# Patient Record
Sex: MALE | Race: WHITE | NOT HISPANIC OR LATINO | Employment: OTHER | URBAN - NONMETROPOLITAN AREA
[De-identification: names, ages, dates, MRNs, and addresses within clinical notes are randomized per-mention and may not be internally consistent; named-entity substitution may affect disease eponyms.]

---

## 2017-01-18 ENCOUNTER — APPOINTMENT (OUTPATIENT)
Dept: LAB | Facility: HOSPITAL | Age: 72
End: 2017-01-18
Attending: INTERNAL MEDICINE
Payer: COMMERCIAL

## 2017-01-18 ENCOUNTER — TRANSCRIBE ORDERS (OUTPATIENT)
Dept: ADMINISTRATIVE | Facility: HOSPITAL | Age: 72
End: 2017-01-18

## 2017-01-18 DIAGNOSIS — E03.9 HYPOTHYROIDISM: ICD-10-CM

## 2017-01-18 DIAGNOSIS — Z00.00 ENCOUNTER FOR GENERAL ADULT MEDICAL EXAMINATION WITHOUT ABNORMAL FINDINGS: ICD-10-CM

## 2017-01-18 DIAGNOSIS — I10 ESSENTIAL (PRIMARY) HYPERTENSION: ICD-10-CM

## 2017-01-18 DIAGNOSIS — E78.5 HYPERLIPIDEMIA: ICD-10-CM

## 2017-01-18 LAB
25(OH)D3 SERPL-MCNC: 37.3 NG/ML (ref 30–100)
ALBUMIN SERPL BCP-MCNC: 3.7 G/DL (ref 3.5–5)
ALP SERPL-CCNC: 92 U/L (ref 46–116)
ALT SERPL W P-5'-P-CCNC: 25 U/L (ref 12–78)
ANION GAP SERPL CALCULATED.3IONS-SCNC: 11 MMOL/L (ref 4–13)
AST SERPL W P-5'-P-CCNC: 18 U/L (ref 5–45)
BILIRUB SERPL-MCNC: 0.4 MG/DL (ref 0.2–1)
BUN SERPL-MCNC: 30 MG/DL (ref 5–25)
CALCIUM SERPL-MCNC: 9 MG/DL (ref 8.3–10.1)
CHLORIDE SERPL-SCNC: 101 MMOL/L (ref 100–108)
CHOLEST SERPL-MCNC: 129 MG/DL (ref 50–200)
CO2 SERPL-SCNC: 28 MMOL/L (ref 21–32)
CREAT SERPL-MCNC: 1.49 MG/DL (ref 0.6–1.3)
EST. AVERAGE GLUCOSE BLD GHB EST-MCNC: 180 MG/DL
GFR SERPL CREATININE-BSD FRML MDRD: 46.8 ML/MIN/1.73SQ M
GLUCOSE SERPL-MCNC: 142 MG/DL (ref 65–140)
HBA1C MFR BLD: 7.9 % (ref 4.2–6.3)
HDLC SERPL-MCNC: 48 MG/DL (ref 40–60)
LDLC SERPL CALC-MCNC: 68 MG/DL (ref 0–100)
POTASSIUM SERPL-SCNC: 4.3 MMOL/L (ref 3.5–5.3)
PROT SERPL-MCNC: 6.6 G/DL (ref 6.4–8.2)
SODIUM SERPL-SCNC: 140 MMOL/L (ref 136–145)
TRIGL SERPL-MCNC: 65 MG/DL

## 2017-01-18 PROCEDURE — 83036 HEMOGLOBIN GLYCOSYLATED A1C: CPT

## 2017-01-18 PROCEDURE — 80053 COMPREHEN METABOLIC PANEL: CPT

## 2017-01-18 PROCEDURE — 36415 COLL VENOUS BLD VENIPUNCTURE: CPT

## 2017-01-18 PROCEDURE — 80061 LIPID PANEL: CPT

## 2017-01-18 PROCEDURE — 82306 VITAMIN D 25 HYDROXY: CPT

## 2017-01-24 DIAGNOSIS — E11.9 TYPE 2 DIABETES MELLITUS WITHOUT COMPLICATIONS (HCC): ICD-10-CM

## 2017-01-27 ENCOUNTER — ALLSCRIPTS OFFICE VISIT (OUTPATIENT)
Dept: OTHER | Facility: OTHER | Age: 72
End: 2017-01-27

## 2017-03-27 ENCOUNTER — GENERIC CONVERSION - ENCOUNTER (OUTPATIENT)
Dept: OTHER | Facility: OTHER | Age: 72
End: 2017-03-27

## 2017-03-27 DIAGNOSIS — E11.9 TYPE 2 DIABETES MELLITUS WITHOUT COMPLICATIONS (HCC): ICD-10-CM

## 2017-04-20 DIAGNOSIS — E11.9 TYPE 2 DIABETES MELLITUS WITHOUT COMPLICATIONS (HCC): ICD-10-CM

## 2017-04-20 DIAGNOSIS — Z12.11 ENCOUNTER FOR SCREENING FOR MALIGNANT NEOPLASM OF COLON: ICD-10-CM

## 2017-04-21 ENCOUNTER — ALLSCRIPTS OFFICE VISIT (OUTPATIENT)
Dept: OTHER | Facility: OTHER | Age: 72
End: 2017-04-21

## 2017-05-11 ENCOUNTER — TRANSCRIBE ORDERS (OUTPATIENT)
Dept: ADMINISTRATIVE | Facility: HOSPITAL | Age: 72
End: 2017-05-11

## 2017-05-11 ENCOUNTER — APPOINTMENT (OUTPATIENT)
Dept: LAB | Facility: HOSPITAL | Age: 72
End: 2017-05-11
Attending: INTERNAL MEDICINE
Payer: COMMERCIAL

## 2017-05-11 DIAGNOSIS — E11.9 TYPE 2 DIABETES MELLITUS WITHOUT COMPLICATIONS (HCC): ICD-10-CM

## 2017-05-11 LAB
ALBUMIN SERPL BCP-MCNC: 4 G/DL (ref 3.5–5)
ALP SERPL-CCNC: 80 U/L (ref 46–116)
ALT SERPL W P-5'-P-CCNC: 26 U/L (ref 12–78)
ANION GAP SERPL CALCULATED.3IONS-SCNC: 8 MMOL/L (ref 4–13)
AST SERPL W P-5'-P-CCNC: 21 U/L (ref 5–45)
BILIRUB SERPL-MCNC: 0.4 MG/DL (ref 0.2–1)
BUN SERPL-MCNC: 33 MG/DL (ref 5–25)
CALCIUM SERPL-MCNC: 8.9 MG/DL (ref 8.3–10.1)
CHLORIDE SERPL-SCNC: 103 MMOL/L (ref 100–108)
CO2 SERPL-SCNC: 30 MMOL/L (ref 21–32)
CREAT SERPL-MCNC: 1.62 MG/DL (ref 0.6–1.3)
CREAT UR-MCNC: 65.8 MG/DL
EST. AVERAGE GLUCOSE BLD GHB EST-MCNC: 174 MG/DL
GFR SERPL CREATININE-BSD FRML MDRD: 42.5 ML/MIN/1.73SQ M
GLUCOSE P FAST SERPL-MCNC: 84 MG/DL (ref 65–99)
HBA1C MFR BLD: 7.7 % (ref 4.2–6.3)
MICROALBUMIN UR-MCNC: 21.4 MG/L (ref 0–20)
MICROALBUMIN/CREAT 24H UR: 33 MG/G CREATININE (ref 0–30)
POTASSIUM SERPL-SCNC: 4.6 MMOL/L (ref 3.5–5.3)
PROT SERPL-MCNC: 6.9 G/DL (ref 6.4–8.2)
SODIUM SERPL-SCNC: 141 MMOL/L (ref 136–145)
TSH SERPL DL<=0.05 MIU/L-ACNC: 3.2 UIU/ML (ref 0.36–3.74)

## 2017-05-11 PROCEDURE — 82570 ASSAY OF URINE CREATININE: CPT

## 2017-05-11 PROCEDURE — 82043 UR ALBUMIN QUANTITATIVE: CPT

## 2017-05-11 PROCEDURE — 80053 COMPREHEN METABOLIC PANEL: CPT

## 2017-05-11 PROCEDURE — 84443 ASSAY THYROID STIM HORMONE: CPT

## 2017-05-11 PROCEDURE — 83036 HEMOGLOBIN GLYCOSYLATED A1C: CPT

## 2017-05-11 PROCEDURE — 36415 COLL VENOUS BLD VENIPUNCTURE: CPT

## 2017-05-24 ENCOUNTER — ALLSCRIPTS OFFICE VISIT (OUTPATIENT)
Dept: OTHER | Facility: OTHER | Age: 72
End: 2017-05-24

## 2017-05-24 DIAGNOSIS — E78.5 HYPERLIPIDEMIA: ICD-10-CM

## 2017-05-24 DIAGNOSIS — E03.9 HYPOTHYROIDISM: ICD-10-CM

## 2017-05-24 DIAGNOSIS — I10 ESSENTIAL (PRIMARY) HYPERTENSION: ICD-10-CM

## 2017-05-24 DIAGNOSIS — E11.9 TYPE 2 DIABETES MELLITUS WITHOUT COMPLICATIONS (HCC): ICD-10-CM

## 2017-06-15 ENCOUNTER — APPOINTMENT (OUTPATIENT)
Dept: LAB | Facility: HOSPITAL | Age: 72
End: 2017-06-15
Payer: COMMERCIAL

## 2017-06-15 ENCOUNTER — TRANSCRIBE ORDERS (OUTPATIENT)
Dept: ADMINISTRATIVE | Facility: HOSPITAL | Age: 72
End: 2017-06-15

## 2017-06-15 DIAGNOSIS — Z00.8 HEALTH EXAMINATION IN POPULATION SURVEY: ICD-10-CM

## 2017-06-15 DIAGNOSIS — Z00.8 HEALTH EXAMINATION IN POPULATION SURVEY: Primary | ICD-10-CM

## 2017-06-15 LAB
CHOLEST SERPL-MCNC: 127 MG/DL (ref 50–200)
EST. AVERAGE GLUCOSE BLD GHB EST-MCNC: 171 MG/DL
HBA1C MFR BLD: 7.6 % (ref 4.2–6.3)
HDLC SERPL-MCNC: 50 MG/DL (ref 40–60)
LDLC SERPL CALC-MCNC: 63 MG/DL (ref 0–100)
TRIGL SERPL-MCNC: 72 MG/DL

## 2017-06-15 PROCEDURE — 83036 HEMOGLOBIN GLYCOSYLATED A1C: CPT

## 2017-06-15 PROCEDURE — 36415 COLL VENOUS BLD VENIPUNCTURE: CPT

## 2017-06-15 PROCEDURE — 80061 LIPID PANEL: CPT

## 2017-07-05 ENCOUNTER — APPOINTMENT (OUTPATIENT)
Dept: LAB | Facility: HOSPITAL | Age: 72
End: 2017-07-05
Attending: INTERNAL MEDICINE
Payer: COMMERCIAL

## 2017-07-05 ENCOUNTER — TRANSCRIBE ORDERS (OUTPATIENT)
Dept: ADMINISTRATIVE | Facility: HOSPITAL | Age: 72
End: 2017-07-05

## 2017-07-05 DIAGNOSIS — Z12.11 ENCOUNTER FOR SCREENING FOR MALIGNANT NEOPLASM OF COLON: ICD-10-CM

## 2017-07-05 LAB — HEMOCCULT STL QL IA: NEGATIVE

## 2017-07-05 PROCEDURE — G0328 FECAL BLOOD SCRN IMMUNOASSAY: HCPCS

## 2017-07-20 DIAGNOSIS — E11.9 TYPE 2 DIABETES MELLITUS WITHOUT COMPLICATIONS (HCC): ICD-10-CM

## 2017-07-21 ENCOUNTER — ALLSCRIPTS OFFICE VISIT (OUTPATIENT)
Dept: OTHER | Facility: OTHER | Age: 72
End: 2017-07-21

## 2017-07-21 ENCOUNTER — GENERIC CONVERSION - ENCOUNTER (OUTPATIENT)
Dept: OTHER | Facility: OTHER | Age: 72
End: 2017-07-21

## 2017-08-21 ENCOUNTER — ALLSCRIPTS OFFICE VISIT (OUTPATIENT)
Dept: OTHER | Facility: OTHER | Age: 72
End: 2017-08-21

## 2017-08-24 DIAGNOSIS — Z12.5 ENCOUNTER FOR SCREENING FOR MALIGNANT NEOPLASM OF PROSTATE: ICD-10-CM

## 2017-08-24 DIAGNOSIS — M19.90 OSTEOARTHRITIS: ICD-10-CM

## 2017-08-24 DIAGNOSIS — E11.9 TYPE 2 DIABETES MELLITUS WITHOUT COMPLICATIONS (HCC): ICD-10-CM

## 2017-08-24 DIAGNOSIS — M25.551 PAIN IN RIGHT HIP: ICD-10-CM

## 2017-09-05 ENCOUNTER — APPOINTMENT (OUTPATIENT)
Dept: LAB | Facility: HOSPITAL | Age: 72
End: 2017-09-05
Payer: COMMERCIAL

## 2017-09-05 ENCOUNTER — TRANSCRIBE ORDERS (OUTPATIENT)
Dept: ADMINISTRATIVE | Facility: HOSPITAL | Age: 72
End: 2017-09-05

## 2017-09-05 ENCOUNTER — APPOINTMENT (OUTPATIENT)
Dept: LAB | Facility: HOSPITAL | Age: 72
End: 2017-09-05
Attending: INTERNAL MEDICINE
Payer: COMMERCIAL

## 2017-09-05 ENCOUNTER — APPOINTMENT (OUTPATIENT)
Dept: RADIOLOGY | Facility: CLINIC | Age: 72
End: 2017-09-05
Payer: COMMERCIAL

## 2017-09-05 ENCOUNTER — OFFICE VISIT (OUTPATIENT)
Dept: URGENT CARE | Facility: CLINIC | Age: 72
End: 2017-09-05
Payer: COMMERCIAL

## 2017-09-05 DIAGNOSIS — E11.9 TYPE 2 DIABETES MELLITUS WITHOUT COMPLICATIONS (HCC): ICD-10-CM

## 2017-09-05 DIAGNOSIS — I10 ESSENTIAL (PRIMARY) HYPERTENSION: ICD-10-CM

## 2017-09-05 DIAGNOSIS — M25.551 PAIN IN RIGHT HIP: ICD-10-CM

## 2017-09-05 DIAGNOSIS — Z12.5 ENCOUNTER FOR SCREENING FOR MALIGNANT NEOPLASM OF PROSTATE: ICD-10-CM

## 2017-09-05 DIAGNOSIS — E78.5 HYPERLIPIDEMIA: ICD-10-CM

## 2017-09-05 DIAGNOSIS — E03.9 HYPOTHYROIDISM: ICD-10-CM

## 2017-09-05 LAB
ALBUMIN SERPL BCP-MCNC: 3.3 G/DL (ref 3.5–5)
ALP SERPL-CCNC: 79 U/L (ref 46–116)
ALT SERPL W P-5'-P-CCNC: 21 U/L (ref 12–78)
ANION GAP SERPL CALCULATED.3IONS-SCNC: 11 MMOL/L (ref 4–13)
AST SERPL W P-5'-P-CCNC: 16 U/L (ref 5–45)
BILIRUB SERPL-MCNC: 0.4 MG/DL (ref 0.2–1)
BUN SERPL-MCNC: 31 MG/DL (ref 5–25)
CALCIUM SERPL-MCNC: 9 MG/DL (ref 8.3–10.1)
CHLORIDE SERPL-SCNC: 98 MMOL/L (ref 100–108)
CHOLEST SERPL-MCNC: 114 MG/DL (ref 50–200)
CO2 SERPL-SCNC: 26 MMOL/L (ref 21–32)
CREAT SERPL-MCNC: 2.04 MG/DL (ref 0.6–1.3)
EST. AVERAGE GLUCOSE BLD GHB EST-MCNC: 180 MG/DL
GFR SERPL CREATININE-BSD FRML MDRD: 32 ML/MIN/1.73SQ M
GLUCOSE P FAST SERPL-MCNC: 77 MG/DL (ref 65–99)
HBA1C MFR BLD: 7.9 % (ref 4.2–6.3)
HDLC SERPL-MCNC: 50 MG/DL (ref 40–60)
LDLC SERPL CALC-MCNC: 51 MG/DL (ref 0–100)
POTASSIUM SERPL-SCNC: 4.8 MMOL/L (ref 3.5–5.3)
PROT SERPL-MCNC: 6.7 G/DL (ref 6.4–8.2)
PSA SERPL-MCNC: 1.3 NG/ML (ref 0–4)
SODIUM SERPL-SCNC: 135 MMOL/L (ref 136–145)
TRIGL SERPL-MCNC: 66 MG/DL
TSH SERPL DL<=0.05 MIU/L-ACNC: 2.39 UIU/ML (ref 0.36–3.74)

## 2017-09-05 PROCEDURE — 83036 HEMOGLOBIN GLYCOSYLATED A1C: CPT

## 2017-09-05 PROCEDURE — 80061 LIPID PANEL: CPT

## 2017-09-05 PROCEDURE — 73502 X-RAY EXAM HIP UNI 2-3 VIEWS: CPT

## 2017-09-05 PROCEDURE — S9088 SERVICES PROVIDED IN URGENT: HCPCS

## 2017-09-05 PROCEDURE — 99213 OFFICE O/P EST LOW 20 MIN: CPT

## 2017-09-05 PROCEDURE — 80053 COMPREHEN METABOLIC PANEL: CPT

## 2017-09-05 PROCEDURE — 84443 ASSAY THYROID STIM HORMONE: CPT

## 2017-09-05 PROCEDURE — G0103 PSA SCREENING: HCPCS

## 2017-09-05 PROCEDURE — 36415 COLL VENOUS BLD VENIPUNCTURE: CPT

## 2017-09-21 ENCOUNTER — ALLSCRIPTS OFFICE VISIT (OUTPATIENT)
Dept: OTHER | Facility: OTHER | Age: 72
End: 2017-09-21

## 2017-09-21 ENCOUNTER — GENERIC CONVERSION - ENCOUNTER (OUTPATIENT)
Dept: OTHER | Facility: OTHER | Age: 72
End: 2017-09-21

## 2017-09-25 ENCOUNTER — GENERIC CONVERSION - ENCOUNTER (OUTPATIENT)
Dept: OTHER | Facility: OTHER | Age: 72
End: 2017-09-25

## 2017-09-25 ENCOUNTER — APPOINTMENT (OUTPATIENT)
Dept: PHYSICAL THERAPY | Facility: HOME HEALTHCARE | Age: 72
End: 2017-09-25
Payer: COMMERCIAL

## 2017-09-25 PROCEDURE — G8978 MOBILITY CURRENT STATUS: HCPCS

## 2017-09-25 PROCEDURE — 97535 SELF CARE MNGMENT TRAINING: CPT

## 2017-09-25 PROCEDURE — G8979 MOBILITY GOAL STATUS: HCPCS

## 2017-09-25 PROCEDURE — 97161 PT EVAL LOW COMPLEX 20 MIN: CPT

## 2017-09-25 PROCEDURE — 97110 THERAPEUTIC EXERCISES: CPT

## 2017-09-27 ENCOUNTER — APPOINTMENT (OUTPATIENT)
Dept: PHYSICAL THERAPY | Facility: HOME HEALTHCARE | Age: 72
End: 2017-09-27
Payer: COMMERCIAL

## 2017-09-27 PROCEDURE — 97110 THERAPEUTIC EXERCISES: CPT

## 2017-09-27 PROCEDURE — 97140 MANUAL THERAPY 1/> REGIONS: CPT

## 2017-09-29 ENCOUNTER — APPOINTMENT (OUTPATIENT)
Dept: PHYSICAL THERAPY | Facility: HOME HEALTHCARE | Age: 72
End: 2017-09-29
Payer: COMMERCIAL

## 2017-09-29 PROCEDURE — 97140 MANUAL THERAPY 1/> REGIONS: CPT

## 2017-09-29 PROCEDURE — 97110 THERAPEUTIC EXERCISES: CPT

## 2017-10-02 ENCOUNTER — APPOINTMENT (OUTPATIENT)
Dept: PHYSICAL THERAPY | Facility: HOME HEALTHCARE | Age: 72
End: 2017-10-02
Payer: COMMERCIAL

## 2017-10-02 PROCEDURE — 97110 THERAPEUTIC EXERCISES: CPT

## 2017-10-02 PROCEDURE — 97140 MANUAL THERAPY 1/> REGIONS: CPT

## 2017-10-04 ENCOUNTER — APPOINTMENT (OUTPATIENT)
Dept: PHYSICAL THERAPY | Facility: HOME HEALTHCARE | Age: 72
End: 2017-10-04
Payer: COMMERCIAL

## 2017-10-04 PROCEDURE — 97140 MANUAL THERAPY 1/> REGIONS: CPT

## 2017-10-04 PROCEDURE — 97110 THERAPEUTIC EXERCISES: CPT

## 2017-10-06 ENCOUNTER — APPOINTMENT (OUTPATIENT)
Dept: PHYSICAL THERAPY | Facility: HOME HEALTHCARE | Age: 72
End: 2017-10-06
Payer: COMMERCIAL

## 2017-10-06 PROCEDURE — 97140 MANUAL THERAPY 1/> REGIONS: CPT

## 2017-10-06 PROCEDURE — 97110 THERAPEUTIC EXERCISES: CPT

## 2017-11-03 ENCOUNTER — ALLSCRIPTS OFFICE VISIT (OUTPATIENT)
Dept: OTHER | Facility: OTHER | Age: 72
End: 2017-11-03

## 2017-11-03 DIAGNOSIS — E55.9 VITAMIN D DEFICIENCY: ICD-10-CM

## 2017-11-03 DIAGNOSIS — C61 MALIGNANT NEOPLASM OF PROSTATE (HCC): ICD-10-CM

## 2017-11-03 DIAGNOSIS — I10 ESSENTIAL (PRIMARY) HYPERTENSION: ICD-10-CM

## 2017-11-03 DIAGNOSIS — E03.9 HYPOTHYROIDISM: ICD-10-CM

## 2017-11-03 DIAGNOSIS — N40.2 NODULAR PROSTATE WITHOUT LOWER URINARY TRACT SYMPTOMS: ICD-10-CM

## 2017-11-03 DIAGNOSIS — E11.9 TYPE 2 DIABETES MELLITUS WITHOUT COMPLICATIONS (HCC): ICD-10-CM

## 2017-11-04 NOTE — PROGRESS NOTES
Assessment  1  DMII (diabetes mellitus, type 2) (250 00) (E11 9)  2  Benign prostatic hyperplasia with urinary obstruction (600 01,599 69) (N40 1,N13 8)  3  Hypertension (401 9) (I10)    Plan   DMII (diabetes mellitus, type 2)    · (1) HEMOGLOBIN A1C; Status:Active; Requested SHAKIRA:07UPX0424;    · Administer: Prevnar 13 Intramuscular Suspension; INJECT 0 5  ML Intramuscular; To Be Done:  81XBP7258  DMII (diabetes mellitus, type 2), Hypertension    · (1) COMPREHENSIVE METABOLIC PANEL; Status:Active; Requested for:03Nov2017;   Hypothyroidism    · (1) TSH; Status:Active; Requested LQE:82QUE8314;   Vitamin D deficiency    · (1) VITAMIN D 25-HYDROXY; Status:Active; Requested IGJ:90HVG7541; Follow-up visit in 4 Months Evaluation and Treatment  Follow-up  Status: Hold For - Scheduling  Requested for: 33TZB3876  Ordered; For: DMII (diabetes mellitus, type 2); Ordered By: Pradeep Jiang  Performed:   Due: 63GOY4020     Discussion/Summary  Discussion Summary:   Rx fbw  Prevnar today  Aware of shingrix and interested when available  Increase water intake  Prostate bx next week  Rto 4 months1        Medication SE Review and Pt Understands Tx: Possible side effects of new medications were reviewed with the patient/guardian today  The treatment plan was reviewed with the patient/guardian  The patient/guardian understands and agrees with the treatment plan       Self Referrals:   Self Referrals: No       1 Amended By: Pradeep Jiang; Nov 03 2017 6:12 PM EST    Chief Complaint  Chief Complaint Free Text Note Form: Pt presents today for a follow up visit  No changes in medications, no need for refills  No new allergies reported  Pt states that he has already received annual flu shot  Pt was asked about pneumo vacc, stated that he was supposed to get it when he last visited the [place], but he had a cold  Goes back to them in the next couple of months, but he would get it here if needed   Pt does have advanced directed, will bring to next visit to copy for chart  Advance Directives  Advance Directive  Luke:   YES - Patient has an advance health care directive  The patient has a living will located  in patient's home  Capacity/Competence: This patient has full decision making capacity for discussion of advance care planning  History of Present Illness  HPI: Pt doing ok  Blood sugar seems ok recently  Did not have labs yet  Having prostate bx next week due to increased psa  Exercises regularly  No hypoglycemia1        1 Amended By: Arlene Toro; Nov 03 2017 6:09 PM EST    Review of Systems  Complete-Male:   Constitutional:1  No fever or chills, feels well, no tiredness, no recent weight gain or weight loss1   Eyes:1  No complaints of eye pain, no red eyes, no discharge from eyes, no itchy eyes1   ENT:1  no complaints of earache, no hearing loss, no nosebleeds, no nasal discharge, no sore throat, no hoarseness1   Cardiovascular: 1  No complaints of slow heart rate, no fast heart rate, no chest pain, no palpitations, no leg claudication, no lower extremity1   Respiratory:1  No complaints of shortness of breath, no wheezing, no cough, no SOB on exertion, no orthopnea or PND1   Gastrointestinal:1  No complaints of abdominal pain, no constipation, no nausea or vomiting, no diarrhea or bloody stools1   Genitourinary:1  No complaints of dysuria, no incontinence, no hesitancy, no nocturia, no genital lesion, no testicular pain1   Musculoskeletal:1  No complaints of arthralgia, no myalgias, no joint swelling or stiffness, no limb pain or swelling1   Integumentary:1  No complaints of skin rash or skin lesions, no itching, no skin wound, no dry skin1   Neurological:1  No compliants of headache, no confusion, no convulsions, no numbness or tingling, no dizziness or fainting, no limb weakness, no difficulty walking1      Psychiatric:1  Is not suicidal, no sleep disturbances, no anxiety or depression, no change in personality, no emotional problems1   Endocrine:1  No complaints of proptosis, no hot flashes, no muscle weakness, no erectile dysfunction, no deepening of the voice, no feelings of weakness1   Hematologic/Lymphatic:1  No complaints of swollen glands, no swollen glands in the neck, does not bleed easily, no easy bruising1         1 Amended By: Wendi Alfonso; Nov 03 2017 6:10 PM EST    Active Problems  1  Acute sinusitis (461 9) (J01 90)  2  Advance directive discussed with patient (V65 49) (Z71 89)  3  Arthritis (716 90) (M19 90)  4  Atypical chest pain (786 59) (R07 89)  5  Benign prostatic hyperplasia with urinary obstruction (600 01,599 69) (N40 1,N13 8)  6  Colon cancer screening (V76 51) (Z12 11)  7  DMII (diabetes mellitus, type 2) (250 00) (E11 9)  8  Encounter for prostate cancer screening (V76 44) (Z12 5)  9  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)  10  Hip pain, left (719 45) (M25 552)  11  Hip pain, right (719 45) (M25 551)  12  Hyperlipidemia (272 4) (E78 5)  13  Hypertension (401 9) (I10)  14  Hypothyroidism (244 9) (E03 9)  15  Incomplete emptying of bladder (788 21) (R33 9)  16  Lumbar radicular pain (724 4) (M54 16)  17  Need for influenza vaccination (V04 81) (Z23)  18  Nocturia (788 43) (R35 1)  19  Prostate nodule (600 10) (N40 2)  20  Right hip pain (719 45) (M25 551)  21  Screening for depression (V79 0) (Z13 89)  22  Sensorineural hearing loss (SNHL) of both ears (389 18) (H90 3)  23  Syncope, near (780 2) (R55)  24  Vitamin D deficiency (268 9) (E55 9)    Past Medical History  1  History of Benign prostatic hypertrophy (600 00) (N40 0)  2  History of Hearing problem (V41 2) (H91 90)  3  History of diabetes mellitus (V12 29) (Z86 39)  4  History of mumps (V12 09) (Z86 19)  5  History of Sinusitis (473 9) (J32 9)  Active Problems And Past Medical History Reviewed: The active problems and past medical history were reviewed and updated today        Surgical History  1  History of Cataract Surgery  2  History of Hand Surgery  3  History of Knee Arthroscopy (Therapeutic)  Surgical History Reviewed: The surgical history was reviewed and updated today  Family History  Mother   1  Family history of Uterine Cancer (V16 49)  Father   2  Family history of diabetes mellitus (V18 0) (Z83 3)  3  Family history of Prostate Cancer (V16 42)  4  Family history of Stroke of unknown cause  Grandmother   5  Family history of malignant neoplasm (V16 9) (Z80 9)  Uncle   6  Family history of diabetes mellitus (V18 0) (Z83 3)  Family History Reviewed: The family history was reviewed and updated today  Social History   · Always uses seat belt   · Being A Social Drinker   · Caffeine Use   · Former smoker (B84 76) (D20 994)   · Lives independently with spouse   ·    · No drug use   · Retired   · Sun Protection Sunscreen   · Uses Safety Equipment - Seatbelts  Social History Reviewed: The social history was reviewed and updated today  The social history was reviewed and is unchanged  Current Meds  1  Aspirin 81 MG TABS; take 2 tablet daily; Therapy: (Recorded:94Geh4617) to Recorded  2  Baclofen 10 MG Oral Tablet; TAKE 1 TABLET EVERY 12HOURS AS NEEDED FOR MUSCLE SPASM; Therapy: 08RJB2208 to (Dennis Root)  Requested for: 81Fia5588; Last Rx:64Sbz3414   Ordered  3  Ciprofloxacin HCl - 500 MG Oral Tablet; Take one tablet twice per day beginning one day before the   biopsy; Therapy: 12BGG4849 to (Last [place])  Requested for: 71Yjr1994 Ordered  4  Fleet Enema 7-19 GM/118ML Rectal Enema; USE AS DIRECTED; Therapy: 45JYJ6238 to (Last [place] [facility])  Requested for: 83Mux7923 Ordered  5  Lantus SoloStar SOLN; 14 units in the am 10 in the evening Recorded  6  Levothyroxine Sodium 88 MCG Oral Tablet; TAKE 1 TABLET DAILY; Therapy: 27AKW1033 to (Evaluate:19Oct2017); Last Rx:24Oct2016 Ordered  7  Linagliptin 5 MG TABS; TAKE 1 TABLET DAILY;    Therapy: (Recorded:84Krm4171) to Recorded  8  Lisinopril 40 MG Oral Tablet; TAKE 1 TABLET DAILY; Therapy: (YXLONYPJ:52TOY2141) to Recorded  9  Meloxicam 7 5 MG Oral Tablet; TAKE 1 TABLET DAILY WITH FOOD; Therapy: 34PHI6769 to [phone])  Requested for: 26VZY8626; Last Rx:49Odo1243   Ordered  10  MetFORMIN HCl - 1000 MG Oral Tablet; Take 1 tablet twice daily  Requested for: 09DOP5475; Last    Rx:18Xcu7919 Ordered  11  Multi Vitamin Mens Oral Tablet; Therapy: (Recorded:17Mml0477) to Recorded  12  NovoLOG 100 UNIT/ML Subcutaneous Solution; INJECT 2 UNIT Before meals; Therapy: (Recorded:62Vpl5602) to Recorded  13  Onglyza 5 MG Oral Tablet; Therapy: 72Zun3550 to Recorded  14  Simvastatin 20 MG Oral Tablet; Take 1 tablet daily; Therapy: 09OTE8523 to (Evaluate:20Mar2014)  Requested for: 25Mar2013; Last Rx:25Mar2013    Ordered  15  Tamsulosin HCl - 0 4 MG Oral Capsule; take 1 capsule daily; Therapy: 17CON3986 to (NJUWZIXZ:01AQT8311); Last Rx:87Rgt6762 Ordered  16  Viagra 100 MG Oral Tablet; TAKE 1 TABLET DAILY 1 HOUR BEFORE NEEDED; Therapy: 81PEF5005 to (Evaluate:15Oct2017); Last Rx:75Fyf6752 Ordered  Medication List Reviewed: The medication list was reviewed and updated today  Allergies  1  No Known Drug Allergies    Vitals  Vital Signs    ** Printed in Appendix #1 below  Physical Exam    Constitutional1    General appearance: No acute distress, well appearing and well nourished  1    Eyes1    Conjunctiva and lids: No swelling, erythema, or discharge  1    Pupils and irises: Equal, round and reactive to light  1    Ears, Nose, Mouth, and Throat1    External inspection of ears and nose: Normal 1    Otoscopic examination: Tympanic membrance translucent with normal light reflex  Canals patent without erythema  1    Nasal mucosa, septum, and turbinates: Normal without edema or erythema  1    Oropharynx: Normal with no erythema, edema, exudate or lesions  1    Pulmonary1    Respiratory effort: No increased work of breathing or signs of respiratory distress  1    Auscultation of lungs: Clear to auscultation, equal breath sounds bilaterally, no wheezes, no rales, no rhonci  1    Cardiovascular1    Palpation of heart: Normal PMI, no thrills  1    Auscultation of heart: Normal rate and rhythm, normal S1 and S2, without murmurs  1    Examination of extremities for edema and/or varicosities: Normal 1    Carotid pulses: Normal 1    Abdomen1    Abdomen: Non-tender, no masses  1    Liver and spleen: No hepatomegaly or splenomegaly  1    Lymphatic1    Palpation of lymph nodes in neck: No lymphadenopathy  1    Musculoskeletal1    Gait and station: Normal 1    Digits and nails: Normal without clubbing or cyanosis  1    Inspection/palpation of joints, bones, and muscles: Normal 1    Skin1    Skin and subcutaneous tissue: Normal without rashes or lesions  1    Neurologic1    Cranial nerves: Cranial nerves 2-12 intact  1    Reflexes: 2+ and symmetric  1    Sensation: No sensory loss  1    Psychiatric1    Orientation to person, place and time: Normal 1    Mood and affect: Normal 1          1 Amended By: Félix Coleman; 2017 6:11 PM EST    Orthocone Maintenance   Medicare Annual Wellness Visit; every 1 year; Last 52IQR0219; Next Due: 66BFX6773; Overdue    Future Appointments    Date/Time Provider Specialty Site   2018 08:00 AM ROSANA Early  Otolaryngology [facility]   2017 01:30 PM [facility]  [facility]   2017 11:30 AM ROSANA Elliott   Urology [facility]     Signatures   Electronically signed by : Yanira Guevara DO; Nov  3 2017  9:40AM EST                       (Author)    Electronically signed by : Yanira Guevara DO; Nov  3 2017  6:12PM EST                       (Author)    Appendix #1     Vital Signs   Patient: Maddy Landaverde ; : 1945; MRN: [id number]      Recorded: 03VIP5800 06:10PM Recorded: 04FZP7225 09:12AM Recorded: 96BLZ2693 09:10AM   Temperature 1 96 9 F    Heart Rate 1  57   Systolic 2749 1 1   Diastolic 663 1 1   Height 1  5 ft 6 in   Weight 1  155 lb 8 0 oz   BMI Calculated 1  25 1   BSA Calculated 1  1 8   O2 Saturation 1  99        1  Amended By: Maite Becerra;  Nov 03 2017 6:10 PM EST

## 2017-11-07 ENCOUNTER — TRANSCRIBE ORDERS (OUTPATIENT)
Dept: ADMINISTRATIVE | Facility: HOSPITAL | Age: 72
End: 2017-11-07

## 2017-11-07 ENCOUNTER — APPOINTMENT (OUTPATIENT)
Dept: LAB | Facility: HOSPITAL | Age: 72
End: 2017-11-07
Attending: UROLOGY
Payer: COMMERCIAL

## 2017-11-07 ENCOUNTER — ALLSCRIPTS OFFICE VISIT (OUTPATIENT)
Dept: OTHER | Facility: OTHER | Age: 72
End: 2017-11-07

## 2017-11-07 DIAGNOSIS — N40.2 NODULAR PROSTATE WITHOUT LOWER URINARY TRACT SYMPTOMS: ICD-10-CM

## 2017-11-07 PROCEDURE — G0416 PROSTATE BIOPSY, ANY MTHD: HCPCS

## 2017-11-07 PROCEDURE — 88344 IMHCHEM/IMCYTCHM EA MLT ANTB: CPT

## 2017-11-08 NOTE — PROCEDURES
Assessment  1  Prostate nodule (600 10) (N40 2)    Plan  Prostate nodule    · (1) TISSUE EXAM; Status:Active - Retrospective By Protocol Authorization; Requested  WYJ:[id number];    Perform:[facility]; [id number]; Last Updated By:Chela Lopez; 2017 2:02:00 PM;Ordered; For:Prostate nodule; Ordered By:Lucho Faria;  L : LEFT LATERAL APEX  L Date/Time: : 18JYR3064  L : Prostate Needle Biopsy  K : LEFT LATERAL MID  K Date/Time: : 40MJX6015  K : Prostate Needle Biopsy  J : LEFT LATERAL BASE  J Date/Time: : 88SDD5273  J : Prostate Needle Biopsy  I : LEFT MEDIAL APEX  I Date/Time: : 85BUS3510  I : Prostate Needle Biopsy  H : LEFT MEDIAL MID  H Date/Time: : 63RFL6242  H : Prostate Needle Biopsy  G : LEFT MEDIAL BASE  G Date/Time: : 64REL1445  G : Prostate Needle Biopsy  F : RIGHT MEDIAL APEX  F Date/Time: : 54PCC0409  F : Prostate Needle Biopsy  E : RIGHT MEDIAL MID  E Date/Time: : 85MMK3625  E : Prostate Needle Biopsy  D : RIGHT MEDIAL BASE  D Date/Time: : 28CDK9809  D : Prostate Needle Biopsy  C : RIGHT LATERAL APEX  C Date/Time: : 97DSD5540  C : Prostate Needle Biopsy  B : RIGHT LATERAL MID  B Date/Time: : 51CIR8364  B : Prostate Needle Biopsy  A : RIGHT LATERAL BASE  A Date/Time: : 33CYG1056  A : Prostate Needle Biopsy  Impression: : PROSTATE NODULE    Discussion/Summary  Discussion Summary:   discussed with the patient potential side effects from prostate biopsy including bleeding from his bladder, bleeding from his rectum, and bleeding in his semen up to about 2-4 weeks  The patient knows that should he have severe uncontrolled bleeding he is to call the office or proceed immediately to the emergency room  Additionally counseled the patient to monitor for fevers greater 100 3  He will finish out his course of antibiotics  will return in 2 weeks for discussion of his pathology         Chief Complaint  Chief Complaint Free Text Note Form: Pt returns to office for prostate biopsy for prostate nodule  History of Present Illness  HPI: 82-year-old male with a family history of prostate cancer in his father  Patient has a significant history of lower urinary tract symptoms that have been managed with Flomax  The patient also has some erectile dysfunction which is likely related to his diabetes  The patient has had success with Viagra in the past but unfortunately was taking it around the same time as his Flomax and developed hypotension and an episode of passing out  patient was started on Cialis 5 mg by his endocrinologist but has not been taking this on a daily basis as is the administration instructions recommend  He has not noted any benefit with his erections from daily Cialis  He continues on Flomax as well  PSA last year was 0 6 and this year is up to 1 3  He was examined by the [de-identified] assistant and there was some concern about the right side of his prostate  He presents again today for discussion and reexamination  presents today for his prostate biopsy  He did take his antibiotics  He presents with his wife who is a former my nurse perioperative nurse  Review of Systems  ROS Reviewed:   ROS reviewed  Active Problems  1  Acute sinusitis (461 9) (J01 90)   2  Advance directive discussed with patient (V65 49) (Z71 89)   3  Arthritis (716 90) (M19 90)   4  Atypical chest pain (786 59) (R07 89)   5  Benign prostatic hyperplasia with urinary obstruction (600 01,599 69) (N40 1,N13 8)   6  Colon cancer screening (V76 51) (Z12 11)   7  DMII (diabetes mellitus, type 2) (250 00) (E11 9)   8  Encounter for prostate cancer screening (V76 44) (Z12 5)   9  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)   10  Hip pain, left (719 45) (M25 552)   11  Hip pain, right (719 45) (M25 551)   12  Hyperlipidemia (272 4) (E78 5)   13  Hypertension (401 9) (I10)   14  Hypothyroidism (244 9) (E03 9)   15  Incomplete emptying of bladder (788 21) (R33 9)   16   Lumbar radicular pain (724 4) (M54 16)   17  Need for influenza vaccination (V04 81) (Z23)   18  Need for pneumococcal vaccination (V03 82) (Z23)   19  Nocturia (788 43) (R35 1)   20  Prostate nodule (600 10) (N40 2)   21  Right hip pain (719 45) (M25 551)   22  Screening for depression (V79 0) (Z13 89)   23  Sensorineural hearing loss (SNHL) of both ears (389 18) (H90 3)   24  Syncope, near (780 2) (R55)   25  Vitamin D deficiency (268 9) (E55 9)    Past Medical History  1  History of Benign prostatic hypertrophy (600 00) (N40 0)   2  History of Hearing problem (V41 2) (H91 90)   3  History of diabetes mellitus (V12 29) (Z86 39)   4  History of mumps (V12 09) (Z86 19)   5  History of Sinusitis (473 9) (J32 9)  Active Problems And Past Medical History Reviewed: The active problems and past medical history were reviewed and updated today  Surgical History  1  History of Cataract Surgery   2  History of Hand Surgery   3  History of Knee Arthroscopy (Therapeutic)   4  History of Needle Biopsy Of Prostate  Surgical History Reviewed: The surgical history was reviewed and updated today  Family History  Mother    1  Family history of Uterine Cancer (V16 49)  Father    2  Family history of diabetes mellitus (V18 0) (Z83 3)   3  Family history of Prostate Cancer (V16 42)   4  Family history of Stroke of unknown cause  Grandmother    5  Family history of malignant neoplasm (V16 9) (Z80 9)  Uncle    6  Family history of diabetes mellitus (V18 0) (Z83 3)  Family History Reviewed: The family history was reviewed and updated today  Social History   · Always uses seat belt   · Being A Social Drinker   · Caffeine Use   · Former smoker (T73 55) (Y31 056)   · Lives independently with spouse   ·    · No drug use   · Retired   · Sun Protection Sunscreen   · Uses Safety Equipment - Seatbelts  Social History Reviewed: The social history was reviewed and is unchanged  Current Meds   1   Aspirin 81 MG TABS; take 2 tablet daily; Therapy: (Recorded:2014) to Recorded   2  Baclofen 10 MG Oral Tablet; TAKE 1 TABLET EVERY 12HOURS AS NEEDED FOR   MUSCLE SPASM; Therapy: 35KNH3207 to (02 423 135)  Requested for: 2017; Last   Rx:2017 Ordered   3  Ciprofloxacin HCl - 500 MG Oral Tablet; Take one tablet twice per day beginning one day   before the biopsy; Therapy: 87RWB2324 to (Last Baljit Teran)  Requested for: 2017 Ordered   4  Lantus SoloStar SOLN; 14 units in the am 10 in the evening Recorded   5  Levothyroxine Sodium 88 MCG Oral Tablet; TAKE 1 TABLET DAILY; Therapy: 29EAF2938 to (Evaluate:2017); Last Rx:2016 Ordered   6  Linagliptin 5 MG TABS; TAKE 1 TABLET DAILY; Therapy: (Recorded:06Foy6200) to Recorded   7  Lisinopril 40 MG Oral Tablet; TAKE 1 TABLET DAILY; Therapy: (YVVUYJQZ:51FSI2101) to Recorded   8  Meloxicam 7 5 MG Oral Tablet; TAKE 1 TABLET DAILY WITH FOOD; Therapy: 40TLM9422 to (69 146 727)  Requested for: 27WDK3845; Last   Rx:2017 Ordered   9  MetFORMIN HCl - 1000 MG Oral Tablet; Take 1 tablet twice daily  Requested for:   58TOC5310; Last Rx:2014 Ordered   10  Multi Vitamin Mens Oral Tablet; Therapy: (Recorded:16Bbq8407) to Recorded   11  NovoLOG 100 UNIT/ML Subcutaneous Solution; INJECT 2 UNIT Before meals; Therapy: (Recorded:2017) to Recorded   12  Onglyza 5 MG Oral Tablet; Therapy: 48Uku2107 to Recorded   13  Simvastatin 20 MG Oral Tablet; Take 1 tablet daily; Therapy: 00RAJ6333 to (Evaluate:2014)  Requested for: 2013; Last    Rx:2013 Ordered   14  Tamsulosin HCl - 0 4 MG Oral Capsule; take 1 capsule daily; Therapy: 27ATB3967 to (SNILT01STI3243); Last Rx:2017 Ordered   15  Viagra 100 MG Oral Tablet; TAKE 1 TABLET DAILY 1 HOUR BEFORE NEEDED; Therapy: 91DMJ7930 to (Evaluate:2017); Last Rx:08Mdg2862 Ordered  Medication List Reviewed: The medication list was reviewed and updated today  Allergies  1   No Known Drug Allergies    Vitals  Vital Signs    Recorded: 08FOW9540 01:49PM   Heart Rate 80   Respiration 20   Systolic 601   Diastolic 90   Height 5 ft 6 in   Weight 156 lb    BMI Calculated 25 18   BSA Calculated 1 8     Physical Exam    Constitutional   General appearance: No acute distress, well appearing and well nourished  Pulmonary   Respiratory effort: No increased work of breathing or signs of respiratory distress  Cardiovascular   Examination of extremities for edema and/or varicosities: Normal     Abdomen   Abdomen: Non-tender, no masses  Genitourinary   Scrotum contents: Normal size, no masses  Epididymis: Normal, no masses  Testes: Normal testes, no masses  Urethral meatus: Normal, no lesions  Penis: Normal, no lesions  Digital rectal exam of prostate: Abnormal  -- Induration of the right reilly prostate  Musculoskeletal   Gait and station: Normal     Skin   Skin and subcutaneous tissue: Normal without rashes or lesions  Results/Data  (1) PSA (SCREEN) (Dx V76 44 Screen for Prostate Cancer) 35Uiw0437 06:49AM Suzanne Hidalgo    Order Number: [id number]     Test Name Result Flag Reference   PROSTATE SPECIFIC ANTIGEN 1 3 ng/mL  0 0-4 0   American Urological Association Guidelines define biochemical recurrence of prostate cancer as a detectable or rising PSA value post-radical prostatectomy that is greater than or equal to 0 2 ng/mL with a second confirmatory level of greater than or equal to 0 2 ng/mL  Procedure  Biopsy note:  patient returns to the office today to undergo a transrectal ultrasound-guided biopsy of the prostate secondary to abnormal rectal exam and PSA velocity  Risks and benefits of the procedure were discussed  Informed consent was obtained  The patient's prebiopsy preparation was deemed to be adequate  Antibiotics had been taken as prescribed  If appropriate blood thinners, had been placed on hold   The patient completed an enema as prescribed  patient was placed in the lateral decubitus position  Digital rectal examination was performed revealing a 20 gram prostate  Viscous lidocaine jelly was instilled into the rectum  Transrectal ultrasonography was then performed  The prostate measured 24 8 grams  cc of 2% lidocaine were then injected bilaterally between the junction of the base of the prostate and the seminal vesicles  Ultrasound guidance was utilized to place the needle into proper position for the administration of the local anesthetic  A standard 12 core biopsy was then performed with one core from each the right later base, mid and apex; right medial base, mid and apex; left medial base, mid and apex; left lateral base, mid, apex  pressure was held for 5 minutes for hemostasis  Overall the patient tolerated the procedure and there were no complications  Future Appointments    Date/Time Provider Specialty Site   03/30/2018 08:30 AM Samuel Jurado DO Internal Medicine [address] INTERNAL MEDICINE [facility]   01/19/2018 08:00 AM ROSANA Bedoya  Otolaryngology [address] [facility]   11/22/2017 11:30 AM ROSANA Gandhi   Urology [address]     Signatures   Electronically signed by : ROSANA Garcia ; Nov 7 2017  2:28PM EST                       (Author)

## 2017-11-22 ENCOUNTER — GENERIC CONVERSION - ENCOUNTER (OUTPATIENT)
Dept: OTHER | Facility: OTHER | Age: 72
End: 2017-11-22

## 2017-12-06 ENCOUNTER — GENERIC CONVERSION - ENCOUNTER (OUTPATIENT)
Dept: UROLOGY | Facility: HOSPITAL | Age: 72
End: 2017-12-06

## 2017-12-06 ENCOUNTER — APPOINTMENT (OUTPATIENT)
Dept: PHYSICAL THERAPY | Facility: CLINIC | Age: 72
End: 2017-12-06
Payer: COMMERCIAL

## 2017-12-06 PROCEDURE — 97112 NEUROMUSCULAR REEDUCATION: CPT

## 2017-12-06 PROCEDURE — G8979 MOBILITY GOAL STATUS: HCPCS

## 2017-12-06 PROCEDURE — G8980 MOBILITY D/C STATUS: HCPCS

## 2017-12-06 PROCEDURE — 97530 THERAPEUTIC ACTIVITIES: CPT

## 2017-12-06 PROCEDURE — G8978 MOBILITY CURRENT STATUS: HCPCS

## 2017-12-06 PROCEDURE — 97163 PT EVAL HIGH COMPLEX 45 MIN: CPT

## 2018-01-09 NOTE — CONSULTS
Chief Complaint  Chief Complaint Free Text Note Form: Sinusitis/Ref by [facility] and Dr Rachel Rios      History of Present Illness  HPI: 80-year-old male referred for evaluation of recurrent acute sinusitis  Patient's had 2 episodes of severe sinus infections lasting less than one week  The first was in November  He had a recurrence of similar event in February  Both vents had severe facial pressure and pain, dental pain, hyposmia, and purulent nasal drainage  For both events he was given azithromycin with complete resolution of symptoms within 7-10 days  He denies any ongoing problems with allergic rhinitis, nasal obstruction, congestion, facial pressure, or pain  He is otherwise in his normal state of health  No neck masses  Review of Systems  Complete ENT ROS [facility]:   Eyes: No complaints of itching, excessive tearing or vision changes  Ears: No complaints of hearing loss, discharge, imbalance, recent ear infections, or tinnitus  Nose: congestion,sinusitis  Mouth: No sores in mouth, no altered taste, no dental problems  Throat: No complaints of throat pain, no difficulty swallowing, no hoarseness  Neck: No neck soreness, no neck pain, no neck lumps or swelling  Genitourinary: No complaints of dysuria, flank pain or frequent urination  Cardiovascular: No complaints of chest pain or palpitations  Respiratory: No complaints of shortness of breath, cough or wheezing  Gastrointestinal: No complaints of heartburn, nausea/vomiting, or constipation  Neurological: No complaints of headache, convulsions or memory loss  ROS Reviewed:   ROS reviewed  Active Problems    1  Acute sinusitis (461 9) (J01 90)   2  Advance directive discussed with patient (V65 49) (Z71 89)   3  Arthritis (716 90) (M19 90)   4  Atypical chest pain (786 59) (R07 89)   5  Benign prostatic hyperplasia with urinary obstruction (600 01,599 69) (N40 1,N13 8)   6  DMII (diabetes mellitus, type 2) (250 00) (E11 9)   7  Hyperlipidemia (272 4) (E78 5)   8  Hypertension (401 9) (I10)   9  Hypothyroidism (244 9) (E03 9)   10  Incomplete emptying of bladder (788 21) (R33 9)   11  Need for influenza vaccination (V04 81) (Z23)   12  Nocturia (788 43) (R35 1)   13  Syncope, near (780 2) (R55)   14  Vitamin D deficiency (268 9) (E55 9)    Past Medical History    1  History of Benign prostatic hypertrophy (600 00) (N40 0)   2  History of Cataracts, bilateral (366 9) (H26 9)   3  History of Colon cancer screening (V76 51) (Z12 11)   4  History of Encounter for prostate cancer screening (V76 44) (Z12 5)   5  History of Hearing problem (V41 2) (H91 90)   6  History of diabetes mellitus (V12 29) (Z86 39)   7  History of mumps (V12 09) (Z86 19)   8  History of Nausea (787 02) (R11 0)   9  History of Need for influenza vaccination (V04 81) (Z23)   10  History of Sinusitis (473 9) (J32 9)   11  History of Skin tag (701 9) (L91 8)   12  History of Urinary tract infection (599 0) (N39 0)  Past Medical History Reviewed: The past medical history was reviewed and updated today  Surgical History    1  History of Cataract Surgery   2  History of Hand Surgery   3  History of Knee Arthroscopy (Therapeutic)  Surgical History Reviewed: The surgical history was reviewed and updated today  Family History    1  Family history of Uterine Cancer (V16 49)    2  Family history of diabetes mellitus (V18 0) (Z83 3)   3  Family history of Prostate Cancer (V16 42)   4  Family history of Stroke of unknown cause    5  Family history of malignant neoplasm (V16 9) (Z80 9)    6  Family history of diabetes mellitus (V18 0) (Z83 3)  Family History Reviewed: The family history was reviewed and updated today         Social History    · Always uses seat belt   · Being A Social Drinker   · Caffeine Use   · Former smoker (L70 89) (F72 344)   · Lives independently with spouse   ·    · No drug use   · Retired   · Sun Protection Sunscreen   · Uses Safety Equipment - Seatbelts  Social History Reviewed: The social history was reviewed and updated today  The social history was reviewed and is unchanged  Current Meds   1  Aspirin 81 MG TABS; take 2 tablet daily; Therapy: (Recorded:29Avp5841) to Recorded   2  Lantus SoloStar SOLN; 14 units in the am 10 in the evening Recorded   3  Levothyroxine Sodium 88 MCG Oral Tablet; TAKE 1 TABLET DAILY; Therapy: 21QUW3300 to (Evaluate:19Oct2017); Last Rx:24Oct2016 Ordered   4  Linagliptin 5 MG TABS; TAKE 1 TABLET DAILY; Therapy: (Recorded:36Yve7898) to Recorded   5  Lisinopril 40 MG Oral Tablet; TAKE 1 TABLET DAILY; Therapy: (SOXEJKMO:[id number]) to Recorded   6  MetFORMIN HCl - 1000 MG Oral Tablet; Take 1 tablet twice daily  Requested for:   47ECN4621; Last Rx:[id number] Ordered   7  Multi Vitamin Mens Oral Tablet; Therapy: (Recorded:86Nqh4190) to Recorded   8  Simvastatin 20 MG Oral Tablet; Take 1 tablet daily; Therapy: 13LPD5340 to (Evaluate:20Mar2014)  Requested for: 25Mar2013; Last   Rx:25Mar2013 Ordered   9  Tamsulosin HCl - 0 4 MG Oral Capsule; On tab QPM;   Therapy: 53YWP4474 to (Last Rx:30Ezl7977) Ordered    Allergies    1  No Known Drug Allergies    Vitals  Signs   Recorded: 21Apr2017 11:02AM   Heart Rate: 80  Systolic: 442  Diastolic: 83  Height: 5 ft 6 in  Weight: 159 lb 6 oz  BMI Calculated: 25 72  BSA Calculated: 1 82  O2 Saturation: 96    Physical Exam    Constitutional:   General appearance: Well developed, well nourished  Ability to communicate: Voice normal  Speech normal    Head and Face:   Head and face: Head normocephalic, atraumatic with no lesions or palpable masses  Submandibular glands and parotid glands: non tender, no masses  Eyes:   Test of Ocular Motility: Gaze normal  No nystagmus  Ears:   Otoscopic Examination: Tympanic membranes intact and normal in appearance, no retraction of tympanic membranes observed, no serous effusion observed, no evidence of tympanosclerosis  Hearing: Normal    Nose:   External auditory canals: No cerumen impaction noted, no drainage observed, no edema noted in EAC, no exostoses present, no osteoma present, no tenderness noted  External Inspection of Nose: No deformities observed, no deviation of bone structure, no skin lesion present, no swelling present  Nares are symmetric, no deviation of caudal portion of septum  Nasal Mucosa: No congestion observed, no mucosal lesion or masses present, no ulcerations observed  Cartilaginous Septum: midline, no bleeding noted, no crusting present, no perforation noted  Turbinates: No hypertrophy or inflammation noted  Mouth: Inspection of Lips, Teeth, Gums: Lips normal in color, moist, no cracks or lesions  No loose teeth, no missing teeth  Gingiva: no bleeding observed, no inflammation present  Hard Palate: no asymmetry observed, no torus present  Soft palate normal with no ulcers noted  Throat:   Examination of Oropharynx: Oral Mucosa: no masses, lesions, leukoplakia, or scarring  Normal Lili's ducts, pink and moist, no discoloration noted  Floor of mouth: normal Warthin's ducts, no lesions, ulcerations, leukoplakia or torus mandibularis  Tonsils: no hypertrophy or ulcerations noted  Tongue: normal mobility, surfaces without fissures, leukoplakia, ulceration or masses, not enlarged, no pallor noted, no white patches present  Neck:   Examination of Neck: No decreased range of motion, trachea midline  Examination of Thyroid: Normal size, non-tender, no palpable masses  Lymphatic:   Palpation of Lymph Nodes: Neck: No generalized lymphadenopathy  Neurological/Psychiatric:   Cranial nerves II-VII grossly intact  Oriented to person, place, and time  Cooperative, in no acute distress        Procedure  Procedure: Nasal endoscopy    Indications: Evaluation of the nasal cavity     Procedure in detail: After informed verbal consent was obtained the nose was anesthetized with topical lidocaine and phenylephrine  After adequate time for anesthesia the nose was evaluated using the endoscope including the middle meatus bilaterally, the inferior and middle turbinates, and the sphenoethmoid recess with the below findings  The patient tolerated the procedure well  Findings: No mucopurulence or polyposis throughout  No lesions or masses  Right-sided septal deviation  Assessment    1  History of Sinusitis (473 9) (J32 9)   2  Former smoker (V15 82) (B36 431)   3  Acute sinusitis (461 9) (J01 90)    Plan    1  Follow-up PRN Evaluation and Treatment  Follow-up  Status: Complete  Done:   21Apr2017 12:54PM    Discussion/Summary  Discussion Summary:   Based on his discussion and history she likely had a viral sinusitis  We discussed ongoing management of sinusitis and any allergic rhinitis  At this time he would like to refrain from any nasal steroid sprays  We will see him back for any further acute episodes  He will return p r n        Signatures   Electronically signed by : ROSANA Mancera ; Apr 21 2017 12:55PM EST                       (Author)

## 2018-01-10 ENCOUNTER — GENERIC CONVERSION - ENCOUNTER (OUTPATIENT)
Dept: UROLOGY | Facility: HOSPITAL | Age: 73
End: 2018-01-10

## 2018-01-12 VITALS
HEIGHT: 66 IN | BODY MASS INDEX: 25.27 KG/M2 | OXYGEN SATURATION: 98 % | HEART RATE: 80 BPM | WEIGHT: 157.25 LBS | SYSTOLIC BLOOD PRESSURE: 128 MMHG | DIASTOLIC BLOOD PRESSURE: 78 MMHG | TEMPERATURE: 98 F

## 2018-01-12 VITALS
WEIGHT: 156.25 LBS | HEART RATE: 70 BPM | SYSTOLIC BLOOD PRESSURE: 142 MMHG | BODY MASS INDEX: 25.11 KG/M2 | DIASTOLIC BLOOD PRESSURE: 78 MMHG | HEIGHT: 66 IN

## 2018-01-13 VITALS
HEART RATE: 57 BPM | OXYGEN SATURATION: 99 % | TEMPERATURE: 96.9 F | DIASTOLIC BLOOD PRESSURE: 72 MMHG | SYSTOLIC BLOOD PRESSURE: 126 MMHG | WEIGHT: 155.5 LBS | HEIGHT: 66 IN | BODY MASS INDEX: 24.99 KG/M2

## 2018-01-13 VITALS
SYSTOLIC BLOOD PRESSURE: 189 MMHG | HEIGHT: 66 IN | BODY MASS INDEX: 25.61 KG/M2 | DIASTOLIC BLOOD PRESSURE: 83 MMHG | WEIGHT: 159.38 LBS | OXYGEN SATURATION: 96 % | HEART RATE: 80 BPM

## 2018-01-14 VITALS
HEART RATE: 100 BPM | BODY MASS INDEX: 24.97 KG/M2 | TEMPERATURE: 97.6 F | DIASTOLIC BLOOD PRESSURE: 72 MMHG | HEIGHT: 66 IN | SYSTOLIC BLOOD PRESSURE: 122 MMHG | OXYGEN SATURATION: 97 % | WEIGHT: 155.38 LBS

## 2018-01-14 VITALS
DIASTOLIC BLOOD PRESSURE: 90 MMHG | HEIGHT: 66 IN | HEART RATE: 80 BPM | SYSTOLIC BLOOD PRESSURE: 160 MMHG | BODY MASS INDEX: 25.07 KG/M2 | WEIGHT: 156 LBS | RESPIRATION RATE: 20 BRPM

## 2018-01-14 VITALS
HEIGHT: 63 IN | WEIGHT: 160.13 LBS | SYSTOLIC BLOOD PRESSURE: 122 MMHG | DIASTOLIC BLOOD PRESSURE: 72 MMHG | BODY MASS INDEX: 28.37 KG/M2

## 2018-01-16 ENCOUNTER — ANESTHESIA EVENT (OUTPATIENT)
Dept: PERIOP | Facility: HOSPITAL | Age: 73
DRG: 708 | End: 2018-01-16
Payer: COMMERCIAL

## 2018-01-16 RX ORDER — SODIUM CHLORIDE 9 MG/ML
125 INJECTION, SOLUTION INTRAVENOUS CONTINUOUS
Status: CANCELLED | OUTPATIENT
Start: 2018-02-07

## 2018-01-17 ENCOUNTER — TRANSCRIBE ORDERS (OUTPATIENT)
Dept: ADMINISTRATIVE | Facility: HOSPITAL | Age: 73
End: 2018-01-17

## 2018-01-17 ENCOUNTER — GENERIC CONVERSION - ENCOUNTER (OUTPATIENT)
Dept: UROLOGY | Facility: HOSPITAL | Age: 73
End: 2018-01-17

## 2018-01-17 ENCOUNTER — APPOINTMENT (OUTPATIENT)
Dept: PREADMISSION TESTING | Facility: HOSPITAL | Age: 73
End: 2018-01-17
Payer: COMMERCIAL

## 2018-01-17 ENCOUNTER — APPOINTMENT (OUTPATIENT)
Dept: LAB | Facility: HOSPITAL | Age: 73
End: 2018-01-17
Attending: UROLOGY
Payer: COMMERCIAL

## 2018-01-17 ENCOUNTER — HOSPITAL ENCOUNTER (OUTPATIENT)
Dept: NON INVASIVE DIAGNOSTICS | Facility: HOSPITAL | Age: 73
Discharge: HOME | End: 2018-01-17
Attending: UROLOGY
Payer: COMMERCIAL

## 2018-01-17 DIAGNOSIS — C61 MALIGNANT NEOPLASM OF PROSTATE (HCC): ICD-10-CM

## 2018-01-17 DIAGNOSIS — Z01.818 PREOP EXAMINATION: ICD-10-CM

## 2018-01-17 DIAGNOSIS — Z01.818 PREOP EXAMINATION: Primary | ICD-10-CM

## 2018-01-17 LAB
ABO GROUP BLD: NORMAL
ANION GAP SERPL CALCULATED.3IONS-SCNC: 10 MMOL/L (ref 4–13)
APTT PPP: 30 SECONDS (ref 23–35)
ATRIAL RATE: 72 BPM
BASOPHILS # BLD AUTO: 0.04 THOUSANDS/ΜL (ref 0–0.1)
BASOPHILS NFR BLD AUTO: 1 % (ref 0–1)
BLD GP AB SCN SERPL QL: NEGATIVE
BUN SERPL-MCNC: 36 MG/DL (ref 5–25)
CALCIUM SERPL-MCNC: 9.1 MG/DL (ref 8.3–10.1)
CHLORIDE SERPL-SCNC: 100 MMOL/L (ref 100–108)
CO2 SERPL-SCNC: 27 MMOL/L (ref 21–32)
CREAT SERPL-MCNC: 1.76 MG/DL (ref 0.6–1.3)
EOSINOPHIL # BLD AUTO: 0.23 THOUSAND/ΜL (ref 0–0.61)
EOSINOPHIL NFR BLD AUTO: 4 % (ref 0–6)
ERYTHROCYTE [DISTWIDTH] IN BLOOD BY AUTOMATED COUNT: 14.4 % (ref 11.6–15.1)
GFR SERPL CREATININE-BSD FRML MDRD: 38 ML/MIN/1.73SQ M
GLUCOSE SERPL-MCNC: 169 MG/DL (ref 65–140)
HCT VFR BLD AUTO: 36 % (ref 36.5–49.3)
HGB BLD-MCNC: 12.1 G/DL (ref 12–17)
INR PPP: 1.05 (ref 0.86–1.16)
LYMPHOCYTES # BLD AUTO: 1.31 THOUSANDS/ΜL (ref 0.6–4.47)
LYMPHOCYTES NFR BLD AUTO: 22 % (ref 14–44)
MCH RBC QN AUTO: 30.1 PG (ref 26.8–34.3)
MCHC RBC AUTO-ENTMCNC: 33.6 G/DL (ref 31.4–37.4)
MCV RBC AUTO: 90 FL (ref 82–98)
MONOCYTES # BLD AUTO: 0.69 THOUSAND/ΜL (ref 0.17–1.22)
MONOCYTES NFR BLD AUTO: 12 % (ref 4–12)
NEUTROPHILS # BLD AUTO: 3.71 THOUSANDS/ΜL (ref 1.85–7.62)
NEUTS SEG NFR BLD AUTO: 61 % (ref 43–75)
NRBC BLD AUTO-RTO: 0 /100 WBCS
P AXIS: 61 DEGREES
PLATELET # BLD AUTO: 229 THOUSANDS/UL (ref 149–390)
PMV BLD AUTO: 10.1 FL (ref 8.9–12.7)
POTASSIUM SERPL-SCNC: 4.1 MMOL/L (ref 3.5–5.3)
PR INTERVAL: 170 MS
PROTHROMBIN TIME: 13.7 SECONDS (ref 12.1–14.4)
QRS AXIS: 46 DEGREES
QRSD INTERVAL: 92 MS
QT INTERVAL: 368 MS
QTC INTERVAL: 402 MS
RBC # BLD AUTO: 4.02 MILLION/UL (ref 3.88–5.62)
RH BLD: POSITIVE
SODIUM SERPL-SCNC: 137 MMOL/L (ref 136–145)
SPECIMEN EXPIRATION DATE: NORMAL
T WAVE AXIS: 54 DEGREES
VENTRICULAR RATE: 72 BPM
WBC # BLD AUTO: 5.98 THOUSAND/UL (ref 4.31–10.16)

## 2018-01-17 PROCEDURE — 86900 BLOOD TYPING SEROLOGIC ABO: CPT

## 2018-01-17 PROCEDURE — 87086 URINE CULTURE/COLONY COUNT: CPT

## 2018-01-17 PROCEDURE — 93005 ELECTROCARDIOGRAM TRACING: CPT

## 2018-01-17 PROCEDURE — 86850 RBC ANTIBODY SCREEN: CPT

## 2018-01-17 PROCEDURE — 36415 COLL VENOUS BLD VENIPUNCTURE: CPT

## 2018-01-17 PROCEDURE — 86901 BLOOD TYPING SEROLOGIC RH(D): CPT

## 2018-01-17 PROCEDURE — 80048 BASIC METABOLIC PNL TOTAL CA: CPT

## 2018-01-17 PROCEDURE — 85025 COMPLETE CBC W/AUTO DIFF WBC: CPT

## 2018-01-17 PROCEDURE — 85730 THROMBOPLASTIN TIME PARTIAL: CPT

## 2018-01-17 PROCEDURE — 85610 PROTHROMBIN TIME: CPT

## 2018-01-17 RX ORDER — TADALAFIL 5 MG/1
5 TABLET ORAL DAILY PRN
Status: ON HOLD | COMMUNITY
End: 2018-02-07

## 2018-01-17 RX ORDER — INSULIN GLARGINE 100 [IU]/ML
8 INJECTION, SOLUTION SUBCUTANEOUS 2 TIMES DAILY
COMMUNITY
End: 2019-02-05 | Stop reason: ALTCHOICE

## 2018-01-17 NOTE — ANESTHESIA PREPROCEDURE EVALUATION
Review of Systems/Medical History  Patient summary reviewed  Chart reviewed  No history of anesthetic complications     Cardiovascular  Negative cardio ROS Hyperlipidemia, Hypertension controlled,    Pulmonary  Smoker ex-smoker  ,        GI/Hepatic    GERD well controlled,        Prostatic disorder, benign prostatic hyperplasia and history of prostate cancer       Endo/Other  Diabetes well controlled type 2 Insulin, History of thyroid disease , hypothyroidism,      GYN       Hematology  Negative hematology ROS      Musculoskeletal    Arthritis     Neurology  Negative neurology ROS      Psychology   Negative psychology ROS              Physical Exam    Airway    Mallampati score: II  TM Distance: >3 FB  Neck ROM: full     Dental   upper dentures,     Cardiovascular  Comment: Negative ROS, Rhythm: regular, Rate: normal, Cardiovascular exam normal    Pulmonary  Pulmonary exam normal Breath sounds clear to auscultation,     Other Findings        Anesthesia Plan  ASA Score- 2     Anesthesia Type- general with ASA Monitors  Additional Monitors:   Airway Plan: ETT  Plan Factors-Patient not instructed to abstain from smoking on day of procedure  Patient did not smoke on day of surgery  Induction- intravenous  Postoperative Plan- Plan for postoperative opioid use  Informed Consent- Anesthetic plan and risks discussed with patient and spouse

## 2018-01-17 NOTE — PRE-PROCEDURE INSTRUCTIONS
Pre-Surgery Instructions:   Medication Instructions    aspirin 81 MG tablet Patient was instructed by Physician and understands   insulin aspart (NovoLOG) 100 units/mL injection Patient was instructed by Physician and understands   insulin glargine (LANTUS) 100 units/mL subcutaneous injection Patient was instructed by Physician and understands   insulin glargine (LANTUS) 100 units/mL subcutaneous injection Patient was instructed by Physician and understands   Levothyroxine Sodium 88 MCG CAPS Patient was instructed by Physician and understands   lisinopril (ZESTRIL) 40 mg tablet Patient was instructed by Physician and understands   metFORMIN (GLUCOPHAGE) 1000 MG tablet Patient was instructed by Physician and understands   Multiple Vitamin (MULTIVITAMIN) tablet Patient was instructed by Physician and understands   saxagliptin (ONGLYZA) 5 MG tablet Patient was instructed by Physician and understands   simvastatin (ZOCOR) 20 mg tablet Patient was instructed by Physician and understands   tadalafil (CIALIS) 5 MG tablet Patient was instructed by Physician and understands   tamsulosin (FLOMAX) 0 4 mg Patient was instructed by Physician and understands  Patient was seen by Dr Chastity Lubin and was instructed to take half a dose of Lantus 5 units night before surgery  Patient was also instructed to take his levothyroxine am of surgery with a sip of water  Patient was instructed to avoid NSAIDS, Aspirin, Vitamins, and supplements 7 days prior to surgery  [facility] pre-op instructions reviewed  Pre-op bathing reviewed and chlorhexidine soap was given to the patient  Edwards catheter education was provide to patient and patient's wife

## 2018-01-18 LAB — BACTERIA UR CULT: NORMAL

## 2018-01-22 ENCOUNTER — ALLSCRIPTS OFFICE VISIT (OUTPATIENT)
Dept: OTHER | Facility: OTHER | Age: 73
End: 2018-01-22

## 2018-01-22 VITALS
SYSTOLIC BLOOD PRESSURE: 176 MMHG | OXYGEN SATURATION: 98 % | BODY MASS INDEX: 25.57 KG/M2 | DIASTOLIC BLOOD PRESSURE: 84 MMHG | HEART RATE: 69 BPM | HEIGHT: 66 IN | WEIGHT: 159.13 LBS

## 2018-01-22 VITALS
SYSTOLIC BLOOD PRESSURE: 140 MMHG | BODY MASS INDEX: 25.55 KG/M2 | HEIGHT: 66 IN | HEART RATE: 78 BPM | WEIGHT: 159 LBS | DIASTOLIC BLOOD PRESSURE: 82 MMHG

## 2018-01-22 VITALS
WEIGHT: 156 LBS | SYSTOLIC BLOOD PRESSURE: 150 MMHG | DIASTOLIC BLOOD PRESSURE: 70 MMHG | BODY MASS INDEX: 25.18 KG/M2 | HEART RATE: 88 BPM

## 2018-01-22 LAB
EST. AVERAGE GLUCOSE BLD GHB EST-MCNC: 140 MG/DL
HBA1C MFR BLD HPLC: 6.5 % (ref 4–6)

## 2018-01-23 NOTE — PROGRESS NOTES
Assessment   1  Prostate cancer (185) (C61)   2  DMII (diabetes mellitus, type 2) (250 00) (E11 9)   3  Benign prostatic hyperplasia with urinary obstruction (600 01,599 69) (N40 1,N13 8)   4  Renal insufficiency (593 9) (N28 9)    Plan   DMII (diabetes mellitus, type 2)    · Hemoglobin A1c- POC; Status:Complete;   Done: 37LBL5239 09:10AM    Discussion/Summary   Surgical Clearance: He is at a LOW risk from a cardiovascular standpoint at this time without any additional cardiac testing  Reevaluation needed, if he should present with symptoms prior to surgery/procedure  Surgical clearance faxed to Dr Sergio Rai   Patient already aware to take half lantus dose night prior to sugery and not to take blood sugar meds day of surgery  Increase water intake  Continue present rx as A1c is much improved today  He has followup with urology but we will call once discharged to see if needs to be seen sooner than appt in March  Possible side effects of new medications were reviewed with the patient/guardian today  The treatment plan was reviewed with the patient/guardian  The patient/guardian understands and agrees with the treatment plan                Self Referrals: No      Chief Complaint   Pt presents for pre op eval today  Pt feels well and ready for his surgery  Pt pre op testing in his chart for review  Appetite is normal per patient  No falls  No refills needed today  History of Present Illness   Pre-Op Visit (Brief): The patient is being seen for a preoperative visit-- and-- prostate cancer  Surgical Risk Assessment:      Prior Anesthesia: He had prior anesthesia,-- no prior adverse reaction to edidural anesthesia,-- no prior adverse reaction to spinal anesthesia-- and-- no prior adverse reaction to general anesthesia  Pertinent Past Medical History: no pertinent past medical history and dm   Exercise Capacity: able to walk four blocks without symptoms-- and-- able to walk two flights of stairs without symptoms  Lifestyle Factors: denies alcohol use, denies tobacco use and denies illegal drug use  Symptoms: no symptoms  Living Situation: home is secure and supportive and no post-op concerns with his living situation  HPI: Recently dx with Prostate Cancer  He generally feels well and sugar overall has been better controlled  No chest pain or sob  Urinary flow slow at times but no acute sxs  he is scheduled for prostate[facility] in [facility]      Review of Systems        Constitutional: No fever or chills, feels well, no tiredness, no recent weight gain or weight loss  Eyes: No complaints of eye pain, no red eyes, no discharge from eyes, no itchy eyes  ENT: no complaints of earache, no hearing loss, no nosebleeds, no nasal discharge, no sore throat, no hoarseness  Cardiovascular: No complaints of slow heart rate, no fast heart rate, no chest pain, no palpitations, no leg claudication, no lower extremity  Respiratory: No complaints of shortness of breath, no wheezing, no cough, no SOB on exertion, no orthopnea or PND  Gastrointestinal: No complaints of abdominal pain, no constipation, no nausea or vomiting, no diarrhea or bloody stools  Genitourinary: No complaints of dysuria, no incontinence, no hesitancy, no nocturia, no genital lesion, no testicular pain  Musculoskeletal: No complaints of arthralgia, no myalgias, no joint swelling or stiffness, no limb pain or swelling  Integumentary: No complaints of skin rash or skin lesions, no itching, no skin wound, no dry skin  Neurological: No compliants of headache, no confusion, no convulsions, no numbness or tingling, no dizziness or fainting, no limb weakness, no difficulty walking  Psychiatric: Is not suicidal, no sleep disturbances, no anxiety or depression, no change in personality, no emotional problems        Endocrine: No complaints of proptosis, no hot flashes, no muscle weakness, no erectile dysfunction, no deepening of the voice, no feelings of weakness  Hematologic/Lymphatic: No complaints of swollen glands, no swollen glands in the neck, does not bleed easily, no easy bruising  Active Problems   1  Acute sinusitis (461 9) (J01 90)   2  Advance directive discussed with patient (V65 49) (Z71 89)   3  Arthritis (716 90) (M19 90)   4  Atypical chest pain (786 59) (R07 89)   5  Benign prostatic hyperplasia with urinary obstruction (600 01,599 69) (N40 1,N13 8)   6  Colon cancer screening (V76 51) (Z12 11)   7  DMII (diabetes mellitus, type 2) (250 00) (E11 9)   8  Encounter for prostate cancer screening (V76 44) (Z12 5)   9  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)   10  Hip pain, left (719 45) (M25 552)   11  Hip pain, right (719 45) (M25 551)   12  Hyperlipidemia (272 4) (E78 5)   13  Hypertension (401 9) (I10)   14  Hypothyroidism (244 9) (E03 9)   15  Incomplete emptying of bladder (788 21) (R33 9)   16  Lumbar radicular pain (724 4) (M54 16)   17  Need for influenza vaccination (V04 81) (Z23)   18  Need for pneumococcal vaccination (V03 82) (Z23)   19  Nocturia (788 43) (R35 1)   20  Prostate cancer (185) (C61)   21  Prostate nodule (600 10) (N40 2)   22  Right hip pain (719 45) (M25 551)   23  Screening for depression (V79 0) (Z13 89)   24  Sensorineural hearing loss (SNHL) of both ears (389 18) (H90 3)   25  Syncope, near (780 2) (R55)   26  Vitamin D deficiency (268 9) (E55 9)    Past Medical History    · History of Benign prostatic hypertrophy (600 00) (N40 0)   · History of Hearing problem (V41 2) (H91 90)   · History of diabetes mellitus (V12 29) (Z86 39)   · History of mumps (V12 09) (Z86 19)   · History of Sinusitis (473 9) (J32 9)     The active problems and past medical history were reviewed and updated today        Surgical History    · History of Cataract Surgery   · History of Hand Surgery   · History of Knee Arthroscopy (Therapeutic)   · History of Needle Biopsy Of Prostate     The surgical history was reviewed and updated today  Family History   Mother    · Family history of Uterine Cancer (V16 49)  Father    · Family history of diabetes mellitus (V18 0) (Z83 3)   · Family history of Prostate Cancer (V16 42)   · Family history of Stroke of unknown cause  Grandmother    · Family history of malignant neoplasm (V16 9) (Z80 9)  Uncle    · Family history of diabetes mellitus (V18 0) (Z83 3)     The family history was reviewed and updated today  Social History    · Always uses seat belt   · Being A Social Drinker   · Caffeine Use   · Former smoker (Z88 38) (M41 162)   · Lives independently with spouse   ·    · No drug use   · Retired   · Sun Protection Sunscreen   · [address]  The social history was reviewed and updated today  The social history was reviewed and is unchanged  Current Meds    1  Aspirin 81 MG TABS; take 2 tablet daily; Therapy: (Recorded:43Gui2227) to Recorded   2  Baclofen 10 MG Oral Tablet; TAKE 1 TABLET EVERY 12HOURS AS NEEDED FOR     MUSCLE SPASM; Therapy: 24QYF7082 to (Arnold [place])  Requested for: 22Qfo7009; Last     Rx:40Oxx7231 Ordered   3  Ciprofloxacin HCl - 500 MG Oral Tablet; Take one tablet twice per day beginning one day     before the biopsy; Therapy: 18MDM5093 to (David Ramsey)  Requested for: 42Lhk7709 Ordered   4  Lantus SoloStar SOLN; 14 units in the am 10 in the evening Recorded   5  Levothyroxine Sodium 88 MCG Oral Tablet; TAKE 1 TABLET DAILY; Therapy: 60LCE9253 to (Evaluate:19Oct2017); Last Rx:84Xiu6695 Ordered   6  Linagliptin 5 MG TABS; TAKE 1 TABLET DAILY; Therapy: (Recorded:27Nov2017) to Recorded   7  Lisinopril 40 MG Oral Tablet; TAKE 1 TABLET DAILY; Therapy: (Recorded:27Nov2017) to Recorded   8  Meloxicam 7 5 MG Oral Tablet; TAKE 1 TABLET DAILY WITH FOOD;      Therapy: 35PXP8491 to ()  Requested for: 37OBE7017; Last Rx: 94CUU5989 Ordered   9  MetFORMIN HCl - 1000 MG Oral Tablet; Take 1 tablet twice daily  Requested for:     17GOC6152; Last Rx:38Ysm7319 Ordered   10  Multi Vitamin Mens Oral Tablet; Therapy: (0370 [id number]) to Recorded   11  NovoLOG 100 UNIT/ML Subcutaneous Solution; INJECT 2 UNIT Before meals; Therapy: (Recorded:27Nov2017) to Recorded   12  Onglyza 5 MG Oral Tablet; Therapy: 85Fdk0194 to Recorded   13  Simvastatin 20 MG Oral Tablet; Take 1 tablet daily; Therapy: 57VPR1418 to (Evaluate:20Mar2014)  Requested for: 25Mar2013; Last      Rx:25Mar2013 Ordered   14  Tamsulosin HCl - 0 4 MG Oral Capsule; take 1 capsule daily; Therapy: 50PHG5095 to (GZXEKHUA:28RFO0279); Last Rx:66Iux9816 Ordered   15  Viagra 100 MG Oral Tablet; TAKE 1 TABLET DAILY 1 HOUR BEFORE NEEDED; Therapy: 28USU6855 to (Evaluate:15Oct2017); Last Rx:49Fbu5155 Ordered     The medication list was reviewed and updated today  Allergies   1  No Known Drug Allergies    Vitals    Recorded: 34OXE3507 09:16AM Recorded: 64UID3156 08:59AM   Temperature  96 8 F, Tympanic   Heart Rate  94   Systolic 376    Diastolic 74    Height  5 ft 6 in   Weight  158 lb 2 oz   BMI Calculated  25 52   BSA Calculated  1 81   O2 Saturation  98, RA     Physical Exam        Constitutional      General appearance: No acute distress, well appearing and well nourished  Head and Face      Head and face: Normal        Palpation of the face and sinuses: No sinus tenderness  Eyes      Conjunctiva and lids: No erythema, swelling or discharge  Pupils and irises: Equal, round, reactive to light  Ophthalmoscopic examination: Normal fundi and optic discs  Ears, Nose, Mouth, and Throat      External inspection of ears and nose: Normal        Otoscopic examination: Tympanic membranes translucent with normal light reflex  Canals patent without erythema         Hearing: Normal        Nasal mucosa, septum, and turbinates: Normal without edema or erythema  Lips, teeth, and gums: Normal, good dentition  Oropharynx: Normal with no erythema, edema, exudate or lesions  Neck      Neck: Supple, symmetric, trachea midline, no masses  Thyroid: Normal, no thyromegaly  Pulmonary      Respiratory effort: No increased work of breathing or signs of respiratory distress  Percussion of chest: Normal        Palpation of chest: Normal        Auscultation of lungs: Clear to auscultation  Cardiovascular      Palpation of heart: Normal PMI, no thrills  Auscultation of heart: Normal rate and rhythm, normal S1 and S2, no murmurs  Carotid pulses: 2+ bilaterally  Abdominal aorta: Normal        Femoral pulses: 2+ bilaterally  Pedal pulses: 2+ bilaterally  Peripheral vascular exam: Normal        Examination of extremities for edema and/or varicosities: Normal        Abdomen      Abdomen: Non-tender, no masses  Liver and spleen: No hepatomegaly or splenomegaly  Examination for hernias: No hernias appreciated  Lymphatic      Palpation of lymph nodes in neck: No lymphadenopathy  Palpation of lymph nodes in axillae: No lymphadenopathy  Palpation of lymph nodes in groin: No lymphadenopathy  Palpation of lymph nodes in other areas: No lymphadenopathy  Musculoskeletal      Gait and station: Normal        Inspection/palpation of digits and nails: Abnormal  -- djd of knees  Inspection/palpation of joints, bones, and muscles: Abnormal        Range of motion: Normal        Stability: Normal        Muscle strength/tone: Normal        Skin      Skin and subcutaneous tissue: Normal without rashes or lesions  Palpation of skin and subcutaneous tissue: Normal turgor  Neurologic      Cranial nerves: Cranial nerves 2-12 intact  Cortical function: Normal mental status  Reflexes: 2+ and symmetric  Sensation: No sensory loss  Coordination: Normal finger to nose and heel to shin  Psychiatric      Judgment and insight: Normal        Orientation to person, place and time: Normal        Recent and remote memory: Intact  Mood and affect: Normal        Results/Data   Hemoglobin A1c- POC 51OIO6376 09:10AM Timothy Fontanez      Test Name Result Flag Reference   HEMOGLOBIN A1C 6 5 H 4-6   EST  AVG  GLUCOSE 140          End of Encounter Meds   1  Meloxicam 7 5 MG Oral Tablet (Mobic); TAKE 1 TABLET DAILY WITH FOOD; Therapy: 15IUE3683 to ([phone] [id number])  Requested for: 65ZCS3722; Last     Rx:21Sep2017 Ordered  2  Tamsulosin HCl - 0 4 MG Oral Capsule; take 1 capsule daily; Therapy: 85HBD7653 to (JNRIRFBA:62QHE7715); Last Rx:21Sep2017 Ordered  3  Lantus SoloStar SOLN; 14 units in the am 10 in the evening Recorded   4  Linagliptin 5 MG TABS; TAKE 1 TABLET DAILY; Therapy: (Recorded:27Nov2017) to Recorded   5  MetFORMIN HCl - 1000 MG Oral Tablet; Take 1 tablet twice daily  Requested for:     79BLT8556; Last Rx:65Jpa1873 Ordered   6  NovoLOG 100 UNIT/ML Subcutaneous Solution; INJECT 2 UNIT Before meals; Therapy: (Recorded:27Nov2017) to Recorded   7  Onglyza 5 MG Oral Tablet; Therapy: 21Aug2017 to Recorded  8  Viagra 100 MG Oral Tablet (Sildenafil Citrate); TAKE 1 TABLET DAILY 1 HOUR BEFORE     NEEDED; Therapy: 49LGF7575 to (Evaluate:15Oct2017); Last Rx:21Sep2017 Ordered  9  Aspirin 81 MG TABS; take 2 tablet daily; Therapy: (Recorded:89Yrq8704) to Recorded   10  Multi Vitamin Mens Oral Tablet; Therapy: (0370 4008096) to Recorded  11  Baclofen 10 MG Oral Tablet; TAKE 1 TABLET EVERY 12HOURS AS NEEDED FOR      MUSCLE SPASM; Therapy: 30MRE3870 to (Song Knife)  Requested for: 21Sep2017; Last      Rx:21Sep2017 Ordered  12  Simvastatin 20 MG Oral Tablet; Take 1 tablet daily; Therapy: 35YAY4338 to (Evaluate:20Mar2014)  Requested for: 25Mar2013; Last      Rx:25Mar2013 Ordered  13   Lisinopril 40 MG Oral Tablet; TAKE 1 TABLET DAILY; Therapy: (Recorded:27Nov2017) to Recorded  14  Levothyroxine Sodium 88 MCG Oral Tablet; TAKE 1 TABLET DAILY; Therapy: 01OYS2347 to (Evaluate:19Oct2017); Last Rx:24Oct2016 Ordered  15  Ciprofloxacin HCl - 500 MG Oral Tablet; Take one tablet twice per day beginning one day      before the biopsy; Therapy: 74QPF0754 to (Last Marisela Mage)  Requested for: 56Iir4251 Ordered    Future Appointments      Date/Time Provider Specialty Site   03/30/2018 08:30 AM Javier Espana DO Internal Medicine [address] [place] [address]   02/07/2018 07:30 AM ROSANA Abrams  Urology [address] [place] [address] OR   02/21/2018 02:15 PM ROSANA Abrams   Urology [facility]     Signatures    Electronically signed by : Nicanor Lozano DO; Jan 22 2018 12:14PM EST                       (Author)

## 2018-01-24 NOTE — CONSULTS
Chief Complaint  Pt presents for pre op eval today  Pt feels well and ready for his surgery  Pt pre op testing in his chart for review  Appetite is normal per patient  No falls  No refills needed today  History of Present Illness  Pre-Op Visit (Brief): The patient is being seen for a preoperative visit and prostate cancer  Surgical Risk Assessment:   Prior Anesthesia: He had prior anesthesia, no prior adverse reaction to edidural anesthesia, no prior adverse reaction to spinal anesthesia and no prior adverse reaction to general anesthesia  Pertinent Past Medical History: no pertinent past medical history and dm  Exercise Capacity: able to walk four blocks without symptoms and able to walk two flights of stairs without symptoms  Lifestyle Factors: denies alcohol use, denies tobacco use and denies illegal drug use  Symptoms: no symptoms  Living Situation: home is secure and supportive and no post-op concerns with his living situation  HPI: Recently dx with Prostate Cancer  He generally feels well and sugar overall has been better controlled  No chest pain or sob  Urinary flow slow at times but no acute sxs  he is scheduled for prostatectomy in 2 weeks at [facility]      Review of Systems    Constitutional: No fever or chills, feels well, no tiredness, no recent weight gain or weight loss  Eyes: No complaints of eye pain, no red eyes, no discharge from eyes, no itchy eyes  ENT: no complaints of earache, no hearing loss, no nosebleeds, no nasal discharge, no sore throat, no hoarseness  Cardiovascular: No complaints of slow heart rate, no fast heart rate, no chest pain, no palpitations, no leg claudication, no lower extremity  Respiratory: No complaints of shortness of breath, no wheezing, no cough, no SOB on exertion, no orthopnea or PND  Gastrointestinal: No complaints of abdominal pain, no constipation, no nausea or vomiting, no diarrhea or bloody stools     Genitourinary: No complaints of dysuria, no incontinence, no hesitancy, no nocturia, no genital lesion, no testicular pain  Musculoskeletal: No complaints of arthralgia, no myalgias, no joint swelling or stiffness, no limb pain or swelling  Integumentary: No complaints of skin rash or skin lesions, no itching, no skin wound, no dry skin  Neurological: No compliants of headache, no confusion, no convulsions, no numbness or tingling, no dizziness or fainting, no limb weakness, no difficulty walking  Psychiatric: Is not suicidal, no sleep disturbances, no anxiety or depression, no change in personality, no emotional problems  Endocrine: No complaints of proptosis, no hot flashes, no muscle weakness, no erectile dysfunction, no deepening of the voice, no feelings of weakness  Hematologic/Lymphatic: No complaints of swollen glands, no swollen glands in the neck, does not bleed easily, no easy bruising  Active Problems    1  Acute sinusitis (461 9) (J01 90)   2  Advance directive discussed with patient (V65 49) (Z71 89)   3  Arthritis (716 90) (M19 90)   4  Atypical chest pain (786 59) (R07 89)   5  Benign prostatic hyperplasia with urinary obstruction (600 01,599 69) (N40 1,N13 8)   6  Colon cancer screening (V76 51) (Z12 11)   7  DMII (diabetes mellitus, type 2) (250 00) (E11 9)   8  Encounter for prostate cancer screening (V76 44) (Z12 5)   9  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)   10  Hip pain, left (719 45) (M25 552)   11  Hip pain, right (719 45) (M25 551)   12  Hyperlipidemia (272 4) (E78 5)   13  Hypertension (401 9) (I10)   14  Hypothyroidism (244 9) (E03 9)   15  Incomplete emptying of bladder (788 21) (R33 9)   16  Lumbar radicular pain (724 4) (M54 16)   17  Need for influenza vaccination (V04 81) (Z23)   18  Need for pneumococcal vaccination (V03 82) (Z23)   19  Nocturia (788 43) (R35 1)   20  Prostate cancer (185) (C61)   21  Prostate nodule (600 10) (N40 2)   22  Right hip pain (719 45) (M25 551)   23  Screening for depression (V79 0) (Z13 89)   24  Sensorineural hearing loss (SNHL) of both ears (389 18) (H90 3)   25  Syncope, near (780 2) (R55)   26  Vitamin D deficiency (268 9) (E55 9)    Past Medical History    · History of Benign prostatic hypertrophy (600 00) (N40 0)   · History of Hearing problem (V41 2) (H91 90)   · History of diabetes mellitus (V12 29) (Z86 39)   · History of mumps (V12 09) (Z86 19)   · History of Sinusitis (473 9) (J32 9)    The active problems and past medical history were reviewed and updated today  Surgical History    · History of Cataract Surgery   · History of Hand Surgery   · History of Knee Arthroscopy (Therapeutic)   · History of Needle Biopsy Of Prostate    The surgical history was reviewed and updated today  Family History    · Family history of Uterine Cancer (V16 49)    · Family history of diabetes mellitus (V18 0) (Z83 3)   · Family history of Prostate Cancer (V16 42)   · Family history of Stroke of unknown cause    · Family history of malignant neoplasm (V16 9) (Z80 9)    · Family history of diabetes mellitus (V18 0) (Z83 3)    The family history was reviewed and updated today  Social History    · Always uses seat belt   · Being A Social Drinker   · Caffeine Use   · Former smoker (W80 21) (F45 475)   · Lives independently with spouse   ·    · No drug use   · Retired   · Sun Protection Sunscreen   · [address]  The social history was reviewed and updated today  The social history was reviewed and is unchanged  Current Meds   1  Aspirin 81 MG TABS; take 2 tablet daily; Therapy: (Recorded:99Uen7763) to Recorded   2  Baclofen 10 MG Oral Tablet; TAKE 1 TABLET EVERY 12HOURS AS NEEDED FOR   MUSCLE SPASM; Therapy: 42JIX0353 to ([id number])  Requested for: 88Hzd7071; Last   Rx:34Rvi2223 Ordered   3  Ciprofloxacin HCl - 500 MG Oral Tablet; Take one tablet twice per day beginning one day   before the biopsy;    Therapy: 88FPV2779 to (Last Alyssa Hur)  Requested for: 21Sep2017 Ordered   4  Lantus SoloStar SOLN; 14 units in the am 10 in the evening Recorded   5  Levothyroxine Sodium 88 MCG Oral Tablet; TAKE 1 TABLET DAILY; Therapy: 96GRO0449 to (Evaluate:19Oct2017); Last Rx:24Oct2016 Ordered   6  Linagliptin 5 MG TABS; TAKE 1 TABLET DAILY; Therapy: (Recorded:27Nov2017) to Recorded   7  Lisinopril 40 MG Oral Tablet; TAKE 1 TABLET DAILY; Therapy: (Recorded:27Nov2017) to Recorded   8  Meloxicam 7 5 MG Oral Tablet; TAKE 1 TABLET DAILY WITH FOOD; Therapy: 88DOF8939 to ()  Requested for: 84MFQ9759; Last   Rx:21Sep2017 Ordered   9  MetFORMIN HCl - 1000 MG Oral Tablet; Take 1 tablet twice daily  Requested for:   06KGN3711; Last Rx:43Kgz4908 Ordered   10  Multi Vitamin Mens Oral Tablet; Therapy: ([id number]) to Recorded   11  NovoLOG 100 UNIT/ML Subcutaneous Solution; INJECT 2 UNIT Before meals; Therapy: (Recorded:27Nov2017) to Recorded   12  Onglyza 5 MG Oral Tablet; Therapy: 39Gnp2999 to Recorded   13  Simvastatin 20 MG Oral Tablet; Take 1 tablet daily; Therapy: 50UJC0182 to (Evaluate:20Mar2014)  Requested for: 25Mar2013; Last    Rx:25Mar2013 Ordered   14  Tamsulosin HCl - 0 4 MG Oral Capsule; take 1 capsule daily; Therapy: 48LDE3417 to (LKSKMXUA:12HRM8793); Last Rx:21Sep2017 Ordered   15  Viagra 100 MG Oral Tablet; TAKE 1 TABLET DAILY 1 HOUR BEFORE NEEDED; Therapy: 22KZP6319 to (Evaluate:15Oct2017); Last Rx:04Ubw4484 Ordered    The medication list was reviewed and updated today  Allergies    1  No Known Drug Allergies    Vitals  Signs    Systolic: 523  Diastolic: 74   Temperature: 96 8 F, Tympanic  Heart Rate: 94  Height: 5 ft 6 in  Weight: 158 lb 2 oz  BMI Calculated: 25 52  BSA Calculated: 1 81  O2 Saturation: 98, RA    Physical Exam    Constitutional   General appearance: No acute distress, well appearing and well nourished      Head and Face   Head and face: Normal     Palpation of the face and sinuses: No sinus tenderness  Eyes   Conjunctiva and lids: No erythema, swelling or discharge  Pupils and irises: Equal, round, reactive to light  Ophthalmoscopic examination: Normal fundi and optic discs  Ears, Nose, Mouth, and Throat   External inspection of ears and nose: Normal     Otoscopic examination: Tympanic membranes translucent with normal light reflex  Canals patent without erythema  Hearing: Normal     Nasal mucosa, septum, and turbinates: Normal without edema or erythema  Lips, teeth, and gums: Normal, good dentition  Oropharynx: Normal with no erythema, edema, exudate or lesions  Neck   Neck: Supple, symmetric, trachea midline, no masses  Thyroid: Normal, no thyromegaly  Pulmonary   Respiratory effort: No increased work of breathing or signs of respiratory distress  Percussion of chest: Normal     Palpation of chest: Normal     Auscultation of lungs: Clear to auscultation  Cardiovascular   Palpation of heart: Normal PMI, no thrills  Auscultation of heart: Normal rate and rhythm, normal S1 and S2, no murmurs  Carotid pulses: 2+ bilaterally  Abdominal aorta: Normal     Femoral pulses: 2+ bilaterally  Pedal pulses: 2+ bilaterally  Peripheral vascular exam: Normal     Examination of extremities for edema and/or varicosities: Normal     Abdomen   Abdomen: Non-tender, no masses  Liver and spleen: No hepatomegaly or splenomegaly  Examination for hernias: No hernias appreciated  Lymphatic   Palpation of lymph nodes in neck: No lymphadenopathy  Palpation of lymph nodes in axillae: No lymphadenopathy  Palpation of lymph nodes in groin: No lymphadenopathy  Palpation of lymph nodes in other areas: No lymphadenopathy  Musculoskeletal   Gait and station: Normal     Inspection/palpation of digits and nails: Abnormal   djd of knees     Inspection/palpation of joints, bones, and muscles: Abnormal     Range of motion: Normal  Stability: Normal     Muscle strength/tone: Normal     Skin   Skin and subcutaneous tissue: Normal without rashes or lesions  Palpation of skin and subcutaneous tissue: Normal turgor  Neurologic   Cranial nerves: Cranial nerves 2-12 intact  Cortical function: Normal mental status  Reflexes: 2+ and symmetric  Sensation: No sensory loss  Coordination: Normal finger to nose and heel to shin  Psychiatric   Judgment and insight: Normal     Orientation to person, place and time: Normal     Recent and remote memory: Intact  Mood and affect: Normal        Results/Data  Hemoglobin A1c- POC 79PYV5024 09:10AM Jacklyn [place]     Test Name Result Flag Reference   HEMOGLOBIN A1C 6 5 H 4-6   EST  AVG  GLUCOSE 140         Assessment    1  Prostate cancer (185) (C61)   2  DMII (diabetes mellitus, type 2) (250 00) (E11 9)   3  Benign prostatic hyperplasia with urinary obstruction (600 01,599 69) (N40 1,N13 8)   4  Renal insufficiency (593 9) (N28 9)    Plan  DMII (diabetes mellitus, type 2)    · Hemoglobin A1c- POC; Status:Complete;   Done: 47FOU7909 09:10AM    Discussion/Summary  Surgical Clearance: He is at a LOW risk from a cardiovascular standpoint at this time without any additional cardiac testing  Reevaluation needed, if he should present with symptoms prior to surgery/procedure  Surgical clearance faxed to Dr Sherita Garcia   Patient already aware to take half lantus dose night prior to sugery and not to take blood sugar meds day of surgery  Increase water intake  Continue present rx as A1c is much improved today  He has followup with urology but we will call once discharged to see if needs to be seen sooner than appt in March  Possible side effects of new medications were reviewed with the patient/guardian today  The treatment plan was reviewed with the patient/guardian   The patient/guardian understands and agrees with the treatment plan         Self Referrals: No      End of Encounter Meds    1  Meloxicam 7 5 MG Oral Tablet (Mobic); TAKE 1 TABLET DAILY WITH FOOD; Therapy: 42HCB7235 to (Sarwat Ontiveros)  Requested for: 01XRM5781; Last   Rx:21Sep2017 Ordered    2  Tamsulosin HCl - 0 4 MG Oral Capsule; take 1 capsule daily; Therapy: 61JZD6109 to (XRDUDZIE:82BMK1613); Last Rx:21Sep2017 Ordered    3  Lantus SoloStar SOLN; 14 units in the am 10 in the evening Recorded   4  Linagliptin 5 MG TABS; TAKE 1 TABLET DAILY; Therapy: (Recorded:27Nov2017) to Recorded   5  MetFORMIN HCl - 1000 MG Oral Tablet; Take 1 tablet twice daily  Requested for:   20ZEB2078; Last Rx:21May2014 Ordered   6  NovoLOG 100 UNIT/ML Subcutaneous Solution; INJECT 2 UNIT Before meals; Therapy: (Recorded:27Nov2017) to Recorded   7  Onglyza 5 MG Oral Tablet; Therapy: 99Mah0943 to Recorded    8  Viagra 100 MG Oral Tablet (Sildenafil Citrate); TAKE 1 TABLET DAILY 1 HOUR BEFORE   NEEDED; Therapy: 35YHI1656 to (Evaluate:15Oct2017); Last Rx:21Sep2017 Ordered    9  Aspirin 81 MG TABS; take 2 tablet daily; Therapy: (Recorded:23Asn4783) to Recorded   10  Multi Vitamin Mens Oral Tablet; Therapy: (Gutierres Slight) to Recorded    11  Baclofen 10 MG Oral Tablet; TAKE 1 TABLET EVERY 12HOURS AS NEEDED FOR    MUSCLE SPASM; Therapy: 06XEX8841 to (Dany Severin)  Requested for: 21Sep2017; Last    Rx:21Sep2017 Ordered    12  Simvastatin 20 MG Oral Tablet; Take 1 tablet daily; Therapy: 05XLJ7944 to (Evaluate:20Mar2014)  Requested for: 25Mar2013; Last    Rx:25Mar2013 Ordered    13  Lisinopril 40 MG Oral Tablet; TAKE 1 TABLET DAILY; Therapy: (Recorded:27Nov2017) to Recorded    14  Levothyroxine Sodium 88 MCG Oral Tablet; TAKE 1 TABLET DAILY; Therapy: 86GAT1853 to (Evaluate:19Oct2017); Last Rx:24Oct2016 Ordered    15  Ciprofloxacin HCl - 500 MG Oral Tablet; Take one tablet twice per day beginning one day    before the biopsy;     Therapy: 17GWG0598 to (Last Tano Dole)  Requested for: 43Egt1914 Ordered    Signatures Electronically signed by : Veronika Santos DO; Jan 22 2018 12:14PM EST                       (Author)

## 2018-01-29 ENCOUNTER — OFFICE VISIT (OUTPATIENT)
Dept: UROLOGY | Facility: HOSPITAL | Age: 73
End: 2018-01-29
Payer: COMMERCIAL

## 2018-01-29 VITALS
BODY MASS INDEX: 28.17 KG/M2 | SYSTOLIC BLOOD PRESSURE: 130 MMHG | WEIGHT: 159 LBS | DIASTOLIC BLOOD PRESSURE: 80 MMHG | HEIGHT: 63 IN | HEART RATE: 72 BPM

## 2018-01-29 DIAGNOSIS — C61 PROSTATE CANCER (HCC): Primary | ICD-10-CM

## 2018-01-29 PROCEDURE — 99213 OFFICE O/P EST LOW 20 MIN: CPT | Performed by: PHYSICIAN ASSISTANT

## 2018-01-29 NOTE — PATIENT INSTRUCTIONS
Robot Assisted Laparoscopic Prostatectomy   WHAT YOU NEED TO KNOW:   Robot-assisted laparoscopic prostatectomy (RALP) is surgery to remove your prostate gland through small incisions in your abdomen  RALP is done with a machine that is controlled by your surgeon  The machine has mechanical arms that use small tools to remove your prostate  HOW TO PREPARE:   The week before your surgery:   · Bring your medicine bottles or a list of your medicines when you see your caregiver  Tell your caregiver if you are allergic to any medicine  Tell your caregiver if you use any herbs, food supplements, or over-the-counter medicine  · Ask your caregiver if you need to stop using aspirin or any other prescribed or over-the-counter medicine before your procedure or surgery  · Tell your healthcare provider about any other surgeries or cancer treatments you had  Tell your healthcare provider if you have had bleeding problems  · You may need a blood transfusion if you lose a large amount of blood during surgery  You may be able to donate your own blood before surgery  You may also ask a family member or friend with the same blood type to donate blood for you  · Arrange a ride home  Ask a family member or friend to drive you home after your surgery or procedure  Do not drive yourself home  · You may need to have tests done before surgery, such as blood tests or a chest x-ray  Ask your healthcare provider for more information about these and other tests that you may need  Write down the date, time, and location of each test     · Write down the correct date, time, and location of your surgery  The day before your surgery:   · Clear liquid diet:  You may have to stop eating solid food for up to 2 days before your surgery  You can drink water, broth, apple juice, or lemon-lime soft drinks  You may also suck on ice chips or eat gelatin  · Bowel cleansing:   You may be given medicine to drink or an enema to clean out your bowel  The day of your surgery:   · You or a close family member will be asked to sign a legal document called a consent form  It gives caregivers permission to do the procedure or surgery  It also explains the problems that may happen, and your choices  Make sure all your questions are answered before you sign this form  · Ask your caregiver before you take any medicine on the day of your surgery  Bring a list of all the medicines you take, or your pill bottles, with you to the hospital  Caregivers will check that your medicines will not interact poorly with the medicine you need for surgery  · Caregivers may insert an intravenous tube (IV) into your vein  A vein in the arm is usually chosen  Through the IV tube, you may be given liquids and medicine  · You may need to take antibiotic medicine to help prevent an infection caused by bacteria  You may get antibiotic medicine before and after your surgery  WHAT WILL HAPPEN:   What will happen:   · You will have monitors to check your heartbeat, blood pressure, and breathing  General anesthesia will keep you asleep during your surgery  A urinary catheter is put into your bladder to drain your urine  Healthcare providers will put stockings on your legs to apply pressure, which will help prevent blood clots  Your legs will then be placed in stirrups  · The robotic arms place a laparoscope and other tools inside your abdomen through small cuts  A laparoscope is a long, thin tube with a light and camera on the end  A gas called carbon dioxide is pumped into your abdomen to inflate it  This gives healthcare providers room so they can see your prostate better  Your surgeon uses the camera to see inside your abdomen  He guides the robotic arms to cut the prostate away from other tissues  The prostate is removed through one of the small cuts in your abdomen  Lymph nodes may also be removed   Your surgeon uses the robotic arms to stitch your incisions closed  After your surgery: You will be taken to a room where you can rest until you are fully awake  Bandages over your incisions will help prevent infection  A urinary catheter will drain your urine while you heal  Do not get out of bed until your healthcare provider says it is okay  Once healthcare providers see that you are not having any problems, you will be taken to your hospital room  CONTACT YOUR HEALTHCARE PROVIDER IF:   · You cannot make it to your surgery on time  · You have a fever  · Your signs and symptoms are getting worse  · You have questions or concerns about your surgery  RISKS:   · You may bleed more than expected or get an infection  Your bladder, ureters (tubes that drain urine from your body), intestines, or rectum could be cut during surgery  After RALP, you may have problems urinating or having bowel movements  You may need more surgery to treat urination problems  You may not be able to have an erection after surgery  Your stitches may come apart  You may have a hernia or develop an abscess (deep infection)  Your recovery may take longer if you need larger cuts in your abdomen  · You may get a blood clot in your arm or leg  The clot may travel to your heart or brain and cause life-threatening problems, such as a heart attack or stroke  Even with surgery, the cancer may come back  · If you do not have the prostatectomy, your signs and symptoms could get worse  Your cancer may spread to other areas of your body and be more difficult to treat  CARE AGREEMENT:   You have the right to help plan your care  Learn about your health condition and how it may be treated  Discuss treatment options with your caregivers to decide what care you want to receive  You always have the right to refuse treatment  © 2017 [employer] Information is for End User's use only and may not be sold, redistributed or otherwise used for commercial purposes   All illustrations and images included in CareNotes® are the copyrighted property of [employer]  or [employer]  The above information is an  only  It is not intended as medical advice for individual conditions or treatments  Talk to your doctor, nurse or pharmacist before following any medical regimen to see if it is safe and effective for you

## 2018-01-29 NOTE — PROGRESS NOTES
Pre-op visit  1/29/2018      Chief Complaint   Patient presents with    Benign Prostatic Hypertrophy     H&P For Prostatectomy        History of Present Illness  Moreno Torres is a 81 y o  male here for history and physical prior to their upcoming robotic prostatectomy  TRUS biopsy was performed 11/7/17 with a PSA of 1 3 revealing clinical T2a [place] 3+4 =7 in 20% of 1 core and [place] 3 + 3 = 6 in 5% of a 2nd core  The patient has had no overall changes in their health since their last visit and denies any prior complications with anesthesia        Review of Systems   Constitutional: Negative for chills and fever  Gastrointestinal: Negative for abdominal pain, diarrhea and vomiting  Genitourinary: Negative for dysuria, flank pain, hematuria and urgency  Psychiatric/Behavioral: Negative for behavioral problems and confusion  Past Medical History  Past Medical History:   Diagnosis Date    Arthritis     Diabetes mellitus ([address] )     Disease of thyroid gland     GERD (gastroesophageal reflux disease)     Hearing aid worn     Hyperlipidemia     Hypertension     Hypothyroidism     Prostate cancer ([address] )     Tingling sensation     bilateral feet occassionally    Tinnitus     Wears glasses     Wears partial dentures     upper       Past Social History  Past Surgical History:   Procedure Laterality Date    CATARACT EXTRACTION      EYE SURGERY      KNEE ARTHROSCOPY Right     knee    TRIGGER FINGER RELEASE         Past Family History  No family history on file  Past Social history  Social History     Social History    Marital status: /Civil Union     Spouse name: N/A    Number of children: N/A    Years of education: N/A     Occupational History    Not on file       Social History Main Topics    Smoking status: Former Smoker    Smokeless tobacco: Never Used      Comment: quit about 50 years ago    Alcohol use 1 2 oz/week     2 Glasses of wine per week    Drug use: No    Sexual activity: Not on file     Other Topics Concern    Not on file     Social History Narrative    No narrative on file       Current Medications  Current Outpatient Prescriptions   Medication Sig Dispense Refill    aspirin 81 MG tablet Take 81 mg by mouth daily   insulin aspart (NovoLOG) 100 units/mL injection Inject 2-4 Units under the skin every evening      insulin glargine (LANTUS) 100 units/mL subcutaneous injection Inject 14 Units under the skin daily        insulin glargine (LANTUS) 100 units/mL subcutaneous injection Inject 10 Units under the skin daily at bedtime      Levothyroxine Sodium 88 MCG CAPS Take 88 mcg by mouth daily        lisinopril (ZESTRIL) 40 mg tablet Take 40 mg by mouth daily        metFORMIN (GLUCOPHAGE) 1000 MG tablet Take 1,000 mg by mouth 2 (two) times a day with meals   Multiple Vitamin (MULTIVITAMIN) tablet Take 1 tablet by mouth daily   saxagliptin (ONGLYZA) 5 MG tablet Take 5 mg by mouth daily      simvastatin (ZOCOR) 20 mg tablet Take 20 mg by mouth daily at bedtime   tadalafil (CIALIS) 5 MG tablet Take 5 mg by mouth daily as needed for erectile dysfunction      tamsulosin (FLOMAX) 0 4 mg Take 0 4 mg by mouth daily with dinner  No current facility-administered medications for this visit  Allergies  No Known Allergies      Past Medical History, Social History, Family History, medications and allergies were reviewed  Vitals  Vitals:    01/29/18 1103   BP: 130/80   Pulse: 72   Weight: 72 1 kg (159 lb)   Height: 5' 3" (1 6 m)         Physical Exam    Constitutional   General appearance: Patient is seated and in no acute distress, well appearing and well nourished  Head and Face   Head and face: Normal     Eyes   Conjunctiva and lids: No erythema, swelling or discharge      Ears, Nose, Mouth, and Throat   Hearing: Normal     Pulmonary   Lungs: Clear to auscultation with no wheezes, rhonchi, or rales  Respiratory effort: No increased work of breathing or signs of respiratory distress  Cardiovascular   Heart: Regular rate and rhythm, no gallops, no murmurs  Examination of extremities for edema and/or varicosities: Normal     Abdomen   Abdomen: Non-tender, no masses  Musculoskeletal   Gait and station:     Skin   Skin and subcutaneous tissue: Warm, dry, and intact  No visible rashes or lesions  Psychiatric   Mood and affect: Normal        Assessment and Plan     81 y o  male managed by Dr Jeannie Romero    1  Clinical T2a [place] 3+4 =7 in 20% of 1 core and Gina 3 + 3 = 6 in 5% of a 2nd core    History and physical was performed for the patients upcoming radical prostatectomy scheduled for 2/7/2018 with Dr Jeannie Romero  All questions and concerns regarding surgery have been addressed and answered  Will proceed with surgery as planned

## 2018-01-30 NOTE — H&P
Pre-op visit  1/29/2018      Chief Complaint   Patient presents with    Benign Prostatic Hypertrophy     H&P For Prostatectomy        History of Present Illness  Earnest Nolan is a 81 y o  male here for history and physical prior to their upcoming robotic prostatectomy  TRUS biopsy was performed 11/7/17 with a PSA of 1 3 revealing clinical T2a [place] 3+4 =7 in 20% of 1 core and Gina 3 + 3 = 6 in 5% of a 2nd core  The patient has had no overall changes in their health since their last visit and denies any prior complications with anesthesia        Review of Systems   Constitutional: Negative for chills and fever  Gastrointestinal: Negative for abdominal pain, diarrhea and vomiting  Genitourinary: Negative for dysuria, flank pain, hematuria and urgency  Psychiatric/Behavioral: Negative for behavioral problems and confusion  Past Medical History  Past Medical History:   Diagnosis Date    Arthritis     Diabetes mellitus ([address] )     Disease of thyroid gland     GERD (gastroesophageal reflux disease)     Hearing aid worn     Hyperlipidemia     Hypertension     Hypothyroidism     Prostate cancer ([address] )     Tingling sensation     bilateral feet occassionally    Tinnitus     Wears glasses     Wears partial dentures     upper       Past Social History  Past Surgical History:   Procedure Laterality Date    CATARACT EXTRACTION      EYE SURGERY      KNEE ARTHROSCOPY Right     knee    TRIGGER FINGER RELEASE         Past Family History  No family history on file  Past Social history  Social History     Social History    Marital status: /Civil Union     Spouse name: N/A    Number of children: N/A    Years of education: N/A     Occupational History    Not on file       Social History Main Topics    Smoking status: Former Smoker    Smokeless tobacco: Never Used      Comment: quit about 50 years ago    Alcohol use 1 2 oz/week     2 Glasses of wine per week    Drug use: No    Sexual activity: Not on file     Other Topics Concern    Not on file     Social History Narrative    No narrative on file       Current Medications  Current Outpatient Prescriptions   Medication Sig Dispense Refill    aspirin 81 MG tablet Take 81 mg by mouth daily   insulin aspart (NovoLOG) 100 units/mL injection Inject 2-4 Units under the skin every evening      insulin glargine (LANTUS) 100 units/mL subcutaneous injection Inject 14 Units under the skin daily        insulin glargine (LANTUS) 100 units/mL subcutaneous injection Inject 10 Units under the skin daily at bedtime      Levothyroxine Sodium 88 MCG CAPS Take 88 mcg by mouth daily        lisinopril (ZESTRIL) 40 mg tablet Take 40 mg by mouth daily        metFORMIN (GLUCOPHAGE) 1000 MG tablet Take 1,000 mg by mouth 2 (two) times a day with meals   Multiple Vitamin (MULTIVITAMIN) tablet Take 1 tablet by mouth daily   saxagliptin (ONGLYZA) 5 MG tablet Take 5 mg by mouth daily      simvastatin (ZOCOR) 20 mg tablet Take 20 mg by mouth daily at bedtime   tadalafil (CIALIS) 5 MG tablet Take 5 mg by mouth daily as needed for erectile dysfunction      tamsulosin (FLOMAX) 0 4 mg Take 0 4 mg by mouth daily with dinner  No current facility-administered medications for this visit  Allergies  No Known Allergies      Past Medical History, Social History, Family History, medications and allergies were reviewed  Vitals  Vitals:    01/29/18 1103   BP: 130/80   Pulse: 72   Weight: 72 1 kg (159 lb)   Height: 5' 3" (1 6 m)         Physical Exam    Constitutional   General appearance: Patient is seated and in no acute distress, well appearing and well nourished  Head and Face   Head and face: Normal     Eyes   Conjunctiva and lids: No erythema, swelling or discharge      Ears, Nose, Mouth, and Throat   Hearing: Normal     Pulmonary   Lungs: Clear to auscultation with no wheezes, rhonchi, or rales  Respiratory effort: No increased work of breathing or signs of respiratory distress  Cardiovascular   Heart: Regular rate and rhythm, no gallops, no murmurs  Examination of extremities for edema and/or varicosities: Normal     Abdomen   Abdomen: Non-tender, no masses  Musculoskeletal   Gait and station:     Skin   Skin and subcutaneous tissue: Warm, dry, and intact  No visible rashes or lesions  Psychiatric   Mood and affect: Normal        Assessment and Plan     81 y o  male managed by Dr Jamey Olivas    1  Clinical T2a [place] 3+4 =7 in 20% of 1 core and [place] 3 + 3 = 6 in 5% of a 2nd core    History and physical was performed for the patients upcoming radical prostatectomy scheduled for 2/7/2018 with Dr Jamey Olivas  All questions and concerns regarding surgery have been addressed and answered  Will proceed with surgery as planned

## 2018-02-07 ENCOUNTER — ANESTHESIA (OUTPATIENT)
Dept: PERIOP | Facility: HOSPITAL | Age: 73
DRG: 708 | End: 2018-02-07
Payer: COMMERCIAL

## 2018-02-07 ENCOUNTER — HOSPITAL ENCOUNTER (INPATIENT)
Facility: HOSPITAL | Age: 73
LOS: 1 days | Discharge: HOME | DRG: 708 | End: 2018-02-08
Attending: UROLOGY | Admitting: UROLOGY
Payer: COMMERCIAL

## 2018-02-07 DIAGNOSIS — E78.5 HYPERLIPIDEMIA, UNSPECIFIED HYPERLIPIDEMIA TYPE: ICD-10-CM

## 2018-02-07 DIAGNOSIS — E07.9 DISEASE OF THYROID GLAND: ICD-10-CM

## 2018-02-07 DIAGNOSIS — Z79.4 TYPE 2 DIABETES MELLITUS WITH COMPLICATION, WITH LONG-TERM CURRENT USE OF INSULIN (HCC): Primary | ICD-10-CM

## 2018-02-07 DIAGNOSIS — C61 MALIGNANT NEOPLASM OF PROSTATE (HCC): ICD-10-CM

## 2018-02-07 DIAGNOSIS — E11.8 TYPE 2 DIABETES MELLITUS WITH COMPLICATION, WITH LONG-TERM CURRENT USE OF INSULIN (HCC): Primary | ICD-10-CM

## 2018-02-07 DIAGNOSIS — I15.9 SECONDARY HYPERTENSION: ICD-10-CM

## 2018-02-07 DIAGNOSIS — C61 PROSTATE CANCER (HCC): ICD-10-CM

## 2018-02-07 DIAGNOSIS — K21.9 GASTROESOPHAGEAL REFLUX DISEASE WITHOUT ESOPHAGITIS: ICD-10-CM

## 2018-02-07 PROBLEM — I10 HYPERTENSION: Status: ACTIVE | Noted: 2018-02-07

## 2018-02-07 PROBLEM — E11.9 DIABETES MELLITUS (HCC): Status: ACTIVE | Noted: 2018-02-07

## 2018-02-07 PROBLEM — N18.30 STAGE 3 CHRONIC KIDNEY DISEASE (HCC): Status: ACTIVE | Noted: 2018-02-07

## 2018-02-07 LAB
ABO GROUP BLD: NORMAL
ANION GAP SERPL CALCULATED.3IONS-SCNC: 10 MMOL/L (ref 4–13)
BLD GP AB SCN SERPL QL: NEGATIVE
BUN SERPL-MCNC: 23 MG/DL (ref 5–25)
CALCIUM SERPL-MCNC: 7.3 MG/DL (ref 8.3–10.1)
CHLORIDE SERPL-SCNC: 106 MMOL/L (ref 100–108)
CO2 SERPL-SCNC: 22 MMOL/L (ref 21–32)
CREAT SERPL-MCNC: 1.81 MG/DL (ref 0.6–1.3)
ERYTHROCYTE [DISTWIDTH] IN BLOOD BY AUTOMATED COUNT: 14.2 % (ref 11.6–15.1)
GFR SERPL CREATININE-BSD FRML MDRD: 37 ML/MIN/1.73SQ M
GLUCOSE SERPL-MCNC: 196 MG/DL (ref 65–140)
GLUCOSE SERPL-MCNC: 200 MG/DL (ref 65–140)
GLUCOSE SERPL-MCNC: 203 MG/DL (ref 65–140)
GLUCOSE SERPL-MCNC: 208 MG/DL (ref 65–140)
GLUCOSE SERPL-MCNC: 252 MG/DL (ref 65–140)
HCT VFR BLD AUTO: 35 % (ref 36.5–49.3)
HGB BLD-MCNC: 11.7 G/DL (ref 12–17)
MCH RBC QN AUTO: 30.2 PG (ref 26.8–34.3)
MCHC RBC AUTO-ENTMCNC: 33.4 G/DL (ref 31.4–37.4)
MCV RBC AUTO: 90 FL (ref 82–98)
PLATELET # BLD AUTO: 181 THOUSANDS/UL (ref 149–390)
PMV BLD AUTO: 9.5 FL (ref 8.9–12.7)
POTASSIUM SERPL-SCNC: 4.4 MMOL/L (ref 3.5–5.3)
RBC # BLD AUTO: 3.87 MILLION/UL (ref 3.88–5.62)
RH BLD: POSITIVE
SODIUM SERPL-SCNC: 138 MMOL/L (ref 136–145)
SPECIMEN EXPIRATION DATE: NORMAL
WBC # BLD AUTO: 15.36 THOUSAND/UL (ref 4.31–10.16)

## 2018-02-07 PROCEDURE — 86923 COMPATIBILITY TEST ELECTRIC: CPT

## 2018-02-07 PROCEDURE — 07TC4ZZ RESECTION OF PELVIS LYMPHATIC, PERCUTANEOUS ENDOSCOPIC APPROACH: ICD-10-PCS | Performed by: UROLOGY

## 2018-02-07 PROCEDURE — 86901 BLOOD TYPING SEROLOGIC RH(D): CPT | Performed by: UROLOGY

## 2018-02-07 PROCEDURE — 86900 BLOOD TYPING SEROLOGIC ABO: CPT | Performed by: UROLOGY

## 2018-02-07 PROCEDURE — 88307 TISSUE EXAM BY PATHOLOGIST: CPT | Performed by: PATHOLOGY

## 2018-02-07 PROCEDURE — 85027 COMPLETE CBC AUTOMATED: CPT | Performed by: UROLOGY

## 2018-02-07 PROCEDURE — 86850 RBC ANTIBODY SCREEN: CPT | Performed by: UROLOGY

## 2018-02-07 PROCEDURE — 82948 REAGENT STRIP/BLOOD GLUCOSE: CPT

## 2018-02-07 PROCEDURE — 0VT04ZZ RESECTION OF PROSTATE, PERCUTANEOUS ENDOSCOPIC APPROACH: ICD-10-PCS | Performed by: UROLOGY

## 2018-02-07 PROCEDURE — 38571 LAPAROSCOPY LYMPHADENECTOMY: CPT | Performed by: UROLOGY

## 2018-02-07 PROCEDURE — 88307 TISSUE EXAM BY PATHOLOGIST: CPT | Performed by: UROLOGY

## 2018-02-07 PROCEDURE — 8E0W4CZ ROBOTIC ASSISTED PROCEDURE OF TRUNK REGION, PERCUTANEOUS ENDOSCOPIC APPROACH: ICD-10-PCS | Performed by: UROLOGY

## 2018-02-07 PROCEDURE — 55866 LAPS SURG PRST8ECT RPBIC RAD: CPT | Performed by: PHYSICIAN ASSISTANT

## 2018-02-07 PROCEDURE — 94760 N-INVAS EAR/PLS OXIMETRY 1: CPT

## 2018-02-07 PROCEDURE — 99221 1ST HOSP IP/OBS SF/LOW 40: CPT | Performed by: PHYSICIAN ASSISTANT

## 2018-02-07 PROCEDURE — 80048 BASIC METABOLIC PNL TOTAL CA: CPT | Performed by: UROLOGY

## 2018-02-07 PROCEDURE — 55866 LAPS SURG PRST8ECT RPBIC RAD: CPT | Performed by: UROLOGY

## 2018-02-07 PROCEDURE — 88309 TISSUE EXAM BY PATHOLOGIST: CPT | Performed by: UROLOGY

## 2018-02-07 PROCEDURE — 38571 LAPAROSCOPY LYMPHADENECTOMY: CPT | Performed by: PHYSICIAN ASSISTANT

## 2018-02-07 PROCEDURE — 88309 TISSUE EXAM BY PATHOLOGIST: CPT | Performed by: PATHOLOGY

## 2018-02-07 RX ORDER — INSULIN GLARGINE 100 [IU]/ML
7 INJECTION, SOLUTION SUBCUTANEOUS EVERY MORNING
Status: DISCONTINUED | OUTPATIENT
Start: 2018-02-08 | End: 2018-02-08 | Stop reason: HOSPADM

## 2018-02-07 RX ORDER — HYDRALAZINE HYDROCHLORIDE 20 MG/ML
5 INJECTION INTRAMUSCULAR; INTRAVENOUS EVERY 4 HOURS PRN
Status: DISCONTINUED | OUTPATIENT
Start: 2018-02-07 | End: 2018-02-08 | Stop reason: HOSPADM

## 2018-02-07 RX ORDER — INSULIN GLARGINE 100 [IU]/ML
5 INJECTION, SOLUTION SUBCUTANEOUS
Status: DISCONTINUED | OUTPATIENT
Start: 2018-02-07 | End: 2018-02-08 | Stop reason: HOSPADM

## 2018-02-07 RX ORDER — LISINOPRIL 20 MG/1
40 TABLET ORAL DAILY
Status: DISCONTINUED | OUTPATIENT
Start: 2018-02-08 | End: 2018-02-08 | Stop reason: HOSPADM

## 2018-02-07 RX ORDER — HYDROCODONE BITARTRATE AND ACETAMINOPHEN 5; 325 MG/1; MG/1
1 TABLET ORAL EVERY 4 HOURS PRN
Status: DISCONTINUED | OUTPATIENT
Start: 2018-02-07 | End: 2018-02-08 | Stop reason: HOSPADM

## 2018-02-07 RX ORDER — ONDANSETRON 2 MG/ML
4 INJECTION INTRAMUSCULAR; INTRAVENOUS EVERY 6 HOURS PRN
Status: DISCONTINUED | OUTPATIENT
Start: 2018-02-07 | End: 2018-02-08 | Stop reason: HOSPADM

## 2018-02-07 RX ORDER — SODIUM CHLORIDE 9 MG/ML
125 INJECTION, SOLUTION INTRAVENOUS CONTINUOUS
Status: DISCONTINUED | OUTPATIENT
Start: 2018-02-07 | End: 2018-02-08

## 2018-02-07 RX ORDER — HYDROMORPHONE HYDROCHLORIDE 2 MG/ML
INJECTION, SOLUTION INTRAMUSCULAR; INTRAVENOUS; SUBCUTANEOUS AS NEEDED
Status: DISCONTINUED | OUTPATIENT
Start: 2018-02-07 | End: 2018-02-07 | Stop reason: SURG

## 2018-02-07 RX ORDER — SODIUM CHLORIDE 9 MG/ML
INJECTION, SOLUTION INTRAVENOUS CONTINUOUS PRN
Status: DISCONTINUED | OUTPATIENT
Start: 2018-02-07 | End: 2018-02-07 | Stop reason: SURG

## 2018-02-07 RX ORDER — LABETALOL HYDROCHLORIDE 5 MG/ML
INJECTION, SOLUTION INTRAVENOUS AS NEEDED
Status: DISCONTINUED | OUTPATIENT
Start: 2018-02-07 | End: 2018-02-07 | Stop reason: SURG

## 2018-02-07 RX ORDER — BUPIVACAINE HYDROCHLORIDE 5 MG/ML
INJECTION, SOLUTION PERINEURAL AS NEEDED
Status: DISCONTINUED | OUTPATIENT
Start: 2018-02-07 | End: 2018-02-07 | Stop reason: HOSPADM

## 2018-02-07 RX ORDER — LEVOTHYROXINE SODIUM 88 UG/1
88 TABLET ORAL
Status: DISCONTINUED | OUTPATIENT
Start: 2018-02-07 | End: 2018-02-08 | Stop reason: HOSPADM

## 2018-02-07 RX ORDER — ATROPA BELLADONNA AND OPIUM 16.2; 6 MG/1; MG/1
1 SUPPOSITORY RECTAL ONCE
Status: CANCELLED | OUTPATIENT
Start: 2018-02-07

## 2018-02-07 RX ORDER — ONDANSETRON 2 MG/ML
4 INJECTION INTRAMUSCULAR; INTRAVENOUS EVERY 6 HOURS PRN
Status: DISCONTINUED | OUTPATIENT
Start: 2018-02-07 | End: 2018-02-07 | Stop reason: HOSPADM

## 2018-02-07 RX ORDER — DOCUSATE SODIUM 100 MG/1
100 CAPSULE, LIQUID FILLED ORAL 2 TIMES DAILY
Status: DISCONTINUED | OUTPATIENT
Start: 2018-02-07 | End: 2018-02-08 | Stop reason: HOSPADM

## 2018-02-07 RX ORDER — ROCURONIUM BROMIDE 10 MG/ML
INJECTION, SOLUTION INTRAVENOUS AS NEEDED
Status: DISCONTINUED | OUTPATIENT
Start: 2018-02-07 | End: 2018-02-07 | Stop reason: SURG

## 2018-02-07 RX ORDER — HYDROCODONE BITARTRATE AND ACETAMINOPHEN 5; 325 MG/1; MG/1
2 TABLET ORAL EVERY 4 HOURS PRN
Status: DISCONTINUED | OUTPATIENT
Start: 2018-02-07 | End: 2018-02-08 | Stop reason: HOSPADM

## 2018-02-07 RX ORDER — ATROPA BELLADONNA AND OPIUM 16.2; 6 MG/1; MG/1
SUPPOSITORY RECTAL AS NEEDED
Status: DISCONTINUED | OUTPATIENT
Start: 2018-02-07 | End: 2018-02-07 | Stop reason: HOSPADM

## 2018-02-07 RX ORDER — SIMETHICONE 80 MG
80 TABLET,CHEWABLE ORAL 4 TIMES DAILY PRN
Status: DISCONTINUED | OUTPATIENT
Start: 2018-02-07 | End: 2018-02-08 | Stop reason: HOSPADM

## 2018-02-07 RX ORDER — PRAVASTATIN SODIUM 20 MG
20 TABLET ORAL
Status: DISCONTINUED | OUTPATIENT
Start: 2018-02-07 | End: 2018-02-08 | Stop reason: HOSPADM

## 2018-02-07 RX ORDER — DOCUSATE SODIUM 100 MG/1
100 CAPSULE, LIQUID FILLED ORAL 2 TIMES DAILY
Qty: 60 CAPSULE | Refills: 0 | Status: SHIPPED | OUTPATIENT
Start: 2018-02-07 | End: 2019-06-28

## 2018-02-07 RX ORDER — CIPROFLOXACIN 250 MG/1
500 TABLET, FILM COATED ORAL EVERY 12 HOURS SCHEDULED
Qty: 6 TABLET | Refills: 0 | Status: SHIPPED | OUTPATIENT
Start: 2018-02-07 | End: 2018-02-10

## 2018-02-07 RX ORDER — MIDAZOLAM HYDROCHLORIDE 1 MG/ML
INJECTION INTRAMUSCULAR; INTRAVENOUS AS NEEDED
Status: DISCONTINUED | OUTPATIENT
Start: 2018-02-07 | End: 2018-02-07 | Stop reason: SURG

## 2018-02-07 RX ORDER — FENTANYL CITRATE 50 UG/ML
INJECTION, SOLUTION INTRAMUSCULAR; INTRAVENOUS AS NEEDED
Status: DISCONTINUED | OUTPATIENT
Start: 2018-02-07 | End: 2018-02-07 | Stop reason: SURG

## 2018-02-07 RX ORDER — LABETALOL HYDROCHLORIDE 5 MG/ML
5 INJECTION, SOLUTION INTRAVENOUS ONCE AS NEEDED
Status: COMPLETED | OUTPATIENT
Start: 2018-02-07 | End: 2018-02-07

## 2018-02-07 RX ORDER — GLYCOPYRROLATE 0.2 MG/ML
INJECTION INTRAMUSCULAR; INTRAVENOUS AS NEEDED
Status: DISCONTINUED | OUTPATIENT
Start: 2018-02-07 | End: 2018-02-07 | Stop reason: SURG

## 2018-02-07 RX ORDER — PROPOFOL 10 MG/ML
INJECTION, EMULSION INTRAVENOUS AS NEEDED
Status: DISCONTINUED | OUTPATIENT
Start: 2018-02-07 | End: 2018-02-07 | Stop reason: SURG

## 2018-02-07 RX ORDER — OXYBUTYNIN CHLORIDE 5 MG/1
5 TABLET, EXTENDED RELEASE ORAL DAILY
Qty: 14 TABLET | Refills: 0 | Status: SHIPPED | OUTPATIENT
Start: 2018-02-07 | End: 2018-07-30

## 2018-02-07 RX ORDER — ONDANSETRON 2 MG/ML
INJECTION INTRAMUSCULAR; INTRAVENOUS AS NEEDED
Status: DISCONTINUED | OUTPATIENT
Start: 2018-02-07 | End: 2018-02-07 | Stop reason: SURG

## 2018-02-07 RX ORDER — OXYBUTYNIN CHLORIDE 5 MG/1
5 TABLET, EXTENDED RELEASE ORAL DAILY
Status: DISCONTINUED | OUTPATIENT
Start: 2018-02-07 | End: 2018-02-08 | Stop reason: HOSPADM

## 2018-02-07 RX ORDER — FENTANYL CITRATE 50 UG/ML
25 INJECTION, SOLUTION INTRAMUSCULAR; INTRAVENOUS
Status: COMPLETED | OUTPATIENT
Start: 2018-02-07 | End: 2018-02-07

## 2018-02-07 RX ORDER — HYDROCODONE BITARTRATE AND ACETAMINOPHEN 5; 325 MG/1; MG/1
1 TABLET ORAL EVERY 4 HOURS PRN
Qty: 40 TABLET | Refills: 0 | Status: SHIPPED | OUTPATIENT
Start: 2018-02-07 | End: 2018-03-26 | Stop reason: ALTCHOICE

## 2018-02-07 RX ADMIN — OXYBUTYNIN CHLORIDE 5 MG: 5 TABLET, FILM COATED, EXTENDED RELEASE ORAL at 15:57

## 2018-02-07 RX ADMIN — INSULIN GLARGINE 5 UNITS: 100 INJECTION, SOLUTION SUBCUTANEOUS at 22:11

## 2018-02-07 RX ADMIN — MIDAZOLAM HYDROCHLORIDE 2 MG: 1 INJECTION, SOLUTION INTRAMUSCULAR; INTRAVENOUS at 07:28

## 2018-02-07 RX ADMIN — SODIUM CHLORIDE 125 ML/HR: 0.9 INJECTION, SOLUTION INTRAVENOUS at 12:12

## 2018-02-07 RX ADMIN — FENTANYL CITRATE 25 MCG: 50 INJECTION, SOLUTION INTRAMUSCULAR; INTRAVENOUS at 12:33

## 2018-02-07 RX ADMIN — LABETALOL HYDROCHLORIDE 5 MG: 5 INJECTION, SOLUTION INTRAVENOUS at 11:34

## 2018-02-07 RX ADMIN — LABETALOL HYDROCHLORIDE 5 MG: 5 INJECTION, SOLUTION INTRAVENOUS at 12:42

## 2018-02-07 RX ADMIN — NEOSTIGMINE METHYLSULFATE 3 MG: 1 INJECTION, SOLUTION INTRAMUSCULAR; INTRAVENOUS; SUBCUTANEOUS at 11:36

## 2018-02-07 RX ADMIN — SODIUM CHLORIDE: 0.9 INJECTION, SOLUTION INTRAVENOUS at 10:30

## 2018-02-07 RX ADMIN — ROCURONIUM BROMIDE 50 MG: 10 INJECTION INTRAVENOUS at 07:33

## 2018-02-07 RX ADMIN — HYDROMORPHONE HYDROCHLORIDE 1 MG: 1 INJECTION, SOLUTION INTRAMUSCULAR; INTRAVENOUS; SUBCUTANEOUS at 14:14

## 2018-02-07 RX ADMIN — FENTANYL CITRATE 100 MCG: 50 INJECTION INTRAMUSCULAR; INTRAVENOUS at 11:48

## 2018-02-07 RX ADMIN — SODIUM CHLORIDE 125 ML/HR: 0.9 INJECTION, SOLUTION INTRAVENOUS at 22:15

## 2018-02-07 RX ADMIN — INSULIN LISPRO 2 UNITS: 100 INJECTION, SOLUTION INTRAVENOUS; SUBCUTANEOUS at 19:21

## 2018-02-07 RX ADMIN — ONDANSETRON 4 MG: 2 INJECTION INTRAMUSCULAR; INTRAVENOUS at 16:42

## 2018-02-07 RX ADMIN — GLYCOPYRROLATE 0.6 MG: 0.2 INJECTION, SOLUTION INTRAMUSCULAR; INTRAVENOUS at 11:35

## 2018-02-07 RX ADMIN — FENTANYL CITRATE 50 MCG: 50 INJECTION INTRAMUSCULAR; INTRAVENOUS at 08:13

## 2018-02-07 RX ADMIN — ROCURONIUM BROMIDE 20 MG: 10 INJECTION INTRAVENOUS at 08:24

## 2018-02-07 RX ADMIN — FENTANYL CITRATE 25 MCG: 50 INJECTION, SOLUTION INTRAMUSCULAR; INTRAVENOUS at 12:55

## 2018-02-07 RX ADMIN — SODIUM CHLORIDE 125 ML/HR: 0.9 INJECTION, SOLUTION INTRAVENOUS at 06:40

## 2018-02-07 RX ADMIN — FENTANYL CITRATE 50 MCG: 50 INJECTION INTRAMUSCULAR; INTRAVENOUS at 08:04

## 2018-02-07 RX ADMIN — CEFAZOLIN SODIUM 1000 MG: 1 SOLUTION INTRAVENOUS at 16:00

## 2018-02-07 RX ADMIN — HYDROMORPHONE HYDROCHLORIDE 0.5 MG: 2 INJECTION, SOLUTION INTRAMUSCULAR; INTRAVENOUS; SUBCUTANEOUS at 08:12

## 2018-02-07 RX ADMIN — FENTANYL CITRATE 100 MCG: 50 INJECTION INTRAMUSCULAR; INTRAVENOUS at 10:49

## 2018-02-07 RX ADMIN — ROCURONIUM BROMIDE 10 MG: 10 INJECTION INTRAVENOUS at 09:18

## 2018-02-07 RX ADMIN — DOCUSATE SODIUM 100 MG: 100 CAPSULE, LIQUID FILLED ORAL at 15:57

## 2018-02-07 RX ADMIN — ENOXAPARIN SODIUM 40 MG: 40 INJECTION SUBCUTANEOUS at 15:57

## 2018-02-07 RX ADMIN — HYDROMORPHONE HYDROCHLORIDE 0.5 MG: 2 INJECTION, SOLUTION INTRAMUSCULAR; INTRAVENOUS; SUBCUTANEOUS at 09:26

## 2018-02-07 RX ADMIN — PRAVASTATIN SODIUM 20 MG: 20 TABLET ORAL at 15:57

## 2018-02-07 RX ADMIN — FENTANYL CITRATE 100 MCG: 50 INJECTION INTRAMUSCULAR; INTRAVENOUS at 07:33

## 2018-02-07 RX ADMIN — SODIUM CHLORIDE: 0.9 INJECTION, SOLUTION INTRAVENOUS at 10:34

## 2018-02-07 RX ADMIN — CEFAZOLIN SODIUM 1000 MG: 1 SOLUTION INTRAVENOUS at 07:39

## 2018-02-07 RX ADMIN — PROPOFOL 150 MG: 10 INJECTION, EMULSION INTRAVENOUS at 07:33

## 2018-02-07 RX ADMIN — ONDANSETRON HYDROCHLORIDE 4 MG: 2 INJECTION, SOLUTION INTRAVENOUS at 07:28

## 2018-02-07 RX ADMIN — INSULIN LISPRO 2 UNITS: 100 INJECTION, SOLUTION INTRAVENOUS; SUBCUTANEOUS at 15:59

## 2018-02-07 RX ADMIN — FENTANYL CITRATE 25 MCG: 50 INJECTION, SOLUTION INTRAMUSCULAR; INTRAVENOUS at 12:44

## 2018-02-07 RX ADMIN — FENTANYL CITRATE 50 MCG: 50 INJECTION INTRAMUSCULAR; INTRAVENOUS at 09:44

## 2018-02-07 RX ADMIN — FENTANYL CITRATE 25 MCG: 50 INJECTION, SOLUTION INTRAMUSCULAR; INTRAVENOUS at 12:16

## 2018-02-07 RX ADMIN — LIDOCAINE HYDROCHLORIDE 80 MG: 20 INJECTION, SOLUTION INTRAVENOUS at 07:33

## 2018-02-07 RX ADMIN — CEFAZOLIN SODIUM 1000 MG: 1 SOLUTION INTRAVENOUS at 20:29

## 2018-02-07 RX ADMIN — SODIUM CHLORIDE: 0.9 INJECTION, SOLUTION INTRAVENOUS at 07:38

## 2018-02-07 NOTE — CASE MANAGEMENT
Thank you,  [facility] in the [facility] by [facility] for 2017  Network Utilization Review Department  Phone: [phone]; Fax [phone]  ATTENTION: The Network Utilization Review Department is now centralized for our 7 Facilities  Make a note that we have a new phone and fax numbers for our Department  Please call with any questions or concerns to [phone] and carefully follow the prompts so that you are directed to the right person  All voicemails are confidential  Fax any determinations, approvals, denials, and requests for initial or continue stay review clinical to [phone]  Due to HIGH CALL volume, it would be easier if you could please send faxed requests to expedite your requests and in part, help us provide discharge notifications faster  Initial Clinical Review    Age/Sex: 81 y o  male    Surgery Date: 2/7/18    Procedure: ROBOTIC ASSISTED LAPAROSCOPIC PROSTATECTOMY; BILATERAL PELVIC LYMPH NODE DISSECTION (N/A)       Anesthesia: General    Admission Orders: Date/Time/Statement: 2/7/18 @ 0904     Orders Placed This Encounter   Procedures    Inpatient Admission     Standing Status:   Standing     Number of Occurrences:   1     Order Specific Question:   Admitting Physician     Answer:   Rebecca Carney [[id number]]     Order Specific Question:   Level of Care     Answer:   Med Surg [16]     Order Specific Question:   Estimated length of stay     Answer:   More than 2 Midnights     Order Specific Question:   Certification     Answer:   I certify that inpatient services are medically necessary for this patient for a duration of greater than two midnights  See H&P and MD Progress Notes for additional information about the patient's course of treatment         Vital Signs: BP (!) 175/95 (BP Location: Right arm)   Pulse 80   Temp 97 6 °F (36 4 °C) (Temporal)   Resp 16   Ht 5' 6" (1 676 m)   Wt 72 1 kg (159 lb)   SpO2 100%   BMI 25 66 kg/m²     Diet:        Diet Orders            Start     Ordered    02/07/18 1423  Room Service  Once     Question:  Type of Service  Answer:  Room Service-Appropriate    02/07/18 1423    02/07/18 1126  Diet Deangelo/CHO Controlled; Diabetic Clear Liquid  Diet effective now     Question Answer Comment   Diet Type Deangelo/CHO Controlled    Deangelo/CHO Controlled Diabetic Clear Liquid    RD to adjust diet per protocol? Yes        02/07/18 1132          Mobility: OOB    DVT Prophylaxis: SEQ COMP DEVICE    Pain Control:   Pain Medications             aspirin 81 MG tablet Take 81 mg by mouth daily      HYDROcodone-acetaminophen (NORCO) 5-325 mg per tablet Take 1 tablet by mouth every 4 (four) hours as needed for pain for up to 40 doses Max Daily Amount: 6 tablets          INPT  [email or web address]  NPO  IS  CHERELLE DRAINS  O2    Scheduled Meds:  Current Facility-Administered Medications:  cefazolin 1,000 mg Intravenous Q8H Jerry Antonio MD    docusate sodium 100 mg Oral BID Jerry Antonio MD    enoxaparin 40 mg Subcutaneous Daily Jerry Antonio MD    HYDROcodone-acetaminophen 1 tablet Oral Q4H PRN Jerry Antonio MD    HYDROcodone-acetaminophen 2 tablet Oral Q4H PRN Jerry Antonio MD    HYDROmorphone 1 mg Intravenous Q3H PRN Jerry Antonio MD    insulin lispro 1-5 Units Subcutaneous Q6H [facility] Jerry Antonio MD    levothyroxine 88 mcg Oral Early Morning Jerry Antonio MD    ondansetron 4 mg Intravenous Q6H PRN Jerry Antonio MD    oxybutynin 5 mg Oral Daily Jerry Antonio MD    pravastatin 20 mg Oral Daily With Mustapha Jaramillo MD    simethicone 80 mg Oral 4x Daily PRN Jerry Antonio MD    sodium chloride 125 mL/hr Intravenous Continuous Mio Blanca DO Last Rate: Stopped (02/07/18 1201)   sodium chloride 125 mL/hr Intravenous Continuous Jerry Antonio MD Last Rate: 100 mL/hr (02/07/18 1401)     Continuous Infusions:  sodium chloride 125 mL/hr Last Rate: Stopped (02/07/18 1201)   sodium chloride 125 mL/hr Last Rate: 100 mL/hr (02/07/18 1401)     PRN Meds:  HYDROcodone-acetaminophen    HYDROcodone-acetaminophen    HYDROmorphone X1    ondansetron    simethicone

## 2018-02-07 NOTE — OP NOTE
OPERATIVE REPORT  PATIENT NAME: Frank Shahid    :  1945  MRN: [id number]  Pt Location: [facility] OR ROOM 06    SURGERY DATE: 2018    Surgeon(s) and Role:     * Julianna Hernández MD - Primary     * Mary Back PA-C - Assisting    Preop Diagnosis:  Malignant neoplasm of prostate ([address] ) Aneta Franklin    Post-Op Diagnosis Codes:     * Malignant neoplasm of prostate ([address] ) Aneta Franklin    Procedure(s) (LRB):  ROBOTIC ASSISTED LAPAROSCOPIC PROSTATECTOMY; BILATERAL PELVIC LYMPH NODE DISSECTION (N/A)    Specimen(s):  ID Type Source Tests Collected by Time Destination   1 : Periprostatic fat and lymph nodes Tissue Lymph Node TISSUE EXAM Julianna Hernández MD 2018 0848    2 : RIGHT PELVIC LYMPH NODES Tissue Lymph Node TISSUE EXAM Julianna Hernández MD 2018 0429    3 : LEFT PELVIC LYMPH NODES (have clip in specimen) Tissue Lymph Node TISSUE EXAM Julianna Hernández MD 2018 1007    4 :  Tissue Prostate TISSUE EXAM Julianna Hernández MD 2018 0848        Estimated Blood Loss:   100 mL    Drains:   19F cory drain, 20 F clifton    Anesthesia Type:   General    Operative Indications:  Malignant neoplasm of prostate ([facility] [address] ) [C61]    Operative Findings:  1  Enlarged prostate, no median lobe  2  Minimal thermal technique on RIGHT neurovascular bundle, non-nerve sparing on the LEFT    Complications:   None    Procedure and Technique: Frank Shahid is a 81 y o  y o  male with a history of [place] 3+4=7 prostate cancer identified in LEFT side of the prostate with 3+3=6 in the RIGHT  Risk and benefits of da Martínez robot-assisted laparoscopic prostatectomy with bilateral pelvic lymph node dissection were discussed and reviewed  Informed consent was obtained and the patient was returned to the operating room on 2018  After the smooth induction of general endotracheal anesthesia, the patient was placed in a low lithotomy position  Venodyne boots were applied  Pressure points were padded    The patient was secured to the bed with padding, towels, and tape  Intravenous antibiotics were administered  A timeout was performed with all members of the operative team confirming the patient's identity and procedure to be performed  Digital rectal exam was performed and a belladonna and opium suppositories placed per rectum  An 66HV supra-umbilical skin incision was made  The skin was elevated  A Veress needle was utilized to create a pneumoperitoneum  A 12 mm Si camera port was then placed  The patient was placed into steep Trendelenburg  2 additional working robotic ports were placed at the level of the umbilicus and approximately 4 fingerbreadths lateral to the umbilicus  An additional left lower quadrant robotic port was placed and a right mid quadrant 12 mm assistant port was placed  A 5 mm assistant port was placed in the right upper quadrant  The robot was docked  We identified the bladder neck by manipulating the Edwards catheter  A posterior peritoneotomy was created behind the bladder and beneath the prostate until denonvilles fascia was identified  I then identified the vasa deferentia  Bilateral vasa deferentia were dissected proximally and transected  I then used each vas as a handle to provide traction to dissect out the ipsilateral seminal vesicle  Both seminal vesicles were dissected to their tip  I then performed exclusive sharp dissection beneath the prostate creating a plane between the prostate and the rectum  The urachal structures were taken down  The bladder was dropped and the space of Retzius was developed  Fibrofatty tissue was cleaned from the dorsum of the prostate and the endopelvic fascia  A large superficial venous complex was identified on the dorsum of the prostate  This was taken with bipolar electro dissection  I then opened the endopelvic fascia bilaterally from base to apex first on the right and then on the left  I dropped the puboprostatic ligaments    I exposed the dorsal venous complex  Muscular fibers were swept off the dorsal venous complex to expose the notch between the DVC and the urethra  The dorsal venous complex was ligated with 0 Vicryl  Next a focused my attention on the prostatic vesical junction  Hot and cold scissor dissection was utilized to create a plane between the bladder and prostate until the Edwards catheter was identified in the midline  The balloon was deflated area the catheter was brought into the surgical field and placed on tension  The posterior bladder neck was taken with hot scissor dissection  At this point time, the previously dissected vas deferens and seminal vesicles were elevated anteriorly into the field  Attention was now focused on the left vascular pedicle  The left side was taken with bipolar dissection along with hot and cold scissor dissection  I discussed with the patient performing a non-nerve sparing approach on the left side  Dissection was then performed from base to apex  Attention was now focused on the right vascular pedicle and performing a right nerve sparing procedure  Packets of the vascular pedicle were created with the scissor and Weck clips were applied  Exclusive sharp scissor dissection was used to take the right vascular pedicle  I then incised the lateral prostatic fascia from base to apex  I began to then create a plane between the right neurovascular bundle and the prostate itself  The capsule of the prostate was clearly visualized and not violated  I perform this dissection all the way from base to apex  The last remaining attachments were the dorsal venous complex and the urethra  The dorsal venous complex was taken with hot monopolar hook on the left and cold scissor dissection on the right  I then placed the prostate on traction and identified the urethra    The urethra was taken sharply  The Edwards catheter was pulled back and the posterior urethra was taken with sharp scissors as well  The last remaining attachments of the rectourethralis muscle were taken with sharp scissors  At this point this specimen was completely free within the pelvis and placed into an Endo Catch bag  Attention was focused on performing the pelvic lymph node dissection  I first focused on the right side  The external iliac vein was identified and skeletonized  The pelvic lymph node packet adjacent to the pelvic sidewall was elevated off of the sidewall and dissected free from the surrounding tissue  The obturator nerve was identified and preserved  The lymph node packet was completely dissected and removed from the surgical field and sent for permanent specimen  The same procedure was then repeated on the left side  Lymph node packets were ligated with Weck clips to reduce lymphatic leakage  LEFT lymph node packet was identified with a Weck clip  I then performed the anastomosis between the bladder and urethra  I started with 2 V lock sutures placed in an outside in fashion on the bladder neck posteriorly  The anastomosis was performed in an inside out fashion on the urethra and an outside in fashion on the bladder  Once the 2 sutures were placed in either side tension was carefully taken off of the sutures until there was excellent reapproximation of the posterior urethra to the posterior bladder neck  I then completed approximately two thirds of the anastomosis in this fashion  I ultimately locked suture on the bladder side and completed the anastomosis in an outside in fashion on the urethra and in an inside out fashion on the bladder  Prior to completing the anastomosis a new Edwards catheter was placed under vision  The anastomosis was completed and the sutures were secured  15 cc were placed into the balloon  The catheter was placed on gentle traction and the bladder was irrigated with over 200 cc of sterile saline with minimal extravasation      A Jose Raul drain was then brought out through the left lower quadrant robotic port under vision  The Endo Catch bag string was brought out through the supra umbilical 8 mm port  The supraumbilical 8 mm port was opened to allow for easy removal of the prostate and seminal vesicles within the Endo Catch bag  The fascia was then reapproximated with 0 Vicryl suture  All incisions were irrigated and the skin was closed with 4-0 Monocryl and history  The drain was secured in place with a drain stitch and a drain sponge was placed  The catheter was placed onto gentle traction and secured  Overall the patient tolerated the procedure well and there were no complications  The patient was extubated in the operating room and transferred to the PACU in stable condition at the conclusion of the case       I was present for the entire procedure    Patient Disposition:  PACU     SIGNATURE: Akilah Gayle MD  DATE: February 7, 2018  TIME: 11:35 AM

## 2018-02-07 NOTE — ASSESSMENT & PLAN NOTE
IDDM, hgb a1c was 6 5%   Pt has hx of hypoglycemia per wife  Pt took half dose lantus yesterday and none this AM, still w/poor appetite  Will continue to hold metformin postoperatively at least 48H, will hold onglyza  Start lantus 7 IU Q am and 5 IU Q HS (1/2 normal dose) and start SSI

## 2018-02-07 NOTE — ASSESSMENT & PLAN NOTE
Suspect 2* pain and intrinsic corticosteroids from surgery  rec'd labetalol 5mg IV once and now BP has improved to 150s  No s/sx of emergency  Will continue w/hydralazine 5mg prn sbp >160mmHg  Will restart lisinopril 40mg PO in Am

## 2018-02-07 NOTE — ASSESSMENT & PLAN NOTE
Unclear baseline suspect mat be approx 1 6-2 0 but limited by data  Will cautiously continue w/lisinopril postoperatively and repeat renal indices  Avoid nephrotoxins, avoid relative hypotension

## 2018-02-07 NOTE — DISCHARGE INSTRUCTIONS
After surgery, you should be active when at home  You will need to take plenty of rest  No heavy lifting (weight limit is approximately a gallon jug of milk held in each hand ) You can shower but do not submerge in water; no tubs/pools/baths  You can walk up and down stairs  Your incision has disolvable sutures and surgical glue  If there is any concern about the incision (redness, drainage, bleeding) please call your surgeon's office  You can eat and drink whatever you like, but monitor for signs of constipation  You should be having daily bowel movements  Take your stool softeners until your bowel begin to function  If you are still constipated, call your surgeon's office to discuss additional medication  Robot Assisted Laparoscopic Prostatectomy   WHAT YOU NEED TO KNOW:   Robot-assisted laparoscopic prostatectomy (RALP) is surgery to remove your prostate gland through small incisions in your abdomen  RALP is done with a machine that is controlled by your surgeon  The machine has mechanical arms that use small tools to remove your prostate  DISCHARGE INSTRUCTIONS:   Medicines:   · Pain medicine: You may be given a prescription medicine to decrease pain  Do not wait until the pain is severe before you take this medicine  · Stool softeners: This medicine makes it easier for you to have a bowel movement  You may need this medicine to treat or prevent constipation  · Antibiotics: This medicine is given to fight or prevent an infection caused by bacteria  Always take your antibiotics exactly as ordered by your healthcare provider  Do not stop taking your medicine unless directed by your healthcare provider  Never save antibiotics or take leftover antibiotics that were given to you for another illness  · Take your medicine as directed  Contact your healthcare provider if you think your medicine is not helping or if you have side effects  Tell him or her if you are allergic to any medicine  Keep a list of the medicines, vitamins, and herbs you take  Include the amounts, and when and why you take them  Bring the list or the pill bottles to follow-up visits  Carry your medicine list with you in case of an emergency  Follow up with your healthcare provider or uro-oncologist in 1 week or as directed: You will need your urinary catheter removed  Tests will show if any cancer remains in your body after surgery  Write down your questions so you remember to ask them during your visits  A Edwards catheter  is a tube put into your bladder to drain urine into a bag  Keep the bag below your waist  This will prevent urine from flowing back into your bladder and causing an infection or other problems  Also, keep the tube free of kinks so the urine will drain properly  Do not pull on the catheter  This can cause pain and bleeding, and may cause the catheter to come out  Wound care: Follow your healthcare provider's directions on how to care for your incisions  Ask when you can start showering or bathing  You will need to keep your stitches covered so they do not get wet  What to expect after surgery:   · Activities: You may feel like resting more after surgery  Slowly start to do more each day  Rest when you feel it is needed  ¨ Normal daily activities:  Ask your healthcare provider when it is safe to return to your normal daily activities  You may need to wait 4 to 6 weeks after surgery  Your healthcare provider will tell you about the activities you should avoid after your surgery  These may include driving while you are taking pain medicines or until your catheter is removed  You may also be given a weight restriction on lifting objects  ¨ Walking and other exercise:  Walking is an important activity to help with your recovery after surgery  Walking is a good way to improve your overall health and help you recover sooner  It also helps keep your blood flowing and reduces the risk of blood clots   Other types of exercise can also be an important part of your recovery  Ask about the exercises that are safe for you  ¨ Sexual activity:  Ask when it is safe to start having sexual intercourse again  You may have trouble having an erection  Your erections may return with time  Medicines and mechanical aids may help  Talk to your healthcare provider about these options  · Bladder control:  After surgery, you may have problems controlling when you urinate  Ask your healthcare provider about pads and liners that absorb urine while you recover  · Constipation:  You may have problems having bowel movements during your recovery  Ask your healthcare provider about diet changes if you are having problems with constipation  Contact your healthcare provider or uro-oncologist if:   · You have a fever  · Your pain is getting worse, even with medicine  · You have an incision that is red, swollen, or has pus coming from it  · You are urinating less than usual      · You have trouble urinating or having a bowel movement  · You have questions or concerns about your condition or care  Seek care immediately or call 911 if:   · You have more blood in your urine than you were told to expect, or you pass a blood clot  · You have an upset stomach or are vomiting  · You have painful swelling in your abdomen that does not go away  · You suddenly feel lightheaded and short of breath  · You have chest pain when you take a deep breath or cough  You cough up blood  · Your arm or leg feels warm, tender, and painful  It may look swollen and red  © 2017 [employer] Information is for End User's use only and may not be sold, redistributed or otherwise used for commercial purposes  All illustrations and images included in CareNotes® are the copyrighted property of [employer]  or [employer]  The above information is an  only   It is not intended as medical advice for individual conditions or treatments  Talk to your doctor, nurse or pharmacist before following any medical regimen to see if it is safe and effective for you

## 2018-02-07 NOTE — H&P
The patient was seen and examined  There are no changes from the prior outpatient H/P  Patient is appropriately prepared for surgery today as planned  Robotic radical prostatectomy with bilateral pelvic lymphadenectomy

## 2018-02-07 NOTE — ANESTHESIA POSTPROCEDURE EVALUATION
Post-Op Assessment Note      CV Status:  Stable    Mental Status:  Alert and awake    Hydration Status:  Euvolemic    PONV Controlled:  Controlled    Airway Patency:  Patent  Airway: intubated    Post Op Vitals Reviewed: Yes          Staff: Anesthesiologist           BP     Temp      Pulse     Resp      SpO2

## 2018-02-07 NOTE — ASSESSMENT & PLAN NOTE
Felt to be an intermediate risk prostate ca w/LUTS  S/p robotically assisted laparoscopic prostatectomy with bilateral pelvic lymph node dissection postop day number 0  Patient reports pain is controlled has nausea but no vomiting poor appetite at this moment  Will adjust insulin accordingly, continue analgesics and diet advancement per primary team will monitor vital signs postoperatively encourage incentive spirometry

## 2018-02-07 NOTE — CONSULTS
Consult- Lauren Clement 1945, 81 y o  male MRN: [id number]    Unit/Bed#: E5 -01 Encounter: [id number]    Primary Care Provider: Nicole Montalvo DO   Date and time admitted to hospital: 2/7/2018  5:51 AM      Inpatient consult to Internal Medicine  Consult performed by: Eugene Carpenter ordered by: Cordelia Silva, [id number] Consultation - [facility] Internal Medicine    Patient Information: Lauren Clement 81 y o  male MRN: [id number]  Unit/Bed#: E5 -01 Encounter: [id number]  PCP: Nicole Montalvo DO  Date of Admission:  2/7/2018  Date of Consultation: 02/07/18  Requesting Physician: Rebecca Shi MD    Reason For Consultation:     Diabetes, hypertension, hyperlipidemia    Assessment/Plan:    Stage 3 chronic kidney disease   Assessment & Plan    Unclear baseline suspect mat be approx 1 6-2 0 but limited by data  Will cautiously continue w/lisinopril postoperatively and repeat renal indices  Avoid nephrotoxins, avoid relative hypotension          Accelerated hypertension   Assessment & Plan    Suspect 2* pain and intrinsic corticosteroids from surgery  rec'd labetalol 5mg IV once and now BP has improved to 150s  No s/sx of emergency  Will continue w/hydralazine 5mg prn sbp >160mmHg  Will restart lisinopril 40mg PO in Am        Hyperlipidemia   Assessment & Plan    Continue pravachol        GERD (gastroesophageal reflux disease)   Assessment & Plan    Start pepcid 20mg BIDprn        Disease of thyroid gland   Assessment & Plan    Continue levothyroxine        Diabetes mellitus ([facility] [address] )   Assessment & Plan    IDDM, hgb a1c was 6 5%   Pt has hx of hypoglycemia per wife  Pt took half dose lantus yesterday and none this AM, still w/poor appetite  Will continue to hold metformin postoperatively at least 48H, will hold onglyza  Start lantus 7 IU Q am and 5 IU Q HS (1/2 normal dose) and start SSI          * Prostate cancer [facility])   Assessment & Plan    [place] to be an intermediate risk prostate ca w/LUTS  S/p robotically assisted laparoscopic prostatectomy with bilateral pelvic lymph node dissection postop day number 0  Patient reports pain is controlled has nausea but no vomiting poor appetite at this moment  Will adjust insulin accordingly, continue analgesics and diet advancement per primary team will monitor vital signs postoperatively encourage incentive spirometry          ·     VTE Prophylaxis: Enoxaparin (Lovenox)  / sequential compression device     Recommendations for Discharge:  ·     Counseling / Coordination of Care Time: 45 minutes  Greater than 50% of total time spent on patient counseling and coordination of care  Collaboration of Care: Were Recommendations Directly Discussed with Primary Treatment Team? - No     History of Present Illness:    Michael Bee is a 81 y o  male who is originally admitted to the urology service on 2/7/2018 due to prostate cancer with lower urinary tract symptoms  We are consulted for diabetes, hypertension, hyperlipidemia and hypothyroid and GERD  Patient has been diagnosed with a intermediate risk prostate cancer which is T to a with a PSA of 1 3 and a [place] score of 3+4 in 20% of the for score in 3+ 3 equals 6 and 5% of the 2nd core after TRUS  Biopsy  He was offered several options including surveillance and watch and waiting verses more aggressive treatments up to and including radiation  He has opted for prostatectomy at this time in lymph node biopsy  Today he is status post robotic assisted laparoscopic prostatectomy with lymph node biopsy of bilateral pelvic lymph nodes postop day number 0  He reports that he is sore and reports that his abdominal discomfort is primarily in the right lower quadrant  It is achy and nonradiating  It is associated with nausea but no vomiting  He has no headache, no blurry vision, no chest pain or shortness of breath no palpitations    Did have a mild episode of lightheadedness upon standing previously which is resolved  He reports he has no appetite still at this time  His past medical history of hypertension which is controlled lisinopril, hyperlipidemia on Pravachol and insulin-dependent diabetes mellitus on and on close and insulin  He takes 14 units Lantus daily every morning and 10 units at night and 2 oz carbs and follows with Endocrinology in the outpatient setting  He to cover his normal schedule dose last night and has not taken any this morning per his PCP  He checks his blood sugars before meals  He has no history of CAD or CHF or CVA  Review of Systems:    Review of Systems   Constitutional: Negative for chills and fever  HENT: Negative for congestion  Eyes: Negative for photophobia and visual disturbance  Respiratory: Negative for cough and shortness of breath  Cardiovascular: Negative for chest pain and palpitations  Gastrointestinal: Positive for abdominal pain and nausea  Neurological: Positive for light-headedness  Negative for numbness and headaches  All other systems reviewed and are negative        Past Medical and Surgical History:     Past Medical History:   Diagnosis Date    Arthritis     Diabetes mellitus ([facility] [address] )     Disease of thyroid gland     GERD (gastroesophageal reflux disease)     Hearing aid worn     Hyperlipidemia     Hypertension     Hypothyroidism     Prostate cancer ([facility] [address] )     Tingling sensation     bilateral feet occassionally    Tinnitus     Wears glasses     Wears partial dentures     upper       Past Surgical History:   Procedure Laterality Date    CATARACT EXTRACTION      EYE SURGERY      JOINT REPLACEMENT      KNEE ARTHROSCOPY Right     knee    TRIGGER FINGER RELEASE         Meds/Allergies:    all medications and allergies reviewed    Allergies: No Known Allergies    Social History:     Marital Status: /Civil Union    Substance Use History:   History   Alcohol Use    1 2 oz/week    2 Glasses of wine per week     Comment: week     History   Smoking Status    Former Smoker   Smokeless Tobacco    Never Used     Comment: quit about 50 years ago     History   Drug Use No       Family History:    Family History   Problem Relation Age of Onset    Cancer Mother     Diabetes Mother     Diabetes Father     Cancer Father     Stroke Father     Hypertension Father        Physical Exam:     Vitals:   Blood Pressure: 158/78 (02/07/18 1500)  Pulse: 82 (02/07/18 1605)  Temperature: 98 4 °F (36 9 °C) (02/07/18 1500)  Temp Source: Temporal (02/07/18 1500)  Respirations: 18 (02/07/18 1500)  Height: 5' 6" (167 6 cm) (02/07/18 5548)  Weight - Scale: 72 1 kg (159 lb) (02/07/18 0625)  SpO2: 99 % (02/07/18 1605)    Physical Exam   Constitutional: He appears well-developed  No distress  HENT:   Head: Normocephalic and atraumatic  Right Ear: External ear normal    Eyes: Conjunctivae are normal  Pupils are equal, round, and reactive to light  Neck: Normal range of motion  Cardiovascular: Normal rate, regular rhythm, normal heart sounds and intact distal pulses  Exam reveals no gallop and no friction rub  No murmur heard  Pulmonary/Chest: Effort normal and breath sounds normal  No respiratory distress  He has no wheezes  He has no rales  He exhibits no tenderness  Patient on L NC oxygen  No work of breathing, no conversational dyspnea or distress   Abdominal: He exhibits no distension  There is tenderness (Right lower quadrant abdominal tenderness to palpation, abdomen soft no guarding  Abdominal incisions are C/D/I)  There is no rebound and no guarding  Musculoskeletal: He exhibits no edema  Neurological: He is alert  Skin: Skin is warm and dry  He is not diaphoretic  Psychiatric: He has a normal mood and affect  Vitals reviewed  Additional Data:     Lab Results: I have personally reviewed pertinent reports          Results from last 7 days  Lab Units 02/07/18  1202   WBC Thousand/uL 15 36*   HEMOGLOBIN g/dL 11 7*   HEMATOCRIT % 35 0*   PLATELETS Thousands/uL 181       Results from last 7 days  Lab Units 02/07/18  1202   SODIUM mmol/L 138   POTASSIUM mmol/L 4 4   CHLORIDE mmol/L 106   CO2 mmol/L 22   BUN mg/dL 23   CREATININE mg/dL 1 81*   CALCIUM mg/dL 7 3*   GLUCOSE RANDOM mg/dL 200*           Imaging:      No results found  EKG, Pathology, and Other Studies Reviewed on Admission:   · EKG:     ** Please Note: This note has been constructed using a voice recognition system   **

## 2018-02-08 VITALS
HEART RATE: 95 BPM | TEMPERATURE: 99.3 F | OXYGEN SATURATION: 98 % | DIASTOLIC BLOOD PRESSURE: 70 MMHG | SYSTOLIC BLOOD PRESSURE: 176 MMHG | WEIGHT: 159 LBS | HEIGHT: 66 IN | RESPIRATION RATE: 18 BRPM | BODY MASS INDEX: 25.55 KG/M2

## 2018-02-08 LAB
ANION GAP SERPL CALCULATED.3IONS-SCNC: 10 MMOL/L (ref 4–13)
BUN SERPL-MCNC: 24 MG/DL (ref 5–25)
CALCIUM SERPL-MCNC: 8 MG/DL (ref 8.3–10.1)
CHLORIDE SERPL-SCNC: 103 MMOL/L (ref 100–108)
CO2 SERPL-SCNC: 23 MMOL/L (ref 21–32)
CREAT SERPL-MCNC: 1.92 MG/DL (ref 0.6–1.3)
ERYTHROCYTE [DISTWIDTH] IN BLOOD BY AUTOMATED COUNT: 14.4 % (ref 11.6–15.1)
GFR SERPL CREATININE-BSD FRML MDRD: 35 ML/MIN/1.73SQ M
GLUCOSE SERPL-MCNC: 184 MG/DL (ref 65–140)
GLUCOSE SERPL-MCNC: 209 MG/DL (ref 65–140)
GLUCOSE SERPL-MCNC: 214 MG/DL (ref 65–140)
GLUCOSE SERPL-MCNC: 221 MG/DL (ref 65–140)
GLUCOSE SERPL-MCNC: 235 MG/DL (ref 65–140)
GLUCOSE SERPL-MCNC: 336 MG/DL (ref 65–140)
HCT VFR BLD AUTO: 33.7 % (ref 36.5–49.3)
HGB BLD-MCNC: 10.9 G/DL (ref 12–17)
MCH RBC QN AUTO: 29.5 PG (ref 26.8–34.3)
MCHC RBC AUTO-ENTMCNC: 32.3 G/DL (ref 31.4–37.4)
MCV RBC AUTO: 91 FL (ref 82–98)
PLATELET # BLD AUTO: 213 THOUSANDS/UL (ref 149–390)
PMV BLD AUTO: 9.8 FL (ref 8.9–12.7)
POTASSIUM SERPL-SCNC: 4.5 MMOL/L (ref 3.5–5.3)
RBC # BLD AUTO: 3.7 MILLION/UL (ref 3.88–5.62)
SODIUM SERPL-SCNC: 136 MMOL/L (ref 136–145)
WBC # BLD AUTO: 9.62 THOUSAND/UL (ref 4.31–10.16)

## 2018-02-08 PROCEDURE — 80048 BASIC METABOLIC PNL TOTAL CA: CPT | Performed by: UROLOGY

## 2018-02-08 PROCEDURE — 85027 COMPLETE CBC AUTOMATED: CPT | Performed by: UROLOGY

## 2018-02-08 PROCEDURE — 82948 REAGENT STRIP/BLOOD GLUCOSE: CPT

## 2018-02-08 PROCEDURE — 99232 SBSQ HOSP IP/OBS MODERATE 35: CPT | Performed by: INTERNAL MEDICINE

## 2018-02-08 RX ORDER — DEXTROSE AND SODIUM CHLORIDE 5; .9 G/100ML; G/100ML
75 INJECTION, SOLUTION INTRAVENOUS CONTINUOUS
Status: DISCONTINUED | OUTPATIENT
Start: 2018-02-08 | End: 2018-02-08

## 2018-02-08 RX ADMIN — HYDRALAZINE HYDROCHLORIDE 5 MG: 20 INJECTION INTRAMUSCULAR; INTRAVENOUS at 11:37

## 2018-02-08 RX ADMIN — CEFAZOLIN SODIUM 1000 MG: 1 SOLUTION INTRAVENOUS at 04:26

## 2018-02-08 RX ADMIN — OXYBUTYNIN CHLORIDE 5 MG: 5 TABLET, FILM COATED, EXTENDED RELEASE ORAL at 08:55

## 2018-02-08 RX ADMIN — INSULIN LISPRO 2 UNITS: 100 INJECTION, SOLUTION INTRAVENOUS; SUBCUTANEOUS at 01:10

## 2018-02-08 RX ADMIN — DOCUSATE SODIUM 100 MG: 100 CAPSULE, LIQUID FILLED ORAL at 08:55

## 2018-02-08 RX ADMIN — HYDROCODONE BITARTRATE AND ACETAMINOPHEN 2 TABLET: 5; 325 TABLET ORAL at 06:12

## 2018-02-08 RX ADMIN — LEVOTHYROXINE SODIUM 88 MCG: 88 TABLET ORAL at 06:10

## 2018-02-08 RX ADMIN — SODIUM CHLORIDE 125 ML/HR: 0.9 INJECTION, SOLUTION INTRAVENOUS at 08:31

## 2018-02-08 RX ADMIN — LISINOPRIL 40 MG: 20 TABLET ORAL at 08:56

## 2018-02-08 RX ADMIN — INSULIN LISPRO 1 UNITS: 100 INJECTION, SOLUTION INTRAVENOUS; SUBCUTANEOUS at 06:13

## 2018-02-08 RX ADMIN — PRAVASTATIN SODIUM 20 MG: 20 TABLET ORAL at 15:32

## 2018-02-08 RX ADMIN — INSULIN LISPRO 4 UNITS: 100 INJECTION, SOLUTION INTRAVENOUS; SUBCUTANEOUS at 11:50

## 2018-02-08 RX ADMIN — ENOXAPARIN SODIUM 40 MG: 40 INJECTION SUBCUTANEOUS at 08:56

## 2018-02-08 RX ADMIN — INSULIN GLARGINE 7 UNITS: 100 INJECTION, SOLUTION SUBCUTANEOUS at 09:05

## 2018-02-08 NOTE — PLAN OF CARE
DISCHARGE PLANNING     Discharge to home or other facility with appropriate resources Progressing        INFECTION - ADULT     Absence or prevention of progression during hospitalization Progressing     Absence of fever/infection during neutropenic period Progressing        PAIN - ADULT     Verbalizes/displays adequate comfort level or baseline comfort level Progressing        SAFETY ADULT     Patient will remain free of falls Progressing     Maintain or return to baseline ADL function Progressing

## 2018-02-08 NOTE — PROGRESS NOTES
Progress Note - Ivana Perez 81 y o  male MRN: [id number]    Unit/Bed#: E5 -01 Encounter: [id number]      Subjective: The patient is feeling reasonably well  He is not having much pain  He denies chest pain or shortness of breath  He had some nausea yesterday but this has resolved  He is eating well  Physical Exam:   Temp:  [97 6 °F (36 4 °C)-99 3 °F (37 4 °C)] 99 3 °F (37 4 °C)  HR:  [64-95] 95  Resp:  [10-19] 18  BP: (139-203)/() 176/70    Gen:  Well-developed, well-nourished, in no distress  Neck:  Supple  No lymphadenopathy, goiter, or bruit  Heart:  Regular rhythm  No murmur, gallop, or rub  Lungs:  Clear to auscultation and percussion  No wheezing, rales, or rhonchi   Abd:  Soft with active bowel sounds  Expected postoperative tenderness is noted No mass or organomegaly  Extremities:  No clubbing, cyanosis, or edema  No calf tenderness  Neuro:  Alert and oriented  No focal sign  Skin:  Warm and dry      LABS:   CBC:   Lab Results   Component Value Date    WBC 9 62 02/08/2018    HGB 10 9 (L) 02/08/2018    HCT 33 7 (L) 02/08/2018    MCV 91 02/08/2018     02/08/2018    MCH 29 5 02/08/2018    MCHC 32 3 02/08/2018    RDW 14 4 02/08/2018    MPV 9 8 02/08/2018   , CMP:   Lab Results   Component Value Date     02/08/2018    K 4 5 02/08/2018     02/08/2018    CO2 23 02/08/2018    ANIONGAP 10 02/08/2018    BUN 24 02/08/2018    CREATININE 1 92 (H) 02/08/2018    GLUCOSE 209 (H) 02/08/2018    CALCIUM 8 0 (L) 02/08/2018    EGFR 35 02/08/2018           Patient Active Problem List   Diagnosis    Pro[facility] cancer ([facility] [address] )    Diabetes mellitus ([facility] [address] )    Disease of thyroid gland    GERD (gastroesophageal reflux disease)    Hyperlipidemia    Accelerated hypertension    Stage 3 chronic kidney disease       Assessment/Plan:  1  Prostate cancer, status post prostatectomy  2  Type 2 diabetes, well controlled  3  Hypertension  4  Hyperlipidemia  5  Chronic kidney disease stage 3  6  Hypothyroidism    The patient appears to be doing well postoperatively  We will continue to monitor his blood pressure, glucose, and kidney function      VTE Pharmacologic Prophylaxis: Enoxaparin (Lovenox)  VTE Mechanical Prophylaxis: sequential compression device

## 2018-02-08 NOTE — SOCIAL WORK
Consult received for Lovenox  Patient lives at home with his wife; wife is a RN and will take care of clifton at home  Script for lovenox was faxed to [facility]; Patient has $100 deductible and then costs is $5  Wife took scripts to pharmacy to have them filled; wife will provide transport home

## 2018-02-08 NOTE — PROGRESS NOTES
Progress Note - General Surgery   Alivia Rodriguez 81 y o  male MRN: [id number]    Assessment & Plan:   1  1 Day Post-Op status post    Procedure(s):  ROBOTIC ASSISTED LAPAROSCOPIC PROSTATECTOMY; BILATERAL PELVIC LYMPH NODE DISSECTION   by Toy Sunshine MD on 2/7/2018  Discharge home with outpatient follow-up    Gerald Smith PA-C  Date: 2/8/2018 Time: 2:15 PM     Subjective: Tolerating diet  Ambulating  [place] Satchel for discharge home    Objective:    Vitals:   Vitals:    02/07/18 1500 02/07/18 1605 02/07/18 2300 02/08/18 0700   BP: 158/78  139/65 (!) 176/70   BP Location: Right arm  Right arm Right arm   Pulse: 83 82 92 95   Resp: 18 18 18   Temp: 98 4 °F (36 9 °C)  98 4 °F (36 9 °C) 99 3 °F (37 4 °C)   TempSrc: Temporal  Temporal Temporal   SpO2: 99% 99% 99% 98%   Weight:       Height:           I/O:   I/O       02/06 0701 - 02/07 0700 02/07 0701 - 02/08 0700 02/08 0701 - 02/09 0700    I V  (mL/kg)  5045 4 (70) 1000 (13 9)    Total Intake(mL/kg)  5045 4 (70) 1000 (13 9)    Urine (mL/kg/hr)  3025 (1 7)     Blood  100 (0 1)     Total Output   3125      Net   +1920 4 +1000                   Exam:    General appearance: normal, alert, fatigued, cooperative, out of bed to the chair  No acute distress  Abdomen: soft, non-tender; bowel sounds normal; no masses,  no organomegaly, left side with CHERELLE now removed, slight drainage on the dressing  Male genitalia: normal, penis: no lesions or discharge  testes: no masses or tenderness  no hernias, Edwards catheter in place with clear yellow urine    Some portions of this record may have been generated with voice recognition software  There may be translation, syntax,  or grammatical errors  Occasional wrong word or "sound-a-like" substitutions may have occurred due to the inherent limitations of the voice recognition software  Read the chart carefully and recognize, using context, where substations may have occurred   If you have any questions, please contact the dictating provider for clarification or correction, as needed         Day Price PA-C  Date: 2/8/2018 Time: 2:15 PM

## 2018-02-08 NOTE — NURSING NOTE
Discharge to home leg bag teaching done prescriptions filled by wife   Lovenox teaching done  No problems noted verbal understanding discharge instructions

## 2018-02-08 NOTE — DISCHARGE SUMMARY
DISCHARGE SUMMARY    Patient Name: Kamila Zuleta  Patient MRN: [id number]  Admitting Provider: Adolfo Gilliland MD  Discharging Provider: Adolfo Gilliland MD  Primary Care Physician at Discharge: [address]   Admission Date: 2/7/2018       Discharge Date: 02/08/18    Admission Diagnosis   Malignant neoplasm of prostate [facility]  Patient known to group for office evaluation regarding elevated PSA  Patient underwent prostate biopsy which was demonstrative for Adenocarcinoma of the Prostate  After explanation of Risks vs  Benefits and potential complications; patient was agreeable and furnished informed consent for radical prostatectomy and bilateral lymph node dissection  Refer to operative report for details  There were no complications  Allergies  No Known Allergies    Outpatient Follow-Up  Future Appointments  Date Time Provider Apoorva Tierney   2/28/2018 9:30 AM Adolfo Gilliland MD [facility]   3/30/2018 8:30 AM Saad Allen DO INT MED [facility]     Test Results Pending at Discharge   Order Current Status    Tissue Exam In process            Discharge Disposition  Home with health care services    Treatments   Edwards catheter & CHERELLE drain  CHERELLE drain removed on POD #1     Consults   internal medicine    Operative Procedures Performed  Procedure(s):  ROBOTIC ASSISTED LAPAROSCOPIC PROSTATECTOMY; BILATERAL PELVIC LYMPH NODE DISSECTION      Total time spent with patient 20 minutes, >50% spent counseling and/or coordination of care         Day Price PA-C  Date: 2/8/2018 Time: 2:24 PM

## 2018-02-08 NOTE — PLAN OF CARE

## 2018-02-08 NOTE — PROGRESS NOTES
UROLOGY PROGRESS NOTE   Patient Identifiers: Allen Yeboah (MRN [id number])  Date of Service: 2/8/2018    Subjective:     24 HR EVENTS:   no significant events  Patient has  complaints of abdominal pain when coughing  Admits not using incentive spirometry as he should, encouraged  On the hour  Bowels gurgling, and passing gas  "no appetite" Was OOB yesterday but got lightheaded  Edwards darin and CHERELLE drain  Low output  Objective:     VITALS:    Vitals:    02/07/18 2300   BP: 139/65   Pulse: 92   Resp: 18   Temp: 98 4 °F (36 9 °C)   SpO2: 99%       INS & OUTS:  I/O last 24 hours:   In: 5045 4 [I V :5045 4]  Out: 3787 [Urine:3025; Blood:100]    LABS:  Lab Results   Component Value Date    HGB 10 9 (L) 02/08/2018    HCT 33 7 (L) 02/08/2018    WBC 9 62 02/08/2018     02/08/2018   ]    Lab Results   Component Value Date     02/08/2018    K 4 5 02/08/2018     02/08/2018    CO2 23 02/08/2018    BUN 24 02/08/2018    CREATININE 1 92 (H) 02/08/2018    CALCIUM 8 0 (L) 02/08/2018    GLUCOSE 209 (H) 02/08/2018   ]    INPATIENT MEDS:    Current Facility-Administered Medications:     docusate sodium (COLACE) capsule 100 mg, 100 mg, Oral, BID, Verdell Gilford, MD, 100 mg at 02/07/18 1557    enoxaparin (LOVENOX) subcutaneous injection 40 mg, 40 mg, Subcutaneous, Daily, Verdell Gilford, MD, 40 mg at 02/07/18 1557    hydrALAZINE (APRESOLINE) injection 5 mg, 5 mg, Intravenous, Q4H PRN, Jeanna Wynn PA-C    HYDROcodone-acetaminophen (NORCO) 5-325 mg per tablet 1 tablet, 1 tablet, Oral, Q4H PRN, Verdell Gilford, MD    HYDROcodone-acetaminophen [facility] 5-325 mg per tablet 2 tablet, 2 tablet, Oral, Q4H PRN, Verdell Gilford, MD, 2 tablet at 02/08/18 0612    HYDROmorphone (DILAUDID) injection 1 mg, 1 mg, Intravenous, Q3H PRN, Verdell Gilford, MD, 1 mg at 02/07/18 1414    insulin glargine (LANTUS) subcutaneous injection 5 Units, 5 Units, Subcutaneous, HS, Jeanna Wynn, ANIVAL, 5 Units at 02/07/18 2211    insulin glargine (LANTUS) subcutaneous injection 7 Units, 7 Units, Subcutaneous, QAM, Jeanna Wynn PA-C    insulin lispro (HumaLOG) 100 units/mL subcutaneous injection 1-5 Units, 1-5 Units, Subcutaneous, Q6H [address], 1 Units at 02/08/18 8713 **AND** Fingerstick Glucose (POCT), , , Q6H, Lola Cox MD    levothyroxine tablet 88 mcg, 88 mcg, Oral, Early Morning, Lola Cox MD, 88 mcg at 02/08/18 0610    lisinopril (ZESTRIL) tablet 40 mg, 40 mg, Oral, Daily, Jeanna Wynn PA-C    ondansetron (ZOFRAN) injection 4 mg, 4 mg, Intravenous, Q6H PRN, Lola Cox MD, 4 mg at 02/07/18 1642    oxybutynin (DITROPAN-XL) 24 hr tablet 5 mg, 5 mg, Oral, Daily, Lola Cox MD, 5 mg at 02/07/18 1557    pravastatin (PRAVACHOL) tablet 20 mg, 20 mg, Oral, Daily With Dinner, Lola Cox MD, 20 mg at 02/07/18 1557    simethicone (MYLICON) chewable tablet 80 mg, 80 mg, Oral, 4x Daily PRN, Lola Cox MD    sodium chloride 0 9 % infusion, 125 mL/hr, Intravenous, Continuous, Orien Laquita, DO, Stopped at 02/07/18 1201    sodium chloride 0 9 % infusion, 125 mL/hr, Intravenous, Continuous, Lola Cox MD, Last Rate: 125 mL/hr at 02/07/18 2215, 125 mL/hr at 02/07/18 2215      Physical Exam:     GEN: alert and oriented x 3    RESP: breathing comfortably with no accessory muscle use    CARDIO: Regular rate and rhythm  ABD: soft, appropriately tender to palpation, non-distended   INCISION: dry and intact    EXT: no significant peripheral edema   DRAINS: serosanguineous  VU: in place draining clear yellow urine, purulent output and none  no clots and         Assessment:   POD #1 DVP    Plan:   -OOB  -Encouraged incentive spirometry   -advance diet to reg diabetic   -Plan is discharge later today if OOB and eating without issue  -CHERELLE drain removed        RN participated in rounds with AP   Plan of care discussed with patient and RN

## 2018-02-09 NOTE — CASE MANAGEMENT
Notification of Discharge  This is a Notification of Discharge from our facility [address]  Please be advised that this patient has been discharge from our facility  Below you will find the admission and discharge date and time including the patients disposition  PRESENTATION DATE: 2/7/2018  5:51 AM  IP ADMISSION DATE: 2/7/18 1134  DISCHARGE DATE: 2/8/2018  5:30 PM  DISPOSITION: [facility] in the [facility] by Parker Mijares for 2017  Network Utilization Review Department  Phone: [phone]; Fax [phone]  ATTENTION: The Network Utilization Review Department is now centralized for our 7 Facilities  Make a note that we have a new phone and fax numbers for our Department  Please call with any questions or concerns to [phone] and carefully follow the prompts so that you are directed to the right person  All voicemails are confidential  Fax any determinations, approvals, denials, and requests for initial or continue stay review clinical to [phone]  Due to HIGH CALL volume, it would be easier if you could please send faxed requests to expedite your requests and in part, help us provide discharge notifications faster

## 2018-02-11 LAB
ABO GROUP BLD BPU: NORMAL
ABO GROUP BLD BPU: NORMAL
BPU ID: NORMAL
BPU ID: NORMAL
UNIT DISPENSE STATUS: NORMAL
UNIT DISPENSE STATUS: NORMAL
UNIT PRODUCT CODE: NORMAL
UNIT PRODUCT CODE: NORMAL
UNIT RH: NORMAL
UNIT RH: NORMAL

## 2018-02-12 ENCOUNTER — TRANSITIONAL CARE MANAGEMENT (OUTPATIENT)
Dept: INTERNAL MEDICINE CLINIC | Facility: CLINIC | Age: 73
End: 2018-02-12

## 2018-02-12 ENCOUNTER — TELEPHONE (OUTPATIENT)
Dept: INTERNAL MEDICINE CLINIC | Facility: CLINIC | Age: 73
End: 2018-02-12

## 2018-02-28 ENCOUNTER — OFFICE VISIT (OUTPATIENT)
Dept: UROLOGY | Facility: HOSPITAL | Age: 73
End: 2018-02-28

## 2018-02-28 VITALS
SYSTOLIC BLOOD PRESSURE: 140 MMHG | WEIGHT: 152 LBS | BODY MASS INDEX: 24.43 KG/M2 | HEIGHT: 66 IN | HEART RATE: 88 BPM | DIASTOLIC BLOOD PRESSURE: 80 MMHG

## 2018-02-28 DIAGNOSIS — C61 PROSTATE CANCER (HCC): Primary | ICD-10-CM

## 2018-02-28 PROCEDURE — 99024 POSTOP FOLLOW-UP VISIT: CPT | Performed by: UROLOGY

## 2018-02-28 RX ORDER — CIPROFLOXACIN 250 MG/1
500 TABLET, FILM COATED ORAL EVERY 12 HOURS SCHEDULED
COMMUNITY
End: 2018-03-26 | Stop reason: ALTCHOICE

## 2018-02-28 NOTE — PROGRESS NOTES
UROLOGY POSTOP NOTE     History of Present Illness:   Harjeet Reese is a 81 y o  male with a family history of prostate cancer in his father  PSA last year was 0 6 and this year is up to 1 3  Prostate biopsy revealed intermediate risk prostate cancer  The patient underwent a robotic assisted laparoscopic prostatectomy with bilateral pelvic lymph node dissection on 2/7/2018  Patient did very well postoperatively and was discharged within 24 hours  He has done well at home  He has had no discomfort  He has had a small amount of blood in his catheter  He remains on Lovenox daily injections for prophylaxis        Past Medical History:     Past Medical History:   Diagnosis Date    Arthritis     Diabetes mellitus ([address] )     Disease of thyroid gland     GERD (gastroesophageal reflux disease)     Hearing aid worn     Hyperlipidemia     Hypertension     Hypothyroidism     Prostate cancer (HCC)     Tingling sensation     bilateral feet occassionally    Tinnitus     Wears glasses     Wears partial dentures     upper       PAST SURGICAL HISTORY:     Past Surgical History:   Procedure Laterality Date    CATARACT EXTRACTION      EYE SURGERY      JOINT REPLACEMENT      KNEE ARTHROSCOPY Right     knee    [place] LAP,PROSTATECTOMY,RADICAL,W/NERVE SPARE,INCL ROBOTIC N/A 2/7/2018    Procedure: ROBOTIC ASSISTED LAPAROSCOPIC PROSTATECTOMY; BILATERAL PELVIC LYMPH NODE DISSECTION;  Surgeon: Dina Muhammad MD;  Location: [facility];  Service: Urology    TRIGGER FINGER RELEASE         CURRENT MEDICATIONS:     Current Outpatient Prescriptions   Medication Sig Dispense Refill    acetaminophen (TYLENOL) 500 MG chewable tablet Chew 500 mg every 6 (six) hours as needed for mild pain      ciprofloxacin (CIPRO) 250 mg tablet Take 500 mg by mouth every 12 (twelve) hours      docusate sodium (COLACE) 100 mg capsule Take 1 capsule (100 mg total) by mouth 2 (two) times a day for 60 doses 60 capsule 0    enoxaparin (LOVENOX) 40 mg/0 4 mL Inject 0 4 mL (40 mg total) under the skin daily for 30 doses 30 Syringe 0    insulin aspart (NovoLOG) 100 units/mL injection Inject 2-4 Units under the skin every evening      insulin glargine (LANTUS) 100 units/mL subcutaneous injection Inject 14 Units under the skin daily        insulin glargine (LANTUS) 100 units/mL subcutaneous injection Inject 10 Units under the skin daily at bedtime      Levothyroxine Sodium 88 MCG CAPS Take 88 mcg by mouth daily        lisinopril (ZESTRIL) 40 mg tablet Take 40 mg by mouth daily        metFORMIN (GLUCOPHAGE) 1000 MG tablet Take 1,000 mg by mouth 2 (two) times a day with meals   Multiple Vitamin (MULTIVITAMIN) tablet Take 1 tablet by mouth daily   saxagliptin (ONGLYZA) 5 MG tablet Take 5 mg by mouth daily      simvastatin (ZOCOR) 20 mg tablet Take 20 mg by mouth daily at bedtime   aspirin 81 MG tablet Take 81 mg by mouth daily   HYDROcodone-acetaminophen (NORCO) 5-325 mg per tablet Take 1 tablet by mouth every 4 (four) hours as needed for pain for up to 40 doses Max Daily Amount: 6 tablets 40 tablet 0    oxybutynin (DITROPAN-XL) 5 mg 24 hr tablet Take 1 tablet (5 mg total) by mouth daily for 14 doses 14 tablet 0     No current facility-administered medications for this visit          ALLERGIES:   No Known Allergies    SOCIAL HISTORY:     Social History     Social History    Marital status: /Civil Union     Spouse name: N/A    Number of children: N/A    Years of education: N/A     Social History Main Topics    Smoking status: Former Smoker    Smokeless tobacco: Never Used      Comment: quit about 50 years ago    Alcohol use 1 2 oz/week     2 Glasses of wine per week      Comment: week    Drug use: No    Sexual activity: Not Asked     Other Topics Concern    None     Social History Narrative    None       SOCIAL HISTORY:     Family History   Problem Relation Age of Onset    Cancer Mother     Diabetes Mother     Diabetes Father     Cancer Father     Stroke Father     Hypertension Father        REVIEW OF SYSTEMS:     General: negative for chills, fatigue, fever, significant unplanned weight changed  Psychological: negative for anxiety, depression, concentration or memory difficulties, irritability, mood swings, sleep disturbances  Ophthalmic: negative for blurry vision or double  ENT: negative for hearing difficulties, tinnitus, vertigo  Hematological and Lymphatic: negative for bleeding problems, blood clots, bruising, swollen lymph nodes  Respiratory: negative for shortness of breath, cough, hemoptysis, orthopnea, tachypnea or wheezing  Cardiovascular: negative for chest pain, dyspnea on exertion, edema, irregular or rapid heartbeat, paroxysmal nocturnal dyspnea  Gastrointestinal: negative for abdominal pain, bright red blood in stools, change in stools, constipation, diarrhea, nausea/vomiting, stool incontinence    GENITOURINARY: see HPI    Musculoskeletal: negative for gait disturbance, joint pain, joint stiffness/sweeling/pain, muscular weakness  Dermatological: negative for rash or skin lesion changes  Neurological: negative for confusion, dizziness, headaches, memory loss, numbness/tingling, seizures, speech problems, tremors or weakness    PHYSICAL EXAM:     /80   Pulse 88   Ht 5' 6" (1 676 m)   Wt 68 9 kg (152 lb)   BMI 24 53 kg/m²   General:  Healthy appearing individual in no acute distress  They have a normal affect  There is not appear to be any gross neurologic defects or abnormalities  HEENT:  Normocephalic, atraumatic  Neck is supple without any palpable lymphadenopathy  Cardiovascular:  Patient has normal palpable distal radial pulses  There is no significant peripheral edema  No JVD is noted  Respiratory:  Patient has unlabored respirations  There is no audible wheeze or rhonchi  Abdomen:  Abdomen with well-healing surgical scars  Abdomen is soft and nontender    There is no tympany  Inguinal and umbilical hernia are not appreciated  Genitourinary: no penile lesions or discharge, no testicular masses or tenderness, no hernias  Urethral Edwards catheter in place  Urine is clear  Musculoskeletal:  Patient does not have significant CVA tenderness with palpation or percussion  They full range of motion in all 4 extremities  Strength in all 4 extremities appears congruent  Patient is able to ambulate without assistance or difficulty  Dermatologic:  Patient has no skin abnormalities or rashes  LABS:     CBC:   Lab Results   Component Value Date    WBC 9 62 02/08/2018    HGB 10 9 (L) 02/08/2018    HCT 33 7 (L) 02/08/2018    MCV 91 02/08/2018     02/08/2018       BMP:   Lab Results   Component Value Date    GLUCOSE 209 (H) 02/08/2018    CALCIUM 8 0 (L) 02/08/2018     02/08/2018    K 4 5 02/08/2018    CO2 23 02/08/2018     02/08/2018    BUN 24 02/08/2018    CREATININE 1 92 (H) 02/08/2018         PATHOLOGY:     2/7/18  Final Diagnosis   PROSTATE CANCER STAGING SUMMARY  1  Specimen identification:       - Procedure:   Radical prostatectomy     - Prostate size and weight: 4 5 x 4 2 x 2 8 cm, 39 grams   2  Tumor     - Histologic type: Acinar adenocarcinoma     - Histologic Grade: Gina Pattern: 6        * primary pattern: 3        * secondary pattern: 3        * tertiary pattern:  N/A        * Grade Group:  Grade group 1     * Intraductal carcinoma: Not identified    - Tumor quantitation: 5%    - Extraprostatic extension: Not identified    - Urinary bladder neck invasion: Not identified    - Seminal vesicle invasion: Not identified   3  Margins: Uninvolved by invasive carcinoma   4  Margin positivity in area of extraprostatic extension: N/A   5  Treatment effect on carcinoma:  No known presurgical therapy   6  Lymph-vascular invasion: Not identified   7  Perineural invasion: Not identified   8   Regional lymph nodes (pN0):     -  Number of lymph nodes involved: 0 -  Number of lymph nodes examined: 4   9  Additional pathologic findings: High-grade prostatic intraepithelial neoplasia  10  Ancillary studies: None, if needed Block D18 can be used  11  PSA: unknown  8th Ed AJCC Tumor Stage: At least stage I  -pT2, pN0, Gina's score 6 (3+3), grade group 1     A  Lymph node, periprostatic fat and lymph nodes, excision:  - Benign vascularized fibroadipose tissue      B  Lymph node, right pelvic, resection:  - One lymph node, negative for carcinoma (0/1)      C  Lymph node, left pelvic, resection:  - Three lymph nodes, negative for carcinoma (0/3)      D  Prostate, radical prostatectomy:  - Prostatic adenocarcinoma, Gina score 6 (3+3), grade group 1   - High-grade prostatic intraepithelial neoplasia  - Margins of resection, negative for carcinoma         PROCEDURE:     Twenty Surinamese urethral Edwards catheter removed  Patient tolerated procedure well  ASSESSMENT:     81 y o  male with T2 N0 MX adenocarcinoma of the prostate, Gina 6 with negative surgical margins    PLAN:     We discussed the patient routine PSA surveillance post prostatectomy  The patient will obtain his lab at the end of March when he has some other lab work for his primary care doctor  I will see him back 3 months postoperatively  The patient understands that is very common to leak urine after catheter removal   He did see our pelvic floor physical therapy team prior to surgery  He will perform good Kegel exercises and we will assess his return of continence at his next visit  Patient will continue on his Lovenox for the full month

## 2018-03-07 NOTE — PROGRESS NOTES
Assessment    1  Sensorineural hearing loss (SNHL) of both ears (389 18) (H90 3)    Chief Complaint  Chief Complaint Free Text Note Form: F/U PATIENT NEEDS EARS CLEANED/REF BY DR MCGOVERN      History of Present Illness  HPI: 72-year-old male returns for reevaluation of the ears  He has a history of sensorineural hearing loss and is followed at the [place] for his hearing aids  He has a [place] audiology appointment and they will not see him unless his ears have been completely cleaned  He denies any changes to his hearing  No ear drainage  No ear fullness  No previous ear surgeries  He is no longer having any problems with sinusitis  Review of Systems  Complete ENT ROS [facility]:   Eyes: No complaints of itching, excessive tearing or vision changes  Ears: NEEDS TO BE CLEANED  Nose: No nasal obstruction, no discharge or runniness, no bleeding, no dryness, no sneezing and no loss of smell  Mouth: No sores in mouth, no altered taste, no dental problems  Throat: No complaints of throat pain, no difficulty swallowing, no hoarseness  Neck: No neck soreness, no neck pain, no neck lumps or swelling  Genitourinary: No complaints of dysuria, flank pain or frequent urination  Cardiovascular: No complaints of chest pain or palpitations  Respiratory: No complaints of shortness of breath, cough or wheezing  Gastrointestinal: No complaints of heartburn, nausea/vomiting, or constipation  Neurological: No complaints of headache, convulsions or memory loss  ROS Reviewed:   ROS reviewed  Active Problems    1  Acute sinusitis (461 9) (J01 90)   2  Advance directive discussed with patient (V65 49) (Z71 89)   3  Arthritis (716 90) (M19 90)   4  Atypical chest pain (786 59) (R07 89)   5  Benign prostatic hyperplasia with urinary obstruction (600 01,599 69) (N40 1,N13 8)   6  Colon cancer screening (V76 51) (Z12 11)   7  Contact dermatitis (692 9) (L25 9)   8  DMII (diabetes mellitus, type 2) (250 00) (E11 9)   9  Hyperlipidemia (272 4) (E78 5)   10  Hypertension (401 9) (I10)   11  Hypothyroidism (244 9) (E03 9)   12  Incomplete emptying of bladder (788 21) (R33 9)   13  Need for influenza vaccination (V04 81) (Z23)   14  Nocturia (788 43) (R35 1)   15  Syncope, near (780 2) (R55)   16  Vitamin D deficiency (268 9) (E55 9)    Past Medical History    1  History of Benign prostatic hypertrophy (600 00) (N40 0)   2  History of Cataracts, bilateral (366 9) (H26 9)   3  History of Encounter for prostate cancer screening (V76 44) (Z12 5)   4  History of Hearing problem (V41 2) (H91 90)   5  History of diabetes mellitus (V12 29) (Z86 39)   6  History of mumps (V12 09) (Z86 19)   7  History of Nausea (787 02) (R11 0)   8  History of Need for influenza vaccination (V04 81) (Z23)   9  History of Sinusitis (473 9) (J32 9)   10  History of Skin tag (701 9) (L91 8)   11  History of Urinary tract infection (599 0) (N39 0)    Surgical History    1  History of Cataract Surgery   2  History of Hand Surgery   3  History of Knee Arthroscopy (Therapeutic)    Family History  Mother    1  Family history of Uterine Cancer (V16 49)  Father    2  Family history of diabetes mellitus (V18 0) (Z83 3)   3  Family history of Prostate Cancer (V16 42)   4  Family history of Stroke of unknown cause  Grandmother    5  Family history of malignant neoplasm (V16 9) (Z80 9)  Uncle    6  Family history of diabetes mellitus (V18 0) (Z83 3)    Social History    · Always uses seat belt   · Being A Social Drinker   · Caffeine Use   · Former smoker (T24 65) (S60 092)   · Lives independently with spouse   ·    · No drug use   · Retired   · Sun Protection Sunscreen   · Uses Safety Equipment - Seatbelts    Current Meds   1  Aspirin 81 MG TABS; take 2 tablet daily; Therapy: (Recorded:00Vqs3069) to Recorded   2  Lantus SoloStar SOLN; 14 units in the am 10 in the evening Recorded   3  Levothyroxine Sodium 88 MCG Oral Tablet; TAKE 1 TABLET DAILY;    Therapy: 60WFO7572 to (Evaluate:19Oct2017); Last Rx:24Oct2016 Ordered   4  Linagliptin 5 MG TABS; TAKE 1 TABLET DAILY; Therapy: (Recorded:03Scx2916) to Recorded   5  Lisinopril 40 MG Oral Tablet; TAKE 1 TABLET DAILY; Therapy: (JWMAUOIE:64XKD0946) to Recorded   6  MetFORMIN HCl - 1000 MG Oral Tablet; Take 1 tablet twice daily  Requested for:   50PYI2113; Last Rx:21May2014 Ordered   7  Multi Vitamin Mens Oral Tablet; Therapy: (Recorded:96Tvc5501) to Recorded   8  NovoLOG 100 UNIT/ML Subcutaneous Solution; INJECT 2 UNIT Before meals; Therapy: (Recorded:24May2017) to Recorded   9  Simvastatin 20 MG Oral Tablet; Take 1 tablet daily; Therapy: 90ZUD2118 to (Evaluate:20Mar2014)  Requested for: 35AGP7245; Last   Rx:25Mar2013 Ordered   10  Tamsulosin HCl - 0 4 MG Oral Capsule; On tab QPM;    Therapy: 27GAF3113 to (Last Rx:22Jul2015) Ordered   11  Triamcinolone Acetonide 0 1 % External Cream; apply BID; Therapy: 96NSR1544 to (Evaluate:23Jul2017)  Requested for: 12MWD8885; Last    Rx:24May2017 Ordered    Allergies    1  No Known Drug Allergies    Vitals  Signs   Recorded: 21Jul2017 08:34AM   Heart Rate: 69  Systolic: 825, LUE, Sitting  Diastolic: 84, LUE, Sitting  Height: 5 ft 6 in  Weight: 159 lb 2 oz  BMI Calculated: 25 68  BSA Calculated: 1 81  O2 Saturation: 98    Physical Exam    Constitutional:   General appearance: Well developed, well nourished  Ability to communicate: Voice normal  Speech normal    Head and Face:   Head and face: Head normocephalic, atraumatic with no lesions or palpable masses  Submandibular glands and parotid glands: non tender, no masses  Eyes:   Test of Ocular Motility: Gaze normal  No nystagmus  Ears:   Otoscopic examination: Abnormal  Bilateral cerumen impaction  After debridement bilateral tympanic membranes are intact with intact normal landmarks      Hearing: Normal    Nose:   External auditory canals: No cerumen impaction noted, no drainage observed, no edema noted in EAC, no exostoses present, no osteoma present, no tenderness noted  External Inspection of Nose: No deformities observed, no deviation of bone structure, no skin lesion present, no swelling present  Nares are symmetric, no deviation of caudal portion of septum  Nasal Mucosa: No congestion observed, no mucosal lesion or masses present, no ulcerations observed  Cartilaginous Septum: midline, no bleeding noted, no crusting present, no perforation noted  Turbinates: No hypertrophy or inflammation noted  Mouth: Inspection of Lips, Teeth, Gums: Lips normal in color, moist, no cracks or lesions  No loose teeth, no missing teeth  Gingiva: no bleeding observed, no inflammation present  Hard Palate: no asymmetry observed, no torus present  Soft palate normal with no ulcers noted  Throat:   Examination of Oropharynx: Oral Mucosa: no masses, lesions, leukoplakia, or scarring  Normal Lili's ducts, pink and moist, no discoloration noted  Floor of mouth: normal Warthin's ducts, no lesions, ulcerations, leukoplakia or torus mandibularis  Tonsils: no hypertrophy or ulcerations noted  Tongue: normal mobility, surfaces without fissures, leukoplakia, ulceration or masses, not enlarged, no pallor noted, no white patches present  Neck:   Examination of Neck: No decreased range of motion, trachea midline  Examination of Thyroid: Normal size, non-tender, no palpable masses  Lymphatic:   Palpation of Lymph Nodes: Neck: No generalized lymphadenopathy  Neurological/Psychiatric:   Cranial nerves II-VII grossly intact  Oriented to person, place, and time  Cooperative, in no acute distress  Procedure  Procedure: Cerumen debridement bilaterally    Indications: Cerumen impaction bilaterally    Procedure in detail: After informed verbal consent was obtained the ear was visualized using microscopy  Using cerumen loop, suction, and alligator forceps the cerumen was debrided and the findings below were seen   The patient tolerated the procedure well  FINDINGS:  Bilateral tympanic membranes are intact with intact normal landmarks  Discussion/Summary  Discussion Summary:   We discussed his ongoing cerumen  We discussed management for this  He will return q 6 months for reevaluation and debridement        Signatures   Electronically signed by : ROSANA Cook ; Jul 21 2017  9:03AM EST                       (Author)

## 2018-03-07 NOTE — CONSULTS
Plan    1  Follow-up PRN Evaluation and Treatment  Follow-up  Status: Complete  Done:   21Apr2017 12:54PM    Assessment    1  History of Sinusitis (473 9) (J32 9)   2  Former smoker (V15 82) (W51 507)   3  Acute sinusitis (461 9) (J01 90)    Chief Complaint  Chief Complaint Free Text Note Form: [facility]us[facility] and [facility] Jang Rough      History of Present Illness  HPI: 68-year-old male referred for evaluation of recurrent acute sinusitis  Patient's had 2 episodes of severe sinus infections lasting less than one week  The first was in November  He had a recurrence of similar event in February  Both vents had severe facial pressure and pain, dental pain, hyposmia, and purulent nasal drainage  For both events he was given azithromycin with complete resolution of symptoms within 7-10 days  He denies any ongoing problems with allergic rhinitis, nasal obstruction, congestion, facial pressure, or pain  He is otherwise in his normal state of health  No neck masses  Review of Systems  Complete ENT ROS [place]:   Eyes: No complaints of itching, excessive tearing or vision changes  Ears: No complaints of hearing loss, discharge, imbalance, recent ear infections, or tinnitus  Nose: congestion,sinusitis  Mouth: No sores in mouth, no altered taste, no dental problems  Throat: No complaints of throat pain, no difficulty swallowing, no hoarseness  Neck: No neck soreness, no neck pain, no neck lumps or swelling  Genitourinary: No complaints of dysuria, flank pain or frequent urination  Cardiovascular: No complaints of chest pain or palpitations  Respiratory: No complaints of shortness of breath, cough or wheezing  Gastrointestinal: No complaints of heartburn, nausea/vomiting, or constipation  Neurological: No complaints of headache, convulsions or memory loss  ROS Reviewed:   ROS reviewed  Active Problems    1  Acute sinusitis (461 9) (J01 90)   2   Advance directive discussed with patient (V62 99) (Z71 89)   3  Arthritis (716 90) (M19 90)   4  Atypical chest pain (786 59) (R07 89)   5  Benign prostatic hyperplasia with urinary obstruction (600 01,599 69) (N40 1,N13 8)   6  DMII (diabetes mellitus, type 2) (250 00) (E11 9)   7  Hyperlipidemia (272 4) (E78 5)   8  Hypertension (401 9) (I10)   9  Hypothyroidism (244 9) (E03 9)   10  Incomplete emptying of bladder (788 21) (R33 9)   11  Need for influenza vaccination (V04 81) (Z23)   12  Nocturia (788 43) (R35 1)   13  Syncope, near (780 2) (R55)   14  Vitamin D deficiency (268 9) (E55 9)    Past Medical History    1  History of Benign prostatic hypertrophy (600 00) (N40 0)   2  History of Cataracts, bilateral (366 9) (H26 9)   3  History of Colon cancer screening (V76 51) (Z12 11)   4  History of Encounter for prostate cancer screening (V76 44) (Z12 5)   5  History of Hearing problem (V41 2) (H91 90)   6  History of diabetes mellitus (V12 29) (Z86 39)   7  History of mumps (V12 09) (Z86 19)   8  History of Nausea (787 02) (R11 0)   9  History of Need for influenza vaccination (V04 81) (Z23)   10  History of Sinusitis (473 9) (J32 9)   11  History of Skin tag (701 9) (L91 8)   12  History of Urinary tract infection (599 0) (N39 0)  Past Medical History Reviewed: The past medical history was reviewed and updated today  Surgical History    1  History of Cataract Surgery   2  History of Hand Surgery   3  History of Knee Arthroscopy (Therapeutic)  Surgical History Reviewed: The surgical history was reviewed and updated today  Family History  Mother    1  Family history of Uterine Cancer (V16 49)  Father    2  Family history of diabetes mellitus (V18 0) (Z83 3)   3  Family history of Prostate Cancer (V16 42)   4  Family history of Stroke of unknown cause  Grandmother    5  Family history of malignant neoplasm (V16 9) (Z80 9)  Uncle    6  Family history of diabetes mellitus (V18 0) (Z83 3)  Family History Reviewed:    The family history was reviewed and updated today  Social History    · Always uses seat belt   · Being A Social Drinker   · Caffeine Use   · Former smoker (U12 04) (B42 977)   · Lives independently with spouse   ·    · No drug use   · Retired   · Sun Protection Sunscreen   · Uses Safety Equipment - Seatbelts  Social History Reviewed: The social history was reviewed and updated today  The social history was reviewed and is unchanged  Current Meds   1  Aspirin 81 MG TABS; take 2 tablet daily; Therapy: (Recorded:33Jbs1808) to Recorded   2  Lantus SoloStar SOLN; 14 units in the am 10 in the evening Recorded   3  Levothyroxine Sodium 88 MCG Oral Tablet; TAKE 1 TABLET DAILY; Therapy: 19PDE2914 to (Evaluate:19Oct2017); Last Rx:24Oct2016 Ordered   4  Linagliptin 5 MG TABS; TAKE 1 TABLET DAILY; Therapy: (Recorded:64Ssp1964) to Recorded   5  Lisinopril 40 MG Oral Tablet; TAKE 1 TABLET DAILY; Therapy: (IWVFOSQZ:12OYR7041) to Recorded   6  MetFORMIN HCl - 1000 MG Oral Tablet; Take 1 tablet twice daily  Requested for:   74WXZ9205; Last Rx:72Ktx5997 Ordered   7  Multi Vitamin Mens Oral Tablet; Therapy: (Recorded:42Bro6229) to Recorded   8  Simvastatin 20 MG Oral Tablet; Take 1 tablet daily; Therapy: 03CMJ0017 to (Evaluate:20Mar2014)  Requested for: 25Mar2013; Last   Rx:25Mar2013 Ordered   9  Tamsulosin HCl - 0 4 MG Oral Capsule; On tab QPM;   Therapy: 04FED8334 to (Last Rx:57Yig5182) Ordered    Allergies    1  No Known Drug Allergies    Vitals  Signs   Recorded: 21Apr2017 11:02AM   Heart Rate: 80  Systolic: 493  Diastolic: 83  Height: 5 ft 6 in  Weight: 159 lb 6 oz  BMI Calculated: 25 72  BSA Calculated: 1 82  O2 Saturation: 96    Physical Exam    Constitutional:   General appearance: Well developed, well nourished  Ability to communicate: Voice normal  Speech normal    Head and Face:   Head and face: Head normocephalic, atraumatic with no lesions or palpable masses  Submandibular glands and parotid glands: non tender, no masses  Eyes:   Test of Ocular Motility: Gaze normal  No nystagmus  Ears:   Otoscopic Examination: Tympanic membranes intact and normal in appearance, no retraction of tympanic membranes observed, no serous effusion observed, no evidence of tympanosclerosis  Hearing: Normal    Nose:   External auditory canals: No cerumen impaction noted, no drainage observed, no edema noted in EAC, no exostoses present, no osteoma present, no tenderness noted  External Inspection of Nose: No deformities observed, no deviation of bone structure, no skin lesion present, no swelling present  Nares are symmetric, no deviation of caudal portion of septum  Nasal Mucosa: No congestion observed, no mucosal lesion or masses present, no ulcerations observed  Cartilaginous Septum: midline, no bleeding noted, no crusting present, no perforation noted  Turbinates: No hypertrophy or inflammation noted  Mouth: Inspection of Lips, Teeth, Gums: Lips normal in color, moist, no cracks or lesions  No loose teeth, no missing teeth  Gingiva: no bleeding observed, no inflammation present  Hard Palate: no asymmetry observed, no torus present  Soft palate normal with no ulcers noted  Throat:   Examination of Oropharynx: Oral Mucosa: no masses, lesions, leukoplakia, or scarring  Normal Lili's ducts, pink and moist, no discoloration noted  Floor of mouth: normal Warthin's ducts, no lesions, ulcerations, leukoplakia or torus mandibularis  Tonsils: no hypertrophy or ulcerations noted  Tongue: normal mobility, surfaces without fissures, leukoplakia, ulceration or masses, not enlarged, no pallor noted, no white patches present  Neck:   Examination of Neck: No decreased range of motion, trachea midline  Examination of Thyroid: Normal size, non-tender, no palpable masses  Lymphatic:   Palpation of Lymph Nodes: Neck: No generalized lymphadenopathy  Neurological/Psychiatric:   Cranial nerves II-VII grossly intact      Oriented to person, place, and time     Cooperative, in no acute distress  Procedure  Procedure: Nasal endoscopy    Indications: Evaluation of the nasal cavity     Procedure in detail: After informed verbal consent was obtained the nose was anesthetized with topical lidocaine and phenylephrine  After adequate time for anesthesia the nose was evaluated using the endoscope including the middle meatus bilaterally, the inferior and middle turbinates, and the sphenoethmoid recess with the below findings  The patient tolerated the procedure well  Findings: No mucopurulence or polyposis throughout  No lesions or masses  Right-sided septal deviation  Discussion/Summary  Discussion Summary:   Based on his discussion and history she likely had a viral sinusitis  We discussed ongoing management of sinusitis and any allergic rhinitis  At this time he would like to refrain from any nasal steroid sprays  We will see him back for any further acute episodes  He will return p r n        Signatures   Electronically signed by : ROSANA Middleton ; Apr 21 2017 12:55PM EST                       (Author)

## 2018-03-15 ENCOUNTER — TRANSCRIBE ORDERS (OUTPATIENT)
Dept: ADMINISTRATIVE | Facility: HOSPITAL | Age: 73
End: 2018-03-15

## 2018-03-15 ENCOUNTER — APPOINTMENT (OUTPATIENT)
Dept: LAB | Facility: HOSPITAL | Age: 73
End: 2018-03-15
Attending: INTERNAL MEDICINE
Payer: COMMERCIAL

## 2018-03-15 DIAGNOSIS — E11.9 TYPE 2 DIABETES MELLITUS WITHOUT COMPLICATIONS (HCC): ICD-10-CM

## 2018-03-15 DIAGNOSIS — C61 PROSTATE CANCER (HCC): ICD-10-CM

## 2018-03-15 DIAGNOSIS — I10 ESSENTIAL (PRIMARY) HYPERTENSION: ICD-10-CM

## 2018-03-15 DIAGNOSIS — E55.9 VITAMIN D DEFICIENCY: ICD-10-CM

## 2018-03-15 DIAGNOSIS — E03.9 HYPOTHYROIDISM: ICD-10-CM

## 2018-03-15 LAB
25(OH)D3 SERPL-MCNC: 35.3 NG/ML (ref 30–100)
ALBUMIN SERPL BCP-MCNC: 3.8 G/DL (ref 3.5–5)
ALP SERPL-CCNC: 88 U/L (ref 46–116)
ALT SERPL W P-5'-P-CCNC: 26 U/L (ref 12–78)
ANION GAP SERPL CALCULATED.3IONS-SCNC: 8 MMOL/L (ref 4–13)
AST SERPL W P-5'-P-CCNC: 16 U/L (ref 5–45)
BILIRUB SERPL-MCNC: 0.5 MG/DL (ref 0.2–1)
BUN SERPL-MCNC: 36 MG/DL (ref 5–25)
CALCIUM SERPL-MCNC: 9.1 MG/DL (ref 8.3–10.1)
CHLORIDE SERPL-SCNC: 100 MMOL/L (ref 100–108)
CO2 SERPL-SCNC: 29 MMOL/L (ref 21–32)
CREAT SERPL-MCNC: 1.77 MG/DL (ref 0.6–1.3)
EST. AVERAGE GLUCOSE BLD GHB EST-MCNC: 151 MG/DL
GFR SERPL CREATININE-BSD FRML MDRD: 38 ML/MIN/1.73SQ M
GLUCOSE P FAST SERPL-MCNC: 136 MG/DL (ref 65–99)
HBA1C MFR BLD: 6.9 % (ref 4.2–6.3)
POTASSIUM SERPL-SCNC: 5 MMOL/L (ref 3.5–5.3)
PROT SERPL-MCNC: 6.8 G/DL (ref 6.4–8.2)
PSA SERPL-MCNC: <0.1 NG/ML (ref 0–4)
SODIUM SERPL-SCNC: 137 MMOL/L (ref 136–145)
TSH SERPL DL<=0.05 MIU/L-ACNC: 0.79 UIU/ML (ref 0.36–3.74)

## 2018-03-15 PROCEDURE — 80053 COMPREHEN METABOLIC PANEL: CPT

## 2018-03-15 PROCEDURE — 83036 HEMOGLOBIN GLYCOSYLATED A1C: CPT

## 2018-03-15 PROCEDURE — 82306 VITAMIN D 25 HYDROXY: CPT

## 2018-03-15 PROCEDURE — 84153 ASSAY OF PSA TOTAL: CPT

## 2018-03-15 PROCEDURE — 84443 ASSAY THYROID STIM HORMONE: CPT

## 2018-03-15 PROCEDURE — 36415 COLL VENOUS BLD VENIPUNCTURE: CPT

## 2018-03-26 ENCOUNTER — OFFICE VISIT (OUTPATIENT)
Dept: INTERNAL MEDICINE CLINIC | Facility: CLINIC | Age: 73
End: 2018-03-26
Payer: COMMERCIAL

## 2018-03-26 VITALS
OXYGEN SATURATION: 98 % | HEIGHT: 66 IN | BODY MASS INDEX: 24.51 KG/M2 | WEIGHT: 152.5 LBS | TEMPERATURE: 97.2 F | SYSTOLIC BLOOD PRESSURE: 126 MMHG | DIASTOLIC BLOOD PRESSURE: 74 MMHG | HEART RATE: 70 BPM

## 2018-03-26 DIAGNOSIS — E11.8 TYPE 2 DIABETES MELLITUS WITH COMPLICATION, WITH LONG-TERM CURRENT USE OF INSULIN (HCC): ICD-10-CM

## 2018-03-26 DIAGNOSIS — R33.9 INCOMPLETE EMPTYING OF BLADDER: ICD-10-CM

## 2018-03-26 DIAGNOSIS — C61 PROSTATE CANCER (HCC): Primary | ICD-10-CM

## 2018-03-26 DIAGNOSIS — N18.30 STAGE 3 CHRONIC KIDNEY DISEASE (HCC): ICD-10-CM

## 2018-03-26 DIAGNOSIS — Z79.4 TYPE 2 DIABETES MELLITUS WITH COMPLICATION, WITH LONG-TERM CURRENT USE OF INSULIN (HCC): ICD-10-CM

## 2018-03-26 DIAGNOSIS — I10 ESSENTIAL HYPERTENSION: ICD-10-CM

## 2018-03-26 PROBLEM — M54.16 LUMBAR RADICULAR PAIN: Status: ACTIVE | Noted: 2017-09-05

## 2018-03-26 PROBLEM — M25.552 HIP PAIN, LEFT: Status: ACTIVE | Noted: 2017-09-21

## 2018-03-26 PROBLEM — N28.9 RENAL INSUFFICIENCY: Status: ACTIVE | Noted: 2018-01-22

## 2018-03-26 PROBLEM — E03.9 HYPOTHYROIDISM: Status: ACTIVE | Noted: 2018-02-07

## 2018-03-26 PROBLEM — H90.3 SENSORINEURAL HEARING LOSS (SNHL) OF BOTH EARS: Status: ACTIVE | Noted: 2017-07-21

## 2018-03-26 PROBLEM — M25.551 HIP PAIN, RIGHT: Status: ACTIVE | Noted: 2017-09-21

## 2018-03-26 PROBLEM — F52.21 ERECTILE DYSFUNCTION OF NON-ORGANIC ORIGIN: Status: ACTIVE | Noted: 2017-07-26

## 2018-03-26 PROCEDURE — 99214 OFFICE O/P EST MOD 30 MIN: CPT | Performed by: INTERNAL MEDICINE

## 2018-03-26 NOTE — PROGRESS NOTES
Assessment/Plan:         Diagnoses and all orders for this visit:    Prostate cancer [facility])  Patient doing well and has urology follow up early May  He does have some leakage and is doing pelvic floor exercises    Essential hypertension  No added salt and continue present Rx    Type 2 diabetes mellitus with complication, with long-term current use of insulin (HCC)  Blood sugars are improving postop and patient will be starting grass cutting within the next few weeks so he feels will improve further  Reviewed most recent labs  No changes    Stage 3 chronic kidney disease  Reviewed most recent labs  Increase hydration especially when he starts grass cutting    Incomplete emptying of bladder  Patietn is doing his exercises and hoping it will improve with time       Patient ID: Harjeet Reese is a 81 y o  male  HPI   Patient is recovering from prostate cancer surgery  Generally doing well except he does have some leakage  He did get pelvic floor exercises and has been doing  He is starting to do more at home and will cut grass once weather allows  Started walking and does wear a pad  No chest pain or sob  Blood sugars now below 200 and improving with decrease metformin  Appetite and bowels are doing ok  No pain    No hematuria    The following portions of the patient's history were reviewed and updated as appropriate:   Past Medical History:   Diagnosis Date    Arthritis     BPH (benign prostatic hyperplasia)     last assessed 4/29/2014    Diabetes mellitus ([address] )     Disease of thyroid gland     GERD (gastroesophageal reflux disease)     Hearing aid worn     Hyperlipidemia     Hypertension     Hypothyroidism     Mumps     Prostate cancer ([address] )     Tingling sensation     bilateral feet occassionally    Tinnitus     Wears glasses     Wears partial dentures     upper     No Known Allergies  Social History     Social History    Marital status: /Civil Union     Spouse name: N/A    Number of children: N/A    Years of education: N/A     Occupational History    Not on file  Social History Main Topics    Smoking status: Former Smoker    Smokeless tobacco: Never Used      Comment: quit about 50 years ago    Alcohol use 1 2 oz/week     2 Glasses of wine per week      Comment: week, social drinker    Drug use: No    Sexual activity: Not on file     Other Topics Concern    Not on file     Social History Narrative    Always uses seat belt    Caffeine use    Lives independently with spouse    Retired    Sun protection sunscreen         Past Surgical History:   Procedure Laterality Date    CATARACT EXTRACTION Bilateral 2000    EYE SURGERY      JOINT REPLACEMENT      KNEE ARTHROSCOPY Right 06/26/2009    knee    NH LAP,PROSTATECTOMY,RADICAL,W/NERVE SPARE,INCL ROBOTIC N/A 2/7/2018    Procedure: ROBOTIC ASSISTED LAPAROSCOPIC PROSTATECTOMY; BILATERAL PELVIC LYMPH NODE DISSECTION;  Surgeon: Alexi Saucedo MD;  Location: [facility];  Service: Urology    PROSTATE BIOPSY  11/07/2017    needle biopsy of prostate    TRIGGER FINGER RELEASE  05/2013    trigger thumb     Family History   Problem Relation Age of Onset    Cancer Mother     Diabetes Mother     Uterine cancer Mother     Diabetes Father     Cancer Father     Stroke Father      of unknown cause    Hypertension Father     Prostate cancer Father     Diabetes Family      Uncle, DM    Cancer Family      Grandmother     Review of Systems   Constitutional: Negative  HENT: Negative  Eyes: Negative  Respiratory: Negative  Cardiovascular: Negative  Gastrointestinal: Negative  Endocrine: Negative  Genitourinary: Positive for frequency and urgency  Musculoskeletal: Negative  Skin: Negative  Allergic/Immunologic: Negative  Neurological: Negative  Hematological: Negative  Psychiatric/Behavioral: Negative          /74   Pulse 70   Temp (!) 97 2 °F (36 2 °C) (Tympanic)   Ht 5' 6" (1 676 m)   Wt 69 2 kg (152 lb 8 oz)   SpO2 98%   BMI 24 61 kg/m²      Physical Exam   Constitutional: He is oriented to person, place, and time  He appears well-developed and well-nourished  No distress  HENT:   Head: Normocephalic and atraumatic  Right Ear: External ear normal    Left Ear: External ear normal    Nose: Nose normal    Mouth/Throat: Oropharynx is clear and moist  No oropharyngeal exudate  Eyes: Conjunctivae and EOM are normal  Pupils are equal, round, and reactive to light  No scleral icterus  Neck: Normal range of motion  Neck supple  No JVD present  No thyromegaly present  Cardiovascular: Normal rate, regular rhythm, normal heart sounds and intact distal pulses  Pulmonary/Chest: Effort normal and breath sounds normal    Abdominal: Soft  Bowel sounds are normal    Musculoskeletal: Normal range of motion  He exhibits no edema, tenderness or deformity  Lymphadenopathy:     He has no cervical adenopathy  Neurological: He is alert and oriented to person, place, and time  He displays normal reflexes  No cranial nerve deficit  He exhibits normal muscle tone  Coordination normal    Skin: Skin is warm and dry  He is not diaphoretic  Psychiatric: He has a normal mood and affect  His behavior is normal  Judgment and thought content normal    Nursing note and vitals reviewed

## 2018-03-26 NOTE — PATIENT INSTRUCTIONS
Kegel Exercises for Men   WHAT YOU NEED TO KNOW:   Kegel exercises help strengthen your pelvic muscles  Pelvic muscles hold your pelvic organs, such as your bladder, in place  Kegel exercises help prevent or control problems with urine incontinence (leakage)  Incontinence may be caused by an enlarged prostate, older age, or obesity  DISCHARGE INSTRUCTIONS:   Contact your healthcare provider if:   · You cannot feel your pelvic muscles tighten or relax  · You continue to leak urine  · You have questions or concerns about your condition or care  Use the correct muscles:  Pelvic muscles are the muscles you use to control urine flow  To target these muscles, stop and start the flow of urine several times  This will help you become familiar with how it feels to tighten and relax these muscles  How to do Kegel exercises:   · Empty your bladder  You may lie down, stand up, or sit down to do these exercises  When you first try to do these exercises, it may be easier if you lie down  Tighten or squeeze your pelvic muscles slowly  It may feel like you are trying to hold back urine or gas  Hold this position for 3 seconds  Relax for 3 seconds  Repeat this cycle 10 times  · Do 10 sets of Kegel exercises, at least 3 times a day  Do not hold your breath when you do Kegel exercises  Keep your stomach, back, and leg muscles relaxed  · As your muscles get stronger, you will be able to hold the squeeze longer  Your healthcare provider may ask that you increase your pelvic muscle squeeze to 10 seconds  After you squeeze for 10 seconds, relax for 10 seconds  What else you should know:   · Once you know how to do Kegel exercises, use different positions  This will help to strengthen your pelvic muscles as much as possible  You can do these exercises while you lie on the floor, watch TV, or stand  · You may notice improved bladder control within about 6 weeks       · Tighten your pelvic muscles before you sneeze, cough, or lift to prevent urine leakage  Follow up with your healthcare provider as directed:  Write down your questions so you remember to ask them during your visits  © 2017 [employer] Information is for End User's use only and may not be sold, redistributed or otherwise used for commercial purposes  All illustrations and images included in CareNotes® are the copyrighted property of [employer]  or [employer]  The above information is an  only  It is not intended as medical advice for individual conditions or treatments  Talk to your doctor, nurse or pharmacist before following any medical regimen to see if it is safe and effective for you

## 2018-05-01 ENCOUNTER — OFFICE VISIT (OUTPATIENT)
Dept: UROLOGY | Facility: CLINIC | Age: 73
End: 2018-05-01

## 2018-05-01 ENCOUNTER — TELEPHONE (OUTPATIENT)
Dept: INTERNAL MEDICINE CLINIC | Facility: CLINIC | Age: 73
End: 2018-05-01

## 2018-05-01 VITALS
HEART RATE: 67 BPM | BODY MASS INDEX: 24.43 KG/M2 | DIASTOLIC BLOOD PRESSURE: 80 MMHG | SYSTOLIC BLOOD PRESSURE: 140 MMHG | HEIGHT: 66 IN | WEIGHT: 152 LBS

## 2018-05-01 DIAGNOSIS — E03.9 HYPOTHYROIDISM, UNSPECIFIED TYPE: Primary | ICD-10-CM

## 2018-05-01 DIAGNOSIS — C61 PROSTATE CANCER (HCC): Primary | ICD-10-CM

## 2018-05-01 PROCEDURE — 99024 POSTOP FOLLOW-UP VISIT: CPT | Performed by: UROLOGY

## 2018-05-01 NOTE — PROGRESS NOTES
UROLOGY POSTOP NOTE     History of Present Illness:   Raad Pantoja is a 81 y o  male with a family history of prostate cancer in his father  PSA last year was 0 6 and this year is up to 1 3  Prostate biopsy revealed intermediate risk prostate cancer  The patient underwent a robotic assisted laparoscopic prostatectomy with bilateral pelvic lymph node dissection on 2/7/2018  Patient did very well postoperatively and was discharged within 24 hours  He has done well since  AUA 6 QoL 1 POLO 19 (patient reported on preop function)    Patient is holding his urine very well  He wears 1 pad throughout the course of the day and is able to hold his urine when the urgency sensation comes on  He has had a couple episodes of leakage overnight  Of note his diabetes has been somewhat poorly controlled as his medications have changed recently  He does not report any erectile function returned at this point time  He was using Viagra 100 mg prior to surgery      Past Medical History:     Past Medical History:   Diagnosis Date    Arthritis     BPH (benign prostatic hyperplasia)     last assessed 4/29/2014    Diabetes mellitus ([address] )     Disease of thyroid gland     GERD (gastroesophageal reflux disease)     Hearing aid worn     Hyperlipidemia     Hypertension     Hypothyroidism     Mumps     Prostate cancer ([address] )     Tingling sensation     bilateral feet occassionally    Tinnitus     Wears glasses     Wears partial dentures     upper       PAST SURGICAL HISTORY:     Past Surgical History:   Procedure Laterality Date    CATARACT EXTRACTION Bilateral 2000    EYE SURGERY      JOINT REPLACEMENT      KNEE ARTHROSCOPY Right 06/26/2009    knee    IA LAP,PROSTATECTOMY,RADICAL,W/NERVE SPARE,INCL ROBOTIC N/A 2/7/2018    Procedure: ROBOTIC ASSISTED LAPAROSCOPIC PROSTATECTOMY; BILATERAL PELVIC LYMPH NODE DISSECTION;  Surgeon: Jarad Rollins MD;  Location: [facility] OR;  Service: Urology    PROSTATE BIOPSY  11/07/2017    needle biopsy of prostate    TRIGGER FINGER RELEASE  05/2013    trigger thumb       CURRENT MEDICATIONS:     Current Outpatient Prescriptions   Medication Sig Dispense Refill    acetaminophen (TYLENOL) 500 MG chewable tablet Chew 500 mg every 6 (six) hours as needed for mild pain      aspirin 81 MG tablet Take 81 mg by mouth daily   insulin aspart (NovoLOG) 100 units/mL injection Inject 8 Units under the skin every evening        insulin glargine (LANTUS) 100 units/mL subcutaneous injection Inject 14 Units under the skin daily        insulin glargine (LANTUS) 100 units/mL subcutaneous injection Inject 10 Units under the skin daily at bedtime      Levothyroxine Sodium 88 MCG CAPS Take 88 mcg by mouth daily        lisinopril (ZESTRIL) 40 mg tablet Take 40 mg by mouth daily        Multiple Vitamin (MULTIVITAMIN) tablet Take 1 tablet by mouth daily   saxagliptin (ONGLYZA) 5 MG tablet Take 5 mg by mouth daily      simvastatin (ZOCOR) 20 mg tablet Take 20 mg by mouth daily at bedtime   docusate sodium (COLACE) 100 mg capsule Take 1 capsule (100 mg total) by mouth 2 (two) times a day for 60 doses 60 capsule 0    enoxaparin (LOVENOX) 40 mg/0 4 mL Inject 0 4 mL (40 mg total) under the skin daily for 30 doses 30 Syringe 0    metFORMIN (GLUCOPHAGE) 1000 MG tablet Take 500 mg by mouth 2 (two) times a day with meals        oxybutynin (DITROPAN-XL) 5 mg 24 hr tablet Take 1 tablet (5 mg total) by mouth daily for 14 doses 14 tablet 0     No current facility-administered medications for this visit          ALLERGIES:   No Known Allergies    SOCIAL HISTORY:     Social History     Social History    Marital status: /Civil Union     Spouse name: N/A    Number of children: N/A    Years of education: N/A     Social History Main Topics    Smoking status: Former Smoker    Smokeless tobacco: Never Used      Comment: quit about 50 years ago    Alcohol use 1 2 oz/week     2 Glasses of wine per week      Comment: week, social drinker    Drug use: No    Sexual activity: Not Asked     Other Topics Concern    None     Social History Narrative    Always uses seat belt    Caffeine use    Lives independently with spouse    Retired    Sun protection sunscreen           SOCIAL HISTORY:     Family History   Problem Relation Age of Onset    Cancer Mother     Diabetes Mother     Uterine cancer Mother     Diabetes Father     Cancer Father     Stroke Father      of unknown cause    Hypertension Father     Prostate cancer Father     Diabetes Family      Uncle, DM    Cancer Family      Grandmother       REVIEW OF SYSTEMS:     General: negative for chills, fatigue, fever, significant unplanned weight changed  Psychological: negative for anxiety, depression, concentration or memory difficulties, irritability, mood swings, sleep disturbances  Ophthalmic: negative for blurry vision or double  ENT: negative for hearing difficulties, tinnitus, vertigo  Hematological and Lymphatic: negative for bleeding problems, blood clots, bruising, swollen lymph nodes  Respiratory: negative for shortness of breath, cough, hemoptysis, orthopnea, tachypnea or wheezing  Cardiovascular: negative for chest pain, dyspnea on exertion, edema, irregular or rapid heartbeat, paroxysmal nocturnal dyspnea  Gastrointestinal: negative for abdominal pain, bright red blood in stools, change in stools, constipation, diarrhea, nausea/vomiting, stool incontinence    GENITOURINARY: see HPI    Musculoskeletal: negative for gait disturbance, joint pain, joint stiffness/sweeling/pain, muscular weakness  Dermatological: negative for rash or skin lesion changes  Neurological: negative for confusion, dizziness, headaches, memory loss, numbness/tingling, seizures, speech problems, tremors or weakness    PHYSICAL EXAM:     /80   Pulse 67   Ht 5' 6" (1 676 m)   Wt 68 9 kg (152 lb)   BMI 24 53 kg/m²   General:  Healthy appearing individual in no acute distress  They have a normal affect  There is not appear to be any gross neurologic defects or abnormalities  HEENT:  Normocephalic, atraumatic  Neck is supple without any palpable lymphadenopathy  Cardiovascular:  Patient has normal palpable distal radial pulses  There is no significant peripheral edema  No JVD is noted  Respiratory:  Patient has unlabored respirations  There is no audible wheeze or rhonchi  Abdomen:  Abdomen with well-healing surgical scars  Abdomen is soft and nontender  There is no tympany  Inguinal and umbilical hernia are not appreciated  Musculoskeletal:  Patient does not have significant CVA tenderness with palpation or percussion  They full range of motion in all 4 extremities  Strength in all 4 extremities appears congruent  Patient is able to ambulate without assistance or difficulty  Dermatologic:  Patient has no skin abnormalities or rashes  LABS:     CBC:   Lab Results   Component Value Date    WBC 9 62 02/08/2018    HGB 10 9 (L) 02/08/2018    HCT 33 7 (L) 02/08/2018    MCV 91 02/08/2018     02/08/2018       BMP:   Lab Results   Component Value Date    GLUCOSE 209 (H) 02/08/2018    CALCIUM 9 1 03/15/2018     03/15/2018    K 5 0 03/15/2018    CO2 29 03/15/2018     03/15/2018    BUN 36 (H) 03/15/2018    CREATININE 1 77 (H) 03/15/2018     Lab Results   Component Value Date    PSA <0 1 03/15/2018    PSA 1 3 09/05/2017    PSA 0 7 06/08/2016     PATHOLOGY:     2/7/18  Final Diagnosis   PROSTATE CANCER STAGING SUMMARY  1  Specimen identification:       - Procedure:   Radical prostatectomy     - Prostate size and weight: 4 5 x 4 2 x 2 8 cm, 39 grams   2   Tumor     - Histologic type: Acinar adenocarcinoma     - Histologic Grade: Gina Pattern: 6        * primary pattern: 3        * secondary pattern: 3        * tertiary pattern:  N/A        * Grade Group:  Grade group 1     * Intraductal carcinoma: Not identified    - Tumor quantitation: 5%    - Extraprostatic extension: Not identified    - Urinary bladder neck invasion: Not identified    - Seminal vesicle invasion: Not identified   3  Margins: Uninvolved by invasive carcinoma   4  Margin positivity in area of extraprostatic extension: N/A   5  Treatment effect on carcinoma:  No known presurgical therapy   6  Lymph-vascular invasion: Not identified   7  Perineural invasion: Not identified   8  Regional lymph nodes (pN0):     -  Number of lymph nodes involved: 0     -  Number of lymph nodes examined: 4   9  Additional pathologic findings: High-grade prostatic intraepithelial neoplasia  10  Ancillary studies: None, if needed Block D18 can be used  11  PSA: unknown  8th Ed AJCC Tumor Stage: At least stage I  -pT2, pN0, Gina's score 6 (3+3), grade group 1     A  Lymph node, periprostatic fat and lymph nodes, excision:  - Benign vascularized fibroadipose tissue      B  Lymph node, right pelvic, resection:  - One lymph node, negative for carcinoma (0/1)      C  Lymph node, left pelvic, resection:  - Three lymph nodes, negative for carcinoma (0/3)      D  Prostate, radical prostatectomy:  - Prostatic adenocarcinoma, [place] score 6 (3+3), grade group 1   - High-grade prostatic intraepithelial neoplasia  - Margins of resection, negative for carcinoma     ASSESSMENT:     81 y o  male with T2 N0 MX adenocarcinoma of the prostate, [place] 6 with negative surgical margins    PLAN:     Patient will continue Kegel exercises and urinary control  He will return in 3 months with an updated PSA  His endocrinologist at the [address] [place] [address] has prescribed Viagra 100 mg  We reviewed side effect and administration profile

## 2018-06-21 ENCOUNTER — APPOINTMENT (OUTPATIENT)
Dept: LAB | Facility: HOSPITAL | Age: 73
End: 2018-06-21
Attending: INTERNAL MEDICINE
Payer: COMMERCIAL

## 2018-06-21 DIAGNOSIS — E03.9 HYPOTHYROIDISM, UNSPECIFIED TYPE: ICD-10-CM

## 2018-06-21 LAB
ALBUMIN SERPL BCP-MCNC: 3.9 G/DL (ref 3.5–5)
ALP SERPL-CCNC: 83 U/L (ref 46–116)
ALT SERPL W P-5'-P-CCNC: 27 U/L (ref 12–78)
ANION GAP SERPL CALCULATED.3IONS-SCNC: 10 MMOL/L (ref 4–13)
AST SERPL W P-5'-P-CCNC: 20 U/L (ref 5–45)
BILIRUB SERPL-MCNC: 0.5 MG/DL (ref 0.2–1)
BUN SERPL-MCNC: 44 MG/DL (ref 5–25)
CALCIUM SERPL-MCNC: 8.9 MG/DL (ref 8.3–10.1)
CHLORIDE SERPL-SCNC: 103 MMOL/L (ref 100–108)
CO2 SERPL-SCNC: 25 MMOL/L (ref 21–32)
CREAT SERPL-MCNC: 2.14 MG/DL (ref 0.6–1.3)
GFR SERPL CREATININE-BSD FRML MDRD: 30 ML/MIN/1.73SQ M
GLUCOSE P FAST SERPL-MCNC: 118 MG/DL (ref 65–99)
POTASSIUM SERPL-SCNC: 4.5 MMOL/L (ref 3.5–5.3)
PROT SERPL-MCNC: 6.7 G/DL (ref 6.4–8.2)
SODIUM SERPL-SCNC: 138 MMOL/L (ref 136–145)
TSH SERPL DL<=0.05 MIU/L-ACNC: 1.23 UIU/ML (ref 0.36–3.74)

## 2018-06-21 PROCEDURE — 36415 COLL VENOUS BLD VENIPUNCTURE: CPT

## 2018-06-21 PROCEDURE — 84443 ASSAY THYROID STIM HORMONE: CPT

## 2018-06-21 PROCEDURE — 80053 COMPREHEN METABOLIC PANEL: CPT

## 2018-06-28 ENCOUNTER — OFFICE VISIT (OUTPATIENT)
Dept: INTERNAL MEDICINE CLINIC | Facility: CLINIC | Age: 73
End: 2018-06-28
Payer: COMMERCIAL

## 2018-06-28 VITALS
HEART RATE: 64 BPM | WEIGHT: 147.38 LBS | BODY MASS INDEX: 23.69 KG/M2 | TEMPERATURE: 97.8 F | OXYGEN SATURATION: 98 % | HEIGHT: 66 IN

## 2018-06-28 DIAGNOSIS — Z00.00 ENCOUNTER FOR MEDICARE ANNUAL WELLNESS EXAM: Primary | ICD-10-CM

## 2018-06-28 DIAGNOSIS — Z00.00 MEDICARE ANNUAL WELLNESS VISIT, SUBSEQUENT: ICD-10-CM

## 2018-06-28 DIAGNOSIS — Z78.9 PARTICIPANT IN HEALTH AND WELLNESS PLAN: ICD-10-CM

## 2018-06-28 DIAGNOSIS — Z79.4 TYPE 2 DIABETES MELLITUS WITHOUT COMPLICATION, WITH LONG-TERM CURRENT USE OF INSULIN (HCC): Primary | ICD-10-CM

## 2018-06-28 DIAGNOSIS — E11.9 TYPE 2 DIABETES MELLITUS WITHOUT COMPLICATION, WITH LONG-TERM CURRENT USE OF INSULIN (HCC): Primary | ICD-10-CM

## 2018-06-28 PROCEDURE — 1101F PT FALLS ASSESS-DOCD LE1/YR: CPT | Performed by: INTERNAL MEDICINE

## 2018-06-28 PROCEDURE — G0439 PPPS, SUBSEQ VISIT: HCPCS | Performed by: INTERNAL MEDICINE

## 2018-06-28 NOTE — PATIENT INSTRUCTIONS

## 2018-06-28 NOTE — PROGRESS NOTES
Assessment and Plan:    Problem List Items Addressed This Visit     DMII (diabetes mellitus, type 2) ([facility] )    Relevant Orders    Microalbumin / creatinine urine ratio    Medicare annual wellness visit, subsequent - Primary        Health Maintenance Due   Topic Date Due    ABDOMINAL AORTIC ANEURYSM (AAA) SCREEN  11/27/2012    Annual Wellnes Visit (AWV)  10/24/2017    GLAUCOMA SCREENING 67+ YR  03/27/2018    OPHTHALMOLOGY EXAM  03/27/2018    URINE MICROALBUMIN  05/11/2018    Diabetic Foot Exam  05/24/2018   Rx for fbw  Exercise regularly and maintain adequate hydration  Has urology and endocrine appt in July and retinal follow up upcoming  Rto 3 month      HPI:  Candida Mosquera is a 81 y o  male here for his Subsequent Wellness Visit  Pt doing generally well  Recovering from prostate surgery - no complaints   Has follow up in July with Dr Mary Ellen Briggs    Patient Active Problem List   Diagnosis    Prostate cancer ([facility] )    DMII (diabetes mellitus, type 2) ([facility] )    Hypothyroidism    GERD (gastroesophageal reflux disease)    Hyperlipidemia    Stage 3 chronic kidney disease    Arthritis    Erectile dysfunction of non-organic origin    Hip pain, left    Hip pain, right    Hypertension    Incomplete emptying of bladder    Lumbar radicular pain    Renal insufficiency    Sensorineural hearing loss (SNHL) of both ears    Syncope, near    Vitamin D deficiency    Medicare annual wellness visit, subsequent     Past Medical History:   Diagnosis Date    Arthritis     BPH (benign prostatic hyperplasia)     last assessed 4/29/2014    Diabetes mellitus ([facility] )     Disease of thyroid gland     GERD (gastroesophageal reflux disease)     Hearing aid worn     Hyperlipidemia     Hypertension     Hypothyroidism     Mumps     Prostate cancer ([facility] )     Tingling sensation     bilateral feet occassionally    Tinnitus     Wears glasses     Wears partial dentures     upper     Past Surgical History: Procedure Laterality Date    CATARACT EXTRACTION Bilateral 2000    EYE SURGERY      JOINT REPLACEMENT      KNEE ARTHROSCOPY Right 06/26/2009    knee    PA LAP,PROSTATECTOMY,RADICAL,W/NERVE SPARE,INCL ROBOTIC N/A 2/7/2018    Procedure: ROBOTIC ASSISTED LAPAROSCOPIC PROSTATECTOMY; BILATERAL PELVIC LYMPH NODE DISSECTION;  Surgeon: Cristal Kaufman MD;  Location: [facility];  Service: Urology    PROSTATE BIOPSY  11/07/2017    needle biopsy of prostate    TRIGGER FINGER RELEASE  05/2013    trigger thumb     Family History   Problem Relation Age of Onset    Cancer Mother     Diabetes Mother     Uterine cancer Mother     Diabetes Father     Cancer Father     Stroke Father         of unknown cause    Hypertension Father     Prostate cancer Father     Diabetes Family         Uncle, DM    Cancer Family         Grandmother     History   Smoking Status    Former Smoker   Smokeless Tobacco    Never Used     Comment: quit about 50 years ago     History   Alcohol Use    1 2 oz/week    2 Glasses of wine per week     Comment: week, social drinker      History   Drug Use No       Current Outpatient Prescriptions   Medication Sig Dispense Refill    aspirin 81 MG tablet Take 162 mg by mouth daily        insulin aspart (NovoLOG) 100 units/mL injection Inject 2 Units under the skin 3 (three) times daily after meals        insulin glargine (LANTUS) 100 units/mL subcutaneous injection Inject 8 Units under the skin 2 (two) times a day        Levothyroxine Sodium 88 MCG CAPS Take 88 mcg by mouth daily        lisinopril (ZESTRIL) 40 mg tablet Take 40 mg by mouth daily        Multiple Vitamin (MULTIVITAMIN) tablet Take 1 tablet by mouth daily   saxagliptin (ONGLYZA) 5 MG tablet Take 2 5 mg by mouth daily        simvastatin (ZOCOR) 20 mg tablet Take 20 mg by mouth daily at bedtime        acetaminophen (TYLENOL) 500 MG chewable tablet Chew 500 mg every 6 (six) hours as needed for mild pain      docusate sodium (COLACE) 100 mg capsule Take 1 capsule (100 mg total) by mouth 2 (two) times a day for 60 doses 60 capsule 0    oxybutynin (DITROPAN-XL) 5 mg 24 hr tablet Take 1 tablet (5 mg total) by mouth daily for 14 doses 14 tablet 0     No current facility-administered medications for this visit        No Known Allergies  Immunization History   Administered Date(s) Administered    Influenza 10/18/2016    Influenza Split High Dose Preservative Free IM 10/01/2016, 10/20/2017    Influenza TIV (IM) 10/15/2012, 10/13/2014, 10/08/2015    Pneumococcal Conjugate 13-Valent 11/03/2017    Pneumococcal Polysaccharide PPV23 12/15/2009    Tdap 03/05/2016, 03/29/2016    Zoster 01/01/2013       Patient Care Team:  Ernestine Davidson DO as PCP - General  DO Krystina Barraza MD Tod Bench, MD Ellene Abbot, MD Manon Cahill, MD Dorn Florence, MD      Medicare Screening Tests and Risk Assessments:  [facility]     ISAR:   Previous hospitalizations?:  No       Once in a Lifetime Medicare Screening:   EKG performed?:  No    AAA screening performed? (if performed, please add date to Health Maintenance):  No       Medicare Screening Tests and Risk Assessment:   AAA Risk Assessment    None Indicated:  Yes    Osteoporosis Risk Assessment    None indicated:  Yes    HIV Risk Assessment    None indicated:  Yes        Drug and Alcohol Use:   Tobacco use    Cigarettes:  never smoker    Smokeless:  never used smokeless tobacco    Tobacco use duration    Tobacco Cessation Readiness    Alcohol use    Alcohol use:  never    Alcohol Treatment Readiness   Illicit Drug Use    Drug use:  never    Drug type:  no sedative use       Diet & Exercise:   Diet   What is your diet?:  Regular   How many servings a day of the following:   Fruits and Vegetables:  1-2 Meat:  1-2   Whole Grains:  2 Simple Carbs:  1   Dairy:  2 Soda:  0   Coffee:  2 Tea:  1   Exercise    Do you currently exercise?:  yes   Times per week:  7     Type of exercise:  walking       Cognitive Impairment Screening:   Depression screening preformed:  Yes Depression screen score:  0    PHQ-9 Depression scale score:  0   Depression screening results:  negative for symptoms   Cognitive Impairment Screening    Do you have difficulty learning or retaining new information?:  No Do you have difficulty handling new tasks?:  No   Do you have difficulty with reasoning?:  No Do you have difficulty with spatial ability and orientation?:  No   Do you have difficulty with language?:  No Do you have difficulty with behavior?:  No       Functional Ability/Level of Safety:   Hearing    Hearing difficulties:  Yes Bilateral:  significantly decreased   Hearing aid:  Yes    Hearing Impairment Assessment    Hearing status:  Hard of hearing   Do your family members ever complain that you turn on the radio or Myntra  too loudly?:  Yes Do you find that other people have to repeat themselves when talking to you?:  Yes   Do you have difficulty hearing while talking on the phone?:  Yes Has anyone ever told you that you are speaking too loudly when talking with them?:  Yes   Do you have trouble hearing the doorbell or phone ringing?:  No Do you have difficulty hearing such that you feel frustrated talking to people?:  No   Do you feel sad because you cannot hear well?:  No Do you feel inconvienced due to your hearing problem?:  No   Do you think you would be a happier person if you could hear better?:  No Would you be willing to go for a hearing aid fitting if suggested?:  Yes   Current Activities    Status:  unlimited ADL's, unlimited driving, unlimited IADL's, unlimited social activities   Help needed with the folllowing:    Using the phone:  No Transportation:  No   Shopping:  No Preparing Meals:  No   Doing Housework:  No Doing Laundry:  No   Managing Medications:  No Managing Money:  No   ADL    Feeding:  Independant   Oral hygiene and Facial grooming:  Independant   Bathing:  Independant Upper Body Dressing:  Independant   Lower Body Dressing:  Independant   Toileting:  Independant   Bed Mobility:  Independant   Fall Risk   Have you fallen in the last 12 months?:  No Are you unsteady on your feet?:  No    Are you taking any medications that may cause fatigue or dizziness?:  No    Do you rush to the bathroom potentially risking a fall?:  No   Injury History   Polypharmacy:  No Antidepressant Use:  No   Sedative Use:  No Antihypertensive Use:  No   Previous Fall:  No Alcohol Use:  No   Deconditioning:  No Visual Impairment:  No   Cogitive Impairment:  No Mmobility Impairment:  No   Postural Hypotension:  No Urinary Incontinence:  Yes       Home Safety:   Are there hazards in your environment?:  No   If you fell, would you need help to get back up from the ground?:  No Do you have problems or concerns getting in/out of a bed, chair, tub, or toilet?:  No   Do you feel unsteady when walking?:  No Is your activity limited by pain?:  No   Do you have handrails and grab-bars in the home?:  Yes Are emergency numbers kept by the phone and regularly updated?:  Yes   Are you and/or family members aware of the dangers of smoking in bed?:  Yes    Do you have working smoke alarms and fire extinguisher?:  Yes Do all household members know how to use them?:  Yes   Have you left the stove on unsupervised?:  No    Home Safety Risk Factors   Unfamilar with surroundings:  No Uneven floors:  No   Stairs or handrail saftey risk:  No Loose rugs:  No   Household clutter:  No Poor household lighting:  No   No grab bars in bathroom:  No Further evaluation needed:  No       Advanced Directives:   Advanced Directives    Living Will:  Yes Durable POA for healthcare:   Yes   Advanced directive:  Yes    Patient's End of Life Decisions        Urinary Incontinence:   Do you have urinary incontinence?:  Yes Do you have incomplete emptying?:  No   Do you urinate frequently?:  Yes Do you have urinary urgency?:  Yes   Do you have urinary hesitancy?:  No Do you have dysuria (painful and/or difficult urination)?:  No   Do you have nocturia (waking up to urinate)?:  No Do you strain when urinating (have to push to urinate)?:  No   Do you have a weak stream when urinating?:  No Do you have intermittent streaming when urinating?:  No   Do you dribble urine after finishing?:  Yes        Glaucoma:            Provider Screening     Preventative Screening/Counseling:   Cardiovascular Screening/Counseling:   (Labs Q5 years, EKG optional one-time)   General:  Screening Current Counseling:  Healthy Diet, Healthy Weight          Diabetes Screening/Counseling:   (2 tests/year if Pre-Diabetes or 1 test/year if no Diabetes)   General:  Screening Current Counseling:  Healthy Diet, Healthy Weight          Colorectal Cancer Screening/Counseling:   (FOBT Q1 yr; Flex Sig Q4 yrs or Q10 yrs after Screening Colonoscopy; Screening Colonoscpy Q2 yrs High Risk or Q10 yrs Low Risk; Barium Enema Q2 yrs High Risk or Q4 yrs Low Risk)   General:  Screening Current Counseling:  high fiber diet          Prostate Cancer Screening/Counseling:   (Annual)    General:  Screening Current          Breast Cancer Screening/Counseling:   (Baseline Age 28 - 43; Annual Age 36+)         Cervical Cancer Screening/Counseling:   (Annual for High Risk or Childbearing Age with Abnormal Pap in Last 3 yrs; Every 2 all others)         Osteoporosis Screening/Counseling:   (Every 2 Yrs if at risk or more if medically necessary)   General:  Screening Current Counseling:  Calcium and Vitamin D Intake, Regular Weightbearing Exercise          AAA Screening/Counseling:   (Once per Lifetime with risk factors)    General:  Screening Current           Glaucoma Screening/Counseling:   (Annual)   General:  Screening Current          HIV Screening/Counseling:   (Voluntary;  Once annually for high risk OR 3 times for Pregnancy at diagnosis of IUP; 3rd trimester; and at Labor   General:  Screening Not Indicated Hepatitis C Screening:             Immunizations:   Influenza (annual):  Risks & Benefits Discussed, Influenza UTD This Year   Pneumococcal (Once in a Lifetime):  Lifetime Vaccine Completed   Hepatitis B Series (low risk patients):  Series Not Indicated   Zostavax (Medicare D Coverage, Pt >66 yo):  Zostavax Vaccine UTD, Risks & Benefits Discussed   TD (Non-Medicare Wellness  Visit required):  Vaccine Status Unknown   Tdap (Non-Medicare Wellness Visit required):  Vaccine Status Unknown       Other Preventative Couseling (Non-Medicare Wellness Visit Required):   sunscreen education provided       Referrals (Non-Medicare Wellness Visit Required):       Medical Equipment/Suppliers:

## 2018-07-11 ENCOUNTER — TRANSCRIBE ORDERS (OUTPATIENT)
Dept: ADMINISTRATIVE | Facility: HOSPITAL | Age: 73
End: 2018-07-11

## 2018-07-11 ENCOUNTER — APPOINTMENT (OUTPATIENT)
Dept: LAB | Facility: HOSPITAL | Age: 73
End: 2018-07-11
Attending: UROLOGY
Payer: COMMERCIAL

## 2018-07-11 ENCOUNTER — APPOINTMENT (OUTPATIENT)
Dept: LAB | Facility: HOSPITAL | Age: 73
End: 2018-07-11

## 2018-07-11 DIAGNOSIS — Z00.8 HEALTH EXAMINATION IN POPULATION SURVEY: Primary | ICD-10-CM

## 2018-07-11 DIAGNOSIS — Z00.8 HEALTH EXAMINATION IN POPULATION SURVEY: ICD-10-CM

## 2018-07-11 DIAGNOSIS — C61 PROSTATE CANCER (HCC): ICD-10-CM

## 2018-07-11 LAB
CHOLEST SERPL-MCNC: 132 MG/DL (ref 50–200)
CREAT UR-MCNC: 73.6 MG/DL
EST. AVERAGE GLUCOSE BLD GHB EST-MCNC: 146 MG/DL
HBA1C MFR BLD: 6.7 % (ref 4.2–6.3)
HDLC SERPL-MCNC: 54 MG/DL (ref 40–60)
LDLC SERPL CALC-MCNC: 64 MG/DL (ref 0–100)
MICROALBUMIN UR-MCNC: 23.5 MG/L (ref 0–20)
MICROALBUMIN/CREAT 24H UR: 32 MG/G CREATININE (ref 0–30)
NONHDLC SERPL-MCNC: 78 MG/DL
PSA SERPL-MCNC: <0.1 NG/ML (ref 0–4)
TRIGL SERPL-MCNC: 72 MG/DL

## 2018-07-11 PROCEDURE — 84153 ASSAY OF PSA TOTAL: CPT

## 2018-07-11 PROCEDURE — 80061 LIPID PANEL: CPT

## 2018-07-11 PROCEDURE — 82043 UR ALBUMIN QUANTITATIVE: CPT | Performed by: INTERNAL MEDICINE

## 2018-07-11 PROCEDURE — 36415 COLL VENOUS BLD VENIPUNCTURE: CPT

## 2018-07-11 PROCEDURE — 82570 ASSAY OF URINE CREATININE: CPT | Performed by: INTERNAL MEDICINE

## 2018-07-11 PROCEDURE — 83036 HEMOGLOBIN GLYCOSYLATED A1C: CPT

## 2018-07-20 LAB
LEFT EYE DIABETIC RETINOPATHY: NORMAL
RIGHT EYE DIABETIC RETINOPATHY: NORMAL

## 2018-07-30 ENCOUNTER — OFFICE VISIT (OUTPATIENT)
Dept: UROLOGY | Facility: CLINIC | Age: 73
End: 2018-07-30
Payer: COMMERCIAL

## 2018-07-30 VITALS
HEART RATE: 60 BPM | DIASTOLIC BLOOD PRESSURE: 90 MMHG | BODY MASS INDEX: 23.95 KG/M2 | SYSTOLIC BLOOD PRESSURE: 150 MMHG | WEIGHT: 149 LBS | HEIGHT: 66 IN

## 2018-07-30 DIAGNOSIS — F52.21 ERECTILE DYSFUNCTION OF NON-ORGANIC ORIGIN: ICD-10-CM

## 2018-07-30 DIAGNOSIS — C61 PROSTATE CANCER (HCC): Primary | ICD-10-CM

## 2018-07-30 PROCEDURE — 99213 OFFICE O/P EST LOW 20 MIN: CPT | Performed by: UROLOGY

## 2018-07-30 NOTE — PROGRESS NOTES
UROLOGY ROUTINE FOLLOWUP NOTE     History of Present Illness:   Noah Todd is a 81 y o  male with a family history of prostate cancer in his father  PSA last year was 0 6 and this year is up to 1 3  Prostate biopsy revealed intermediate risk prostate cancer  The patient underwent a robotic assisted laparoscopic prostatectomy with bilateral pelvic lymph node dissection on 2/7/2018  Patient did very well postoperatively and was discharged within 24 hours  He has done well since  AUA 6 QoL 1 POLO 19 (patient reported on preop function)    Patient is holding his urine very well  He wears 1 pad throughout the course of the day mostly for peace of mind and is able to hold his urine when the urgency sensation comes on  Some wetness or leakage is noted only with extreme activity  Patient did use Viagra recently and noted some return erection  It was not rigid enough for penetration at this time  He was using Viagra 100 mg prior to surgery      Past Medical History:     Past Medical History:   Diagnosis Date    Arthritis     BPH (benign prostatic hyperplasia)     last assessed 4/29/2014    Diabetes mellitus ([address] )     Disease of thyroid gland     GERD (gastroesophageal reflux disease)     Hearing aid worn     Hyperlipidemia     Hypertension     Hypothyroidism     Mumps     Prostate cancer ([address] )     Tingling sensation     bilateral feet occassionally    Tinnitus     Wears glasses     Wears partial dentures     upper       PAST SURGICAL HISTORY:     Past Surgical History:   Procedure Laterality Date    CATARACT EXTRACTION Bilateral 2000    EYE SURGERY      JOINT REPLACEMENT      KNEE ARTHROSCOPY Right 06/26/2009    knee    [place] LAP,PROSTATECTOMY,RADICAL,W/NERVE SPARE,INCL ROBOTIC N/A 2/7/2018    Procedure: ROBOTIC ASSISTED LAPAROSCOPIC PROSTATECTOMY; BILATERAL PELVIC LYMPH NODE DISSECTION;  Surgeon: Gabriela Nagy MD;  Location: [facility];  Service: Urology    PROSTATE BIOPSY  11/07/2017    needle biopsy of prostate    TRIGGER FINGER RELEASE  05/2013    trigger thumb       CURRENT MEDICATIONS:     Current Outpatient Prescriptions   Medication Sig Dispense Refill    acetaminophen (TYLENOL) 500 MG chewable tablet Chew 500 mg every 6 (six) hours as needed for mild pain      aspirin 81 MG tablet Take 162 mg by mouth daily        insulin aspart (NovoLOG) 100 units/mL injection Inject 2 Units under the skin 3 (three) times daily after meals        insulin glargine (LANTUS) 100 units/mL subcutaneous injection Inject 8 Units under the skin 2 (two) times a day        Levothyroxine Sodium 88 MCG CAPS Take 88 mcg by mouth daily        lisinopril (ZESTRIL) 40 mg tablet Take 40 mg by mouth daily        Multiple Vitamin (MULTIVITAMIN) tablet Take 1 tablet by mouth daily   saxagliptin (ONGLYZA) 5 MG tablet Take 2 5 mg by mouth daily        simvastatin (ZOCOR) 20 mg tablet Take 20 mg by mouth daily at bedtime   docusate sodium (COLACE) 100 mg capsule Take 1 capsule (100 mg total) by mouth 2 (two) times a day for 60 doses 60 capsule 0     No current facility-administered medications for this visit          ALLERGIES:   No Known Allergies    SOCIAL HISTORY:     Social History     Social History    Marital status: /Civil Union     Spouse name: N/A    Number of children: N/A    Years of education: N/A     Occupational History    printer  retired      Social History Main Topics    Smoking status: Former Smoker    Smokeless tobacco: Never Used      Comment: quit about 50 years ago    Alcohol use 1 2 oz/week     2 Glasses of wine per week      Comment: week, social drinker    Drug use: No    Sexual activity: Not Asked     Other Topics Concern    None     Social History Narrative    Always uses seat belt    Caffeine use    Lives independently with spouse    Retired    Sun protection sunscreen           SOCIAL HISTORY:     Family History   Problem Relation Age of Onset  Cancer Mother     Diabetes Mother     Uterine cancer Mother     Diabetes Father     Cancer Father     Stroke Father         of unknown cause    Hypertension Father     Prostate cancer Father     Diabetes Family         Uncle, DM    Cancer Family         Grandmother       REVIEW OF SYSTEMS:     Review of Systems   Constitutional: Negative  Respiratory: Negative  Cardiovascular: Negative  Gastrointestinal: Negative  Genitourinary: Negative  Musculoskeletal: Negative  Skin: Negative  Neurological: Negative  Psychiatric/Behavioral: Negative  PHYSICAL EXAM:     /90   Pulse 60   Ht 5' 6" (1 676 m)   Wt 67 6 kg (149 lb)   BMI 24 05 kg/m²   General:  Healthy appearing individual in no acute distress  They have a normal affect  There is not appear to be any gross neurologic defects or abnormalities  HEENT:  Normocephalic, atraumatic  Neck is supple without any palpable lymphadenopathy  Cardiovascular:  Patient has normal palpable distal radial pulses  There is no significant peripheral edema  No JVD is noted  Respiratory:  Patient has unlabored respirations  There is no audible wheeze or rhonchi  Abdomen:  Abdomen with healed surgical scars  Abdomen is soft and nontender  There is no tympany  Inguinal and umbilical hernia are not appreciated  Musculoskeletal:  Patient does not have significant CVA tenderness with palpation or percussion  They full range of motion in all 4 extremities  Strength in all 4 extremities appears congruent  Patient is able to ambulate without assistance or difficulty  Dermatologic:  Patient has no skin abnormalities or rashes       LABS:     CBC:   Lab Results   Component Value Date    WBC 9 62 02/08/2018    HGB 10 9 (L) 02/08/2018    HCT 33 7 (L) 02/08/2018    MCV 91 02/08/2018     02/08/2018       BMP:   Lab Results   Component Value Date    GLUCOSE 209 (H) 02/08/2018    CALCIUM 8 9 06/21/2018     06/21/2018    K 4 5 06/21/2018    CO2 25 06/21/2018     06/21/2018    BUN 44 (H) 06/21/2018    CREATININE 2 14 (H) 06/21/2018     Lab Results   Component Value Date    PSA <0 1 07/11/2018    PSA <0 1 03/15/2018    PSA 1 3 09/05/2017     PATHOLOGY:     2/7/18  Final Diagnosis   PROSTATE CANCER STAGING SUMMARY  1  Specimen identification:       - Procedure:   Radical prostatectomy     - Prostate size and weight: 4 5 x 4 2 x 2 8 cm, 39 grams   2  Tumor     - Histologic type: Acinar adenocarcinoma     - Histologic Grade: Rosendale Pattern: 6        * primary pattern: 3        * secondary pattern: 3        * tertiary pattern:  N/A        * Grade Group:  Grade group 1     * Intraductal carcinoma: Not identified    - Tumor quantitation: 5%    - Extraprostatic extension: Not identified    - Urinary bladder neck invasion: Not identified    - Seminal vesicle invasion: Not identified   3  Margins: Uninvolved by invasive carcinoma   4  Margin positivity in area of extraprostatic extension: N/A   5  Treatment effect on carcinoma:  No known presurgical therapy   6  Lymph-vascular invasion: Not identified   7  Perineural invasion: Not identified   8  Regional lymph nodes (pN0):     -  Number of lymph nodes involved: 0     -  Number of lymph nodes examined: 4   9  Additional pathologic findings: High-grade prostatic intraepithelial neoplasia  10  Ancillary studies: None, if needed Block D18 can be used  11  PSA: unknown  8th Ed AJCC Tumor Stage: At least stage I  -pT2, pN0, Rosendale's score 6 (3+3), grade group 1     A  Lymph node, periprostatic fat and lymph nodes, excision:  - Benign vascularized fibroadipose tissue      B  Lymph node, right pelvic, resection:  - One lymph node, negative for carcinoma (0/1)      C  Lymph node, left pelvic, resection:  - Three lymph nodes, negative for carcinoma (0/3)      D   Prostate, radical prostatectomy:  - Prostatic adenocarcinoma, Rosendale score 6 (3+3), grade group 1   - High-grade prostatic intraepithelial neoplasia  - Margins of resection, negative for carcinoma     ASSESSMENT:     81 y o  male with T2 N0 MX adenocarcinoma of the prostate, Skipwith 6 with negative surgical margins    PLAN:     PSA remains undetectable, follow-up in 3 months    Urination appears well improved only 5-6 months out from surgery    Patient is still having some erectile dysfunction related to his prostatectomy  He did have pre-existing erectile dysfunction for which he used Viagra 100 mg  Following surgery, the patient understands that he can expect recovery over 1 to 1-1/2 years  Should the patient not be happy with his function, he may not need more regular usage of phosphodiesterase 5 inhibitors to achieve satisfactory function    We will discuss this with the patient's [facility] team as they do prescribe his PDE 5 medications

## 2018-09-26 ENCOUNTER — APPOINTMENT (OUTPATIENT)
Dept: LAB | Facility: HOSPITAL | Age: 73
End: 2018-09-26
Attending: INTERNAL MEDICINE
Payer: COMMERCIAL

## 2018-09-26 DIAGNOSIS — E11.9 TYPE 2 DIABETES MELLITUS WITHOUT COMPLICATION, WITH LONG-TERM CURRENT USE OF INSULIN (HCC): ICD-10-CM

## 2018-09-26 DIAGNOSIS — Z00.00 MEDICARE ANNUAL WELLNESS VISIT, SUBSEQUENT: ICD-10-CM

## 2018-09-26 DIAGNOSIS — Z79.4 TYPE 2 DIABETES MELLITUS WITHOUT COMPLICATION, WITH LONG-TERM CURRENT USE OF INSULIN (HCC): ICD-10-CM

## 2018-09-26 LAB
ALBUMIN SERPL BCP-MCNC: 3.7 G/DL (ref 3.5–5)
ALP SERPL-CCNC: 83 U/L (ref 46–116)
ALT SERPL W P-5'-P-CCNC: 31 U/L (ref 12–78)
ANION GAP SERPL CALCULATED.3IONS-SCNC: 6 MMOL/L (ref 4–13)
AST SERPL W P-5'-P-CCNC: 26 U/L (ref 5–45)
BILIRUB SERPL-MCNC: 0.5 MG/DL (ref 0.2–1)
BUN SERPL-MCNC: 36 MG/DL (ref 5–25)
CALCIUM SERPL-MCNC: 9.2 MG/DL (ref 8.3–10.1)
CHLORIDE SERPL-SCNC: 102 MMOL/L (ref 100–108)
CHOLEST SERPL-MCNC: 137 MG/DL (ref 50–200)
CO2 SERPL-SCNC: 29 MMOL/L (ref 21–32)
CREAT SERPL-MCNC: 2.15 MG/DL (ref 0.6–1.3)
EST. AVERAGE GLUCOSE BLD GHB EST-MCNC: 169 MG/DL
GFR SERPL CREATININE-BSD FRML MDRD: 30 ML/MIN/1.73SQ M
GLUCOSE P FAST SERPL-MCNC: 131 MG/DL (ref 65–99)
HBA1C MFR BLD: 7.5 % (ref 4.2–6.3)
HDLC SERPL-MCNC: 62 MG/DL (ref 40–60)
LDLC SERPL CALC-MCNC: 62 MG/DL (ref 0–100)
NONHDLC SERPL-MCNC: 75 MG/DL
POTASSIUM SERPL-SCNC: 4.9 MMOL/L (ref 3.5–5.3)
PROT SERPL-MCNC: 6.9 G/DL (ref 6.4–8.2)
SODIUM SERPL-SCNC: 137 MMOL/L (ref 136–145)
TRIGL SERPL-MCNC: 63 MG/DL

## 2018-09-26 PROCEDURE — 83036 HEMOGLOBIN GLYCOSYLATED A1C: CPT

## 2018-09-26 PROCEDURE — 36415 COLL VENOUS BLD VENIPUNCTURE: CPT

## 2018-09-26 PROCEDURE — 80053 COMPREHEN METABOLIC PANEL: CPT

## 2018-09-26 PROCEDURE — 80061 LIPID PANEL: CPT

## 2018-10-04 ENCOUNTER — OFFICE VISIT (OUTPATIENT)
Dept: INTERNAL MEDICINE CLINIC | Facility: CLINIC | Age: 73
End: 2018-10-04
Payer: COMMERCIAL

## 2018-10-04 VITALS
HEART RATE: 59 BPM | HEIGHT: 66 IN | OXYGEN SATURATION: 98 % | WEIGHT: 149.6 LBS | SYSTOLIC BLOOD PRESSURE: 126 MMHG | DIASTOLIC BLOOD PRESSURE: 70 MMHG | BODY MASS INDEX: 24.04 KG/M2 | TEMPERATURE: 97.4 F

## 2018-10-04 DIAGNOSIS — Z79.4 TYPE 2 DIABETES MELLITUS WITHOUT COMPLICATION, WITH LONG-TERM CURRENT USE OF INSULIN (HCC): Primary | ICD-10-CM

## 2018-10-04 DIAGNOSIS — E11.9 TYPE 2 DIABETES MELLITUS WITHOUT COMPLICATION, WITH LONG-TERM CURRENT USE OF INSULIN (HCC): Primary | ICD-10-CM

## 2018-10-04 DIAGNOSIS — C61 PROSTATE CANCER (HCC): ICD-10-CM

## 2018-10-04 DIAGNOSIS — E03.9 ACQUIRED HYPOTHYROIDISM: ICD-10-CM

## 2018-10-04 PROCEDURE — 99214 OFFICE O/P EST MOD 30 MIN: CPT | Performed by: INTERNAL MEDICINE

## 2018-10-04 RX ORDER — LEVOTHYROXINE SODIUM 88 UG/1
88 CAPSULE ORAL DAILY
Qty: 90 CAPSULE | Refills: 3 | Status: SHIPPED | OUTPATIENT
Start: 2018-10-04 | End: 2021-10-15 | Stop reason: ALTCHOICE

## 2018-10-04 NOTE — PROGRESS NOTES
Assessment/Plan:         Diagnoses and all orders for this visit:    Type 2 diabetes mellitus without complication, with long-term current use of insulin ([facility])  -     Comprehensive metabolic panel; Future  -     LDL cholesterol, direct; Future  -     Microalbumin / creatinine urine ratio  -     HEMOGLOBIN A1C W/ EAG ESTIMATION; Future  Pt did have dose adjustment thru the [facility] and will be changing to levimir at some time as well Recent labs were reviewed today and he will monitor closely    Acquired hypothyroidism  -     Levothyroxine Sodium 88 MCG CAPS; Take 1 capsule (88 mcg total) by mouth daily for 90 days  -     TSH, 3rd generation with Free T4 reflex; Future    Prostate Cancer - Patient has urology followup with PSA in November  He has been doing well  Since the procedure this summer in general  Rto 4 months with labs     Patient ID: Axel Garcia is a 81 y o  male  HPI   Pt has been feeling ok generally  His sugars are fluctuant and the trend is a little higher since the endocrinolgist decreased his insulin but he has been fairly stable  He does walk for exercise and is still busy cutting grass  No chest pain or sob  No falls  His urinary pattern has been good since he had prostate surgery and he can get thru most nights without getting up to go to the bathroom  He has urology appt in November Eye exams & tests to be performed upon return: Eye exam is utd and stable  Review of Systems   Constitutional: Negative  HENT: Negative  Eyes: Negative  Respiratory: Negative  Cardiovascular: Negative  Gastrointestinal: Negative  Endocrine: Negative  Genitourinary: Negative  Musculoskeletal: Negative  Skin: Negative  Allergic/Immunologic: Negative  Neurological: Negative  Hematological: Negative  Psychiatric/Behavioral: Negative        Past Medical History:   Diagnosis Date    Arthritis     BPH (benign prostatic hyperplasia)     last assessed 4/29/2014    Diabetes mellitus ([address] )     Disease of thyroid gland     GERD (gastroesophageal reflux disease)     Hearing aid worn     Hyperlipidemia     Hypertension     Hypothyroidism     Mumps     Prostate cancer ([address] )     Tingling sensation     bilateral feet occassionally    Tinnitus     Wears glasses     Wears partial dentures     upper     Past Surgical History:   Procedure Laterality Date    CATARACT EXTRACTION Bilateral 2000    EYE SURGERY      JOINT REPLACEMENT      KNEE ARTHROSCOPY Right 06/26/2009    knee    [place] LAP,PROSTATECTOMY,RADICAL,W/NERVE SPARE,INCL ROBOTIC N/A 2/7/2018    Procedure: ROBOTIC ASSISTED LAPAROSCOPIC PROSTATECTOMY; BILATERAL PELVIC LYMPH NODE DISSECTION;  Surgeon: Naeem Alston MD;  Location: [facility] OR;  Service: Urology    PROSTATE BIOPSY  11/07/2017    needle biopsy of prostate    TRIGGER FINGER RELEASE  05/2013    trigger thumb     No Known Allergies  Social History     Social History    Marital status: /Civil Union     Spouse name: N/A    Number of children: N/A    Years of education: N/A     Occupational History    printer  retired      Social History Main Topics    Smoking status: Former Smoker    Smokeless tobacco: Never Used      Comment: quit about 50 years ago    Alcohol use 1 2 oz/week     2 Glasses of wine per week      Comment: week, social drinker    Drug use: No    Sexual activity: Not on file     Other Topics Concern    Not on file     Social History Narrative    Always uses seat belt    Caffeine use    Lives independently with spouse    Retired    Sun protection sunscreen                   /70   Pulse 59   Temp (!) 97 4 °F (36 3 °C) (Tympanic)   Ht 5' 6" (1 676 m)   Wt 67 9 kg (149 lb 9 6 oz)   SpO2 98%   BMI 24 15 kg/m²          Physical Exam   Constitutional: He is oriented to person, place, and time  He appears well-developed and well-nourished  No distress  HENT:   Head: Normocephalic and atraumatic     Right Ear: External ear normal    Left Ear: External ear normal    Nose: Nose normal    Mouth/Throat: Oropharynx is clear and moist  No oropharyngeal exudate  Eyes: Pupils are equal, round, and reactive to light  Conjunctivae and EOM are normal  No scleral icterus  Neck: Normal range of motion  Neck supple  No JVD present  Cardiovascular: Normal rate, regular rhythm, normal heart sounds and intact distal pulses  No murmur heard  Pulmonary/Chest: Effort normal and breath sounds normal  No respiratory distress  He has no wheezes  He has no rales  He exhibits no tenderness  Abdominal: Soft  Bowel sounds are normal  He exhibits no distension and no mass  There is no tenderness  There is no rebound and no guarding  Musculoskeletal: Normal range of motion  He exhibits no edema, tenderness or deformity  Lymphadenopathy:     He has no cervical adenopathy  Neurological: He is alert and oriented to person, place, and time  He has normal reflexes  He displays normal reflexes  No cranial nerve deficit  He exhibits normal muscle tone  Coordination normal    Skin: Skin is warm and dry  He is not diaphoretic  Psychiatric: He has a normal mood and affect  His behavior is normal  Judgment and thought content normal    Nursing note and vitals reviewed

## 2018-11-20 ENCOUNTER — APPOINTMENT (OUTPATIENT)
Dept: LAB | Facility: HOSPITAL | Age: 73
End: 2018-11-20
Attending: UROLOGY
Payer: COMMERCIAL

## 2018-11-20 DIAGNOSIS — C61 PROSTATE CANCER (HCC): ICD-10-CM

## 2018-11-20 LAB — PSA SERPL-MCNC: <0.1 NG/ML (ref 0–4)

## 2018-11-20 PROCEDURE — 36415 COLL VENOUS BLD VENIPUNCTURE: CPT

## 2018-11-20 PROCEDURE — 84153 ASSAY OF PSA TOTAL: CPT

## 2018-11-27 ENCOUNTER — OFFICE VISIT (OUTPATIENT)
Dept: UROLOGY | Facility: CLINIC | Age: 73
End: 2018-11-27
Payer: COMMERCIAL

## 2018-11-27 VITALS
HEART RATE: 60 BPM | RESPIRATION RATE: 20 BRPM | BODY MASS INDEX: 23.95 KG/M2 | HEIGHT: 66 IN | WEIGHT: 149 LBS | SYSTOLIC BLOOD PRESSURE: 180 MMHG | DIASTOLIC BLOOD PRESSURE: 100 MMHG

## 2018-11-27 DIAGNOSIS — C61 PROSTATE CANCER (HCC): Primary | ICD-10-CM

## 2018-11-27 PROCEDURE — 99213 OFFICE O/P EST LOW 20 MIN: CPT | Performed by: UROLOGY

## 2018-11-27 NOTE — PROGRESS NOTES
UROLOGY ROUTINE FOLLOWUP NOTE     History of Present Illness:   David Tran is a 72 y o  male with a family history of prostate cancer in his father  PSA last year was 0 6 and this year is up to 1 3  Prostate biopsy revealed intermediate risk prostate cancer  The patient underwent a robotic assisted laparoscopic prostatectomy with bilateral pelvic lymph node dissection on 2/7/2018  Patient did very well postoperatively and was discharged within 24 hours  He has done well since  AUA 6 QoL 1 POLO 19 (patient reported on preop function)    Patient is holding his urine very well  He wears 1 pad throughout the course of the day mostly for peace of mind and is able to hold his urine when the urgency sensation comes on  Some wetness or leakage is noted only with extreme activity  Patient did use Viagra recently and noted some return erection  It was not rigid enough for penetration at this time  He was using Viagra 100 mg prior to surgery  He is still not pleased with his current function      Past Medical History:     Past Medical History:   Diagnosis Date    Arthritis     BPH (benign prostatic hyperplasia)     last assessed 4/29/2014    Diabetes mellitus ([address] )     Disease of thyroid gland     GERD (gastroesophageal reflux disease)     Hearing aid worn     Hyperlipidemia     Hypertension     Hypothyroidism     Mumps     Prostate cancer ([address] )     Tingling sensation     bilateral feet occassionally    Tinnitus     Wears glasses     Wears partial dentures     upper       PAST SURGICAL HISTORY:     Past Surgical History:   Procedure Laterality Date    CATARACT EXTRACTION Bilateral 2000    EYE SURGERY      JOINT REPLACEMENT      KNEE ARTHROSCOPY Right 06/26/2009    knee    [place] LAP,PROSTATECTOMY,RADICAL,W/NERVE SPARE,INCL ROBOTIC N/A 2/7/2018    Procedure: ROBOTIC ASSISTED LAPAROSCOPIC PROSTATECTOMY; BILATERAL PELVIC LYMPH NODE DISSECTION;  Surgeon: Carl Anne MD; Location: [facility];  Service: Urology    PROSTATE BIOPSY  11/07/2017    needle biopsy of prostate    TRIGGER FINGER RELEASE  05/2013    trigger thumb       CURRENT MEDICATIONS:     Current Outpatient Prescriptions   Medication Sig Dispense Refill    acetaminophen (TYLENOL) 500 MG chewable tablet Chew 500 mg every 6 (six) hours as needed for mild pain      aspirin 81 MG tablet Take 162 mg by mouth daily        insulin aspart (NovoLOG) 100 units/mL injection Inject 2 Units under the skin 3 (three) times daily after meals        insulin glargine (LANTUS) 100 units/mL subcutaneous injection Inject 8 Units under the skin 2 (two) times a day        Levothyroxine Sodium 88 MCG CAPS Take 1 capsule (88 mcg total) by mouth daily for 90 days 90 capsule 3    lisinopril (ZESTRIL) 40 mg tablet Take 40 mg by mouth daily        Multiple Vitamin (MULTIVITAMIN) tablet Take 1 tablet by mouth daily   saxagliptin (ONGLYZA) 5 MG tablet Take 2 5 mg by mouth daily        simvastatin (ZOCOR) 20 mg tablet Take 20 mg by mouth daily at bedtime   docusate sodium (COLACE) 100 mg capsule Take 1 capsule (100 mg total) by mouth 2 (two) times a day for 60 doses 60 capsule 0     No current facility-administered medications for this visit          ALLERGIES:   No Known Allergies    SOCIAL HISTORY:     Social History     Social History    Marital status: /Civil Union     Spouse name: N/A    Number of children: N/A    Years of education: N/A     Occupational History    printer  retired      Social History Main Topics    Smoking status: Former Smoker    Smokeless tobacco: Never Used      Comment: quit about 50 years ago    Alcohol use 1 2 oz/week     2 Glasses of wine per week      Comment: week, social drinker    Drug use: No    Sexual activity: Not Asked     Other Topics Concern    None     Social History Narrative    Always uses seat belt    Caffeine use    Lives independently with spouse    Retired    Sun protection sunscreen           SOCIAL HISTORY:     Family History   Problem Relation Age of Onset    Cancer Mother     Diabetes Mother     Uterine cancer Mother     Diabetes Father     Cancer Father     Stroke Father         of unknown cause    Hypertension Father     Prostate cancer Father     Diabetes Family         Uncle, DM    Cancer Family         Grandmother       REVIEW OF SYSTEMS:     Review of Systems   Constitutional: Negative  Respiratory: Negative  Cardiovascular: Negative  Gastrointestinal: Negative  Genitourinary: Negative  Musculoskeletal: Negative  Skin: Negative  Neurological: Negative  Psychiatric/Behavioral: Negative  PHYSICAL EXAM:     BP (!) 180/100   Pulse 60   Resp 20   Ht 5' 6" (1 676 m)   Wt 67 6 kg (149 lb)   BMI 24 05 kg/m²   General:  Healthy appearing individual in no acute distress  They have a normal affect  There is not appear to be any gross neurologic defects or abnormalities  HEENT:  Normocephalic, atraumatic  Neck is supple without any palpable lymphadenopathy  Cardiovascular:  Patient has normal palpable distal radial pulses  There is no significant peripheral edema  No JVD is noted  Respiratory:  Patient has unlabored respirations  There is no audible wheeze or rhonchi  Abdomen:  Abdomen with healed surgical scars  Abdomen is soft and nontender  There is no tympany  Inguinal and umbilical hernia are not appreciated  Musculoskeletal:  Patient does not have significant CVA tenderness with palpation or percussion  They full range of motion in all 4 extremities  Strength in all 4 extremities appears congruent  Patient is able to ambulate without assistance or difficulty  Dermatologic:  Patient has no skin abnormalities or rashes       LABS:     CBC:   Lab Results   Component Value Date    WBC 9 62 02/08/2018    HGB 10 9 (L) 02/08/2018    HCT 33 7 (L) 02/08/2018    MCV 91 02/08/2018     02/08/2018       BMP:   Lab Results   Component Value Date    GLUCOSE 135 10/01/2015    CALCIUM 9 2 09/26/2018     10/01/2015    K 4 9 09/26/2018    CO2 29 09/26/2018     09/26/2018    BUN 36 (H) 09/26/2018    CREATININE 2 15 (H) 09/26/2018     Lab Results   Component Value Date    PSA <0 1 11/20/2018    PSA <0 1 07/11/2018    PSA <0 1 03/15/2018     PATHOLOGY:     2/7/18  Final Diagnosis   PROSTATE CANCER STAGING SUMMARY  1  Specimen identification:       - Procedure:   Radical prostatectomy     - Prostate size and weight: 4 5 x 4 2 x 2 8 cm, 39 grams   2  Tumor     - Histologic type: Acinar adenocarcinoma     - Histologic Grade: Jeffrey Pattern: 6        * primary pattern: 3        * secondary pattern: 3        * tertiary pattern:  N/A        * Grade Group:  Grade group 1     * Intraductal carcinoma: Not identified    - Tumor quantitation: 5%    - Extraprostatic extension: Not identified    - Urinary bladder neck invasion: Not identified    - Seminal vesicle invasion: Not identified   3  Margins: Uninvolved by invasive carcinoma   4  Margin positivity in area of extraprostatic extension: N/A   5  Treatment effect on carcinoma:  No known presurgical therapy   6  Lymph-vascular invasion: Not identified   7  Perineural invasion: Not identified   8  Regional lymph nodes (pN0):     -  Number of lymph nodes involved: 0     -  Number of lymph nodes examined: 4   9  Additional pathologic findings: High-grade prostatic intraepithelial neoplasia  10  Ancillary studies: None, if needed Block D18 can be used  11  PSA: unknown  8th Ed AJCC Tumor Stage: At least stage I  -pT2, pN0, Gina's score 6 (3+3), grade group 1     A  Lymph node, periprostatic fat and lymph nodes, excision:  - Benign vascularized fibroadipose tissue      B  Lymph node, right pelvic, resection:  - One lymph node, negative for carcinoma (0/1)      C  Lymph node, left pelvic, resection:  - Three lymph nodes, negative for carcinoma (0/3)      D   Prostate, radical prostatectomy:  - Prostatic adenocarcinoma, Gina score 6 (3+3), grade group 1   - High-grade prostatic intraepithelial neoplasia  - Margins of resection, negative for carcinoma     ASSESSMENT:     72 y o  male with T2 N0 MX adenocarcinoma of the prostate, Gina 6 s/p RALP/PLND 2/2018 with negative surgical margins    PLAN:     PSA remains undetectable, follow-up in 3 months (for visit at one year)    Urination appears well improved only 5-6 months out from surgery    Patient is still having some erectile dysfunction related to his prostatectomy  He will continue to use the Viagra as needed  We also discussed vacuum erection device and the patient was given information  He knows that the next step would be consideration of Tri Mix injections  He is not ready for this at this time  We can discuss at his next visit

## 2019-01-04 LAB
LEFT EYE DIABETIC RETINOPATHY: NORMAL
RIGHT EYE DIABETIC RETINOPATHY: NORMAL

## 2019-01-04 PROCEDURE — 3072F LOW RISK FOR RETINOPATHY: CPT | Performed by: INTERNAL MEDICINE

## 2019-01-15 ENCOUNTER — APPOINTMENT (OUTPATIENT)
Dept: LAB | Facility: HOSPITAL | Age: 74
End: 2019-01-15
Attending: INTERNAL MEDICINE
Payer: COMMERCIAL

## 2019-01-15 DIAGNOSIS — E03.9 ACQUIRED HYPOTHYROIDISM: ICD-10-CM

## 2019-01-15 DIAGNOSIS — E11.9 TYPE 2 DIABETES MELLITUS WITHOUT COMPLICATION, WITH LONG-TERM CURRENT USE OF INSULIN (HCC): ICD-10-CM

## 2019-01-15 DIAGNOSIS — Z79.4 TYPE 2 DIABETES MELLITUS WITHOUT COMPLICATION, WITH LONG-TERM CURRENT USE OF INSULIN (HCC): ICD-10-CM

## 2019-01-15 LAB
ALBUMIN SERPL BCP-MCNC: 4.2 G/DL (ref 3.5–5)
ALP SERPL-CCNC: 89 U/L (ref 46–116)
ALT SERPL W P-5'-P-CCNC: 29 U/L (ref 12–78)
ANION GAP SERPL CALCULATED.3IONS-SCNC: 9 MMOL/L (ref 4–13)
AST SERPL W P-5'-P-CCNC: 20 U/L (ref 5–45)
BILIRUB SERPL-MCNC: 0.5 MG/DL (ref 0.2–1)
BUN SERPL-MCNC: 34 MG/DL (ref 5–25)
CALCIUM SERPL-MCNC: 9.2 MG/DL (ref 8.3–10.1)
CHLORIDE SERPL-SCNC: 101 MMOL/L (ref 100–108)
CO2 SERPL-SCNC: 28 MMOL/L (ref 21–32)
CREAT SERPL-MCNC: 1.93 MG/DL (ref 0.6–1.3)
EST. AVERAGE GLUCOSE BLD GHB EST-MCNC: 174 MG/DL
GFR SERPL CREATININE-BSD FRML MDRD: 34 ML/MIN/1.73SQ M
GLUCOSE P FAST SERPL-MCNC: 147 MG/DL (ref 65–99)
HBA1C MFR BLD: 7.7 % (ref 4.2–6.3)
LDLC SERPL DIRECT ASSAY-MCNC: 80 MG/DL (ref 0–100)
POTASSIUM SERPL-SCNC: 4.5 MMOL/L (ref 3.5–5.3)
PROT SERPL-MCNC: 7.4 G/DL (ref 6.4–8.2)
SODIUM SERPL-SCNC: 138 MMOL/L (ref 136–145)
TSH SERPL DL<=0.05 MIU/L-ACNC: 2.93 UIU/ML (ref 0.36–3.74)

## 2019-01-15 PROCEDURE — 80053 COMPREHEN METABOLIC PANEL: CPT

## 2019-01-15 PROCEDURE — 36415 COLL VENOUS BLD VENIPUNCTURE: CPT

## 2019-01-15 PROCEDURE — 83036 HEMOGLOBIN GLYCOSYLATED A1C: CPT

## 2019-01-15 PROCEDURE — 83721 ASSAY OF BLOOD LIPOPROTEIN: CPT

## 2019-01-15 PROCEDURE — 84443 ASSAY THYROID STIM HORMONE: CPT

## 2019-02-05 ENCOUNTER — OFFICE VISIT (OUTPATIENT)
Dept: INTERNAL MEDICINE CLINIC | Facility: CLINIC | Age: 74
End: 2019-02-05
Payer: COMMERCIAL

## 2019-02-05 VITALS
TEMPERATURE: 97.2 F | BODY MASS INDEX: 24.01 KG/M2 | WEIGHT: 149.38 LBS | SYSTOLIC BLOOD PRESSURE: 124 MMHG | HEIGHT: 66 IN | OXYGEN SATURATION: 99 % | HEART RATE: 67 BPM | DIASTOLIC BLOOD PRESSURE: 70 MMHG

## 2019-02-05 DIAGNOSIS — C61 PROSTATE CANCER (HCC): ICD-10-CM

## 2019-02-05 DIAGNOSIS — Z79.4 TYPE 2 DIABETES MELLITUS WITH COMPLICATION, WITH LONG-TERM CURRENT USE OF INSULIN (HCC): Primary | ICD-10-CM

## 2019-02-05 DIAGNOSIS — I10 ESSENTIAL HYPERTENSION: ICD-10-CM

## 2019-02-05 DIAGNOSIS — E11.8 TYPE 2 DIABETES MELLITUS WITH COMPLICATION, WITH LONG-TERM CURRENT USE OF INSULIN (HCC): Primary | ICD-10-CM

## 2019-02-05 PROBLEM — M25.552 HIP PAIN, LEFT: Status: RESOLVED | Noted: 2017-09-21 | Resolved: 2019-02-05

## 2019-02-05 PROBLEM — M25.551 HIP PAIN, RIGHT: Status: RESOLVED | Noted: 2017-09-21 | Resolved: 2019-02-05

## 2019-02-05 PROCEDURE — 3008F BODY MASS INDEX DOCD: CPT | Performed by: INTERNAL MEDICINE

## 2019-02-05 PROCEDURE — 3078F DIAST BP <80 MM HG: CPT | Performed by: INTERNAL MEDICINE

## 2019-02-05 PROCEDURE — 3074F SYST BP LT 130 MM HG: CPT | Performed by: INTERNAL MEDICINE

## 2019-02-05 PROCEDURE — 99214 OFFICE O/P EST MOD 30 MIN: CPT | Performed by: INTERNAL MEDICINE

## 2019-02-05 NOTE — PROGRESS NOTES
Assessment/Plan:         Diagnoses and all orders for this visit:    Type 2 diabetes mellitus with complication, with long-term current use of insulin [facility])  Patient saw endocrine and is entertaining the insulin pump thru the [place]  Has appt for new meter Rito next week    Prostate cancer [facility])  Patient doing well has followup with Dr Saravanan [place] hypertension  No added salt diet  Continue present rx  Exercises daily and will be more active in spring with grass cutting    Other orders  -     insulin detemir (LEVEMIR FLEXTOUCH) 100 Units/mL injection pen; Inject under the skin      Rto 3 months     Patient ID: Frank Shahid is a 72 y o  male  HPI   Pt doing ok overall and is excited to get mew scanning meter thru the [place]  He also is looking into the pump and needs to do process to get approval - has appt in April  No new complaints Has urology followup in march Blood sugar about the same - averaging in 140 range  Occasional dribbling with exertion and gets up maybe once nightly  No chest pain or sob  Review of Systems   Constitutional: Negative  HENT: Negative  Respiratory: Negative  Cardiovascular: Negative  Gastrointestinal: Negative  Genitourinary: Negative  Musculoskeletal: Positive for arthralgias  Skin: Negative  Neurological: Positive for numbness  Hematological: Negative  Psychiatric/Behavioral: Negative        Past Medical History:   Diagnosis Date    Arthritis     BPH (benign prostatic hyperplasia)     last assessed 4/29/2014    Diabetes mellitus ([address] )     Disease of thyroid gland     GERD (gastroesophageal reflux disease)     Hearing aid worn     Hyperlipidemia     Hypertension     Hypothyroidism     Mumps     Prostate cancer (HCC)     Tingling sensation     bilateral feet occassionally    Tinnitus     Wears glasses     Wears partial dentures     upper     Past Surgical History:   Procedure Laterality Date    CATARACT EXTRACTION Bilateral [id number]      JOINT REPLACEMENT      KNEE ARTHROSCOPY Right 06/26/2009    knee    [place] LAP,PROSTATECTOMY,RADICAL,W/NERVE SPARE,INCL ROBOTIC N/A 2/7/2018    Procedure: ROBOTIC ASSISTED LAPAROSCOPIC PROSTATECTOMY; BILATERAL PELVIC LYMPH NODE DISSECTION;  Surgeon: Luda Rahman MD;  Location: [facility];  Service: Urology    PROSTATE BIOPSY  11/07/2017    needle biopsy of prostate    TRIGGER FINGER RELEASE  05/2013    trigger thumb     Social History     Social History    Marital status: /Civil Union     Spouse name: N/A    Number of children: N/A    Years of education: N/A     Occupational History    printer  retired      Social History Main Topics    Smoking status: Former Smoker    Smokeless tobacco: Never Used      Comment: quit about 50 years ago    Alcohol use 1 2 oz/week     2 Glasses of wine per week      Comment: week, social drinker    Drug use: No    Sexual activity: Not on file     Other Topics Concern    Not on file     Social History Narrative    Always uses seat belt    Caffeine use    Lives independently with spouse    Retired    Sun protection sunscreen         No Known Allergies        /70   Pulse 67   Temp (!) 97 2 °F (36 2 °C) (Tympanic)   Ht 5' 6" (1 676 m)   Wt 67 8 kg (149 lb 6 oz)   SpO2 99%   BMI 24 11 kg/m²          Physical Exam   Constitutional: He is oriented to person, place, and time  He appears well-developed and well-nourished  No distress  HENT:   Head: Normocephalic and atraumatic  Right Ear: External ear normal    Left Ear: External ear normal    Nose: Nose normal    Mouth/Throat: Oropharynx is clear and moist  No oropharyngeal exudate  Eyes: Pupils are equal, round, and reactive to light  Conjunctivae and EOM are normal  No scleral icterus  Neck: Normal range of motion  Neck supple  No JVD present  Cardiovascular: Normal rate, regular rhythm, normal heart sounds and intact distal pulses      No murmur heard   Pulmonary/Chest: Effort normal and breath sounds normal    Abdominal: Soft  Bowel sounds are normal    Musculoskeletal: Normal range of motion  Lymphadenopathy:     He has no cervical adenopathy  Neurological: He is alert and oriented to person, place, and time  He displays normal reflexes  No cranial nerve deficit  He exhibits abnormal muscle tone  Coordination normal    Skin: Skin is warm and dry  He is not diaphoretic  Psychiatric: He has a normal mood and affect  His behavior is normal  Judgment and thought content normal    Nursing note and vitals reviewed

## 2019-02-26 ENCOUNTER — APPOINTMENT (OUTPATIENT)
Dept: LAB | Facility: HOSPITAL | Age: 74
End: 2019-02-26
Attending: UROLOGY
Payer: COMMERCIAL

## 2019-02-26 DIAGNOSIS — C61 PROSTATE CANCER (HCC): ICD-10-CM

## 2019-02-26 LAB — PSA SERPL-MCNC: <0.1 NG/ML (ref 0–4)

## 2019-02-26 PROCEDURE — 84153 ASSAY OF PSA TOTAL: CPT

## 2019-02-26 PROCEDURE — 36415 COLL VENOUS BLD VENIPUNCTURE: CPT

## 2019-03-05 ENCOUNTER — TELEPHONE (OUTPATIENT)
Dept: UROLOGY | Facility: CLINIC | Age: 74
End: 2019-03-05

## 2019-03-05 ENCOUNTER — OFFICE VISIT (OUTPATIENT)
Dept: UROLOGY | Facility: CLINIC | Age: 74
End: 2019-03-05
Payer: COMMERCIAL

## 2019-03-05 VITALS
BODY MASS INDEX: 24.21 KG/M2 | SYSTOLIC BLOOD PRESSURE: 120 MMHG | DIASTOLIC BLOOD PRESSURE: 80 MMHG | HEART RATE: 64 BPM | WEIGHT: 150 LBS

## 2019-03-05 DIAGNOSIS — C61 PROSTATE CANCER (HCC): Primary | ICD-10-CM

## 2019-03-05 DIAGNOSIS — F52.21 ERECTILE DYSFUNCTION OF NON-ORGANIC ORIGIN: ICD-10-CM

## 2019-03-05 PROCEDURE — 99213 OFFICE O/P EST LOW 20 MIN: CPT | Performed by: UROLOGY

## 2019-03-05 NOTE — PROGRESS NOTES
UROLOGY ROUTINE FOLLOWUP NOTE     History of Present Illness:   Michael Bee is a 72 y o  male with a family history of prostate cancer in his father  PSA last year was 0 6 and this year is up to 1 3  Prostate biopsy revealed intermediate risk prostate cancer  The patient underwent a robotic assisted laparoscopic prostatectomy with bilateral pelvic lymph node dissection on 2/7/2018  Patient did very well postoperatively and was discharged within 24 hours  He has done well since  AUA 6 QoL 1 POLO 19 (patient reported on preop function)    Patient is holding his urine very well  No significant leakage  Patient did use Viagra recently and noted some return erection  Patient has progressed and is able to have penetrate of intercourse  He and his wife are pleased  He was using Viagra 100 mg prior to surgery       Lab Results   Component Value Date    PSA <0 1 02/26/2019    PSA <0 1 11/20/2018    PSA <0 1 07/11/2018     Past Medical History:     Past Medical History:   Diagnosis Date    Arthritis     BPH (benign prostatic hyperplasia)     last assessed 4/29/2014    Diabetes mellitus ([address] )     Disease of thyroid gland     GERD (gastroesophageal reflux disease)     Hearing aid worn     Hyperlipidemia     Hypertension     Hypothyroidism     Mumps     Prostate cancer ([address] )     Tingling sensation     bilateral feet occassionally    Tinnitus     Wears glasses     Wears partial dentures     upper       PAST SURGICAL HISTORY:     Past Surgical History:   Procedure Laterality Date    CATARACT EXTRACTION Bilateral 2000    EYE SURGERY      JOINT REPLACEMENT      KNEE ARTHROSCOPY Right 06/26/2009    knee    [place] LAP,PROSTATECTOMY,RADICAL,W/NERVE SPARE,INCL ROBOTIC N/A 2/7/2018    Procedure: ROBOTIC ASSISTED LAPAROSCOPIC PROSTATECTOMY; BILATERAL PELVIC LYMPH NODE DISSECTION;  Surgeon: Aashish Márquez MD;  Location: [facility];  Service: Urology    PROSTATE BIOPSY  11/07/2017    needle biopsy of prostate    TRIGGER FINGER RELEASE  05/2013    trigger thumb       CURRENT MEDICATIONS:     Current Outpatient Medications   Medication Sig Dispense Refill    acetaminophen (TYLENOL) 500 MG chewable tablet Chew 500 mg every 6 (six) hours as needed for mild pain      aspirin 81 MG tablet Take 162 mg by mouth daily        insulin aspart (NovoLOG) 100 units/mL injection Inject 2 Units under the skin 3 (three) times daily after meals        insulin detemir (LEVEMIR FLEXTOUCH) 100 Units/mL injection pen Inject under the skin      lisinopril (ZESTRIL) 40 mg tablet Take 40 mg by mouth daily        Multiple Vitamin (MULTIVITAMIN) tablet Take 1 tablet by mouth daily   saxagliptin (ONGLYZA) 5 MG tablet Take 2 5 mg by mouth daily        simvastatin (ZOCOR) 20 mg tablet Take 20 mg by mouth daily at bedtime   docusate sodium (COLACE) 100 mg capsule Take 1 capsule (100 mg total) by mouth 2 (two) times a day for 60 doses 60 capsule 0    Levothyroxine Sodium 88 MCG CAPS Take 1 capsule (88 mcg total) by mouth daily for 90 days 90 capsule 3     No current facility-administered medications for this visit  ALLERGIES:   No Known Allergies    SOCIAL HISTORY:     Social History     Socioeconomic History    Marital status: /Civil Union     Spouse name: None    Number of children: None    Years of education: None    Highest education level: None   Occupational History    Occupation: printer  retired   Social Needs    Financial resource strain: None    Food insecurity:     Worry: None     Inability: None    Transportation needs:     Medical: None     Non-medical: None   Tobacco Use    Smoking status: Former Smoker    Smokeless tobacco: Never Used    Tobacco comment: quit about 50 years ago   Substance and Sexual Activity    Alcohol use:  Yes     Alcohol/week: 1 2 oz     Types: 2 Glasses of wine per week     Comment: week, social drinker    Drug use: No    Sexual activity: None Lifestyle    Physical activity:     Days per week: None     Minutes per session: None    Stress: None   Relationships    Social connections:     Talks on phone: None     Gets together: None     Attends [employer] service: None     Active member of club or organization: None     Attends meetings of clubs or organizations: None     Relationship status: None    Intimate partner violence:     Fear of current or ex partner: None     Emotionally abused: None     Physically abused: None     Forced sexual activity: None   Other Topics Concern    None   Social History Narrative    Always uses seat belt    Caffeine use    Lives independently with spouse    Retired    Sun protection sunscreen       SOCIAL HISTORY:     Family History   Problem Relation Age of Onset    Cancer Mother     Diabetes Mother     Uterine cancer Mother     Diabetes Father     Cancer Father     Stroke Father         of unknown cause    Hypertension Father     Prostate cancer Father     Diabetes Family         Uncle, DM    Cancer Family         Grandmother       REVIEW OF SYSTEMS:     Review of Systems   Constitutional: Negative  Respiratory: Negative  Cardiovascular: Negative  Gastrointestinal: Negative  Genitourinary: Positive for testicular pain (  Right groin pain with activity)  Musculoskeletal: Negative  Skin: Negative  Neurological: Negative  Psychiatric/Behavioral: Negative  PHYSICAL EXAM:     /80   Pulse 64   Wt 68 kg (150 lb)   BMI 24 21 kg/m²   General:  Healthy appearing individual in no acute distress  They have a normal affect  There is not appear to be any gross neurologic defects or abnormalities  HEENT:  Normocephalic, atraumatic  Neck is supple without any palpable lymphadenopathy  Cardiovascular:  Patient has normal palpable distal radial pulses  There is no significant peripheral edema  No JVD is noted  Respiratory:  Patient has unlabored respirations    There is no audible wheeze or rhonchi  Abdomen:  Abdomen with healed surgical scars  Abdomen is soft and nontender  There is no tympany  Inguinal and umbilical hernia are not appreciated  Musculoskeletal:  Patient does not have significant CVA tenderness with palpation or percussion  They full range of motion in all 4 extremities  Strength in all 4 extremities appears congruent  Patient is able to ambulate without assistance or difficulty  Dermatologic:  Patient has no skin abnormalities or rashes  LABS:     CBC:   Lab Results   Component Value Date    WBC 9 62 02/08/2018    HGB 10 9 (L) 02/08/2018    HCT 33 7 (L) 02/08/2018    MCV 91 02/08/2018     02/08/2018       BMP:   Lab Results   Component Value Date    GLUCOSE 135 10/01/2015    CALCIUM 9 2 01/15/2019     10/01/2015    K 4 5 01/15/2019    CO2 28 01/15/2019     01/15/2019    BUN 34 (H) 01/15/2019    CREATININE 1 93 (H) 01/15/2019     Lab Results   Component Value Date    PSA <0 1 02/26/2019    PSA <0 1 11/20/2018    PSA <0 1 07/11/2018     PATHOLOGY:     2/7/18  Final Diagnosis   PROSTATE CANCER STAGING SUMMARY  1  Specimen identification:       - Procedure:   Radical prostatectomy     - Prostate size and weight: 4 5 x 4 2 x 2 8 cm, 39 grams   2  Tumor     - Histologic type: Acinar adenocarcinoma     - Histologic Grade: [place] Pattern: 6        * primary pattern: 3        * secondary pattern: 3        * tertiary pattern:  N/A        * Grade Group:  Grade group 1     * Intraductal carcinoma: Not identified    - Tumor quantitation: 5%    - Extraprostatic extension: Not identified    - Urinary bladder neck invasion: Not identified    - Seminal vesicle invasion: Not identified   3  Margins: Uninvolved by invasive carcinoma   4  Margin positivity in area of extraprostatic extension: N/A   5  Treatment effect on carcinoma:  No known presurgical therapy   6  Lymph-vascular invasion: Not identified   7  Perineural invasion: Not identified   8   Regional lymph nodes (pN0):     -  Number of lymph nodes involved: 0     -  Number of lymph nodes examined: 4   9  Additional pathologic findings: High-grade prostatic intraepithelial neoplasia  10  Ancillary studies: None, if needed Block D18 can be used  11  PSA: unknown  8th Ed AJCC Tumor Stage: At least stage I  -pT2, pN0, Gina's score 6 (3+3), grade group 1     A  Lymph node, periprostatic fat and lymph nodes, excision:  - Benign vascularized fibroadipose tissue      B  Lymph node, right pelvic, resection:  - One lymph node, negative for carcinoma (0/1)      C  Lymph node, left pelvic, resection:  - Three lymph nodes, negative for carcinoma (0/3)      D  Prostate, radical prostatectomy:  - Prostatic adenocarcinoma, Gina score 6 (3+3), grade group 1   - High-grade prostatic intraepithelial neoplasia  - Margins of resection, negative for carcinoma     ASSESSMENT:     72 y o  male with T2 N0 MX adenocarcinoma of the prostate, [place] 6 s/p RALP/PLND 2/2018 with negative surgical margins    PLAN:     PSA remains undetectable, follow-up in 6 months given final pathology and undetectable PSA values  Urination  Improved and no signs of incontinence  Patient is still having some erectile dysfunction   However this existed prior to surgery  He has now returned to a relatively baseline function with the use of Viagra 100 mg  Patient is wife understand that alternative options should he desire a more treatment  We will continue to follow

## 2019-04-16 ENCOUNTER — APPOINTMENT (OUTPATIENT)
Dept: LAB | Facility: HOSPITAL | Age: 74
End: 2019-04-16
Attending: SURGERY
Payer: COMMERCIAL

## 2019-04-16 ENCOUNTER — HOSPITAL ENCOUNTER (OUTPATIENT)
Dept: NON INVASIVE DIAGNOSTICS | Facility: HOSPITAL | Age: 74
Discharge: HOME | End: 2019-04-16
Attending: SURGERY
Payer: COMMERCIAL

## 2019-04-16 ENCOUNTER — CONSULT (OUTPATIENT)
Dept: SURGERY | Facility: HOSPITAL | Age: 74
End: 2019-04-16
Payer: COMMERCIAL

## 2019-04-16 VITALS
TEMPERATURE: 97.9 F | WEIGHT: 151 LBS | HEART RATE: 72 BPM | HEIGHT: 66 IN | BODY MASS INDEX: 24.27 KG/M2 | SYSTOLIC BLOOD PRESSURE: 190 MMHG | DIASTOLIC BLOOD PRESSURE: 91 MMHG

## 2019-04-16 DIAGNOSIS — K40.90 RIGHT INGUINAL HERNIA: Primary | ICD-10-CM

## 2019-04-16 DIAGNOSIS — K40.90 RIGHT INGUINAL HERNIA: ICD-10-CM

## 2019-04-16 LAB
ANION GAP SERPL CALCULATED.3IONS-SCNC: 8 MMOL/L (ref 4–13)
ATRIAL RATE: 63 BPM
BASOPHILS # BLD AUTO: 0.06 THOUSANDS/ΜL (ref 0–0.1)
BASOPHILS NFR BLD AUTO: 1 % (ref 0–1)
BUN SERPL-MCNC: 37 MG/DL (ref 5–25)
CALCIUM SERPL-MCNC: 9.2 MG/DL (ref 8.3–10.1)
CHLORIDE SERPL-SCNC: 101 MMOL/L (ref 100–108)
CO2 SERPL-SCNC: 30 MMOL/L (ref 21–32)
CREAT SERPL-MCNC: 1.79 MG/DL (ref 0.6–1.3)
EOSINOPHIL # BLD AUTO: 0.33 THOUSAND/ΜL (ref 0–0.61)
EOSINOPHIL NFR BLD AUTO: 8 % (ref 0–6)
ERYTHROCYTE [DISTWIDTH] IN BLOOD BY AUTOMATED COUNT: 12.6 % (ref 11.6–15.1)
GFR SERPL CREATININE-BSD FRML MDRD: 37 ML/MIN/1.73SQ M
GLUCOSE SERPL-MCNC: 118 MG/DL (ref 65–140)
HCT VFR BLD AUTO: 41.5 % (ref 36.5–49.3)
HGB BLD-MCNC: 13.9 G/DL (ref 12–17)
IMM GRANULOCYTES # BLD AUTO: 0.01 THOUSAND/UL (ref 0–0.2)
IMM GRANULOCYTES NFR BLD AUTO: 0 % (ref 0–2)
LYMPHOCYTES # BLD AUTO: 1.08 THOUSANDS/ΜL (ref 0.6–4.47)
LYMPHOCYTES NFR BLD AUTO: 25 % (ref 14–44)
MCH RBC QN AUTO: 32.2 PG (ref 26.8–34.3)
MCHC RBC AUTO-ENTMCNC: 33.5 G/DL (ref 31.4–37.4)
MCV RBC AUTO: 96 FL (ref 82–98)
MONOCYTES # BLD AUTO: 0.42 THOUSAND/ΜL (ref 0.17–1.22)
MONOCYTES NFR BLD AUTO: 10 % (ref 4–12)
NEUTROPHILS # BLD AUTO: 2.5 THOUSANDS/ΜL (ref 1.85–7.62)
NEUTS SEG NFR BLD AUTO: 56 % (ref 43–75)
NRBC BLD AUTO-RTO: 0 /100 WBCS
P AXIS: 84 DEGREES
PLATELET # BLD AUTO: 206 THOUSANDS/UL (ref 149–390)
PMV BLD AUTO: 9.3 FL (ref 8.9–12.7)
POTASSIUM SERPL-SCNC: 4.8 MMOL/L (ref 3.5–5.3)
PR INTERVAL: 196 MS
QRS AXIS: 72 DEGREES
QRSD INTERVAL: 98 MS
QT INTERVAL: 384 MS
QTC INTERVAL: 392 MS
RBC # BLD AUTO: 4.32 MILLION/UL (ref 3.88–5.62)
SODIUM SERPL-SCNC: 139 MMOL/L (ref 136–145)
T WAVE AXIS: 81 DEGREES
VENTRICULAR RATE: 63 BPM
WBC # BLD AUTO: 4.4 THOUSAND/UL (ref 4.31–10.16)

## 2019-04-16 PROCEDURE — 80048 BASIC METABOLIC PNL TOTAL CA: CPT

## 2019-04-16 PROCEDURE — 93010 ELECTROCARDIOGRAM REPORT: CPT | Performed by: INTERNAL MEDICINE

## 2019-04-16 PROCEDURE — 85025 COMPLETE CBC W/AUTO DIFF WBC: CPT

## 2019-04-16 PROCEDURE — 93005 ELECTROCARDIOGRAM TRACING: CPT

## 2019-04-16 PROCEDURE — 36415 COLL VENOUS BLD VENIPUNCTURE: CPT

## 2019-04-16 PROCEDURE — 99204 OFFICE O/P NEW MOD 45 MIN: CPT | Performed by: SURGERY

## 2019-04-16 RX ORDER — SODIUM CHLORIDE, SODIUM LACTATE, POTASSIUM CHLORIDE, CALCIUM CHLORIDE 600; 310; 30; 20 MG/100ML; MG/100ML; MG/100ML; MG/100ML
125 INJECTION, SOLUTION INTRAVENOUS CONTINUOUS
Status: CANCELLED | OUTPATIENT
Start: 2019-04-16

## 2019-04-16 RX ORDER — CHLORHEXIDINE GLUCONATE 4 G/100ML
SOLUTION TOPICAL DAILY PRN
Status: CANCELLED | OUTPATIENT
Start: 2019-04-16

## 2019-04-16 RX ORDER — HEPARIN SODIUM 5000 [USP'U]/ML
5000 INJECTION, SOLUTION INTRAVENOUS; SUBCUTANEOUS ONCE
Status: CANCELLED | OUTPATIENT
Start: 2019-04-19

## 2019-04-16 RX ORDER — CEFAZOLIN SODIUM 1 G/50ML
1000 SOLUTION INTRAVENOUS ONCE
Status: CANCELLED | OUTPATIENT
Start: 2019-04-19 | End: 2019-04-16

## 2019-04-17 ENCOUNTER — CONSULT (OUTPATIENT)
Dept: INTERNAL MEDICINE CLINIC | Facility: CLINIC | Age: 74
End: 2019-04-17
Payer: COMMERCIAL

## 2019-04-17 VITALS
DIASTOLIC BLOOD PRESSURE: 78 MMHG | WEIGHT: 150.4 LBS | TEMPERATURE: 97.9 F | OXYGEN SATURATION: 99 % | HEART RATE: 61 BPM | BODY MASS INDEX: 24.17 KG/M2 | SYSTOLIC BLOOD PRESSURE: 124 MMHG | HEIGHT: 66 IN

## 2019-04-17 DIAGNOSIS — Z79.4 TYPE 2 DIABETES MELLITUS WITH COMPLICATION, WITH LONG-TERM CURRENT USE OF INSULIN (HCC): ICD-10-CM

## 2019-04-17 DIAGNOSIS — K40.90 RIGHT INGUINAL HERNIA: Primary | ICD-10-CM

## 2019-04-17 DIAGNOSIS — N18.30 STAGE 3 CHRONIC KIDNEY DISEASE (HCC): ICD-10-CM

## 2019-04-17 DIAGNOSIS — E11.8 TYPE 2 DIABETES MELLITUS WITH COMPLICATION, WITH LONG-TERM CURRENT USE OF INSULIN (HCC): ICD-10-CM

## 2019-04-17 PROBLEM — N28.9 RENAL INSUFFICIENCY: Status: RESOLVED | Noted: 2018-01-22 | Resolved: 2019-04-17

## 2019-04-17 PROCEDURE — 99215 OFFICE O/P EST HI 40 MIN: CPT | Performed by: INTERNAL MEDICINE

## 2019-04-17 PROCEDURE — 3066F NEPHROPATHY DOC TX: CPT | Performed by: INTERNAL MEDICINE

## 2019-04-18 ENCOUNTER — ANESTHESIA EVENT (OUTPATIENT)
Dept: PERIOP | Facility: HOSPITAL | Age: 74
End: 2019-04-18
Payer: COMMERCIAL

## 2019-04-19 ENCOUNTER — ANESTHESIA (OUTPATIENT)
Dept: PERIOP | Facility: HOSPITAL | Age: 74
End: 2019-04-19
Payer: COMMERCIAL

## 2019-04-19 ENCOUNTER — HOSPITAL ENCOUNTER (OUTPATIENT)
Facility: HOSPITAL | Age: 74
Setting detail: OUTPATIENT SURGERY
Discharge: HOME | End: 2019-04-19
Attending: SURGERY | Admitting: SURGERY
Payer: COMMERCIAL

## 2019-04-19 VITALS
BODY MASS INDEX: 24.11 KG/M2 | WEIGHT: 150 LBS | RESPIRATION RATE: 16 BRPM | DIASTOLIC BLOOD PRESSURE: 90 MMHG | SYSTOLIC BLOOD PRESSURE: 180 MMHG | TEMPERATURE: 97.8 F | HEART RATE: 68 BPM | OXYGEN SATURATION: 94 % | HEIGHT: 66 IN

## 2019-04-19 DIAGNOSIS — K40.90 RIGHT INGUINAL HERNIA: Primary | ICD-10-CM

## 2019-04-19 PROCEDURE — 49505 PRP I/HERN INIT REDUC >5 YR: CPT | Performed by: SURGERY

## 2019-04-19 PROCEDURE — C1781 MESH (IMPLANTABLE): HCPCS | Performed by: SURGERY

## 2019-04-19 PROCEDURE — 49505 PRP I/HERN INIT REDUC >5 YR: CPT

## 2019-04-19 DEVICE — BARD MESH PERFIX PLUG, MEDIUM
Type: IMPLANTABLE DEVICE | Site: INGUINAL | Status: FUNCTIONAL
Brand: BARD MESH PERFIX PLUG

## 2019-04-19 RX ORDER — LIDOCAINE HYDROCHLORIDE 10 MG/ML
INJECTION, SOLUTION INFILTRATION; PERINEURAL AS NEEDED
Status: DISCONTINUED | OUTPATIENT
Start: 2019-04-19 | End: 2019-04-19 | Stop reason: SURG

## 2019-04-19 RX ORDER — HEPARIN SODIUM 5000 [USP'U]/ML
5000 INJECTION, SOLUTION INTRAVENOUS; SUBCUTANEOUS ONCE
Status: COMPLETED | OUTPATIENT
Start: 2019-04-19 | End: 2019-04-19

## 2019-04-19 RX ORDER — OXYCODONE HYDROCHLORIDE AND ACETAMINOPHEN 5; 325 MG/1; MG/1
1 TABLET ORAL EVERY 4 HOURS PRN
Qty: 30 TABLET | Refills: 0 | Status: SHIPPED | OUTPATIENT
Start: 2019-04-19 | End: 2019-06-28

## 2019-04-19 RX ORDER — PROPOFOL 10 MG/ML
INJECTION, EMULSION INTRAVENOUS AS NEEDED
Status: DISCONTINUED | OUTPATIENT
Start: 2019-04-19 | End: 2019-04-19 | Stop reason: SURG

## 2019-04-19 RX ORDER — ONDANSETRON 4 MG/1
4 TABLET, FILM COATED ORAL EVERY 8 HOURS PRN
Qty: 20 TABLET | Refills: 0 | Status: SHIPPED | OUTPATIENT
Start: 2019-04-19 | End: 2019-06-28

## 2019-04-19 RX ORDER — ONDANSETRON 2 MG/ML
INJECTION INTRAMUSCULAR; INTRAVENOUS AS NEEDED
Status: DISCONTINUED | OUTPATIENT
Start: 2019-04-19 | End: 2019-04-19 | Stop reason: SURG

## 2019-04-19 RX ORDER — SODIUM CHLORIDE, SODIUM LACTATE, POTASSIUM CHLORIDE, CALCIUM CHLORIDE 600; 310; 30; 20 MG/100ML; MG/100ML; MG/100ML; MG/100ML
75 INJECTION, SOLUTION INTRAVENOUS CONTINUOUS
Status: DISCONTINUED | OUTPATIENT
Start: 2019-04-19 | End: 2019-04-19 | Stop reason: HOSPADM

## 2019-04-19 RX ORDER — ROPIVACAINE HYDROCHLORIDE 5 MG/ML
INJECTION, SOLUTION EPIDURAL; INFILTRATION; PERINEURAL
Status: DISCONTINUED | OUTPATIENT
Start: 2019-04-19 | End: 2019-04-19 | Stop reason: SURG

## 2019-04-19 RX ORDER — BUPIVACAINE HYDROCHLORIDE 5 MG/ML
INJECTION, SOLUTION PERINEURAL AS NEEDED
Status: DISCONTINUED | OUTPATIENT
Start: 2019-04-19 | End: 2019-04-19 | Stop reason: HOSPADM

## 2019-04-19 RX ORDER — CHLORHEXIDINE GLUCONATE 4 G/100ML
SOLUTION TOPICAL DAILY PRN
Status: DISCONTINUED | OUTPATIENT
Start: 2019-04-19 | End: 2019-04-19 | Stop reason: HOSPADM

## 2019-04-19 RX ORDER — EPHEDRINE SULFATE 50 MG/ML
INJECTION INTRAVENOUS AS NEEDED
Status: DISCONTINUED | OUTPATIENT
Start: 2019-04-19 | End: 2019-04-19 | Stop reason: SURG

## 2019-04-19 RX ORDER — FENTANYL CITRATE/PF 50 MCG/ML
25 SYRINGE (ML) INJECTION
Status: DISCONTINUED | OUTPATIENT
Start: 2019-04-19 | End: 2019-04-19 | Stop reason: HOSPADM

## 2019-04-19 RX ORDER — MIDAZOLAM HYDROCHLORIDE 1 MG/ML
INJECTION INTRAMUSCULAR; INTRAVENOUS AS NEEDED
Status: DISCONTINUED | OUTPATIENT
Start: 2019-04-19 | End: 2019-04-19 | Stop reason: SURG

## 2019-04-19 RX ORDER — OXYCODONE HYDROCHLORIDE AND ACETAMINOPHEN 5; 325 MG/1; MG/1
2 TABLET ORAL EVERY 4 HOURS PRN
Status: DISCONTINUED | OUTPATIENT
Start: 2019-04-19 | End: 2019-04-19 | Stop reason: HOSPADM

## 2019-04-19 RX ORDER — SODIUM CHLORIDE, SODIUM LACTATE, POTASSIUM CHLORIDE, CALCIUM CHLORIDE 600; 310; 30; 20 MG/100ML; MG/100ML; MG/100ML; MG/100ML
125 INJECTION, SOLUTION INTRAVENOUS CONTINUOUS
Status: DISCONTINUED | OUTPATIENT
Start: 2019-04-19 | End: 2019-04-19 | Stop reason: HOSPADM

## 2019-04-19 RX ORDER — FENTANYL CITRATE 50 UG/ML
INJECTION, SOLUTION INTRAMUSCULAR; INTRAVENOUS AS NEEDED
Status: DISCONTINUED | OUTPATIENT
Start: 2019-04-19 | End: 2019-04-19 | Stop reason: SURG

## 2019-04-19 RX ORDER — ONDANSETRON 2 MG/ML
4 INJECTION INTRAMUSCULAR; INTRAVENOUS EVERY 8 HOURS PRN
Status: DISCONTINUED | OUTPATIENT
Start: 2019-04-19 | End: 2019-04-19 | Stop reason: HOSPADM

## 2019-04-19 RX ORDER — CEFAZOLIN SODIUM 1 G/50ML
1000 SOLUTION INTRAVENOUS ONCE
Status: COMPLETED | OUTPATIENT
Start: 2019-04-19 | End: 2019-04-19

## 2019-04-19 RX ORDER — ONDANSETRON 2 MG/ML
4 INJECTION INTRAMUSCULAR; INTRAVENOUS ONCE AS NEEDED
Status: DISCONTINUED | OUTPATIENT
Start: 2019-04-19 | End: 2019-04-19 | Stop reason: HOSPADM

## 2019-04-19 RX ORDER — MORPHINE SULFATE 4 MG/ML
2 INJECTION, SOLUTION INTRAMUSCULAR; INTRAVENOUS EVERY 4 HOURS PRN
Status: DISCONTINUED | OUTPATIENT
Start: 2019-04-19 | End: 2019-04-19 | Stop reason: HOSPADM

## 2019-04-19 RX ORDER — ALBUTEROL SULFATE 2.5 MG/3ML
2.5 SOLUTION RESPIRATORY (INHALATION) ONCE AS NEEDED
Status: DISCONTINUED | OUTPATIENT
Start: 2019-04-19 | End: 2019-04-19 | Stop reason: HOSPADM

## 2019-04-19 RX ADMIN — LIDOCAINE HYDROCHLORIDE 50 MG: 10 INJECTION, SOLUTION INFILTRATION; PERINEURAL at 07:31

## 2019-04-19 RX ADMIN — ONDANSETRON HYDROCHLORIDE 4 MG: 2 INJECTION, SOLUTION INTRAVENOUS at 08:15

## 2019-04-19 RX ADMIN — HEPARIN SODIUM 5000 UNITS: 5000 INJECTION INTRAVENOUS; SUBCUTANEOUS at 07:07

## 2019-04-19 RX ADMIN — MIDAZOLAM HYDROCHLORIDE 2 MG: 1 INJECTION, SOLUTION INTRAMUSCULAR; INTRAVENOUS at 07:27

## 2019-04-19 RX ADMIN — FENTANYL CITRATE 25 MCG: 50 INJECTION, SOLUTION INTRAMUSCULAR; INTRAVENOUS at 07:47

## 2019-04-19 RX ADMIN — SODIUM CHLORIDE, SODIUM LACTATE, POTASSIUM CHLORIDE, AND CALCIUM CHLORIDE 125 ML/HR: .6; .31; .03; .02 INJECTION, SOLUTION INTRAVENOUS at 07:03

## 2019-04-19 RX ADMIN — CEFAZOLIN SODIUM 1000 MG: 1 SOLUTION INTRAVENOUS at 07:27

## 2019-04-19 RX ADMIN — FENTANYL CITRATE 25 MCG: 50 INJECTION, SOLUTION INTRAMUSCULAR; INTRAVENOUS at 07:57

## 2019-04-19 RX ADMIN — FENTANYL CITRATE 25 MCG: 50 INJECTION, SOLUTION INTRAMUSCULAR; INTRAVENOUS at 07:38

## 2019-04-19 RX ADMIN — EPHEDRINE SULFATE 5 MG: 50 INJECTION, SOLUTION INTRAVENOUS at 08:23

## 2019-04-19 RX ADMIN — ROPIVACAINE HYDROCHLORIDE 30 ML: 5 INJECTION, SOLUTION EPIDURAL; INFILTRATION; PERINEURAL at 08:59

## 2019-04-19 RX ADMIN — PROPOFOL 200 MG: 10 INJECTION, EMULSION INTRAVENOUS at 07:31

## 2019-05-02 ENCOUNTER — APPOINTMENT (OUTPATIENT)
Dept: LAB | Facility: HOSPITAL | Age: 74
End: 2019-05-02
Attending: INTERNAL MEDICINE
Payer: COMMERCIAL

## 2019-05-02 DIAGNOSIS — Z78.9 PARTICIPANT IN HEALTH AND WELLNESS PLAN: ICD-10-CM

## 2019-05-02 DIAGNOSIS — Z00.00 ENCOUNTER FOR MEDICARE ANNUAL WELLNESS EXAM: ICD-10-CM

## 2019-05-02 LAB
CHOLEST SERPL-MCNC: 135 MG/DL (ref 50–200)
CREAT UR-MCNC: 63.4 MG/DL
EST. AVERAGE GLUCOSE BLD GHB EST-MCNC: 148 MG/DL
HBA1C MFR BLD: 6.8 % (ref 4.2–6.3)
HDLC SERPL-MCNC: 51 MG/DL (ref 40–60)
LDLC SERPL CALC-MCNC: 71 MG/DL (ref 0–100)
MICROALBUMIN UR-MCNC: 11.9 MG/L (ref 0–20)
MICROALBUMIN/CREAT 24H UR: 19 MG/G CREATININE (ref 0–30)
NONHDLC SERPL-MCNC: 84 MG/DL
TRIGL SERPL-MCNC: 64 MG/DL

## 2019-05-02 PROCEDURE — 82570 ASSAY OF URINE CREATININE: CPT | Performed by: INTERNAL MEDICINE

## 2019-05-02 PROCEDURE — 83036 HEMOGLOBIN GLYCOSYLATED A1C: CPT

## 2019-05-02 PROCEDURE — 82043 UR ALBUMIN QUANTITATIVE: CPT | Performed by: INTERNAL MEDICINE

## 2019-05-02 PROCEDURE — 80061 LIPID PANEL: CPT

## 2019-05-02 PROCEDURE — 3061F NEG MICROALBUMINURIA REV: CPT | Performed by: INTERNAL MEDICINE

## 2019-05-02 PROCEDURE — 36415 COLL VENOUS BLD VENIPUNCTURE: CPT

## 2019-05-07 ENCOUNTER — OFFICE VISIT (OUTPATIENT)
Dept: SURGERY | Facility: HOSPITAL | Age: 74
End: 2019-05-07

## 2019-05-07 VITALS — SYSTOLIC BLOOD PRESSURE: 174 MMHG | DIASTOLIC BLOOD PRESSURE: 83 MMHG | HEART RATE: 81 BPM

## 2019-05-07 DIAGNOSIS — K40.90 RIGHT INGUINAL HERNIA: Primary | ICD-10-CM

## 2019-05-07 PROCEDURE — 99024 POSTOP FOLLOW-UP VISIT: CPT | Performed by: SURGERY

## 2019-05-10 ENCOUNTER — OFFICE VISIT (OUTPATIENT)
Dept: INTERNAL MEDICINE CLINIC | Facility: CLINIC | Age: 74
End: 2019-05-10
Payer: COMMERCIAL

## 2019-05-10 VITALS
TEMPERATURE: 96.7 F | OXYGEN SATURATION: 98 % | HEART RATE: 57 BPM | HEIGHT: 66 IN | WEIGHT: 148.38 LBS | BODY MASS INDEX: 23.84 KG/M2 | DIASTOLIC BLOOD PRESSURE: 70 MMHG | SYSTOLIC BLOOD PRESSURE: 124 MMHG

## 2019-05-10 DIAGNOSIS — N18.30 STAGE 3 CHRONIC KIDNEY DISEASE (HCC): ICD-10-CM

## 2019-05-10 DIAGNOSIS — Z79.4 TYPE 2 DIABETES MELLITUS WITH COMPLICATION, WITH LONG-TERM CURRENT USE OF INSULIN (HCC): Primary | ICD-10-CM

## 2019-05-10 DIAGNOSIS — I10 ESSENTIAL HYPERTENSION: ICD-10-CM

## 2019-05-10 DIAGNOSIS — E11.8 TYPE 2 DIABETES MELLITUS WITH COMPLICATION, WITH LONG-TERM CURRENT USE OF INSULIN (HCC): Primary | ICD-10-CM

## 2019-05-10 DIAGNOSIS — C61 PROSTATE CANCER (HCC): ICD-10-CM

## 2019-05-10 PROCEDURE — 3078F DIAST BP <80 MM HG: CPT | Performed by: INTERNAL MEDICINE

## 2019-05-10 PROCEDURE — 3074F SYST BP LT 130 MM HG: CPT | Performed by: INTERNAL MEDICINE

## 2019-05-10 PROCEDURE — 99214 OFFICE O/P EST MOD 30 MIN: CPT | Performed by: INTERNAL MEDICINE

## 2019-06-28 ENCOUNTER — OFFICE VISIT (OUTPATIENT)
Dept: OTOLARYNGOLOGY | Facility: CLINIC | Age: 74
End: 2019-06-28
Payer: COMMERCIAL

## 2019-06-28 VITALS
SYSTOLIC BLOOD PRESSURE: 160 MMHG | WEIGHT: 144 LBS | HEIGHT: 66 IN | DIASTOLIC BLOOD PRESSURE: 80 MMHG | BODY MASS INDEX: 23.14 KG/M2

## 2019-06-28 DIAGNOSIS — H61.23 BILATERAL IMPACTED CERUMEN: Primary | ICD-10-CM

## 2019-06-28 DIAGNOSIS — H90.3 SENSORINEURAL HEARING LOSS (SNHL), BILATERAL: ICD-10-CM

## 2019-06-28 PROCEDURE — 69210 REMOVE IMPACTED EAR WAX UNI: CPT | Performed by: OTOLARYNGOLOGY

## 2019-06-28 PROCEDURE — 99243 OFF/OP CNSLTJ NEW/EST LOW 30: CPT | Performed by: OTOLARYNGOLOGY

## 2019-06-28 RX ORDER — ALOGLIPTIN 12.5 MG/1
TABLET, FILM COATED ORAL
COMMUNITY
End: 2019-12-20 | Stop reason: ALTCHOICE

## 2019-08-02 LAB
LEFT EYE DIABETIC RETINOPATHY: NORMAL
RIGHT EYE DIABETIC RETINOPATHY: NORMAL

## 2019-08-02 PROCEDURE — 2022F DILAT RTA XM EVC RTNOPTHY: CPT | Performed by: INTERNAL MEDICINE

## 2019-08-08 ENCOUNTER — OFFICE VISIT (OUTPATIENT)
Dept: SURGERY | Facility: HOSPITAL | Age: 74
End: 2019-08-08

## 2019-08-08 VITALS
WEIGHT: 146 LBS | BODY MASS INDEX: 23.46 KG/M2 | TEMPERATURE: 98.1 F | SYSTOLIC BLOOD PRESSURE: 155 MMHG | HEART RATE: 66 BPM | HEIGHT: 66 IN | DIASTOLIC BLOOD PRESSURE: 81 MMHG

## 2019-08-08 DIAGNOSIS — K40.90 RIGHT INGUINAL HERNIA: Primary | ICD-10-CM

## 2019-08-08 PROCEDURE — 99024 POSTOP FOLLOW-UP VISIT: CPT | Performed by: SURGERY

## 2019-08-08 NOTE — ASSESSMENT & PLAN NOTE
Status post right inguinal hernia pair with mesh  Doing okay  Does state that he does have some soreness along the incision after extensive her high intensity work after activity  And does not in the way feel like his preoperative hernia discomfort  Unclear etiology could be some underlying cutaneous nerves versus maybe some irritation of the of the mesh were scar tissue  And the does not bother her very much at all at this time he would like just observe it for now  Follow-up p r n

## 2019-08-08 NOTE — PROGRESS NOTES
Assessment/Plan:    Right inguinal hernia  Status post right inguinal hernia pair with mesh  Doing okay  Does state that he does have some soreness along the incision after extensive her high intensity work after activity  And does not in the way feel like his preoperative hernia discomfort  Unclear etiology could be some underlying cutaneous nerves versus maybe some irritation of the of the mesh were scar tissue  And the does not bother her very much at all at this time he would like just observe it for now  Follow-up p r n  Diagnoses and all orders for this visit:    Right inguinal hernia          Subjective:      Patient ID: Audie Stout is a 72 y o  male  82-year-old gentleman who had a right inguinal hernia pair with mesh approximately 4 months ago presents today for follow-up  Overall doing well  No nausea vomiting  No fevers or chills  Tolerates regular diet  No changes in bowel or bladder habits  However he does have some soreness along the incision when he notices during increased activity  Such as mowing the lawn or lift anything heavy  Denies any noticeable bulge  States that the soreness that he describes is completely different than his preoperative hernia pain  The soreness usually does dissipates in goes away however he was little concerned and his wife was even more concerned and wanted him to be checked out  Denies any overlying dura redness denies any drainage from the incision  The following portions of the patient's history were reviewed and updated as appropriate:   He  has a past medical history of Arthritis, BPH (benign prostatic hyperplasia), Diabetes mellitus ([address] ), Disease of thyroid gland, GERD (gastroesophageal reflux disease), Hearing aid worn, Hyperlipidemia, Hypertension, Hypothyroidism, Mumps, Prostate cancer ([address] ), Tingling sensation, Tinnitus, Wears glasses, and Wears partial dentures    He   Patient Active Problem List    Diagnosis Date Noted    Medicare annual wellness visit, subsequent 06/28/2018    DMII (diabetes mellitus, type 2) ([facility] ) 02/07/2018    Hypothyroidism 02/07/2018    GERD (gastroesophageal reflux disease) 02/07/2018    Hyperlipidemia 02/07/2018    Stage 3 chronic kidney disease ([facility] ) 02/07/2018    Prostate cancer ([facility] ) 01/29/2018    Lumbar radicular pain 09/05/2017    Erectile dysfunction of non-organic origin 07/26/2017    Sensorineural hearing loss (SNHL), bilateral 07/21/2017    Vitamin D deficiency 11/04/2014    Arthritis 06/26/2012    Hypertension 06/26/2012     He  has a past surgical history that includes Eye surgery; Cataract extraction (Bilateral, 2000); Knee arthroscopy (Right, 06/26/2009); Trigger finger release (05/2013); Joint replacement; pr lap,prostatectomy,radical,w/nerve spare,incl robotic (N/A, 2/7/2018); Prostate biopsy (11/07/2017); and pr repair ing hernia,5+y/o,reducibl (Right, 4/19/2019)  His family history includes Cancer in his family, father, and mother; Diabetes in his family, father, and mother; Hypertension in his father; Prostate cancer in his father; Stroke in his father; Uterine cancer in his mother  He  reports that he has quit smoking  He has never used smokeless tobacco  He reports that he drinks about 2 0 standard drinks of alcohol per week  He reports that he does not use drugs    Current Outpatient Medications   Medication Sig Dispense Refill    Alogliptin Benzoate 12 5 MG TABS Take by mouth      aspirin 81 MG tablet Take 81 mg by mouth daily       insulin aspart (NovoLOG) 100 units/mL injection Inject 2 Units under the skin 3 (three) times daily after meals        insulin detemir (LEVEMIR FLEXTOUCH) 100 Units/mL injection pen Inject under the skin      Levothyroxine Sodium 88 MCG CAPS Take 1 capsule (88 mcg total) by mouth daily for 90 days 90 capsule 3    lisinopril (ZESTRIL) 40 mg tablet Take 40 mg by mouth daily        Multiple Vitamin (MULTIVITAMIN) tablet Take 1 tablet by mouth daily       simvastatin (ZOCOR) 20 mg tablet Take 20 mg by mouth daily at bedtime  No current facility-administered medications for this visit  He has No Known Allergies       Review of Systems   Constitutional: Negative  Gastrointestinal: Positive for abdominal pain (Right groin soreness)  Skin: Negative for color change, pallor, rash and wound  Objective:      /81   Pulse 66   Temp 98 1 °F (36 7 °C)   Ht 5' 6" (1 676 m)   Wt 66 2 kg (146 lb)   BMI 23 57 kg/m²          Physical Exam   Constitutional: He appears well-developed and well-nourished  No distress  Abdominal: Soft  He exhibits no distension and no mass  There is no tenderness  There is no rebound and no guarding  No hernia  Hernia confirmed negative in the right inguinal area  Lymphadenopathy: No inguinal adenopathy noted on the right side  Skin: He is not diaphoretic  Vitals reviewed

## 2019-08-30 ENCOUNTER — APPOINTMENT (OUTPATIENT)
Dept: LAB | Facility: HOSPITAL | Age: 74
End: 2019-08-30
Attending: UROLOGY
Payer: COMMERCIAL

## 2019-08-30 DIAGNOSIS — C61 PROSTATE CANCER (HCC): ICD-10-CM

## 2019-08-30 LAB — PSA SERPL-MCNC: <0.1 NG/ML (ref 0–4)

## 2019-08-30 PROCEDURE — 84153 ASSAY OF PSA TOTAL: CPT

## 2019-08-30 PROCEDURE — 36415 COLL VENOUS BLD VENIPUNCTURE: CPT

## 2019-09-04 ENCOUNTER — TELEPHONE (OUTPATIENT)
Dept: INTERNAL MEDICINE CLINIC | Facility: CLINIC | Age: 74
End: 2019-09-04

## 2019-09-04 DIAGNOSIS — E11.8 TYPE 2 DIABETES MELLITUS WITH COMPLICATION, WITH LONG-TERM CURRENT USE OF INSULIN (HCC): Primary | ICD-10-CM

## 2019-09-04 DIAGNOSIS — N18.30 STAGE 3 CHRONIC KIDNEY DISEASE (HCC): ICD-10-CM

## 2019-09-04 DIAGNOSIS — Z79.4 TYPE 2 DIABETES MELLITUS WITH COMPLICATION, WITH LONG-TERM CURRENT USE OF INSULIN (HCC): Primary | ICD-10-CM

## 2019-09-04 DIAGNOSIS — E78.5 HYPERLIPIDEMIA, UNSPECIFIED HYPERLIPIDEMIA TYPE: ICD-10-CM

## 2019-09-04 NOTE — PROGRESS NOTES
UROLOGY ROUTINE FOLLOWUP NOTE     History of Present Illness:   Ag Escobar is a 72 y o  male with a family history of prostate cancer in his father  PSA 2016 was 0 6, up to 1 3 in 2017  Prostate biopsy revealed intermediate risk prostate cancer  The patient underwent a robotic assisted laparoscopic prostatectomy with bilateral pelvic lymph node dissection on 2/7/2018  Patient did very well postoperatively and was discharged within 24 hours  He has done well since  His PSA has been undetectable since surgery  Patient is holding his urine very well  No significant leakage  Wears a pantiliner if doing yardwork/heavy activity but rarely leaks  Patient did have preoperative erectile dysfunction requiring use of Viagra 100 mg  He has returned to using Viagra 100 mg with return of erectile function, satisfactory for penetrative intercourse about half of the time  He is satisfied with this currently, he receives this medication through the [place]      Lab Results   Component Value Date    PSA <0 1 08/30/2019    PSA <0 1 02/26/2019    PSA <0 1 11/20/2018     Past Medical History:     Past Medical History:   Diagnosis Date    Arthritis     BPH (benign prostatic hyperplasia)     last assessed 4/29/2014    Diabetes mellitus ([address] )     Disease of thyroid gland     GERD (gastroesophageal reflux disease)     Hearing aid worn     Hyperlipidemia     Hypertension     Hypothyroidism     Mumps     Prostate cancer ([address] )     Tingling sensation     bilateral feet occassionally    Tinnitus     Wears glasses     Wears partial dentures     upper       PAST SURGICAL HISTORY:     Past Surgical History:   Procedure Laterality Date    CATARACT EXTRACTION Bilateral 2000    EYE SURGERY      JOINT REPLACEMENT      KNEE ARTHROSCOPY Right 06/26/2009    knee    ID LAP,PROSTATECTOMY,RADICAL,W/NERVE SPARE,INCL ROBOTIC N/A 2/7/2018    Procedure: ROBOTIC ASSISTED LAPAROSCOPIC PROSTATECTOMY; BILATERAL PELVIC LYMPH NODE DISSECTION;  Surgeon: Kiera Machado MD;  [facility];  Service: Urology    HI REPAIR ING HERNIA,5+Y/O,REDUCIBL Right 4/19/2019    Procedure: REPAIR HERNIA INGUINAL;  Surgeon: Gabriel Powers DO;  [facility];  Service: General    PROSTATE BIOPSY  11/07/2017    needle biopsy of prostate    TRIGGER FINGER RELEASE  05/2013    trigger thumb       CURRENT MEDICATIONS:     Current Outpatient Medications   Medication Sig Dispense Refill    Alogliptin Benzoate 12 5 MG TABS Take by mouth      aspirin 81 MG tablet Take 81 mg by mouth daily       insulin aspart (NovoLOG) 100 units/mL injection Inject 2 Units under the skin 3 (three) times daily after meals        insulin detemir (LEVEMIR FLEXTOUCH) 100 Units/mL injection pen Inject under the skin      lisinopril (ZESTRIL) 40 mg tablet Take 40 mg by mouth daily        Multiple Vitamin (MULTIVITAMIN) tablet Take 1 tablet by mouth daily   simvastatin (ZOCOR) 20 mg tablet Take 20 mg by mouth daily at bedtime   Levothyroxine Sodium 88 MCG CAPS Take 1 capsule (88 mcg total) by mouth daily for 90 days 90 capsule 3     No current facility-administered medications for this visit  ALLERGIES:   No Known Allergies    SOCIAL HISTORY:     Social History     Socioeconomic History    Marital status: /Civil Union     Spouse name: None    Number of children: None    Years of education: None    Highest education level: None   Occupational History    Occupation: printer  retired   Social Needs    Financial resource strain: None    Food insecurity:     Worry: None     Inability: None    Transportation needs:     Medical: None     Non-medical: None   Tobacco Use    Smoking status: Former Smoker    Smokeless tobacco: Never Used    Tobacco comment: quit about 50 years ago   Substance and Sexual Activity    Alcohol use:  Yes     Alcohol/week: 2 0 standard drinks     Types: 2 Glasses of wine per week     Comment: week, social drinker    Drug use: No    Sexual activity: None   Lifestyle    Physical activity:     Days per week: None     Minutes per session: None    Stress: None   Relationships    Social connections:     Talks on phone: None     Gets together: None     Attends Judaism service: None     Active member of club or organization: None     Attends meetings of clubs or organizations: None     Relationship status: None    Intimate partner violence:     Fear of current or ex partner: None     Emotionally abused: None     Physically abused: None     Forced sexual activity: None   Other Topics Concern    None   Social History Narrative    Always uses seat belt    Caffeine use    Lives independently with spouse    Retired    Sun protection sunscreen       SOCIAL HISTORY:     Family History   Problem Relation Age of Onset    Cancer Mother     Diabetes Mother     Uterine cancer Mother     Diabetes Father     Cancer Father     Stroke Father         of unknown cause    Hypertension Father     Prostate cancer Father     Diabetes Family         Uncle, DM    Cancer Family         Grandmother       REVIEW OF SYSTEMS:     Review of Systems   Constitutional: Negative  Respiratory: Negative  Cardiovascular: Negative  Genitourinary: Negative for decreased urine volume, difficulty urinating, dysuria, flank pain, frequency, hematuria and urgency  Musculoskeletal: Negative  PHYSICAL EXAM:     Physical Exam   Constitutional: He is oriented to person, place, and time  He appears well-developed and well-nourished  No distress  HENT:   Head: Normocephalic and atraumatic  Pulmonary/Chest: Effort normal    Musculoskeletal: Normal range of motion  He exhibits no edema  Neurological: He is alert and oriented to person, place, and time  Skin: Skin is warm  He is not diaphoretic  Psychiatric: He has a normal mood and affect  Nursing note and vitals reviewed          LABS:     CBC:   Lab Results   Component Value Date    WBC 4 40 04/16/2019    HGB 13 9 04/16/2019    HCT 41 5 04/16/2019    MCV 96 04/16/2019     04/16/2019       BMP:   Lab Results   Component Value Date    GLUCOSE 135 10/01/2015    CALCIUM 9 2 04/16/2019     10/01/2015    K 4 8 04/16/2019    CO2 30 04/16/2019     04/16/2019    BUN 37 (H) 04/16/2019    CREATININE 1 79 (H) 04/16/2019     Lab Results   Component Value Date    PSA <0 1 08/30/2019    PSA <0 1 02/26/2019    PSA <0 1 11/20/2018     PATHOLOGY:     2/7/18  Final Diagnosis   PROSTATE CANCER STAGING SUMMARY  1  Specimen identification:       - Procedure:   Radical prostatectomy     - Prostate size and weight: 4 5 x 4 2 x 2 8 cm, 39 grams   2  Tumor     - Histologic type: Acinar adenocarcinoma     - Histologic Grade: [place] Pattern: 6        * primary pattern: 3        * secondary pattern: 3        * tertiary pattern:  N/A        * Grade Group:  Grade group 1     * Intraductal carcinoma: Not identified    - Tumor quantitation: 5%    - Extraprostatic extension: Not identified    - Urinary bladder neck invasion: Not identified    - Seminal vesicle invasion: Not identified   3  Margins: Uninvolved by invasive carcinoma   4  Margin positivity in area of extraprostatic extension: N/A   5  Treatment effect on carcinoma:  No known presurgical therapy   6  Lymph-vascular invasion: Not identified   7  Perineural invasion: Not identified   8  Regional lymph nodes (pN0):     -  Number of lymph nodes involved: 0     -  Number of lymph nodes examined: 4   9  Additional pathologic findings: High-grade prostatic intraepithelial neoplasia  10  Ancillary studies: None, if needed Block D18 can be used  11  PSA: unknown  8th Ed AJCC Tumor Stage: At least stage I  -pT2, pN0, [place]'s score 6 (3+3), grade group 1     A  Lymph node, periprostatic fat and lymph nodes, excision:  - Benign vascularized fibroadipose tissue      B   Lymph node, right pelvic, resection:  - One lymph node, negative for carcinoma (0/1)      C  Lymph node, left pelvic, resection:  - Three lymph nodes, negative for carcinoma (0/3)      D  Prostate, radical prostatectomy:  - Prostatic adenocarcinoma, Gina score 6 (3+3), grade group 1   - High-grade prostatic intraepithelial neoplasia  - Margins of resection, negative for carcinoma     ASSESSMENT:     72 y o  male with T2 N0 MX adenocarcinoma of the prostate, [place] 6 s/p RALP/PLND 2/2018 with negative surgical margins    PLAN:     PSA remains undetectable, follow-up in 6 months given final pathology and undetectable PSA values  Urination continues to improve, no incontinence  Good stream, no hematuria or UTIs  Patient is still having some mild-moderate erectile dysfunction  However this existed prior to surgery  He has now returned to a relatively baseline function with the use of Viagra 100 mg  Patient and his wife understand that there are alternative options should he desire more treatment  We will continue to follow

## 2019-09-05 ENCOUNTER — OFFICE VISIT (OUTPATIENT)
Dept: UROLOGY | Facility: CLINIC | Age: 74
End: 2019-09-05
Payer: COMMERCIAL

## 2019-09-05 VITALS
BODY MASS INDEX: 23.21 KG/M2 | WEIGHT: 144.4 LBS | HEIGHT: 66 IN | HEART RATE: 60 BPM | SYSTOLIC BLOOD PRESSURE: 130 MMHG | DIASTOLIC BLOOD PRESSURE: 90 MMHG

## 2019-09-05 DIAGNOSIS — C61 PROSTATE CANCER (HCC): Primary | ICD-10-CM

## 2019-09-05 PROCEDURE — 99213 OFFICE O/P EST LOW 20 MIN: CPT | Performed by: PHYSICIAN ASSISTANT

## 2019-09-12 ENCOUNTER — APPOINTMENT (OUTPATIENT)
Dept: LAB | Facility: HOSPITAL | Age: 74
End: 2019-09-12
Attending: INTERNAL MEDICINE
Payer: COMMERCIAL

## 2019-09-12 DIAGNOSIS — E11.8 TYPE 2 DIABETES MELLITUS WITH COMPLICATION, WITH LONG-TERM CURRENT USE OF INSULIN (HCC): ICD-10-CM

## 2019-09-12 DIAGNOSIS — Z79.4 TYPE 2 DIABETES MELLITUS WITH COMPLICATION, WITH LONG-TERM CURRENT USE OF INSULIN (HCC): ICD-10-CM

## 2019-09-12 DIAGNOSIS — N18.30 STAGE 3 CHRONIC KIDNEY DISEASE (HCC): ICD-10-CM

## 2019-09-12 DIAGNOSIS — E78.5 HYPERLIPIDEMIA, UNSPECIFIED HYPERLIPIDEMIA TYPE: ICD-10-CM

## 2019-09-12 LAB
ALBUMIN SERPL BCP-MCNC: 3.8 G/DL (ref 3.5–5)
ALP SERPL-CCNC: 82 U/L (ref 46–116)
ALT SERPL W P-5'-P-CCNC: 21 U/L (ref 12–78)
ANION GAP SERPL CALCULATED.3IONS-SCNC: 8 MMOL/L (ref 4–13)
AST SERPL W P-5'-P-CCNC: 19 U/L (ref 5–45)
BILIRUB SERPL-MCNC: 0.5 MG/DL (ref 0.2–1)
BUN SERPL-MCNC: 30 MG/DL (ref 5–25)
CALCIUM SERPL-MCNC: 9.5 MG/DL (ref 8.3–10.1)
CHLORIDE SERPL-SCNC: 97 MMOL/L (ref 100–108)
CO2 SERPL-SCNC: 28 MMOL/L (ref 21–32)
CREAT SERPL-MCNC: 1.83 MG/DL (ref 0.6–1.3)
EST. AVERAGE GLUCOSE BLD GHB EST-MCNC: 143 MG/DL
GFR SERPL CREATININE-BSD FRML MDRD: 36 ML/MIN/1.73SQ M
GLUCOSE P FAST SERPL-MCNC: 121 MG/DL (ref 65–99)
HBA1C MFR BLD: 6.6 % (ref 4.2–6.3)
LDLC SERPL DIRECT ASSAY-MCNC: 71 MG/DL (ref 0–100)
POTASSIUM SERPL-SCNC: 4.4 MMOL/L (ref 3.5–5.3)
PROT SERPL-MCNC: 7.2 G/DL (ref 6.4–8.2)
SODIUM SERPL-SCNC: 133 MMOL/L (ref 136–145)

## 2019-09-12 PROCEDURE — 36415 COLL VENOUS BLD VENIPUNCTURE: CPT

## 2019-09-12 PROCEDURE — 83036 HEMOGLOBIN GLYCOSYLATED A1C: CPT

## 2019-09-12 PROCEDURE — 83721 ASSAY OF BLOOD LIPOPROTEIN: CPT

## 2019-09-12 PROCEDURE — 80053 COMPREHEN METABOLIC PANEL: CPT

## 2019-09-17 ENCOUNTER — OFFICE VISIT (OUTPATIENT)
Dept: INTERNAL MEDICINE CLINIC | Facility: CLINIC | Age: 74
End: 2019-09-17
Payer: COMMERCIAL

## 2019-09-17 VITALS
BODY MASS INDEX: 23.22 KG/M2 | SYSTOLIC BLOOD PRESSURE: 126 MMHG | TEMPERATURE: 97.4 F | WEIGHT: 144.5 LBS | HEART RATE: 67 BPM | OXYGEN SATURATION: 98 % | HEIGHT: 66 IN | DIASTOLIC BLOOD PRESSURE: 70 MMHG

## 2019-09-17 DIAGNOSIS — E11.8 TYPE 2 DIABETES MELLITUS WITH COMPLICATION, WITH LONG-TERM CURRENT USE OF INSULIN (HCC): ICD-10-CM

## 2019-09-17 DIAGNOSIS — Z00.00 MEDICARE ANNUAL WELLNESS VISIT, SUBSEQUENT: Primary | ICD-10-CM

## 2019-09-17 DIAGNOSIS — Z79.4 TYPE 2 DIABETES MELLITUS WITH COMPLICATION, WITH LONG-TERM CURRENT USE OF INSULIN (HCC): ICD-10-CM

## 2019-09-17 PROCEDURE — 3008F BODY MASS INDEX DOCD: CPT | Performed by: INTERNAL MEDICINE

## 2019-09-17 PROCEDURE — G0439 PPPS, SUBSEQ VISIT: HCPCS | Performed by: INTERNAL MEDICINE

## 2019-09-17 NOTE — PROGRESS NOTES
Assessment and Plan:     Problem List Items Addressed This Visit        Endocrine    DMII (diabetes mellitus, type 2) ([facility] )    Relevant Orders    HEMOGLOBIN A1C W/ EAG ESTIMATION       Other    Medicare annual wellness visit, subsequent - Primary      Last A1c 6 6 and he is doing well on the pump  Keep activity during the winter months  Eye exam utd  Has endocrine followup   Flu shot upcoming  Shingrix discussed   Rto 3 months     Preventive health issues were discussed with patient, and age appropriate screening tests were ordered as noted in patient's After Visit Summary  Personalized health advice and appropriate referrals for health education or preventive services given if needed, as noted in patient's After Visit Summary       History of Present Illness:     Patient presents for Medicare Annual Wellness visit    Patient Care Team:  Dee Hernandez DO as PCP - Kaity Ureña, MD Jose Keys MD Chrystine Hook, MD Terresa Paul, MD Viveca Hang, MD Houser/Tone (Ophthalmology)  Dilma Salgado DPM (Podiatry)     Problem List:     Patient Active Problem List   Diagnosis    Prostate cancer [facility])    DMII (diabetes mellitus, type 2) ([place] [address] )    Hypothyroidism    GERD (gastroesophageal reflux disease)    Hyperlipidemia    Stage 3 chronic kidney disease ([place] [address] )    Arthritis    Erectile dysfunction of non-organic origin    Hypertension    Lumbar radicular pain    Sensorineural hearing loss (SNHL), bilateral    Vitamin D deficiency    Medicare annual wellness visit, subsequent      Past Medical and Surgical History:     Past Medical History:   Diagnosis Date    Arthritis     BPH (benign prostatic hyperplasia)     last assessed 4/29/2014    Diabetes mellitus ([facility] )     Disease of thyroid gland     GERD (gastroesophageal reflux disease)     Hearing aid worn     Hyperlipidemia     Hypertension     Hypothyroidism     Mumps     Prostate cancer ([facility] )     Tingling sensation     bilateral feet occassionally    Tinnitus     Wears glasses     Wears partial dentures     upper     Past Surgical History:   Procedure Laterality Date    CATARACT EXTRACTION Bilateral 2000    EYE SURGERY      JOINT REPLACEMENT      KNEE ARTHROSCOPY Right 06/26/2009    knee    NH LAP,PROSTATECTOMY,RADICAL,W/NERVE SPARE,INCL ROBOTIC N/A 2/7/2018    Procedure: ROBOTIC ASSISTED LAPAROSCOPIC PROSTATECTOMY; BILATERAL PELVIC LYMPH NODE DISSECTION;  Surgeon: Clary Flores MD;  [facility];  Service: Urology    NH REPAIR [address] HERNIA,5+Y/O,REDUCIBL Right 4/19/2019    Procedure: REPAIR HERNIA INGUINAL;  Surgeon: Reyes Files, DO;  [facility];  Service: General    PROSTATE BIOPSY  11/07/2017    needle biopsy of prostate    TRIGGER FINGER RELEASE  05/2013    trigger thumb      Family History:     Family History   Problem Relation Age of Onset    Cancer Mother     Diabetes Mother     Uterine cancer Mother     Diabetes Father     Cancer Father     Stroke Father         of unknown cause    Hypertension Father     Prostate cancer Father     Diabetes Family         Uncle, DM    Cancer Family         Grandmother      Social History:     Social History     Socioeconomic History    Marital status: /Civil Union     Spouse name: None    Number of children: None    Years of education: None    Highest education level: None   Occupational History    Occupation: printer  retired   Social Needs    Financial resource strain: None    Food insecurity:     Worry: None     Inability: None    Transportation needs:     Medical: None     Non-medical: None   Tobacco Use    Smoking status: Former Smoker    Smokeless tobacco: Never Used    Tobacco comment: quit about 50 years ago   Substance and Sexual Activity    Alcohol use:  Yes     Alcohol/week: 2 0 standard drinks     Types: 2 Glasses of wine per week     Comment: week, social drinker    Drug use: No    Sexual activity: None   Lifestyle    Physical activity:     Days per week: None     Minutes per session: None    Stress: None   Relationships    Social connections:     Talks on phone: None     Gets together: None     Attends Catholic service: None     Active member of club or organization: None     Attends meetings of clubs or organizations: None     Relationship status: None    Intimate partner violence:     Fear of current or ex partner: None     Emotionally abused: None     Physically abused: None     Forced sexual activity: None   Other Topics Concern    None   Social History Narrative    Always uses seat belt    Caffeine use    Lives independently with spouse    Retired    Sun protection sunscreen       Medications and Allergies:     Current Outpatient Medications   Medication Sig Dispense Refill    Alogliptin Benzoate 12 5 MG TABS Take by mouth      aspirin 81 MG tablet Take 81 mg by mouth daily       Levothyroxine Sodium 88 MCG CAPS Take 1 capsule (88 mcg total) by mouth daily for 90 days 90 capsule 3    lisinopril (ZESTRIL) 40 mg tablet Take 40 mg by mouth daily        Multiple Vitamin (MULTIVITAMIN) tablet Take 1 tablet by mouth daily   simvastatin (ZOCOR) 20 mg tablet Take 20 mg by mouth daily at bedtime  No current facility-administered medications for this visit        No Known Allergies   Immunizations:     Immunization History   Administered Date(s) Administered    INFLUENZA 10/18/2016    Influenza Split High Dose Preservative Free IM 10/01/2016, 10/20/2017    Influenza TIV (IM) 10/15/2012, 10/13/2014, 10/08/2015    Influenza, high dose seasonal 0 5 mL 10/30/2018    Pneumococcal Conjugate 13-Valent 11/03/2017    Pneumococcal Polysaccharide PPV23 12/15/2009    Tdap 03/05/2016, 03/29/2016    Zoster 01/01/2013      Health Maintenance:         Topic Date Due    Hepatitis C Screening  04/17/2018 (Originally 1945)    CRC Screening: Colonoscopy  07/05/2027         Topic Date Due  HEPATITIS B VACCINES (1 of 3 - Risk 3-dose series) 11/27/1966    Pneumococcal Vaccine: 65+ Years (2 of 2 - PPSV23) 11/03/2018    INFLUENZA VACCINE  07/01/2019      Medicare Health Risk Assessment:     /70   Pulse 67   Temp (!) 97 4 °F (36 3 °C) (Tympanic)   Ht 5' 6" (1 676 m)   Wt 65 5 kg (144 lb 8 oz)   SpO2 98%   BMI 23 32 kg/m²      Tylor Mattson is here for his Subsequent Wellness visit  Last Medicare Wellness visit information reviewed, patient interviewed, no change since last AWV  Health Risk Assessment:   Patient rates overall health as very good  Patient feels that their physical health rating is same  Eyesight was rated as same  Hearing was rated as same  Patient feels that their emotional and mental health rating is same  Pain experienced in the last 7 days has been none  Patient states that he has experienced no weight loss or gain in last 6 months  Depression Screening:   PHQ-2 Score: 0      Fall Risk Screening: In the past year, patient has experienced: no history of falling in past year      Home Safety:  Patient has trouble with stairs inside or outside of their home  Patient has no working smoke alarms and has no working carbon monoxide detector  Home safety hazards include: none  Nutrition:   Current diet is Diabetic  Medications:   Patient is currently taking over-the-counter supplements  OTC medications include: see medication list  Patient is able to manage medications  Activities of Daily Living (ADLs)/Instrumental Activities of Daily Living (IADLs):   Walk and transfer into and out of bed and chair?: Yes  Dress and groom yourself?: Yes    Bathe or shower yourself?: Yes    Feed yourself?  Yes  Do your laundry/housekeeping?: Yes  Manage your money, pay your bills and track your expenses?: Yes  Make your own meals?: Yes    Do your own shopping?: Yes    Previous Hospitalizations:   Any hospitalizations or ED visits within the last 12 months?: Yes    How many hospitalizations have you had in the last year?: 1-2    Advance Care Planning:   Living will: Yes    Durable POA for healthcare: Yes    End of Life Decisions reviewed with patient: Yes    Provider agrees with end of life decisions: Yes      Cognitive Screening:   Provider or family/friend/caregiver concerned regarding cognition?: No    PREVENTIVE SCREENINGS      Cardiovascular Screening:    General: Screening Not Indicated and History Lipid Disorder      Diabetes Screening:     General: Screening Not Indicated and History Diabetes      Colorectal Cancer Screening:     General: Screening Current      Prostate Cancer Screening:    General: History Prostate Cancer      Osteoporosis Screening:    General: Screening Not Indicated      Abdominal Aortic Aneurysm (AAA) Screening:    Risk factors include: age between 73-67 yo and tobacco use        Lung Cancer Screening:     General: Screening Not Indicated      Hepatitis C Screening:    General: Screening Current    Other Counseling Topics:   Regular weightbearing exercise         Ammon Lorenzana DO

## 2019-09-27 LAB
LEFT EYE DIABETIC RETINOPATHY: NORMAL
RIGHT EYE DIABETIC RETINOPATHY: NORMAL

## 2019-11-22 LAB
LEFT EYE DIABETIC RETINOPATHY: NORMAL
RIGHT EYE DIABETIC RETINOPATHY: NORMAL

## 2019-11-22 PROCEDURE — 2022F DILAT RTA XM EVC RTNOPTHY: CPT | Performed by: INTERNAL MEDICINE

## 2019-12-03 ENCOUNTER — TELEPHONE (OUTPATIENT)
Dept: INTERNAL MEDICINE CLINIC | Facility: CLINIC | Age: 74
End: 2019-12-03

## 2019-12-03 DIAGNOSIS — N18.30 STAGE 3 CHRONIC KIDNEY DISEASE (HCC): Primary | ICD-10-CM

## 2019-12-10 ENCOUNTER — APPOINTMENT (OUTPATIENT)
Dept: LAB | Facility: HOSPITAL | Age: 74
End: 2019-12-10
Attending: INTERNAL MEDICINE
Payer: COMMERCIAL

## 2019-12-10 DIAGNOSIS — N18.30 STAGE 3 CHRONIC KIDNEY DISEASE (HCC): ICD-10-CM

## 2019-12-10 DIAGNOSIS — E11.8 TYPE 2 DIABETES MELLITUS WITH COMPLICATION, WITH LONG-TERM CURRENT USE OF INSULIN (HCC): ICD-10-CM

## 2019-12-10 DIAGNOSIS — Z79.4 TYPE 2 DIABETES MELLITUS WITH COMPLICATION, WITH LONG-TERM CURRENT USE OF INSULIN (HCC): ICD-10-CM

## 2019-12-10 LAB
ALBUMIN SERPL BCP-MCNC: 3.9 G/DL (ref 3.5–5)
ALP SERPL-CCNC: 81 U/L (ref 46–116)
ALT SERPL W P-5'-P-CCNC: 30 U/L (ref 12–78)
ANION GAP SERPL CALCULATED.3IONS-SCNC: 5 MMOL/L (ref 4–13)
AST SERPL W P-5'-P-CCNC: 26 U/L (ref 5–45)
BILIRUB SERPL-MCNC: 0.5 MG/DL (ref 0.2–1)
BUN SERPL-MCNC: 37 MG/DL (ref 5–25)
CALCIUM SERPL-MCNC: 9.1 MG/DL (ref 8.3–10.1)
CHLORIDE SERPL-SCNC: 103 MMOL/L (ref 100–108)
CO2 SERPL-SCNC: 30 MMOL/L (ref 21–32)
CREAT SERPL-MCNC: 1.78 MG/DL (ref 0.6–1.3)
EST. AVERAGE GLUCOSE BLD GHB EST-MCNC: 171 MG/DL
GFR SERPL CREATININE-BSD FRML MDRD: 37 ML/MIN/1.73SQ M
GLUCOSE P FAST SERPL-MCNC: 139 MG/DL (ref 65–99)
HBA1C MFR BLD: 7.6 % (ref 4.2–6.3)
POTASSIUM SERPL-SCNC: 4 MMOL/L (ref 3.5–5.3)
PROT SERPL-MCNC: 7.1 G/DL (ref 6.4–8.2)
SODIUM SERPL-SCNC: 138 MMOL/L (ref 136–145)

## 2019-12-10 PROCEDURE — 80053 COMPREHEN METABOLIC PANEL: CPT

## 2019-12-10 PROCEDURE — 83036 HEMOGLOBIN GLYCOSYLATED A1C: CPT

## 2019-12-10 PROCEDURE — 36415 COLL VENOUS BLD VENIPUNCTURE: CPT

## 2019-12-20 ENCOUNTER — OFFICE VISIT (OUTPATIENT)
Dept: INTERNAL MEDICINE CLINIC | Facility: CLINIC | Age: 74
End: 2019-12-20
Payer: COMMERCIAL

## 2019-12-20 VITALS
OXYGEN SATURATION: 97 % | WEIGHT: 147.13 LBS | TEMPERATURE: 96.8 F | HEART RATE: 65 BPM | DIASTOLIC BLOOD PRESSURE: 78 MMHG | SYSTOLIC BLOOD PRESSURE: 124 MMHG | BODY MASS INDEX: 23.65 KG/M2 | HEIGHT: 66 IN

## 2019-12-20 DIAGNOSIS — E11.65 TYPE 2 DIABETES MELLITUS WITH HYPERGLYCEMIA, WITH LONG-TERM CURRENT USE OF INSULIN (HCC): ICD-10-CM

## 2019-12-20 DIAGNOSIS — Z23 IMMUNIZATION DUE: ICD-10-CM

## 2019-12-20 DIAGNOSIS — C61 PROSTATE CANCER (HCC): ICD-10-CM

## 2019-12-20 DIAGNOSIS — N18.30 STAGE 3 CHRONIC KIDNEY DISEASE (HCC): ICD-10-CM

## 2019-12-20 DIAGNOSIS — E11.8 TYPE 2 DIABETES MELLITUS WITH COMPLICATION, WITH LONG-TERM CURRENT USE OF INSULIN (HCC): Primary | ICD-10-CM

## 2019-12-20 DIAGNOSIS — Z79.4 TYPE 2 DIABETES MELLITUS WITH HYPERGLYCEMIA, WITH LONG-TERM CURRENT USE OF INSULIN (HCC): ICD-10-CM

## 2019-12-20 DIAGNOSIS — E03.9 ACQUIRED HYPOTHYROIDISM: ICD-10-CM

## 2019-12-20 DIAGNOSIS — Z79.4 TYPE 2 DIABETES MELLITUS WITH COMPLICATION, WITH LONG-TERM CURRENT USE OF INSULIN (HCC): Primary | ICD-10-CM

## 2019-12-20 PROCEDURE — 90732 PPSV23 VACC 2 YRS+ SUBQ/IM: CPT

## 2019-12-20 PROCEDURE — 99214 OFFICE O/P EST MOD 30 MIN: CPT | Performed by: INTERNAL MEDICINE

## 2019-12-20 PROCEDURE — G0009 ADMIN PNEUMOCOCCAL VACCINE: HCPCS

## 2019-12-20 NOTE — PROGRESS NOTES
Assessment/Plan:         Diagnoses and all orders for this visit:    Type 2 diabetes mellitus with complication, with long-term current use of insulin ([facility] )  -     HEMOGLOBIN A1C W/ EAG ESTIMATION; Future  Pt aware A1c is higher and is followed    Type 2 diabetes mellitus with hyperglycemia, with long-term current use of insulin ([facility] )  He has followup with endocrine at the [place] and has changed back to the Hernandez system for monitoring which he prefers    Prostate cancer [facility])  Pt has urology followup in March and offers no complaints today    Stage 3 chronic kidney disease ([facility] )  He has followup labs upcoming thru the [place] and recent labs were stable range    Acquired hypothyroidism  Recheck tsh with annual labs thru the [place] scheduled for March per patient    Pneumovax today  Rto 3 months     Patient ID: Dipak Artis is a 69 y o  male  HPI  Pt doing ok but his sugars have been fluctuant He is changing back to the Hernandez meter which he hopes will help as the medtronic was too fluctuant and alarming often No chest pain dizziness sob No urinary complaints He has been shovelling snow and walks almost daily for exercise  He is aware his sugar average went up but he was having very frequent lows in recent weeks so insulin adjusted  No falls No limiting pain No n/v/d He has urology followup and endocrine followup in March      Review of Systems   Constitutional: Negative for activity change, chills and fever  HENT: Negative  Respiratory: Negative  Cardiovascular: Negative  Gastrointestinal: Negative  Genitourinary: Negative  Musculoskeletal: Positive for arthralgias  Skin: Negative  Neurological: Negative  Hematological: Negative  Psychiatric/Behavioral: Negative          Past Medical History:   Diagnosis Date    Arthritis     BPH (benign prostatic hyperplasia)     last assessed 4/29/2014    Diabetes mellitus ([address] )     Disease of thyroid gland     GERD (gastroesophageal reflux disease)     Hearing aid worn     Hyperlipidemia     Hypertension     Hypothyroidism     Mumps     [facility]     Tingling sensation     bilateral feet occassionally    Tinnitus     Wears glasses     Wears partial dentures     upper     Past Surgical History:   Procedure Laterality Date    CATARACT EXTRACTION Bilateral 2000    EYE SURGERY      JOINT REPLACEMENT      KNEE ARTHROSCOPY Right 06/26/2009    knee    NE LAP,PROSTATECTOMY,RADICAL,W/NERVE SPARE,INCL ROBOTIC N/A 2/7/2018    Procedure: ROBOTIC ASSISTED LAPAROSCOPIC PROSTATECTOMY; BILATERAL PELVIC LYMPH NODE DISSECTION;  Surgeon: Jo Gaytan MD;  [facility];  Service: Urology    NE REPAIR [address] HERNIA,5+Y/O,REDUCIBL Right 4/19/2019    Procedure: REPAIR HERNIA INGUINAL;  Surgeon: Ron Dobbins DO;  [facility];  Service: General    PROSTATE BIOPSY  11/07/2017    needle biopsy of prostate    TRIGGER FINGER RELEASE  05/2013    trigger thumb     Social History     Socioeconomic History    Marital status: /Civil Union     Spouse name: Not on file    Number of children: Not on file    Years of education: Not on file    Highest education level: Not on file   Occupational History    Occupation: printer  retired   Social Needs    Financial resource strain: Not on file    Food insecurity:     Worry: Not on file     Inability: Not on file   [employer] needs:     Medical: Not on file     Non-medical: Not on file   Tobacco Use    Smoking status: Former Smoker    Smokeless tobacco: Never Used    Tobacco comment: quit about 50 years ago   Substance and Sexual Activity    Alcohol use:  Yes     Alcohol/week: 2 0 standard drinks     Types: 2 Glasses of wine per week     Comment: week, social drinker    Drug use: No    Sexual activity: Not on file   Lifestyle    Physical activity:     Days per week: Not on file     Minutes per session: Not on file    Stress: Not on file   Relationships    Social connections: Talks on phone: Not on file     Gets together: Not on file     Attends Congregation service: Not on file     Active member of club or organization: Not on file     Attends meetings of clubs or organizations: Not on file     Relationship status: Not on file    Intimate partner violence:     Fear of current or ex partner: Not on file     Emotionally abused: Not on file     Physically abused: Not on file     Forced sexual activity: Not on file   Other Topics Concern    Not on file   Social History Narrative    Always uses seat belt    Caffeine use    Lives independently with spouse    Retired    Sun protection sunscreen     No Known Allergies         /78   Pulse 65   Temp (!) 96 8 °F (36 °C) (Tympanic)   Ht 5' 6" (1 676 m)   Wt 66 7 kg (147 lb 2 oz)   SpO2 97%   BMI 23 75 kg/m²          Physical Exam   Constitutional: He is oriented to person, place, and time  He appears well-developed and well-nourished  No distress  HENT:   Head: Normocephalic and atraumatic  Mouth/Throat: Oropharynx is clear and moist  No oropharyngeal exudate  Eyes: Pupils are equal, round, and reactive to light  Conjunctivae and EOM are normal  No scleral icterus  Neck: Normal range of motion  Neck supple  No JVD present  Cardiovascular: Normal rate and regular rhythm  No murmur heard  Pulmonary/Chest: Effort normal and breath sounds normal  No respiratory distress  He has no wheezes  Abdominal: Soft  Bowel sounds are normal  He exhibits no distension  There is no guarding  Musculoskeletal: Normal range of motion  He exhibits no edema  Lymphadenopathy:     He has no cervical adenopathy  Neurological: He is alert and oriented to person, place, and time  He displays normal reflexes  No cranial nerve deficit  Skin: Skin is warm and dry  Capillary refill takes less than 2 seconds  He is not diaphoretic  No erythema  Psychiatric: He has a normal mood and affect   His behavior is normal  Judgment and thought content normal    Nursing note and vitals reviewed

## 2020-02-18 ENCOUNTER — TELEPHONE (OUTPATIENT)
Dept: INTERNAL MEDICINE CLINIC | Facility: CLINIC | Age: 75
End: 2020-02-18

## 2020-02-18 DIAGNOSIS — Z79.4 TYPE 2 DIABETES MELLITUS WITH COMPLICATION, WITH LONG-TERM CURRENT USE OF INSULIN (HCC): Primary | ICD-10-CM

## 2020-02-18 DIAGNOSIS — E78.5 HYPERLIPIDEMIA, UNSPECIFIED HYPERLIPIDEMIA TYPE: ICD-10-CM

## 2020-02-18 DIAGNOSIS — N18.30 STAGE 3 CHRONIC KIDNEY DISEASE (HCC): ICD-10-CM

## 2020-02-18 DIAGNOSIS — E11.8 TYPE 2 DIABETES MELLITUS WITH COMPLICATION, WITH LONG-TERM CURRENT USE OF INSULIN (HCC): Primary | ICD-10-CM

## 2020-03-12 ENCOUNTER — APPOINTMENT (OUTPATIENT)
Dept: LAB | Facility: HOSPITAL | Age: 75
End: 2020-03-12
Payer: COMMERCIAL

## 2020-03-12 DIAGNOSIS — Z79.4 TYPE 2 DIABETES MELLITUS WITH COMPLICATION, WITH LONG-TERM CURRENT USE OF INSULIN (HCC): ICD-10-CM

## 2020-03-12 DIAGNOSIS — E78.5 HYPERLIPIDEMIA, UNSPECIFIED HYPERLIPIDEMIA TYPE: ICD-10-CM

## 2020-03-12 DIAGNOSIS — C61 PROSTATE CANCER (HCC): ICD-10-CM

## 2020-03-12 DIAGNOSIS — N18.30 STAGE 3 CHRONIC KIDNEY DISEASE (HCC): ICD-10-CM

## 2020-03-12 DIAGNOSIS — E11.8 TYPE 2 DIABETES MELLITUS WITH COMPLICATION, WITH LONG-TERM CURRENT USE OF INSULIN (HCC): ICD-10-CM

## 2020-03-12 LAB
ALBUMIN SERPL BCP-MCNC: 3.9 G/DL (ref 3.5–5)
ALP SERPL-CCNC: 73 U/L (ref 46–116)
ALT SERPL W P-5'-P-CCNC: 31 U/L (ref 12–78)
ANION GAP SERPL CALCULATED.3IONS-SCNC: 6 MMOL/L (ref 4–13)
AST SERPL W P-5'-P-CCNC: 29 U/L (ref 5–45)
BILIRUB SERPL-MCNC: 0.5 MG/DL (ref 0.2–1)
BUN SERPL-MCNC: 27 MG/DL (ref 5–25)
CALCIUM SERPL-MCNC: 9.2 MG/DL (ref 8.3–10.1)
CHLORIDE SERPL-SCNC: 96 MMOL/L (ref 100–108)
CHOLEST SERPL-MCNC: 133 MG/DL (ref 50–200)
CO2 SERPL-SCNC: 28 MMOL/L (ref 21–32)
CREAT SERPL-MCNC: 1.81 MG/DL (ref 0.6–1.3)
EST. AVERAGE GLUCOSE BLD GHB EST-MCNC: 137 MG/DL
GFR SERPL CREATININE-BSD FRML MDRD: 37 ML/MIN/1.73SQ M
GLUCOSE P FAST SERPL-MCNC: 114 MG/DL (ref 65–99)
HBA1C MFR BLD: 6.4 %
HDLC SERPL-MCNC: 62 MG/DL
LDLC SERPL CALC-MCNC: 59 MG/DL (ref 0–100)
NONHDLC SERPL-MCNC: 71 MG/DL
POTASSIUM SERPL-SCNC: 4.3 MMOL/L (ref 3.5–5.3)
PROT SERPL-MCNC: 7.5 G/DL (ref 6.4–8.2)
PSA SERPL-MCNC: <0.1 NG/ML (ref 0–4)
SODIUM SERPL-SCNC: 130 MMOL/L (ref 136–145)
TRIGL SERPL-MCNC: 62 MG/DL

## 2020-03-12 PROCEDURE — 36415 COLL VENOUS BLD VENIPUNCTURE: CPT

## 2020-03-12 PROCEDURE — 80061 LIPID PANEL: CPT

## 2020-03-12 PROCEDURE — 80053 COMPREHEN METABOLIC PANEL: CPT

## 2020-03-12 PROCEDURE — 84153 ASSAY OF PSA TOTAL: CPT

## 2020-03-12 PROCEDURE — 83036 HEMOGLOBIN GLYCOSYLATED A1C: CPT

## 2020-03-18 ENCOUNTER — TELEPHONE (OUTPATIENT)
Dept: UROLOGY | Facility: CLINIC | Age: 75
End: 2020-03-18

## 2020-03-18 DIAGNOSIS — C61 PROSTATE CANCER (HCC): Primary | ICD-10-CM

## 2020-03-18 NOTE — TELEPHONE ENCOUNTER
Called and spoke with patient  Informed patient that his PSA came back undetectable at <0 1  Patient to follow up in 6 months with PSA prior  Patient scheduled for 09/24/20 at 9:15am in the [facility] office with Yen Grimaldo

## 2020-03-18 NOTE — TELEPHONE ENCOUNTER
Please call/reschedule*    Leola Knight has prostate cancer, status post RALP  Was to be seen for 6 month PSA visit    Please relay to him his PSA is undetectable <0 1 which is very good  He needs PSA in an additional six months with office visit for that time

## 2020-04-17 LAB
LEFT EYE DIABETIC RETINOPATHY: NORMAL
RIGHT EYE DIABETIC RETINOPATHY: NORMAL

## 2020-07-16 ENCOUNTER — TELEPHONE (OUTPATIENT)
Dept: INTERNAL MEDICINE CLINIC | Facility: CLINIC | Age: 75
End: 2020-07-16

## 2020-07-16 DIAGNOSIS — E78.5 HYPERLIPIDEMIA, UNSPECIFIED HYPERLIPIDEMIA TYPE: ICD-10-CM

## 2020-07-16 DIAGNOSIS — N18.30 STAGE 3 CHRONIC KIDNEY DISEASE (HCC): ICD-10-CM

## 2020-07-16 DIAGNOSIS — Z79.4 TYPE 2 DIABETES MELLITUS WITH COMPLICATION, WITH LONG-TERM CURRENT USE OF INSULIN (HCC): Primary | ICD-10-CM

## 2020-07-16 DIAGNOSIS — E11.8 TYPE 2 DIABETES MELLITUS WITH COMPLICATION, WITH LONG-TERM CURRENT USE OF INSULIN (HCC): Primary | ICD-10-CM

## 2020-07-24 LAB
LEFT EYE DIABETIC RETINOPATHY: NORMAL
RIGHT EYE DIABETIC RETINOPATHY: NORMAL

## 2020-08-30 ENCOUNTER — HOSPITAL ENCOUNTER (EMERGENCY)
Facility: HOSPITAL | Age: 75
Discharge: HOME | End: 2020-08-30
Attending: EMERGENCY MEDICINE | Admitting: EMERGENCY MEDICINE
Payer: COMMERCIAL

## 2020-08-30 VITALS
RESPIRATION RATE: 14 BRPM | OXYGEN SATURATION: 94 % | TEMPERATURE: 97.7 F | HEART RATE: 65 BPM | WEIGHT: 146.61 LBS | BODY MASS INDEX: 23.66 KG/M2 | DIASTOLIC BLOOD PRESSURE: 114 MMHG | SYSTOLIC BLOOD PRESSURE: 243 MMHG

## 2020-08-30 DIAGNOSIS — I10 HYPERTENSION: ICD-10-CM

## 2020-08-30 DIAGNOSIS — S61.314A LACERATION OF RIGHT RING FINGER WITHOUT FOREIGN BODY WITH DAMAGE TO NAIL, INITIAL ENCOUNTER: Primary | ICD-10-CM

## 2020-08-30 PROCEDURE — 99284 EMERGENCY DEPT VISIT MOD MDM: CPT | Performed by: PHYSICIAN ASSISTANT

## 2020-08-30 PROCEDURE — 12001 RPR S/N/AX/GEN/TRNK 2.5CM/<: CPT | Performed by: PHYSICIAN ASSISTANT

## 2020-08-30 PROCEDURE — 99283 EMERGENCY DEPT VISIT LOW MDM: CPT

## 2020-08-30 NOTE — DISCHARGE INSTRUCTIONS
Keep dressing in place for 24 hours  Wash daily, watch for signs of infection such as increased redness, pain, warmth, swelling, discharge/drainage, fever or any other concerns  OTC medications as needed for pain relief  Continue to monitor your blood pressure  Follow up with PCP or return to ER as needed

## 2020-08-30 NOTE — ED PROVIDER NOTES
History  Chief Complaint   Patient presents with    Finger Laceration     pt cut right 4th digit on mandolin slicing an onion  69year old male presents for evaluation of a laceration to the distal ring finger on the right hand  This occurred today less than 30 minutes prior to arrival   Pt notes he was using a mandolin slicer and cutting an onion when injury occurred  Spouse is a nurse  She immediately performed first aid and washed the area and applied bandage  She notes it continued to bleed and decided to bring him in for evaluation  Pt takes a baby aspirin; denies any blood thinners or anticoagulation  Pt notes mild pain locally  He did cut part of his nail  Pt has otherwise been in usual state of health  Denies recent illness  No other injuries reported  His tetanus is reported to be UTD and was last done in 2016  Pt right hand dominant  History provided by:  Patient and spouse   used: No    Finger Laceration   Location:  Finger  Finger laceration location:  R ring finger  Depth:  Cutaneous  Laceration mechanism:  Metal edge  Pain details:     Severity:  Mild  Foreign body present:  No foreign bodies  Tetanus status:  Up to date  Associated symptoms: no fever, no focal weakness, no numbness, no rash, no redness, no swelling and no streaking        Prior to Admission Medications   Prescriptions Last Dose Informant Patient Reported? Taking? Levothyroxine Sodium 88 MCG CAPS 8/30/2020 at Unknown time Self No Yes   Sig: Take 1 capsule (88 mcg total) by mouth daily for 90 days   Multiple Vitamin (MULTIVITAMIN) tablet 8/30/2020 at Unknown time Self Yes Yes   Sig: Take 1 tablet by mouth daily     aspirin 81 MG tablet 8/30/2020 at Unknown time Self Yes Yes   Sig: Take 81 mg by mouth daily    lisinopril (ZESTRIL) 40 mg tablet 8/30/2020 at Unknown time Self Yes Yes   Sig: Take 40 mg by mouth daily     simvastatin (ZOCOR) 20 mg tablet 8/29/2020 at Unknown time Self Yes Yes Sig: Take 20 mg by mouth daily at bedtime  Facility-Administered Medications: None       Past Medical History:   Diagnosis Date    Arthritis     BPH (benign prostatic hyperplasia)     last assessed 4/29/2014    [facility] ([facility] )     Disease of thyroid gland     GERD (gastroesophageal reflux disease)     Hearing aid worn     Hyperlipidemia     Hypertension     Hypothyroidism     Mumps     Prostate cancer ([facility] [address] )     Tingling sensation     bilateral feet occassionally    Tinnitus     Wears glasses     Wears partial dentures     upper       Past Surgical History:   Procedure Laterality Date    CATARACT EXTRACTION Bilateral 2000    EYE SURGERY      JOINT REPLACEMENT      KNEE ARTHROSCOPY Right 06/26/2009    knee    [place] LAP,PROSTATECTOMY,RADICAL,W/NERVE SPARE,INCL ROBOTIC N/A 2/7/2018    Procedure: ROBOTIC ASSISTED LAPAROSCOPIC PROSTATECTOMY; BILATERAL PELVIC LYMPH NODE DISSECTION;  Surgeon: Quique Coughlin MD;  [facility];  Service: Urology    NH REPAIR ING HERNIA,5+Y/O,REDUCIBL Right 4/19/2019    Procedure: REPAIR HERNIA INGUINAL;  Surgeon: Marian Corea DO;  Location: [facility];  Service: General    PROSTATE BIOPSY  11/07/2017    needle biopsy of prostate    TRIGGER FINGER RELEASE  05/2013    trigger thumb       Family History   Problem Relation Age of Onset    Cancer Mother     Diabetes Mother     Uterine cancer Mother     Diabetes Father     Cancer Father     Stroke Father         of unknown cause    Hypertension Father     Prostate cancer Father     Diabetes Family         Uncle, DM    Cancer Family         Grandmother     I have reviewed and agree with the history as documented  E-Cigarette/Vaping    E-Cigarette Use Never User      E-Cigarette/Vaping Substances     Social History     Tobacco Use    Smoking status: Former Smoker    Smokeless tobacco: Never Used    Tobacco comment: quit about 50 years ago   Substance Use Topics    Alcohol use:  Yes Alcohol/week: 2 0 standard drinks     Types: 2 Glasses of wine per week     Comment: week, social drinker    Drug use: No       Review of Systems   Constitutional: Negative  Negative for chills, fatigue and fever  HENT: Negative  Negative for congestion, rhinorrhea and sore throat  Eyes: Negative  Negative for visual disturbance  Respiratory: Negative  Negative for cough, shortness of breath and wheezing  Cardiovascular: Negative  Negative for chest pain, palpitations and leg swelling  Gastrointestinal: Negative  Negative for abdominal pain, constipation, diarrhea, nausea and vomiting  Genitourinary: Negative  Negative for dysuria, flank pain, frequency and hematuria  Musculoskeletal: Negative  Negative for back pain, myalgias and neck pain  Skin: Positive for wound  Negative for rash  Neurological: Negative  Negative for dizziness, focal weakness, light-headedness, numbness and headaches  Psychiatric/Behavioral: Negative  Negative for confusion  All other systems reviewed and are negative  Physical Exam  Physical Exam  Vitals signs and nursing note reviewed  Constitutional:       General: He is not in acute distress  Appearance: Normal appearance  He is well-developed  He is not toxic-appearing or diaphoretic  HENT:      Head: Normocephalic and atraumatic  Right Ear: Hearing, tympanic membrane, ear canal and external ear normal       Left Ear: Hearing, tympanic membrane, ear canal and external ear normal       Nose: Nose normal       Mouth/Throat:      Pharynx: Uvula midline  No oropharyngeal exudate  Eyes:      General: No scleral icterus  Conjunctiva/sclera: Conjunctivae normal       Pupils: Pupils are equal, round, and reactive to light  Neck:      Musculoskeletal: Normal range of motion and neck supple  Vascular: No JVD  Trachea: Trachea normal  No tracheal deviation     Cardiovascular:      Rate and Rhythm: Normal rate and regular rhythm  Pulses: Normal pulses  Heart sounds: Normal heart sounds  No murmur  Pulmonary:      Effort: Pulmonary effort is normal  No respiratory distress  Breath sounds: Normal breath sounds  No wheezing, rhonchi or rales  Abdominal:      General: Bowel sounds are normal       Palpations: Abdomen is soft  Tenderness: There is no abdominal tenderness  There is no guarding or rebound  Hernia: No hernia is present  Musculoskeletal: Normal range of motion  General: No tenderness  Right hand: He exhibits laceration (<0 5 cm)  He exhibits normal range of motion, no bony tenderness, normal capillary refill, no deformity and no swelling  Normal sensation noted  Normal strength noted  Hands:       Comments: Extremity neurovascularly intact  Normal ROM  Skin:     General: Skin is warm and dry  Capillary Refill: Capillary refill takes less than 2 seconds  Findings: Laceration present  No rash  Nails: There is no clubbing  Comments: Pt does have crack through the distal finger nail of the affected digit however this is no disruption of the underlying nail bed and no bleeding in this area  Neurological:      General: No focal deficit present  Mental Status: He is alert and oriented to person, place, and time  Cranial Nerves: No cranial nerve deficit  Sensory: No sensory deficit  Motor: No abnormal muscle tone     Psychiatric:         Behavior: Behavior normal          Vital Signs  ED Triage Vitals [08/30/20 1018]   Temperature Pulse Respirations Blood Pressure SpO2   97 7 °F (36 5 °C) 65 14 (!) 243/114 94 %      Temp Source Heart Rate Source Patient Position - Orthostatic VS BP Location FiO2 (%)   Temporal Monitor Sitting Left arm --      Pain Score       3           Vitals:    08/30/20 1018   BP: (!) 243/114   Pulse: 65   Patient Position - Orthostatic VS: Sitting         Visual Acuity      ED Medications  Medications - No data to display    Diagnostic Studies  Results Reviewed     None                 No orders to display              Procedures  Laceration repair    Date/Time: 8/30/2020 10:45 AM  Performed by: Philip Moody PA-C  Authorized by: Philip Moody PA-C   Consent: Verbal consent obtained  Risks and benefits: risks, benefits and alternatives were discussed  Consent given by: patient  Patient understanding: patient states understanding of the procedure being performed  Site marked: the operative site was marked  Patient identity confirmed: verbally with patient and arm band  Time out: Immediately prior to procedure a "time out" was called to verify the correct patient, procedure, equipment, support staff and site/side marked as required  Body area: upper extremity  Location details: right ring finger  Laceration length: 0 5 cm  Foreign bodies: no foreign bodies  Tendon involvement: none  Nerve involvement: none      Procedure Details:  Preparation: Patient was prepped and draped in the usual sterile fashion  Irrigation solution: saline  Irrigation method: syringe  Amount of cleaning: standard  Number of sutures: 0  Dressing: gauze roll (surgifoam, nonadherent)  Patient tolerance: Patient tolerated the procedure well with no immediate complications               ED Course     Verbal consent obtained for repair of the laceration  Pt prepped and draped in usual sterile fashion  See procedure note above  Area was thoroughly cleaned and explored  Bleeding controlled and no repair with sutures needed  Pt's spouse notes she only came for surgifoam could be applied to wound  I do feel this is appropriate treatment and a small piece of surgifoam was applied with a nonadherent and gauze roll dressing  Pt tolerated well  Wound well approximated and hemostasis achieved  Tetanus was not updated  Reviewed standard wound care  S/sx infection reviewed  OTC meds as needed for pain relief    Strict return precautions outlined  BP elevated; pt asymptomatic in this regards  Advised to continue home meds  Continue to monitor BP and f/u with PCP  Spouse is an RN and will monitor  Advised outpatient follow up with PCP or return to ER for change in condition as outlined  Pt and spouse verbalized understanding and had no further questions  Pt left in stable, improved condition  US AUDIT      Most Recent Value   Initial Alcohol Screen: US AUDIT-C    1  How often do you have a drink containing alcohol?  0 Filed at: 08/30/2020 1019   2  How many drinks containing alcohol do you have on a typical day you are drinking? 0 Filed at: 08/30/2020 1019   3b  FEMALE Any Age, or MALE 65+: How often do you have 4 or more drinks on one occassion? 0 Filed at: 08/30/2020 1019   Audit-C Score  0 Filed at: 08/30/2020 1019                  ABEL/DAST-10      Most Recent Value   How many times in the past year have you    Used an illegal drug or used a prescription medication for non-medical reasons?   Never Filed at: 08/30/2020 1019                                MDM  Number of Diagnoses or Management Options  Hypertension: established and worsening  Laceration of right ring finger without foreign body with damage to nail, initial encounter: new and does not require workup     Amount and/or Complexity of Data Reviewed  Decide to obtain previous medical records or to obtain history from someone other than the patient: yes  Obtain history from someone other than the patient: yes  Review and summarize past medical records: yes    Patient Progress  Patient progress: improved        Disposition  Final diagnoses:   Laceration of right ring finger without foreign body with damage to nail, initial encounter   Hypertension     Time reflects when diagnosis was documented in both MDM as applicable and the Disposition within this note     Time User Action Codes Description Comment    8/30/2020 10:53 AM Miranda Muhammad Add [A84 [id number]] Laceration of right ring finger without foreign body with damage to nail, initial encounter     8/30/2020 10:53 AM Aleena Howard Add [I10] Hypertension       ED Disposition     ED Disposition Condition Date/Time Comment    Discharge Stable Sun Aug 30, 2020 10:52 AM Kylah Arceo discharge to home/self care  Follow-up Information     Follow up With Specialties Details Why Contact Info Additional DO [facility] Schedule an appointment as soon as possible for a visit   [address] Emergency Department Emergency Medicine  As needed [address] [facility] ED, [place] [address], [place], [place], [address]          Discharge Medication List as of 8/30/2020 10:58 AM      CONTINUE these medications which have NOT CHANGED    Details   aspirin 81 MG tablet Take 81 mg by mouth daily , Historical Med      Levothyroxine Sodium 88 MCG CAPS Take 1 capsule (88 mcg total) by mouth daily for 90 days, Starting u 10/4/2018, Until Sun 8/30/2020, Print      lisinopril (ZESTRIL) 40 mg tablet Take 40 mg by mouth daily  , Historical Med      Multiple Vitamin (MULTIVITAMIN) tablet Take 1 tablet by mouth daily  , Historical Med      simvastatin (ZOCOR) 20 mg tablet Take 20 mg by mouth daily at bedtime  , Historical Med           No discharge procedures on file      PDMP Review     None          ED Provider  Electronically Signed by           Edwin Garg PA-C  08/30/20 5450

## 2020-09-22 ENCOUNTER — APPOINTMENT (OUTPATIENT)
Dept: LAB | Facility: HOSPITAL | Age: 75
End: 2020-09-22
Payer: COMMERCIAL

## 2020-09-22 DIAGNOSIS — C61 PROSTATE CANCER (HCC): ICD-10-CM

## 2020-09-22 DIAGNOSIS — E11.8 TYPE 2 DIABETES MELLITUS WITH COMPLICATION, WITH LONG-TERM CURRENT USE OF INSULIN (HCC): ICD-10-CM

## 2020-09-22 DIAGNOSIS — Z79.4 TYPE 2 DIABETES MELLITUS WITH COMPLICATION, WITH LONG-TERM CURRENT USE OF INSULIN (HCC): ICD-10-CM

## 2020-09-22 DIAGNOSIS — N18.30 STAGE 3 CHRONIC KIDNEY DISEASE (HCC): ICD-10-CM

## 2020-09-22 DIAGNOSIS — E78.5 HYPERLIPIDEMIA, UNSPECIFIED HYPERLIPIDEMIA TYPE: ICD-10-CM

## 2020-09-22 LAB
ALBUMIN SERPL BCP-MCNC: 3.7 G/DL (ref 3.5–5)
ALP SERPL-CCNC: 78 U/L (ref 46–116)
ALT SERPL W P-5'-P-CCNC: 30 U/L (ref 12–78)
ANION GAP SERPL CALCULATED.3IONS-SCNC: 3 MMOL/L (ref 4–13)
AST SERPL W P-5'-P-CCNC: 23 U/L (ref 5–45)
BILIRUB SERPL-MCNC: 0.4 MG/DL (ref 0.2–1)
BUN SERPL-MCNC: 29 MG/DL (ref 5–25)
CALCIUM SERPL-MCNC: 9.3 MG/DL (ref 8.3–10.1)
CHLORIDE SERPL-SCNC: 102 MMOL/L (ref 100–108)
CHOLEST SERPL-MCNC: 128 MG/DL (ref 50–200)
CO2 SERPL-SCNC: 30 MMOL/L (ref 21–32)
CREAT SERPL-MCNC: 1.87 MG/DL (ref 0.6–1.3)
EST. AVERAGE GLUCOSE BLD GHB EST-MCNC: 148 MG/DL
GFR SERPL CREATININE-BSD FRML MDRD: 35 ML/MIN/1.73SQ M
GLUCOSE P FAST SERPL-MCNC: 104 MG/DL (ref 65–99)
HBA1C MFR BLD: 6.8 %
HDLC SERPL-MCNC: 53 MG/DL
LDLC SERPL CALC-MCNC: 60 MG/DL (ref 0–100)
NONHDLC SERPL-MCNC: 75 MG/DL
POTASSIUM SERPL-SCNC: 4.1 MMOL/L (ref 3.5–5.3)
PROT SERPL-MCNC: 7.3 G/DL (ref 6.4–8.2)
PSA SERPL-MCNC: <0.1 NG/ML (ref 0–4)
SODIUM SERPL-SCNC: 135 MMOL/L (ref 136–145)
TRIGL SERPL-MCNC: 76 MG/DL

## 2020-09-22 PROCEDURE — 80061 LIPID PANEL: CPT

## 2020-09-22 PROCEDURE — 80053 COMPREHEN METABOLIC PANEL: CPT

## 2020-09-22 PROCEDURE — 36415 COLL VENOUS BLD VENIPUNCTURE: CPT

## 2020-09-22 PROCEDURE — 84153 ASSAY OF PSA TOTAL: CPT

## 2020-09-22 PROCEDURE — 3044F HG A1C LEVEL LT 7.0%: CPT | Performed by: INTERNAL MEDICINE

## 2020-09-22 PROCEDURE — 83036 HEMOGLOBIN GLYCOSYLATED A1C: CPT

## 2020-09-29 ENCOUNTER — OFFICE VISIT (OUTPATIENT)
Dept: UROLOGY | Facility: CLINIC | Age: 75
End: 2020-09-29
Payer: COMMERCIAL

## 2020-09-29 VITALS
HEART RATE: 78 BPM | BODY MASS INDEX: 23.09 KG/M2 | DIASTOLIC BLOOD PRESSURE: 88 MMHG | WEIGHT: 143.08 LBS | TEMPERATURE: 98.6 F | SYSTOLIC BLOOD PRESSURE: 136 MMHG

## 2020-09-29 DIAGNOSIS — C61 PROSTATE CANCER (HCC): Primary | ICD-10-CM

## 2020-09-29 PROCEDURE — 99213 OFFICE O/P EST LOW 20 MIN: CPT | Performed by: PHYSICIAN ASSISTANT

## 2020-09-29 NOTE — PROGRESS NOTES
9/29/2020      Chief Complaint   Patient presents with    Prostate Cancer         Assessment and Plan    69 y o  male managed by Dr Charity Gatica    1  [place] 3+4=7 [id number] Prostate Cancer  - status post radical prostatectomy February 2018  - PSA undetectable <0 1 since surgery    Follow-up six months with PSA    History of Present Illness  Iona Dawn is a 69 y o  male here for followup evaluation of intermediate risk prostate cancer identified initially with PSA rise from 0 6 to 1 3 in 2017 with an abnormal CARLY  He is s/p RALP with bPLND on 2/7/2018  PSA has been undetectable since surgery  He has regained urinary continence, wearing a light pantiliner while doing yardwork only for dribbles, will have dribbles with laughing and sneezing at times as well  Pre and Post operative ED treated with viagra 100mg prn with success more times than not  Presents today with no complaints  Review of Systems   Constitutional: Negative for activity change, appetite change, chills, fever and unexpected weight change  HENT: Negative  Respiratory: Negative  Negative for shortness of breath  Cardiovascular: Negative  Negative for chest pain  Gastrointestinal: Negative for abdominal pain, diarrhea, nausea and vomiting  Endocrine: Negative  Genitourinary: Negative for decreased urine volume, difficulty urinating, dysuria, flank pain, frequency, hematuria and urgency  Musculoskeletal: Negative for back pain and gait problem  Skin: Negative  Allergic/Immunologic: Negative  Neurological: Negative  Hematological: Negative for adenopathy  Does not bruise/bleed easily                  Past Medical History  Past Medical History:   Diagnosis Date    Arthritis     BPH (benign prostatic hyperplasia)     last assessed 4/29/2014    Diabetes mellitus ([facility] [address] )     Disease of thyroid gland     GERD (gastroesophageal reflux disease)     Hearing aid worn     Hyperlipidemia     Hypertension     Hypothyroidism     Mumps     Pro[facility] cancer ([facility] [address] )     Tingling sensation     bilateral feet occassionally    Tinnitus     Wears glasses     Wears partial dentures     upper     Past Social History  Past Surgical History:   Procedure Laterality Date    CATARACT EXTRACTION Bilateral 2000    EYE SURGERY      JOINT REPLACEMENT      KNEE ARTHROSCOPY Right 06/26/2009    knee    [place] LAP,PROSTATECTOMY,RADICAL,W/NERVE SPARE,INCL ROBOTIC N/A 2/7/2018    Procedure: ROBOTIC ASSISTED LAPAROSCOPIC PROSTATECTOMY; BILATERAL PELVIC LYMPH NODE DISSECTION;  Surgeon: Neil Real MD;  Location: [facility];  Service: Urology    [place] REPAIR [address] HERNIA,5+Y/O,REDUCIBL Right 4/19/2019    Procedure: REPAIR HERNIA INGUINAL;  Surgeon: Leroy Quispe DO;  Location: [facility];  Service: General    PROSTATE BIOPSY  11/07/2017    needle biopsy of prostate    TRIGGER FINGER RELEASE  05/2013    trigger thumb     Social History     Tobacco Use   Smoking Status Former Smoker   Smokeless Tobacco Never Used   Tobacco Comment    quit about 48 years ago     Past Family History  Family History   Problem Relation Age of Onset    Cancer Mother     Diabetes Mother     Uterine cancer Mother     Diabetes Father     Cancer Father     Stroke Father         of unknown cause    Hypertension Father     Prostate cancer Father     Diabetes Family         Uncle, DM    Cancer Family         Grandmother     Past Social history  Social History     Socioeconomic History    Marital status: /Civil Union     Spouse name: Not on file    Number of children: Not on file    Years of education: Not on file    Highest education level: Not on file   Occupational History    Occupation: printer  retired   Social Needs    Financial resource strain: Not on file    Food insecurity     Worry: Not on file     Inability: Not on file   [employer] needs     Medical: Not on file     Non-medical: Not on file   Tobacco Use    Smoking status: Former Smoker    Smokeless tobacco: Never Used    Tobacco comment: quit about 50 years ago   Substance and Sexual Activity    Alcohol use: Yes     Alcohol/week: 2 0 standard drinks     Types: 2 Glasses of wine per week     Comment: week, social drinker    Drug use: No    Sexual activity: Not on file   Lifestyle    Physical activity     Days per week: Not on file     Minutes per session: Not on file    Stress: Not on file   Relationships    Social connections     Talks on phone: Not on file     Gets together: Not on file     Attends Alevism service: Not on file     Active member of club or organization: Not on file     Attends meetings of clubs or organizations: Not on file     Relationship status: Not on file    Intimate partner violence     Fear of current or ex partner: Not on file     Emotionally abused: Not on file     Physically abused: Not on file     Forced sexual activity: Not on file   Other Topics Concern    Not on file   Social History Narrative    Always uses seat belt    Caffeine use    Lives independently with spouse    Retired    Sun protection sunscreen     Current Medications  Current Outpatient Medications   Medication Sig Dispense Refill    aspirin 81 MG tablet Take 81 mg by mouth daily       Levothyroxine Sodium 88 MCG CAPS Take 1 capsule (88 mcg total) by mouth daily for 90 days 90 capsule 3    lisinopril (ZESTRIL) 40 mg tablet Take 40 mg by mouth daily        Multiple Vitamin (MULTIVITAMIN) tablet Take 1 tablet by mouth daily   simvastatin (ZOCOR) 20 mg tablet Take 20 mg by mouth daily at bedtime  No current facility-administered medications for this visit        Allergies  No Known Allergies      The following portions of the patient's history were reviewed and updated as appropriate: allergies, current medications, past medical history, past social history, past surgical history and problem list       Vitals  Vitals:    09/29/20 1112   BP: 136/88   Pulse: 78   Temp: 98 6 °F (37 °C)   Weight: 64 9 kg (143 lb 1 3 oz)       Physical Exam  Vitals signs and nursing note reviewed  Constitutional:       General: He is not in acute distress  Appearance: Normal appearance  He is well-developed  He is not diaphoretic  HENT:      Head: Normocephalic and atraumatic  Pulmonary:      Effort: Pulmonary effort is normal       Comments: No cough or audible wheeze  Abdominal:      General: There is no distension  Tenderness: There is no abdominal tenderness  There is no right CVA tenderness or left CVA tenderness  Musculoskeletal:      Right lower leg: No edema  Left lower leg: No edema  Skin:     General: Skin is warm and dry  Neurological:      Mental Status: He is alert and oriented to person, place, and time        Gait: Gait normal    Psychiatric:         Speech: Speech normal          Behavior: Behavior normal            Results  No results found for this or any previous visit (from the past 1 hour(s)) ]  Lab Results   Component Value Date    PSA <0 1 09/22/2020    PSA <0 1 03/12/2020    PSA <0 1 08/30/2019    PSA <0 1 02/26/2019     Lab Results   Component Value Date    GLUCOSE 135 10/01/2015    CALCIUM 9 3 09/22/2020     10/01/2015    K 4 1 09/22/2020    CO2 30 09/22/2020     09/22/2020    BUN 29 (H) 09/22/2020    CREATININE 1 87 (H) 09/22/2020     Lab Results   Component Value Date    WBC 4 40 04/16/2019    HGB 13 9 04/16/2019    HCT 41 5 04/16/2019    MCV 96 04/16/2019     04/16/2019         Orders  Orders Placed This Encounter   Procedures    PSA Total, Diagnostic     Standing Status:   Future     Standing Expiration Date:   9/29/2021

## 2020-10-01 ENCOUNTER — TELEPHONE (OUTPATIENT)
Dept: ADMINISTRATIVE | Facility: OTHER | Age: 75
End: 2020-10-01

## 2020-10-01 ENCOUNTER — OFFICE VISIT (OUTPATIENT)
Dept: INTERNAL MEDICINE CLINIC | Facility: CLINIC | Age: 75
End: 2020-10-01
Payer: COMMERCIAL

## 2020-10-01 VITALS
DIASTOLIC BLOOD PRESSURE: 68 MMHG | SYSTOLIC BLOOD PRESSURE: 122 MMHG | WEIGHT: 146 LBS | OXYGEN SATURATION: 98 % | TEMPERATURE: 98.2 F | BODY MASS INDEX: 23.46 KG/M2 | HEART RATE: 78 BPM | HEIGHT: 66 IN

## 2020-10-01 DIAGNOSIS — Z79.4 LONG TERM CURRENT USE OF INSULIN (HCC): ICD-10-CM

## 2020-10-01 DIAGNOSIS — Z79.4 TYPE 2 DIABETES MELLITUS WITH HYPERGLYCEMIA, WITH LONG-TERM CURRENT USE OF INSULIN (HCC): ICD-10-CM

## 2020-10-01 DIAGNOSIS — E78.5 HYPERLIPIDEMIA, UNSPECIFIED HYPERLIPIDEMIA TYPE: ICD-10-CM

## 2020-10-01 DIAGNOSIS — E11.65 TYPE 2 DIABETES MELLITUS WITH HYPERGLYCEMIA, WITH LONG-TERM CURRENT USE OF INSULIN (HCC): ICD-10-CM

## 2020-10-01 DIAGNOSIS — Z00.00 MEDICARE ANNUAL WELLNESS VISIT, SUBSEQUENT: Primary | ICD-10-CM

## 2020-10-01 PROBLEM — N52.1 ERECTILE DYSFUNCTION DUE TO TYPE 2 DIABETES MELLITUS (HCC): Status: ACTIVE | Noted: 2020-10-01

## 2020-10-01 PROBLEM — C61 PROSTATE CANCER (HCC): Status: RESOLVED | Noted: 2018-01-29 | Resolved: 2020-10-01

## 2020-10-01 PROBLEM — C61 ADENOCARCINOMA OF PROSTATE (HCC): Status: ACTIVE | Noted: 2017-11-07

## 2020-10-01 PROBLEM — E11.69 ERECTILE DYSFUNCTION DUE TO TYPE 2 DIABETES MELLITUS (HCC): Status: ACTIVE | Noted: 2020-10-01

## 2020-10-01 PROBLEM — E16.2 HYPOGLYCEMIA: Status: ACTIVE | Noted: 2020-10-01

## 2020-10-01 PROCEDURE — 1036F TOBACCO NON-USER: CPT | Performed by: INTERNAL MEDICINE

## 2020-10-01 PROCEDURE — 1125F AMNT PAIN NOTED PAIN PRSNT: CPT | Performed by: INTERNAL MEDICINE

## 2020-10-01 PROCEDURE — G0439 PPPS, SUBSEQ VISIT: HCPCS | Performed by: INTERNAL MEDICINE

## 2020-10-01 PROCEDURE — 3725F SCREEN DEPRESSION PERFORMED: CPT | Performed by: INTERNAL MEDICINE

## 2020-10-01 PROCEDURE — 1170F FXNL STATUS ASSESSED: CPT | Performed by: INTERNAL MEDICINE

## 2020-10-01 PROCEDURE — 1160F RVW MEDS BY RX/DR IN RCRD: CPT | Performed by: INTERNAL MEDICINE

## 2020-10-01 PROCEDURE — 3078F DIAST BP <80 MM HG: CPT | Performed by: INTERNAL MEDICINE

## 2020-10-16 LAB
LEFT EYE DIABETIC RETINOPATHY: NORMAL
RIGHT EYE DIABETIC RETINOPATHY: NORMAL

## 2020-10-16 PROCEDURE — 2022F DILAT RTA XM EVC RTNOPTHY: CPT | Performed by: INTERNAL MEDICINE

## 2020-12-22 LAB
CREAT ?TM UR-SCNC: 23.1 UMOL/L
EXT MICROALBUMIN URINE RANDOM: 1.6
MICROALBUMIN/CREAT UR: 69.1 MG/G{CREAT}

## 2021-03-03 ENCOUNTER — APPOINTMENT (INPATIENT)
Dept: NON INVASIVE DIAGNOSTICS | Facility: HOSPITAL | Age: 76
DRG: 065 | End: 2021-03-03
Payer: COMMERCIAL

## 2021-03-03 ENCOUNTER — APPOINTMENT (EMERGENCY)
Dept: CT IMAGING | Facility: HOSPITAL | Age: 76
DRG: 065 | End: 2021-03-03
Payer: COMMERCIAL

## 2021-03-03 ENCOUNTER — APPOINTMENT (EMERGENCY)
Dept: RADIOLOGY | Facility: HOSPITAL | Age: 76
DRG: 065 | End: 2021-03-03
Payer: COMMERCIAL

## 2021-03-03 ENCOUNTER — HOSPITAL ENCOUNTER (INPATIENT)
Facility: HOSPITAL | Age: 76
LOS: 2 days | DRG: 065 | End: 2021-03-05
Attending: EMERGENCY MEDICINE | Admitting: INTERNAL MEDICINE
Payer: COMMERCIAL

## 2021-03-03 ENCOUNTER — APPOINTMENT (INPATIENT)
Dept: MRI IMAGING | Facility: HOSPITAL | Age: 76
DRG: 065 | End: 2021-03-03
Payer: COMMERCIAL

## 2021-03-03 DIAGNOSIS — E11.65 TYPE 2 DIABETES MELLITUS WITH HYPERGLYCEMIA, WITH LONG-TERM CURRENT USE OF INSULIN (HCC): ICD-10-CM

## 2021-03-03 DIAGNOSIS — Z79.4 TYPE 2 DIABETES MELLITUS WITH HYPERGLYCEMIA, WITH LONG-TERM CURRENT USE OF INSULIN (HCC): ICD-10-CM

## 2021-03-03 DIAGNOSIS — E87.1 HYPONATREMIA: ICD-10-CM

## 2021-03-03 DIAGNOSIS — I63.9 CVA (CEREBRAL VASCULAR ACCIDENT) (HCC): Primary | ICD-10-CM

## 2021-03-03 LAB
ALBUMIN SERPL BCP-MCNC: 3.4 G/DL (ref 3.5–5)
ALP SERPL-CCNC: 89 U/L (ref 46–116)
ALT SERPL W P-5'-P-CCNC: 25 U/L (ref 12–78)
ANION GAP SERPL CALCULATED.3IONS-SCNC: 7 MMOL/L (ref 4–13)
APTT PPP: 28 SECONDS (ref 23–37)
AST SERPL W P-5'-P-CCNC: 21 U/L (ref 5–45)
BILIRUB DIRECT SERPL-MCNC: 0.13 MG/DL (ref 0–0.2)
BILIRUB SERPL-MCNC: 0.5 MG/DL (ref 0.2–1)
BUN SERPL-MCNC: 25 MG/DL (ref 5–25)
CALCIUM SERPL-MCNC: 8.8 MG/DL (ref 8.3–10.1)
CHLORIDE SERPL-SCNC: 97 MMOL/L (ref 100–108)
CO2 SERPL-SCNC: 25 MMOL/L (ref 21–32)
CREAT SERPL-MCNC: 1.61 MG/DL (ref 0.6–1.3)
ERYTHROCYTE [DISTWIDTH] IN BLOOD BY AUTOMATED COUNT: 12.9 % (ref 11.6–15.1)
FLUAV RNA RESP QL NAA+PROBE: NEGATIVE
FLUBV RNA RESP QL NAA+PROBE: NEGATIVE
GFR SERPL CREATININE-BSD FRML MDRD: 42 ML/MIN/1.73SQ M
GLUCOSE SERPL-MCNC: 168 MG/DL (ref 65–140)
GLUCOSE SERPL-MCNC: 210 MG/DL (ref 65–140)
GLUCOSE SERPL-MCNC: 219 MG/DL (ref 65–140)
GLUCOSE SERPL-MCNC: 241 MG/DL (ref 65–140)
HCT VFR BLD AUTO: 35 % (ref 36.5–49.3)
HGB BLD-MCNC: 11.4 G/DL (ref 12–17)
INR PPP: 1.13 (ref 0.84–1.19)
MCH RBC QN AUTO: 31.4 PG (ref 26.8–34.3)
MCHC RBC AUTO-ENTMCNC: 32.6 G/DL (ref 31.4–37.4)
MCV RBC AUTO: 96 FL (ref 82–98)
PLATELET # BLD AUTO: 191 THOUSANDS/UL (ref 149–390)
PMV BLD AUTO: 9.9 FL (ref 8.9–12.7)
POTASSIUM SERPL-SCNC: 4 MMOL/L (ref 3.5–5.3)
PROT SERPL-MCNC: 6.5 G/DL (ref 6.4–8.2)
PROTHROMBIN TIME: 14.3 SECONDS (ref 11.6–14.5)
RBC # BLD AUTO: 3.63 MILLION/UL (ref 3.88–5.62)
RSV RNA RESP QL NAA+PROBE: NEGATIVE
SARS-COV-2 RNA RESP QL NAA+PROBE: NEGATIVE
SODIUM SERPL-SCNC: 129 MMOL/L (ref 136–145)
SODIUM SERPL-SCNC: 133 MMOL/L (ref 136–145)
TROPONIN I SERPL-MCNC: <0.02 NG/ML
TSH SERPL DL<=0.05 MIU/L-ACNC: 3 UIU/ML (ref 0.36–3.74)
WBC # BLD AUTO: 4.46 THOUSAND/UL (ref 4.31–10.16)

## 2021-03-03 PROCEDURE — 84484 ASSAY OF TROPONIN QUANT: CPT | Performed by: EMERGENCY MEDICINE

## 2021-03-03 PROCEDURE — 70496 CT ANGIOGRAPHY HEAD: CPT

## 2021-03-03 PROCEDURE — 99285 EMERGENCY DEPT VISIT HI MDM: CPT

## 2021-03-03 PROCEDURE — 84443 ASSAY THYROID STIM HORMONE: CPT | Performed by: INTERNAL MEDICINE

## 2021-03-03 PROCEDURE — G1004 CDSM NDSC: HCPCS

## 2021-03-03 PROCEDURE — 93005 ELECTROCARDIOGRAM TRACING: CPT

## 2021-03-03 PROCEDURE — 71045 X-RAY EXAM CHEST 1 VIEW: CPT

## 2021-03-03 PROCEDURE — 70551 MRI BRAIN STEM W/O DYE: CPT

## 2021-03-03 PROCEDURE — 36415 COLL VENOUS BLD VENIPUNCTURE: CPT | Performed by: EMERGENCY MEDICINE

## 2021-03-03 PROCEDURE — 82948 REAGENT STRIP/BLOOD GLUCOSE: CPT

## 2021-03-03 PROCEDURE — 99223 1ST HOSP IP/OBS HIGH 75: CPT | Performed by: INTERNAL MEDICINE

## 2021-03-03 PROCEDURE — 85730 THROMBOPLASTIN TIME PARTIAL: CPT | Performed by: EMERGENCY MEDICINE

## 2021-03-03 PROCEDURE — 80048 BASIC METABOLIC PNL TOTAL CA: CPT | Performed by: EMERGENCY MEDICINE

## 2021-03-03 PROCEDURE — 85027 COMPLETE CBC AUTOMATED: CPT | Performed by: EMERGENCY MEDICINE

## 2021-03-03 PROCEDURE — 93306 TTE W/DOPPLER COMPLETE: CPT | Performed by: INTERNAL MEDICINE

## 2021-03-03 PROCEDURE — 85610 PROTHROMBIN TIME: CPT | Performed by: EMERGENCY MEDICINE

## 2021-03-03 PROCEDURE — G0425 INPT/ED TELECONSULT30: HCPCS | Performed by: PSYCHIATRY & NEUROLOGY

## 2021-03-03 PROCEDURE — 0241U HB NFCT DS VIR RESP RNA 4 TRGT: CPT | Performed by: EMERGENCY MEDICINE

## 2021-03-03 PROCEDURE — 84295 ASSAY OF SERUM SODIUM: CPT | Performed by: INTERNAL MEDICINE

## 2021-03-03 PROCEDURE — 70498 CT ANGIOGRAPHY NECK: CPT

## 2021-03-03 PROCEDURE — 99291 CRITICAL CARE FIRST HOUR: CPT | Performed by: EMERGENCY MEDICINE

## 2021-03-03 PROCEDURE — 93306 TTE W/DOPPLER COMPLETE: CPT

## 2021-03-03 PROCEDURE — 92610 EVALUATE SWALLOWING FUNCTION: CPT

## 2021-03-03 PROCEDURE — 80076 HEPATIC FUNCTION PANEL: CPT | Performed by: EMERGENCY MEDICINE

## 2021-03-03 RX ORDER — ASPIRIN 81 MG/1
81 TABLET, CHEWABLE ORAL DAILY
Status: DISCONTINUED | OUTPATIENT
Start: 2021-03-04 | End: 2021-03-03

## 2021-03-03 RX ORDER — HEPARIN SODIUM 5000 [USP'U]/ML
5000 INJECTION, SOLUTION INTRAVENOUS; SUBCUTANEOUS EVERY 8 HOURS SCHEDULED
Status: DISCONTINUED | OUTPATIENT
Start: 2021-03-03 | End: 2021-03-05 | Stop reason: HOSPADM

## 2021-03-03 RX ORDER — CLOPIDOGREL BISULFATE 75 MG/1
75 TABLET ORAL DAILY
Status: DISCONTINUED | OUTPATIENT
Start: 2021-03-03 | End: 2021-03-05 | Stop reason: HOSPADM

## 2021-03-03 RX ORDER — ASPIRIN 325 MG
325 TABLET ORAL ONCE
Status: COMPLETED | OUTPATIENT
Start: 2021-03-03 | End: 2021-03-03

## 2021-03-03 RX ORDER — ATORVASTATIN CALCIUM 40 MG/1
40 TABLET, FILM COATED ORAL
Status: DISCONTINUED | OUTPATIENT
Start: 2021-03-03 | End: 2021-03-05 | Stop reason: HOSPADM

## 2021-03-03 RX ORDER — POLYETHYLENE GLYCOL 3350 17 G/17G
17 POWDER, FOR SOLUTION ORAL DAILY PRN
Status: DISCONTINUED | OUTPATIENT
Start: 2021-03-03 | End: 2021-03-05

## 2021-03-03 RX ORDER — LEVOTHYROXINE SODIUM 88 UG/1
88 TABLET ORAL
Status: DISCONTINUED | OUTPATIENT
Start: 2021-03-03 | End: 2021-03-05 | Stop reason: HOSPADM

## 2021-03-03 RX ORDER — PANTOPRAZOLE SODIUM 40 MG/1
40 TABLET, DELAYED RELEASE ORAL
Status: DISCONTINUED | OUTPATIENT
Start: 2021-03-03 | End: 2021-03-05 | Stop reason: HOSPADM

## 2021-03-03 RX ORDER — HYDRALAZINE HYDROCHLORIDE 20 MG/ML
5 INJECTION INTRAMUSCULAR; INTRAVENOUS EVERY 6 HOURS PRN
Status: DISCONTINUED | OUTPATIENT
Start: 2021-03-03 | End: 2021-03-05 | Stop reason: HOSPADM

## 2021-03-03 RX ORDER — ONDANSETRON 2 MG/ML
4 INJECTION INTRAMUSCULAR; INTRAVENOUS EVERY 6 HOURS PRN
Status: DISCONTINUED | OUTPATIENT
Start: 2021-03-03 | End: 2021-03-05 | Stop reason: HOSPADM

## 2021-03-03 RX ORDER — ACETAMINOPHEN 325 MG/1
650 TABLET ORAL EVERY 6 HOURS PRN
Status: DISCONTINUED | OUTPATIENT
Start: 2021-03-03 | End: 2021-03-05 | Stop reason: HOSPADM

## 2021-03-03 RX ORDER — SODIUM CHLORIDE 9 MG/ML
50 INJECTION, SOLUTION INTRAVENOUS CONTINUOUS
Status: DISCONTINUED | OUTPATIENT
Start: 2021-03-03 | End: 2021-03-05

## 2021-03-03 RX ADMIN — ATORVASTATIN CALCIUM 40 MG: 40 TABLET, FILM COATED ORAL at 17:17

## 2021-03-03 RX ADMIN — IOHEXOL 100 ML: 350 INJECTION, SOLUTION INTRAVENOUS at 07:33

## 2021-03-03 RX ADMIN — SODIUM CHLORIDE 125 ML/HR: 0.9 INJECTION, SOLUTION INTRAVENOUS at 08:06

## 2021-03-03 RX ADMIN — HEPARIN SODIUM 5000 UNITS: 5000 INJECTION INTRAVENOUS; SUBCUTANEOUS at 14:43

## 2021-03-03 RX ADMIN — ASPIRIN 325 MG ORAL TABLET 325 MG: 325 PILL ORAL at 08:05

## 2021-03-03 RX ADMIN — HEPARIN SODIUM 5000 UNITS: 5000 INJECTION INTRAVENOUS; SUBCUTANEOUS at 22:04

## 2021-03-03 RX ADMIN — LEVOTHYROXINE SODIUM 88 MCG: 88 TABLET ORAL at 10:32

## 2021-03-03 RX ADMIN — PANTOPRAZOLE SODIUM 40 MG: 40 TABLET, DELAYED RELEASE ORAL at 10:32

## 2021-03-03 RX ADMIN — CLOPIDOGREL BISULFATE 75 MG: 75 TABLET ORAL at 10:32

## 2021-03-03 NOTE — ASSESSMENT & PLAN NOTE
Eliezer Carlisle is a 68-year-old man who does have some vascular risk factors and presents with left upper lower extremity weakness with a small acute ischemic stroke of the right anterior medulla  There is some concern for a cardioembolic source in spite of the size/location of this stroke because his small-vessel ischemic disease risk factors are already quite well controlled at baseline       -at this time will discontinue aspirin in favor of Plavix monotherapy  Based on his NIH Stroke Scale he would not qualify for dual anti-platelet therapy  -okay to continue atorvastatin 40 mg in the hospital and he should be converted to simvastatin 40 mg when he is at home as he has been stabilized on simvastatin already  -he should continue to monitor his blood sugar numbers and should be targeting hemoglobin A1c of less than 7%   -continue to monitor his blood pressure at home and target numbers less than 130/80 in general  -he should work remain physically active and will clearly need PT/OT evaluation   -await the results of his transthoracic echocardiogram   If there is any cardiac structural abnormality whatsoever it would be reasonable to consider an outpatient implantable loop recorder to rule out atrial fibrillation  Would not pursue ANDREW at this point unless transthoracic study is suggestive of significant structural pathology which is not expected  Outpatient Neurology follow-up has been requested

## 2021-03-03 NOTE — ASSESSMENT & PLAN NOTE
Lab Results   Component Value Date    EGFR 42 03/03/2021    EGFR 35 09/22/2020    EGFR 37 03/12/2020    CREATININE 1 61 (H) 03/03/2021    CREATININE 1 87 (H) 09/22/2020    CREATININE 1 81 (H) 03/12/2020     CKD 3  Current creatinine appears to be at baseline  Currently with ACE-inhibitor on hold to allow permissive hypertension the setting of acute CVA  Monitor renal function, avoid nephrotoxins, and renally dose medications when appropriate

## 2021-03-03 NOTE — ED PROCEDURE NOTE
PROCEDURE  ECG 12 Lead Documentation Only    Date/Time: 3/3/2021 8:03 AM  Performed by: Toshia De Leon MD  Authorized by:  Toshia De Leon MD     Indications / Diagnosis:  CVA  ECG reviewed by me, the ED Provider: yes    Patient location:  ED and bedside  Previous ECG:     Previous ECG:  Unavailable    Comparison to cardiac monitor: Yes    Interpretation:     Interpretation: normal    Rate:     ECG rate:  65    ECG rate assessment: normal    Rhythm:     Rhythm: sinus rhythm    Ectopy:     Ectopy: none    QRS:     QRS axis:  Normal    QRS intervals:  Normal  Conduction:     Conduction: normal    ST segments:     ST segments:  Normal  T waves:     T waves: normal    Comments:      No acute ischemia or infarction         Toshia De Leon MD  03/03/21 6929

## 2021-03-03 NOTE — ASSESSMENT & PLAN NOTE
Change to high potency statin while hospitalized  As per Neurology, as patient has already been stabilized on simvastatin, will be discharged on 40 mg dosing of Zocor time of discharge

## 2021-03-03 NOTE — ED PROCEDURE NOTE
PROCEDURE  CriticalCare Time  Performed by: Virgil Napoles MD  Authorized by:  Virgil Napoles MD     Critical care provider statement:     Critical care time (minutes):  43    Critical care start time:  3/3/2021 7:18 AM    Critical care end time:  3/3/2021 8:19 AM    Critical care time was exclusive of:  Separately billable procedures and treating other patients    Critical care was necessary to treat or prevent imminent or life-threatening deterioration of the following conditions:  CNS failure or compromise (stroke alert - CVA)    Critical care was time spent personally by me on the following activities:  Obtaining history from patient or surrogate, development of treatment plan with patient or surrogate, discussions with consultants, evaluation of patient's response to treatment, examination of patient, ordering and performing treatments and interventions, ordering and review of laboratory studies, ordering and review of radiographic studies, re-evaluation of patient's condition and review of old charts    I assumed direction of critical care for this patient from another provider in my specialty: no           Virgil Napoles MD  03/03/21 6415

## 2021-03-03 NOTE — TELEMEDICINE
TeleConsultation - Neurology   Nataliia Lafleur 70 y o  male MRN: [id number]  Unit/Bed#: 406-01 Encounter: [id number]      REQUIRED DOCUMENTATION:     1  This service was provided via Telemedicine  2  Provider located at [facility] office  3  TeleMed provider: Annalisa Woodward MD   4  Identify all parties in room with patient during tele consult:  Pt's wife  5  After connecting through televideo, patient was identified by name and date of birth  Patient was then informed that this was a Telemedicine visit and that the exam was being conducted confidentially over secure lines  My office door was closed  No one else was in the room  Patient acknowledged consent and understanding of privacy and security of the Telemedicine visit, and gave us permission to have the assistant stay in the room in order to assist with the history and to conduct the exam   I informed the patient that I have reviewed their record in Epic and presented the opportunity for them to ask any questions regarding the visit today  The patient agreed to participate  Assessment/Plan     * CVA (cerebral vascular accident) [facility])  Leisa Hernandez is a 81-year-old man who does have some vascular risk factors and presents with left upper lower extremity weakness with a small acute ischemic stroke of the right anterior medulla  There is some concern for a cardioembolic source in spite of the size/location of this stroke because his small-vessel ischemic disease risk factors are already quite well controlled at baseline       -at this time will discontinue aspirin in favor of Plavix monotherapy  Based on his NIH Stroke Scale he would not qualify for dual anti-platelet therapy  -okay to continue atorvastatin 40 mg in the hospital and he should be converted to simvastatin 40 mg when he is at home as he has been stabilized on simvastatin already      -he should continue to monitor his blood sugar numbers and should be targeting hemoglobin A1c of less than 7%   -continue to monitor his blood pressure at home and target numbers less than 130/80 in general  -he should work remain physically active and will clearly need PT/OT evaluation   -await the results of his transthoracic echocardiogram   If there is any cardiac structural abnormality whatsoever it would be reasonable to consider an outpatient implantable loop recorder to rule out atrial fibrillation  Would not pursue ANDREW at this point unless transthoracic study is suggestive of significant structural pathology which is not expected  Outpatient Neurology follow-up has been requested  Hyacinth Louise will need follow up in in 4 weeks with neurovascular attending or advance practitioner  He will not require outpatient neurological testing  History of Present Illness     Reason for Consult / Principal Problem: stroke  Hx and PE limited by: none  HPI: Hyacinth Louise is a 70 y o  right handed male who presents with stroke  He reports that he has never had similar symptoms  He had an episode this past Monday, March 1st, where he had some staggering as he was coming home from his walk  That is not atypical for him with his history of diabetes  When he got home his wife checked his blood pressure and he checked his blood sugar both of which were good  Those symptoms resolved  Yesterday in the late day he began to experience some limping/loss of coordination with the left leg  He thought that it would go away and he went to bed  He awoke at approximately 0030 hours and the symptoms had persisted and this morning he also noted some left arm symptoms  He does have some ongoing weakness with decreased coordination on that left-hand side  He did not endorse any difficulties with seeing, talking, swallowing  He clearly monitors his blood sugar at home and his most recent hemoglobin A1c from the end of 2020 was 6 8%    He also has his blood pressure checked by his wife on a relatively regular basis and typically sees numbers at approximately 0130/80  I directly reviewed with him the results of his MRI which suggest a small right anterior medullary stroke  There is also evidence of mild to moderate microvascular disease  Ultimately the size and character of stroke is more consistent with small-vessel ischemic disease however his small-vessel risk factors are actually very well controlled  Bearing that in mind there is at least some concern for a cardiac source for stroke  Consult to Neurology  Consult performed by: Michelle Varner MD  Consult ordered by: William Meléndez MD           Review of Systems   Constitutional: Negative  HENT: Negative  Eyes: Negative  Respiratory: Negative  Cardiovascular: Negative  Gastrointestinal: Negative  Genitourinary: Negative  Neurological: Positive for weakness  Hematological: Negative          Historical Information   Past Medical History:   Diagnosis Date    Arthritis     BPH (benign prostatic hyperplasia)     last assessed 4/29/2014    Diabetes mellitus ([facility] [address] )     Disease of thyroid gland     GERD (gastroesophageal reflux disease)     Hearing aid worn     Hyperlipidemia     Hypertension     Hypothyroidism     Mumps     Prostate cancer ([facility] [address] )     Tingling sensation     bilateral feet occassionally    Tinnitus     Wears glasses     Wears partial dentures     upper     Past Surgical History:   Procedure Laterality Date    CATARACT EXTRACTION Bilateral 2000    EYE SURGERY      JOINT REPLACEMENT      KNEE ARTHROSCOPY Right 06/26/2009    knee    DC LAP,PROSTATECTOMY,RADICAL,W/NERVE SPARE,INCL ROBOTIC N/A 2/7/2018    Procedure: ROBOTIC ASSISTED LAPAROSCOPIC PROSTATECTOMY; BILATERAL PELVIC LYMPH NODE DISSECTION;  Surgeon: Zahraa Pederson MD;  Location: [facility];  Service: Urology    DC REPAIR [address] HERNIA,5+Y/O,REDUCIBL Right 4/19/2019    Procedure: REPAIR HERNIA INGUINAL;  Surgeon: Gabi Polanco DO; Location: [facility];  Service: General    PROSTATE BIOPSY  11/07/2017    needle biopsy of prostate    TRIGGER FINGER RELEASE  05/2013    trigger thumb     Social History   Social History     Substance and Sexual Activity   Alcohol Use Yes    Alcohol/week: 2 0 standard drinks    Types: 2 Glasses of wine per week    Comment: week, social drinker     Social History     Substance and Sexual Activity   Drug Use No     E-Cigarette/Vaping    E-Cigarette Use Never User      E-Cigarette/Vaping Substances    Nicotine No     THC No     CBD No     Flavoring No     Other No     Unknown No      Social History     Tobacco Use   Smoking Status Former Smoker   Smokeless Tobacco Never Used   Tobacco Comment    quit about 50 years ago     Family History:   Family History   Problem Relation Age of Onset    Cancer Mother     Diabetes Mother     Uterine cancer Mother     Diabetes Father     Cancer Father     Stroke Father         of unknown cause    Hypertension Father     Prostate cancer Father     Diabetes Family         Uncle, DM    Cancer Family         Grandmother         Meds/Allergies   PTA meds:   Prior to Admission Medications   Prescriptions Last Dose Informant Patient Reported? Taking? Levothyroxine Sodium 88 MCG CAPS  Self No No   Sig: Take 1 capsule (88 mcg total) by mouth daily for 90 days   Multiple Vitamin (MULTIVITAMIN) tablet 3/3/2021 at Unknown time Self Yes Yes   Sig: Take 1 tablet by mouth daily  PATIENT MAINTAINED INSULIN PUMP 3/3/2021 at Unknown time Self Yes Yes   Sig: Inject 0 4 each under the skin continuous    aspirin 81 MG tablet 3/3/2021 at Unknown time Self Yes Yes   Sig: Take 81 mg by mouth daily    lisinopril (ZESTRIL) 40 mg tablet 3/3/2021 at Unknown time Self Yes Yes   Sig: Take 40 mg by mouth daily     simvastatin (ZOCOR) 20 mg tablet 3/2/2021 at Unknown time Self Yes Yes   Sig: Take 20 mg by mouth daily at bedtime        Facility-Administered Medications: None       No Known Allergies    Objective   Vitals:Blood pressure 167/90, pulse 67, temperature 98 7 °F (37 1 °C), resp  rate 18, weight 69 2 kg (152 lb 8 9 oz), SpO2 97 %  ,Body mass index is 24 62 kg/m²  No intake or output data in the 24 hours ending 03/03/21 1602    Invasive Devices: Invasive Devices     Peripheral Intravenous Line            Peripheral IV 03/03/21 Left Antecubital less than 1 day                Physical Exam  Neurologic Exam     At the time of my eval he was awake, alert, and in no distress  There was no dysarthria or aphasia  Extra occular movements were intact with no significant nystagmus  Tongue was midline, face was symmetric appearing  There was left sided weakness compared to the right  Lab Results:   CBC:   Results from last 7 days   Lab Units 03/03/21  0742   WBC Thousand/uL 4 46   RBC Million/uL 3 63*   HEMOGLOBIN g/dL 11 4*   HEMATOCRIT % 35 0*   MCV fL 96   PLATELETS Thousands/uL 191   , BMP/CMP:   Results from last 7 days   Lab Units 03/03/21  1212 03/03/21  0742   SODIUM mmol/L 133* 129*   POTASSIUM mmol/L  --  4 0   CHLORIDE mmol/L  --  97*   CO2 mmol/L  --  25   BUN mg/dL  --  25   CREATININE mg/dL  --  1 61*   CALCIUM mg/dL  --  8 8   AST U/L  --  21   ALT U/L  --  25   ALK PHOS U/L  --  89   EGFR ml/min/1 73sq m  --  42   , HgBA1C:   , Coagulation:   Results from last 7 days   Lab Units 03/03/21  0742   INR  1 13   , Lipid Profile:     Imaging Studies: I have personally reviewed pertinent films in PACS    VTE Prophylaxis: Heparin    Code Status: Level 1 - Full Code  Advance Directive and Living Will: Yes    Power of :    POLST:      Counseling / Coordination of Care  Total time spent today 44 minutes  Greater than 50% of total time was spent with the patient and / or family counseling and / or coordination of care   A description of the counseling / coordination of care: is as above

## 2021-03-03 NOTE — ED PROVIDER NOTES
History  Chief Complaint   Patient presents with    STROKE Alert     patient woke up at 0700 with left sided weakness and decreased sensation  reports dragging left foot yesterday  positive for left arm and leg drift, and decreased senstation  patient on ASA     This is a 70year old male that presented to the ED this AM via EMS - [address] called PTA due to left sided weakness    Pt is alert and oriented - hx of diabetes, hypothyroid, hypertension and hyperlipidemia  Denies hx of MI or prior CVA    Reports that he was having ambulating yesterday as his left leg was weak - reports that he woke up in the middle of the night - which he reports was 6 hours ago - and had left sided weakness  Also with decreased sensation of the left extremities and left arm drift    Denies CP or shortness of breath - deneis recent illness    Denies bowel or bladder incontinence    Hypertensive on arrival however below permissive blood pressure standard of 220/110    When pt returned from CT I assessed him again - reports that when he woke up at 1230 am to go the bathroom he noticed that he had the weakness          Prior to Admission Medications   Prescriptions Last Dose Informant Patient Reported? Taking? Levothyroxine Sodium 88 MCG CAPS  Self No No   Sig: Take 1 capsule (88 mcg total) by mouth daily for 90 days   Multiple Vitamin (MULTIVITAMIN) tablet  Self Yes No   Sig: Take 1 tablet by mouth daily  aspirin 81 MG tablet  Self Yes No   Sig: Take 81 mg by mouth daily    lisinopril (ZESTRIL) 40 mg tablet  Self Yes No   Sig: Take 40 mg by mouth daily     simvastatin (ZOCOR) 20 mg tablet  Self Yes No   Sig: Take 20 mg by mouth daily at bedtime        Facility-Administered Medications: None       Past Medical History:   Diagnosis Date    Arthritis     BPH (benign prostatic hyperplasia)     last assessed 4/29/2014    Diabetes mellitus ([facility] [address] )     Disease of thyroid gland     GERD (gastroesophageal reflux disease)     Hearing aid worn     Hyperlipidemia     Hypertension     Hypothyroidism     Mumps     Prostate cancer ([address]     Tingling sensation     bilateral feet occassionally    Tinnitus     Wears glasses     Wears partial dentures     upper       Past Surgical History:   Procedure Laterality Date    CATARACT EXTRACTION Bilateral 2000    EYE SURGERY      JOINT REPLACEMENT      KNEE ARTHROSCOPY Right 06/26/2009    knee    [place] LAP,PROSTATECTOMY,RADICAL,W/NERVE SPARE,INCL ROBOTIC N/A 2/7/2018    Procedure: ROBOTIC ASSISTED LAPAROSCOPIC PROSTATECTOMY; BILATERAL PELVIC LYMPH NODE DISSECTION;  Surgeon: Ro Rizo MD;  [facility]: [facility];  Service: Urology    [place] REPAIR ING HERNIA,5+Y/O,REDUCIBL Right 4/19/2019    Procedure: REPAIR HERNIA INGUINAL;  Surgeon: Dolores Whaley DO;  Location: [facility];  Service: General    PROSTATE BIOPSY  11/07/2017    needle biopsy of prostate    TRIGGER FINGER RELEASE  05/2013    trigger thumb       Family History   Problem Relation Age of Onset    Cancer Mother     Diabetes Mother     Uterine cancer Mother     Diabetes Father     Cancer Father     Stroke Father         of unknown cause    Hypertension Father     Prostate cancer Father     Diabetes Family         Uncle, DM    Cancer Family         Grandmother     I have reviewed and agree with the history as documented  E-Cigarette/Vaping    E-Cigarette Use Never User      E-Cigarette/Vaping Substances    Nicotine No     THC No     CBD No     Flavoring No     Other No     Unknown No      Social History     Tobacco Use    Smoking status: Former Smoker    Smokeless tobacco: Never Used    Tobacco comment: quit about 50 years ago   Substance Use Topics    Alcohol use:  Yes     Alcohol/week: 2 0 standard drinks     Types: 2 Glasses of wine per week     Comment: week, social drinker    Drug use: No       Review of Systems   Constitutional: Negative for activity change, appetite change, chills, diaphoresis, fatigue, fever and unexpected weight change  HENT: Negative for congestion, ear discharge, ear pain, mouth sores, sinus pressure, sinus pain, sneezing, sore throat, trouble swallowing and voice change  Eyes: Negative for photophobia, pain, discharge, redness, itching and visual disturbance  Respiratory: Negative for cough, chest tightness and shortness of breath  Cardiovascular: Negative for chest pain, palpitations and leg swelling  Gastrointestinal: Negative for abdominal pain, constipation, nausea and vomiting  Endocrine: Negative for cold intolerance, heat intolerance, polydipsia, polyphagia and polyuria  Genitourinary: Negative for decreased urine volume, difficulty urinating, dysuria, frequency, hematuria and urgency  Musculoskeletal: Negative for arthralgias, back pain, gait problem, joint swelling, myalgias, neck pain and neck stiffness  Skin: Negative for color change and rash  Allergic/Immunologic: Negative for immunocompromised state  Neurological: Positive for weakness  Negative for dizziness, tremors, seizures, syncope, speech difficulty, light-headedness, numbness and headaches  Decreased left sided sensation   Hematological: Does not bruise/bleed easily  Psychiatric/Behavioral: Negative for behavioral problems and suicidal ideas  Physical Exam  Physical Exam  Vitals signs and nursing note reviewed  Constitutional:       General: He is not in acute distress  Appearance: Normal appearance  He is well-developed and normal weight  He is not ill-appearing, toxic-appearing or diaphoretic  HENT:      Head: Normocephalic and atraumatic  Right Ear: Tympanic membrane, ear canal and external ear normal  There is no impacted cerumen  Left Ear: Tympanic membrane, ear canal and external ear normal  There is no impacted cerumen  Nose: Nose normal  No congestion or rhinorrhea        Mouth/Throat:      Mouth: Mucous membranes are moist       Pharynx: Oropharynx is clear  No oropharyngeal exudate or posterior oropharyngeal erythema  Eyes:      General: No scleral icterus  Right eye: No discharge  Left eye: No discharge  Extraocular Movements: Extraocular movements intact  Conjunctiva/sclera: Conjunctivae normal       Pupils: Pupils are equal, round, and reactive to light  Neck:      Musculoskeletal: Normal range of motion and neck supple  No neck rigidity or muscular tenderness  Vascular: No JVD  Trachea: No tracheal deviation  Cardiovascular:      Rate and Rhythm: Normal rate and regular rhythm  Heart sounds: Normal heart sounds  No murmur  Pulmonary:      Effort: Pulmonary effort is normal  No respiratory distress  Breath sounds: Rhonchi (bilateral bases  left greater than right) present  No wheezing or rales  Chest:      Chest wall: No tenderness  Abdominal:      General: Bowel sounds are normal       Palpations: Abdomen is soft  There is no mass  Tenderness: There is no abdominal tenderness  There is no right CVA tenderness, left CVA tenderness or guarding  Hernia: No hernia is present  Musculoskeletal: Normal range of motion  General: No swelling, tenderness, deformity or signs of injury  Right lower leg: No edema  Left lower leg: No edema  Lymphadenopathy:      Cervical: No cervical adenopathy  Skin:     General: Skin is warm and dry  Capillary Refill: Capillary refill takes less than 2 seconds  Findings: No bruising, erythema or rash  Neurological:      Mental Status: He is alert and oriented to person, place, and time  Mental status is at baseline  Cranial Nerves: No cranial nerve deficit  Sensory: Sensory deficit (decreased sensation left sided extremitiies) present  Motor: Weakness (left arm,  and left leg) present  No abnormal muscle tone        Coordination: Coordination normal       Gait: Gait normal       Deep Tendon Reflexes: Reflexes normal  Psychiatric:         Mood and Affect: Mood normal          Behavior: Behavior normal          Thought Content:  Thought content normal          Judgment: Judgment normal          Vital Signs  ED Triage Vitals   Temp Pulse Respirations Blood Pressure SpO2   -- 03/03/21 0725 03/03/21 0725 03/03/21 0725 03/03/21 0725    71 16 (!) 205/97 98 %      Temp src Heart Rate Source Patient Position - Orthostatic VS BP Location FiO2 (%)   -- 03/03/21 0725 03/03/21 0725 03/03/21 0733 --    Monitor Lying Right arm       Pain Score       --                  Vitals:    03/03/21 0725 03/03/21 0733   BP: (!) 205/97 (!) 205/97   Pulse: 71 71   Patient Position - Orthostatic VS: Lying Lying         Visual Acuity  Visual Acuity      Most Recent Value   L Pupil Size (mm)  3   R Pupil Size (mm)  3          ED Medications  Medications   iohexol (OMNIPAQUE) 350 MG/ML injection (SINGLE-DOSE) 100 mL (100 mL Intravenous Given 3/3/21 0733)       Diagnostic Studies  Results Reviewed     Procedure Component Value Units Date/Time    Hepatic function panel [[id number]]     Lab Status: No result Specimen: Blood     Basic metabolic panel [[id number]]     Lab Status: No result Specimen: Blood     CBC and Platelet [[id number]]     Lab Status: No result Specimen: Blood     Protime-INR [[id number]]     Lab Status: No result Specimen: Blood     APTT [[id number]]     Lab Status: No result Specimen: Blood     Troponin I [[id number]]     Lab Status: No result Specimen: Blood     [facility]OVID[facility] Influ[facility] [[id number]]     Lab Status: No result Specimen: Nasopharyngeal Swab                  CT stroke alert brain    (Results Pending)   CTA stroke alert (head/neck)    (Results Pending)   XR chest 1 view portable    (Results Pending)              Procedures  Procedures         ED Course  ED Course as of Mar 03 0820   Wed Mar 03, 2021   0732 Discussed case and presentation in while in CT scan - advised that he just reviewed the plain CT is negative for acute findings or bleed  NIH 6 - last well known time was yesterday - had problems with ambulating with left leg - reports that he woke up in the middle of the night - 6 hours PTA and had the left sided weakness and  sensation      0748 Received TT from Dr Benjamin Chu at 4348 - advised that there is no FVO - to follow stroke pathway  Not a candidate for dual antiplatelet therapy  0 This 70year old male presented via EMS with left sided weakness - CODE STROKE called - last well known time was last evening - woke up at 1230 am with the left sided weakness  CT and CTA negative for acute findings - NIH 6 - as per stroke neurologist pt is not a TPA candidate due to wake up time of symptoms being at 1230 AM - 6 5 hours prior to arrival - also no LVO and not a candidate for dual antiplatelet therapy  Allowing oremissive HTN - IV fluids at 125 ml per hour  Ordered  mg oral after dysphagia screen       0811 No acute findings on EKG - no acute finsings on CXR       0812 Creatinine(!): 1 61   0812 Has CKI with baseline creat appx 1 8 - improved today at 1 61  Discussed findings with patinet and wife - pt to be admitted to this campus under the stroke pathway  Received call from radiologist at [facility] regarding CT/CTA having no acute findings      0818 All imaging and/or lab testing discussed with patient  Patient and/or family members verbalizes understanding and agrees with plan for admission  Patient is stable for admission      Portions of the record may have been created with voice recognition software  Occasional wrong word or "sound a like" substitutions may have occurred due to the inherent limitations of voice recognition software  Read the chart carefully and recognize, using context, where substitutions have occurred                             Stroke Assessment     Row Name 21 [id number]             NIH Stroke Scale    Interval  Baseline      Level of Consciousness (1a )  0      LOC Questions (1b )  0 LOC Commands (1c )  0      Best Gaze (2 )  0      Visual (3 )  0      Facial Palsy (4 )  0      Motor Arm, Left (5a )  1      Motor Arm, Right (5b )  0      Motor Leg, Left (6a )  3      Motor Leg, Right (6b )  0      Limb Ataxia (7 )  1      Sensory (8 )  1      Best Language (9 )  0      Dysarthria (10 )  0      Extinction and Inattention (11 ) (Formerly Neglect)  0      Total  6                                  MDM  Number of Diagnoses or Management Options  Diagnosis management comments: ICH, CVA       Amount and/or Complexity of Data Reviewed  Clinical lab tests: ordered  Tests in the radiology section of CPT®: ordered  Obtain history from someone other than the patient: (EMS)  Discuss the patient with other providers: (Dr Mayra Graves - stroke neurologist)    Risk of Complications, Morbidity, and/or Mortality  Presenting problems: high        Disposition  Final diagnoses:   None     ED Disposition     None      Follow-up Information    None         Patient's Medications   Discharge Prescriptions    No medications on file     No discharge procedures on file      PDMP Review     None          ED Provider  Electronically Signed by           Glennis Lesch, MD  03/03/21 0137

## 2021-03-03 NOTE — ASSESSMENT & PLAN NOTE
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    · ECHO obtained and without significant structural abnormalities  · Continue high potency statin - recommendations for converting back to home simvastatin at 40 mg upon discharge  · Neurology with recommendations for discontinuation of ASA in favor of daily Clopidogrel  · Hemoglobin A1c ordered and pending - tight control devised  · Also with recommendations for good blood pressure control long-term  · PT/OT, ST services consulted  · Continue maintained on telemetry  · Initiate on PPI for GI prophylaxis  · Permissive hypertension  · Outpatient neurology follow-up  · Per official neurology evaluation, patient with well controlled vascular risk factors - consider cardioembolic source as well  However recommended against a ANDREW at this point as TTE was without structural abnormalities  Consider outpatient implantable loop recorder vs  Zio patch to rule out AFib as potential source

## 2021-03-03 NOTE — ASSESSMENT & PLAN NOTE
Potentially on basis of acute CVA vs  Dehydration - initial sodium 129  Decreased NS rate, repeat Na at 11am with gradual response to 133

## 2021-03-03 NOTE — ASSESSMENT & PLAN NOTE
Lab Results   Component Value Date    HGBA1C 6 8 (H) 09/22/2020       No results for input(s): POCGLU in the last 72 hours  Blood Sugar Average: Last 72 hrs:     Hemoglobin A1c well controlled  Utilizes insulin pump at home, which will be continued here  Maintain on ADA diet

## 2021-03-03 NOTE — CASE MANAGEMENT
Met with pt to discuss role as  in helping pt to develop discharge plan and to help pt carry out their plan  Pt's current LOS is  1 day   Pt is a Risk of Unplanned Readmission score of  14   Pt is not a bundle  Pt lives in a house with his wife  Pt has 0 INEZ  Pt has 13 steps on the inside  Pt has his bedroom on the 2nd floor  Pt has a full bathroom on 2nd floor and a powder room on the 1rst floor  Pt has no DME  Pt is independent with ADL's and functional mobility  Pt and his wife share the cooking  Pt and his wife both drive  Pt has never had home care or any services in the home   PCP : Jackie Broussard  Preferred Pharmacy: Pt uses the [facility] in [facility]  Pt gets most of his routine meds from the [address]  Pt has no barriers to getting his medications  Pt might benefit from Home care vs Short term rehab  I will continue to follow to see what PT are recommending for after care

## 2021-03-03 NOTE — SPEECH THERAPY NOTE
Speech-Language Pathology Bedside Swallow Evaluation    Patient Name: Naz Roche    ISPND'L Date: 3/3/2021     Problem List  Principal Problem:    CVA (cerebral vascular accident) [facility])  Active Problems:    DMII (diabetes mellitus, type 2) ([facility] [address] )    Hypothyroidism    GERD (gastroesophageal reflux disease)    Hyperlipidemia    Stage 3 chronic kidney disease    Hypertension    Hyponatremia      Past Medical History  Past Medical History:   Diagnosis Date    Arthritis     BPH (benign prostatic hyperplasia)     last assessed 4/29/2014    Diabetes mellitus ([facility] )     Disease of thyroid gland     GERD (gastroesophageal reflux disease)     Hearing aid worn     Hyperlipidemia     Hypertension     Hypothyroidism     Mumps     Prostate cancer ([facility] [address] [facility]     Tingling sensation     bilateral feet occassionally    Tinnitus     Wears glasses     Wears partial dentures     upper       Past Surgical History  Past Surgical History:   Procedure Laterality Date    CATARACT EXTRACTION Bilateral 2000    EYE SURGERY      JOINT REPLACEMENT      KNEE ARTHROSCOPY Right 06/26/2009    knee    [place] LAP,PROSTATECTOMY,RADICAL,W/NERVE SPARE,INCL ROBOTIC N/A 2/7/2018    Procedure: ROBOTIC ASSISTED LAPAROSCOPIC PROSTATECTOMY; BILATERAL PELVIC LYMPH NODE DISSECTION;  Surgeon: Awais Pineda MD;  Location: [facility];  Service: Urology    [place] REPAIR [address] HERNIA,5+Y/O,REDUCIBL Right 4/19/2019    Procedure: REPAIR HERNIA INGUINAL;  Surgeon: Helena Reich DO;  Location: [facility];  Service: General    PROSTATE BIOPSY  11/07/2017    needle biopsy of prostate    TRIGGER FINGER RELEASE  05/2013    trigger thumb       Summary  Pt presented with minimally impaired to functional appearing oral and pharyngeal stage swallowing skills with materials administered today  Pt does appear to have a mild L facial weakness (in addition to LUE and LLE weakness), however speech is clear and intelligible   No s/s aspiration noted with swallow of thin liquids, soft, or regular solids  Pharyngeal swallow initiation may be mildly delayed  ST will f/u briefly for consistency of regular diet tolerance  Risk/s for Aspiration: suspect low    Recommended Diet: regular diet and thin liquids   Recommended Form of Meds: whole with liquid   Aspiration precautions and swallowing strategies: upright posture      Current Medical Status per neurology consult 3/3/21  Jf Segal is a 70 y o  male who presents as a stroke alert  He has multiple vascular risk factors as below  Reportedly he went to bed in his normal state last night and awoke 6 hours PTA with L sided weakness  He also had some LLE dragging yesterday  There is some L sided weakness and sensory loss on exam with no significant neglect or gaze preference  NIHSS is 6    Allergies:  No known allergies    Special Studies:  MRI brain 3/3/21 IMPRESSION:  1  Acute anterior right medullary infarct  No significant edema or mass effect  No hemorrhagic transformation  2   Chronic microangiopathy  CXR 3/3/21 IMPRESSION:  No acute cardiopulmonary disease  Social/Education/Vocational Hx:  Pt lives with spouse    Swallow Information   Current Risks for Dysphagia & Aspiration: CVA  Current Diet: regular diet and thin liquids (Neuro note indicated NPO until seen by speech)  Baseline Diet: regular diet and thin liquids      Baseline Assessment   Behavior/Cognition: alert  Speech/Language Status: able to participate in conversation and not able to to follow commands  Patient Positioning: upright in bed  Pain Status/Interventions/Response to Interventions: No report of or nonverbal indications of pain         Swallow Mechanism Exam  Facial: symmetrical  Labial: decreased ROM left side  Lingual: WFL  Velum: symmetrical  Mandible: adequate ROM  Dentition: adequate  Vocal quality:clear/adequate   Volitional Cough: strong/productive   Respiratory Status: on RA       Consistencies Assessed and Performance Consistencies Administered: thin liquids, puree and hard solids    Oral Stage: minimal  Mastication was adequate with the materials administered today  Bolus formation and transfer were functional, mild oral residue noted which cleared with a liquid wash  No overt s/s reduced oral control  Pharyngeal Stage: minimal  Swallow Mechanics: Swallowing initiation appeared mildly delayed to prompt  Laryngeal rise was palpated and judged to be within functional limits  No coughing, throat clearing, change in vocal quality or respiratory status noted today  Esophageal Concerns: hx of GERD      Summary and Recommendations (see above)    Results Reviewed with: patient     Treatment Recommended: yes, brief     Frequency of treatment: x1-2 visits      Dysphagia LTG  -Patient will demonstrate safe and effective oral intake (without overt s/s significant oral/pharyngeal dysphagia including s/s penetration or aspiration) for the highest appropriate diet level  Short Term Goals:  -Pt will tolerate regular diet and thin liquid with no significant s/s oral or pharyngeal dysphagia across 1-3 diagnostic sessions

## 2021-03-03 NOTE — H&P
H&P- Tracey Barraza 1945, 70 y o  male MRN: [id number]    Unit/Bed#: 406-01 Encounter: [id number]    Primary Care Provider: Rad Bianchi DO   Date and time admitted to hospital: 3/3/2021  7:30 AM        * CVA (cerebral vascular accident) [facility])  Assessment & Plan  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    · ECHO obtained and without significant structural abnormalities  · Continue high potency statin - recommendations for converting back to home simvastatin at 40 mg upon discharge  · Neurology with recommendations for discontinuation of ASA in favor of daily Clopidogrel  · Hemoglobin A1c ordered and pending - tight control devised  · Also with recommendations for good blood pressure control long-term  · PT/OT, ST services consulted  · Continue maintained on telemetry  · Initiate on PPI for GI prophylaxis  · Permissive hypertension  · Outpatient neurology follow-up  · Per official neurology evaluation, patient with well controlled vascular risk factors - consider cardioembolic source as well  However recommended against a ANDREW at this point as TTE was without structural abnormalities  Consider outpatient implantable loop recorder vs  Zio patch to rule out AFib as potential source  DMII (diabetes mellitus, type 2) ([facility] )  Assessment & Plan  Lab Results   Component Value Date    HGBA1C 6 8 (H) 09/22/2020       No results for input(s): POCGLU in the last 72 hours  Blood Sugar Average: Last 72 hrs:     Hemoglobin A1c well controlled  Utilizes insulin pump at home, which will be continued here    Maintain on ADA diet     Hypothyroidism  Assessment & Plan  Recheck TSH  Continue thyroid replacement therapy  GERD (gastroesophageal reflux disease)  Assessment & Plan  Initiate on PPI therapy, especially given need for antiplatelet therapy in changed to clopidogrel  Hyperlipidemia  Assessment & Plan  Change to high potency statin while hospitalized  As per Neurology, as patient has already been stabilized on simvastatin, will be discharged on 40 mg dosing of Zocor time of discharge  Hyponatremia  Assessment & Plan  Potentially on basis of acute CVA vs  Dehydration - initial sodium 129  Decreased NS rate, repeat Na at 11am with gradual response to 133  Hypertension  Assessment & Plan  Hold ACE-I to allow for permissive hypertension  IV Hydralazine as needed for SBP sustained >200  Stage 3 chronic kidney disease  Assessment & Plan  Lab Results   Component Value Date    EGFR 42 03/03/2021    EGFR 35 09/22/2020    EGFR 37 03/12/2020    CREATININE 1 61 (H) 03/03/2021    CREATININE 1 87 (H) 09/22/2020    CREATININE 1 81 (H) 03/12/2020     CKD 3  Current creatinine appears to be at baseline  Currently with ACE-inhibitor on hold to allow permissive hypertension the setting of acute CVA  Monitor renal function, avoid nephrotoxins, and renally dose medications when appropriate  VTE Prophylaxis: Heparin  / sequential compression device   Code Status: Full  POLST: POLST form is not discussed and not completed at this time  Discussion with family: Yes    Anticipated Length of Stay:  Patient will be admitted on an Inpatient basis with an anticipated length of stay of  > 2 midnights  Justification for Hospital Stay:  Evaluation and treatment of suspected acute CVA  Total Time for Visit, including Counseling / Coordination of Care: 1 hour  Greater than 50% of this total time spent on direct patient counseling and coordination of care  Chief Complaint:   Left-sided upper and lower extremity deficits      History of Present Illness:    Very pleasant 68-year-old man with past medical history significant for DM, being [facility] ED on 03/03/2021 with stroke-like symptoms  Mr Shabnam Merlos has past medical history significant for well-controlled IDDM, HTN and dyslipidemia, CKD 3, and GERD  His other history includes hypothyroidism, OA, mention of prostate CA, and sensorineural hearing loss  The patient presented to the ED with onset of left upper and left lower extremity weakness  He reports that on the night prior he had began to experience some imbalance and gait issues, specifically reported as left leg limping  He awoke to use the bathroom at 12:30 in the morning, and noted both left upper and lower extremity deficits with weakness  However he decided to go back to bed, but as his symptoms had not resolved in the morning the patient presented to ED for further evaluation  CT of the head was negative, but symptoms were typical for CVA - he was unfortunately outside the time range for tPA  The patient was referred for admission for further evaluation treatment  Review of Systems:    Review of Systems   Constitutional: Negative for chills and fever  HENT: Positive for hearing loss  Chronic hearing loss  Respiratory: Negative for chest tightness and shortness of breath  Cardiovascular: Negative for chest pain  Gastrointestinal: Negative for abdominal pain  Neurological: Positive for facial asymmetry and weakness         Past Medical and Surgical History:     Past Medical History:   Diagnosis Date    Arthritis     BPH (benign prostatic hyperplasia)     last assessed 4/29/2014    Diabetes mellitus ([address] )     Disease of thyroid gland     GERD (gastroesophageal reflux disease)     Hearing aid worn     Hyperlipidemia     Hypertension     Hypothyroidism     Mumps     Prostate cancer (HCC)     Tingling sensation     bilateral feet occassionally    Tinnitus  Wears glasses     Wears partial dentures     upper       Past Surgical History:   Procedure Laterality Date    CATARACT EXTRACTION Bilateral 2000    EYE SURGERY      JOINT REPLACEMENT      KNEE ARTHROSCOPY Right 06/26/2009    knee    ID LAP,PROSTATECTOMY,RADICAL,W/NERVE SPARE,INCL ROBOTIC N/A 2/7/2018    Procedure: ROBOTIC ASSISTED LAPAROSCOPIC PROSTATECTOMY; BILATERAL PELVIC LYMPH NODE DISSECTION;  Surgeon: Erika Barraza MD;  Location: [facility];  Service: Urology    ID REPAIR [address] HERNIA,5+Y/O,REDUCIBL Right 4/19/2019    Procedure: REPAIR HERNIA INGUINAL;  Surgeon: Sarai Cox DO;  Location: [facility];  Service: General    PROSTATE BIOPSY  11/07/2017    needle biopsy of prostate    TRIGGER FINGER RELEASE  05/2013    trigger thumb       Meds/Allergies:    Prior to Admission medications    Medication Sig Start Date End Date Taking? Authorizing Provider   aspirin 81 MG tablet Take 81 mg by mouth daily    Yes Historical Provider, MD   lisinopril (ZESTRIL) 40 mg tablet Take 40 mg by mouth daily     Yes Historical Provider, MD   Multiple Vitamin (MULTIVITAMIN) tablet Take 1 tablet by mouth daily  Yes Historical Provider, MD   PATIENT MAINTAINED INSULIN PUMP Inject 0 4 each under the skin continuous    Yes Historical Provider, MD   simvastatin (ZOCOR) 20 mg tablet Take 20 mg by mouth daily at bedtime  Yes Historical Provider, MD   Levothyroxine Sodium 88 MCG CAPS Take 1 capsule (88 mcg total) by mouth daily for 90 days 10/4/18 10/1/20  Arti Lal DO     I have reviewed home medications using allscripts      Allergies: No Known Allergies    Social History:     Marital Status: /Civil Union   Patient Pre-hospital Living Situation:  Lives with spouse  Patient Pre-hospital Level of Mobility:  Independent  Patient Pre-hospital Diet Restrictions:  Diabetic  Substance Use History:   Social History     Substance and Sexual Activity   Alcohol Use Yes    Alcohol/week: 2 0 standard drinks  Types: 2 Glasses of wine per week    Comment: week, social drinker     Social History     Tobacco Use   Smoking Status Former Smoker   Smokeless Tobacco Never Used   Tobacco Comment    quit about 50 years ago     Social History     Substance and Sexual Activity   Drug Use No       Family History:    non-contributory    Physical Exam:     Vitals:   Blood Pressure: 167/90 (03/03/21 1507)  Pulse: 67 (03/03/21 1507)  Temperature: 98 7 °F (37 1 °C) (03/03/21 1507)  Temp Source: Temporal (03/03/21 1000)  Respirations: 18 (03/03/21 1507)  Weight - Scale: 69 2 kg (152 lb 8 9 oz) (03/03/21 0755)  SpO2: 97 % (03/03/21 1507)    Physical Exam  Constitutional:       General: He is not in acute distress  Appearance: Normal appearance  He is normal weight  He is not ill-appearing or toxic-appearing  Neck:      Musculoskeletal: Neck supple  Cardiovascular:      Rate and Rhythm: Normal rate and regular rhythm  Heart sounds: No murmur  Pulmonary:      Effort: Pulmonary effort is normal       Breath sounds: Normal breath sounds  No rhonchi  Abdominal:      General: Abdomen is flat  Bowel sounds are normal       Palpations: Abdomen is soft  Tenderness: There is no abdominal tenderness  There is no guarding or rebound  Musculoskeletal:         General: No swelling or tenderness  Skin:     General: Skin is warm and dry  Neurological:      Mental Status: He is alert and oriented to person, place, and time  Motor: Weakness present  Comments: Slight left-sided facial droop  Left upper and lower extremity weakness noted, with strength 4/5  Psychiatric:         Mood and Affect: Mood normal          Behavior: Behavior normal          Thought Content: Thought content normal          Judgment: Judgment normal         Additional Data:     Lab Results: I have personally reviewed pertinent reports        Results from last 7 days   Lab Units 03/03/21  0742   WBC Thousand/uL 4 46   HEMOGLOBIN g/dL 11 4* HEMATOCRIT % 35 0*   PLATELETS Thousands/uL 191     Results from last 7 days   Lab Units 03/03/21  1212 03/03/21  0742   SODIUM mmol/L 133* 129*   POTASSIUM mmol/L  --  4 0   CHLORIDE mmol/L  --  97*   CO2 mmol/L  --  25   BUN mg/dL  --  25   CREATININE mg/dL  --  1 61*   ANION GAP mmol/L  --  7   CALCIUM mg/dL  --  8 8   ALBUMIN g/dL  --  3 4*   TOTAL BILIRUBIN mg/dL  --  0 50   ALK PHOS U/L  --  89   ALT U/L  --  25   AST U/L  --  21   GLUCOSE RANDOM mg/dL  --  241*     Results from last 7 days   Lab Units 03/03/21  0742   INR  1 13                   Imaging: I have personally reviewed pertinent reports  MRI brain wo contrast   Final Result by Luz Marina Cardozo MD (03/03 1316)      1  Acute anterior right medullary infarct  No significant edema or mass effect  No hemorrhagic transformation  2   Chronic microangiopathy  The study was marked in [facility] for immediate notification  Workstation performed: [id number]         XR chest 1 view portable   Final Result by Bethene Lesches, MD (03/03 2046)      No acute cardiopulmonary disease  Workstation performed: [id number]         CTA stroke alert (head/neck)   Final Result by  (03/03 9893)   Addendum 1 of 1 by Omaira Garcia MD (03/03 1142)   ADDENDUM:      Correction: Findings were directly discussed with SIMONE Collins on    3/3/2021 8:16 AM       Final      CT stroke alert brain   Final Result by Omaira Garcia MD (03/03 1053)      No acute intracranial abnormality  Mild cerebral chronic microangiopathic disease  Stable ventriculomegaly, slightly disproportionate to degree of generalized cerebral sulcal prominence  Findings were directly discussed with SIMONE Collins on 3/3/2021 8:16 AM       Workstation performed: [id number]             [facility] Reviewed: Yes     ** Please Note: This note has been constructed using a voice recognition system   **

## 2021-03-03 NOTE — QUICK NOTE
Alis Booker          Assessment/Plan   Assessment: Jf Cornell is a 69 y/o male presenting with likely acute ischemic stroke    TPA Decision: Patient not a TPA candidate  Unclear time of onset outside appropriate time window  Unfortunately as he awoke with sx 6 hours prior to presentation he does not qualify for alteplase in wake-up stroke  Plan: -Admit on stroke pathway  -initiate post stroke protocol including Vs, Neuro checks, and monitor for angioedema  -NPO pending dysphagia screen  -Asa 325mg PO X1 and then 81mg daily  -Permissive HTN to max bp 220/110  -high dose statin  -Check FLP, A1c, 2d Echo  -MRI brain  -PT/OT/SP    History of Present Illness     Reason for Consult / Principal Problem: stroke alert  Hx and PE limited by: none  Patient last known well: Last night at [facility]  Stroke alert called: 3/3/2021 0716  Neurology time of arrival: 3/3/2021 [id number] by phone    HPI: Alis Booker is a 70 y o  male who presents as a stroke alert  He has multiple vascular risk factors as below  Reportedly he went to bed in his normal state last night and awoke 6 hours PTA with L sided weakness  He also had some LLE dragging yesterday  There is some L sided weakness and sensory loss on exam with no significant neglect or gaze preference  NIHSS is 6    I personally reviewed the non-contrast head CT and there is no evidence of ICH or large territorial infarction  I also reviewed the CTA and there is no LVO or flow limiting stenosis      Past Medical History:   Diagnosis Date    Arthritis     BPH (benign prostatic hyperplasia)     last assessed 4/29/2014    Diabetes mellitus [facility]     Disease of thyroid gland     GERD (gastroesophageal reflux disease)     Hearing aid worn     Hyperlipidemia     Hypertension     Hypothyroidism     Mumps     Prostate cancer (HCC)     Tingling sensation     bilateral feet occassionally    Tinnitus     Wears glasses     Wears partial dentures     upper       Social History     Socioeconomic History    Marital status: /Civil Union     Spouse name: Not on file    Number of children: Not on file    Years of education: Not on file    Highest education level: Not on file   Occupational History    Occupation: printer  retired   Social Needs    Financial resource strain: Not on file    Food insecurity     Worry: Not on file     Inability: Not on file   [employer] needs     Medical: Not on file     Non-medical: Not on file   Tobacco Use    Smoking status: Former Smoker    Smokeless tobacco: Never Used    Tobacco comment: quit about 50 years ago   Substance and Sexual Activity    Alcohol use:  Yes     Alcohol/week: 2 0 standard drinks     Types: 2 Glasses of wine per week     Comment: week, social drinker    Drug use: No    Sexual activity: Not on file   Lifestyle    Physical activity     Days per week: Not on file     Minutes per session: Not on file    Stress: Not on file   Relationships    Social connections     Talks on phone: Not on file     Gets together: Not on file     Attends [employer] service: Not on file     Active member of club or organization: Not on file     Attends meetings of clubs or organizations: Not on file     Relationship status: Not on file    Intimate partner violence     Fear of current or ex partner: Not on file     Emotionally abused: Not on file     Physically abused: Not on file     Forced sexual activity: Not on file   Other Topics Concern    Not on file   Social History Narrative    Always uses seat belt    Caffeine use    Lives independently with spouse    Retired    Sun protection sunscreen         Family History   Problem Relation Age of Onset    Cancer Mother     Diabetes Mother     Uterine cancer Mother     Diabetes Father     Cancer Father     Stroke Father         of unknown cause    Hypertension Father     Prostate cancer Father     Diabetes Family         Uncle, DM    Cancer Family         Grandmother       Meds/Allergies   PTA meds:   Prior to Admission Medications   Prescriptions Last Dose Informant Patient Reported? Taking? Levothyroxine Sodium 88 MCG CAPS  Self No No   Sig: Take 1 capsule (88 mcg total) by mouth daily for 90 days   Multiple Vitamin (MULTIVITAMIN) tablet  Self Yes No   Sig: Take 1 tablet by mouth daily  aspirin 81 MG tablet  Self Yes No   Sig: Take 81 mg by mouth daily    lisinopril (ZESTRIL) 40 mg tablet  Self Yes No   Sig: Take 40 mg by mouth daily     simvastatin (ZOCOR) 20 mg tablet  Self Yes No   Sig: Take 20 mg by mouth daily at bedtime  Facility-Administered Medications: None         No data found

## 2021-03-04 LAB
ANION GAP SERPL CALCULATED.3IONS-SCNC: 9 MMOL/L (ref 4–13)
BASOPHILS # BLD AUTO: 0.07 THOUSANDS/ΜL (ref 0–0.1)
BASOPHILS NFR BLD AUTO: 1 % (ref 0–1)
BUN SERPL-MCNC: 27 MG/DL (ref 5–25)
CALCIUM SERPL-MCNC: 9.1 MG/DL (ref 8.3–10.1)
CHLORIDE SERPL-SCNC: 100 MMOL/L (ref 100–108)
CHOLEST SERPL-MCNC: 136 MG/DL (ref 50–200)
CO2 SERPL-SCNC: 27 MMOL/L (ref 21–32)
CREAT SERPL-MCNC: 1.62 MG/DL (ref 0.6–1.3)
EOSINOPHIL # BLD AUTO: 0.26 THOUSAND/ΜL (ref 0–0.61)
EOSINOPHIL NFR BLD AUTO: 4 % (ref 0–6)
ERYTHROCYTE [DISTWIDTH] IN BLOOD BY AUTOMATED COUNT: 13.2 % (ref 11.6–15.1)
EST. AVERAGE GLUCOSE BLD GHB EST-MCNC: 163 MG/DL
GFR SERPL CREATININE-BSD FRML MDRD: 41 ML/MIN/1.73SQ M
GLUCOSE SERPL-MCNC: 204 MG/DL (ref 65–140)
GLUCOSE SERPL-MCNC: 223 MG/DL (ref 65–140)
GLUCOSE SERPL-MCNC: 233 MG/DL (ref 65–140)
GLUCOSE SERPL-MCNC: 290 MG/DL (ref 65–140)
HBA1C MFR BLD: 7.3 %
HCT VFR BLD AUTO: 39.7 % (ref 36.5–49.3)
HDLC SERPL-MCNC: 62 MG/DL
HGB BLD-MCNC: 13 G/DL (ref 12–17)
IMM GRANULOCYTES # BLD AUTO: 0.02 THOUSAND/UL (ref 0–0.2)
IMM GRANULOCYTES NFR BLD AUTO: 0 % (ref 0–2)
LDLC SERPL CALC-MCNC: 62 MG/DL (ref 0–100)
LYMPHOCYTES # BLD AUTO: 1.1 THOUSANDS/ΜL (ref 0.6–4.47)
LYMPHOCYTES NFR BLD AUTO: 16 % (ref 14–44)
MAGNESIUM SERPL-MCNC: 1.9 MG/DL (ref 1.6–2.6)
MCH RBC QN AUTO: 30.9 PG (ref 26.8–34.3)
MCHC RBC AUTO-ENTMCNC: 32.7 G/DL (ref 31.4–37.4)
MCV RBC AUTO: 94 FL (ref 82–98)
MONOCYTES # BLD AUTO: 0.58 THOUSAND/ΜL (ref 0.17–1.22)
MONOCYTES NFR BLD AUTO: 8 % (ref 4–12)
NEUTROPHILS # BLD AUTO: 5.07 THOUSANDS/ΜL (ref 1.85–7.62)
NEUTS SEG NFR BLD AUTO: 71 % (ref 43–75)
NONHDLC SERPL-MCNC: 74 MG/DL
NRBC BLD AUTO-RTO: 0 /100 WBCS
PLATELET # BLD AUTO: 224 THOUSANDS/UL (ref 149–390)
PMV BLD AUTO: 10 FL (ref 8.9–12.7)
POTASSIUM SERPL-SCNC: 4.1 MMOL/L (ref 3.5–5.3)
RBC # BLD AUTO: 4.21 MILLION/UL (ref 3.88–5.62)
SODIUM SERPL-SCNC: 136 MMOL/L (ref 136–145)
TRIGL SERPL-MCNC: 62 MG/DL
WBC # BLD AUTO: 7.1 THOUSAND/UL (ref 4.31–10.16)

## 2021-03-04 PROCEDURE — 82948 REAGENT STRIP/BLOOD GLUCOSE: CPT

## 2021-03-04 PROCEDURE — 80061 LIPID PANEL: CPT | Performed by: INTERNAL MEDICINE

## 2021-03-04 PROCEDURE — 97163 PT EVAL HIGH COMPLEX 45 MIN: CPT

## 2021-03-04 PROCEDURE — 97167 OT EVAL HIGH COMPLEX 60 MIN: CPT

## 2021-03-04 PROCEDURE — 99232 SBSQ HOSP IP/OBS MODERATE 35: CPT | Performed by: INTERNAL MEDICINE

## 2021-03-04 PROCEDURE — 80048 BASIC METABOLIC PNL TOTAL CA: CPT | Performed by: INTERNAL MEDICINE

## 2021-03-04 PROCEDURE — 83036 HEMOGLOBIN GLYCOSYLATED A1C: CPT | Performed by: INTERNAL MEDICINE

## 2021-03-04 PROCEDURE — 83735 ASSAY OF MAGNESIUM: CPT | Performed by: INTERNAL MEDICINE

## 2021-03-04 PROCEDURE — 92526 ORAL FUNCTION THERAPY: CPT

## 2021-03-04 PROCEDURE — 85025 COMPLETE CBC W/AUTO DIFF WBC: CPT | Performed by: INTERNAL MEDICINE

## 2021-03-04 RX ORDER — LISINOPRIL 20 MG/1
20 TABLET ORAL ONCE
Status: COMPLETED | OUTPATIENT
Start: 2021-03-04 | End: 2021-03-04

## 2021-03-04 RX ADMIN — LEVOTHYROXINE SODIUM 88 MCG: 88 TABLET ORAL at 06:01

## 2021-03-04 RX ADMIN — HEPARIN SODIUM 5000 UNITS: 5000 INJECTION INTRAVENOUS; SUBCUTANEOUS at 05:59

## 2021-03-04 RX ADMIN — HEPARIN SODIUM 5000 UNITS: 5000 INJECTION INTRAVENOUS; SUBCUTANEOUS at 13:28

## 2021-03-04 RX ADMIN — PANTOPRAZOLE SODIUM 40 MG: 40 TABLET, DELAYED RELEASE ORAL at 06:00

## 2021-03-04 RX ADMIN — LISINOPRIL 20 MG: 20 TABLET ORAL at 19:26

## 2021-03-04 RX ADMIN — ATORVASTATIN CALCIUM 40 MG: 40 TABLET, FILM COATED ORAL at 17:48

## 2021-03-04 RX ADMIN — HEPARIN SODIUM 5000 UNITS: 5000 INJECTION INTRAVENOUS; SUBCUTANEOUS at 21:00

## 2021-03-04 RX ADMIN — CLOPIDOGREL BISULFATE 75 MG: 75 TABLET ORAL at 09:40

## 2021-03-04 NOTE — CASE MANAGEMENT
[facility], they are starting the [address] [place] [address]  I notified patient's wife of same

## 2021-03-04 NOTE — ASSESSMENT & PLAN NOTE
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    · ECHO obtained and without significant structural abnormalities  · Continue high potency statin - recommendations for converting back to home simvastatin at 40 mg upon discharge  · Neurology with recommendations for discontinuation of ASA in favor of daily Clopidogrel  · Hemoglobin A1c 7 3% - tight control devised  · Also with recommendations for good blood pressure control long-term  · Permissive hypertension initially - will now begin to slowly aim for better control  Restart ACE-inhibitor at half dose this evening  · Continue maintained on telemetry  · Initiate on PPI for GI prophylaxis  · Outpatient neurology follow-up  · Recommendations by PT/OT for acute rehab  Underwent speech therapy evaluation as well  · Per official neurology evaluation, patient with well controlled vascular risk factors - consider cardioembolic source as well  However recommended against a ANDREW at this point as TTE was without structural abnormalities  Consider outpatient implantable loop recorder vs  Zio patch to rule out AFib as potential source

## 2021-03-04 NOTE — PHYSICAL THERAPY NOTE
Physical Therapy Evaluation    Patient Name: Merlin Bong    NHTFH'A Date: 3/4/2021     Problem List  Principal Problem:    CVA (cerebral vascular accident) ([facility] )  Active Problems:    DMII (diabetes mellitus, type 2) ([facility] )    Hypothyroidism    GERD (gastroesophageal reflux disease)    Hyperlipidemia    Stage 3 chronic kidney disease    Hypertension    Hyponatremia       Past Medical History  Past Medical History:   Diagnosis Date    Arthritis     BPH (benign prostatic hyperplasia)     last assessed 4/29/2014    Diabetes mellitus ([facility] )     Disease of thyroid gland     GERD (gastroesophageal reflux disease)     Hearing aid worn     Hyperlipidemia     Hypertension     Hypothyroidism     Mumps     Prostate cancer ([facility] )     Tingling sensation     bilateral feet occassionally    Tinnitus     Wears glasses     Wears partial dentures     upper        Past Surgical History  Past Surgical History:   Procedure Laterality Date    CATARACT EXTRACTION Bilateral 2000    EYE SURGERY      JOINT REPLACEMENT      KNEE ARTHROSCOPY Right 06/26/2009    knee    HI LAP,PROSTATECTOMY,RADICAL,W/NERVE SPARE,INCL ROBOTIC N/A 2/7/2018    Procedure: ROBOTIC ASSISTED LAPAROSCOPIC PROSTATECTOMY; BILATERAL PELVIC LYMPH NODE DISSECTION;  Surgeon: Elenita Dominguez MD;  Location: [facility];  Service: Urology    HI REPAIR [address] HERNIA,5+Y/O,REDUCIBL Right 4/19/2019    Procedure: REPAIR HERNIA INGUINAL;  Surgeon: Jaswinder Henriquez DO;  Location: [facility];  Service: General    PROSTATE BIOPSY  11/07/2017    needle biopsy of prostate    TRIGGER FINGER RELEASE  05/2013    trigger thumb         03/04/21 0853   PT Last Visit   PT Visit Date 03/04/21   Note Type   Note type Evaluation   Pain Assessment   Pain Assessment Tool Pain Assessment not indicated - pt denies pain   Pain Score No Pain   Home Living   Type of [address] [place] [address] Two level;1/2 bath on main level;Bed/bath upstairs  (0 INEZ)   Bathroom Shower/Tub Tub/shower unit   Bathroom Toilet Standard   Bathroom Accessibility Accessible   Home Equipment Other (Comment)  (none)   Additional Comments pt denies use of DME at baseline   Prior Function   Level of [place] Independent with ADLs and functional mobility   Lives With Spouse   ADL Assistance Independent   IADLs Independent   Falls in the last 6 months 0   Vocational Retired   Comments + driving   Restrictions/Precautions   Wells [place] Bearing Precautions Per Order No   Other Precautions Chair Alarm; Bed Alarm;Telemetry;Multiple lines; Fall Risk   General   Family/Caregiver Present No   Cognition   Overall Cognitive Status WFL   Arousal/Participation Alert   Orientation Level Oriented X4   Following Commands Follows all commands and directions without difficulty   RLE Assessment   RLE Assessment WFL   LLE Assessment   LLE Assessment X  (3-/5 grossly)   Coordination   Movements are Fluid and Coordinated 0   Coordination and Movement Description all movement with L UE and L LE very ataxic and purposeful   Sensation WFL   Light Touch   RLE Light Touch Grossly intact   LLE Light Touch Grossly intact   Bed Mobility   Supine to Sit 5  Supervision   Additional items Verbal cues   Sit to Supine   (pt OOB at end of session)   Transfers   Sit to Stand 5  Supervision   Additional items Increased time required;Verbal cues   Stand to Sit 5  Supervision   Additional items Increased time required;Verbal cues   Additional Comments RW used   Ambulation/Elevation   Gait pattern Excessively slow; Short stride; Foward flexed; Ataxia; Decreased L stance;Decreased foot clearance;L Foot drag   Gait Assistance 4  Minimal assist   Additional items Assist x 1;Verbal cues   Assistive Device Rolling walker   Distance 30'   Stair Management Assistance Not tested   Balance   Static Sitting Fair +   Dynamic Sitting Fair +   Static Standing [address] -  (with RW)   Endurance Deficit   Endurance Deficit Yes   Endurance Deficit Description pt easily fatigued with ambulation d/t increased effort to advance L LE   Activity Tolerance   Activity Tolerance Patient limited by Anna Daniel   Assessment   Prognosis Good   Problem List Decreased strength;Decreased endurance; Impaired balance;Decreased mobility; Decreased coordination   Assessment Patient is a 70 y o  male evaluated by Physical Therapy s/p admit to [facility] on 3/3/2021 with admitting diagnosis of: Hyponatremia, CVA (cerebral vascular accident), Stroke-like symptoms, and principal problem of: CVA (cerebral vascular accident)  PT was consulted to assess patient's functional mobility and discharge needs  Ordered are PT Evaluation and treatment with activity level of: activity as tolerated  Comorbidities affecting patient's physical performance at time of assessment include: DM, HTN, HLD, prostate cancer, arthritis, GERD, hypothyroidism, BPH  Personal factors affecting the patient at time of IE include: lives in 2 story home, ambulating with assistive device, inability to navigate community distances, inability/difficulty performing IADLs and inability/difficulty performing ADLs  Please locate objective findings from PT assessment regarding body systems outlined above  Upon evaluation, pt able to perform all functional mobility with SUP-Karen, RW, and increased time  Verbal cuing provided throughout for safety awareness and sequencing with RW  Seated rest break taken following 30' of ambulation d/t L LE fatigue  Pt with L foot drop during mobility but did not experience LOB  Pt demonstrating good carryover of education and motivation to participate  The patient's AM-PAC Basic Mobility Inpatient Short Form Raw Score is 18, Standardized Score is 41 05  A standardized score less than 42 9 suggests the patient may benefit from discharge to post-acute rehabilitation services  Please also refer to the recommendation of the Physical Therapist for safe discharge planning  Pt will benefit from continued PT intervention during LOS to address current deficits, increase LOF, and facilitate safe d/c to next level of care when medically appropriate  D/c recommendation at this time is post-acute rehabilitation services  Pt educated on importance of attending acute rehab and is in agreement with plan of care  Goals   Patient Goals to get better   Short Term Goal #1 Pt will participate in B LE strengthening exercises to faciliate improved functional mobility  Short Term Goal #2 Pt will perform all functional transfers and bed mobility mod(I) with good safety awareness  LTG Expiration Date 03/18/21   Long Term Goal #1 Pt will ambulate 150' with LRAD and SUP while maintaining good functional dynamic balance  Long Term Goal #2 Pt will ascend/descend 5 stairs with HR and SUP while demonstrating good knowledge of sequencing  Plan   Treatment/Interventions Functional transfer training;LE strengthening/ROM; Elevations; Therapeutic exercise; Endurance training;Bed mobility;Gait training   PT Frequency Other (Comment)  (3-5x/week)   Recommendation   PT Discharge Recommendation Post-Acute Rehabilitation Services   PT - OK to Discharge Yes  (when medically appropriate to rehab)   Mena 8 in Bed Without Bedrails 4   Lying on Back to Sitting on Edge of Flat Bed 3   Moving Bed to Chair 3   Standing Up From Chair 3   Walk in Room 3   Climb 3-5 Stairs 2   Basic Mobility Inpatient Raw Score 18   Basic Mobility Standardized Score 41 05     Pt seated in recliner at end of session with all needs in reach

## 2021-03-04 NOTE — UTILIZATION REVIEW
Initial Clinical Review    Admission: Date/Time/Statement:   Admission Orders (From admission, onward)     Ordered        03/03/21 0807  Inpatient Admission  Once                   Orders Placed This Encounter   Procedures    Inpatient Admission     Standing Status:   Standing     Number of Occurrences:   1     Order Specific Question:   Level of Care     Answer:   Med Surg [16]     Order Specific Question:   Bed Type     Answer:   Jaclyn [4]     Order Specific Question:   Bed request comments     Answer:   tele     Order Specific Question:   Estimated length of stay     Answer:   More than 2 Midnights     Order Specific Question:   Certification     Answer:   I certify that inpatient services are medically necessary for this patient for a duration of greater than two midnights  See H&P and MD Progress Notes for additional information about the patient's course of treatment  ED Arrival Information     Expected Arrival Acuity Means of Arrival Escorted By Service Admission Type    - 3/3/2021 07:18 Emergent Ambulance [facility] Hospitalist Emergency    Arrival Complaint    Stroke-Like Symptoms        Chief Complaint   Patient presents with    STROKE Alert     patient woke up at 0700 with left sided weakness and decreased sensation  reports dragging left foot yesterday  positive for left arm and leg drift, and decreased senstation  patient on ASA     Assessment/Plan: 70 yo m w/hx dm, hypothyroid, ckd, prostate ca,  htn to ED by EMS admitted as inpatient due to CVA  Presented as a stroke alert due LUE/LLE weakness and difficulty ambulating since 0030 when he woke up to use the bathroom  Was dragging his LLE the day prior  Went back to bed, but sx persisted in AM  C/o decreased L sided extremity sensation and L arm drift  Neuro immediately contacted and determined likely acute ischemic stroke  No TPA due to outside time window   Exam reveals L facial droop, LUE/LLE 4/5 strength, weakness  No evidence of bleed or stenosis on imaging  Stroke workup in progress with permissive htn, IVF, MRI brain, asa/statin  3/3 PM update per neuro: stopped asa in favor of plavix monotherapy  Does not qualify for DAPT based on NIH xcore  Continue statin, target bp 130/80, should remain physically active  Await echo  3/3 PM update per attending: MRI brain confirms acute anterior R medullary infarct  Also hyponatremic at 129, which could be from cva or dehydration  Decreased the NS rate for gradual increase to goal of 133  Echo wnl  Keep on tele, continue permissive htn, consider cardioembolic source  No ANDREW needed since TTE w/o structural abnormalities  Consider outpt loop recorder or zio patch to r/o a fib       3/4: GCS 15, remains a&o x 4, LUE/LLE weaker than the R      ED Triage Vitals   Temperature Pulse Respirations Blood Pressure SpO2   03/03/21 0725 03/03/21 0725 03/03/21 0725 03/03/21 0725 03/03/21 0725   98 2 °F (36 8 °C) 71 16 (!) 205/97 98 %      Temp Source Heart Rate Source Patient Position - Orthostatic VS BP Location FiO2 (%)   03/03/21 0725 03/03/21 0725 03/03/21 0725 03/03/21 0733 --   Temporal Monitor Lying Right arm       Pain Score       03/03/21 0917       No Pain          Wt Readings from Last 1 Encounters:   03/04/21 63 5 kg (139 lb 15 9 oz)     Additional Vital Signs:   Date/Time  Temp  Pulse  Resp  BP  MAP (mmHg)  SpO2  O2 Device    03/04/21 09:44:14  98 3 °F (36 8 °C)  74  --  160/92  115  96 %  --    03/04/21 04:48:32  98 3 °F (36 8 °C)  72  --  163/92  116  96 %  --    03/04/21 00:58:38  98 3 °F (36 8 °C)  82  --  155/105Abnormal   122  97 %  --    03/03/21 22:23:35  98 5 °F (36 9 °C)  73  18  136/80  99  97 %  --    03/03/21 20:54:13  98 5 °F (36 9 °C)  66  --  166/91  116  96 %  --    03/03/21 2047  --  --  --  --  --  95 %  None (Room air)    03/03/21 15:07:35  98 7 °F (37 1 °C)  67  18  167/90  116  97 %  --    03/03/21 13:19:42  --  65  18  171/90Abnormal   117  98 %  -- 03/03/21 1200  --  68  18  173/91Abnormal   119  97 %  --    03/03/21 1000  98 3 °F (36 8 °C)  72  18  173/89Abnormal   121  98 %  --    03/03/21 0922  --  --  --  --  --  --  None (Room air)    03/03/21 08:36:14  98 5 °F (36 9 °C)  81  17  174/93Abnormal   120  97 %  --    03/03/21 0755  98 2 °F (36 8 °C)  --  --  --  --  --  None (Room air)    03/03/21 0745  --  67  24Abnormal   187/90Abnormal   129  98 %  --    03/03/21 0733  --  71  16  205/97Abnormal   --  98 %  None (Room air)      Date and Time Eye Opening Best Verbal Response Best Motor Response [place] Coma Scale Score   03/03/21 0745 4 5 6 15   03/03/21 0730 4 5 6 15   03/03/21 0725 4 5 6 15       Pertinent Labs/Diagnostic Test Results:   3/3 CT brain: No acute intracranial abnormality      Mild cerebral chronic microangiopathic disease    Stable ventriculomegaly, slightly disproportionate to degree of generalized cerebral sulcal prominence  3/3 CTA head/neck: No cervical or intracranial large vessel occlusion    Mild age expected atherosclerotic disease    Cholelithiasis without suggestion of cholecystitis      3/3 PCXR: nothing acute    3/3 MRI brain:1  Acute anterior right medullary infarct  No significant edema or mass effect  No hemorrhagic transformation    2   Chronic microangiopathy  3/3 EKG: nsr    3/3 echo: LEFT VENTRICLE:  Systolic function was normal  Ejection fraction was estimated to be 60 %  There were no regional wall motion abnormalities  Wall thickness was mildly increased  The changes were consistent with concentric remodeling (increased wall thickness with normal wall mass)  Doppler parameters were consistent with abnormal left ventricular relaxation (grade 1 diastolic dysfunction)    RIGHT VENTRICLE:The size was normal   Systolic function was normal    AORTIC VALVE:There was mild regurgitation    TRICUSPID VALVE:There was mild regurgitation        Results from last 7 days   Lab Units 03/03/21  0748   SARS-COV-2  Negative Results from last 7 days   Lab Units 03/04/21  0552 03/03/21  0742   WBC Thousand/uL 7 10 4 46   HEMOGLOBIN g/dL 13 0 11 4*   HEMATOCRIT % 39 7 35 0*   PLATELETS Thousands/uL 224 191   NEUTROS ABS Thousands/µL 5 07  --          Results from last 7 days   Lab Units 03/04/21  0552 03/03/21  1212 03/03/21  0742   SODIUM mmol/L 136 133* 129*   POTASSIUM mmol/L 4 1  --  4 0   CHLORIDE mmol/L 100  --  97*   CO2 mmol/L 27  --  25   ANION GAP mmol/L 9  --  7   BUN mg/dL 27*  --  25   CREATININE mg/dL 1 62*  --  1 61*   EGFR ml/min/1 73sq m 41  --  42   CALCIUM mg/dL 9 1  --  8 8   MAGNESIUM mg/dL 1 9  --   --      Results from last 7 days   Lab Units 03/03/21  0742   AST U/L 21   ALT U/L 25   ALK PHOS U/L 89   TOTAL PROTEIN g/dL 6 5   ALBUMIN g/dL 3 4*   TOTAL BILIRUBIN mg/dL 0 50   BILIRUBIN DIRECT mg/dL 0 13     Results from last 7 days   Lab Units 03/04/21  0726 03/03/21  1507 03/03/21  1315 03/03/21  0745   POC GLUCOSE mg/dl 204* 168* 210* 219*     Results from last 7 days   Lab Units 03/04/21  0552 03/03/21  0742   GLUCOSE RANDOM mg/dL 223* 241*     Results from last 7 days   Lab Units 03/03/21  0742   TROPONIN I ng/mL <0 02         Results from last 7 days   Lab Units 03/03/21  0742   PROTIME seconds 14 3   INR  1 13   PTT seconds 28     Results from last 7 days   Lab Units 03/03/21  0742   TSH 3RD GENERATON uIU/mL 3 000     Results from last 7 days   Lab Units 03/03/21  0748   INFLUENZA A PCR  Negative   INFLUENZA B PCR  Negative   RSV PCR  Negative     ED Treatment:   Medication Administration from 03/03/2021 0718 to 03/03/2021 0820       Date/Time Order Dose Route Action     03/03/2021 0806 sodium chloride 0 9 % infusion 125 mL/hr Intravenous New Bag     03/03/2021 0805 aspirin tablet 325 mg 325 mg Oral Given        Past Medical History:   Diagnosis Date    Arthritis     BPH (benign prostatic hyperplasia)     last assessed 4/29/2014    Diabetes mellitus (HCC)     Disease of thyroid gland     GERD (gastroesophageal reflux disease)     Hearing aid worn     Hyperlipidemia     Hypertension     Hypothyroidism     Mumps     Prostate cancer (HCC)     Tingling sensation     bilateral feet occassionally    Tinnitus     Wears glasses     Wears partial dentures     upper     Present on Admission:   [facility]cere[facility] vascular accident) ([facility] )   Hyponatremia   DMII (diabetes mellitus, type 2) ([facility] )   Hypothyroidism   GERD (gastroesophageal reflux disease)   Hyperlipidemia   Stage 3 chronic kidney disease   Hypertension      Admitting Diagnosis: Hyponatremia [E87 1]  CVA (cerebral vascular accident) ([place] [address] ) [I63 9]  Stroke-like symptoms [R29 90]  Age/Sex: 70 y o  male  Admission Orders:  Scheduled Medications:  atorvastatin, 40 mg, Oral, Daily With Dinner  clopidogrel, 75 mg, Oral, Daily  heparin (porcine), 5,000 Units, Subcutaneous, Q8H [facility]  levothyroxine, 88 mcg, Oral, Early Morning  pantoprazole, 40 mg, Oral, Daily Before Breakfast  patient maintained insulin pump, 1 each, Subcutaneous, Q8H      Continuous IV Infusions:  sodium chloride, 50 mL/hr, Intravenous, Continuous      PRN Meds:  acetaminophen, 650 mg, Oral, Q6H PRN  hydrALAZINE, 5 mg, Intravenous, Q6H PRN  insulin aspart, 1,000 Units, Subcutaneous Insulin Pump, Daily PRN  ondansetron, 4 mg, Intravenous, Q6H PRN  polyethylene glycol, 17 g, Oral, Daily PRN      Neuro checks Every 1 hour x 4 hours, then every 2 hours x 8 hours, then every 4 hours x 72 hours  Tele    IP CONSULT TO NEUROLOGY  IP CONSULT TO CASE MANAGEMENT  IP CONSULT TO NUTRITION SERVICES    Network Utilization Review Department  ATTENTION: Please call with any questions or concerns to [phone] and carefully listen to the prompts so that you are directed to the right person   All voicemails are confidential   Samuel Kennedy all requests for admission clinical reviews, approved or denied determinations and any other requests to dedicated fax number below belonging to the campus where the patient is receiving treatment   List of dedicated fax numbers for the Facilities:  [address] [place] [address] DENIALS (Administrative/Medical Necessity) [phone]   [address] (Maternity/NICU/Pediatrics) [phone] [address] [facility] [address] Jennifer Tsang 0974 (  Yared Gill "Berta" 103) [address] [place] [address] Sami Maza [address] [place] [address] [phone]

## 2021-03-04 NOTE — ASSESSMENT & PLAN NOTE
Lab Results   Component Value Date    HGBA1C 7 3 (H) 03/04/2021       Recent Labs     03/03/21  1507 03/04/21  0726 03/04/21  1143 03/04/21  1348   POCGLU 168* 204* 233* 290*       Blood Sugar Average: Last 72 hrs:  (P) 601 [id number]   Hemoglobin A1c well controlled  Utilizes insulin pump at home, which will be continued here  Maintain on ADA diet

## 2021-03-04 NOTE — PLAN OF CARE
Problem: Prexisting or High Potential for Compromised Skin Integrity  Goal: Skin integrity is maintained or improved  Description: INTERVENTIONS:  - Identify patients at risk for skin breakdown  - Assess and monitor skin integrity  - Assess and monitor nutrition and hydration status  - Monitor labs   - Assess for incontinence   - Turn and reposition patient  - Assist with mobility/ambulation  - Relieve pressure over bony prominences  - Avoid friction and shearing  - Provide appropriate hygiene as needed including keeping skin clean and dry  - Evaluate need for skin moisturizer/barrier cream  - Collaborate with interdisciplinary team   - Patient/family teaching  - Consider wound care consult   Outcome: Progressing     Problem: Potential for Falls  Goal: Patient will remain free of falls  Description: INTERVENTIONS:  - Assess patient frequently for physical needs  -  Identify cognitive and physical deficits and behaviors that affect risk of falls    -  [place] fall precautions as indicated by assessment   - Educate patient/family on patient safety including physical limitations  - Instruct patient to call for assistance with activity based on assessment  - Modify environment to reduce risk of injury  - Consider OT/PT consult to assist with strengthening/mobility  Outcome: Progressing     Problem: NEUROSENSORY - ADULT  Goal: Achieves stable or improved neurological status  Description: INTERVENTIONS  - Monitor and report changes in neurological status  - Monitor vital signs such as temperature, blood pressure, glucose, and any other labs ordered   - Initiate measures to prevent increased intracranial pressure  - Monitor for seizure activity and implement precautions if appropriate      Outcome: Progressing     Problem: CARDIOVASCULAR - ADULT  Goal: Maintains optimal cardiac output and hemodynamic stability  Description: INTERVENTIONS:  - Monitor I/O, vital signs and rhythm  - Monitor for S/S and trends of decreased cardiac output  - Administer and titrate ordered vasoactive medications to optimize hemodynamic stability  - Assess quality of pulses, skin color and temperature  - Assess for signs of decreased coronary artery perfusion  - Instruct patient to report change in severity of symptoms  Outcome: Progressing  Goal: Absence of cardiac dysrhythmias or at baseline rhythm  Description: INTERVENTIONS:  - Continuous cardiac monitoring, vital signs, obtain 12 lead EKG if ordered  - Administer antiarrhythmic and heart rate control medications as ordered  - Monitor electrolytes and administer replacement therapy as ordered  Outcome: Progressing     Problem: MUSCULOSKELETAL - ADULT  Goal: Maintain or return mobility to safest level of function  Description: INTERVENTIONS:  - Assess patient's ability to carry out ADLs; assess patient's baseline for ADL function and identify physical deficits which impact ability to perform ADLs (bathing, care of mouth/teeth, toileting, grooming, dressing, etc )  - Assess/evaluate cause of self-care deficits   - Assess range of motion  - Assess patient's mobility  - Assess patient's need for assistive devices and provide as appropriate  - Encourage maximum independence but intervene and supervise when necessary  - Involve family in performance of ADLs  - Assess for home care needs following discharge   - Consider OT consult to assist with ADL evaluation and planning for discharge  - Provide patient education as appropriate  Outcome: Progressing     Problem: MUSCULOSKELETAL - ADULT  Goal: Maintain or return mobility to safest level of function  Description: INTERVENTIONS:  - Assess patient's ability to carry out ADLs; assess patient's baseline for ADL function and identify physical deficits which impact ability to perform ADLs (bathing, care of mouth/teeth, toileting, grooming, dressing, etc )  - Assess/evaluate cause of self-care deficits   - Assess range of motion  - Assess patient's mobility  - Assess patient's need for assistive devices and provide as appropriate  - Encourage maximum independence but intervene and supervise when necessary  - Involve family in performance of ADLs  - Assess for home care needs following discharge   - Consider OT consult to assist with ADL evaluation and planning for discharge  - Provide patient education as appropriate  Outcome: Progressing

## 2021-03-04 NOTE — ASSESSMENT & PLAN NOTE
Lab Results   Component Value Date    EGFR 41 03/04/2021    EGFR 42 03/03/2021    EGFR 35 09/22/2020    CREATININE 1 62 (H) 03/04/2021    CREATININE 1 61 (H) 03/03/2021    CREATININE 1 87 (H) 09/22/2020     CKD 3  Current creatinine appears to be at baseline  Currently with ACE-inhibitor on hold to allow permissive hypertension the setting of acute CVA - being resumed at half dose tonight  Monitor renal function, avoid nephrotoxins, and renally dose medications when appropriate

## 2021-03-04 NOTE — OCCUPATIONAL THERAPY NOTE
Occupational Therapy         Patient Name: Kiera GIBBS Date: 3/4/2021    Order received and chart review performed; attempted OT evaluation this PM; pt with neuro consult; will complete evaluation as able      Belkys Palafox OT

## 2021-03-04 NOTE — PROGRESS NOTES
Progress Note - Chris Houston 1945, 70 y o  male MRN: [id number]    Unit/Bed#: 406-01 Encounter: [id number]    Primary Care Provider: Magalie Knight DO   Date and time admitted to hospital: 3/3/2021  7:30 AM        * CVA (cerebral vascular accident) [facility])  Assessment & Plan  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    · ECHO obtained and without significant structural abnormalities  · Continue high potency statin - recommendations for converting back to home simvastatin at 40 mg upon discharge  · Neurology with recommendations for discontinuation of ASA in favor of daily Clopidogrel  · Hemoglobin A1c 7 3% - tight control devised  · Also with recommendations for good blood pressure control long-term  · Permissive hypertension initially - will now begin to slowly aim for better control  Restart ACE-inhibitor at half dose this evening  · Continue maintained on telemetry  · Initiate on PPI for GI prophylaxis  · Outpatient neurology follow-up  · Recommendations by PT/OT for acute rehab  Underwent speech therapy evaluation as well  · Per official neurology evaluation, patient with well controlled vascular risk factors - consider cardioembolic source as well  However recommended against a ANDREW at this point as TTE was without structural abnormalities  Consider outpatient implantable loop recorder vs  Zio patch to rule out AFib as potential source        DMII (diabetes mellitus, type 2) [facility])  Assessment & Plan  Lab Results   Component Value Date    HGBA1C 7 3 (H) 03/04/2021       Recent Labs     03/03/21  1507 03/04/21  2182 03/04/21  1143 03/04/21  1348   POCGLU 168* 204* 233* 290*       Blood Sugar Average: Last 72 hrs:  (P) 356 [id number]   Hemoglobin A1c well controlled  Utilizes insulin pump at home, which will be continued here  Maintain on ADA diet  Hypothyroidism  Assessment & Plan  TSH currently normal  Continue thyroid replacement therapy  GERD (gastroesophageal reflux disease)  Assessment & Plan  Initiate on PPI therapy, especially given need for antiplatelet therapy in changed to clopidogrel  Hyperlipidemia  Assessment & Plan  Change to high potency statin while hospitalized  As per Neurology, as patient has already been stabilized on simvastatin, will be discharged on 40 mg dosing of Zocor time of discharge  Hyponatremia  Assessment & Plan  Potentially on basis of acute CVA vs  Dehydration - initial sodium 129  Decreased NS rate, repeat Na at 11am with gradual response to 133 - normalized at 1:36 a m  This morning  Hypertension  Assessment & Plan  Initially held ACE-I to allow for permissive hypertension  IV Hydralazine as needed for SBP sustained >200  Resume ACE-inhibitor at half dose tonight  Stage 3 chronic kidney disease  Assessment & Plan  Lab Results   Component Value Date    EGFR 41 03/04/2021    EGFR 42 03/03/2021    EGFR 35 09/22/2020    CREATININE 1 62 (H) 03/04/2021    CREATININE 1 61 (H) 03/03/2021    CREATININE 1 87 (H) 09/22/2020     CKD 3  Current creatinine appears to be at baseline  Currently with ACE-inhibitor on hold to allow permissive hypertension the setting of acute CVA - being resumed at half dose tonight  Monitor renal function, avoid nephrotoxins, and renally dose medications when appropriate  VTE Pharmacologic Prophylaxis:   Pharmacologic: Heparin  Mechanical VTE Prophylaxis in Place: Yes    Patient Centered Rounds: I have performed bedside rounds with nursing staff today      Discussions with Specialists or Other Care Team Provider: Neurology    Education and Discussions with Family / Patient: Yes    Time Spent for Care: 20 minutes  More than 50% of total time spent on counseling and coordination of care as described above  Current Length of Stay: 1 day(s)    Current Patient Status: Inpatient   Certification Statement: The patient will continue to require additional inpatient hospital stay due to Need for placement to acute rehab  Discharge Plan: Potential discharge 3/5/2021    Code Status: Level 1 - Full Code      Subjective:   Patient seen and examined - Continues to have left sided deficits  No overnight events reported  Remains afebrile  Objective:     Vitals:   Temp (24hrs), Av 4 °F (36 9 °C), Min:98 3 °F (36 8 °C), Max:98 5 °F (36 9 °C)    Temp:  [98 3 °F (36 8 °C)-98 5 °F (36 9 °C)] 98 3 °F (36 8 °C)  HR:  [66-82] 72  Resp:  [18] 18  BP: (136-174)/() 174/88  SpO2:  [95 %-97 %] 96 %  Body mass index is 22 6 kg/m²  Input and Output Summary (last 24 hours): Intake/Output Summary (Last 24 hours) at 3/4/2021 1811  Last data filed at 3/4/2021 1233  Gross per 24 hour   Intake 120 ml   Output --   Net 120 ml       Physical Exam:   Constitutional:       General: He is not in acute distress  Appearance: Normal appearance  He is normal weight  He is not ill-appearing or toxic-appearing  Neck:      Musculoskeletal: Neck supple  Cardiovascular:      Rate and Rhythm: Normal rate and regular rhythm  Heart sounds: No murmur  Pulmonary:      Effort: Pulmonary effort is normal       Breath sounds: Normal breath sounds  No rhonchi  Abdominal:      General: Abdomen is flat  Bowel sounds are normal       Palpations: Abdomen is soft  Tenderness: There is no abdominal tenderness  There is no guarding or rebound  Musculoskeletal:         General: No swelling or tenderness  Skin:     General: Skin is warm and dry  Neurological:      Mental Status: He is alert and oriented to person, place, and time  Motor: Weakness present  Comments: Slight left-sided facial droop  Left upper and lower extremity weakness noted, with strength 4/5  Psychiatric:         Mood and Affect: Mood normal          Behavior: Behavior normal          Thought Content: Thought content normal          Judgment: Judgment normal      Additional Data:     Labs:    Results from last 7 days   Lab Units 03/04/21  0552   WBC Thousand/uL 7 10   HEMOGLOBIN g/dL 13 0   HEMATOCRIT % 39 7   PLATELETS Thousands/uL 224   NEUTROS PCT % 71   LYMPHS PCT % 16   MONOS PCT % 8   EOS PCT % 4     Results from last 7 days   Lab Units 03/04/21  0552  03/03/21  0742   SODIUM mmol/L 136   < > 129*   POTASSIUM mmol/L 4 1  --  4 0   CHLORIDE mmol/L 100  --  97*   CO2 mmol/L 27  --  25   BUN mg/dL 27*  --  25   CREATININE mg/dL 1 62*  --  1 61*   ANION GAP mmol/L 9  --  7   CALCIUM mg/dL 9 1  --  8 8   ALBUMIN g/dL  --   --  3 4*   TOTAL BILIRUBIN mg/dL  --   --  0 50   ALK PHOS U/L  --   --  89   ALT U/L  --   --  25   AST U/L  --   --  21   GLUCOSE RANDOM mg/dL 223*  --  241*    < > = values in this interval not displayed  Results from last 7 days   Lab Units 03/03/21  0742   INR  1 13     Results from last 7 days   Lab Units 03/04/21  1348 03/04/21  1143 03/04/21  0726 03/03/21  1507 03/03/21  1315 03/03/21  0745   POC GLUCOSE mg/dl 290* 233* 204* 168* 210* 219*     Results from last 7 days   Lab Units 03/04/21  0552   HEMOGLOBIN A1C % 7 3*               * I Have Reviewed All Lab Data Listed Above  * Additional Pertinent Lab Tests Reviewed:  All Labs Within Last 24 Hours Reviewed    Recent Cultures (last 7 days):           Last 24 Hours Medication List:   Current Facility-Administered Medications   Medication Dose Route Frequency Provider Last Rate    acetaminophen  650 mg Oral Q6H PRN Mehreen Wilson MD      atorvastatin  40 mg Oral Daily With Yael Chu MD      clopidogrel  75 mg Oral Daily Mehreen Wilson MD      heparin (porcine)  5,000 Units Subcutaneous Q8H Maria Isabel Ford MD      hydrALAZINE  5 mg Intravenous Q6H PRN Mary Abdullahi MD      insulin aspart  1,000 Units Subcutaneous Insulin Pump Daily PRN Mary Abdullahi MD      levothyroxine  88 mcg Oral Early Morning Mary Abdullahi MD      lisinopril  20 mg Oral Once Mary Abdullahi MD      ondansetron  4 mg Intravenous Q6H PRN Mary Abdullahi MD      pantoprazole  40 mg Oral Daily Before Bisi Harmon MD      patient maintained insulin pump  1 each Subcutaneous 4x Daily (with meals and at bedtime) Mary Abdullahi MD      polyethylene glycol  17 g Oral Daily PRN Mary Abdullahi MD      sodium chloride  50 mL/hr Intravenous Continuous Mary Abdullahi MD 50 mL/hr (03/03/21 1032)        Today, Patient Was Seen By: Mary Abdullahi MD    ** Please Note: Dictation voice to text software may have been used in the creation of this document   **

## 2021-03-04 NOTE — ASSESSMENT & PLAN NOTE
Initially held ACE-I to allow for permissive hypertension  IV Hydralazine as needed for SBP sustained >200  Resume ACE-inhibitor at half dose tonight

## 2021-03-04 NOTE — PLAN OF CARE
Problem: OCCUPATIONAL THERAPY ADULT  Goal: Performs self-care activities at highest level of function for planned discharge setting  See evaluation for individualized goals  Description: Treatment Interventions: ADL retraining, Functional transfer training, Endurance training, UE strengthening/ROM, Patient/family training, Equipment evaluation/education, Neuromuscular reeducation, Fine motor coordination activities, Compensatory technique education, Activityengagement          See flowsheet documentation for full assessment, interventions and recommendations  Note: Limitation: Decreased UE ROM, Decreased ADL status, Decreased UE strength, Decreased Safe judgement during ADL, Decreased cognition, Decreased endurance, Decreased self-care trans, Decreased high-level ADLs, Decreased fine motor control     Assessment: Pt is a 70 y o  male seen for OT evaluation s/p admit to [facility] on 3/3/2021 w/ [facility]  Comorbidities affecting pt's functional performance at time of assessment include: DM, thryroid gland, HTN, hyperlipidemia, prostate CA, arthritis, tinnitus, mumps, tingling sensation  Personal factors affecting pt at time of IE include:difficulty performing ADLS, difficulty performing IADLS , flat affect and health management   Prior to admission, pt was (I) with ADLs and IADLs with use of no device during mobility  Upon evaluation: Pt requires (S)-mod (A) with use of RW during mobility 2* the following deficits impacting occupational performance: weakness, decreased ROM, decreased strength, decreased balance, decreased tolerance, impaired GMC and impaired [address]  Pt to benefit from continued skilled OT tx while in the hospital to address deficits as defined above and maximize level of functional independence w ADL's and functional mobility   Occupational Performance areas to address include: grooming, bathing/shower, toilet hygiene, dressing, functional mobility, community mobility and clothing management  Pt is a very motivated gentleman  From OT standpoint, recommendation at time of d/c would be short term rehab  Pt's raw score on the AM-PAC Daily Activity inpatient short form is 15, standardized score is 34 69, less than 39 4  Patients at this level are likely to benefit from DC to post-acute rehab services, however, please refer to therapist recommendation for safe DC planning       OT Discharge Recommendation: Post-Acute Rehabilitation Services

## 2021-03-04 NOTE — SPEECH THERAPY NOTE
Speech/Language Pathology Progress Note    Patient Name: Raghu Cristobal  RNROU'I Date: 3/4/2021    Subjective:  Pt awake and alert, sitting up in chair at bedside eating his lunch  Objective:  Pt seen for diagnostic swallow therapy  He continues to have mild L facial weakness, however speech/languege appear WFL  Pt was seen while eating his regular diet/thin liquid lunch, which included chicken fingers, baked potato, and cooked carrots  Pt tolerated thin liquids without s/s aspiration  He does occasionally have a congested sounding cough, however not consistent with PO intake and pt reported he has had this cough for many years  No concerns with mastication and swallow of regular solids  Assessment:  Pt is tolerating regular diet and thin liquids without s/s aspiration or significant dysphagia  Plan/Recommendations:  Continue regular diet and thin liquids, d/c ST services at this time

## 2021-03-04 NOTE — ASSESSMENT & PLAN NOTE
Potentially on basis of acute CVA vs  Dehydration - initial sodium 129  Decreased NS rate, repeat Na at 11am with gradual response to 133 - normalized at 1:36 a m  This morning

## 2021-03-04 NOTE — OCCUPATIONAL THERAPY NOTE
Occupational Therapy Evaluation     Patient Name: Kiera Ruth  XFLTU'T Date: 3/4/2021  Problem List  Principal Problem:    CVA (cerebral vascular accident) [facility])  Active Problems:    DMII (diabetes mellitus, type 2) ([facility] )    Hypothyroidism    GERD (gastroesophageal reflux disease)    Hyperlipidemia    Stage 3 chronic kidney disease    Hypertension    Hyponatremia    Past Medical History  Past Medical History:   Diagnosis Date    Arthritis     BPH (benign prostatic hyperplasia)     last assessed 4/29/2014    [facility]     Disease of thyroid gland     GERD (gastroesophageal reflux disease)     Hearing aid worn     Hyperlipidemia     Hypertension     Hypothyroidism     Mumps     Prostate cancer ([facility]     Tingling sensation     bilateral feet occassionally    Tinnitus     Wears glasses     Wears partial dentures     upper     Past Surgical History  Past Surgical History:   Procedure Laterality Date    CATARACT EXTRACTION Bilateral 2000    EYE SURGERY      JOINT REPLACEMENT      KNEE ARTHROSCOPY Right 06/26/2009    knee    GA LAP,PROSTATECTOMY,RADICAL,W/NERVE SPARE,INCL ROBOTIC N/A 2/7/2018    Procedure: ROBOTIC ASSISTED LAPAROSCOPIC PROSTATECTOMY; BILATERAL PELVIC LYMPH NODE DISSECTION;  Surgeon: Philip Serna MD;  Location: [facility];  Service: Urology    GA REPAIR [address] HERNIA,5+Y/O,REDUCIBL Right 4/19/2019    Procedure: REPAIR HERNIA INGUINAL;  Surgeon: Shabnam Simmons DO;  Location: [facility];  Service: General    PROSTATE BIOPSY  11/07/2017    needle biopsy of prostate    TRIGGER FINGER RELEASE  05/2013    trigger thumb             03/04/21 0852   OT Last Visit   OT Visit Date 03/04/21   Note Type   Note type Evaluation   Restrictions/Precautions   Weight Bearing Precautions Per Order No   Other Precautions Chair Alarm;Multiple lines;Telemetry; Fall Risk   Pain Assessment   Pain Assessment Tool Pain Assessment not indicated - pt denies pain   Home Living   Type of [address] Two level;1/2 bath on main level;Bed/bath upstairs; Other (Comment)  (0 INEZ; 13 steps to 2nd c HR)   Bathroom Shower/Tub Tub/shower unit   Bathroom Toilet Standard   Bathroom Accessibility Accessible   Home Equipment Other (Comment)  (no DME)   Additional Comments pt does not utilize device at baseline during mobility   Prior Function   Level of [place] Independent with ADLs and functional mobility   Lives With Spouse   ADL Assistance Independent   IADLs Independent   Falls in the last 6 months 0   Vocational Retired   Comments pt is (I) with driving   Psychosocial   Psychosocial (WDL) X   Patient Behaviors/Mood Flat affect   Subjective   Subjective "I want to get back to cutting the grass"   ADL   Where Assessed Edge of bed   UB Bathing Assistance 3  Moderate Assistance   UB Bathing Deficit Right arm;Left arm; Abdomen   LB Bathing Assistance 3  Moderate Assistance   LB Bathing Deficit Right lower leg including foot; Left lower leg including foot   UB Dressing Assistance 3  Moderate Assistance   UB Dressing Deficit Fasteners;Pull around back   LB Dressing Assistance 3  Moderate Assistance   LB Dressing Deficit Don/doff R sock; Don/doff L sock   Additional Comments pt with significant difficulties with ADL tasks at mod (A)-max (A) due to significant difficulties utilizing and mobilizing L UE at this time with moderate impairements to L hand coordination and dexterity to manipulate ADL items and tasks   Bed Mobility   Supine to Sit 5  Supervision   Additional items Increased time required   Sit to Supine   (seated in chair at end of session)   Additional Comments pt on RA during session; SpO2 WFL with no complaints of SOB; Spo2 WFL   Transfers   Sit to Stand 5  Supervision   Additional items Increased time required;Verbal cues  (RW)   Stand to Sit 5  Supervision   Additional items Increased time required;Verbal cues  (RW)   Additional Comments pt requires cues for safety with RW; pt with apparent L LE weakness and ataxia; pt requires assist of R UE for L UE to RW    Functional Mobility   Functional Mobility 4  Minimal assistance   Additional Comments x1 with use of RW; pt requires cues for sequencing of L LE and R LE during mobility; pt performs ~30ft and limited due to L LE coordination as well as fatigues quickly during tasks   Additional items Rolling walker   Balance   Static Sitting Fair +   Dynamic Sitting Fair +   Static Standing Fair   Dynamic Standing Fair -   Ambulatory Fair -   Activity Tolerance   Activity Tolerance Patient limited by fatigue   RUE Assessment   RUE Assessment WFL   LUE Assessment   LUE Assessment X  (3+/5 grossly; decreased AROM at shoulder; increased time)   Hand Function   Gross Motor Coordination Impaired  (L UE)   Fine Motor Coordination Impaired  (L UE)   Sensation   Light Touch No apparent deficits   Sharp/Dull No apparent deficits   Additional Comments significantly decreased opposition on L UE and wrist flex/extension 3+/5   Proprioception   Proprioception Partial deficits in the LUE   Vision-Basic Assessment   Current Vision Wears glasses all the time  (reports difficulties reading far distances)   Vision - Complex Assessment   Ocular Range of Motion [facility]   Perception   Inattention/Neglect Appears intact   Cognition   Overall Cognitive Status WFL   Arousal/Participation Alert   Attention Within functional limits   Orientation Level Oriented X4   Memory Within functional limits   Following Commands Follows all commands and directions without difficulty   Assessment   Limitation Decreased UE ROM; Decreased ADL status; Decreased UE strength;Decreased Safe judgement during ADL;Decreased cognition;Decreased endurance;Decreased self-care trans;Decreased high-level ADLs; Decreased fine motor control   Assessment Pt is a 70 y o  male seen for OT evaluation s/p [facility] w/ CVA (cerebral vascular accident) ([facility] )    Comorbidities affecting pt's functional performance at time of assessment include: DM, thryroid gland, HTN, hyperlipidemia, prostate CA, arthritis, tinnitus, mumps, tingling sensation  Personal factors affecting pt at time of IE include:difficulty performing ADLS, difficulty performing IADLS , flat affect and health management   Prior to admission, pt was (I) with ADLs and IADLs with use of no device during mobility  Upon evaluation: Pt requires (S)-mod (A) with use of RW during mobility 2* the following deficits impacting occupational performance: weakness, decreased ROM, decreased strength, decreased balance, decreased tolerance, impaired GMC and impaired [address]  Pt to benefit from continued skilled OT tx while in the hospital to address deficits as defined above and maximize level of functional independence w ADL's and functional mobility  Occupational Performance areas to address include: grooming, bathing/shower, toilet hygiene, dressing, functional mobility, community mobility and clothing management  Pt is a very motivated gentleman  From OT standpoint, recommendation at time of d/c would be short term rehab  Pt's raw score on the -PAC Daily Activity inpatient short form is 15, standardized score is 34 69, less than 39 4  Patients at this level are likely to benefit from DC to post-acute rehab services, however, please refer to therapist recommendation for safe DC planning  Goals   Patient Goals to feel better   Short Term Goal  pt will perform UE strengthening and ROM exercises    Long Term Goal #1 pt will demonstrate UB/LB bathing and grooming tasks with min (A) level    Long Term Goal #2 pt will demonstrate functional mobility with RW at mod (I) level    Long Term Goal pt will demonstrate toilet transfers and hygiene at (I) level   Plan   Treatment Interventions ADL retraining;Functional transfer training; Endurance training;UE strengthening/ROM; Patient/family training;Equipment evaluation/education; Neuromuscular reeducation; Fine motor coordination activities; Compensatory technique education; Activityengagement   Goal Expiration Date 03/18/21   OT Frequency 3-5x/wk   Recommendation   OT Discharge Recommendation Post-Acute Rehabilitation Services   AM-PAC Daily Activity Inpatient   Lower Body Dressing 2   Bathing 2   Toileting 3   Upper Body Dressing 2   Grooming 3   Eating 3   Daily Activity Raw Score 15   Daily Activity Standardized Score (Calc for Raw Score >=11) 34 69   AM-PAC Applied Cognition Inpatient   Following a Speech/Presentation 4   Understanding Ordinary Conversation 4   Taking Medications 4   Remembering Where Things Are Placed or Put Away 4   Remembering List of 4-5 Errands 4   Taking Care of Complicated Tasks 4   Applied Cognition Raw Score 24   Applied Cognition Standardized Score 62 21     Pt will benefit from continued OT services in order to maximize (I) c ADL performance, FM c RW, and improve overall endurance/strength required to complete functional tasks in preparation for d/c      Pt left seated in chair at end of session; all needs within reach; all lines intact

## 2021-03-04 NOTE — PLAN OF CARE
Problem: PHYSICAL THERAPY ADULT  Goal: Performs mobility at highest level of function for planned discharge setting  See evaluation for individualized goals  Description: Treatment/Interventions: Functional transfer training, LE strengthening/ROM, Elevations, Therapeutic exercise, Endurance training, Bed mobility, Gait training          See flowsheet documentation for full assessment, interventions and recommendations  Note: Prognosis: Good  Problem List: Decreased strength, Decreased endurance, Impaired balance, Decreased mobility, Decreased coordination  Assessment: Patient is a 70 y o  male evaluated by Physical Therapy s/p admit to [place] [address] on 3/3/2021 with admitting diagnosis of: Hyponatremia, CVA (cerebral vascular accident), Stroke-like symptoms, and principal problem of: CVA (cerebral vascular accident)  PT was consulted to assess patient's functional mobility and discharge needs  Ordered are PT Evaluation and treatment with activity level of: activity as tolerated  Comorbidities affecting patient's physical performance at time of assessment include: DM, HTN, HLD, prostate cancer, arthritis, GERD, hypothyroidism, BPH  Personal factors affecting the patient at time of IE include: lives in 2 story home, ambulating with assistive device, inability to navigate community distances, inability/difficulty performing IADLs and inability/difficulty performing ADLs  Please locate objective findings from PT assessment regarding body systems outlined above  Upon evaluation, pt able to perform all functional mobility with SUP-Karen, RW, and increased time  Verbal cuing provided throughout for safety awareness and sequencing with RW  Seated rest break taken following 30' of ambulation d/t L LE fatigue  Pt with L foot drop during mobility but did not experience LOB  Pt demonstrating good carryover of education and motivation to participate   The patient's AM-PAC Basic Mobility Inpatient Short Form Raw Score is 18, Standardized Score is 41 05  A standardized score less than 42 9 suggests the patient may benefit from discharge to post-acute rehabilitation services  Please also refer to the recommendation of the Physical Therapist for safe discharge planning  Pt will benefit from continued PT intervention during LOS to address current deficits, increase LOF, and facilitate safe d/c to next level of care when medically appropriate  D/c recommendation at this time is post-acute rehabilitation services  Pt educated on importance of attending acute rehab and is in agreement with plan of care  PT Discharge Recommendation: Post-Acute Rehabilitation Services     PT - OK to Discharge: Yes(when medically appropriate to rehab)    See flowsheet documentation for full assessment

## 2021-03-04 NOTE — CASE MANAGEMENT
I was notified by Ziggy Brito at Acute Rehab that patient has a $375 copay which is part of there deductible they must pay before he can go to ARU  Pt is agreeable to same  I notified Obed Sommers of same

## 2021-03-04 NOTE — CONSULTS
Consult received d/t stroke  Full nutrition assessment completed and viewable in the nutrition documentation  Assessed at low nutrition risk  Tolerating regular diet texture and consistency  Adjusted diet to 2 gm Na CCD 2 and discontinued low-fat portion of cardiac diet order for liberalization as patient has been losing wt  PTA (not significant amount, however) and as his lipid profile is normal  Briefly verbally reviewed diet with patient who has a regimented diet at home per diet recall  No new recommendations  Will follow status prn  Thank you for the consult

## 2021-03-04 NOTE — CASE MANAGEMENT
PT is recommending that patient should go to an acute Rehab facility  I met with patient and explained to what acute rehab is   Pt asked me to talk to his wife  I called patient' wife and she would like him to go [address]   Referral was sent as requested   I explained to [facility] that we do have to get an authorization from [employer] to go to Acute Rehab  If patient is unable to go to Acute she would like me to make a referral to 5th floor  I notified wife I will update her and patient as soon as I know anything

## 2021-03-05 ENCOUNTER — APPOINTMENT (INPATIENT)
Dept: MRI IMAGING | Facility: HOSPITAL | Age: 76
DRG: 065 | End: 2021-03-05
Payer: COMMERCIAL

## 2021-03-05 ENCOUNTER — HOSPITAL ENCOUNTER (INPATIENT)
Facility: HOSPITAL | Age: 76
LOS: 20 days | Discharge: HOME HEALTH | DRG: 057 | End: 2021-03-25
Attending: FAMILY MEDICINE | Admitting: FAMILY MEDICINE
Payer: COMMERCIAL

## 2021-03-05 VITALS
OXYGEN SATURATION: 97 % | TEMPERATURE: 99.2 F | HEIGHT: 66 IN | HEART RATE: 78 BPM | WEIGHT: 142.42 LBS | SYSTOLIC BLOOD PRESSURE: 159 MMHG | BODY MASS INDEX: 22.89 KG/M2 | RESPIRATION RATE: 18 BRPM | DIASTOLIC BLOOD PRESSURE: 84 MMHG

## 2021-03-05 DIAGNOSIS — K21.9 GASTROESOPHAGEAL REFLUX DISEASE WITHOUT ESOPHAGITIS: Primary | ICD-10-CM

## 2021-03-05 DIAGNOSIS — I10 ESSENTIAL HYPERTENSION: Chronic | ICD-10-CM

## 2021-03-05 DIAGNOSIS — I63.9 CEREBROVASCULAR ACCIDENT (CVA), UNSPECIFIED MECHANISM (HCC): ICD-10-CM

## 2021-03-05 DIAGNOSIS — E78.5 HYPERLIPIDEMIA, UNSPECIFIED HYPERLIPIDEMIA TYPE: ICD-10-CM

## 2021-03-05 PROBLEM — E16.2 HYPOGLYCEMIA: Status: RESOLVED | Noted: 2020-10-01 | Resolved: 2021-03-05

## 2021-03-05 PROBLEM — E11.9 DMII (DIABETES MELLITUS, TYPE 2) (HCC): Chronic | Status: ACTIVE | Noted: 2018-02-07

## 2021-03-05 PROBLEM — E03.9 HYPOTHYROIDISM: Chronic | Status: ACTIVE | Noted: 2018-02-07

## 2021-03-05 PROBLEM — N18.30 STAGE 3 CHRONIC KIDNEY DISEASE (HCC): Chronic | Status: ACTIVE | Noted: 2018-02-07

## 2021-03-05 LAB
ANION GAP SERPL CALCULATED.3IONS-SCNC: 10 MMOL/L (ref 4–13)
ATRIAL RATE: 65 BPM
BASOPHILS # BLD AUTO: 0.07 THOUSANDS/ΜL (ref 0–0.1)
BASOPHILS NFR BLD AUTO: 1 % (ref 0–1)
BUN SERPL-MCNC: 26 MG/DL (ref 5–25)
CALCIUM SERPL-MCNC: 9.1 MG/DL (ref 8.3–10.1)
CHLORIDE SERPL-SCNC: 99 MMOL/L (ref 100–108)
CO2 SERPL-SCNC: 25 MMOL/L (ref 21–32)
CREAT SERPL-MCNC: 1.5 MG/DL (ref 0.6–1.3)
EOSINOPHIL # BLD AUTO: 0.1 THOUSAND/ΜL (ref 0–0.61)
EOSINOPHIL NFR BLD AUTO: 1 % (ref 0–6)
ERYTHROCYTE [DISTWIDTH] IN BLOOD BY AUTOMATED COUNT: 13.1 % (ref 11.6–15.1)
FLUAV RNA RESP QL NAA+PROBE: NEGATIVE
FLUBV RNA RESP QL NAA+PROBE: NEGATIVE
GFR SERPL CREATININE-BSD FRML MDRD: 46 ML/MIN/1.73SQ M
GLUCOSE SERPL-MCNC: 238 MG/DL (ref 65–140)
GLUCOSE SERPL-MCNC: 260 MG/DL (ref 65–140)
GLUCOSE SERPL-MCNC: 265 MG/DL (ref 65–140)
GLUCOSE SERPL-MCNC: 278 MG/DL (ref 65–140)
GLUCOSE SERPL-MCNC: 308 MG/DL (ref 65–140)
GLUCOSE SERPL-MCNC: 309 MG/DL (ref 65–140)
HCT VFR BLD AUTO: 38.4 % (ref 36.5–49.3)
HGB BLD-MCNC: 12.8 G/DL (ref 12–17)
IMM GRANULOCYTES # BLD AUTO: 0.03 THOUSAND/UL (ref 0–0.2)
IMM GRANULOCYTES NFR BLD AUTO: 0 % (ref 0–2)
LYMPHOCYTES # BLD AUTO: 0.87 THOUSANDS/ΜL (ref 0.6–4.47)
LYMPHOCYTES NFR BLD AUTO: 7 % (ref 14–44)
MAGNESIUM SERPL-MCNC: 1.7 MG/DL (ref 1.6–2.6)
MCH RBC QN AUTO: 31.4 PG (ref 26.8–34.3)
MCHC RBC AUTO-ENTMCNC: 33.3 G/DL (ref 31.4–37.4)
MCV RBC AUTO: 94 FL (ref 82–98)
MONOCYTES # BLD AUTO: 1.28 THOUSAND/ΜL (ref 0.17–1.22)
MONOCYTES NFR BLD AUTO: 11 % (ref 4–12)
NEUTROPHILS # BLD AUTO: 9.83 THOUSANDS/ΜL (ref 1.85–7.62)
NEUTS SEG NFR BLD AUTO: 80 % (ref 43–75)
NRBC BLD AUTO-RTO: 0 /100 WBCS
P AXIS: 73 DEGREES
PLATELET # BLD AUTO: 213 THOUSANDS/UL (ref 149–390)
PMV BLD AUTO: 10.1 FL (ref 8.9–12.7)
POTASSIUM SERPL-SCNC: 4 MMOL/L (ref 3.5–5.3)
PR INTERVAL: 198 MS
QRS AXIS: 43 DEGREES
QRSD INTERVAL: 98 MS
QT INTERVAL: 392 MS
QTC INTERVAL: 407 MS
RBC # BLD AUTO: 4.07 MILLION/UL (ref 3.88–5.62)
RSV RNA RESP QL NAA+PROBE: NEGATIVE
SARS-COV-2 RNA RESP QL NAA+PROBE: NEGATIVE
SODIUM SERPL-SCNC: 134 MMOL/L (ref 136–145)
T WAVE AXIS: 50 DEGREES
VENTRICULAR RATE: 65 BPM
WBC # BLD AUTO: 12.18 THOUSAND/UL (ref 4.31–10.16)

## 2021-03-05 PROCEDURE — NC001 PR NO CHARGE

## 2021-03-05 PROCEDURE — 80048 BASIC METABOLIC PNL TOTAL CA: CPT | Performed by: INTERNAL MEDICINE

## 2021-03-05 PROCEDURE — 82948 REAGENT STRIP/BLOOD GLUCOSE: CPT

## 2021-03-05 PROCEDURE — 99239 HOSP IP/OBS DSCHRG MGMT >30: CPT | Performed by: INTERNAL MEDICINE

## 2021-03-05 PROCEDURE — 0241U HB NFCT DS VIR RESP RNA 4 TRGT: CPT | Performed by: INTERNAL MEDICINE

## 2021-03-05 PROCEDURE — 93010 ELECTROCARDIOGRAM REPORT: CPT | Performed by: INTERNAL MEDICINE

## 2021-03-05 PROCEDURE — 85025 COMPLETE CBC W/AUTO DIFF WBC: CPT | Performed by: INTERNAL MEDICINE

## 2021-03-05 PROCEDURE — 83735 ASSAY OF MAGNESIUM: CPT | Performed by: INTERNAL MEDICINE

## 2021-03-05 PROCEDURE — 70551 MRI BRAIN STEM W/O DYE: CPT

## 2021-03-05 PROCEDURE — G1004 CDSM NDSC: HCPCS

## 2021-03-05 RX ORDER — ACETAMINOPHEN 325 MG/1
650 TABLET ORAL EVERY 6 HOURS PRN
Status: DISCONTINUED | OUTPATIENT
Start: 2021-03-05 | End: 2021-03-25 | Stop reason: HOSPADM

## 2021-03-05 RX ORDER — PANTOPRAZOLE SODIUM 40 MG/1
40 TABLET, DELAYED RELEASE ORAL
Status: CANCELLED | OUTPATIENT
Start: 2021-03-06

## 2021-03-05 RX ORDER — HYDRALAZINE HYDROCHLORIDE 20 MG/ML
5 INJECTION INTRAMUSCULAR; INTRAVENOUS EVERY 6 HOURS PRN
Status: CANCELLED | OUTPATIENT
Start: 2021-03-05

## 2021-03-05 RX ORDER — DOCUSATE SODIUM 100 MG/1
100 CAPSULE, LIQUID FILLED ORAL 2 TIMES DAILY
Status: CANCELLED | OUTPATIENT
Start: 2021-03-05

## 2021-03-05 RX ORDER — ATORVASTATIN CALCIUM 40 MG/1
40 TABLET, FILM COATED ORAL
Status: DISCONTINUED | OUTPATIENT
Start: 2021-03-06 | End: 2021-03-25 | Stop reason: HOSPADM

## 2021-03-05 RX ORDER — POLYETHYLENE GLYCOL 3350 17 G/17G
17 POWDER, FOR SOLUTION ORAL DAILY PRN
Status: DISCONTINUED | OUTPATIENT
Start: 2021-03-05 | End: 2021-03-25 | Stop reason: HOSPADM

## 2021-03-05 RX ORDER — ACETAMINOPHEN 325 MG/1
650 TABLET ORAL EVERY 6 HOURS PRN
Status: CANCELLED | OUTPATIENT
Start: 2021-03-05

## 2021-03-05 RX ORDER — CLOPIDOGREL BISULFATE 75 MG/1
75 TABLET ORAL DAILY
Status: CANCELLED | OUTPATIENT
Start: 2021-03-06

## 2021-03-05 RX ORDER — ACETAMINOPHEN 325 MG/1
650 TABLET ORAL EVERY 6 HOURS PRN
COMMUNITY
End: 2021-03-25 | Stop reason: HOSPADM

## 2021-03-05 RX ORDER — POLYETHYLENE GLYCOL 3350 17 G/17G
17 POWDER, FOR SOLUTION ORAL DAILY PRN
COMMUNITY
End: 2021-03-25 | Stop reason: HOSPADM

## 2021-03-05 RX ORDER — CLOPIDOGREL BISULFATE 75 MG/1
75 TABLET ORAL DAILY
COMMUNITY
End: 2021-03-25 | Stop reason: HOSPADM

## 2021-03-05 RX ORDER — HEPARIN SODIUM 5000 [USP'U]/ML
5000 INJECTION, SOLUTION INTRAVENOUS; SUBCUTANEOUS EVERY 8 HOURS SCHEDULED
Status: DISCONTINUED | OUTPATIENT
Start: 2021-03-05 | End: 2021-03-25 | Stop reason: HOSPADM

## 2021-03-05 RX ORDER — ATORVASTATIN CALCIUM 40 MG/1
40 TABLET, FILM COATED ORAL DAILY
COMMUNITY
End: 2021-03-25 | Stop reason: HOSPADM

## 2021-03-05 RX ORDER — DOCUSATE SODIUM 100 MG/1
100 CAPSULE, LIQUID FILLED ORAL 2 TIMES DAILY
Status: DISCONTINUED | OUTPATIENT
Start: 2021-03-05 | End: 2021-03-05 | Stop reason: HOSPADM

## 2021-03-05 RX ORDER — LISINOPRIL 20 MG/1
40 TABLET ORAL DAILY
Status: DISCONTINUED | OUTPATIENT
Start: 2021-03-05 | End: 2021-03-05 | Stop reason: HOSPADM

## 2021-03-05 RX ORDER — LEVOTHYROXINE SODIUM 88 UG/1
88 TABLET ORAL
Status: CANCELLED | OUTPATIENT
Start: 2021-03-06

## 2021-03-05 RX ORDER — DOCUSATE SODIUM 100 MG/1
100 CAPSULE, LIQUID FILLED ORAL 2 TIMES DAILY
Status: DISCONTINUED | OUTPATIENT
Start: 2021-03-05 | End: 2021-03-25 | Stop reason: HOSPADM

## 2021-03-05 RX ORDER — CLOPIDOGREL BISULFATE 75 MG/1
75 TABLET ORAL DAILY
Status: DISCONTINUED | OUTPATIENT
Start: 2021-03-06 | End: 2021-03-25 | Stop reason: HOSPADM

## 2021-03-05 RX ORDER — DOCUSATE SODIUM 100 MG/1
100 CAPSULE, LIQUID FILLED ORAL 2 TIMES DAILY
COMMUNITY
End: 2021-03-25 | Stop reason: HOSPADM

## 2021-03-05 RX ORDER — ONDANSETRON 2 MG/ML
4 INJECTION INTRAMUSCULAR; INTRAVENOUS EVERY 6 HOURS PRN
Status: CANCELLED | OUTPATIENT
Start: 2021-03-05

## 2021-03-05 RX ORDER — ATORVASTATIN CALCIUM 40 MG/1
40 TABLET, FILM COATED ORAL
Status: CANCELLED | OUTPATIENT
Start: 2021-03-05

## 2021-03-05 RX ORDER — HYDRALAZINE HYDROCHLORIDE 25 MG/1
25 TABLET, FILM COATED ORAL EVERY 8 HOURS PRN
Status: DISCONTINUED | OUTPATIENT
Start: 2021-03-05 | End: 2021-03-25 | Stop reason: HOSPADM

## 2021-03-05 RX ORDER — POLYETHYLENE GLYCOL 3350 17 G/17G
17 POWDER, FOR SOLUTION ORAL DAILY PRN
Status: DISCONTINUED | OUTPATIENT
Start: 2021-03-05 | End: 2021-03-05 | Stop reason: HOSPADM

## 2021-03-05 RX ORDER — PANTOPRAZOLE SODIUM 40 MG/1
40 TABLET, DELAYED RELEASE ORAL
Status: DISCONTINUED | OUTPATIENT
Start: 2021-03-06 | End: 2021-03-08

## 2021-03-05 RX ORDER — HEPARIN SODIUM 5000 [USP'U]/ML
5000 INJECTION, SOLUTION INTRAVENOUS; SUBCUTANEOUS EVERY 8 HOURS SCHEDULED
Status: CANCELLED | OUTPATIENT
Start: 2021-03-05

## 2021-03-05 RX ORDER — LISINOPRIL 20 MG/1
40 TABLET ORAL DAILY
Status: CANCELLED | OUTPATIENT
Start: 2021-03-06

## 2021-03-05 RX ORDER — POLYETHYLENE GLYCOL 3350 17 G/17G
17 POWDER, FOR SOLUTION ORAL DAILY PRN
Status: CANCELLED | OUTPATIENT
Start: 2021-03-05

## 2021-03-05 RX ORDER — LISINOPRIL 20 MG/1
40 TABLET ORAL DAILY
Status: DISCONTINUED | OUTPATIENT
Start: 2021-03-06 | End: 2021-03-25 | Stop reason: HOSPADM

## 2021-03-05 RX ORDER — HYDRALAZINE HYDROCHLORIDE 20 MG/ML
5 INJECTION INTRAMUSCULAR; INTRAVENOUS EVERY 6 HOURS PRN
Status: DISCONTINUED | OUTPATIENT
Start: 2021-03-05 | End: 2021-03-05

## 2021-03-05 RX ORDER — PANTOPRAZOLE SODIUM 40 MG/1
40 TABLET, DELAYED RELEASE ORAL DAILY
COMMUNITY
End: 2021-03-25 | Stop reason: HOSPADM

## 2021-03-05 RX ORDER — LEVOTHYROXINE SODIUM 88 UG/1
88 TABLET ORAL
Status: DISCONTINUED | OUTPATIENT
Start: 2021-03-06 | End: 2021-03-25 | Stop reason: HOSPADM

## 2021-03-05 RX ADMIN — HEPARIN SODIUM 5000 UNITS: 5000 INJECTION INTRAVENOUS; SUBCUTANEOUS at 06:01

## 2021-03-05 RX ADMIN — LEVOTHYROXINE SODIUM 88 MCG: 88 TABLET ORAL at 06:02

## 2021-03-05 RX ADMIN — PANTOPRAZOLE SODIUM 40 MG: 40 TABLET, DELAYED RELEASE ORAL at 06:02

## 2021-03-05 RX ADMIN — CLOPIDOGREL BISULFATE 75 MG: 75 TABLET ORAL at 10:55

## 2021-03-05 RX ADMIN — LISINOPRIL 40 MG: 20 TABLET ORAL at 10:56

## 2021-03-05 RX ADMIN — DOCUSATE SODIUM 100 MG: 100 CAPSULE, LIQUID FILLED ORAL at 18:33

## 2021-03-05 RX ADMIN — HEPARIN SODIUM 5000 UNITS: 5000 INJECTION INTRAVENOUS; SUBCUTANEOUS at 15:12

## 2021-03-05 RX ADMIN — HEPARIN SODIUM 5000 UNITS: 5000 INJECTION INTRAVENOUS; SUBCUTANEOUS at 21:09

## 2021-03-05 NOTE — CASE MANAGEMENT
Awaiting auth from Pieter Ivy James from Acute Rehab did call [employer] to find out status of Auth  [facility] will pick the patient up at 3:30 PM to transport the patient to [facility] [address]  I notified nurse, patient and wife of transport time

## 2021-03-05 NOTE — CASE MANAGEMENT
As per Esau Jimenez patient was approved by his insurance company to go to [facility]   [facility] ambulance is picking the patient up in approximately one half hour  I left message for patient's wife on her house phone that patient is being transferred today   I sent in basket  message for patients PCP and Neurology to call for a follow up appointment

## 2021-03-05 NOTE — ASSESSMENT & PLAN NOTE
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    · ECHO obtained and without significant structural abnormalities  · Was maintained on telemetry during hospitalization  · Continue high potency statin - recommendations by Neurology for converting back to home simvastatin at 40 mg upon discharge  · Neurology with recommendations for discontinuation of ASA in favor of daily Clopidogrel  · Hemoglobin A1c 7 3% - tight control devised  · Also with recommendations for good blood pressure control long-term  · Permissive hypertension initially - administered half dose of lisinopril last night  Restart ACE-inhibitor today  · Initiate on PPI for GI prophylaxis  · Outpatient neurology follow-up  · Recommendations by PT/OT for acute rehab  Underwent speech therapy evaluation as well  · Per official neurology evaluation, patient with well controlled vascular risk factors - consider cardioembolic source as well  However recommended against a ANDREW at this point as TTE was without structural abnormalities  Consider outpatient implantable loop recorder vs  Zio patch to rule out AFib as potential source  Patient's left-sided deficits have progressed since time of admission, initially with 4/5 strength in the left upper and lower extremities, now 1-2/5  MRI was repeated following discussion with Neurology, essentially unchanged  Mr Felisha Waterman has been accepted to acute rehab, being discharged to [facility] today

## 2021-03-05 NOTE — PLAN OF CARE
Problem: Prexisting or High Potential for Compromised Skin Integrity  Goal: Skin integrity is maintained or improved  Description: INTERVENTIONS:  - Identify patients at risk for skin breakdown  - Assess and monitor skin integrity  - Assess and monitor nutrition and hydration status  - Monitor labs   - Assess for incontinence   - Turn and reposition patient  - Assist with mobility/ambulation  - Relieve pressure over bony prominences  - Avoid friction and shearing  - Provide appropriate hygiene as needed including keeping skin clean and dry  - Evaluate need for skin moisturizer/barrier cream  - Collaborate with interdisciplinary team   - Patient/family teaching  - Consider wound care consult   Outcome: Progressing     Problem: Potential for Falls  Goal: Patient will remain free of falls  Description: INTERVENTIONS:  - Assess patient frequently for physical needs  -  Identify cognitive and physical deficits and behaviors that affect risk of falls    -  [place] fall precautions as indicated by assessment   - Educate patient/family on patient safety including physical limitations  - Instruct patient to call for assistance with activity based on assessment  - Modify environment to reduce risk of injury  - Consider OT/PT consult to assist with strengthening/mobility  Outcome: Progressing     Problem: NEUROSENSORY - ADULT  Goal: Achieves stable or improved neurological status  Description: INTERVENTIONS  - Monitor and report changes in neurological status  - Monitor vital signs such as temperature, blood pressure, glucose, and any other labs ordered   - Initiate measures to prevent increased intracranial pressure  - Monitor for seizure activity and implement precautions if appropriate      Outcome: Progressing     Problem: CARDIOVASCULAR - ADULT  Goal: Maintains optimal cardiac output and hemodynamic stability  Description: INTERVENTIONS:  - Monitor I/O, vital signs and rhythm  - Monitor for S/S and trends of decreased cardiac output  - Administer and titrate ordered vasoactive medications to optimize hemodynamic stability  - Assess quality of pulses, skin color and temperature  - Assess for signs of decreased coronary artery perfusion  - Instruct patient to report change in severity of symptoms  Outcome: Progressing  Goal: Absence of cardiac dysrhythmias or at baseline rhythm  Description: INTERVENTIONS:  - Continuous cardiac monitoring, vital signs, obtain 12 lead EKG if ordered  - Administer antiarrhythmic and heart rate control medications as ordered  - Monitor electrolytes and administer replacement therapy as ordered  Outcome: Progressing     Problem: MUSCULOSKELETAL - ADULT  Goal: Maintain or return mobility to safest level of function  Description: INTERVENTIONS:  - Assess patient's ability to carry out ADLs; assess patient's baseline for ADL function and identify physical deficits which impact ability to perform ADLs (bathing, care of mouth/teeth, toileting, grooming, dressing, etc )  - Assess/evaluate cause of self-care deficits   - Assess range of motion  - Assess patient's mobility  - Assess patient's need for assistive devices and provide as appropriate  - Encourage maximum independence but intervene and supervise when necessary  - Involve family in performance of ADLs  - Assess for home care needs following discharge   - Consider OT consult to assist with ADL evaluation and planning for discharge  - Provide patient education as appropriate  Outcome: Progressing

## 2021-03-05 NOTE — CASE MANAGEMENT
Still awaiting auth from [address] [place] [address] Dr Davian Peguero again called [address]  and it is currently being reviewed by the Medical director

## 2021-03-05 NOTE — UTILIZATION REVIEW
Initial Clinical Review    Admission: Date/Time/Statement:   Admission Orders (From admission, onward)     Ordered        03/03/21 0807  Inpatient Admission  Once                   Orders Placed This Encounter   Procedures    Inpatient Admission     Standing Status:   Standing     Number of Occurrences:   1     Order Specific Question:   Level of Care     Answer:   Med Surg [16]     Order Specific Question:   Bed Type     Answer:   [facility] [4]     Order Specific Question:   Bed request comments     Answer:   tele     Order Specific Question:   Estimated length of stay     Answer:   More than 2 Midnights     Order Specific Question:   Certification     Answer:   I certify that inpatient services are medically necessary for this patient for a duration of greater than two midnights  See H&P and MD Progress Notes for additional information about the patient's course of treatment  ED Arrival Information     Expected Arrival Acuity Means of Arrival Escorted By Service Admission Type    - 3/3/2021 07:18 Emergent Ambulance [facility]  [facility]) Hospitalist Emergency    Arrival Complaint    Stroke-Like Symptoms        Chief Complaint   Patient presents with    STROKE Alert     patient woke up at 0700 with left sided weakness and decreased sensation  reports dragging left foot yesterday  positive for left arm and leg drift, and decreased senstation  patient on ASA     Assessment/Plan: 68 yo m w/hx dm, hypothyroid, ckd, prostate ca,  htn to ED by EMS admitted as inpatient due to CVA  Presented as a stroke alert due LUE/LLE weakness and difficulty ambulating since 0030 when he woke up to use the bathroom  Was dragging his LLE the day prior  Went back to bed, but sx persisted in AM  C/o decreased L sided extremity sensation and L arm drift  Neuro immediately contacted and determined likely acute ischemic stroke  No TPA due to outside time window   Exam reveals L facial droop, LUE/LLE 4/5 strength, weakness  No evidence of bleed or stenosis on imaging  Stroke workup in progress with permissive htn, IVF, MRI brain, asa/statin  3/3 PM update per neuro: stopped asa in favor of plavix monotherapy  Does not qualify for DAPT based on NIH xcore  Continue statin, target bp 130/80, should remain physically active  Await echo  3/3 PM update per attending: MRI brain confirms acute anterior R medullary infarct  Also hyponatremic at 129, which could be from cva or dehydration  Decreased the NS rate for gradual increase to goal of 133  Echo wnl  Keep on tele, continue permissive htn, consider cardioembolic source  No ANDREW needed since TTE w/o structural abnormalities  Consider outpt loop recorder or zio patch to r/o a fib  3/4: GCS 15, remains a&o x 4, LUE/LLE weaker than the R  ACE held for permissive HTN, resume @ 1/2 dose tonight  Monitor renal function, avoid nephrotoxins, and renally dose medications when appropriate      ED Triage Vitals   Temperature Pulse Respirations Blood Pressure SpO2   03/03/21 0725 03/03/21 0725 03/03/21 0725 03/03/21 0725 03/03/21 0725   98 2 °F (36 8 °C) 71 16 (!) 205/97 98 %      Temp Source Heart Rate Source Patient Position - Orthostatic VS BP Location FiO2 (%)   03/03/21 0725 03/03/21 0725 03/03/21 0725 03/03/21 0733 --   Temporal Monitor Lying Right arm       Pain Score       03/03/21 0917       No Pain          Wt Readings from Last 1 Encounters:   03/05/21 64 6 kg (142 lb 6 7 oz)     Additional Vital Signs:   Date/Time  Temp  Pulse  Resp  BP  MAP (mmHg)  SpO2  O2 Device    03/04/21 09:44:14  98 3 °F (36 8 °C)  74  --  160/92  115  96 %  --    03/04/21 04:48:32  98 3 °F (36 8 °C)  72  --  163/92  116  96 %  --    03/04/21 00:58:38  98 3 °F (36 8 °C)  82  --  155/105Abnormal   122  97 %  --    03/03/21 22:23:35  98 5 °F (36 9 °C)  73  18  136/80  99  97 %  --    03/03/21 20:54:13  98 5 °F (36 9 °C)  66  --  166/91  116  96 %  --    03/03/21 2047  --  --  --  --  --  95 %  None (Room air)    03/03/21 15:07:35  98 7 °F (37 1 °C)  67  18  167/90  116  97 %  --    03/03/21 13:19:42  --  65  18  171/90Abnormal   117  98 %  --    03/03/21 1200  --  68  18  173/91Abnormal   119  97 %  --    03/03/21 1000  98 3 °F (36 8 °C)  72  18  173/89Abnormal   121  98 %  --    03/03/21 0922  --  --  --  --  --  --  None (Room air)    03/03/21 08:36:14  98 5 °F (36 9 °C)  81  17  174/93Abnormal   120  97 %  --    03/03/21 0755  98 2 °F (36 8 °C)  --  --  --  --  --  None (Room air)    03/03/21 0745  --  67  24Abnormal   187/90Abnormal   129  98 %  --    03/03/21 0733  --  71  16  205/97Abnormal   --  98 %  None (Room air)      Date and Time Eye Opening Best Verbal Response Best Motor Response Louise Coma Scale Score   03/03/21 0745 4 5 6 15   03/03/21 0730 4 5 6 15   03/03/21 0725 4 5 6 15       Pertinent Labs/Diagnostic Test Results:   3/3 CT brain: No acute intracranial abnormality      Mild cerebral chronic microangiopathic disease    Stable ventriculomegaly, slightly disproportionate to degree of generalized cerebral sulcal prominence  3/3 CTA head/neck: No cervical or intracranial large vessel occlusion    Mild age expected atherosclerotic disease    Cholelithiasis without suggestion of cholecystitis      3/3 PCXR: nothing acute    3/3 MRI brain:1  Acute anterior right medullary infarct  No significant edema or mass effect  No hemorrhagic transformation    2   Chronic microangiopathy  3/3 EKG: nsr    3/3 echo: LEFT VENTRICLE:  Systolic function was normal  Ejection fraction was estimated to be 60 %  There were no regional wall motion abnormalities  Wall thickness was mildly increased  The changes were consistent with concentric remodeling (increased wall thickness with normal wall mass)    Doppler parameters were consistent with abnormal left ventricular relaxation (grade 1 diastolic dysfunction)    RIGHT VENTRICLE:The size was normal   Systolic function was normal    AORTIC VALVE:There was mild regurgitation    TRICUSPID VALVE:There was mild regurgitation        Results from last 7 days   Lab Units 03/03/21  0748   SARS-COV-2  Negative     Results from last 7 days   Lab Units 03/05/21  0539 03/04/21  0552 03/03/21  0742   WBC Thousand/uL 12 18* 7 10 4 46   HEMOGLOBIN g/dL 12 8 13 0 11 4*   HEMATOCRIT % 38 4 39 7 35 0*   PLATELETS Thousands/uL 213 224 191   NEUTROS ABS Thousands/µL 9 83* 5 07  --          Results from last 7 days   Lab Units 03/05/21  0539 03/04/21  0552 03/03/21  1212 03/03/21  0742   SODIUM mmol/L 134* 136 133* 129*   POTASSIUM mmol/L 4 0 4 1  --  4 0   CHLORIDE mmol/L 99* 100  --  97*   CO2 mmol/L 25 27  --  25   ANION GAP mmol/L 10 9  --  7   BUN mg/dL 26* 27*  --  25   CREATININE mg/dL 1 50* 1 62*  --  1 61*   EGFR ml/min/1 73sq m 46 41  --  42   CALCIUM mg/dL 9 1 9 1  --  8 8   MAGNESIUM mg/dL 1 7 1 9  --   --      Results from last 7 days   Lab Units 03/03/21  0742   AST U/L 21   ALT U/L 25   ALK PHOS U/L 89   TOTAL PROTEIN g/dL 6 5   ALBUMIN g/dL 3 4*   TOTAL BILIRUBIN mg/dL 0 50   BILIRUBIN DIRECT mg/dL 0 13     Results from last 7 days   Lab Units 03/04/21  1348 03/04/21  1143 03/04/21  0726 03/03/21  1507 03/03/21  1315 03/03/21  0745   POC GLUCOSE mg/dl 290* 233* 204* 168* 210* 219*     Results from last 7 days   Lab Units 03/05/21  0539 03/04/21  0552 03/03/21  0742   GLUCOSE RANDOM mg/dL 308* 223* 241*     Results from last 7 days   Lab Units 03/03/21  0742   TROPONIN I ng/mL <0 02         Results from last 7 days   Lab Units 03/03/21  0742   PROTIME seconds 14 3   INR  1 13   PTT seconds 28     Results from last 7 days   Lab Units 03/03/21  0742   TSH 3RD GENERATON uIU/mL 3 000     Results from last 7 days   Lab Units 03/03/21  0748   INFLUENZA A PCR  Negative   INFLUENZA B PCR  Negative   RSV PCR  Negative     ED Treatment:   Medication Administration from 03/03/2021 0718 to 03/03/2021 0820       Date/Time Order Dose Route Action     03/03/2021 0806 sodium chloride 0 9 % infusion 125 mL/hr Intravenous New Bag     03/03/2021 0805 aspirin tablet 325 mg 325 mg Oral Given        Past Medical History:   Diagnosis Date    Arthritis     BPH (benign prostatic hyperplasia)     last assessed 4/29/2014    Diabetes mellitus ([place] [address] )     Disease of thyroid gland     GERD (gastroesophageal reflux disease)     Hearing aid worn     Hyperlipidemia     Hypertension     Hypothyroidism     Mumps     Prostate cancer ([place] [address] )     Tingling sensation     bilateral feet occassionally    Tinnitus     Wears glasses     Wears partial dentures     upper     Present on Admission:   CVA (cerebral vascular accident) ([place] [address] )   Hyponatremia   DMII (diabetes mellitus, type 2) ([place] [address] )   Hypothyroidism   GERD (gastroesophageal reflux disease)   Hyperlipidemia   Stage 3 chronic kidney disease   Hypertension      Admitting Diagnosis: Hyponatremia [E87 1]  CVA (cerebral vascular accident) ([place] [address] ) [I63 9]  Stroke-like symptoms [R29 90]  Age/Sex: 70 y o  male  Admission Orders:  Scheduled Medications:  atorvastatin, 40 mg, Oral, Daily With Dinner  clopidogrel, 75 mg, Oral, Daily  heparin (porcine), 5,000 Units, Subcutaneous, Q8H [address]  levothyroxine, 88 mcg, Oral, Early Morning  lisinopril, 40 mg, Oral, Daily  pantoprazole, 40 mg, Oral, Daily Before Breakfast  patient maintained insulin pump, 1 each, Subcutaneous, 4x Daily (with meals and at bedtime)      Continuous IV Infusions:     PRN Meds:  acetaminophen, 650 mg, Oral, Q6H PRN  hydrALAZINE, 5 mg, Intravenous, Q6H PRN  insulin aspart, 1,000 Units, Subcutaneous Insulin Pump, Daily PRN  ondansetron, 4 mg, Intravenous, Q6H PRN  polyethylene glycol, 17 g, Oral, Daily PRN      Neuro checks Every 1 hour x 4 hours, then every 2 hours x 8 hours, then every 4 hours x 72 hours  Tele    IP CONSULT TO NEUROLOGY  IP CONSULT TO CASE MANAGEMENT  IP CONSULT TO NUTRITION SERVICES    Network Utilization Review Department  ATTENTION: Please call with any questions or concerns to [phone] and carefully listen to the prompts so that you are directed to the right person  All voicemails are confidential   Shirley Musa all requests for admission clinical reviews, approved or denied determinations and any other requests to dedicated fax number below belonging to the campus where the patient is receiving treatment   List of dedicated fax numbers for the Facilities:  [address] DENIALS (Administrative/Medical Necessity) [phone]   [address] (Maternity/NICU/Pediatrics) [phone]   [address] [place] [address] Dr Jennifer Tsang 9481 (  Yared Gill "Berta" 103) [address] [place] [address] Sami Maza [address] [place] [address] [phone]

## 2021-03-05 NOTE — NURSING NOTE
Discharged in no acute distress to [facility] Acute Rehab  Transported by [facility] [place] ambulance

## 2021-03-05 NOTE — ASSESSMENT & PLAN NOTE
Lab Results   Component Value Date    EGFR 46 03/05/2021    EGFR 41 03/04/2021    EGFR 42 03/03/2021    CREATININE 1 50 (H) 03/05/2021    CREATININE 1 62 (H) 03/04/2021    CREATININE 1 61 (H) 03/03/2021     CKD 3  Current creatinine appears to be at baseline  Initially with ACE-inhibitor on hold to allow permissive hypertension the setting of acute CVA - was resumed at half dose last evening, full-dose this morning  Monitor renal function, avoid nephrotoxins, and renally dose medications when appropriate

## 2021-03-05 NOTE — PLAN OF CARE
Problem: Potential for Falls  Goal: Patient will remain free of falls  Description: INTERVENTIONS:  - Assess patient frequently for physical needs  -  Identify cognitive and physical deficits and behaviors that affect risk of falls    -  [place] fall precautions as indicated by assessment   - Educate patient/family on patient safety including physical limitations  - Instruct patient to call for assistance with activity based on assessment  - Modify environment to reduce risk of injury  - Consider OT/PT consult to assist with strengthening/mobility  Outcome: Progressing     Problem: Prexisting or High Potential for Compromised Skin Integrity  Goal: Skin integrity is maintained or improved  Description: INTERVENTIONS:  - Identify patients at risk for skin breakdown  - Assess and monitor skin integrity  - Assess and monitor nutrition and hydration status  - Monitor labs   - Assess for incontinence   - Turn and reposition patient  - Assist with mobility/ambulation  - Relieve pressure over bony prominences  - Avoid friction and shearing  - Provide appropriate hygiene as needed including keeping skin clean and dry  - Evaluate need for skin moisturizer/barrier cream  - Collaborate with interdisciplinary team   - Patient/family teaching  - Consider wound care consult   Outcome: Progressing     Problem: PAIN - ADULT  Goal: Verbalizes/displays adequate comfort level or baseline comfort level  Description: Interventions:  - Encourage patient to monitor pain and request assistance  - Assess pain using appropriate pain scale  - Administer analgesics based on type and severity of pain and evaluate response  - Implement non-pharmacological measures as appropriate and evaluate response  - Consider cultural and social influences on pain and pain management  - Notify physician/advanced practitioner if interventions unsuccessful or patient reports new pain  Outcome: Progressing     Problem: INFECTION - ADULT  Goal: Absence or prevention of progression during hospitalization  Description: INTERVENTIONS:  - Assess and monitor for signs and symptoms of infection  - Monitor lab/diagnostic results  - Monitor all insertion sites, i e  indwelling lines, tubes, and drains  - Monitor endotracheal if appropriate and nasal secretions for changes in amount and color  - [place] appropriate cooling/warming therapies per order  - Administer medications as ordered  - Instruct and encourage patient and family to use good hand hygiene technique  - Identify and instruct in appropriate isolation precautions for identified infection/condition  Outcome: Progressing     Problem: SAFETY ADULT  Goal: Patient will remain free of falls  Description: INTERVENTIONS:  - Assess patient frequently for physical needs  -  Identify cognitive and physical deficits and behaviors that affect risk of falls    -  [place] fall precautions as indicated by assessment   - Educate patient/family on patient safety including physical limitations  - Instruct patient to call for assistance with activity based on assessment  - Modify environment to reduce risk of injury  - Consider OT/PT consult to assist with strengthening/mobility  Outcome: Progressing  Goal: Maintain or return to baseline ADL function  Description: INTERVENTIONS:  -  Assess patient's ability to carry out ADLs; assess patient's baseline for ADL function and identify physical deficits which impact ability to perform ADLs (bathing, care of mouth/teeth, toileting, grooming, dressing, etc )  - Assess/evaluate cause of self-care deficits   - Assess range of motion  - Assess patient's mobility; develop plan if impaired  - Assess patient's need for assistive devices and provide as appropriate  - Encourage maximum independence but intervene and supervise when necessary  - Involve family in performance of ADLs  - Assess for home care needs following discharge   - Consider OT consult to assist with ADL evaluation and planning for discharge  - Provide patient education as appropriate  Outcome: Progressing  Goal: Maintain or return mobility status to optimal level  Description: INTERVENTIONS:  - Assess patient's baseline mobility status (ambulation, transfers, stairs, etc )    - Identify cognitive and physical deficits and behaviors that affect mobility  - Identify mobility aids required to assist with transfers and/or ambulation (gait belt, sit-to-stand, lift, walker, cane, etc )  - [place] fall precautions as indicated by assessment  - Record patient progress and toleration of activity level on Mobility SBAR; progress patient to next Phase/Stage  - Instruct patient to call for assistance with activity based on assessment  - Consider rehabilitation consult to assist with strengthening/weightbearing, etc   Outcome: Progressing     Problem: DISCHARGE PLANNING  Goal: Discharge to home or other facility with appropriate resources  Description: INTERVENTIONS:  - Identify barriers to discharge w/patient and caregiver  - Arrange for needed discharge resources and transportation as appropriate  - Identify discharge learning needs (meds, wound care, etc )  - Arrange for interpretive services to assist at discharge as needed  - Refer to Case Management Department for coordinating discharge planning if the patient needs post-hospital services based on physician/advanced practitioner order or complex needs related to functional status, cognitive ability, or social support system  Outcome: Progressing

## 2021-03-05 NOTE — ASSESSMENT & PLAN NOTE
Lab Results   Component Value Date    HGBA1C 7 3 (H) 03/04/2021       Recent Labs     03/03/21  1507 03/04/21  0726 03/04/21  1143 03/04/21  1348   POCGLU 168* 204* 233* 290*       Blood Sugar Average: Last 72 hrs:  (P) 123 [id number]   Hemoglobin A1c well controlled  Utilizes insulin pump at home, which will be continued here  Maintain on ADA diet

## 2021-03-05 NOTE — PROGRESS NOTES
PHYSICAL MEDICINE AND REHABILITATION   PREADMISSION ASSESSMENT     [facility] Diagnoses:  Impairment of mobility, safety and Activities of Daily Living (ADLs) due to Stroke:  01 1  Left Body Involvement (Right Brain)  Etiologic Diagnosis:  acute anterior right medullary infarct  Date of Onset: 3/3/21   Date of surgery: n/a    PATIENT INFORMATION  Name: Masoud Simpson Phone #: [phone] (home)   Address: [address] [place] [id number]  YOB: 1945 Age: 70 y o  SS#   Marital Status: /Civil Union  Ethnicity: White  Employment Status: retired  Extended Emergency Contact Information  Primary Emergency Contact: [employer]  Address: [address] Phone: [phone]  Mobile Phone: [phone]  Relation: Spouse  Secondary Emergency Contact: Iván Pitts   [address] Phone: [phone]  Relation: None  Advance Directive: Level 1-Full Code (has ACP docs)    INSURANCE/COVERAGE:     Primary Payor: [employer]  REP / Plan: Murtaza Hughes O [facility] REP / Product Type: Medicare HMO /  #[id number] Secondary Payer: Private Pay   Payer Contact: Susy Roper  Payer Contact:   Contact Phone: [phone]  Contact Phone:     Authorization #: [id number]  Coverage Dates: 3/5/21-3/11/21  LCD: 3/11/21-Update due on 3/12/21  Updates can be faxed to Susy Roper  at [phone]  Norma's phone # is [phone]  Medicare #: [id number]  Medicare Days:   Medical Record #: [id number]    REFERRAL SOURCE:   Referring provider: Mitchel Escudero MD  Referring facility: [address] [place] [address]  [address]  PCP: Edward Trevizo DO PCP phone number: [phone]    MEDICAL INFORMATION  HPI: This is a 70year old male who presented to [address] [place] [address] ER on 3/3/21 with left sided weakness    Patient reported that the weakness began in his LLE later in the day prior to coming to the ER  The weakness then progressed to his LUE on the day of presentation to the ER  Patient also experiencing slight left facial droop  CT of the head did not show any acute findings  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  He was continued on a high potency statin and recommendation was given to convert back to simvastatin 40 mg upon discharge  Neurology was consulted and recommended monotherapy (Plavix) and therefore ASA was discontinued  TTE was without structural abnormalities  Neurology recommended against ANDREW  Patient may be considered for an OP implantable loop recorder vs  Zio patch to r/o a-fib as a potential source of CVA since he has well controlled vascular risk factors  Patient was allowed permissive HTN and on 3/4, his ACE inhibitor was restarted at 1/2 dose  Patient with hyponatremia potentially on the basis of CVA vs  dehydration  Initial Na+ 129  NS was decreased and repeat Na+ was 133  Patient worked with PT and OT and was recommended for post acute rehabilitation services  He is now medically stable and ready to begin rehabilitation  Past Medical History:   Past Surgical History:    Allergies:     Past Medical History:   Diagnosis Date    Arthritis     BPH (benign prostatic hyperplasia)     last assessed 4/29/2014    Diabetes mellitus ([address] )     Disease of thyroid gland     GERD (gastroesophageal reflux disease)     Hearing aid worn     Hyperlipidemia     Hypertension     Hypothyroidism     Mumps     Prostate cancer ([address] )     Tingling sensation     bilateral feet occassionally    Tinnitus     Wears glasses     Wears partial dentures     upper    Past Surgical History:   Procedure Laterality Date    CATARACT EXTRACTION Bilateral 2000    EYE SURGERY      JOINT REPLACEMENT      KNEE ARTHROSCOPY Right 06/26/2009    knee    CA LAP,PROSTATECTOMY,RADICAL,W/NERVE SPARE,INCL ROBOTIC N/A 2/7/2018    Procedure: ROBOTIC ASSISTED LAPAROSCOPIC PROSTATECTOMY; BILATERAL PELVIC LYMPH NODE DISSECTION;  Surgeon: Awais Pineda MD;  Location: [facility];  Service: Urology    NC REPAIR [address] HERNIA,5+Y/O,REDUCIBL Right 4/19/2019    Procedure: REPAIR HERNIA INGUINAL;  Surgeon: Helena Reich DO;  Location: [facility];  Service: General    PROSTATE BIOPSY  11/07/2017    needle biopsy of prostate    TRIGGER FINGER RELEASE  05/2013    trigger thumb     No Known Allergies      Comorbidities:   LUE and LLE weakness  Left facial droop  Hyponatremia  Hyperlipidemia  HTN  Stage 3 CKD  Type 2 DM  GERD          Surgeries/Procedures in the last 100 days: n/a    CURRENT VITAL SIGNS:   Temp:  [98 5 °F (36 9 °C)-99 2 °F (37 3 °C)] 99 2 °F (37 3 °C)  HR:  [72-87] 78  Resp:  [17-18] 18  BP: (159-233)/() 159/84   Intake/Output Summary (Last 24 hours) at 3/5/2021 1619  Last data filed at 3/5/2021 1230  Gross per 24 hour   Intake 1720 ml   Output 400 ml   Net 1320 ml        LABORATORY RESULTS:      Lab Results   Component Value Date    HGB 12 8 03/05/2021    HGB 14 1 06/28/2015    HCT 38 4 03/05/2021    HCT 40 5 06/28/2015    WBC 12 18 (H) 03/05/2021    WBC 10 59 (H) 06/28/2015     Lab Results   Component Value Date    BUN 26 (H) 03/05/2021    BUN 21 10/01/2015     10/01/2015    K 4 0 03/05/2021    K 4 2 10/01/2015    CL 99 (L) 03/05/2021     10/01/2015    GLUCOSE 135 10/01/2015    CREATININE 1 50 (H) 03/05/2021    CREATININE 1 29 10/01/2015     Lab Results   Component Value Date    PROTIME 14 3 03/03/2021    INR 1 13 03/03/2021        DIAGNOSTIC STUDIES:  Xr Chest 1 View Portable    Result Date: 3/3/2021  Impression: No acute cardiopulmonary disease   Workstation performed: [id number]     Mri Brain Wo Contrast    Result Date: 3/5/2021  Impression: Small infarct within the anterior paramedian medullary was present on the prior study from 3/3/2021 although slightly better visualized on today's exam   Stable moderate chronic microangiopathic change  This examination was marked "immediate notification" in Epic in order to begin the standard process by which the radiology reading room liaison alerts the referring practitioner  Workstation performed: [id number]     Mri Brain Wo Contrast    Result Date: 3/3/2021  Impression: 1  Acute anterior right medullary infarct  No significant edema or mass effect  No hemorrhagic transformation  2   Chronic microangiopathy  The study was marked in [facility] for immediate notification  Workstation performed: [id number]     Ct Stroke Alert Brain    Result Date: 3/3/2021  Impression: No acute intracranial abnormality  Mild cerebral chronic microangiopathic disease  Stable ventriculomegaly, slightly disproportionate to degree of generalized cerebral sulcal prominence  Findings were directly discussed with SIMONE Poon on 3/3/2021 8:16 AM  Workstation performed: [id number]     Cta Stroke Alert (head/neck)    Addendum Date: 3/3/2021    ADDENDUM: Correction: Findings were directly discussed with SIMONE Poon on 3/3/2021 8:16 AM     Result Date: 3/3/2021  Impression: No cervical or intracranial large vessel occlusion  Mild age expected atherosclerotic disease  Cholelithiasis without suggestion of cholecystitis    Findings were directly discussed with SIMONE Poon on 3/3/2021 7:34 AM  Workstation performed: [id number]       PRECAUTIONS/SPECIAL NEEDS:  Anticoagulation:  clopidogrel 75 mg orally every day and heparin, Blood Sugar Management: as per MD orders, Dietary Restrictions: cardiovascular 2gm Na+ consistent CHO diet level 2, Hard of Hearing, Language Preference: English and fall risk    MEDICATIONS:     Current Facility-Administered Medications:     acetaminophen (TYLENOL) tablet 650 mg, 650 mg, Oral, Q6H PRN, Sabina Bales MD    atorvastatin (LIPITOR) tablet 40 mg, 40 mg, Oral, Daily With Imer Tinoco MD, 40 mg at 03/04/21 1748    clopidogrel (PLAVIX) tablet 75 mg, 75 mg, Oral, Daily, Salome Freitas MD, 75 mg at 03/05/21 1055    docusate sodium (COLACE) capsule 100 mg, 100 mg, Oral, BID, Salome Freitas MD    heparin (porcine) subcutaneous injection 5,000 Units, 5,000 Units, Subcutaneous, Q8H [address], Salome Freitas MD, 5,000 Units at 03/05/21 1512    hydrALAZINE (APRESOLINE) injection 5 mg, 5 mg, Intravenous, Q6H PRN, Salome Freitas MD    insulin aspart (NovoLOG) FOR PUMP REFILLS 1,000 Units, 1,000 Units, Subcutaneous Insulin Pump, Daily PRN, Salome Freitas MD    levothyroxine tablet 88 mcg, 88 mcg, Oral, Early Morning, Salome Freitas MD, 88 mcg at 03/05/21 0602    lisinopril (ZESTRIL) tablet 40 mg, 40 mg, Oral, Daily, Salome Freitas MD, 40 mg at 03/05/21 1056    ondansetron (ZOFRAN) injection 4 mg, 4 mg, Intravenous, Q6H PRN, Salome Freitas MD    pantoprazole (PROTONIX) EC tablet 40 mg, 40 mg, Oral, Daily Before Breakfast, Salome Freitas MD, 40 mg at 03/05/21 0602    PATIENT MAINTAINED INSULIN PUMP 1 each, 1 each, Subcutaneous, 4x Daily (with meals and at bedtime), Salome Freitas MD, 1 each at 03/05/21 1136    polyethylene glycol (MIRALAX) packet 17 g, 17 g, Oral, Daily PRN, Salome Freitas MD    SKIN INTEGRITY:   Skin intact    PRIOR LEVEL OF FUNCTION:  He lives in [facility] family home  Rosendo Ellison is  and lives with their spouse  Self Care: Independent, Indoor Mobility: Independent, Stairs (in/outdoor): Independent and Cognition: Independent    FALLS IN THE LAST 6 MONTHS: 0    HOME ENVIRONMENT:  The living area: Patient lives in a 2 level home with no INEZ  There are No steps to enter the home  The patient will have 24 hour supervision/physical assistance available upon discharge      PREVIOUS DME:  Equipment in home (previous DME): None    FUNCTIONAL STATUS:  Physical Therapy Occupational Therapy Speech Therapy   03/04/21 0853    PT Last Visit   PT Visit Date 03/04/21   Note Type   Note type Evaluation   Pain Assessment   Pain Assessment Tool Pain Assessment not indicated - pt denies pain   Pain Score No Pain   Home Living   Type of Home House   Home Layout Two level;1/2 bath on main level;Bed/bath upstairs  (0 INEZ)   Bathroom Shower/Tub Tub/shower unit   Bathroom Toilet Standard   Bathroom Accessibility Accessible   Home Equipment Other (Comment)  (none)   Additional Comments pt denies use of DME at baseline   Prior Function   Level of [place] Independent with ADLs and functional mobility   Lives With Spouse   ADL Assistance Independent   IADLs Independent   Falls in the last 6 months 0   Vocational Retired   Comments + driving   Restrictions/Precautions   [place] Bearing Precautions Per Order No   Other Precautions Chair Alarm; Bed Alarm;Telemetry;Multiple lines; Fall Risk   General   Family/Caregiver Present No   Cognition   Overall Cognitive Status WFL   Arousal/Participation Alert   Orientation Level Oriented X4   Following Commands Follows all commands and directions without difficulty   RLE Assessment   RLE Assessment WFL   LLE Assessment   LLE Assessment X  (3-/5 grossly)   Coordination   Movements are Fluid and Coordinated 0   Coordination and Movement Description all movement with L UE and L LE very ataxic and purposeful   Sensation WFL   Light Touch   RLE Light Touch Grossly intact   LLE Light Touch Grossly intact   Bed Mobility   Supine to Sit 5  Supervision   Additional items Verbal cues   Sit to Supine    (pt OOB at end of session)   Transfers   Sit to Stand 5  Supervision   Additional items Increased time required;Verbal cues   Stand to Sit 5  Supervision   Additional items Increased time required;Verbal cues   Additional Comments RW used   Ambulation/Elevation   Gait pattern Excessively slow; Short stride; Foward flexed; Ataxia; Decreased L stance;Decreased foot clearance;L Foot drag   Gait Assistance 4  Minimal assist   Additional items Assist x 1;Verbal cues   Assistive Device Rolling walker   Distance 30'   Stair Management Assistance Not tested   Balance   Static Sitting Fair +   Dynamic Sitting Fair +   Static Standing Fair -   Dynamic Standing [address] -  (with RW)   Endurance Deficit   Endurance Deficit Yes   Endurance Deficit Description pt easily fatigued with ambulation d/t increased effort to advance L LE   Activity Tolerance   Activity Tolerance Patient limited by Tyson Winston   Assessment   Prognosis Good   Problem List Decreased strength;Decreased endurance; Impaired balance;Decreased mobility; Decreased coordination   Assessment Patient is a 70 y o  male evaluated by Physical Therapy s/p admit to [facility] on 3/3/2021 with admitting diagnosis of: Hyponatremia, CVA (cerebral vascular accident), Stroke-like symptoms, and principal problem of: CVA (cerebral vascular accident)  PT was consulted to assess patient's functional mobility and discharge needs  Ordered are PT Evaluation and treatment with activity level of: activity as tolerated  Comorbidities affecting patient's physical performance at time of assessment include: DM, HTN, HLD, prostate cancer, arthritis, GERD, hypothyroidism, BPH  Personal factors affecting the patient at time of IE include: lives in 2 story home, ambulating with assistive device, inability to navigate community distances, inability/difficulty performing IADLs and inability/difficulty performing ADLs  Please locate objective findings from PT assessment regarding body systems outlined above  Upon evaluation, pt able to perform all functional mobility with SUP-Karen, RW, and increased time  Verbal cuing provided throughout for safety awareness and sequencing with RW  Seated rest break taken following 30' of ambulation d/t L LE fatigue  Pt with L foot drop during mobility but did not experience LOB  Pt demonstrating good carryover of education and motivation to participate   The patient's AM-PAC Basic Mobility Inpatient Short Form Raw Score is 18, Standardized Score is 41 05  A standardized score less than 42 9 suggests the patient may benefit from discharge to post-acute rehabilitation services  Please also refer to the recommendation of the Physical Therapist for safe discharge planning  Pt will benefit from continued PT intervention during LOS to address current deficits, increase LOF, and facilitate safe d/c to next level of care when medically appropriate  D/c recommendation at this time is post-acute rehabilitation services  Pt educated on importance of attending acute rehab and is in agreement with plan of care  03/04/21 0852    OT Last Visit   OT Visit Date 03/04/21   Note Type   Note type Evaluation   Restrictions/Precautions   Weight Bearing Precautions Per Order No   Other Precautions Chair Alarm;Multiple lines;Telemetry; Fall Risk   Pain Assessment   Pain Assessment Tool Pain Assessment not indicated - pt denies pain   Home Living   Type of [address] Two level;1/2 bath on main level;Bed/bath upstairs; Other (Comment)  (0 INEZ; 13 steps to 2nd c HR)   Bathroom Shower/Tub Tub/shower unit   Bathroom Toilet Standard   Bathroom Accessibility Accessible   Home Equipment Other (Comment)  (no DME)   Additional Comments pt does not utilize device at baseline during mobility   Prior Function   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 mobility   Lives With Spouse   ADL Assistance Independent   IADLs Independent   Falls in the last 6 months 0   Vocational Retired   Comments pt is (I) with driving   Psychosocial   Psychosocial (WDL) X   Patient Behaviors/Mood Flat affect   Subjective   Subjective "I want to get back to cutting the grass"   ADL   Where Assessed Edge of bed   UB Bathing Assistance 3  Moderate Assistance   UB Bathing Deficit Right arm;Left arm; Abdomen   LB Bathing Assistance 3  Moderate Assistance   LB Bathing Deficit Right lower leg including foot; Left lower leg including foot   UB Dressing Assistance 3  Moderate Assistance   UB Dressing Deficit Fasteners;Pull around back   LB Dressing Assistance 3  Moderate Assistance   LB Dressing Deficit Don/doff R sock; Don/doff L sock   Additional Comments pt with significant difficulties with ADL tasks at mod (A)-max (A) due to significant difficulties utilizing and mobilizing L UE at this time with moderate impairements to L hand coordination and dexterity to manipulate ADL items and tasks   Bed Mobility   Supine to Sit 5  Supervision   Additional items Increased time required   Sit to Supine    (seated in chair at end of session)   Additional Comments pt on RA during session; SpO2 WFL with no complaints of SOB; Spo2 WFL   Transfers   Sit to Stand 5  Supervision   Additional items Increased time required;Verbal cues  (RW)   Stand to Sit 5  Supervision   Additional items Increased time required;Verbal cues  (RW)   Additional Comments pt requires cues for safety with RW; pt with apparent L LE weakness and ataxia; pt requires assist of R UE for L UE to RW    Functional Mobility   Functional Mobility 4  Minimal assistance   Additional Comments x1 with use of RW; pt requires cues for sequencing of L LE and R LE during mobility; pt performs ~30ft and limited due to L LE coordination as well as fatigues quickly during tasks   Additional items Rolling walker   Balance   Static Sitting Fair +   Dynamic Sitting Fair +   Static Standing Fair   Dynamic Standing Fair -   Ambulatory Fair -   Activity Tolerance   Activity Tolerance Patient limited by fatigue   RUE Assessment   RUE Assessment WFL   LUE Assessment   LUE Assessment X  (3+/5 grossly; decreased AROM at shoulder; increased time)   Hand Function   Gross Motor Coordination Impaired  (L UE)   Fine Motor Coordination Impaired  (L UE)   Sensation   Light Touch No apparent deficits   Sharp/Dull No apparent deficits   Additional Comments significantly decreased opposition on L UE and wrist flex/extension 3+/5 Proprioception   Proprioception Partial deficits in the LUE   Vision-Basic Assessment   Current Vision Wears glasses all the time  (reports difficulties reading far distances)   Vision - Complex Assessment   Ocular Range of Motion [facility]   Perception   Inattention/Neglect Appears intact   Cognition   Overall Cognitive Status WFL   Arousal/Participation Alert   Attention Within functional limits   Orientation Level Oriented X4   Memory Within functional limits   Following Commands Follows all commands and directions without difficulty   Assessment   Limitation Decreased UE ROM; Decreased ADL status; Decreased UE strength;Decreased Safe judgement during ADL;Decreased cognition;Decreased endurance;Decreased self-care trans;Decreased high-level ADLs; Decreased fine motor control   Assessment Pt is a 70 y o  male seen for OT evaluation s/p admit to [facility] on 3/3/2021 w/ CVA (cerebral vascular accident) ([facility] [address] )  Comorbidities affecting pt's functional performance at time of assessment include: DM, thryroid gland, HTN, hyperlipidemia, prostate CA, arthritis, tinnitus, mumps, tingling sensation  Personal factors affecting pt at time of IE include:difficulty performing ADLS, difficulty performing IADLS , flat affect and health management   Prior to admission, pt was (I) with ADLs and IADLs with use of no device during mobility  Upon evaluation: Pt requires (S)-mod (A) with use of RW during mobility 2* the following deficits impacting occupational performance: weakness, decreased ROM, decreased strength, decreased balance, decreased tolerance, impaired [facility] and impaired [facility]  Pt to benefit from continued skilled OT tx while in the hospital to address deficits as defined above and maximize level of functional independence w ADL's and functional mobility  Occupational Performance areas to address include: grooming, bathing/shower, toilet hygiene, dressing, functional mobility, community mobility and clothing management   Pt is a very motivated gentleman  From OT standpoint, recommendation at time of d/c would be short term rehab  Pt's raw score on the AM-PAC Daily Activity inpatient short form is 15, standardized score is 34 69, less than 39 4  Patients at this level are likely to benefit from DC to post-acute rehab services, however, please refer to therapist recommendation for safe DC planning  Patient Name: Toni Humphrey  GCZAX'P Date: 3/4/2021     Subjective:  Pt awake and alert, sitting up in chair at bedside eating his lunch      Objective:  Pt seen for diagnostic swallow therapy  He continues to have mild L facial weakness, however speech/languege appear WFL  Pt was seen while eating his regular diet/thin liquid lunch, which included chicken fingers, baked potato, and cooked carrots  Pt tolerated thin liquids without s/s aspiration  He does occasionally have a congested sounding cough, however not consistent with PO intake and pt reported he has had this cough for many years  No concerns with mastication and swallow of regular solids       Assessment:  Pt is tolerating regular diet and thin liquids without s/s aspiration or significant dysphagia      Plan/Recommendations:  Continue regular diet and thin liquids, d/c ST services at this time  CURRENT GAP IN FUNCTION  Prior to Admission:     Functional Status: Patient was independent with mobility/ambulation, transfers, ADL's, IADL's  Estimated length of stay: 10 to 14 days    Anticipated Post-Discharge Disposition/Treatment  Disposition: Return to previous home/apartment    Outpatient Services: Physical Therapy (PT) and Occupational Therapy (OT)    BARRIERS TO DISCHARGE  Home Accessibility, Decreased strength, Decreased endurance, Impaired balance, Decreased mobility, Decreased coordination, Decreased UE ROM, Decreased ADL status, Decreased Safe judgement during ADL, Decreased cognition, Decreased self-care trans, Decreased high-level ADLs, Decreased fine motor control    INTERVENTIONS FOR DISCHARGE  ADL retraining, Functional transfer training, Endurance training, UE strengthening/ROM, Patient/family training, Equipment evaluation/education, Neuromuscular reeducation, Fine motor coordination activities, Compensatory technique education, Activity engagement, LE strengthening/ROM, Elevations, Therapeutic exercise, Bed mobility, Gait training, navigation of steps    REQUIRED THERAPY:  Patient will require PT and OT 90 minutes each per day, five days per week to achieve rehab goals  REQUIRED FUNCTIONAL AND MEDICAL MANAGEMENT FOR INPATIENT REHABILITATION:  Skin:  Patient will require close monitoring of skin integrity secondary to decreased mobility, Deep Vein Thrombosis (DVT) Prophylaxis:  heparin, Diabetes Management: continue sliding scale insulin, patient to do finger sticks as ordered, SLIM to continue to manage diabetes, and other medical conditions while on ARC, PT and OT interventions, any necessary labs, consults, imaging studies and medication changes/additions/discontinuations needed to manage patient's case while on ARC    RECOMMENDED LEVEL OF CARE:  This is a 70year old male who presented to [address] [place] [address] ER on 3/3/21 with left sided weakness  Patient reported that the weakness began in his LLE later in the day prior to coming to the ER  The weakness then progressed to his LUE on the day of presentation to the ER  Patient also experiencing slight left facial droop  CT of the head did not show any acute findings  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  He was continued on a high potency statin and recommendation was given to convert back to simvastatin 40 mg upon discharge  Neurology was consulted and recommended monotherapy (Plavix) and therefore ASA was discontinued  TTE was without structural abnormalities  Neurology recommended against ANDREW    Patient may be considered for an OP implantable loop recorder vs  Zio patch to r/o a-fib as a potential source of CVA since he has well controlled vascular risk factors  Patient was allowed permissive HTN and on 3/4, his ACE inhibitor was restarted at 1/2 dose  Patient with hyponatremia potentially on the basis of CVA vs  dehydration  Initial Na+ 129  NS was decreased and repeat Na+ was 133  Patient worked with PT and OT and was recommended for post acute rehabilitation services  He is now medically stable and ready to begin rehabilitation  PTA, patient was independent with mobility/ambulation, transfers, ADLs and IADLs  He was a +   He is now requiring moderate assistance with his ADLs  With PT, he is requiring supervision with supine to sit bed mobility and sit to stand and stand to sit transfers  He ambulated 30 feet with a RW and min x 1 assistance  Gait pattern: excessively slow, short stride, forward flexed, ataxia, decreased left stance, decreased foot clearance and left foot drag  Patient will require close monitoring and medical management by a physician to monitor labs and other medical conditions, PM&R management, 24/7 rehabilitation nursing care and specialized interdisciplinary therapy services in preparation for discharge which can only be provided in the inpatient acute rehabilitation setting  Nurses will closely monitor patient's VS, I/Os, pain level, skin integrity and his overall condition  Nurses will also provide patient with education and therapy carryover during the hours when he is not participating in his scheduled therapy sessions  They will also perform routine neuro checks  Inpatient acute rehabilitation is recommended for this patient to maximize his overall strength, endurance, self care and mobility upon discharge home with the support of his wife

## 2021-03-05 NOTE — DISCHARGE SUMMARY
Discharge- Ashok Bennett 1945, 70 y o  male MRN: [id number]    Unit/Bed#: 406-01 Encounter: [id number]    Primary Care Provider: Clayton Borja DO   Date and time admitted to hospital: 3/3/2021  7:30 AM        * CVA (cerebral vascular accident) [facility])  Assessment & Plan  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    · ECHO obtained and without significant structural abnormalities  · Was maintained on telemetry during hospitalization  · Continue high potency statin - recommendations by Neurology for converting back to home simvastatin at 40 mg upon discharge  · Neurology with recommendations for discontinuation of ASA in favor of daily Clopidogrel  · Hemoglobin A1c 7 3% - tight control devised  · Also with recommendations for good blood pressure control long-term  · Permissive hypertension initially - administered half dose of lisinopril last night  Restart ACE-inhibitor today  · Initiate on PPI for GI prophylaxis  · Outpatient neurology follow-up  · Recommendations by PT/OT for acute rehab  Underwent speech therapy evaluation as well  · Per official neurology evaluation, patient with well controlled vascular risk factors - consider cardioembolic source as well  However recommended against a ANDREW at this point as TTE was without structural abnormalities  Consider outpatient implantable loop recorder vs  Zio patch to rule out AFib as potential source  Patient's left-sided deficits have progressed since time of admission, initially with 4/5 strength in the left upper and lower extremities, now 1-2/5    MRI was repeated following discussion with Neurology, essentially unchanged  Mr Jake Bills has been accepted to acute rehab, being discharged to [facility] today  DMII (diabetes mellitus, type 2) [facility])  Assessment & Plan  Lab Results   Component Value Date    HGBA1C 7 3 (H) 03/04/2021       Recent Labs     03/03/21  1507 03/04/21  0726 03/04/21  1143 03/04/21  1348   POCGLU 168* 204* 233* 290*       Blood Sugar Average: Last 72 hrs:  (P) 807 [id number]   Hemoglobin A1c well controlled  Utilizes insulin pump at home, which will be continued here  Maintain on ADA diet  Hypothyroidism  Assessment & Plan  TSH currently normal  Continue thyroid replacement therapy  GERD (gastroesophageal reflux disease)  Assessment & Plan  Initiate on PPI therapy, especially given need for antiplatelet therapy in changed to clopidogrel  Hyperlipidemia  Assessment & Plan  Change to high potency statin while hospitalized  As per Neurology, as patient has already been stabilized on simvastatin, will be discharged on 40 mg dosing of Zocor time of discharge  Hypertension  Assessment & Plan  As above  ACE-inhibitor being resumed today  Stage 3 chronic kidney disease  Assessment & Plan  Lab Results   Component Value Date    EGFR 46 03/05/2021    EGFR 41 03/04/2021    EGFR 42 03/03/2021    CREATININE 1 50 (H) 03/05/2021    CREATININE 1 62 (H) 03/04/2021    CREATININE 1 61 (H) 03/03/2021     CKD 3  Current creatinine appears to be at baseline  Initially with ACE-inhibitor on hold to allow permissive hypertension the setting of acute CVA - was resumed at half dose last evening, full-dose this morning  Monitor renal function, avoid nephrotoxins, and renally dose medications when appropriate        Discharging Physician / Practitioner: Annel Marie MD  PCP: Michael Landry DO  Admission Date:   Admission Orders (From admission, onward)     Ordered        03/03/21 0807  Inpatient Admission  Once                   Discharge Date: 03/05/21    Resolved Problems  Date Reviewed: 3/5/2021    None          Consultations During Hospital Stay:  · Neurology    Procedures Performed:   · None    Significant Findings / Test Results:   Xr Chest 1 View Portable    Result Date: 3/3/2021  Impression: No acute cardiopulmonary disease  Workstation performed: [id number]     Mri Brain Wo Contrast    Result Date: 3/5/2021  Impression: Small infarct within the anterior paramedian medullary was present on the prior study from 3/3/2021 although slightly better visualized on today's exam   Stable moderate chronic microangiopathic change  This examination was marked "immediate notification" in Epic in order to begin the standard process by which the radiology reading room liaison alerts the referring practitioner  Workstation performed: [id number]     Mri Brain Wo Contrast    Result Date: 3/3/2021  Impression: 1  Acute anterior right medullary infarct  No significant edema or mass effect  No hemorrhagic transformation  2   Chronic microangiopathy  The study was marked in [facility] for immediate notification  Workstation performed: [id number]     Ct Stroke Alert Brain    Result Date: 3/3/2021  Impression: No acute intracranial abnormality  Mild cerebral chronic microangiopathic disease  Stable ventriculomegaly, slightly disproportionate to degree of generalized cerebral sulcal prominence  Findings were directly discussed with SIMONE Sherman on 3/3/2021 8:16 AM  Workstation performed: [id number]     Cta Stroke Alert (head/neck)    Addendum Date: 3/3/2021    ADDENDUM: Correction: Findings were directly discussed with SIMONE Sherman on 3/3/2021 8:16 AM     Result Date: 3/3/2021  Impression: No cervical or intracranial large vessel occlusion  Mild age expected atherosclerotic disease  Cholelithiasis without suggestion of cholecystitis    Findings were directly discussed with SIMONE Sherman on 3/3/2021 7:34 AM  Workstation performed: [id number]     Transthoracic Echocardiogram  2D, M-mode, Doppler, and Color Doppler     Study date:  03-Mar-2021     Patient: Rashaun Caba  MR number: [id number]  Account number: [de-identified]  : 1945  Age: 70 years  Gender: Male  Status: Inpatient  Location: Bedside  Height: 66 in  Weight: 151 6 lb  BP: 167/ 90 mmHg     Indications: cerebral vascular accident     Diagnoses: 436 - CVA     Sonographer:  Debora Reilly RDCS  Primary Physician:  Jackie Broussard DO  Referring Physician:  Tl Edmond MD  Group:  [id number]  Interpreting Physician:  Kelly Mcintosh DO     SUMMARY     LEFT VENTRICLE:  Systolic function was normal  Ejection fraction was estimated to be 60 %  There were no regional wall motion abnormalities  Wall thickness was mildly increased  The changes were consistent with concentric remodeling (increased wall thickness with normal wall mass)  Doppler parameters were consistent with abnormal left ventricular relaxation (grade 1 diastolic dysfunction)      RIGHT VENTRICLE:  The size was normal   Systolic function was normal      AORTIC VALVE:  There was mild regurgitation      TRICUSPID VALVE:  There was mild regurgitation      HISTORY: PRIOR HISTORY: diabetes mellitus, hypothyroid, hyperlipidemia, chronic kidney disease, hypertension, gerd     PROCEDURE: The procedure was performed at the bedside  This was a routine study  The transthoracic approach was used  The study included complete 2D imaging, M-mode, complete spectral Doppler, and color Doppler  Images were obtained from  the parasternal, apical, subcostal, and suprasternal notch acoustic windows  Image quality was adequate      LEFT VENTRICLE: Size was normal  Systolic function was normal  Ejection fraction was estimated to be 60 %  There were no regional wall motion abnormalities  Wall thickness was mildly increased  The changes were consistent with concentric  remodeling (increased wall thickness with normal wall mass)   DOPPLER: Doppler parameters were consistent with abnormal left ventricular relaxation (grade 1 diastolic dysfunction)  There was no evidence of elevated ventricular filling  pressure by Doppler parameters      RIGHT VENTRICLE: The size was normal  Systolic function was normal  Wall thickness was normal      LEFT ATRIUM: Size was normal      RIGHT ATRIUM: Size was normal      MITRAL VALVE: Valve structure was normal  There was normal leaflet separation  DOPPLER: The transmitral velocity was within the normal range  There was no evidence for stenosis  There was trace regurgitation      AORTIC VALVE: The valve was trileaflet  Leaflets exhibited normal thickness and normal cuspal separation  DOPPLER: Transaortic velocity was within the normal range  There was no evidence for stenosis  There was mild regurgitation      TRICUSPID VALVE: The valve structure was normal  There was normal leaflet separation  DOPPLER: The transtricuspid velocity was within the normal range  There was no evidence for stenosis  There was mild regurgitation  The tricuspid jet  envelope definition was inadequate for estimation of RV systolic pressure  There are no indirect findings (abnormal RV volume or geometry, altered pulmonary flow velocity profile, or leftward septal displacement) which would suggest  moderate or severe pulmonary hypertension      PULMONIC VALVE: Leaflets exhibited normal thickness, no calcification, and normal cuspal separation  DOPPLER: The transpulmonic velocity was within the normal range  There was trace regurgitation      PERICARDIUM: There was no pericardial effusion  The pericardium was normal in appearance      AORTA: The root exhibited normal size      SYSTEMIC VEINS: IVC: The inferior vena cava was normal in size and course  Respirophasic changes were normal     Incidental Findings:   · As above     Test Results Pending at Discharge (will require follow up):    · None    Complications:  None    Reason for Admission: Acute CVA    HPI from original H&P:     Very pleasant 37-year-old man with past medical history significant for DM, being admitted from [facility] ED on 03/03/2021 with stroke-like symptoms      Mr Yvonne Santos has past medical history significant for well-controlled IDDM, HTN and dyslipidemia, CKD 3, and GERD  His other history includes hypothyroidism, OA, mention of prostate CA, and sensorineural hearing loss  The patient presented to the ED with onset of left upper and left lower extremity weakness  He reports that on the night prior he had began to experience some imbalance and gait issues, specifically reported as left leg limping  He awoke to use the bathroom at 12:30 in the morning, and noted both left upper and lower extremity deficits with weakness  However he decided to go back to bed, but as his symptoms had not resolved in the morning the patient presented to ED for further evaluation  CT of the head was negative, but symptoms were typical for CVA - he was unfortunately outside the time range for tPA  The patient was referred for admission for further evaluation treatment  Please see above list of diagnoses and related plan for additional information  Condition at Discharge: stable     Discharge Day Visit / Exam:     Vitals: Blood Pressure: 159/84 (03/05/21 1519)  Pulse: 78 (03/05/21 1519)  Temperature: 99 2 °F (37 3 °C) (03/05/21 1519)  Temp Source: Oral (03/03/21 2223)  Respirations: 18 (03/05/21 1519)  Height: 5' 6" (167 6 cm) (03/05/21 1159)  Weight - Scale: 64 6 kg (142 lb 6 7 oz) (03/05/21 0538)  SpO2: 97 % (03/05/21 1519)    Discussion with Family: Yes    Discharge instructions/Information to patient and family:   See after visit summary for information provided to patient and family  Provisions for Follow-Up Care:  See after visit summary for information related to follow-up care and any pertinent home health orders        Disposition:     Acute Rehab at [facility]    For Discharges to [facility]:   · Not Applicable to this Patient - Not Applicable to this Patient    Planned Readmission: Yes     Discharge Statement:  I spent 40 minutes discharging the patient  This time was spent on the day of discharge  I had direct contact with the patient on the day of discharge  Greater than 50% of the total time was spent examining patient, answering all patient questions, arranging and discussing plan of care with patient as well as directly providing post-discharge instructions  Additional time then spent on discharge activities  Discharge Medications:  See after visit summary for reconciled discharge medications provided to patient and family        ** Please Note: This note has been constructed using a voice recognition system **

## 2021-03-05 NOTE — NURSING NOTE
Patient admitted to our unit from [address] [place] [address]  Pleasant and cooperative  Voices no complaints of pain but just muscle discomfort in LLE at foot  Patients left upper extremity flaccid and left lower extremity with minimal movement  Reports no numbness or tingling  Patient has insulin pump and manages it himself after nurse checks his sugar and he counts his carbs  Patient also has Jennifer Farzana patch to LUE that he can scan over and check his sugar as well  Doing well on current diet and can swallow meds whole  Oriented to call bell system, unit, therapy schedule, and visiting  Sitting upright in bed at this time eating  Will continue to monitor and follow plan of care

## 2021-03-06 LAB
ANION GAP SERPL CALCULATED.3IONS-SCNC: 11 MMOL/L (ref 4–13)
BASOPHILS # BLD AUTO: 0.1 THOUSANDS/ΜL (ref 0–0.1)
BASOPHILS NFR BLD AUTO: 1 % (ref 0–2)
BUN SERPL-MCNC: 30 MG/DL (ref 7–25)
CALCIUM SERPL-MCNC: 9.9 MG/DL (ref 8.6–10.5)
CHLORIDE SERPL-SCNC: 95 MMOL/L (ref 98–107)
CO2 SERPL-SCNC: 27 MMOL/L (ref 21–31)
CREAT SERPL-MCNC: 1.66 MG/DL (ref 0.7–1.3)
EOSINOPHIL # BLD AUTO: 0.2 THOUSAND/ΜL (ref 0–0.61)
EOSINOPHIL NFR BLD AUTO: 2 % (ref 0–5)
ERYTHROCYTE [DISTWIDTH] IN BLOOD BY AUTOMATED COUNT: 14.6 % (ref 11.5–14.5)
GFR SERPL CREATININE-BSD FRML MDRD: 40 ML/MIN/1.73SQ M
GLUCOSE SERPL-MCNC: 257 MG/DL (ref 65–140)
GLUCOSE SERPL-MCNC: 278 MG/DL (ref 65–140)
GLUCOSE SERPL-MCNC: 296 MG/DL (ref 65–140)
GLUCOSE SERPL-MCNC: 296 MG/DL (ref 65–140)
GLUCOSE SERPL-MCNC: 361 MG/DL (ref 65–140)
GLUCOSE SERPL-MCNC: 400 MG/DL (ref 65–99)
HCT VFR BLD AUTO: 40.6 % (ref 42–47)
HGB BLD-MCNC: 13.6 G/DL (ref 14–18)
LYMPHOCYTES # BLD AUTO: 1.7 THOUSANDS/ΜL (ref 0.6–4.47)
LYMPHOCYTES NFR BLD AUTO: 17 % (ref 21–51)
MAGNESIUM SERPL-MCNC: 1.9 MG/DL (ref 1.9–2.7)
MCH RBC QN AUTO: 31.9 PG (ref 26–34)
MCHC RBC AUTO-ENTMCNC: 33.5 G/DL (ref 31–37)
MCV RBC AUTO: 95 FL (ref 81–99)
MONOCYTES # BLD AUTO: 0.9 THOUSAND/ΜL (ref 0.17–1.22)
MONOCYTES NFR BLD AUTO: 9 % (ref 2–12)
NEUTROPHILS # BLD AUTO: 7.3 THOUSANDS/ΜL (ref 1.4–6.5)
NEUTS SEG NFR BLD AUTO: 72 % (ref 42–75)
PLATELET # BLD AUTO: 218 THOUSANDS/UL (ref 149–390)
PMV BLD AUTO: 8.8 FL (ref 8.6–11.7)
POTASSIUM SERPL-SCNC: 4.1 MMOL/L (ref 3.5–5.5)
RBC # BLD AUTO: 4.27 MILLION/UL (ref 4.3–5.9)
SODIUM SERPL-SCNC: 133 MMOL/L (ref 134–143)
WBC # BLD AUTO: 10.1 THOUSAND/UL (ref 4.8–10.8)

## 2021-03-06 PROCEDURE — 97542 WHEELCHAIR MNGMENT TRAINING: CPT

## 2021-03-06 PROCEDURE — 97166 OT EVAL MOD COMPLEX 45 MIN: CPT

## 2021-03-06 PROCEDURE — 80048 BASIC METABOLIC PNL TOTAL CA: CPT | Performed by: PHYSICAL MEDICINE & REHABILITATION

## 2021-03-06 PROCEDURE — 97530 THERAPEUTIC ACTIVITIES: CPT

## 2021-03-06 PROCEDURE — 99255 IP/OBS CONSLTJ NEW/EST HI 80: CPT | Performed by: STUDENT IN AN ORGANIZED HEALTH CARE EDUCATION/TRAINING PROGRAM

## 2021-03-06 PROCEDURE — 83735 ASSAY OF MAGNESIUM: CPT | Performed by: PHYSICAL MEDICINE & REHABILITATION

## 2021-03-06 PROCEDURE — 97535 SELF CARE MNGMENT TRAINING: CPT

## 2021-03-06 PROCEDURE — 85025 COMPLETE CBC W/AUTO DIFF WBC: CPT | Performed by: PHYSICAL MEDICINE & REHABILITATION

## 2021-03-06 PROCEDURE — 97162 PT EVAL MOD COMPLEX 30 MIN: CPT

## 2021-03-06 PROCEDURE — 99223 1ST HOSP IP/OBS HIGH 75: CPT | Performed by: FAMILY MEDICINE

## 2021-03-06 PROCEDURE — 97116 GAIT TRAINING THERAPY: CPT

## 2021-03-06 PROCEDURE — 82948 REAGENT STRIP/BLOOD GLUCOSE: CPT

## 2021-03-06 PROCEDURE — 97112 NEUROMUSCULAR REEDUCATION: CPT

## 2021-03-06 PROCEDURE — 97110 THERAPEUTIC EXERCISES: CPT

## 2021-03-06 RX ORDER — AMLODIPINE BESYLATE 5 MG/1
5 TABLET ORAL DAILY
Status: DISCONTINUED | OUTPATIENT
Start: 2021-03-06 | End: 2021-03-25 | Stop reason: HOSPADM

## 2021-03-06 RX ADMIN — DOCUSATE SODIUM 100 MG: 100 CAPSULE, LIQUID FILLED ORAL at 17:07

## 2021-03-06 RX ADMIN — HEPARIN SODIUM 5000 UNITS: 5000 INJECTION INTRAVENOUS; SUBCUTANEOUS at 15:08

## 2021-03-06 RX ADMIN — ATORVASTATIN CALCIUM 40 MG: 40 TABLET, FILM COATED ORAL at 17:07

## 2021-03-06 RX ADMIN — AMLODIPINE BESYLATE 5 MG: 5 TABLET ORAL at 15:05

## 2021-03-06 RX ADMIN — PANTOPRAZOLE SODIUM 40 MG: 40 TABLET, DELAYED RELEASE ORAL at 06:01

## 2021-03-06 RX ADMIN — DOCUSATE SODIUM 100 MG: 100 CAPSULE, LIQUID FILLED ORAL at 09:33

## 2021-03-06 RX ADMIN — HEPARIN SODIUM 5000 UNITS: 5000 INJECTION INTRAVENOUS; SUBCUTANEOUS at 21:34

## 2021-03-06 RX ADMIN — CLOPIDOGREL BISULFATE 75 MG: 75 TABLET ORAL at 09:32

## 2021-03-06 RX ADMIN — HEPARIN SODIUM 5000 UNITS: 5000 INJECTION INTRAVENOUS; SUBCUTANEOUS at 06:01

## 2021-03-06 RX ADMIN — LISINOPRIL 40 MG: 20 TABLET ORAL at 09:32

## 2021-03-06 RX ADMIN — LEVOTHYROXINE SODIUM 88 MCG: 88 TABLET ORAL at 06:01

## 2021-03-06 NOTE — PROGRESS NOTES
03/06/21 0656   Patient Data   Rehab Impairment CVA with L body involvement   Date of Onset   (03/01/2021)   Support System   Name Reyes Ortiz   Relationship wife   Health Status good   Able to provide 24 hour supervision Yes   Able to provide physical help? Yes   Home Setup   Type of Home Multi Level   Method of Entry   (level entry)   Number of Stairs in Home 13   In Home Hand Rail Right   First Floor Bathroom Half   Prior Level of Function   Indoor-Mobility (Ambulation) 3  Independent - Patient completed the activities by him/herself, with or without an assistive device, with no assistance from a helper  Stairs 3  Independent - Patient completed the activities by him/herself, with or without an assistive device, with no assistance from a helper  Prior Device Used Z  None of the above   Restrictions/Precautions   Precautions Fall Risk   Braces or Orthoses Sling  (LUE)   Pain Assessment   Pain Assessment Tool 0-10   Pain Score No Pain   Transfer Bed/Chair/Wheelchair   Positioning Concerns Hemiplegia   Limitations Noted In Balance; Coordination; Endurance;UE Strength;LE Strength   Type of Assistance Needed Physical assistance   Amount of Physical Assistance Provided 50%-74%   Comment mod assist sit pivot transfer   Chair/Bed-to-Chair Transfer CARE Score 2   Roll Left and Right   Type of Assistance Needed Physical assistance   Amount of Physical Assistance Provided 25%-49%   Comment MIN A to R; S to left   Roll Left and Right CARE Score 3   Sit to Lying   Type of Assistance Needed Physical assistance   Amount of Physical Assistance Provided 25%-49%   Comment MIN A   Sit to Lying CARE Score 3   Lying to Sitting on Side of Bed   Type of Assistance Needed Physical assistance   Amount of Physical Assistance Provided 50%-74%   Comment MOD A   Lying to Sitting on Side of Bed CARE Score 2   Sit to Stand   Type of Assistance Needed Physical assistance   Amount of Physical Assistance Provided 25%-49%   Comment MIN A at wall rail   Sit to Stand CARE Score 3   Car Transfer   Reason if not Attempted Safety concerns   Car Transfer CARE Score 88   Ambulation   Primary Mode of Locomotion Prior to Admission Walk   Distance Walked (feet) 9 ft  (9' 6')   Assist Device   (wall rail)   Gait Pattern Inconsistant Alyx; Slow Alyx;Decreased foot clearance;L foot drag;L hemiparesis;L knee paolo;Narrow ERASMO;Step to   Limitations Noted In Balance; Endurance; Safety;Strength   Provided Assistance with: Balance;Limb Advancement;Limb Stabilization;Trunk Support   Walk Assist Level Minimum Assist;Moderate Assist   Findings level surfaces   Walk 10 Feet   Reason if not Attempted Safety concerns   Walk 10 Feet CARE Score 88   Walk 50 Feet with Two Turns   Reason if not Attempted Safety concerns   Walk 50 Feet with Two Turns CARE Score 88   Walk 150 Feet   Reason if not Attempted Safety concerns   Walk 150 Feet CARE Score 88   Walking 10 Feet on Uneven Surfaces   Reason if not Attempted Safety concerns   Walking 10 Feet on Uneven Surfaces CARE Score 88   Wheelchair mobility   Type of Wheelchair Used 1   Manual   Method Right upper extremity;Right lower extremity   Assistance Provided For Locking Brakes;Obstacles;Remove Leg Rest;Replace Leg Rest   Distance Level Surface (feet) 181 ft   Distance Wheeled 3% Grade 12 ft   Findings MIN A level and unlevel surfaces   Wheel 50 Feet with Two Turns   Type of Assistance Needed Physical assistance   Amount of Physical Assistance Provided 25%-49%   Comment min assist   Wheel 50 Feet with Two Turns CARE Score 3   Wheel 150 Feet   Type of Assistance Needed Physical assistance   Amount of Physical Assistance Provided 25%-49%   Comment min assist   Wheel 150 Feet CARE Score 3   Curb or Single Stair   Reason if not Attempted Safety concerns   1 Step (Curb) CARE Score 88   4 Steps   Reason if not Attempted Safety concerns   4 Steps CARE Score 88   12 Steps   Reason if not Attempted Safety concerns   12 Steps CARE Score 88 Stairs   Findings not tested; TBA as able   Comprehension   Assist Devices Glasses; Hearing Aid   QI: Comprehension 3  Usually Understands: Understands most conversations, but misses some part/intent of message  Requires cues at times to understand  Comprehension (FIM) 6 - Understands complex/abstract but requires  glasses for visual comp   Expression   QI: Expression 4  Express complex messages without difficulty and with speech that is clear and easy to [place]   Expression (FIM) 7 - Expresses complex/abstract ideas in a reasonable time w/o devices or helper  Social Interaction   Social Interaction (FIM) 6 - Interacts appropriately with others BUT requires extra  time   Problem Solving   Problem solving (FIM) 5 - Solves basic problems 90% of time   Memory   Memory (FIM) 6 - Recognizes with extra time   RLE Assessment   RLE Assessment WFL   Strength RLE   R Hip Flexion 4+/5   R Hip Extension 4+/5   R Hip ABduction 4/5   R Hip ADduction 4/5   R Knee Flexion 5/5   R Knee Extension 5/5   R Ankle Dorsiflexion 4+/5   R Ankle Plantar Flexion 4+/5   Tone RLE   RLE Tone   (trace ankle; trace knee flex; trace hip; knee ext wfl)   LLE Assessment   LLE Assessment   (trace ankle, hip and knee flex; knee ext wfl; PROM WFL)   Strength LLE   L Hip Flexion 1/5   L Hip Extension 1/5   L Hip ABduction 1/5   L Hip ADduction 1/5   L Knee Flexion 1/5   L Knee Extension 3/5   L Ankle Dorsiflexion 1/5   L Ankle Plantar Flexion 1/5   Coordination   Movements are Fluid and Coordinated 0   Coordination and Movement Description trace movement LLE   Sensation   Light Touch No apparent deficits   Propioception No apparent deficits   Cognition   Overall Cognitive Status WFL   Arousal/Participation Alert; Responsive; Cooperative   Attention Within functional limits   Orientation Level Oriented X4   Memory Within functional limits   Following Commands Follows all commands and directions without difficulty   Discharge Information   Vocational Plan Retired/not working   Patient's Discharge Plan return home with wife   Patient's Rehab Expectations "walk"   Barriers to Discharge Home Decreased Strength; Safety Considerations   Impressions Patient seen for IE  Presents, following CVA, with decreased ROM/strength, decreased balance and safety, and decreased endurance  Would benefit from continued inpatient [facility] PT to increase function, safety, and increased independence in prep for safe d/c to home     PT Therapy Minutes   PT Time In 0656   PT Time Out 0801   PT Total Time (minutes) 65   PT Mode of treatment - Individual (minutes) 65   PT Mode of treatment - Concurrent (minutes) 0   PT Mode of treatment - Group (minutes) 0   PT Mode of treatment - Co-treat (minutes) 0   PT Mode of Treatment - Total time(minutes) 65 minutes   PT Cumulative Minutes 65   Cumulative Minutes   Cumulative therapy minutes 65

## 2021-03-06 NOTE — ASSESSMENT & PLAN NOTE
· Acute right medullary infarct diagnosed at [facility]  · Continue plavix and lipitor   · continue PT ellen acute rehab recommendations

## 2021-03-06 NOTE — ASSESSMENT & PLAN NOTE
Lab Results   Component Value Date    HGBA1C 7 3 (H) 03/04/2021       Recent Labs     03/05/21  0732 03/05/21  1130 03/05/21  1802 03/05/21 2000   POCGLU 278* 309* 260* 265*       Blood Sugar Average: Last 72 hrs:  (P) 262 5   · Poorly controlled, on insulin pump but glucose levels are still persistently above 250  · Patient reports when he is under stress his glucose levels increase, he would prefer to continue carb counting and titrating his own insulin  Continue current regimen

## 2021-03-06 NOTE — H&P
PHYSICAL MEDICINE AND REHABILITATION H&P/ADMISSION NOTE  Niko Wray 70 y o  male MRN: [id number]  Unit/Bed#: -01 Encounter: [id number]     Rehab Diagnosis: Impairment of mobility, safety and Activities of Daily Living (ADLs) due to Stroke:  01 1  Left Body Involvement (Right Brain)    History of Present Illness:   Niko Wray is a 70 y o  male who presented to the [facility] ER on 3/3/21 with left sided weakness  Patient reported that the weakness began in his LLE later in the day prior to coming to the ER  The weakness then progressed to his LUE on the day of presentation to the ER  Pt also experienced slight left facial droop  CT of head did not show any acute findings  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  He was continued on a high potency statin and recommendation was given to convert back to simvastatin 40 mg upon discharge  Neurology was consulted and recommended monotherapy (plavix) and therefore ASA was discontinued  TTE was without structural abnormalities  Neurology recommended against ANDREW  Pt may be considered for an OP implantable loop recorder vs  zio patch to r/o a-fib as a potential source of CVA, since he has well controlled vascular risk factors  Pt was allowed permissive HTN and on 3/4, his ACE inhibitor was restarted at half dose  Pt with hyponatremia potentially on the basis of CVA vs dehydration  Initial NA+ was 129  NS was decreased and repeat Na+ was 133  Pt worked with PT/OT and was deemed appropriate for an acute rehabilitation setting  He arrive to  on 3/5/21                       Subjective: Pt doing well and has no complaints  Stated that he had started therapy already and that his therapist said he is doing great  He has no questions at this time      Review of Systems:   Constitutional: Negative  HENT: Negative  CV: negative  Respiratory: Negative  GI: Positive for no bowel movement since Tuesday  Urinary: Positive for prostectomy  Musculoskeletal: Negative  Endocrine: Positive for type 2 DM with insulin pump  Hemat: Negative  Neuro: Positive for flaccid LUE/LLE  Psych Negative    Plan:     CVA  -LLE/LUE weakness  -Cont simvastatin 40 mg at discharge  - Cont daily clopidogrel  -Monitor blood pressure, blood sugars  -Cont lisinopril  -Cont PPI for GI prophylaxis  -outpatient implantable loop recorder vs zio patch to rule out a fib  -Acute chomprehensive interdisciplinary inpatient rehabilitation to include intensive skilled therapies as outlines with oversight and management by a rehabilitation Physician Assistant overseen by rehabilitation physician as well as inpatient rehabilitation nursing, case management and weekly interdisciplinary team meeting    Diabetes mellitus type 2  -HGA1C- 7 3 as of 3/4/21  -Utilizes insulin pump at home, cont this while in rehab  -Maintain on ADA diet    Hypothyroidism  -Cont thyroid replacement therapy    GERD  -Cont PPI    Hyperlipidemia  -High potency statin while in acute rehab  -Discharge on 40 mg of Zocor    Hypertension  -Cont   Ace-I    Stage 3 chronic kidney disease  -Current creatinine appears to be at baseline  -Cont ACE-I  -Monitor renal function  -Avoid nephrotoxins  -Renally dose medications when appropriate    DVT prophylaxis  -plavix daily  -SCDs daily    Code  -Full          Scheduled Meds:  Current Facility-Administered Medications   Medication Dose Route Frequency Provider Last Rate    acetaminophen  650 mg Oral Q6H PRN Bird Ray PA-C      atorvastatin  40 mg Oral Daily With [employer] ANIVAL Atkinson      clopidogrel  75 mg Oral Daily Bird Ray PA-C      docusate sodium  100 mg Oral BID Bird Ray PA-C      heparin (porcine)  5,000 Units Subcutaneous [facility] Bird Ray PA-C      hydrALAZINE  25 mg Oral Q8H PRN Bird Ray PA-C      insulin aspart  1,000 Units Subcutaneous Insulin Pump Daily PRN Bird Ray PA-C     [facility] levothyroxine  88 mcg Oral Early Morning Clinton Foster PA-C      lisinopril  40 mg Oral Daily Clinton Foster PA-C      pantoprazole  40 mg Oral Daily Before Breakfast Clinton Foster PA-C      patient maintained insulin pump  1 each Subcutaneous 4x Daily (with meals and at bedtime) Clinton Foster PA-C      polyethylene glycol  17 g Oral Daily PRN Clinton Foster PA-C         Restrictions include:  left upper extremity weight bearing as tolerated and left lower extremity weight bearing as tolerated Fall precautions      Functional History - Prior to Admission:      Functional Status: Patient was independent with mobility/ambulation, transfers, ADL's, IADL's  Functional Status Upon Admission to [facility]:  Mobility: Supervision  Transfers: Moderate assistance  ADLs: Moderate assistance     Physical Exam:  Temp:  [98 1 °F (36 7 °C)-100 2 °F (37 9 °C)] 98 1 °F (36 7 °C)  HR:  [72-79] 72  Resp:  [18] 18  BP: (156-191)/(75-97) 156/75  SpO2:  [95 %-97 %] 95 %    General:   alert, no apparent distress, cooperative and comfortable  HEENT:  Head: Normocephalic, no lesions, without obvious abnormality  Eye: Normal external eye, conjunctiva, lidsc cornea  Ears: Normal external ears  Nose: Normal external nose, mucus membranes  CARDIAC:  regular rate and rhythm, S1, S2 normal, no murmur, click, rub or gallop  LUNGS:  no abnormal respiratory pattern, no retractions noted, non-labored breathing   ABDOMEN:  soft, non-tender, non-distended  EXTREMITIES:  extremities normal, warm and well-perfused; no cyanosis, clubbing, or edema and flacid LUE/LLE  NEURO:  clear speech, following all commands, oriented x4 and cranial nerves II-XI intact  PSYCH:  Alert and oriented, appropriate affect        Laboratory:    Results from last 7 days   Lab Units 03/06/21  0437 03/05/21  0539 03/04/21  0552   HEMOGLOBIN g/dL 13 6* 12 8 13 0   HEMATOCRIT % 40 6* 38 4 39 7   WBC Thousand/uL 10 10 12 18* 7 10     Results from last 7 days Lab Units 03/06/21  0437 03/05/21  0539 03/04/21  0552  03/03/21  0742   BUN mg/dL 30* 26* 27*  --  25   SODIUM mmol/L 133* 134* 136   < > 129*   POTASSIUM mmol/L 4 1 4 0 4 1  --  4 0   CHLORIDE mmol/L 95* 99* 100  --  97*   CREATININE mg/dL 1 66* 1 50* 1 62*  --  1 61*   AST U/L  --   --   --   --  21   ALT U/L  --   --   --   --  25    < > = values in this interval not displayed  Results from last 7 days   Lab Units 03/03/21  0742   PROTIME seconds 14 3   INR  1 13        Wt Readings from Last 1 Encounters:   03/06/21 63 1 kg (139 lb 1 6 oz)     Estimated body mass index is 22 45 kg/m² as calculated from the following:    Height as of this encounter: 5' 6" (1 676 m)  Weight as of this encounter: 63 1 kg (139 lb 1 6 oz)  Imaging: reviewed     Rehabilitation Prognosis: good     Tolerance for three hours of therapy a day: good     Family/Patient Goals:  Patient/family's goals: Return to previous home/apartment  Patient will receive PT, OT, and ST 60 minutes each per day, five days per week to achieve rehab goals or participate in 900 minutes of therapy within a 7 day week period  Mobility Goals: [place]  Transfer Goals: [place]  Activities of Daily Living (ADLs) Goals: [place]    Discharge Planning:  Rehabilitation and discharge goals discussed with the patient and/or family  Case Managment and Social Work to review patient/family resources and to coordinate Discharge Planning  Estimated length of stay: 10 to 14 days    Patient and Family Education and Training:  Rehabilitation and discharge goals discussed with the patient and/or family  Patient/family education/training needs to be discussed in weekly team meeting      Equipment/DME needs: Therapy teams to assess and evaluate for additional equipment/DME needs throughout rehabilitation stay    Past Medical History:   Diagnosis Date    Arthritis     BPH (benign prostatic hyperplasia)     last assessed 4/29/2014    Diabetes mellitus ([address] )     Disease of thyroid gland     GERD (gastroesophageal reflux disease)     Hearing aid worn     Hyperlipidemia     Hypertension     Hypothyroidism     Mumps     Prostate cancer ([address] )     Tingling sensation     bilateral feet occassionally    Tinnitus     Wears glasses     Wears partial dentures     upper     Past Surgical History:   Procedure Laterality Date    CATARACT EXTRACTION Bilateral 2000    EYE SURGERY      JOINT REPLACEMENT      KNEE ARTHROSCOPY Right 06/26/2009    knee    AL LAP,PROSTATECTOMY,RADICAL,W/NERVE SPARE,INCL ROBOTIC N/A 2/7/2018    Procedure: ROBOTIC ASSISTED LAPAROSCOPIC PROSTATECTOMY; BILATERAL PELVIC LYMPH NODE DISSECTION;  Surgeon: Tony Gill MD;  Location: [facility];  Service: Urology    AL REPAIR [address] HERNIA,5+Y/O,REDUCIBL Right 4/19/2019    Procedure: REPAIR HERNIA INGUINAL;  Surgeon: Lianne Umaña DO;  Location: [facility];  Service: General    PROSTATE BIOPSY  11/07/2017    needle biopsy of prostate    TRIGGER FINGER RELEASE  05/2013    trigger thumb      Family History   Problem Relation Age of Onset    Cancer Mother     Diabetes Mother     Uterine cancer Mother     Diabetes Father     Cancer Father     Stroke Father         of unknown cause    Hypertension Father     Prostate cancer Father     Diabetes Family         Uncle, DM    Cancer Family         Grandmother     Social History     Socioeconomic History    Marital status: /Civil Union     Spouse name: None    Number of children: None    Years of education: None    Highest education level: None   Occupational History    Occupation: printer  retired   Social Needs    Financial resource strain: None    Food insecurity     Worry: None     Inability: None    Transportation needs     Medical: None     Non-medical: None   Tobacco Use    Smoking status: Former Smoker    Smokeless tobacco: Never Used    Tobacco comment: quit about 50 years ago   Substance and Sexual Activity    Alcohol use: Not Currently     Alcohol/week: 2 0 standard drinks     Types: 2 Glasses of wine per week     Comment: week, social drinker    Drug use: No    Sexual activity: None   Lifestyle    Physical activity     Days per week: None     Minutes per session: None    Stress: None   Relationships    Social connections     Talks on phone: None     Gets together: None     Attends Sikhism service: None     Active member of club or organization: None     Attends meetings of clubs or organizations: None     Relationship status: None    Intimate partner violence     Fear of current or ex partner: None     Emotionally abused: None     Physically abused: None     Forced sexual activity: None   Other Topics Concern    None   Social History Narrative    Always uses seat belt    Caffeine use    Lives independently with spouse    Retired    Sun protection sunscreen       Patient Active Problem List   Diagnosis    DMII (diabetes mellitus, type 2) ([facility] )    Hypothyroidism    GERD (gastroesophageal reflux disease)    Hyperlipidemia    Stage 3 chronic kidney disease    Arthritis    Essential hypertension    Lumbar radicular pain    Sensorineural hearing loss (SNHL), bilateral    Vitamin D deficiency    Medicare annual wellness visit, subsequent    Adenocarcinoma of prostate ([facility] )    Erectile dysfunction due to type 2 diabetes mellitus ([facility] )    Long term current use of insulin ([facility] )    CVA (cerebral vascular accident) ([place] [id number] )     No Known Allergies       Medical Necessity Criteria for [facility] Admission: Chronic Kidney Disease, Hypertension, Bowel/Bladder Management and Diabetes requiring close blood glucose monitoring  In addition, the preadmission screen, post-admission physical evaluation, overall plan of care and admissions order demonstrate a reasonable expectation that the following criteria were met at the time of admission to the [facility]    1  The patient requires active and ongoing therapeutic intervention of multiple therapy disciplines (physical therapy, occupational therapy, speech-language pathology, or prosthetics/orthotics), one of which is physical or occupational therapy  2  Patient requires an intensive rehabilitation therapy program, as defined in Chapter 1, section 110 2 2 of the CMS Medicare Policy Manual  This intensive rehabilitation therapy program will consist of at least 3 hours of therapy per day at least 5 days per week or at least 15 hours of intensive rehabilitation therapy within a 7 consecutive day period, beginning with the date of admission to the [facility]  3  The patient is reasonably expected to actively participate in, and benefit significantly from, the intensive rehabilitation therapy program as defined in Chapter 1, section 110 2 2 of the CMS Medicare Policy Manual at this time of admission to the [facility]  He can reasonably be expected to make measurable improvement (that will be of practical value to improve the patients functional capacity or adaptation to impairments) as a result of the rehabilitation treatment, as defined in section 110 3, and such improvement can be expected to be made within the prescribed period of time  As noted in the CMS Medicare Policy Manual, the patient need not be expected to achieve complete independence in the domain of self-care nor be expected to return to his or her prior level of functioning in order to meet this standard  4  The patient must require physician supervision by a rehabilitation physician  As such, a rehabilitation physician will conduct face-to-face visits with the patient at least 3 days per week throughout the patients stay in the [facility] to assess the patient both medically and functionally, as well as to modify the course of treatment as needed to maximize the patients capacity to benefit from the rehabilitation process    5  The patient requires an intensive and coordinated interdisciplinary approach to providing rehabilitation, as defined in Chapter 1, section 110 2 5 of the CMS Medicare Policy Manual  This will be achieved through periodic team conferences, conducted at least once in a 7-day period, and comprising of an interdisciplinary team of medical professionals consisting of: a rehabilitation physician, registered nurse,  and/or , and a licensed/certified therapist from each therapy discipline involved in treating the patient  Changes Since Pre-admission Assessment: None -This patient's participation in rehab continues to be reasonable, necessary and appropriate  CMS Required Post-Admission Physician Evaluation Elements  History and Physical, including medical history, functional history and active comorbidities as in above text  Post-Admission Physician Evaluation:  The patient has the potential to make improvement and is in need of physical, occupational, and/or therapy services  The patient may also need nutritional services  Given the patient's complex medical condition and risk of further medical complications, rehabilitative services cannot be safely provided at a lower level of care, such as a skilled nursing facility  I have reviewed the patient's functional and medical status at the time of the preadmission screening and they are the same as on the day of this admission  I acknowledge that I have personally performed a full physical examination on this patient within 24 hours of admission  The patient and/or family demonstrated understanding the rehabilitation program and the discharge process after we discussed them  Agree in entirety: yes  Minor adaptions: none    Major changes: none     Stephanie Nielsen PA-C  Physical Medicine and Rehabilitation    ** Please Note: Fluency Direct voice to text software may have been used in the creation of this document   **

## 2021-03-06 NOTE — ASSESSMENT & PLAN NOTE
Lab Results   Component Value Date    EGFR 46 03/05/2021    EGFR 41 03/04/2021    EGFR 42 03/03/2021    CREATININE 1 50 (H) 03/05/2021    CREATININE 1 62 (H) 03/04/2021    CREATININE 1 61 (H) 03/03/2021   · Creatinine stable  · Monitor periodically

## 2021-03-06 NOTE — PROGRESS NOTES
03/06/21 1136   Patient Data   Rehab Impairment CVA, DM with insulin pump   Etiologic Diagnosis CVA with L body involvement   Date of Onset 03/03/21   Support System   Name Merrell Kocher   Relationship wife   Home Setup   Type of Home Multi Level  (bed/ bath 2nd floor)   Method of Entry Stairs   Number of Stairs 1   Number of Stairs in Home 13   First Floor Bathroom Half   Second Floor Bathroom Shower;Grab Bars   Second Floor Bathroom Accessibility Grab bars in [place]    Prior IADL Participation   Money Management Identify Money;Estimate Costs;Estimate Change   Meal Preparation Partial Participation   Laundry Partial Participation   Home Cleaning Partial Participation   Prior Level of Function   Self-Care 3  Independent - Patient completed the activities by him/herself, with or without an assistive device, with no assistance from a helper  Functional Cognition 3  Independent - Patient completed the activities by him/herself, with or without an assistive device, with no assistance from a helper  Prior Device Used Z  None of the above   Falls in the Last Year   Number of falls in the past 12 months 0   Patient Preference   Nickname (Patient Preference) Bill   Patient Normally Wakes at 0630   Restrictions/Precautions   Precautions Fall Risk;Limb alert;Multiple lines  (insulin pump with acess stomach and ranjan LUE)   Pain Assessment   Pain Assessment Tool Pain Assessment not indicated - pt denies pain   Eating Assessment   Food To Mouth Yes   Positioning Upright;Out of Bed   Meal Assessed Lunch   Opens Packages No   Type of Assistance Needed Supervision   Eating CARE Score 4   Oral Hygiene   Type of Assistance Needed Supervision   Oral Hygiene CARE Score 4   Grooming   Able To Initiate Tasks;Comb/Brush Hair;Wash/Dry Face;Brush/Clean Teeth;Wash/Dry Hands   Limitation Noted In Coordination;Problem Solving; Safety;Strength   Findings supervision   Tub/Shower Transfer   Reason Not Assessed Sponge Bath;Safety   Shower/Bathe Self   Type of Assistance Needed Physical assistance   Amount of Physical Assistance Provided 25%-49%   Shower/Bathe Self CARE Score 3   Bathing   Assessed Bath Style Sponge Bath   Anticipated D/C Bath Style Shower   Able to [place] No   Able to [employer] Temperature No   Limitations Noted in Balance; Endurance; Coordination; Safety;Strength;ROM   Positioning Seated;Standing   Dressing/Undressing Clothing   Remove UB Clothes Pullover Shirt   Don UB Clothes Pullover Shirt   Type of Assistance Needed Physical assistance   Amount of Physical Assistance Provided 25%-49%   Upper Body Dressing CARE Score 3   Remove LB Clothes Pants; Undergarment;Socks   Don LB Clothes Pants; Undergarment;Socks   Type of Assistance Needed Physical assistance   Amount of Physical Assistance Provided 75% or more   Lower Body Dressing CARE Score 2   Limitations Noted In Balance; Coordination; Endurance;Problem Solving; Safety;Strength;ROM   Positioning Supported Sit;Standing   Putting On/Taking Off Footwear   Type of Assistance Needed Physical assistance   Amount of Physical Assistance Provided 75% or more   Putting On/Taking Off Footwear CARE Score 2   Transfer Bed/Chair/Wheelchair   Type of Assistance Needed Physical assistance   Amount of Physical Assistance Provided 25%-49%   Chair/Bed-to-Chair Transfer CARE Score 3   Sit to Stand   Type of Assistance Needed Physical assistance   Amount of Physical Assistance Provided Less than 25%   Sit to Stand CARE Score 3   Picking Up Object   Type of Assistance Needed Physical assistance   Amount of Physical Assistance Provided 75% or more   Picking Up Object CARE Score 2   Comprehension   Comprehension (FIM) 6 - Understands complex/abstract but requires  glasses for visual comp   Expression   Expression (FIM) 7 - Expresses complex/abstract ideas in a reasonable time w/o devices or helper     Social Interaction   Social Interaction (FIM) 6 - Interacts appropriately with others BUT requires extra  time   Problem Solving   Problem solving (FIM) 5 - Solves basic problems 90% of time   Memory   Memory (FIM) 6 - Recognizes with extra time   RUE Assessment   RUE Assessment WFL  (4-/5 MMT sh to hand)   LUE Assessment   LUE Assessment X  (PROM LUE sh to hand, trace ms contractiosn L shoulder)   Coordination   Movements are Fluid and Coordinated 0   Coordination and Movement Description trace ms contractions Left shoulder add and extension   Sensation   Light Touch No apparent deficits   Propioception No apparent deficits   Cognition   Overall Cognitive Status WFL   Orientation Level Oriented X4   Discharge Information   Vocational Plan Retired/not working  (enjoys walking and working outside)   Patient's Discharge Plan return home with wife   Patient's Rehab Expectations "Get back like I was before "   Impressions Pt presents following hospitalization due to CVA with left body involvement  Pt requires assist due to decreased balance, safety, endurance and LLE/LUE strength/ ROM/ coordination  Pt will benefit from skilled OTservices to increase independence with daily tasks     OT Therapy Minutes   OT Time In 9894   OT Time Out 1250   OT Total Time (minutes) 74   OT Mode of treatment - Individual (minutes) 74

## 2021-03-06 NOTE — PROGRESS NOTES
03/06/21 0830   Pain Assessment   Pain Assessment Tool 0-10   Pain Score No Pain   Restrictions/Precautions   Precautions Fall Risk   Braces or Orthoses Sling  (LUE)   Cognition   Overall Cognitive Status WFL   Orientation Level Oriented X4   Sit to Stand   Type of Assistance Needed Physical assistance   Amount of Physical Assistance Provided 25%-49%   Comment MIN A wall rail vs HW   Sit to Stand CARE Score 3   Walk 10 Feet   Type of Assistance Needed Physical assistance   Amount of Physical Assistance Provided 25%-49%   Comment min-mod assist wall rail vs HW   Walk 10 Feet CARE Score 3   Ambulation   Does the patient walk? 2  Yes   Primary Mode of Locomotion Prior to Admission Walk   Distance Walked (feet) 23 ft  (23' 12' (wall rail); 12' (HW))   Assist Device   (wall rail vs HW)   Gait Pattern Inconsistant Alyx; Slow Alyx;Decreased foot clearance;L foot drag;L hemiparesis;L knee paolo;Narrow ERASMO;Step to   Limitations Noted In Balance; Coordination;Device Management; Endurance; Safety;Strength   Provided Assistance with: Balance;Limb Advancement;Limb Stabilization   Walk Assist Level Minimum Assist;Moderate Assist   Findings level surfaces wall rail vs HW with DF wrap assist   Wheel 50 Feet with Two Turns   Type of Assistance Needed Physical assistance   Amount of Physical Assistance Provided 25%-49%   Comment min assist   Wheel 50 Feet with Two Turns CARE Score 3   Wheel 150 Feet   Type of Assistance Needed Physical assistance   Amount of Physical Assistance Provided 25%-49%   Comment min assist   Wheel 150 Feet CARE Score 3   Wheelchair mobility   Does the patient use a wheelchair? 1  Yes   Type of Wheelchair Used 1   Manual   Method Right upper extremity;Right lower extremity   Assistance Provided For Locking Brakes;Obstacles;Remove Leg Rest;Replace Leg Rest   Distance Level Surface (feet) 186 ft   Distance Wheeled 3% Grade 12 ft   Findings min assist level and unlevel surface   Therapeutic Interventions Strengthening seated ther ex   Balance gait and transfer training   Neuromuscular Re-Education ice massage LLE   Other w/c mobility   Assessment   Treatment Assessment Patient tolerated therapy session well  Trialed patient with [facility] for ambulation with positive results  Completed ther ex for general LE strengthening; gait and transfer traiing focusing on sequence and technique for improved balance and safety with functional mobility  PT Barriers   Physical Impairment Decreased strength;Decreased range of motion;Decreased endurance; Impaired balance;Decreased mobility; Decreased coordination;Decreased safety awareness   Functional Limitation Wheelchair management; [facility] negotiation   Plan   Treatment/Interventions Functional transfer training;LE strengthening/ROM; Therapeutic exercise;Gait training   Progress Progressing toward goals   PT Therapy Minutes   PT Time In 0830   PT Time Out 0928   PT Total Time (minutes) 58   PT Mode of treatment - Individual (minutes) 58   PT Mode of treatment - Concurrent (minutes) 0   PT Mode of treatment - Group (minutes) 0   PT Mode of treatment - Co-treat (minutes) 0   PT Mode of Treatment - Total time(minutes) 58 minutes   PT Cumulative Minutes 123   Therapy Time missed   Time missed?  No

## 2021-03-07 LAB
ANION GAP SERPL CALCULATED.3IONS-SCNC: 11 MMOL/L (ref 4–13)
BASOPHILS # BLD AUTO: 0.1 THOUSANDS/ΜL (ref 0–0.1)
BASOPHILS NFR BLD AUTO: 1 % (ref 0–2)
BUN SERPL-MCNC: 33 MG/DL (ref 7–25)
CALCIUM SERPL-MCNC: 9.9 MG/DL (ref 8.6–10.5)
CHLORIDE SERPL-SCNC: 96 MMOL/L (ref 98–107)
CO2 SERPL-SCNC: 24 MMOL/L (ref 21–31)
CREAT SERPL-MCNC: 1.62 MG/DL (ref 0.7–1.3)
EOSINOPHIL # BLD AUTO: 0.3 THOUSAND/ΜL (ref 0–0.61)
EOSINOPHIL NFR BLD AUTO: 3 % (ref 0–5)
ERYTHROCYTE [DISTWIDTH] IN BLOOD BY AUTOMATED COUNT: 14.3 % (ref 11.5–14.5)
GFR SERPL CREATININE-BSD FRML MDRD: 41 ML/MIN/1.73SQ M
GLUCOSE P FAST SERPL-MCNC: 367 MG/DL (ref 65–99)
GLUCOSE SERPL-MCNC: 222 MG/DL (ref 65–140)
GLUCOSE SERPL-MCNC: 279 MG/DL (ref 65–140)
GLUCOSE SERPL-MCNC: 328 MG/DL (ref 65–140)
GLUCOSE SERPL-MCNC: 349 MG/DL (ref 65–140)
GLUCOSE SERPL-MCNC: 367 MG/DL (ref 65–99)
HCT VFR BLD AUTO: 40.7 % (ref 42–47)
HGB BLD-MCNC: 13.8 G/DL (ref 14–18)
LYMPHOCYTES # BLD AUTO: 1.6 THOUSANDS/ΜL (ref 0.6–4.47)
LYMPHOCYTES NFR BLD AUTO: 18 % (ref 21–51)
MAGNESIUM SERPL-MCNC: 1.9 MG/DL (ref 1.9–2.7)
MCH RBC QN AUTO: 32 PG (ref 26–34)
MCHC RBC AUTO-ENTMCNC: 33.8 G/DL (ref 31–37)
MCV RBC AUTO: 95 FL (ref 81–99)
MONOCYTES # BLD AUTO: 0.7 THOUSAND/ΜL (ref 0.17–1.22)
MONOCYTES NFR BLD AUTO: 7 % (ref 2–12)
NEUTROPHILS # BLD AUTO: 6.4 THOUSANDS/ΜL (ref 1.4–6.5)
NEUTS SEG NFR BLD AUTO: 71 % (ref 42–75)
PLATELET # BLD AUTO: 224 THOUSANDS/UL (ref 149–390)
PMV BLD AUTO: 8.5 FL (ref 8.6–11.7)
POTASSIUM SERPL-SCNC: 4.5 MMOL/L (ref 3.5–5.5)
RBC # BLD AUTO: 4.31 MILLION/UL (ref 4.3–5.9)
SODIUM SERPL-SCNC: 131 MMOL/L (ref 134–143)
WBC # BLD AUTO: 9.1 THOUSAND/UL (ref 4.8–10.8)

## 2021-03-07 PROCEDURE — 97110 THERAPEUTIC EXERCISES: CPT

## 2021-03-07 PROCEDURE — 82948 REAGENT STRIP/BLOOD GLUCOSE: CPT

## 2021-03-07 PROCEDURE — 83735 ASSAY OF MAGNESIUM: CPT | Performed by: PHYSICAL MEDICINE & REHABILITATION

## 2021-03-07 PROCEDURE — 85025 COMPLETE CBC W/AUTO DIFF WBC: CPT | Performed by: PHYSICAL MEDICINE & REHABILITATION

## 2021-03-07 PROCEDURE — 97530 THERAPEUTIC ACTIVITIES: CPT

## 2021-03-07 PROCEDURE — 97116 GAIT TRAINING THERAPY: CPT

## 2021-03-07 PROCEDURE — 80048 BASIC METABOLIC PNL TOTAL CA: CPT | Performed by: PHYSICAL MEDICINE & REHABILITATION

## 2021-03-07 PROCEDURE — 97542 WHEELCHAIR MNGMENT TRAINING: CPT

## 2021-03-07 RX ADMIN — HEPARIN SODIUM 5000 UNITS: 5000 INJECTION INTRAVENOUS; SUBCUTANEOUS at 06:11

## 2021-03-07 RX ADMIN — ATORVASTATIN CALCIUM 40 MG: 40 TABLET, FILM COATED ORAL at 16:22

## 2021-03-07 RX ADMIN — DOCUSATE SODIUM 100 MG: 100 CAPSULE, LIQUID FILLED ORAL at 09:06

## 2021-03-07 RX ADMIN — PANTOPRAZOLE SODIUM 40 MG: 40 TABLET, DELAYED RELEASE ORAL at 06:11

## 2021-03-07 RX ADMIN — DOCUSATE SODIUM 100 MG: 100 CAPSULE, LIQUID FILLED ORAL at 17:24

## 2021-03-07 RX ADMIN — LISINOPRIL 40 MG: 20 TABLET ORAL at 09:06

## 2021-03-07 RX ADMIN — HEPARIN SODIUM 5000 UNITS: 5000 INJECTION INTRAVENOUS; SUBCUTANEOUS at 22:11

## 2021-03-07 RX ADMIN — HEPARIN SODIUM 5000 UNITS: 5000 INJECTION INTRAVENOUS; SUBCUTANEOUS at 14:47

## 2021-03-07 RX ADMIN — AMLODIPINE BESYLATE 5 MG: 5 TABLET ORAL at 09:06

## 2021-03-07 RX ADMIN — CLOPIDOGREL BISULFATE 75 MG: 75 TABLET ORAL at 09:06

## 2021-03-07 RX ADMIN — LEVOTHYROXINE SODIUM 88 MCG: 88 TABLET ORAL at 06:11

## 2021-03-07 NOTE — PLAN OF CARE
Problem: Potential for Falls  Goal: Patient will remain free of falls  Description: INTERVENTIONS:  - Assess patient frequently for physical needs  -  Identify cognitive and physical deficits and behaviors that affect risk of falls    -  [place] fall precautions as indicated by assessment   - Educate patient/family on patient safety including physical limitations  - Instruct patient to call for assistance with activity based on assessment  - Modify environment to reduce risk of injury  - Consider OT/PT consult to assist with strengthening/mobility  Outcome: Progressing     Problem: Prexisting or High Potential for Compromised Skin Integrity  Goal: Skin integrity is maintained or improved  Description: INTERVENTIONS:  - Identify patients at risk for skin breakdown  - Assess and monitor skin integrity  - Assess and monitor nutrition and hydration status  - Monitor labs   - Assess for incontinence   - Turn and reposition patient  - Assist with mobility/ambulation  - Relieve pressure over bony prominences  - Avoid friction and shearing  - Provide appropriate hygiene as needed including keeping skin clean and dry  - Evaluate need for skin moisturizer/barrier cream  - Collaborate with interdisciplinary team   - Patient/family teaching  - Consider wound care consult   Outcome: Progressing     Problem: PAIN - ADULT  Goal: Verbalizes/displays adequate comfort level or baseline comfort level  Description: Interventions:  - Encourage patient to monitor pain and request assistance  - Assess pain using appropriate pain scale  - Administer analgesics based on type and severity of pain and evaluate response  - Implement non-pharmacological measures as appropriate and evaluate response  - Consider cultural and social influences on pain and pain management  - Notify physician/advanced practitioner if interventions unsuccessful or patient reports new pain  Outcome: Progressing     Problem: INFECTION - ADULT  Goal: Absence or prevention of progression during hospitalization  Description: INTERVENTIONS:  - Assess and monitor for signs and symptoms of infection  - Monitor lab/diagnostic results  - Monitor all insertion sites, i e  indwelling lines, tubes, and drains  - Monitor endotracheal if appropriate and nasal secretions for changes in amount and color  - [place] appropriate cooling/warming therapies per order  - Administer medications as ordered  - Instruct and encourage patient and family to use good hand hygiene technique  - Identify and instruct in appropriate isolation precautions for identified infection/condition  Outcome: Progressing     Problem: SAFETY ADULT  Goal: Patient will remain free of falls  Description: INTERVENTIONS:  - Assess patient frequently for physical needs  -  Identify cognitive and physical deficits and behaviors that affect risk of falls    -  [place] fall precautions as indicated by assessment   - Educate patient/family on patient safety including physical limitations  - Instruct patient to call for assistance with activity based on assessment  - Modify environment to reduce risk of injury  - Consider OT/PT consult to assist with strengthening/mobility  Outcome: Progressing  Goal: Maintain or return to baseline ADL function  Description: INTERVENTIONS:  -  Assess patient's ability to carry out ADLs; assess patient's baseline for ADL function and identify physical deficits which impact ability to perform ADLs (bathing, care of mouth/teeth, toileting, grooming, dressing, etc )  - Assess/evaluate cause of self-care deficits   - Assess range of motion  - Assess patient's mobility; develop plan if impaired  - Assess patient's need for assistive devices and provide as appropriate  - Encourage maximum independence but intervene and supervise when necessary  - Involve family in performance of ADLs  - Assess for home care needs following discharge   - Consider OT consult to assist with ADL evaluation and planning for discharge  - Provide patient education as appropriate  Outcome: Progressing  Goal: Maintain or return mobility status to optimal level  Description: INTERVENTIONS:  - Assess patient's baseline mobility status (ambulation, transfers, stairs, etc )    - Identify cognitive and physical deficits and behaviors that affect mobility  - Identify mobility aids required to assist with transfers and/or ambulation (gait belt, sit-to-stand, lift, walker, cane, etc )  - [place] fall precautions as indicated by assessment  - Record patient progress and toleration of activity level on Mobility SBAR; progress patient to next Phase/Stage  - Instruct patient to call for assistance with activity based on assessment  - Consider rehabilitation consult to assist with strengthening/weightbearing, etc   Outcome: Progressing     Problem: DISCHARGE PLANNING  Goal: Discharge to home or other facility with appropriate resources  Description: INTERVENTIONS:  - Identify barriers to discharge w/patient and caregiver  - Arrange for needed discharge resources and transportation as appropriate  - Identify discharge learning needs (meds, wound care, etc )  - Arrange for interpretive services to assist at discharge as needed  - Refer to Case Management Department for coordinating discharge planning if the patient needs post-hospital services based on physician/advanced practitioner order or complex needs related to functional status, cognitive ability, or social support system  Outcome: Progressing

## 2021-03-07 NOTE — NURSING NOTE
Pt resting comfortably in chair  Wife visited for the afternoon and brought in a new insulin vial for patients pump  Pt administers insulin as needed through his pump  Wife  took home patients hearing aides  Pt has no complaints  Call bell within reach

## 2021-03-07 NOTE — PROGRESS NOTES
03/07/21 0655   Pain Assessment   Pain Assessment Tool 0-10   Pain Score No Pain   Restrictions/Precautions   Precautions Fall Risk   Braces or Orthoses Sling  (LUE)   Cognition   Overall Cognitive Status WFL   Orientation Level Oriented X4   Sit to Stand   Type of Assistance Needed Physical assistance   Amount of Physical Assistance Provided 25%-49%   Comment min assist with HW   Sit to Stand CARE Score 3   Walk 10 Feet   Type of Assistance Needed Physical assistance   Amount of Physical Assistance Provided 25%-49%   Comment min-mod assist   Walk 10 Feet CARE Score 3   Ambulation   Does the patient walk? 2  Yes   Primary Mode of Locomotion Prior to Admission Walk   Distance Walked (feet) 21 ft  (21' 20')   Assist Device Solo Walker   Gait Pattern Inconsistant Alyx; Slow Alyx;Decreased foot clearance;L foot drag;L hemiparesis;L knee paolo;Narrow ERASMO;Step to   Limitations Noted In Balance; Coordination;Device Management; Endurance; Safety;Strength   Provided Assistance with: Balance;Limb Advancement;Limb Stabilization   Walk Assist Level Minimum Assist;Moderate Assist   Findings level surfaces; DF assist wrap   Wheel 50 Feet with Two Turns   Type of Assistance Needed Physical assistance   Amount of Physical Assistance Provided 25%-49%   Comment min assist   Wheel 50 Feet with Two Turns CARE Score 3   Wheel 150 Feet   Type of Assistance Needed Physical assistance   Amount of Physical Assistance Provided 25%-49%   Comment min assist   Wheel 150 Feet CARE Score 3   Wheelchair mobility   Does the patient use a wheelchair? 1  Yes   Type of Wheelchair Used 1   Manual   Method Right upper extremity;Right lower extremity   Assistance Provided For Locking Brakes;Obstacles;Remove Leg Rest;Replace Leg Rest;Remove armrests;Replace armrests   Distance Level Surface (feet) 178 ft   Distance Wheeled 3% Grade 22 ft   Findings min assist level and unlevel surfaces   Therapeutic Interventions   Strengthening standing ther ex Balance gait and transfer training   Other w/c mobility   Assessment   Treatment Assessment Patient tolerated therapy session well  No complaints of pain noted  Completed standing ther ex for general LE strengthening as well as improved standing balance; gait and transfer training focusing on sequence and technique for improved balance and safety with functional mobility  Patient able to propel w/c with MIN A       PT Barriers   Physical Impairment Decreased strength;Decreased range of motion;Decreased endurance; Impaired balance;Decreased mobility; Decreased safety awareness   Functional Limitation Wheelchair management; [facility] negotiation   Plan   Treatment/Interventions Functional transfer training;LE strengthening/ROM; Therapeutic exercise;Gait training   Progress Progressing toward goals   PT Therapy Minutes   PT Time In 0655   PT Time Out 0805   PT Total Time (minutes) 70   PT Mode of treatment - Individual (minutes) 70   PT Mode of treatment - Concurrent (minutes) 0   PT Mode of treatment - Group (minutes) 0   PT Mode of treatment - Co-treat (minutes) 0   PT Mode of Treatment - Total time(minutes) 70 minutes   PT Cumulative Minutes 193   Therapy Time missed   Time missed?  No

## 2021-03-08 LAB
ANION GAP SERPL CALCULATED.3IONS-SCNC: 9 MMOL/L (ref 4–13)
BASOPHILS # BLD AUTO: 0.1 THOUSANDS/ΜL (ref 0–0.1)
BASOPHILS NFR BLD AUTO: 1 % (ref 0–2)
BUN SERPL-MCNC: 41 MG/DL (ref 7–25)
CALCIUM SERPL-MCNC: 9.2 MG/DL (ref 8.6–10.5)
CHLORIDE SERPL-SCNC: 96 MMOL/L (ref 98–107)
CO2 SERPL-SCNC: 25 MMOL/L (ref 21–31)
CREAT SERPL-MCNC: 1.66 MG/DL (ref 0.7–1.3)
EOSINOPHIL # BLD AUTO: 0.3 THOUSAND/ΜL (ref 0–0.61)
EOSINOPHIL NFR BLD AUTO: 3 % (ref 0–5)
ERYTHROCYTE [DISTWIDTH] IN BLOOD BY AUTOMATED COUNT: 14.1 % (ref 11.5–14.5)
GFR SERPL CREATININE-BSD FRML MDRD: 40 ML/MIN/1.73SQ M
GLUCOSE P FAST SERPL-MCNC: 372 MG/DL (ref 65–99)
GLUCOSE SERPL-MCNC: 206 MG/DL (ref 65–140)
GLUCOSE SERPL-MCNC: 249 MG/DL (ref 65–140)
GLUCOSE SERPL-MCNC: 262 MG/DL (ref 65–140)
GLUCOSE SERPL-MCNC: 352 MG/DL (ref 65–140)
GLUCOSE SERPL-MCNC: 372 MG/DL (ref 65–99)
HCT VFR BLD AUTO: 37.2 % (ref 42–47)
HGB BLD-MCNC: 12.5 G/DL (ref 14–18)
LYMPHOCYTES # BLD AUTO: 1.2 THOUSANDS/ΜL (ref 0.6–4.47)
LYMPHOCYTES NFR BLD AUTO: 16 % (ref 21–51)
MAGNESIUM SERPL-MCNC: 1.9 MG/DL (ref 1.9–2.7)
MCH RBC QN AUTO: 31.9 PG (ref 26–34)
MCHC RBC AUTO-ENTMCNC: 33.6 G/DL (ref 31–37)
MCV RBC AUTO: 95 FL (ref 81–99)
MONOCYTES # BLD AUTO: 0.7 THOUSAND/ΜL (ref 0.17–1.22)
MONOCYTES NFR BLD AUTO: 10 % (ref 2–12)
NEUTROPHILS # BLD AUTO: 5.4 THOUSANDS/ΜL (ref 1.4–6.5)
NEUTS SEG NFR BLD AUTO: 70 % (ref 42–75)
PLATELET # BLD AUTO: 197 THOUSANDS/UL (ref 149–390)
PMV BLD AUTO: 8.5 FL (ref 8.6–11.7)
POTASSIUM SERPL-SCNC: 4.2 MMOL/L (ref 3.5–5.5)
RBC # BLD AUTO: 3.93 MILLION/UL (ref 4.3–5.9)
SODIUM SERPL-SCNC: 130 MMOL/L (ref 134–143)
WBC # BLD AUTO: 7.7 THOUSAND/UL (ref 4.8–10.8)

## 2021-03-08 PROCEDURE — 99232 SBSQ HOSP IP/OBS MODERATE 35: CPT | Performed by: FAMILY MEDICINE

## 2021-03-08 PROCEDURE — 97112 NEUROMUSCULAR REEDUCATION: CPT

## 2021-03-08 PROCEDURE — 97530 THERAPEUTIC ACTIVITIES: CPT

## 2021-03-08 PROCEDURE — 97542 WHEELCHAIR MNGMENT TRAINING: CPT

## 2021-03-08 PROCEDURE — 97116 GAIT TRAINING THERAPY: CPT

## 2021-03-08 PROCEDURE — 97129 THER IVNTJ 1ST 15 MIN: CPT

## 2021-03-08 PROCEDURE — 83735 ASSAY OF MAGNESIUM: CPT | Performed by: PHYSICAL MEDICINE & REHABILITATION

## 2021-03-08 PROCEDURE — 82948 REAGENT STRIP/BLOOD GLUCOSE: CPT

## 2021-03-08 PROCEDURE — 85025 COMPLETE CBC W/AUTO DIFF WBC: CPT | Performed by: PHYSICAL MEDICINE & REHABILITATION

## 2021-03-08 PROCEDURE — 97130 THER IVNTJ EA ADDL 15 MIN: CPT

## 2021-03-08 PROCEDURE — 92523 SPEECH SOUND LANG COMPREHEN: CPT

## 2021-03-08 PROCEDURE — 97110 THERAPEUTIC EXERCISES: CPT

## 2021-03-08 PROCEDURE — 97535 SELF CARE MNGMENT TRAINING: CPT

## 2021-03-08 PROCEDURE — 80048 BASIC METABOLIC PNL TOTAL CA: CPT | Performed by: PHYSICAL MEDICINE & REHABILITATION

## 2021-03-08 RX ORDER — PANTOPRAZOLE SODIUM 40 MG/1
40 TABLET, DELAYED RELEASE ORAL
Status: DISCONTINUED | OUTPATIENT
Start: 2021-03-08 | End: 2021-03-25 | Stop reason: HOSPADM

## 2021-03-08 RX ADMIN — DOCUSATE SODIUM 100 MG: 100 CAPSULE, LIQUID FILLED ORAL at 09:58

## 2021-03-08 RX ADMIN — LEVOTHYROXINE SODIUM 88 MCG: 88 TABLET ORAL at 05:09

## 2021-03-08 RX ADMIN — HEPARIN SODIUM 5000 UNITS: 5000 INJECTION INTRAVENOUS; SUBCUTANEOUS at 21:00

## 2021-03-08 RX ADMIN — LISINOPRIL 40 MG: 20 TABLET ORAL at 09:58

## 2021-03-08 RX ADMIN — HEPARIN SODIUM 5000 UNITS: 5000 INJECTION INTRAVENOUS; SUBCUTANEOUS at 05:09

## 2021-03-08 RX ADMIN — AMLODIPINE BESYLATE 5 MG: 5 TABLET ORAL at 09:58

## 2021-03-08 RX ADMIN — ATORVASTATIN CALCIUM 40 MG: 40 TABLET, FILM COATED ORAL at 16:49

## 2021-03-08 RX ADMIN — PANTOPRAZOLE SODIUM 40 MG: 40 TABLET, DELAYED RELEASE ORAL at 05:09

## 2021-03-08 RX ADMIN — CLOPIDOGREL BISULFATE 75 MG: 75 TABLET ORAL at 09:58

## 2021-03-08 RX ADMIN — HEPARIN SODIUM 5000 UNITS: 5000 INJECTION INTRAVENOUS; SUBCUTANEOUS at 15:36

## 2021-03-08 RX ADMIN — DOCUSATE SODIUM 100 MG: 100 CAPSULE, LIQUID FILLED ORAL at 17:15

## 2021-03-08 NOTE — PROGRESS NOTES
Cognitive Linguistic Evaluation     03/08/21 1040   Pain Assessment   Pain Assessment Tool 0-10   Pain Score No Pain   Restrictions/Precautions   Precautions Fall Risk   Braces or Orthoses Sling  (LUE)   Cognitive Linguisitic Assessments   Cognitive Linquistic Quick Test (CLQT) Pt completed the CLQT+ on initial evaluation with a Composite Severity Rating score of 4 0 out of 4 0, correlating to overall cognitive linguistic skills to be WNL at time of assessment in comparison to age matched peers ranging from 65-81 y/o  Pt's Cognitive Domain scores are as follows: Attention-score of 203; Memory-score of 171; Executive Functions-score of 30; Language-score of 33; Visuospatial Skills-score of 97; Clock Drawing-score of 11  All scores correlate to skills which are WNL, accounting for all cognitive domains  Pt scored at or above criterion cut score for 10 out of 10 tasks completed  Pt educated on results of assessment to include his performance in each area  Comprehension   Assist Devices Glasses  (HA not present at hospital)   Auditory Basic;Complex   Visual Basic;Complex   Findings Pt completed cognitive linguistic evaluation  See SLP Rehab note for details  QI: Comprehension 4  Undestands: Clear comprehension without cues or repetitions   Comprehension (FIM) 6 - Requires repetition very infrequently   Expression   Verbal Basic;Complex   Non-Verbal Basic;Complex   Intelligibility Sentence   Findings Pt completed cognitive linguistic evaluation  See SLP Rehab note for details  QI: Expression 4  Express complex messages without difficulty and with speech that is clear and easy to [place]   Expression (FIM) 6 - Expresses complex/abstract but requires:  more time   Social Interaction   Cooperation with staff;with family   Participation Individual   Behaviors observed Appropriate   Findings Pt cooperative and appropriate throughout assessment      Social Interaction (FIM) 6 - Interacts appropriately with others BUT requires extra  time   Problem Solving   Complex Manages discharge planning   Routine Manages call bell   Findings Pt completed cognitive linguistic evaluation  See SLP Rehab note for details  Problem solving (FIM) 6 - Solves complex problems BUT requires extra time   Memory   Recognize People Yes   Remember Routine Yes   Initiates Tasks Yes   Short-Term Intact   Long Term Intact   Recalls Precaution Yes   Findings Pt completed cognitive linguistic evaluation  See SLP Rehab note for details  Memory (FIM) 6 - Recognizes with extra time   Executive Function Skills   Problem Solving WFL   [place] WFL   Memory Skills   Orientation Level Oriented X4   Long Term Biographical Recall WFL - Within Functional Limits   Short Term Immediate Recall WFL - Within Functional Limits   Short Term Recall of Paragraph WFL - Within Functional Limits   Short Term Working Recall WFL - Within Functional Limits   Motor Speech Evaluation   Dysarthria No  (not formally assessed; not informally observed however)   Intelligibility Intelligible   Speech/Language/Cognition Assessmetn   Treatment Assessment Cognitive linguistic evaluation was completed this session consisting of patient interview, informal assessment and completion of the CLQT+ (pt scored WNL-see above for details)  Pt oriented x4 and demo functional LTM recall of biographical info  Pt also demonstrating functional attention, STM recall and comprehension during session  Pt's speech was clear and effective for communication of both basic and complex ideas  During session, pt able to recall previously taken meds prior to admission, while also explaining how he manages his insulin pump and the changes in his BS which he has been experiencing since admission-pt appeared insight into medication management, also stating that his wife would be present to assist if needed as she is a recently retired RN   Pt required occasional repetition of information due [facility], but was able to follow multi-step commands w/o difficulty  Regarding pt's participation in IADLs, pt reports that his wife manages household finances and mostly drives, but that he was independent for all other IADLs as he states that they typically share all IADLs  Pt reporting a supportive wife who is able to assist on discharge  Overall, pt is presenting w/ functional cognitive linguistic skills and appears to be at baseline  SLP also spoke w/ pt's wife who was present later in the day-stated that pt appears to be at baseline and she has not observed any deficits  She reports that initially he presented w/ slurred speech but that this has now resolved  She also confirmed that she can assist at home if needed  SLP spoke w/ OT and PT teams-both deny any cognitive linguistic deficits observed  Base don current evaluation, pt is presenting with overall cognitive linguistic skills that are WNL at time of assessment and further skilled ST services are not warranted for structured cognitive linguistic therapy at this time  However, pt will benefit from short term follow up of skilled ST services to provide stroke education as during discussion, pt had appropriate questions related to stroke and secondary stroke prevention  Anticipate brief follow up-staff to inform ST team should cognitive linguistic difficulties be observed w/ ST team to then follow  Swallow Assessment   Swallow Treatment Assessment Pt was being followed briefly for dysphagia therapy in acute care setting, however, was discharged from dysphagia therapy  No reports of swallow difficulties from unit staff  Pt also denies any oral or pharyngeal swallow difficulties      SLP Therapy Minutes   SLP Time In 5905   SLP Time Out 1140   SLP Total Time (minutes) 60   SLP Mode of treatment - Individual (minutes) 60   SLP Mode of treatment - Concurrent (minutes) 0   SLP Mode of treatment - Group (minutes) 0   SLP Mode of treatment - Co-treat (minutes) 0   SLP Mode of Treatment - Total time(minutes) 60 minutes   SLP Cumulative Minutes 60   Therapy Time missed   Time missed?  No

## 2021-03-08 NOTE — PROGRESS NOTES
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  He was continued on a high potency statin and recommendation was given to convert back to simvastatin 40 mg upon discharge  Neurology was consulted and recommended monotherapy (plavix) and therefore ASA was discontinued  TTE was without structural abnormalities  Neurology recommended against ANDREW  Pt may be considered for an OP implantable loop recorder vs  zio patch to r/o a-fib as a potential source of CVA, since he has well controlled vascular risk factors  Pt was allowed permissive HTN and on 3/4, his ACE inhibitor was restarted at half dose  Pt with hyponatremia potentially on the basis of CVA vs dehydration  Initial NA+ was 129  NS was decreased and repeat Na+ was 133  Pt worked with PT/OT and was deemed appropriate for an acute rehabilitation setting  He arrive to  on 3/5/21                        Subjective:  Pt has some concerns for his blood sugar levels  Sugars have been running higher than his normal which is around 100-140s  Dr Ambar Armstrong initially saw patient when consulted and placed patient on insulin sliding scale algorithms 1, 3 and 4  Will put these into place and continuously monitor levels to ensure patient does not go into a hypoglycemic state  ROS: A 10 point ROS was performed; negative except as noted above         Assessment/Plan:    CVA  -LLE/LUE weakness  -Cont simvastatin 40 mg at discharge  - Cont daily clopidogrel  -Monitor blood pressure, blood sugars  -Cont lisinopril  -Cont PPI for GI prophylaxis  -outpatient implantable loop recorder vs zio patch to rule out a fib  -Acute chomprehensive interdisciplinary inpatient rehabilitation to include intensive skilled therapies as outlines with oversight and management by a rehabilitation Physician Assistant overseen by rehabilitation physician as well as inpatient rehabilitation nursing, case management and weekly interdisciplinary team meeting     Diabetes mellitus type 2  -HGA1C- 7 3 as of 3/4/21  -Utilizes insulin pump at home, cont this while in rehab  -Maintain on ADA diet     Hypothyroidism  -Cont thyroid replacement therapy     GERD  -Cont PPI     Hyperlipidemia  -High potency statin while in acute rehab  -Discharge on 40 mg of Zocor     Hypertension  -Cont   Ace-I     Stage 3 chronic kidney disease  -Current creatinine appears to be at baseline  -Cont ACE-I  -Monitor renal function  -Avoid nephrotoxins  -Renally dose medications when appropriate     DVT prophylaxis  -plavix daily  -SCDs daily     Code  -Full                 Scheduled Meds:  Current Facility-Administered Medications   Medication Dose Route Frequency Provider Last Rate    acetaminophen  650 mg Oral Q6H PRN Zita Kelly PA-C      amLODIPine  5 mg Oral Daily Marisa Love MD      atorvastatin  40 mg Oral Daily With [employer] ANIVAL Atkinson      clopidogrel  75 mg Oral Daily Zita Kelly PA-C      docusate sodium  100 mg Oral BID Zita Kelly PA-C      heparin (porcine)  5,000 Units Subcutaneous [facility] Zita Kelly PA-C      hydrALAZINE  25 mg Oral Q8H PRN Zita Kelly PA-C      insulin aspart  1,000 Units Subcutaneous Insulin Pump Daily PRN Zita Kelly PA-C      insulin lispro  1-5 Units Subcutaneous HS Zita Kelly PA-C      insulin lispro  1-6 Units Subcutaneous TID AC Fabby Atkinson PA-C      insulin lispro  2-12 Units Subcutaneous TID AC Miguel Angel Escobar PA-C      levothyroxine  88 mcg Oral Early Morning Miguel Angel Escobar PA-C      lisinopril  40 mg Oral Daily Miguel Angel Escobar PA-C      pantoprazole  40 mg Oral Daily Before Breakfast Luc Jaramillo MD      patient maintained insulin pump  1 each Subcutaneous 4x Daily (with meals and at bedtime) Miguel Angel Escobar PA-C      polyethylene glycol  17 g Oral Daily PRN Miguel Angel Escobar PA-C         Objective:    Functional Update:  Mobility: Supervision  Transfers: Moderate assistance  ADLs: Moderate assistance           Physical Exam:  Temp:  [99 °F (37 2 °C)-99 5 °F (37 5 °C)] 99 5 °F (37 5 °C)  HR:  [74-79] 74  Resp:  [18] 18  BP: (149-161)/(72-82) 149/72  SpO2:  [96 %] 96 %    General: alert, no apparent distress, cooperative and comfortable  HEENT:  Head: Normocephalic, no lesions, without obvious abnormality  Eye: Normal external eye, conjunctiva, lidsc cornea  Ears: Normal external ears  Nose: Normal external nose, mucus membranes  CARDIAC:  regular rate and rhythm, S1, S2 normal, no murmur, click, rub or gallop  LUNGS:  no abnormal respiratory pattern, no retractions noted, non-labored breathing   ABDOMEN:  soft, non-tender, non-distended  EXTREMITIES:  extremities normal, warm and well-perfused; no cyanosis, clubbing, or edema  NEURO:  clear speech, following all commands, oriented x4  PSYCH:  Alert and oriented, appropriate affect            Diagnostic Studies: Labs reviewed  No orders to display       Laboratory: Labs reviewed  Results from last 7 days   Lab Units 03/08/21  0540 03/07/21  0621 03/06/21  0437   HEMOGLOBIN g/dL 12 5* 13 8* 13 6*   HEMATOCRIT % 37 2* 40 7* 40 6*   WBC Thousand/uL 7 70 9 10 10 10     Results from last 7 days   Lab Units 03/08/21  0540 03/07/21  0621 03/06/21  0437  03/03/21  0742   BUN mg/dL 41* 33* 30*   < > 25   SODIUM mmol/L 130* 131* 133*   < > 129*   POTASSIUM mmol/L 4 2 4 5 4 1   < > 4 0   CHLORIDE mmol/L 96* 96* 95*   < > 97* CREATININE mg/dL 1 66* 1 62* 1 66*   < > 1 61*   AST U/L  --   --   --   --  21   ALT U/L  --   --   --   --  25    < > = values in this interval not displayed  Results from last 7 days   Lab Units 03/03/21  0742   PROTIME seconds 14 3   INR  1 13        ** Please Note: Fluency Direct voice to text software may have been used in the creation of this document   **

## 2021-03-08 NOTE — CASE MANAGEMENT
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

## 2021-03-08 NOTE — NURSING NOTE
Pt requests to stand at bedside to use urinal   Assist x 2 for safety purposes was used for this during the night  Pt LUE has slight muscle strength with adduction,  is absent  LLE pt states has involuntary muscle contractions  Pt is currently sleeping with no signs of pain or distress observed  Call bell and belongings within reach  Will continue to monitor and follow plan of care

## 2021-03-08 NOTE — PLAN OF CARE
Problem: Potential for Falls  Goal: Patient will remain free of falls  Description: INTERVENTIONS:  - Assess patient frequently for physical needs  -  Identify cognitive and physical deficits and behaviors that affect risk of falls    -  [place] fall precautions as indicated by assessment   - Educate patient/family on patient safety including physical limitations  - Instruct patient to call for assistance with activity based on assessment  - Modify environment to reduce risk of injury  - Consider OT/PT consult to assist with strengthening/mobility  Outcome: Progressing     Problem: Prexisting or High Potential for Compromised Skin Integrity  Goal: Skin integrity is maintained or improved  Description: INTERVENTIONS:  - Identify patients at risk for skin breakdown  - Assess and monitor skin integrity  - Assess and monitor nutrition and hydration status  - Monitor labs   - Assess for incontinence   - Turn and reposition patient  - Assist with mobility/ambulation  - Relieve pressure over bony prominences  - Avoid friction and shearing  - Provide appropriate hygiene as needed including keeping skin clean and dry  - Evaluate need for skin moisturizer/barrier cream  - Collaborate with interdisciplinary team   - Patient/family teaching  - Consider wound care consult   Outcome: Progressing     Problem: PAIN - ADULT  Goal: Verbalizes/displays adequate comfort level or baseline comfort level  Description: Interventions:  - Encourage patient to monitor pain and request assistance  - Assess pain using appropriate pain scale  - Administer analgesics based on type and severity of pain and evaluate response  - Implement non-pharmacological measures as appropriate and evaluate response  - Consider cultural and social influences on pain and pain management  - Notify physician/advanced practitioner if interventions unsuccessful or patient reports new pain  Outcome: Progressing     Problem: INFECTION - ADULT  Goal: Absence or prevention of progression during hospitalization  Description: INTERVENTIONS:  - Assess and monitor for signs and symptoms of infection  - Monitor lab/diagnostic results  - Monitor all insertion sites, i e  indwelling lines, tubes, and drains  - Monitor endotracheal if appropriate and nasal secretions for changes in amount and color  - [place] appropriate cooling/warming therapies per order  - Administer medications as ordered  - Instruct and encourage patient and family to use good hand hygiene technique  - Identify and instruct in appropriate isolation precautions for identified infection/condition  Outcome: Progressing     Problem: SAFETY ADULT  Goal: Patient will remain free of falls  Description: INTERVENTIONS:  - Assess patient frequently for physical needs  -  Identify cognitive and physical deficits and behaviors that affect risk of falls    -  [place] fall precautions as indicated by assessment   - Educate patient/family on patient safety including physical limitations  - Instruct patient to call for assistance with activity based on assessment  - Modify environment to reduce risk of injury  - Consider OT/PT consult to assist with strengthening/mobility  Outcome: Progressing  Goal: Maintain or return to baseline ADL function  Description: INTERVENTIONS:  -  Assess patient's ability to carry out ADLs; assess patient's baseline for ADL function and identify physical deficits which impact ability to perform ADLs (bathing, care of mouth/teeth, toileting, grooming, dressing, etc )  - Assess/evaluate cause of self-care deficits   - Assess range of motion  - Assess patient's mobility; develop plan if impaired  - Assess patient's need for assistive devices and provide as appropriate  - Encourage maximum independence but intervene and supervise when necessary  - Involve family in performance of ADLs  - Assess for home care needs following discharge   - Consider OT consult to assist with ADL evaluation and planning for discharge  - Provide patient education as appropriate  Outcome: Progressing  Goal: Maintain or return mobility status to optimal level  Description: INTERVENTIONS:  - Assess patient's baseline mobility status (ambulation, transfers, stairs, etc )    - Identify cognitive and physical deficits and behaviors that affect mobility  - Identify mobility aids required to assist with transfers and/or ambulation (gait belt, sit-to-stand, lift, walker, cane, etc )  - [place] fall precautions as indicated by assessment  - Record patient progress and toleration of activity level on Mobility SBAR; progress patient to next Phase/Stage  - Instruct patient to call for assistance with activity based on assessment  - Consider rehabilitation consult to assist with strengthening/weightbearing, etc   Outcome: Progressing     Problem: DISCHARGE PLANNING  Goal: Discharge to home or other facility with appropriate resources  Description: INTERVENTIONS:  - Identify barriers to discharge w/patient and caregiver  - Arrange for needed discharge resources and transportation as appropriate  - Identify discharge learning needs (meds, wound care, etc )  - Arrange for interpretive services to assist at discharge as needed  - Refer to Case Management Department for coordinating discharge planning if the patient needs post-hospital services based on physician/advanced practitioner order or complex needs related to functional status, cognitive ability, or social support system  Outcome: Progressing

## 2021-03-08 NOTE — TREATMENT PLAN
Individualized Plan of [address] 70 y o  male MRN: [id number]  Unit/Bed#: -01 Encounter: [id number]     PATIENT INFORMATION  ADMISSION DATE: 3/5/2021  5:34 PM TOI CATEGORY:Stroke:  01 1  Left Body Involvement (Right Brain)   ADMISSION DIAGNOSIS: Stroke ([facility] [address] ) [I63 [id number]]  EXPECTED LOS: 10-14 days     MEDICAL/FUNCTIONAL PROGNOSIS  Based on my assessment of the patient's medical conditions and current functional status, the prognosis for attaining medical and functional goals or the IRF stay is:  Good    Medical Goals: Patient [facility] medically [facility] and Patient will be able to manage medical conditions and comorbid conditions with medications and follow up upon completion of rehab program    [address]: Home - Assistance      ANTICIPATED FOLLOW-UP SERVICE:   Outpatient Therapy Services: PT, OT and SLP      Home Health Services: PT, OT, SLP and Nursing      DISCIPLINE SPECIFIC PLANS:  Required Disciplines & Services: Rehabillitation Nursing, Case Management and Dietay/Nutrition    REQUIRED THERAPY:  Therapy Hours per Day Days per Week Total Days   Physical Therapy 1 5 5 5   Occupational Therapy 1 5 5 5   NOTE: Additional therapy time(s) may be added as appropriate to meet patient needs and to achieve functional goals  REQUIRED THERAPY:  Therapy Hours per Day Days per Week Total Days   Physical Therapy 1 5 5   Occupational Therapy 1 5 5   Speech/Language Therapy 1 3 3   NOTE: Additional therapy time(s) may be added as appropriate to meet patient needs and to achieve functional goals      Patient will either participate in above therapy regimen or participate in 900 minutes of therapy within 7 day week consisting of PT, OT and SLP due to the following medical procedure/condition:Stroke:  01 1  Left Body Involvement (Right Brain)    ANTICIPATED FUNCTIONAL OUTCOMES:  ADL: Patient will require assist with ADLs with least restrictive device upon completion of rehab program   Bladder/Bowel: Patient will return to premorbid level for bladder/bowel management upon completion of rehab program   Transfers: Patient will require supervision with transfers with least restrictive device upon completion of rehab program   Locomotion: Patient will require assist with locomotion with least restrictive device upon completion of rehab program   Cognitive: Patient will return to premorbid level of cognitive activity upon completion of rehab program     DISCHARGE PLANNING NEEDS  Equipment needs: Discharge needs to be reviewed with team      REHAB ANTICIPATED PARTICIPATION RESTRICTIONS:  Assist with Mobility, Assist with Tub Shower Transfer, Rquires Assist with ADLS, Requires Assit with Homemaking, Requires Assit with Steps, Requires modified diet and Weight Bearing as tolerated

## 2021-03-08 NOTE — PROGRESS NOTES
03/08/21 1300   Pain Assessment   Pain Assessment Tool 0-10   Pain Score No Pain   Restrictions/Precautions   Precautions Fall Risk   Braces or Orthoses Sling  (LUE)   Cognition   Overall Cognitive Status WFL   Orientation Level Oriented X4   Sit to Lying   Type of Assistance Needed Physical assistance   Amount of Physical Assistance Provided 25%-49%   Comment min-mod assist   Sit to Lying CARE Score 3   Sit to Stand   Type of Assistance Needed Physical assistance   Amount of Physical Assistance Provided 25%-49%   Comment cg/min assist with HW   Sit to Stand CARE Score 3   Bed-Chair Transfer   Type of Assistance Needed Physical assistance   Amount of Physical Assistance Provided 25%-49%   Comment min-mod assist sit pivot w/c to bed   Chair/Bed-to-Chair Transfer CARE Score 3   Walk 10 Feet   Type of Assistance Needed Physical assistance   Amount of Physical Assistance Provided 25%-49%   Comment min-mod assist   Walk 10 Feet CARE Score 3   Ambulation   Does the patient walk? 2  Yes   Primary Mode of Locomotion Prior to Admission Walk   Distance Walked (feet) 24 ft  (24' x 2 17')   Assist Device Solo Walker   Gait Pattern Inconsistant Alyx; Slow Alyx;Decreased foot clearance;L foot drag;L hemiparesis;L knee paolo;Narrow ERASMO;Step to   Limitations Noted In Balance; Coordination;Device Management; Endurance; Safety;Strength   Provided Assistance with: Balance;Limb Advancement;Limb Stabilization   Walk Assist Level Minimum Assist;Moderate Assist   Findings level surfaces; DF assist wrap   Wheel 50 Feet with Two Turns   Type of Assistance Needed Physical assistance   Amount of Physical Assistance Provided 25%-49%   Comment cg/min assist   Wheel 50 Feet with Two Turns CARE Score 3   Wheel 150 Feet   Type of Assistance Needed Physical assistance   Amount of Physical Assistance Provided 25%-49%   Comment cg/min assist   Wheel 150 Feet CARE Score 3   Wheelchair mobility   Method Right upper extremity;Right lower extremity   Assistance Provided For Locking Brakes;Obstacles;Remove Leg Rest;Replace Leg Rest   Distance Level Surface (feet) 221 ft  (221')   Findings cg/min assist level and unlevel surfaces   Therapeutic Interventions   Strengthening standing ther ex   Balance gait and transfer training   Other w/c mobility   Assessment   Treatment Assessment Patient tolerated therapy session well  LLE remains weak  No complaints of pain noted  Completed ther ex for general LE strengthening as well as improved standing balance with HW; gait and transfer training focusing on sequence and technique for improved balance and safety with functional mobility using HW  PT Barriers   Physical Impairment Decreased strength;Decreased range of motion;Decreased endurance; Impaired balance;Decreased mobility; Decreased coordination;Decreased safety awareness   Functional Limitation Wheelchair management; [facility] negotiation   Plan   Treatment/Interventions Functional transfer training;LE strengthening/ROM; Therapeutic exercise; Bed mobility;Gait training   Progress Progressing toward goals   PT Therapy Minutes   PT Time In 1300   PT Time Out 1403   PT Total Time (minutes) 63   PT Mode of treatment - Individual (minutes) 63   PT Mode of treatment - Concurrent (minutes) 0   PT Mode of treatment - Group (minutes) 0   PT Mode of treatment - Co-treat (minutes) 0   PT Mode of Treatment - Total time(minutes) 63 minutes   PT Cumulative Minutes 319   Therapy Time missed   Time missed?  No

## 2021-03-08 NOTE — PCC OCCUPATIONAL THERAPY
3/10/21: Pt's current LOF listed above  Barriers include decreased strength, ROM, and coordination on L side with LUE > LLE, impaired tone LUE, decreased balance, decreased independence in self care, and decreased activity tolerance  Pt participating in ADL training, therapeutic exercises, therapeutic activities, functional mobility/transfers, neuromuscular reeducation, and activity tolerance in order to progress towards goals  Pt would benefit from continued skilled OT services in order to address listed barriers and prepare for safe d/c     3/17/21: Pt's current LOF listed above  Continues with barriers of decreased strength, ROM, and coordination on L side with LUE > LLE, decreased balance, decreased independence in self care, and decreased activity tolerance  Pt participating in ADL training, therapeutic exercises, therapeutic activities, functional mobility/transfers, neuromuscular reeducation, and activity tolerance in order to progress towards goals  Pt would benefit from continued skilled OT services in order to address listed barriers and prepare for safe d/c     3/24/21: Pt's current LOF listed above  Continues with barriers of decreased strength, ROM, and coordination on L side with LUE > LLE, decreased balance, decreased independence in self care, and decreased activity tolerance  Pt participating in ADL training, therapeutic exercises, therapeutic activities, functional mobility/transfers, neuromuscular reeducation, and activity tolerance in order to progress towards goals  Pt would benefit from continued skilled OT services in order to address listed barriers and prepare for safe d/c

## 2021-03-08 NOTE — QUICK NOTE
Noted to have consistently elevated BG- ISS added without much improvement  Patient has an insulin pump with 0 4units/hr basal rate with 1 unit per 25 gram of carbs coverage  Suspected that he may not be receiving enough meals coverage and will change to 1 unit per 15 grams of carbs  Will monitor BG closely for now  Discontinue ISS for now

## 2021-03-08 NOTE — PROGRESS NOTES
03/08/21 0657   Pain Assessment   Pain Assessment Tool 0-10   Pain Score No Pain   Restrictions/Precautions   Precautions Fall Risk   Braces or Orthoses Sling  (LUE)   Cognition   Overall Cognitive Status WFL   Orientation Level Oriented X4   Roll Left and Right   Type of Assistance Needed Supervision;Verbal cues   Comment bed rail   Roll Left and Right CARE Score 4   Lying to Sitting on Side of Bed   Type of Assistance Needed Physical assistance   Amount of Physical Assistance Provided 25%-49%   Comment min-mod assist   Lying to Sitting on Side of Bed CARE Score 3   Sit to Stand   Type of Assistance Needed Physical assistance   Amount of Physical Assistance Provided 25%-49%   Comment min assist using bed rail   Sit to Stand CARE Score 3   Bed-Chair Transfer   Type of Assistance Needed Physical assistance   Amount of Physical Assistance Provided 25%-49%   Comment min-mod assist   Chair/Bed-to-Chair Transfer CARE Score 3   Therapeutic Interventions   Strengthening supine RLE AROM; AAROM LLE   Neuromuscular Re-Education ice massage LLE   Other transfer training   Assessment   Treatment Assessment Patient tolerated therapy sesson well  Reported pain L shoulder during the night, however, no complaints of pain during therapy session  Ice massage to LLE with postive DF and  quad contractions  Completed ther ex for general LE strengthening; transfer training focusing on sequence and technique for improved balance and safety with functional transfers  PT Barriers   Physical Impairment Decreased strength;Decreased range of motion;Decreased endurance; Impaired balance;Decreased mobility; Decreased safety awareness   Functional Limitation Wheelchair management; [facility] negotiation   Plan   Treatment/Interventions Functional transfer training;LE strengthening/ROM; Therapeutic exercise; Bed mobility   Progress Progressing toward goals   PT Therapy Minutes   PT Time In 0657   PT Time Out 0800   PT Total Time (minutes) 63   PT Mode of treatment - Individual (minutes) 63   PT Mode of treatment - Concurrent (minutes) 0   PT Mode of treatment - Group (minutes) 0   PT Mode of treatment - Co-treat (minutes) 0   PT Mode of Treatment - Total time(minutes) 63 minutes   PT Cumulative Minutes 256   Therapy Time missed   Time missed?  No

## 2021-03-08 NOTE — PROGRESS NOTES
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   Date of Onset 03/03/21   Support System   Name Tom Kowalski   Relationship spouse   Prior IADL Participation   Money Management Identify Money;Estimate Costs;Estimate Change   Meal Preparation Partial Participation  (shares with wife)   Laundry Partial Participation  (shares with wife)   Home Cleaning Partial Participation  (shares with wife)   Prior Level of Function   Functional Cognition 3  Independent - Patient completed the activities by him/herself, with or without an assistive device, with no assistance from a helper  Patient Preference   Nickname (Patient Preference) Bill   Psychosocial   Psychosocial (WDL) WDL   Patient Behaviors/Mood Calm; Cooperative   Restrictions/Precautions   Precautions Fall Risk   Braces or Orthoses Sling  (LUE)   Pain Assessment   Pain Assessment Tool 0-10   Pain Score No Pain   Comprehension   Assist Devices Glasses  (HA not present at hospital)   Auditory Basic;Complex   Visual Basic;Complex   Findings Pt completed cognitive linguistic evaluation  See SLP Rehab note for details  QI: Comprehension 4  Undestands: Clear comprehension without cues or repetitions   Comprehension (FIM) 6 - Requires repetition very infrequently   Expression   Verbal Basic;Complex   Non-Verbal Basic;Complex   Intelligibility Sentence   Findings Pt completed cognitive linguistic evaluation  See SLP Rehab note for details  QI: Expression 4   Express complex messages without difficulty and with speech that is clear and easy to [place]   Expression (FIM) 6 - Expresses complex/abstract but requires:  more time   Social Interaction   Cooperation with staff;with family   Participation Individual   Behaviors observed Appropriate   Findings Pt cooperative and appropriate throughout assessment  Social Interaction (FIM) 6 - Interacts appropriately with others BUT requires extra  time   Problem Solving   Complex Manages discharge planning   Routine Manages call bell   Findings Pt completed cognitive linguistic evaluation  See SLP Rehab note for details  Problem solving (FIM) 6 - Solves complex problems BUT requires extra time   Memory   Recognize People Yes   Remember Routine Yes   Initiates Tasks Yes   Short-Term Intact   Long Term Intact   Recalls Precaution Yes   Findings Pt completed cognitive linguistic evaluation  See SLP Rehab note for details  Memory (FIM) 6 - Recognizes with extra time   Cognition   Overall Cognitive Status [facility]   Arousal/Participation Alert; Responsive; Cooperative   Attention Within functional limits   Orientation Level Oriented X4   Memory Within functional limits   Following Commands Follows all commands and directions without difficulty   Comments Pt completed cognitive linguistic evaluation  See SLP Rehab note for details  Discharge Information   Vocational Plan Retired/not working   Patient's Discharge Plan return home with support from wife   Patient's Rehab Expectations "to get back to my life before this"   Barriers to Discharge Home Decreased Strength; Safety Considerations   Impressions Cognitive linguistic evaluation was completed where pt found to be presenting with overall functional cognitive linguistic skills and appears to be at baseline  SLP also spoke with pt's wife who was present later in the day-stated that pt appears to be at baseline and she has not observed any deficits  SLP spoke with OT and PT teams-both deny any cognitive linguistic deficits observed  Based on current evaluation, pt is presenting with overall cognitive linguistic skills that are WNL at time of assessment and further skilled ST services are not warranted for structured cognitive linguistic therapy at this time   However, pt will benefit from short term follow up of skilled ST services to provide stroke education as during discussion, pt had appropriate questions related to stroke and secondary stroke prevention  Anticipate brief follow up-staff to inform ST team should cognitive linguistic difficulties be observed with ST team to then follow      SLP Therapy Minutes   SLP Time In 1193   SLP Time Out 1140   SLP Total Time (minutes) 60   SLP Mode of treatment - Individual (minutes) 60   SLP Mode of treatment - Concurrent (minutes) 0   SLP Mode of treatment - Group (minutes) 0   SLP Mode of treatment - Co-treat (minutes) 0   SLP Mode of Treatment - Total time(minutes) 60 minutes   SLP Cumulative Minutes 60   Cumulative Minutes   Cumulative therapy minutes 316

## 2021-03-08 NOTE — PCC CARE MANAGEMENT
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, which he does on a volunray basis  Pt was tearful as he spoke about concerns related to the stroke, but also expresseed a strong sense of hope & optimism  Provided emotional support & encouragement  Offered Pt support sessions as needed  Discussed role of team members & reviewed [address] with Pt & spouse, who expressed understanding & agreement  SW will continue to monitor & assist as needed with [address]  Pt has [employer], see UR section of chart for insurance details  3/10/21 - Tx team recommendations reviewed with patient & spouse, who expressed understanding & agreement  Target DC date is 3/23 with [facility] (Nsg, PT, OT); a list of providers was provided to Pt & a referral will be made based on Pt preference  Pt acknowledged experiencing some symptoms of depression; Pt's spouse stated that he has no history of depression or any mental health issues, but does appear to be experiencing 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 at this time  Provided Pt with emotional support & encouragement & bedside counseling  Pt also recepetive to support sessions with SW intern offering activities such as arts & crafts, etc   [place] will continue to monitor & assist as needed with Tx & DC planning  3/17/21 - Tx team recommendations reviewed with patient and spouse  Pt expressed understanding & agreement   Target DC date was set for Thursday March 25th upon treatment teams request, with [facility] (PT, OT, and NSG) upon Pt request; a list of providers was provided to Pt & a referral will be made based on Pt preference  Pt spouse requested BSC and reilly walker   SW will continue to monitor & assist as needed with Tx & DC planning

## 2021-03-08 NOTE — PROGRESS NOTES
03/08/21 0900   Pain Assessment   Pain Assessment Tool Pain Assessment not indicated - pt denies pain   Restrictions/Precautions   Precautions Fall Risk   Weight Bearing Restrictions No   ROM Restrictions No   Braces or Orthoses Sling  (LUE)   Grooming   Able To Initiate Tasks;Comb/Brush Hair;Wash/Dry Face   Limitation Noted In Coordination; Safety;Strength   Shower/Bathe Self   Type of Assistance Needed Physical assistance   Amount of Physical Assistance Provided 25%-49%   Comment assist for RUE, perineal, buttocks   Shower/Bathe Self CARE Score 3   Bathing   Assessed Bath Style Sponge Bath   Anticipated D/C Bath Style Shower;Sponge Bath   Able to Karma Snap No   Able to Wash/Rinse/Dry (body part) Left Arm;L Upper Leg;R Upper Leg;R Lower Leg/Foot;L Lower Leg/Foot;Chest;Abdomen   Limitations Noted in Balance; Coordination; Endurance;ROM;Safety;Strength   Positioning Seated;Standing   Adaptive Equipment Longhand Sponge   Upper Body Dressing   Type of Assistance Needed Physical assistance   Amount of Physical Assistance Provided 25%-49%   Comment assist to doff shirt off RUE, don over LUE   Upper Body Dressing CARE Score 3   Lower Body Dressing   Type of Assistance Needed Physical assistance; Adaptive equipment   Amount of Physical Assistance Provided 50%-74%   Comment assist to maneuver reacher, pull down and up from hips when standing   Lower Body Dressing CARE Score 2   Putting On/Taking Off Footwear   Type of Assistance Needed Physical assistance   Amount of Physical Assistance Provided 75% or more   Comment able to doff R sock and shoe   Putting On/Taking Off Footwear CARE Score 2   Dressing/Undressing Clothing   Remove UB Clothes Pullover Shirt   Don UB Clothes Pullover Shirt   Remove LB Clothes Pants; Undergarment;Socks; Shoes   Don LB [employer]; Undergarment;Socks; Shoes   Limitations Noted In Balance; Coordination; Endurance; Safety;Strength;ROM   Adaptive Equipment Reacher   Positioning Supported Sit   Sit to Stand   Type of Assistance Needed Physical assistance   Amount of Physical Assistance Provided 25%-49%   Sit to Stand CARE Score 3   Toileting Hygiene   Type of Assistance Needed Physical assistance   Amount of Physical Assistance Provided 50%-74%   Comment assist to pull L side of pants down and up   Sloan Joseph 83 Score 2   Toileting   Able to [address]   (incidental assist for both directions for L side)   Manage Hygiene Bladder   Limitations Noted In Balance; Coordination;ROM;Safety;UE Strength;LE Strength   Adaptive Equipment Grab Bar   Toilet Transfer   Type of Assistance Needed Physical assistance   Amount of Physical Assistance Provided 25%-49%   Toilet Transfer CARE Score 3   Toilet Transfer   Surface Assessed Raised Toilet   Transfer Technique Stand Pivot   Limitations Noted In Balance; Endurance;ROM;Safety;UE Strength;LE Strength   Adaptive Equipment Grab Bar   Therapeutic Exercise - ROM   UE-ROM Yes   ROM - Left Upper Extremities    LUE ROM Comment P/AAROM LUE shoulder to digits all planes of motion, pt with trace to no active movement all planes   Neuromuscular Education   Comments ice massage to LUE biceps, triceps, wrist flexors 2x5 with intermittent muscle belly tapping and rapid flexion/extension to encourage increased flexor tone and active movement; pt with slight increase in biceps flexor tone after ice and other techniques but no increase in active movement any planes   Cognition   Overall Cognitive Status WFL   Arousal/Participation Alert; Responsive; Cooperative   Attention Within functional limits   Orientation Level Oriented X4   Memory Within functional limits   Following Commands Follows all commands and directions without difficulty   Assessment   Treatment Assessment Pt agreeable to OT session this AM  Received sitting upright in w/c  ADL session completed; current LOF and details listed in respective sections   Pt requires physical assist for all ADL tasks at this time 2* decreased strength, ROM, and coordination L side, decreased balance, and decreased activity tolerance  Pt introduced to AE for LB ADL tasks; hand over hand required from OT, and pt will require more practice to fully maximize AE use  Toilet transfer Karen with verbal cues to move LLE, especially foot as pt demonstrated foot drop during transfer  After ADL, completed neuormuscular reeducation including P/AAROM and ice stim to LUE  Pt's LUE is overall flaccid with occasional slight twitches in response to neuro re-ed  Pt would benefit from continued skilled OT services in order to maximize independence in self care, functional mobility/transfers, ROM/strength/coordination, and activity tolerance  Prognosis Good   Problem List Decreased strength;Decreased range of motion;Decreased endurance; Impaired balance;Decreased coordination; Impaired tone   Plan   Treatment/Interventions ADL retraining;Functional transfer training;LE strengthening/ROM; Therapeutic exercise; Endurance training;Patient/family training;Equipment eval/education; Compensatory technique education;OT   Progress Progressing toward goals   OT Therapy Minutes   OT Time In 0900   OT Time Out 1026   OT Total Time (minutes) 86   OT Mode of treatment - Individual (minutes) 86   Therapy Time missed   Time missed?  No

## 2021-03-08 NOTE — NURSING NOTE
Pt compliant with administering his own insulin via insulin pump  Checks blood sugar with Rito device  Needs assistance x 1-2 to get OOB  Able to feed self with setup  Call bell within reach  Frequent checks made

## 2021-03-08 NOTE — PCC SPEECH THERAPY
Cognitive linguistic evaluation was completed where pt found to be presenting with overall functional cognitive linguistic skills and appears to be at baseline  Pt completed the CLQT+ on initial evaluation with scores correlating to overall cognitive linguistic skills to be WNL, as well as all cognitive linguistic domains  SLP also spoke with pt's wife who was present later in the day-stated that pt appears to be at baseline and she has not observed any deficits  SLP spoke with OT and PT teams-both deny any cognitive linguistic deficits observed  Based on current evaluation, pt is presenting with overall cognitive linguistic skills that are WNL at time of assessment and further skilled ST services are not warranted for structured cognitive linguistic therapy at this time  However, pt will benefit from short term follow up of skilled ST services to provide stroke education as during discussion, pt had appropriate questions related to stroke and secondary stroke prevention  Anticipate brief follow up-staff to inform ST team should cognitive linguistic difficulties be observed with ST team to then follow

## 2021-03-08 NOTE — UTILIZATION REVIEW
Notification of Discharge  This is a Notification of Discharge from our facility [address]  Please be advised that this patient has been discharge from our facility  Below you will find the admission and discharge date and time including the patients disposition  PRESENTATION DATE: 3/3/2021  7:30 AM  OBS ADMISSION DATE:   IP ADMISSION DATE: 3/3/21 0807   DISCHARGE DATE: 3/5/2021  4:40 PM  DISPOSITION: [address] [place] [address] Dr [facility]   Admission Orders listed below:  Admission Orders (From admission, onward)     Ordered        03/03/21 0807  Inpatient Admission  Once                   Please contact the UR Department if additional information is required to close this patient's authorization/case  [facility] Utilization Review Department  Main: [phone] x carefully listen to the prompts  All voicemails are confidential   [email or web address]  Send all requests for admission clinical reviews, approved or denied determinations and any other requests to dedicated fax number below belonging to the campus where the patient is receiving treatment   List of dedicated fax numbers:  [address] DENIALS (Administrative/Medical Necessity) [phone]   [address] (Maternity/NICU/Pediatrics) [phone]   [place] Acevedo [phone]     [address] [phone]   Edwin Valdes [phone]   [address] [phone]   [facility]  [phone]   [address] [facility]  [address] [facility] [place] [address] [facility] [phone]

## 2021-03-09 DIAGNOSIS — Z23 ENCOUNTER FOR IMMUNIZATION: ICD-10-CM

## 2021-03-09 LAB
ANION GAP SERPL CALCULATED.3IONS-SCNC: 8 MMOL/L (ref 4–13)
BASOPHILS # BLD AUTO: 0.1 THOUSANDS/ΜL (ref 0–0.1)
BASOPHILS NFR BLD AUTO: 1 % (ref 0–2)
BUN SERPL-MCNC: 41 MG/DL (ref 7–25)
CALCIUM SERPL-MCNC: 9.9 MG/DL (ref 8.6–10.5)
CHLORIDE SERPL-SCNC: 95 MMOL/L (ref 98–107)
CO2 SERPL-SCNC: 28 MMOL/L (ref 21–31)
CREAT SERPL-MCNC: 1.63 MG/DL (ref 0.7–1.3)
EOSINOPHIL # BLD AUTO: 0.4 THOUSAND/ΜL (ref 0–0.61)
EOSINOPHIL NFR BLD AUTO: 6 % (ref 0–5)
ERYTHROCYTE [DISTWIDTH] IN BLOOD BY AUTOMATED COUNT: 14.2 % (ref 11.5–14.5)
GFR SERPL CREATININE-BSD FRML MDRD: 41 ML/MIN/1.73SQ M
GLUCOSE P FAST SERPL-MCNC: 329 MG/DL (ref 65–99)
GLUCOSE SERPL-MCNC: 185 MG/DL (ref 65–140)
GLUCOSE SERPL-MCNC: 229 MG/DL (ref 65–140)
GLUCOSE SERPL-MCNC: 287 MG/DL (ref 65–140)
GLUCOSE SERPL-MCNC: 329 MG/DL (ref 65–99)
GLUCOSE SERPL-MCNC: 369 MG/DL (ref 65–140)
HCT VFR BLD AUTO: 39.3 % (ref 42–47)
HGB BLD-MCNC: 13.2 G/DL (ref 14–18)
LYMPHOCYTES # BLD AUTO: 1.2 THOUSANDS/ΜL (ref 0.6–4.47)
LYMPHOCYTES NFR BLD AUTO: 22 % (ref 21–51)
MAGNESIUM SERPL-MCNC: 2 MG/DL (ref 1.9–2.7)
MCH RBC QN AUTO: 32 PG (ref 26–34)
MCHC RBC AUTO-ENTMCNC: 33.5 G/DL (ref 31–37)
MCV RBC AUTO: 96 FL (ref 81–99)
MONOCYTES # BLD AUTO: 0.6 THOUSAND/ΜL (ref 0.17–1.22)
MONOCYTES NFR BLD AUTO: 11 % (ref 2–12)
NEUTROPHILS # BLD AUTO: 3.2 THOUSANDS/ΜL (ref 1.4–6.5)
NEUTS SEG NFR BLD AUTO: 59 % (ref 42–75)
PLATELET # BLD AUTO: 204 THOUSANDS/UL (ref 149–390)
PMV BLD AUTO: 8.7 FL (ref 8.6–11.7)
POTASSIUM SERPL-SCNC: 4.6 MMOL/L (ref 3.5–5.5)
RBC # BLD AUTO: 4.12 MILLION/UL (ref 4.3–5.9)
SODIUM SERPL-SCNC: 131 MMOL/L (ref 134–143)
WBC # BLD AUTO: 5.5 THOUSAND/UL (ref 4.8–10.8)

## 2021-03-09 PROCEDURE — 97535 SELF CARE MNGMENT TRAINING: CPT

## 2021-03-09 PROCEDURE — 80048 BASIC METABOLIC PNL TOTAL CA: CPT | Performed by: PHYSICAL MEDICINE & REHABILITATION

## 2021-03-09 PROCEDURE — 97530 THERAPEUTIC ACTIVITIES: CPT

## 2021-03-09 PROCEDURE — 83735 ASSAY OF MAGNESIUM: CPT | Performed by: PHYSICAL MEDICINE & REHABILITATION

## 2021-03-09 PROCEDURE — 97110 THERAPEUTIC EXERCISES: CPT

## 2021-03-09 PROCEDURE — 85025 COMPLETE CBC W/AUTO DIFF WBC: CPT | Performed by: PHYSICAL MEDICINE & REHABILITATION

## 2021-03-09 PROCEDURE — 97129 THER IVNTJ 1ST 15 MIN: CPT

## 2021-03-09 PROCEDURE — 97116 GAIT TRAINING THERAPY: CPT

## 2021-03-09 PROCEDURE — 97112 NEUROMUSCULAR REEDUCATION: CPT

## 2021-03-09 PROCEDURE — 82948 REAGENT STRIP/BLOOD GLUCOSE: CPT

## 2021-03-09 PROCEDURE — 97542 WHEELCHAIR MNGMENT TRAINING: CPT

## 2021-03-09 PROCEDURE — 99232 SBSQ HOSP IP/OBS MODERATE 35: CPT | Performed by: FAMILY MEDICINE

## 2021-03-09 PROCEDURE — 97130 THER IVNTJ EA ADDL 15 MIN: CPT

## 2021-03-09 RX ADMIN — ATORVASTATIN CALCIUM 40 MG: 40 TABLET, FILM COATED ORAL at 16:53

## 2021-03-09 RX ADMIN — HEPARIN SODIUM 5000 UNITS: 5000 INJECTION INTRAVENOUS; SUBCUTANEOUS at 05:08

## 2021-03-09 RX ADMIN — POLYETHYLENE GLYCOL 3350 17 G: 17 POWDER, FOR SOLUTION ORAL at 17:19

## 2021-03-09 RX ADMIN — INSULIN LISPRO 5 UNITS: 100 INJECTION, SOLUTION INTRAVENOUS; SUBCUTANEOUS at 10:45

## 2021-03-09 RX ADMIN — HEPARIN SODIUM 5000 UNITS: 5000 INJECTION INTRAVENOUS; SUBCUTANEOUS at 15:08

## 2021-03-09 RX ADMIN — LISINOPRIL 40 MG: 20 TABLET ORAL at 08:39

## 2021-03-09 RX ADMIN — DOCUSATE SODIUM 100 MG: 100 CAPSULE, LIQUID FILLED ORAL at 17:20

## 2021-03-09 RX ADMIN — AMLODIPINE BESYLATE 5 MG: 5 TABLET ORAL at 08:38

## 2021-03-09 RX ADMIN — HEPARIN SODIUM 5000 UNITS: 5000 INJECTION INTRAVENOUS; SUBCUTANEOUS at 21:54

## 2021-03-09 RX ADMIN — INSULIN LISPRO 1 UNITS: 100 INJECTION, SOLUTION INTRAVENOUS; SUBCUTANEOUS at 16:53

## 2021-03-09 RX ADMIN — LEVOTHYROXINE SODIUM 88 MCG: 88 TABLET ORAL at 05:08

## 2021-03-09 RX ADMIN — CLOPIDOGREL BISULFATE 75 MG: 75 TABLET ORAL at 08:38

## 2021-03-09 RX ADMIN — PANTOPRAZOLE SODIUM 40 MG: 40 TABLET, DELAYED RELEASE ORAL at 05:08

## 2021-03-09 RX ADMIN — DOCUSATE SODIUM 100 MG: 100 CAPSULE, LIQUID FILLED ORAL at 08:38

## 2021-03-09 RX ADMIN — INSULIN LISPRO 2 UNITS: 100 INJECTION, SOLUTION INTRAVENOUS; SUBCUTANEOUS at 21:54

## 2021-03-09 NOTE — PCC NURSING
3/8/21 Pt admitted S/P Stroke  Pt alert and oriented  LUE Flaccid and LLE very weak  Pt is continent of bowel and bladder  Pt uses urinal  Assist of 2 with transfers  3/16/21 Pt admitted S/P Stroke  Pt alert and oriented  LUE Flaccid and LLE very weak  Pt is continent of bowel and bladder  Pt uses urinal standing at the bedside with the assistance of staff  Assist of 2 with transfers  3/22/21 Pt is A+Ox4, HRR, lungs clear, no edema  Pt remains weak on L side   L side is absent  Cont on B+B, LBM 3/22, uses urinal with minimal assist to hold urinal   Pt ambulates with reilly walker contact guard  No falls

## 2021-03-09 NOTE — PROGRESS NOTES
03/09/21 1415   Pain Assessment   Pain Assessment Tool Pain Assessment not indicated - pt denies pain   Pain Score No Pain   Restrictions/Precautions   Precautions Bed/chair alarms; Fall Risk;Limb alert   Comprehension   Comprehension (FIM) 6 - Has only MILD difficulty with complex/abstract info   Expression   Expression (FIM) 6 - Expresses complex/abstract but requires:  more time   Social Interaction   Social Interaction (FIM) 6 - Interacts appropriately with others BUT requires extra  time   Problem Solving   Problem solving (FIM) 6 - Solves complex problems BUT requires extra time   Memory   Memory (FIM) 6 - Recognizes with extra time   Speech/Language/Cognition Assessmetn   Treatment Assessment Pt seen for skilled speech therapy session targeting stroke education  Pt alert and interactive seen upright in bed in room  Wife present for session as well- engaging both in conversation and education  Pt provided with a "What you need to know about Stroke: A patient resource kit" and discussed the basics of his stroke and diagnosis  Pt was able to recall events and his primary symptoms leading up to his hospitalization- weakness, dizziness, head ache  Pt recalled the types of testing he went through to diagnose the stroke (CT and MRI) and knew that the stroke was in his brainstem  Discussed how the brain works in opposites- Left side controls the Right and vice versa as well as other functions of the medulla oblongata provided pictures for model on 3D Brain gisselle  Pt next discussed the two different types of strokes in the terms of ischemic versus hemorrhagic as well as the differences in medical proceedings and medical management of both  Reviewed section on Stroke, TIA, and Warning Signs- pt educated on what TIA stood for and how this can be a warning sign of a larger stroke to occur   Discussed how strokes can be preventable with managing a healthy life style but also discussed the areas he cannot control that put him at risk- family genetics  Last, discussed the changes caused by the stroke and pt was able to identify his deficits of L sided hemiparesis and fatigue being his larger side effects at this time  At the end, discussed the rehab routine here and role of each discipline as well as his expectations during the recovery process  Pt did express some frustrations with slow progress but provided encouragement that his stroke is still very early in the recovery process and to cont to stay motivated and participate to see the best results  At this time, cont to recommend skilled SLP services to complete stroke education to maximize cognitive linguistic communication abilities at this time  SLP Therapy Minutes   SLP Time In 3107   SLP Time Out 1644   SLP Total Time (minutes) 30   SLP Mode of treatment - Individual (minutes) 30   SLP Mode of treatment - Concurrent (minutes) 0   SLP Mode of treatment - Group (minutes) 0   SLP Mode of treatment - Co-treat (minutes) 0   SLP Mode of Treatment - Total time(minutes) 30 minutes   SLP Cumulative Minutes 90   Therapy Time missed   Time missed?  No

## 2021-03-09 NOTE — PROGRESS NOTES
03/09/21 0855   Pain Assessment   Pain Assessment Tool 0-10   Pain Score No Pain   Restrictions/Precautions   Precautions Fall Risk   Braces or Orthoses Sling  (LUE)   Cognition   Overall Cognitive Status WFL   Orientation Level Oriented X4   Sit to Stand   Type of Assistance Needed Incidental touching   Comment cg   Sit to Stand CARE Score 4   Walk 10 Feet   Type of Assistance Needed Physical assistance   Amount of Physical Assistance Provided 25%-49%   Comment min assist; occ mod assist when fatigued   Walk 10 Feet CARE Score 3   Ambulation   Does the patient walk? 2  Yes   Primary Mode of Locomotion Prior to Admission Walk   Distance Walked (feet) 23 ft  (18' 17' 23' 10')   Assist Device Solo Walker   Gait Pattern Inconsistant Alyx; Slow Alyx;Decreased foot clearance;L foot drag;L hemiparesis;R knee paolo;Narrow ERASMO;Step to   Limitations Noted In Balance; Coordination;Device Management; Endurance; Safety;Strength   Provided Assistance with: Balance   Walk Assist Level Minimum Assist;Moderate Assist   Findings level surfaces; occasional mod assist when fatigured   Wheel 50 Feet with Two Turns   Type of Assistance Needed Supervision;Verbal cues   Wheel 50 Feet with Two Turns CARE Score 4   Wheel 150 Feet   Type of Assistance Needed Supervision;Verbal cues   Wheel 150 Feet CARE Score 4   Wheelchair mobility   Does the patient use a wheelchair? 1  Yes   Type of Wheelchair Used 1   Manual   Method Right upper extremity;Right lower extremity   Assistance Provided For Locking Brakes;Obstacles;Remove Leg Rest;Replace Leg Rest;Remove armrests;Replace armrests   Distance Level Surface (feet) 157 ft   Distance Wheeled 3% Grade 12 ft   Findings close S with cues   Toilet Transfer   Type of Assistance Needed Physical assistance   Amount of Physical Assistance Provided 25%-49%   Comment min-mod assist   Toilet Transfer CARE Score 3   Therapeutic Interventions   Strengthening supine ther ex; AROM RLE, AAROM LLE; open and closed chain exercises   Balance gait and transfer training   Other w/c mobility   Assessment   Treatment Assessment Patient tolerated therapy session well  No complaints of pain reported  Impoved balance and technique with all functional tasks today  Completed ther ex for general LE strengthening; gait and transfer training focusing on sequence and technique for improved balance and safety with functional mobility  PT Barriers   Physical Impairment Decreased strength;Decreased range of motion;Decreased endurance; Impaired balance;Decreased mobility; Decreased coordination;Decreased safety awareness   Functional Limitation Wheelchair management; [facility] negotiation   Plan   Treatment/Interventions Functional transfer training;LE strengthening/ROM; Therapeutic exercise; Bed mobility;Gait training   Progress Progressing toward goals   PT Therapy Minutes   PT Time In 0855   PT Time Out 1030   PT Total Time (minutes) 95   PT Mode of treatment - Individual (minutes) 95   PT Mode of treatment - Concurrent (minutes) 0   PT Mode of treatment - Group (minutes) 0   PT Mode of treatment - Co-treat (minutes) 0   PT Mode of Treatment - Total time(minutes) 95 minutes   PT Cumulative Minutes 414   Therapy Time missed   Time missed?  No

## 2021-03-09 NOTE — PROGRESS NOTES
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  He was continued on a high potency statin and recommendation was given to convert back to simvastatin 40 mg upon discharge  Neurology was consulted and recommended monotherapy (plavix) and therefore ASA was discontinued  TTE was without structural abnormalities  Neurology recommended against ANDREW  Pt may be considered for an OP implantable loop recorder vs  zio patch to r/o a-fib as a potential source of CVA, since he has well controlled vascular risk factors  Pt was allowed permissive HTN and on 3/4, his ACE inhibitor was restarted at half dose  Pt with hyponatremia potentially on the basis of CVA vs dehydration  Initial NA+ was 129  NS was decreased and repeat Na+ was 133  Pt worked with PT/OT and was deemed appropriate for an acute rehabilitation setting  He arrive to  on 3/5/21                        Subjective:  Pt still experiencing high blood sugars, around 200s  Some confusion between hospitalist whether patient should have coverage or not  Hospatilist decided on changing pump from 1 unit per 25 g of carbs to 15 g of carbs  I added coverage back on today due to patients inability to change pump stating "my endocrinologist at the [facility] adjust this " I will have patient see his endocrinologist upon discharge if adjustments are still needed   Pt states he believes his sugars have increased due to "stress " We will continue to monitor  Coverage added for bedtime daily and before meals  ROS: A 10 point ROS was performed; negative except as noted above  Assessment/Plan:    CVA  -LLE/LUE weakness  -Cont simvastatin 40 mg at discharge  - Cont daily clopidogrel  -Monitor blood pressure, blood sugars  -Cont lisinopril  -Cont PPI for GI prophylaxis  -outpatient implantable loop recorder vs zio patch to rule out a fib  -Acute chomprehensive interdisciplinary inpatient rehabilitation to include intensive skilled therapies as outlines with oversight and management by a rehabilitation Physician Assistant overseen by rehabilitation physician as well as inpatient rehabilitation nursing, case management and weekly interdisciplinary team meeting     Diabetes mellitus type 2  -HGA1C- 7 3 as of 3/4/21  -Utilizes insulin pump at home, cont this while in rehab  -Maintain on ADA diet  -Coverage with algorithm 3 for with meals, and algorithm 1 for bedtime       Hypothyroidism  -Cont thyroid replacement therapy     GERD  -Cont PPI     Hyperlipidemia  -High potency statin while in acute rehab  -Discharge on 40 mg of Zocor     Hypertension  -Cont   Ace-I     Stage 3 chronic kidney disease  -Current creatinine appears to be at baseline  -Cont ACE-I  -Monitor renal function  -Avoid nephrotoxins  -Renally dose medications when appropriate     DVT prophylaxis  -plavix daily  -SCDs daily     Code  -Full                 Scheduled Meds:  Current Facility-Administered Medications   Medication Dose Route Frequency Provider Last Rate    acetaminophen  650 mg Oral Q6H PRN Louie Guzmán PA-C      amLODIPine  5 mg Oral Daily Yudi Elliott MD      atorvastatin  40 mg Oral Daily With [employer] ANIVAL Atkinson      clopidogrel  75 mg Oral Daily Louie Guzmán PA-C      docusate sodium  100 mg Oral BID Louie Guzmán PA-C      heparin (porcine)  5,000 Units Subcutaneous [facility] Louie Guzmán PA-C      hydrALAZINE  25 mg Oral Q8H PRN Inderjit HALLEY Yeung-MARYBETH      insulin aspart  1,000 Units Subcutaneous Insulin Pump Daily PRN Inderjit Anjana, PA-C      insulin lispro  1-5 Units Subcutaneous HS Inderjit HALLEY Yeung-C      insulin lispro  1-6 Units Subcutaneous TID AC Inderjit Yeung, PA-C      levothyroxine  88 mcg Oral Early Morning Inderjit HALLEY Yeung-C      lisinopril  40 mg Oral Daily HALLEY Grove-MARYBETH      pantoprazole  40 mg Oral Daily Before Breakfast Steve Lisa MD      patient maintained insulin pump  1 each Subcutaneous 4x Daily (with meals and at bedtime) REEMA Quintana      polyethylene glycol  17 g Oral Daily PRN Inderjit Yeung PA-C         Objective:    Functional Update:  Mobility: Supervision  Transfers: Moderate assistance  ADLs: Moderate assistance           Physical Exam:  Temp:  [97 6 °F (36 4 °C)-99 6 °F (37 6 °C)] 97 6 °F (36 4 °C)  HR:  [71-72] 71  Resp:  [18] 18  BP: (150-157)/(71-81) 157/81  SpO2:  [94 %-96 %] 94 %    General: alert, no apparent distress, cooperative and comfortable  HEENT:  Head: Normocephalic, no lesions, without obvious abnormality  Eye: Normal external eye, conjunctiva, lidsc cornea  Ears: Normal external ears  Nose: Normal external nose, mucus membranes  CARDIAC:  regular rate and rhythm, S1, S2 normal, no murmur, click, rub or gallop  LUNGS:  no abnormal respiratory pattern, no retractions noted, non-labored breathing   ABDOMEN:  soft, non-tender, non-distended  EXTREMITIES:  extremities normal, warm and well-perfused; no cyanosis, clubbing, or edema  NEURO:  clear speech, following all commands, oriented x4  PSYCH:  Alert and oriented, appropriate affect            Diagnostic Studies: Labs reviewed  No orders to display       Laboratory: Labs reviewed  Results from last 7 days   Lab Units 03/09/21  0515 03/08/21  0540 03/07/21  0621   HEMOGLOBIN g/dL 13 2* 12 5* 13 8*   HEMATOCRIT % 39 3* 37 2* 40 7*   WBC Thousand/uL 5 50 7 70 9 10     Results from last 7 days   Lab Units 03/09/21  0515 03/08/21  0540 03/07/21  0621  03/03/21  0742   BUN mg/dL 41* 41* 33*   < > 25   SODIUM mmol/L 131* 130* 131*   < > 129*   POTASSIUM mmol/L 4 6 4 2 4 5   < > 4 0   CHLORIDE mmol/L 95* 96* 96*   < > 97*   CREATININE mg/dL 1 63* 1 66* 1 62*   < > 1 61*   AST U/L  --   --   --   --  21   ALT U/L  --   --   --   --  25    < > = values in this interval not displayed  Results from last 7 days   Lab Units 03/03/21  0742   PROTIME seconds 14 3   INR  1 13        ** Please Note: Fluency Direct voice to text software may have been used in the creation of this document   **

## 2021-03-09 NOTE — NURSING NOTE
No change in assessment from this am  Pt alert and oriented x4  Manages own insulin pump  Sitting in chair , wife visiting  Call bell within reach

## 2021-03-09 NOTE — PROGRESS NOTES
03/09/21 1305   Pain Assessment   Pain Assessment Tool Pain Assessment not indicated - pt denies pain   Restrictions/Precautions   Precautions Fall Risk;Limb alert   Braces or Orthoses Sling  (LUE)   Sit to Stand   Type of Assistance Needed Incidental touching   Sit to Stand CARE Score 4   Bed-Chair Transfer   Type of Assistance Needed Physical assistance   Amount of Physical Assistance Provided Less than 25%   Chair/Bed-to-Chair Transfer CARE Score 3   Toileting Hygiene   Type of Assistance Needed Physical assistance   Amount of Physical Assistance Provided 75% or more   Comment urinal use while standing    Toileting Hygiene CARE Score 2   Toileting   Able to Pull Clothing down no, up no  Limitations Noted In Balance; Coordination;Problem Solving;ROM;Safety;LE Strength;UE Strength   Adaptive Equipment   (rail of bed for support)   Functional Standing Tolerance   Time 3-4 minutes 2 trials for WB through LUE and card match reaching with RUE CGA/ Min A   Exercise Tools   Exercise Tools Yes   Other Exercise Tool 1 card match reaching with RUE while WB thorugh LUE on tabletop   Neuromuscular Education   Weight Bearing Technique Yes   RUE Weight Bearing Forearm seated; Extended arm standing   Response to Weight Bearing Technique pt tolerates WB through elbow S while sitting and hand with min A while standing with no complaints pain   Functional Movement Patterns 1 set 8 PROM with ice stim ro LUE shoulder to hand with only occ muscle contractions noted   Coordination   Gross Motor pink foam sponge squeezes with assist L hand 1 set 6   Cognition   Overall Cognitive Status WFL   Arousal/Participation Alert; Responsive; Cooperative   Orientation Level Oriented X4   Additional Activities   Additional Activities Other (Comment)   Additional Activities Comments w/c mobility min A/ S with RUE and RLE room to OT gym   Other Comments   Assessment Pt participates in 10 minutes concurrent treatment focusing on tabletop tasks to increase UB strength/ ROM and activity tolerance with similar goals as another patient   Assessment   Treatment Assessment Pt presents sitting in the w/c agreeable to OT session including neuro lamont LUE, standing, toileting with urinal use and related transfers  Pt requires assist due to decreased balance, safety, endurance and LUE/ LLE strength/ ROM/ coordination  Pt is making gains towards goals and will benefit from conitnued skilled OT services to increase independence with daily tasks  Problem List Decreased strength;Decreased range of motion;Decreased endurance; Impaired balance;Decreased coordination;Decreased safety awareness   Plan   Treatment/Interventions ADL retraining;Functional transfer training; Therapeutic exercise; Endurance training;Patient/family training;Equipment eval/education; Compensatory technique education   Progress Progressing toward goals   OT Therapy Minutes   OT Time In 1305   OT Time Out 1405   OT Total Time (minutes) 60   OT Mode of treatment - Individual (minutes) 50   OT Mode of treatment - Concurrent (minutes) 10   Therapy Time missed   Time missed?  No

## 2021-03-10 LAB
ANION GAP SERPL CALCULATED.3IONS-SCNC: 10 MMOL/L (ref 4–13)
BASOPHILS # BLD AUTO: 0.1 THOUSANDS/ΜL (ref 0–0.1)
BASOPHILS NFR BLD AUTO: 1 % (ref 0–2)
BUN SERPL-MCNC: 44 MG/DL (ref 7–25)
CALCIUM SERPL-MCNC: 9.6 MG/DL (ref 8.6–10.5)
CHLORIDE SERPL-SCNC: 96 MMOL/L (ref 98–107)
CO2 SERPL-SCNC: 25 MMOL/L (ref 21–31)
CREAT SERPL-MCNC: 1.63 MG/DL (ref 0.7–1.3)
EOSINOPHIL # BLD AUTO: 0.4 THOUSAND/ΜL (ref 0–0.61)
EOSINOPHIL NFR BLD AUTO: 7 % (ref 0–5)
ERYTHROCYTE [DISTWIDTH] IN BLOOD BY AUTOMATED COUNT: 14.4 % (ref 11.5–14.5)
GFR SERPL CREATININE-BSD FRML MDRD: 41 ML/MIN/1.73SQ M
GLUCOSE P FAST SERPL-MCNC: 301 MG/DL (ref 65–99)
GLUCOSE SERPL-MCNC: 124 MG/DL (ref 65–140)
GLUCOSE SERPL-MCNC: 135 MG/DL (ref 65–140)
GLUCOSE SERPL-MCNC: 216 MG/DL (ref 65–140)
GLUCOSE SERPL-MCNC: 301 MG/DL (ref 65–99)
GLUCOSE SERPL-MCNC: 309 MG/DL (ref 65–140)
GLUCOSE SERPL-MCNC: 396 MG/DL (ref 65–140)
HCT VFR BLD AUTO: 37.2 % (ref 42–47)
HGB BLD-MCNC: 12.7 G/DL (ref 14–18)
LYMPHOCYTES # BLD AUTO: 1.3 THOUSANDS/ΜL (ref 0.6–4.47)
LYMPHOCYTES NFR BLD AUTO: 26 % (ref 21–51)
MAGNESIUM SERPL-MCNC: 1.9 MG/DL (ref 1.9–2.7)
MCH RBC QN AUTO: 32.1 PG (ref 26–34)
MCHC RBC AUTO-ENTMCNC: 34.1 G/DL (ref 31–37)
MCV RBC AUTO: 94 FL (ref 81–99)
MONOCYTES # BLD AUTO: 0.6 THOUSAND/ΜL (ref 0.17–1.22)
MONOCYTES NFR BLD AUTO: 11 % (ref 2–12)
NEUTROPHILS # BLD AUTO: 2.7 THOUSANDS/ΜL (ref 1.4–6.5)
NEUTS SEG NFR BLD AUTO: 54 % (ref 42–75)
PLATELET # BLD AUTO: 208 THOUSANDS/UL (ref 149–390)
PMV BLD AUTO: 8.5 FL (ref 8.6–11.7)
POTASSIUM SERPL-SCNC: 4.4 MMOL/L (ref 3.5–5.5)
RBC # BLD AUTO: 3.96 MILLION/UL (ref 4.3–5.9)
SODIUM SERPL-SCNC: 131 MMOL/L (ref 134–143)
WBC # BLD AUTO: 5 THOUSAND/UL (ref 4.8–10.8)

## 2021-03-10 PROCEDURE — 97542 WHEELCHAIR MNGMENT TRAINING: CPT

## 2021-03-10 PROCEDURE — 97112 NEUROMUSCULAR REEDUCATION: CPT

## 2021-03-10 PROCEDURE — 97530 THERAPEUTIC ACTIVITIES: CPT

## 2021-03-10 PROCEDURE — 97535 SELF CARE MNGMENT TRAINING: CPT

## 2021-03-10 PROCEDURE — 97116 GAIT TRAINING THERAPY: CPT

## 2021-03-10 PROCEDURE — 99232 SBSQ HOSP IP/OBS MODERATE 35: CPT | Performed by: FAMILY MEDICINE

## 2021-03-10 PROCEDURE — 83735 ASSAY OF MAGNESIUM: CPT | Performed by: PHYSICAL MEDICINE & REHABILITATION

## 2021-03-10 PROCEDURE — 82948 REAGENT STRIP/BLOOD GLUCOSE: CPT

## 2021-03-10 PROCEDURE — 80048 BASIC METABOLIC PNL TOTAL CA: CPT | Performed by: PHYSICAL MEDICINE & REHABILITATION

## 2021-03-10 PROCEDURE — 99232 SBSQ HOSP IP/OBS MODERATE 35: CPT | Performed by: NURSE PRACTITIONER

## 2021-03-10 PROCEDURE — 97130 THER IVNTJ EA ADDL 15 MIN: CPT

## 2021-03-10 PROCEDURE — 97129 THER IVNTJ 1ST 15 MIN: CPT

## 2021-03-10 PROCEDURE — 85025 COMPLETE CBC W/AUTO DIFF WBC: CPT | Performed by: PHYSICAL MEDICINE & REHABILITATION

## 2021-03-10 PROCEDURE — 97110 THERAPEUTIC EXERCISES: CPT

## 2021-03-10 RX ADMIN — AMLODIPINE BESYLATE 5 MG: 5 TABLET ORAL at 09:00

## 2021-03-10 RX ADMIN — DOCUSATE SODIUM 100 MG: 100 CAPSULE, LIQUID FILLED ORAL at 09:00

## 2021-03-10 RX ADMIN — HEPARIN SODIUM 5000 UNITS: 5000 INJECTION INTRAVENOUS; SUBCUTANEOUS at 14:22

## 2021-03-10 RX ADMIN — HEPARIN SODIUM 5000 UNITS: 5000 INJECTION INTRAVENOUS; SUBCUTANEOUS at 21:08

## 2021-03-10 RX ADMIN — DOCUSATE SODIUM 100 MG: 100 CAPSULE, LIQUID FILLED ORAL at 17:11

## 2021-03-10 RX ADMIN — CLOPIDOGREL BISULFATE 75 MG: 75 TABLET ORAL at 09:00

## 2021-03-10 RX ADMIN — INSULIN LISPRO 6 UNITS: 100 INJECTION, SOLUTION INTRAVENOUS; SUBCUTANEOUS at 10:44

## 2021-03-10 RX ADMIN — LISINOPRIL 40 MG: 20 TABLET ORAL at 09:00

## 2021-03-10 RX ADMIN — PANTOPRAZOLE SODIUM 40 MG: 40 TABLET, DELAYED RELEASE ORAL at 05:34

## 2021-03-10 RX ADMIN — INSULIN LISPRO 3 UNITS: 100 INJECTION, SOLUTION INTRAVENOUS; SUBCUTANEOUS at 12:38

## 2021-03-10 RX ADMIN — INSULIN LISPRO 3 UNITS: 100 INJECTION, SOLUTION INTRAVENOUS; SUBCUTANEOUS at 16:42

## 2021-03-10 RX ADMIN — LEVOTHYROXINE SODIUM 88 MCG: 88 TABLET ORAL at 05:34

## 2021-03-10 RX ADMIN — ATORVASTATIN CALCIUM 40 MG: 40 TABLET, FILM COATED ORAL at 16:40

## 2021-03-10 RX ADMIN — HEPARIN SODIUM 5000 UNITS: 5000 INJECTION INTRAVENOUS; SUBCUTANEOUS at 05:33

## 2021-03-10 NOTE — PROGRESS NOTES
03/09/21 1930   Charting Type   Charting Type Shift assessment   Neurological   Neuro (WDL) X   Level of Consciousness Alert/awake   Orientation Level Oriented X4   Cognition Appropriate judgement   Facial Symmetry Left facial drooping   Tongue Deviation Midline   Speech Clear   R Hand  Strong   L Hand  Absent   RUE Muscle Strength 5- Normal strength   LUE Muscle Strength 0- No movement   LLE Muscle Strength 2- Movement with gravity eliminated   Neuro Symptoms Fatigue   Relieved by Rest   Neuro Additional Assessments No   [place] Coma Scale   Eye Opening 4   Best Verbal Response 5   Best Motor Response 6   [place] Coma Scale Score 15   HEENT   HEENT (WDL) X   Head and Face Symmetrical   R Eye Mildly impaired vision   L Eye Mildly impaired vision   R Ear Mildly impaired hearing   L Ear Mildly impaired hearing   Teeth Missing teeth   Respiratory   Respiratory (WDL) WDL   Respiratory Pattern Normal   Chest Assessment Chest expansion symmetrical   Cardiac   Cardiac (WDL) WDL   Cardiac Regularity Regular   Pain Assessment   Pain Assessment Tool Pain Assessment not indicated - pt denies pain   Pain Score No Pain   Peripheral Vascular   Peripheral Vascular (WDL) WDL   Integumentary   Integumentary (WDL) X   Skin Color Appropriate for ethnicity   Skin Condition/Temp Warm;Dry   Skin Integrity Bruising   Skin Location scatttered   Skin Turgor Non-tenting   Jamal Scale   Sensory Perceptions 3   Moisture 4   Activity 3   Mobility 3   Nutrition 3   Friction and Shear 2   Jamal Scale Score 18   Musculoskeletal   Musculoskeletal (WDL) X   Level of Assistance Moderate assist, patient does 50-74%   Assistive Device Front wheel walker   RUE Full movement   LUE Paralysis   RLE Full movement   LLE Limited movement   Gastrointestinal   Gastrointestinal (WDL) X   Abdomen Inspection Soft;Nondistended   Stool Assessment   Bowel Incontinence No   Genitourinary   Genitourinary (WDL) WDL   Urine Assessment   Urinary Incontinence No   Anal/Rectal   Anal/Rectal (WDL) WDL   Psychosocial   Psychosocial (WDL) WDL

## 2021-03-10 NOTE — PROGRESS NOTES
03/10/21 1410   Pain Assessment   Pain Assessment Tool Pain Assessment not indicated - pt denies pain   Pain Score No Pain   Restrictions/Precautions   Precautions Fall Risk;Limb alert   Comprehension   Comprehension (FIM) 6 - Has only MILD difficulty with complex/abstract info   Expression   Expression (FIM) 6 - Expresses complex/abstract but requires:  more time   Social Interaction   Social Interaction (FIM) 6 - Interacts appropriately with others BUT requires extra  time   Problem Solving   Problem solving (FIM) 6 - Solves complex problems BUT requires extra time   Memory   Memory (FIM) 6 - Recognizes with extra time   Speech/Language/Cognition Assessmetn   Treatment Assessment Pt seen for skilled speech therapy session to completed stroke education  Pt alert and interactive seen upright in bed for session  Wife present as well and engaged in conversation  Pt reviewed what was covered yesterday- able to recall the types of strokes and his specific stroke  Pt was able to recall the symptoms her felt for his stroke and the testing that was completed to diagnose it  Pt recalled what TIA was but needed cues for what it stood for and what it meant for his risk of something bigger about to happen  Pt was able to recall some ways to prevent strokes- increased activity, managing DM, no smoking, limit alcohol, healthy diet and body weight  Discussed the changes caused by the stroke and pt was able to ID his specific deficits with Left sided weaknesses  Reviewed the BE FAST acronym in which he was able to recall 4/6 of the letters by end of session  Discussed some complications after stroke and the ways in which we are preventing any of them from occurring (pneumonia, bedsores, contractures, DVT)  Last reviewed plans for discharge after this and the life after stroke  Pt currently lives in a two story home with half bath on first floor and full bath on second floor   Wife stated she is having the tub removed and putting in a walkin shower with handrails and possibly a built in seat as well  Informed that any further equipment that he may need will be discussed and order once closer to discharge  Pt has completed Stroke Education- all questions answered as best as able- pt receptive and engaged during sessions for maximal comprehension  At this time no further skilled SLP services warranted  SLP Therapy Minutes   SLP Time In 1410   SLP Time Out 1440   SLP Total Time (minutes) 30   SLP Mode of treatment - Individual (minutes) 30   SLP Mode of treatment - Concurrent (minutes) 0   SLP Mode of treatment - Group (minutes) 0   SLP Mode of treatment - Co-treat (minutes) 0   SLP Mode of Treatment - Total time(minutes) 30 minutes   SLP Cumulative Minutes 120   Therapy Time missed   Time missed?  No

## 2021-03-10 NOTE — PROGRESS NOTES
03/10/21 0900   Pain Assessment   Pain Assessment Tool 0-10   Pain Score No Pain   Restrictions/Precautions   Precautions Fall Risk;Limb alert   Braces or Orthoses Sling;AFO  (LUE)   Cognition   Overall Cognitive Status WFL   Orientation Level Oriented X4   Sit to Lying   Type of Assistance Needed Physical assistance   Amount of Physical Assistance Provided 25%-49%   Comment min assist   Sit to Lying CARE Score 3   Sit to Stand   Type of Assistance Needed Incidental touching   Comment cg with HW   Sit to Stand CARE Score 4   Bed-Chair Transfer   Type of Assistance Needed Physical assistance   Amount of Physical Assistance Provided 25%-49%   Comment min assist   Chair/Bed-to-Chair Transfer CARE Score 3   Walk 10 Feet   Type of Assistance Needed Physical assistance   Amount of Physical Assistance Provided 25%-49%   Comment min- occasional MOD A   Walk 10 Feet CARE Score 3   Ambulation   Primary Mode of Locomotion Prior to Admission Walk   Distance Walked (feet)   ( 25' (ace wrap in DFassist) 18' 15' 14' (off shelf AFO))   Assist Device Solo Walker   Gait Pattern Inconsistant Alyx; Slow Alyx;Decreased foot clearance;L foot drag;L hemiparesis; Narrow ERASMO;Step to   Limitations Noted In Balance; Coordination;Device Management; Endurance; Safety;Strength   Provided Assistance with: Balance   Walk Assist Level Minimum Assist;Moderate Assist   Findings level surfaces; min- occasional MOD A   Wheel 50 Feet with Two Turns   Type of Assistance Needed Supervision   Wheel 50 Feet with Two Turns CARE Score 4   Wheel 150 Feet   Type of Assistance Needed Supervision   Wheel 150 Feet CARE Score 4   Wheelchair mobility   Does the patient use a wheelchair? 1  Yes   Type of Wheelchair Used 1   Manual   Method Right upper extremity;Right lower extremity   Assistance Provided For Locking Brakes;Obstacles;Remove Leg Rest;Replace Leg Rest;Remove armrests;Replace armrests   Distance Level Surface (feet) 188 ft   Distance Wheeled 3% Grade 12 ft   Toilet Transfer   Type of Assistance Needed Physical assistance   Amount of Physical Assistance Provided 25%-49%   Comment min assist   Toilet Transfer CARE Score 3   Therapeutic Interventions   Strengthening seated RLE AROM ther ex   Balance gait and transfer training using off shelf AFO   Other w/c mobility   Assessment   Treatment Assessment Patient tolerated therapy session well  No pain reported  Trialed gait with off shelf AFO with some success today  Completed ther ex for general LE strengthening; gait and tranfer training fousing on sequence and technque for improved balance and safety with functional mobility  Patient now able to propel wheelchair with supervision  Plan   Treatment/Interventions Functional transfer training;LE strengthening/ROM; Therapeutic exercise;Gait training   Progress Progressing toward goals   PT Therapy Minutes   PT Time In 0900   PT Time Out 1027   PT Total Time (minutes) 87   PT Mode of treatment - Individual (minutes) 87   PT Mode of treatment - Concurrent (minutes) 0   PT Mode of treatment - Group (minutes) 0   PT Mode of treatment - Co-treat (minutes) 0   PT Mode of Treatment - Total time(minutes) 87 minutes   PT Cumulative Minutes 501   Therapy Time missed   Time missed?  No

## 2021-03-10 NOTE — PROGRESS NOTES
03/10/21 1338   Pain Assessment   Pain Assessment Tool Pain Assessment not indicated - pt denies pain   Restrictions/Precautions   Precautions Fall Risk;Limb alert   Weight Bearing Restrictions No   ROM Restrictions No   Braces or Orthoses Sling  (LUE)   Sit to Lying   Type of Assistance Needed Physical assistance   Amount of Physical Assistance Provided Less than 25%   Sit to Lying CARE Score 3   Lying to Sitting on Side of Bed   Type of Assistance Needed Physical assistance   Amount of Physical Assistance Provided 25%-49%   Lying to Sitting on Side of Bed CARE Score 3   Sit to Stand   Type of Assistance Needed Physical assistance   Amount of Physical Assistance Provided Less than 25%   Sit to Stand CARE Score 3   Bed-Chair Transfer   Type of Assistance Needed Physical assistance   Amount of Physical Assistance Provided Less than 25%   Chair/Bed-to-Chair Transfer CARE Score 3   Transfer Bed/Chair/Wheelchair   Limitations Noted In Balance; Coordination; Endurance;UE Strength;LE Strength   Functional Standing Tolerance   Time 3m5s during clothespin activity   Therapeutic Exercise - ROM   UE-ROM Yes   ROM- Right Upper Extremities   RUE ROM Comment used reacher to retrieve wooden pegs from floor and place in basket on table; towel slides x15 all planes of motion with RUE providing AAROM to LUE   ROM - Left Upper Extremities    LUE ROM Comment towel slides x15 all planes of motion with RUE providing AAROM to LUE   Neuromuscular Education   RUE Weight Bearing Forearm seated; Extended arm standing   Response to Weight Bearing Technique weight bearing into LUE while seated completing clothespin activity and then while standing completing same activity; support from OT to maintain stability of LUE required as well as support when standing for pt to lean into LUE; slight contraction of biceps noted when leaning into UE when seated   Cognition   Overall Cognitive Status [facility]   Arousal/Participation Alert; Responsive; Cooperative Attention Within functional limits   Orientation Level Oriented X4   Memory Within functional limits   Following Commands Follows all commands and directions without difficulty   Assessment   Treatment Assessment Pt agreeable to OT session this PM  Received sitting in bed with HOB raised  Transferred into w/c with Karen, then completed ROM and weight bearing exercises; details in respective sections  Pt continues with overall flaccidity in LUE, however did have slight increased biceps contractions when leaning into LUE while seated  Pt reported slight soreness in core and RUE after exercises and returned to supine with Karen  Pt would benefit from continued skilled OT services in order to maximize functional mobility/transfers, ROM/strength, and activity tolerance  Prognosis Good   Problem List Decreased strength;Decreased range of motion;Decreased endurance; Impaired balance;Decreased coordination;Decreased safety awareness   Plan   Treatment/Interventions ADL retraining;Functional transfer training;LE strengthening/ROM; Therapeutic exercise; Endurance training;Equipment eval/education;Patient/family training; Compensatory technique education;OT   Progress Progressing toward goals   OT Therapy Minutes   OT Time In 1338   OT Time Out 1405   OT Total Time (minutes) 27   OT Mode of treatment - Individual (minutes) 27   Therapy Time missed   Time missed?  No

## 2021-03-10 NOTE — PROGRESS NOTES
03/10/21 0703   Pain Assessment   Pain Assessment Tool Pain Assessment not indicated - pt denies pain   Restrictions/Precautions   Precautions Fall Risk;Limb alert   Weight Bearing Restrictions No   ROM Restrictions No   Braces or Orthoses Sling  (LUE)   Eating   Type of Assistance Needed Set-up / clean-up   Amount of Physical Assistance Provided No physical assistance   Eating CARE Score 5   Oral Hygiene   Type of Assistance Needed Set-up / clean-up   Amount of Physical Assistance Provided No physical assistance   Oral Hygiene CARE Score 5   Grooming   Able To Initiate Tasks; Wash/Dry Face;Brush/Clean Teeth;Wash/Dry Hands   Limitation Noted In Coordination; Safety;Strength   Shower/Bathe Self   Type of Assistance Needed Adaptive equipment;Physical assistance   Amount of Physical Assistance Provided Less than 25%   Comment assist for RUE   Shower/Bathe Self CARE Score 3   Bathing   Assessed Bath Style Sponge Bath   Anticipated D/C Bath Style Shower;Sponge Bath   Able to Lis Jarvis No   Able to Wash/Rinse/Dry (body part) Left Arm;L Upper Leg;R Upper Leg;R Lower Leg/Foot;L Lower Leg/Foot;Chest;Abdomen;Perineal Area; Buttocks   Limitations Noted in Balance; Coordination;ROM;Safety;Strength   Positioning Seated;Standing   Adaptive Equipment Longhand Sponge   Upper Body Dressing   Type of Assistance Needed Physical assistance   Amount of Physical Assistance Provided Less than 25%   Comment assist to don shirt over LUE   Upper Body Dressing CARE Score 3   Lower Body Dressing   Type of Assistance Needed Physical assistance; Adaptive equipment   Amount of Physical Assistance Provided 50%-74%   Comment assist to maneuver reacher, pull pants up and down on L side   Lower Body Dressing CARE Score 2   Putting On/Taking Off Footwear   Type of Assistance Needed Physical assistance; Adaptive equipment   Amount of Physical Assistance Provided 50%-74%   Comment assist to set up sock aide, don bilat shoes   Putting On/Taking Off Footwear CARE Score 2   Dressing/Undressing Clothing   Remove UB Clothes Pullover Shirt   Don UB Clothes Pullover Shirt   Remove LB Clothes Undergarment;Socks   [address]; Undergarment;Socks; Shoes   Limitations Noted In Balance; Coordination; Safety;Strength;ROM   Adaptive Equipment Reacher;Sock Aide   Positioning Supported Sit   Sit to Stand   Type of Assistance Needed Physical assistance   Amount of Physical Assistance Provided Less than 25%   Sit to Stand CARE Score 3   Toileting Hygiene   Type of Assistance Needed Physical assistance   Amount of Physical Assistance Provided 50%-74%   Comment able to assist in pulling R side of pants up and down   Sloan Joseph 83 Score 2   Toileting   Able to [address]   (incidental assist for L side both directions)   Manage Hygiene Bladder; Bowel   Limitations Noted In Balance; Coordination;ROM;Safety;UE Strength;LE Strength   Adaptive Equipment Grab Bar   Toilet Transfer   Type of Assistance Needed Physical assistance   Amount of Physical Assistance Provided 25%-49%   Toilet Transfer CARE Score 3   Toilet Transfer   Surface Assessed Raised Toilet   Transfer Technique Stand Pivot   Limitations Noted In Balance; Endurance;ROM;Safety;UE Strength;LE Strength   Adaptive Equipment Grab Bar   Health Management   Health Management Level Wheelchair   Health Management Level of Assistance Close supervision   Health Management discussed with OT and RN management of insulin pump and sliding scale, pt usually manages independently at home   Therapeutic Exercise - ROM   UE-ROM Yes   ROM - Left Upper Extremities    LUE ROM Comment PROM x5 shoulder to digits all planes of motion with prolonged stretch at end range   Neuromuscular Education   Comments ice massage LUE biceps and triceps 2x5 with intermittent muscle belly tapping and rapid flexion/extension to encourage muscle tightness; pt with no reaction to any technique with the exception of one brief internal rotation flexion contraction during icing of triceps   Cognition   Overall Cognitive Status WFL   Arousal/Participation Alert; Responsive; Cooperative   Attention Within functional limits   Orientation Level Oriented X4   Memory Within functional limits   Following Commands Follows all commands and directions without difficulty   Assessment   Treatment Assessment Pt agreeable to OT session this AM  Received sitting on toilet completing toileting  ADL session completed; current LOF and details listed in respective sections  Pt continues to require physical assist for ADL tasks 2* decreased strength, ROM, and coordination in LUE, decreased balance, and slightly decreased activity tolerance  Pt used AE for LB tasks as necessary with hand over hand/physical assist from OT to correctly and effectively use  Pt's transfers Karen with good use of RW for stability and safe hand and foot placement  After ADL, completed neuro re-ed to [facility]; details in respective sections  Pt continues with general flaccidity of LUE with no AROM noted  Set up with breakfast tray at end of session  Pt would benefit from continued skilled OT services in order to maximize independence in self care, functional mobilitiy/transfers, ROM/strength/coordination, and activity tolerance  Prognosis Good   Problem List Decreased strength;Decreased range of motion;Decreased endurance; Impaired balance;Decreased coordination;Decreased safety awareness   Plan   Treatment/Interventions ADL retraining;Functional transfer training;LE strengthening/ROM; Therapeutic exercise; Endurance training;Patient/family training;Equipment eval/education; Compensatory technique education;OT   Progress Progressing toward goals   OT Therapy Minutes   OT Time In 0703   OT Time Out 0831   OT Total Time (minutes) 88   OT Mode of treatment - Individual (minutes) 88   Therapy Time missed   Time missed?  No

## 2021-03-10 NOTE — PROGRESS NOTES
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  He was continued on a high potency statin and recommendation was given to convert back to simvastatin 40 mg upon discharge  Neurology was consulted and recommended monotherapy (plavix) and therefore ASA was discontinued  TTE was without structural abnormalities  Neurology recommended against ANDREW  Pt may be considered for an OP implantable loop recorder vs  zio patch to r/o a-fib as a potential source of CVA, since he has well controlled vascular risk factors  Pt was allowed permissive HTN and on 3/4, his ACE inhibitor was restarted at half dose  Pt with hyponatremia potentially on the basis of CVA vs dehydration  Initial NA+ was 129  NS was decreased and repeat Na+ was 133  Pt worked with PT/OT and was deemed appropriate for an acute rehabilitation setting  He arrive to  on 3/5/21                        Subjective:  Pt still experiencing blood sugar levels around the 200s-300s  Had the hospatilist come talk with patient and work out a plan  Pt will be getting more insulin coverage with his meals and ISS for now  Hospitalist will be following along side until pt is back to his normal levels  Pt admits that this has happen previously when he had a prostectomy  He states once he returned home and to his normal routine his sugars were back to normal levels   Pt agreeable to plan with hospitalist      ROS: CHARLENE 10 point ROS was performed; negative except as noted above  Assessment/Plan:    CVA  -LLE/LUE weakness  -Cont simvastatin 40 mg at discharge  - Cont daily clopidogrel  -Monitor blood pressure, blood sugars  -Cont lisinopril  -Cont PPI for GI prophylaxis  -outpatient implantable loop recorder vs zio patch to rule out a fib  -Acute chomprehensive interdisciplinary inpatient rehabilitation to include intensive skilled therapies as outlines with oversight and management by a rehabilitation Physician Assistant overseen by rehabilitation physician as well as inpatient rehabilitation nursing, case management and weekly interdisciplinary team meeting     Diabetes mellitus type 2  -HGA1C- 7 3 as of 3/4/21  -Utilizes insulin pump at home, cont this while in rehab  -Maintain on ADA diet  -Coverage with algorithm 3 for with meals, and algorithm 1 for bedtime     -Cont Humalog 1-5 units daily at bedtime  -Cont humalog 1-6 units 3 times daily  Before meals  -Cont Humalog 3 units 3 times daily with meals    Hypothyroidism  -Cont thyroid replacement therapy     GERD  -Cont PPI     Hyperlipidemia  -High potency statin while in acute rehab  -Discharge on 40 mg of Zocor     Hypertension  -Cont   Ace-I     Stage 3 chronic kidney disease  -Current creatinine appears to be at baseline  -Cont ACE-I  -Monitor renal function  -Avoid nephrotoxins  -Renally dose medications when appropriate     DVT prophylaxis  -plavix daily  -SCDs daily     Code  -Full                 Scheduled Meds:  Current Facility-Administered Medications   Medication Dose Route Frequency Provider Last Rate    acetaminophen  650 mg Oral Q6H PRN Anne Walsh PA-C      amLODIPine  5 mg Oral Daily Nestor Muhammad MD      atorvastatin  40 mg Oral Daily With [employer], PA-C      clopidogrel  75 mg Oral Daily Duayne ClipANIVAL josé      docusate sodium  100 mg Oral BID Duaymichelle Walsh PA-C      heparin (porcine)  5,000 Units Subcutaneous [facility] Sherene Baumgarten, PA-C      hydrALAZINE  25 mg Oral Q8H PRN Sherene Baumgarten, PA-C      insulin aspart  1,000 Units Subcutaneous Insulin Pump Daily PRN Sherene Baumgarten, PA-C      insulin lispro  1-5 Units Subcutaneous HS Sherene Baumgarten, PA-C      insulin lispro  1-6 Units Subcutaneous TID AC Fabby Atkinson PA-C      insulin lispro  3 Units Subcutaneous TID With Meals REEMA Stanley      levothyroxine  88 mcg Oral Early Morning Sherene Baumgarten, PA-C      lisinopril  40 mg Oral Daily Sherene Baumgarten, PA-C      pantoprazole  40 mg Oral Daily Before Breakfast Graeme Yanes MD      patient maintained insulin pump  1 each Subcutaneous 4x Daily (with meals and at bedtime) REEMA Stanley      polyethylene glycol  17 g Oral Daily PRN Sherene Baumgarten, PA-C         Objective:    Functional Update:       Physical Therapy:     Weight Bearing Status: Full Weight Bearing  Transfers: Incidental Touching, Minimal Assistance(CG STS with HW/wall or bed rail; MIN A SPT/sit pivot)  Bed Mobility: Minimal Assistance  Amulation Distance (ft): 24 feet  Ambulation: Minimal Assistance, Moderate Assistance  Assistive Device for Ambulation: Solo Walker  Wheelchair Mobility Distance: 157 ft  Wheelchair Mobility: Supervision  Discharge Recommendations: Home with:  [address] with[de-identified] 24 Hour Assisteance, Family Support, First Floor Setup, Home Physical Therapy  Occupational Therapy:  Eating: Supervision  Grooming: Supervision  Bathing: Minimal Assistance  Bathing: Minimal Assistance  Upper Body Dressing: Minimal Assistance  Lower Body Dressing: Maximum Assistance  Toileting:  Moderate Assistance  Tub/Shower Transfer: (TBA)  Toilet Transfer: Minimal Assistance  Cognition: Within Defined Limits  Orientation: Person, Place, Time, Situation  Discharge Recommendations: Home with:  [address] with[de-identified] Family Support, Home Occupational Therapy    Speech Therapy:  Mode of Communication: Verbal  Cognition: Within Defined Limits  Orientation: Person, Place, Time, Situation  Discharge Recommendations: Home with:  [address] with[de-identified] Family Support(level of assist pending pt's progress)    Physical Exam:  Temp:  [98 2 °F (36 8 °C)-98 9 °F (37 2 °C)] 98 2 °F (36 8 °C)  HR:  [68-72] 72  Resp:  [18] 18  BP: (144-150)/(73-78) 144/78  SpO2:  [96 %-97 %] 97 %    General: alert, no apparent distress, cooperative and comfortable  HEENT:  Head: Normocephalic, no lesions, without obvious abnormality  Eye: Normal external eye, conjunctiva, lidsc cornea  Ears: Normal external ears  Nose: Normal external nose, mucus membranes  CARDIAC:  regular rate and rhythm, S1, S2 normal, no murmur, click, rub or gallop  LUNGS:  no abnormal respiratory pattern, no retractions noted, non-labored breathing   ABDOMEN:  soft, non-tender, non-distended  EXTREMITIES:  extremities normal, warm and well-perfused; no cyanosis, clubbing, or edema  NEURO:  clear speech, following all commands, oriented x4  PSYCH:  Alert and oriented, appropriate affect  Diagnostic Studies: Labs reviewed  No orders to display       Laboratory: Labs reviewed  Results from last 7 days   Lab Units 03/10/21  0538 03/09/21  0515 03/08/21  0540   HEMOGLOBIN g/dL 12 7* 13 2* 12 5*   HEMATOCRIT % 37 2* 39 3* 37 2*   WBC Thousand/uL 5 00 5 50 7 70     Results from last 7 days   Lab Units 03/10/21  0538 03/09/21  0515 03/08/21  0540   BUN mg/dL 44* 41* 41*   SODIUM mmol/L 131* 131* 130*   POTASSIUM mmol/L 4 4 4 6 4 2   CHLORIDE mmol/L 96* 95* 96*   CREATININE mg/dL 1 63* 1 63* 1 66*            ** Please Note: Fluency Direct voice to text software may have been used in the creation of this document   **

## 2021-03-10 NOTE — NURSING NOTE
Patient reported BG check 2 hours post meal of 90  Requesting zora marques at this time, patient states will eat one pack now and one before bed

## 2021-03-10 NOTE — PROGRESS NOTES
03/10/21 1200   Pain Assessment   Pain Assessment Tool 0-10   Pain Score No Pain   Restrictions/Precautions   Precautions Fall Risk;Limb alert   Braces or Orthoses Sling  (LUE)   Therapeutic Interventions   Strengthening supine ther ex; AAROM LLE   Assessment   Treatment Assessment Patient tolerated therapy session depiite increased fatigue from earlier therapy  Completed ther ex for general LE strengthening  Slow return noted in LLE, however, able to work in available range to muscle fatigue  Plan   Treatment/Interventions LE strengthening/ROM; Therapeutic exercise   Progress Progressing toward goals   PT Therapy Minutes   PT Time In 1200   PT Time Out 1230   PT Total Time (minutes) 30   PT Mode of treatment - Individual (minutes) 30   PT Mode of treatment - Concurrent (minutes) 0   PT Mode of treatment - Group (minutes) 0   PT Mode of treatment - Co-treat (minutes) 0   PT Mode of Treatment - Total time(minutes) 30 minutes   PT Cumulative Minutes 531   Therapy Time missed   Time missed?  No

## 2021-03-10 NOTE — NURSING NOTE
Patient's fasting bg 309 at 0715  Patient concerned bg high and is under more control at home  Patient rechecked bg on touchless [employer] from home at [phone], showing bg 269  Declined receiving sliding scale coverage  Per pt, calculated bg and carbs on insulin pump from home, basal rate 0 5 units/hr and stated giving self bolus 5 3 units per pump instructions  Patient requesting to wait to recheck bg at 1030 and receive sliding scale coverage  PM+R notified of patient's bg and request  Per PM+R, notified hospitalist, come and evaluate pt  Patient presently oob in  in [facility] with PT  Will follow

## 2021-03-10 NOTE — CASE MANAGEMENT
Tx team recommendations reviewed with patient & spouse, who expressed understanding & agreement  Target DC date is 3/23 with [facility] (Nsg, PT, OT); a list of providers was provided to Pt & a referral will be made based on Pt preference  Pt acknowledged experiencing some symptoms of depression; Pt's spouse stated that he has no history of depression or any mental health issues, but does appear to be experiencing 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 at this time  Provided Pt with emotional support & encouragement & bedside counseling  Pt also recepetive to support sessions with SW intern offering activities such as arts & crafts, etc   Samira Mata will continue to monitor & assist as needed with Tx & DC planning

## 2021-03-10 NOTE — PCC PHYSICAL THERAPY
03/10/2021:   Patient participating in therapy and making positive gains  Patient MIN A bed mobility; CG STS transfer with HW vs wall/bed rail; MIN A SPT/sit pivot transfer; S wheelchair mobility up to 157' level and unlevel surfaces; MIN-occasional MOD A ambulation up to 24' level surfaces with HW and DF wrap assist   Remains limited by decreased ROM/strength, decreased balance and safety, and decreased endurance  May benefit from continued inpatient ARC PT to increase function, safety, and increased independence in prep for safe d/c     03/16/2021:   Patient participating in therapy and making positive gains  Patient tolerating E-stim to LLE with positive contractions  Patient CG/S bed mobility, CG STS transfers with HW, MIN A SPT no device, CG/MIN A ambulation up to 135' level surfaces with HW and off shelf AFO, MOD I wheelchair mobility level and unlevel surfaces, MIN A up and down 6 steps (up forward, down backward) with single rail  Remains limited by decreased ROM/strength, decreased balance and safety, and decreased endurance  Would benefit from continued inpatient ARC PT to increase function, safety, and increased independence in prep for safe d/c     3/24/2021:  Patient participating in therapy and continuing to make positive gains  Patient S bed mobility, CG/S transfers with reilly walker, MOD I wheelchair mobility level and unlevel surfaces, CG ambulation up to 331' level and unlevel surfaces with reilly walker, CG up/down 21 steps with single rail going up forward and down backwards  Patient uses AFO LLE and sling LUE  Remains limited by decreased ROM/strength, decreased balance and safety  Would benefit from continued inpatient ARC PT to increase function, safety, and improved independence in prep for safe d/c and to complete family training

## 2021-03-10 NOTE — ASSESSMENT & PLAN NOTE
Lab Results   Component Value Date    HGBA1C 7 3 (H) 03/04/2021       Recent Labs     03/09/21  1603 03/09/21  2029 03/10/21  0715 03/10/21  1040   POCGLU 185* 229* 309* 396*       Blood Sugar Average: Last 72 hrs:  (P) 287 [id number]   · BG has been much better controlled at home but due to stress and acute illness; BG has been poorly controlled at persistently 200-300  · Will continue with insulin pump regimen- per patient manage which includes approx 10 units of basal rate; 25 g per 1 unit for carbs coverage; and sliding scale according to the pump setting  Patient also check BG 2 hours after meals and administer insulin from the pump according to the reading  · Will continue ISS (additional subQ that will be given by our staff) AC (algorithm 3) and HS (algorithm 1- due to h/o of hypoglycemia at nighttime)  Will add 3 units of humalog TID with meals  Will monitor BG closely for the next 24 hours  If not much improved will increase  · Discussed with patient and wife (via phone) in details

## 2021-03-10 NOTE — ASSESSMENT & PLAN NOTE
· Acute right medullary infarct diagnosed at [facility]  · Continue plavix and lipitor   · continue PT/OT/speech per acute rehab recommendations

## 2021-03-11 LAB
GLUCOSE SERPL-MCNC: 105 MG/DL (ref 65–140)
GLUCOSE SERPL-MCNC: 111 MG/DL (ref 65–140)
GLUCOSE SERPL-MCNC: 157 MG/DL (ref 65–140)
GLUCOSE SERPL-MCNC: 268 MG/DL (ref 65–140)
GLUCOSE SERPL-MCNC: 309 MG/DL (ref 65–140)
GLUCOSE SERPL-MCNC: 89 MG/DL (ref 65–140)

## 2021-03-11 PROCEDURE — 97530 THERAPEUTIC ACTIVITIES: CPT

## 2021-03-11 PROCEDURE — 97542 WHEELCHAIR MNGMENT TRAINING: CPT

## 2021-03-11 PROCEDURE — 97110 THERAPEUTIC EXERCISES: CPT

## 2021-03-11 PROCEDURE — 97116 GAIT TRAINING THERAPY: CPT

## 2021-03-11 PROCEDURE — 82948 REAGENT STRIP/BLOOD GLUCOSE: CPT

## 2021-03-11 PROCEDURE — 99232 SBSQ HOSP IP/OBS MODERATE 35: CPT | Performed by: FAMILY MEDICINE

## 2021-03-11 PROCEDURE — 97535 SELF CARE MNGMENT TRAINING: CPT

## 2021-03-11 PROCEDURE — 97112 NEUROMUSCULAR REEDUCATION: CPT

## 2021-03-11 RX ADMIN — DOCUSATE SODIUM 100 MG: 100 CAPSULE, LIQUID FILLED ORAL at 08:25

## 2021-03-11 RX ADMIN — INSULIN LISPRO 3 UNITS: 100 INJECTION, SOLUTION INTRAVENOUS; SUBCUTANEOUS at 12:46

## 2021-03-11 RX ADMIN — HEPARIN SODIUM 5000 UNITS: 5000 INJECTION INTRAVENOUS; SUBCUTANEOUS at 06:06

## 2021-03-11 RX ADMIN — DOCUSATE SODIUM 100 MG: 100 CAPSULE, LIQUID FILLED ORAL at 17:01

## 2021-03-11 RX ADMIN — CLOPIDOGREL BISULFATE 75 MG: 75 TABLET ORAL at 08:25

## 2021-03-11 RX ADMIN — LISINOPRIL 40 MG: 20 TABLET ORAL at 08:25

## 2021-03-11 RX ADMIN — PANTOPRAZOLE SODIUM 40 MG: 40 TABLET, DELAYED RELEASE ORAL at 06:06

## 2021-03-11 RX ADMIN — ATORVASTATIN CALCIUM 40 MG: 40 TABLET, FILM COATED ORAL at 16:58

## 2021-03-11 RX ADMIN — INSULIN LISPRO 3 UNITS: 100 INJECTION, SOLUTION INTRAVENOUS; SUBCUTANEOUS at 12:47

## 2021-03-11 RX ADMIN — INSULIN LISPRO 4 UNITS: 100 INJECTION, SOLUTION INTRAVENOUS; SUBCUTANEOUS at 08:24

## 2021-03-11 RX ADMIN — LEVOTHYROXINE SODIUM 88 MCG: 88 TABLET ORAL at 06:06

## 2021-03-11 RX ADMIN — HEPARIN SODIUM 5000 UNITS: 5000 INJECTION INTRAVENOUS; SUBCUTANEOUS at 14:28

## 2021-03-11 RX ADMIN — INSULIN LISPRO 3 UNITS: 100 INJECTION, SOLUTION INTRAVENOUS; SUBCUTANEOUS at 08:24

## 2021-03-11 RX ADMIN — AMLODIPINE BESYLATE 5 MG: 5 TABLET ORAL at 08:25

## 2021-03-11 RX ADMIN — HEPARIN SODIUM 5000 UNITS: 5000 INJECTION INTRAVENOUS; SUBCUTANEOUS at 21:30

## 2021-03-11 NOTE — PROGRESS NOTES
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  He was continued on a high potency statin and recommendation was given to convert back to simvastatin 40 mg upon discharge  Neurology was consulted and recommended monotherapy (plavix) and therefore ASA was discontinued  TTE was without structural abnormalities  Neurology recommended against ANDREW  Pt may be considered for an OP implantable loop recorder vs  zio patch to r/o a-fib as a potential source of CVA, since he has well controlled vascular risk factors  Pt was allowed permissive HTN and on 3/4, his ACE inhibitor was restarted at half dose  Pt with hyponatremia potentially on the basis of CVA vs dehydration  Initial NA+ was 129  NS was decreased and repeat Na+ was 133  Pt worked with PT/OT and was deemed appropriate for an acute rehabilitation setting  He arrive to  on 3/5/21                        Subjective:  Occupational therapy recommended patient trying E-STIM for left arm  Discussed with patient about taking his BG arm reader out and letting us monitor his sugars to allow usage for the E-STIM  Pt made aware that he cannot have his own reader in that arm due to the risks with the E-STIM  He is also aware it cannot be switch to his right arm, which has full function, due to the inability to lift his left arm up to check the reader  Pt understands and is in agreement   Pt expresses frustration about sugars rising again after they were within normal range yesterday  Confirmed with urologist that benefits of e-stim outweight risks with his h/o prostate cancer/prostectomy  ROS: A 10 point ROS was performed; negative except as noted above  Assessment/Plan:    CVA  -LLE/LUE weakness  -Cont simvastatin 40 mg at discharge  - Cont daily clopidogrel  -Monitor blood pressure, blood sugars  -Cont lisinopril  -Cont PPI for GI prophylaxis  -outpatient implantable loop recorder vs zio patch to rule out a fib  -Acute chomprehensive interdisciplinary inpatient rehabilitation to include intensive skilled therapies as outlines with oversight and management by a rehabilitation Physician Assistant overseen by rehabilitation physician as well as inpatient rehabilitation nursing, case management and weekly interdisciplinary team meeting   -Starting e-stim 3/11/21 to left arm    Diabetes mellitus type 2  -HGA1C- 7 3 as of 3/4/21  -Utilizes insulin pump at home, cont this while in rehab  -Maintain on ADA diet  -Coverage with algorithm 3 for with meals, and algorithm 1 for bedtime     -Cont Humalog 1-5 units daily at bedtime  -Cont humalog 1-6 units 3 times daily  Before meals  -Cont Humalog 3 units 3 times daily with meals    Hypothyroidism  -Cont thyroid replacement therapy     GERD  -Cont PPI     Hyperlipidemia  -High potency statin while in acute rehab  -Discharge on 40 mg of Zocor     Hypertension  -Cont   Ace-I     Stage 3 chronic kidney disease  -Current creatinine appears to be at baseline  -Cont ACE-I  -Monitor renal function  -Avoid nephrotoxins  -Renally dose medications when appropriate     DVT prophylaxis  -plavix daily  -SCDs daily     Code  -Full                 Scheduled Meds:  Current Facility-Administered Medications   Medication Dose Route Frequency Provider Last Rate    acetaminophen  650 mg Oral Q6H PRN Sky Monae PA-C      amLODIPine  5 mg Oral Daily Alessandro Hein MD      atorvastatin 40 mg Oral Daily With [employer] ANIVAL Atkinson      clopidogrel  75 mg Oral Daily Renetta Baird PA-C      docusate sodium  100 mg Oral BID Renetta Baird PA-C      heparin (porcine)  5,000 Units Subcutaneous [facility] Renetta Baird PA-C      hydrALAZINE  25 mg Oral Q8H PRN Renetta Baird PA-C      insulin aspart  1,000 Units Subcutaneous Insulin Pump Daily PRN Renetta Baird PA-C      insulin lispro  1-5 Units Subcutaneous HS Renetta Baird PA-C      insulin lispro  1-6 Units Subcutaneous TID AC Fabby ANIVAL Atkinson      insulin lispro  3 Units Subcutaneous TID With Meals REEMA Hand      levothyroxine  88 mcg Oral Early Morning Renetta Baird PA-C      lisinopril  40 mg Oral Daily Renetta Baird PA-C      pantoprazole  40 mg Oral Daily Before Breakfast Harry Ulloa MD      patient maintained insulin pump  1 each Subcutaneous 4x Daily (with meals and at bedtime) REEMA Hand      polyethylene glycol  17 g Oral Daily PRN Renetta Baird PA-C         Objective:    Functional Update:       Physical Therapy:     Weight Bearing Status: Full Weight Bearing  Transfers: Incidental Touching, Minimal Assistance(CG STS with HW/wall or bed rail; MIN A SPT/sit pivot)  Bed Mobility: Minimal Assistance  Amulation Distance (ft): 24 feet  Ambulation: Minimal Assistance, Moderate Assistance  Assistive Device for Ambulation: Solo Walker  Wheelchair Mobility Distance: 157 ft  Wheelchair Mobility: Supervision  Discharge Recommendations: Home with:  [address] [place] [address] with[de-identified] 24 Hour Assisteance, Family Support, First Floor Setup, Home Physical Therapy  Occupational Therapy:  Eating: Supervision  Grooming: Supervision  Bathing: Minimal Assistance  Bathing: Minimal Assistance  Upper Body Dressing: Minimal Assistance  Lower Body Dressing: Maximum Assistance  Toileting:  Moderate Assistance  Tub/Shower Transfer: (TBA)  Toilet Transfer: Minimal Assistance  Cognition: Within Defined Limits  Orientation: Person, Place, Time, Situation  Discharge Recommendations: Home with:  [address] with[de-identified] Family Support, Home Occupational Therapy    Speech Therapy:  Mode of Communication: Verbal  Cognition: Within Defined Limits  Orientation: Person, Place, Time, Situation  Discharge Recommendations: Home with:  [address] with[de-identified] Family Support(level of assist pending pt's progress)    Physical Exam:  Temp:  [98 8 °F (37 1 °C)-99 °F (37 2 °C)] 98 8 °F (37 1 °C)  HR:  [72-82] 82  Resp:  [18] 18  BP: (130-139)/(69-80) 130/69  SpO2:  [96 %-97 %] 96 %    General: alert, no apparent distress, cooperative and comfortable  HEENT:  Head: Normocephalic, no lesions, without obvious abnormality  Eye: Normal external eye, conjunctiva, lidsc cornea  Ears: Normal external ears  Nose: Normal external nose, mucus membranes  CARDIAC:  regular rate and rhythm, S1, S2 normal, no murmur, click, rub or gallop  LUNGS:  no abnormal respiratory pattern, no retractions noted, non-labored breathing   ABDOMEN:  soft, non-tender, non-distended  EXTREMITIES:  extremities normal, warm and well-perfused; no cyanosis, clubbing, or edema  NEURO:  clear speech, following all commands, oriented x4  PSYCH:  Alert and oriented, appropriate affect  Diagnostic Studies: Labs reviewed  No orders to display       Laboratory: Labs reviewed  Results from last 7 days   Lab Units 03/10/21  0538 03/09/21  0515 03/08/21  0540   HEMOGLOBIN g/dL 12 7* 13 2* 12 5*   HEMATOCRIT % 37 2* 39 3* 37 2*   WBC Thousand/uL 5 00 5 50 7 70     Results from last 7 days   Lab Units 03/10/21  0538 03/09/21  0515 03/08/21  0540   BUN mg/dL 44* 41* 41*   SODIUM mmol/L 131* 131* 130*   POTASSIUM mmol/L 4 4 4 6 4 2   CHLORIDE mmol/L 96* 95* 96*   CREATININE mg/dL 1 63* 1 63* 1 66*            ** Please Note: Fluency Direct voice to text software may have been used in the creation of this document   **

## 2021-03-11 NOTE — QUICK NOTE
Discussed with RN regarding the patient's blood glucose  Continues to elevate with breakfast and lunch- will change meal coverage from 3 units to 5 units for breakfast and lunch  Will keep dinner coverage at 3 units for now

## 2021-03-11 NOTE — PROGRESS NOTES
03/11/21 1054   Pain Assessment   Pain Assessment Tool Pain Assessment not indicated - pt denies pain   Restrictions/Precautions   Precautions Fall Risk;Limb alert   Weight Bearing Restrictions No   ROM Restrictions No   Braces or Orthoses Sling  (LUE)   Eating   Type of Assistance Needed Set-up / clean-up   Amount of Physical Assistance Provided No physical assistance   Eating CARE Score 5   Grooming   Able To Initiate Tasks;Comb/Brush Hair;Wash/Dry Face   Limitation Noted In Coordination; Safety;Strength   Shower/Bathe Self   Type of Assistance Needed Physical assistance   Amount of Physical Assistance Provided 25%-49%   Comment pt only wanted to wash UB; assist for RUE   Shower/Bathe Self CARE Score 3   Bathing   Assessed Bath Style Sponge Bath   Anticipated D/C Bath Style Shower;Sponge Bath   Able to Wash/Rinse/Dry (body part) Left Arm;Right Arm; Chest;Abdomen   Limitations Noted in Balance; Coordination;ROM;Safety;Strength   Positioning Seated   Upper Body Dressing   Type of Assistance Needed Physical assistance   Amount of Physical Assistance Provided Less than 25%   Comment assist to don shirt over LUE   Upper Body Dressing CARE Score 3   Lying to Sitting on Side of Bed   Type of Assistance Needed Physical assistance   Amount of Physical Assistance Provided 25%-49%   Lying to Sitting on Side of Bed CARE Score 3   Sit to Stand   Type of Assistance Needed Physical assistance   Amount of Physical Assistance Provided Less than 25%   Sit to Stand CARE Score 3   Bed-Chair Transfer   Type of Assistance Needed Physical assistance   Amount of Physical Assistance Provided Less than 25%   Chair/Bed-to-Chair Transfer CARE Score 3   Transfer Bed/Chair/Wheelchair   Limitations Noted In Balance; Coordination;UE Strength;LE Strength   Therapeutic Exercise - ROM   UE-ROM Yes   ROM - Left Upper Extremities    LUE ROM Comment PROM shoulder to digits x5 all planes of motion with prolonged stretch at end range   Neuromuscular Education   Comments e-stim to LUE targeting elbow flexion/extension, wrist flexion/extension, pronation/supination; 2x10 each plane, intensity started at 3 to allow pt to assimilate to stim, then increased to 5-5 25; pt with trace elbow flexion/extension, pronation, wrist flexion/extension after each trial of stim   Cognition   Overall Cognitive Status [facility]   Arousal/Participation Alert; Responsive; Cooperative   Attention Within functional limits   Orientation Level Oriented X4   Memory Within functional limits   Following Commands Follows all commands and directions without difficulty   Assessment   Treatment Assessment Pt agreeable to OT session this AM  Received lying supine in bed  Brief ADL session completed; pt only wanted to wash UB and change shirt as LB washed overnight  Pt continues to require physical assist for ADL tasks 2* decreased strength, ROM, and coordination LUE  After brief ADL, participated in e-stim to LUE to encourage AROM; details in respective sections  Pt was eager to use e-stim and see improvement in LUE  Pt with slight improvement in LUE AROM after stim  Returned to room and required set up for lunch tray at end of session  Pt would benefit from continued skilled OT services in order to maximize independence in self care, functional mobility/transfers, ROM/strength/coordination, and activity tolerance  Prognosis Good   Problem List Decreased strength;Decreased range of motion;Decreased endurance; Impaired balance;Decreased coordination;Decreased safety awareness   Plan   Treatment/Interventions ADL retraining;Functional transfer training;LE strengthening/ROM; Therapeutic exercise; Endurance training;Patient/family training;Equipment eval/education; Compensatory technique education;OT   Progress Progressing toward goals   OT Therapy Minutes   OT Time In 1054   OT Time Out 1229   OT Total Time (minutes) 95   OT Mode of treatment - Individual (minutes) 95   Therapy Time missed   Time missed?  No

## 2021-03-11 NOTE — PROGRESS NOTES
03/11/21 0900   Pain Assessment   Pain Assessment Tool 0-10   Pain Score No Pain   Restrictions/Precautions   Precautions Fall Risk;Limb alert   Braces or Orthoses Sling   Cognition   Overall Cognitive Status WFL   Arousal/Participation Alert; Responsive; Cooperative   Attention Within functional limits   Orientation Level Oriented X4   Memory Within functional limits   Following Commands Follows all commands and directions without difficulty   Sit to Lying   Type of Assistance Needed Physical assistance   Amount of Physical Assistance Provided Less than 25%   Sit to Lying CARE Score 3   Sit to Stand   Type of Assistance Needed Incidental touching   Amount of Physical Assistance Provided No physical assistance   Sit to Stand CARE Score 4   Bed-Chair Transfer   Type of Assistance Needed Physical assistance   Amount of Physical Assistance Provided Less than 25%   Chair/Bed-to-Chair Transfer CARE Score 3   Transfer Bed/Chair/Wheelchair   Limitations Noted In Balance;Confidence; Coordination; Endurance; Sequencing;UE Strength;LE Strength   Adaptive Equipment Solo Walker   Stand Pivot Minimal Assist   [facility]   [facility] to [facility]   Sit to Supine Minimal Assist   All Transfer Minimal Assist   Walk 10 Feet   Type of Assistance Needed Incidental touching   Amount of Physical Assistance Provided No physical assistance   Walk 10 Feet CARE Score 4   Ambulation   Does the patient walk? 2  Yes   Primary Mode of Locomotion Prior to Admission Walk   Distance Walked (feet) 28 ft  (25, 20)   Assist Device Solo Walker   Gait Pattern Inconsistant Alyx; Slow Alyx;Decreased foot clearance;L foot drag;L hemiparesis; Narrow ERASMO   Limitations Noted In Balance; Endurance; Safety;Strength   Provided Assistance with: Balance   Walk Assist Level Contact Guard   Findings level   Wheel 50 Feet with Two Turns   Type of Assistance Needed Supervision   Amount of Physical Assistance Provided No physical assistance   Wheel 50 Feet with Two Turns CARE Score 4   Wheel 150 Feet   Type of Assistance Needed Supervision   Amount of Physical Assistance Provided No physical assistance   Wheel 150 Feet CARE Score 4   Wheelchair mobility   Does the patient use a wheelchair? 1  Yes   Type of Wheelchair Used 1  Manual   Method Right upper extremity;Right lower extremity   Distance Level Surface (feet) 175 ft  (86)   Findings Close S   Therapeutic Interventions   Strengthening seated and supine ther ex 30 reps AAROM for L LE   Balance gait and transfers   Other w/c mobility   Assessment   Treatment Assessment Pt is pleasant and cooperative with PT session; Pt performed seated and supine ther ex for general conditioning and strengthening B LE as able; AAROM for L LE; Pt is CGA/Min A for transfers with occasional VCs for hand placement; Pt amb with HW up to 29' with CGA and over the counter AFO on L LE; Pt was able to advance L LE on his own with compensation of leaning backwards at time; Pt L LE fatigues quickly with walking; Rests needed throughout session due to fatigue and weakness   PT Barriers   Physical Impairment Decreased strength;Decreased range of motion;Decreased endurance; Impaired balance;Decreased mobility; Decreased coordination;Decreased safety awareness   Functional Limitation Wheelchair management; Walking;Standing;Transfers;Stair negotiation   Plan   Treatment/Interventions Functional transfer training;LE strengthening/ROM; Elevations; Therapeutic exercise; Endurance training;Patient/family training;Gait training   Progress Progressing toward goals   PT Therapy Minutes   PT Time In 0900   PT Time Out 1030   PT Total Time (minutes) 90   PT Mode of treatment - Individual (minutes) 90   PT Mode of treatment - Concurrent (minutes) 0   PT Mode of treatment - Group (minutes) 0   PT Mode of treatment - Co-treat (minutes) 0   PT Mode of Treatment - Total time(minutes) 90 minutes   PT Cumulative Minutes 621   Therapy Time missed   Time missed? No

## 2021-03-11 NOTE — NURSING NOTE
03/11/21 Resumed care of patient at this time  Wife visiting at bedside  Pt offers no complaints  Will monitor   Nataly Queen RN

## 2021-03-11 NOTE — SPEECH THERAPY NOTE
[facility] Speech Therapy Discharge Summary    Pt admitted to [facility] on 3/5/21 dx Acute Anterior Right Medullary Infarct  Upon initial assessment, pt completed the CLQT+ with a Composite Severity Rating score of 4 0 out of 4 0, correlating to overall cognitive linguistic skills to be WNL at time of assessment in comparison to age matched peers ranging from 65-79 y/o  Pt scored at or above criterion cut score for 10 out of 10 tasks completed  Pt educated on results of assessment to include his performance in each area  Overall, pt is presenting w/ functional cognitive linguistic skills and appears to be at baseline  SLP also spoke w/ pt's wife who was present later in the day-stated that pt appears to be at baseline and she has not observed any deficits  She reports that initially he presented w/ slurred speech but that this has now resolved  She also confirmed that she can assist at home if needed  Pt completed brief follow up for Stroke Education  All questions answered- pt receptive to education  At this time no further skilled SLP services required  Pt to cont on acute rehab unit until completion of PT/OT services for safe discharge home with wife for support

## 2021-03-11 NOTE — PROGRESS NOTES
03/10/21 2026   Charting Type   Charting Type Shift assessment   Neurological   Neuro (WDL) X   Level of Consciousness Alert/awake   Orientation Level Oriented X4   Cognition Appropriate judgement   Facial Symmetry Left facial drooping   Tongue Deviation Midline   Speech Clear   R Hand  Strong   L Hand  Absent   RUE Muscle Strength 5- Normal strength   LUE Muscle Strength 0- No movement   RLE Muscle Strength 5- Normal strength   LLE Muscle Strength 2- Movement with gravity eliminated   Neuro Symptoms Fatigue   Relieved by Rest   Neuro Additional Assessments No   [place] Coma Scale   Eye Opening 4   Best Verbal Response 5   Best Motor Response 6   [place] Coma Scale Score 15   HEENT   HEENT (WDL) X   Head and Face Symmetrical   R Eye Mildly impaired vision   L Eye Mildly impaired vision   R Ear Mildly impaired hearing   L Ear Mildly impaired hearing   Teeth Missing teeth   Respiratory   Respiratory (WDL) WDL   Respiratory Pattern Normal   Chest Assessment Chest expansion symmetrical   Cardiac   Cardiac (WDL) WDL   Cardiac Regularity Regular   Pain Assessment   Pain Assessment Tool 0-10   Pain Score No Pain   Peripheral Vascular   Peripheral Vascular (WDL) WDL   Integumentary   Integumentary (WDL) X   Skin Color Appropriate for ethnicity   Skin Condition/Temp Warm;Dry   Skin Integrity Bruising   Skin Location scatttered   Skin Turgor Non-tenting   Jamal Scale   Sensory Perceptions 3   Moisture 4   Activity 3   Mobility 3   Nutrition 3   Friction and Shear 2   Jamal Scale Score 18   Musculoskeletal   Musculoskeletal (WDL) X   RUE Full movement   LUE Paralysis   RLE Full movement   LLE Limited movement   Gastrointestinal   Gastrointestinal (WDL) X   Abdomen Inspection Soft;Nondistended   Stool Assessment   Bowel Incontinence No   Genitourinary   Genitourinary (WDL) WDL   Urine Assessment   Urinary Incontinence No   Anal/Rectal   Anal/Rectal (WDL) WDL   Psychosocial   Psychosocial (WDL) WDL

## 2021-03-12 LAB
GLUCOSE SERPL-MCNC: 120 MG/DL (ref 65–140)
GLUCOSE SERPL-MCNC: 133 MG/DL (ref 65–140)
GLUCOSE SERPL-MCNC: 222 MG/DL (ref 65–140)
GLUCOSE SERPL-MCNC: 268 MG/DL (ref 65–140)

## 2021-03-12 PROCEDURE — 97116 GAIT TRAINING THERAPY: CPT

## 2021-03-12 PROCEDURE — 97530 THERAPEUTIC ACTIVITIES: CPT

## 2021-03-12 PROCEDURE — 97112 NEUROMUSCULAR REEDUCATION: CPT

## 2021-03-12 PROCEDURE — 97110 THERAPEUTIC EXERCISES: CPT

## 2021-03-12 PROCEDURE — 97032 APPL MODALITY 1+ESTIM EA 15: CPT

## 2021-03-12 PROCEDURE — 97542 WHEELCHAIR MNGMENT TRAINING: CPT

## 2021-03-12 PROCEDURE — 82948 REAGENT STRIP/BLOOD GLUCOSE: CPT

## 2021-03-12 PROCEDURE — 99232 SBSQ HOSP IP/OBS MODERATE 35: CPT | Performed by: FAMILY MEDICINE

## 2021-03-12 RX ADMIN — PANTOPRAZOLE SODIUM 40 MG: 40 TABLET, DELAYED RELEASE ORAL at 05:10

## 2021-03-12 RX ADMIN — INSULIN LISPRO 5 UNITS: 100 INJECTION, SOLUTION INTRAVENOUS; SUBCUTANEOUS at 08:51

## 2021-03-12 RX ADMIN — CLOPIDOGREL BISULFATE 75 MG: 75 TABLET ORAL at 08:45

## 2021-03-12 RX ADMIN — HEPARIN SODIUM 5000 UNITS: 5000 INJECTION INTRAVENOUS; SUBCUTANEOUS at 21:04

## 2021-03-12 RX ADMIN — AMLODIPINE BESYLATE 5 MG: 5 TABLET ORAL at 08:44

## 2021-03-12 RX ADMIN — HEPARIN SODIUM 5000 UNITS: 5000 INJECTION INTRAVENOUS; SUBCUTANEOUS at 14:29

## 2021-03-12 RX ADMIN — INSULIN LISPRO 3 UNITS: 100 INJECTION, SOLUTION INTRAVENOUS; SUBCUTANEOUS at 08:43

## 2021-03-12 RX ADMIN — LISINOPRIL 40 MG: 20 TABLET ORAL at 08:45

## 2021-03-12 RX ADMIN — INSULIN LISPRO 5 UNITS: 100 INJECTION, SOLUTION INTRAVENOUS; SUBCUTANEOUS at 12:46

## 2021-03-12 RX ADMIN — ATORVASTATIN CALCIUM 40 MG: 40 TABLET, FILM COATED ORAL at 16:55

## 2021-03-12 RX ADMIN — DOCUSATE SODIUM 100 MG: 100 CAPSULE, LIQUID FILLED ORAL at 08:47

## 2021-03-12 RX ADMIN — HEPARIN SODIUM 5000 UNITS: 5000 INJECTION INTRAVENOUS; SUBCUTANEOUS at 05:10

## 2021-03-12 RX ADMIN — LEVOTHYROXINE SODIUM 88 MCG: 88 TABLET ORAL at 05:10

## 2021-03-12 RX ADMIN — DOCUSATE SODIUM 100 MG: 100 CAPSULE, LIQUID FILLED ORAL at 17:13

## 2021-03-12 RX ADMIN — INSULIN LISPRO 1 UNITS: 100 INJECTION, SOLUTION INTRAVENOUS; SUBCUTANEOUS at 21:05

## 2021-03-12 NOTE — PROGRESS NOTES
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  He was continued on a high potency statin and recommendation was given to convert back to simvastatin 40 mg upon discharge  Neurology was consulted and recommended monotherapy (plavix) and therefore ASA was discontinued  TTE was without structural abnormalities  Neurology recommended against ANDREW  Pt may be considered for an OP implantable loop recorder vs  zio patch to r/o a-fib as a potential source of CVA, since he has well controlled vascular risk factors  Pt was allowed permissive HTN and on 3/4, his ACE inhibitor was restarted at half dose  Pt with hyponatremia potentially on the basis of CVA vs dehydration  Initial NA+ was 129  NS was decreased and repeat Na+ was 133  Pt worked with PT/OT and was deemed appropriate for an acute rehabilitation setting  He arrive to  on 3/5/21                        Subjective:  Pt complaining of staff not helping assist him get ready in the morning stating "I wasn't aware I had to ask staff to help assist me " Patient was not aware that night shift can help him get ready  This was reciprocated to nursing and nursing will make note that the pt would prefer getting ready with night shift   Will also inform OT if they can put him on their schedule first    Blood sugars improved significantly since adding on hospital regimen with hospitalist     Pt states E-stim is working great and he experienced mild discomfort but looks forward to using this in future therapy sessions  ROS: A 10 point ROS was performed; negative except as noted above  Assessment/Plan:    CVA  -LLE/LUE weakness  -Cont simvastatin 40 mg at discharge  - Cont daily clopidogrel  -Monitor blood pressure, blood sugars  -Cont lisinopril  -Cont PPI for GI prophylaxis  -outpatient implantable loop recorder vs zio patch to rule out a fib  -Acute chomprehensive interdisciplinary inpatient rehabilitation to include intensive skilled therapies as outlines with oversight and management by a rehabilitation Physician Assistant overseen by rehabilitation physician as well as inpatient rehabilitation nursing, case management and weekly interdisciplinary team meeting   -Starting e-stim 3/11/21 to left arm    Diabetes mellitus type 2  -HGA1C- 7 3 as of 3/4/21  -Utilizes insulin pump at home, cont this while in rehab  -Maintain on ADA diet  -Coverage with algorithm 3 for with meals, and algorithm 1 for bedtime     -Cont Humalog 1-5 units daily at bedtime  -Cont humalog 1-6 units 3 times daily  Before meals  -Cont Humalog 3 units 3 times daily with meals    Hypothyroidism  -Cont thyroid replacement therapy     GERD  -Cont PPI     Hyperlipidemia  -High potency statin while in acute rehab  -Discharge on 40 mg of Zocor     Hypertension  -Cont   Ace-I     Stage 3 chronic kidney disease  -Current creatinine appears to be at baseline  -Cont ACE-I  -Monitor renal function  -Avoid nephrotoxins  -Renally dose medications when appropriate     DVT prophylaxis  -plavix daily  -SCDs daily     Code  -Full                 Scheduled Meds:  Current Facility-Administered Medications   Medication Dose Route Frequency Provider Last Rate    acetaminophen  650 mg Oral Q6H PRN Kayla Lion PA-C      amLODIPine  5 mg Oral Daily Lamont Pandey MD      atorvastatin  40 mg Oral Daily With [employer] ANIVAL Atkinson      clopidogrel  75 mg Oral Daily Duayne Clipper, PA-C      docusate sodium  100 mg Oral BID Duayne Clipper, PA-C      heparin (porcine)  5,000 Units Subcutaneous [facility] Duayne Clipper, PA-C      hydrALAZINE  25 mg Oral Q8H PRN Duayne Clipper, PA-C      insulin aspart  1,000 Units Subcutaneous Insulin Pump Daily PRN Dumichellene Clipper, PA-C      insulin lispro  1-5 Units Subcutaneous HS Duayne Clipper, PA-C      insulin lispro  1-6 Units Subcutaneous TID AC Fabby Atkinson, PA-C      insulin lispro  3 Units Subcutaneous Daily With [employer], CRNP      insulin lispro  5 Units Subcutaneous Daily With Breakfast Areta Parcel, CRNP      insulin lispro  5 Units Subcutaneous Daily With Lunch Areta Parcel, CRNP      levothyroxine  88 mcg Oral Early Morning Duayne Clipper, PA-C      lisinopril  40 mg Oral Daily Duayne Clipper, PA-C      pantoprazole  40 mg Oral Daily Before Breakfast Obed Dolan MD      patient maintained insulin pump  1 each Subcutaneous 4x Daily (with meals and at bedtime) Areta Parcel, CRNP      polyethylene glycol  17 g Oral Daily PRN Duayne Clipestefanía, PA-C         Objective:    Functional Update:       Physical Therapy:     Weight Bearing Status: Full Weight Bearing  Transfers: Incidental Touching, Minimal Assistance(CG STS with HW/wall or bed rail; MIN A SPT/sit pivot)  Bed Mobility: Minimal Assistance  Amulation Distance (ft): 24 feet  Ambulation: Minimal Assistance, Moderate Assistance  Assistive Device for Ambulation: Solo Walker  Wheelchair Mobility Distance: 157 ft  Wheelchair Mobility: Supervision  Discharge Recommendations: Home with:  [address] [place] [address] Norman Sextonjamie with[de-identified] 24 Hour Assisteance, Family Support, First Floor Setup, Home Physical Therapy  Occupational Therapy:  Eating: Supervision  Grooming: Supervision  Bathing: Minimal Assistance  Bathing: Minimal Assistance  Upper Body Dressing: Minimal Assistance  Lower Body Dressing: Maximum Assistance  Toileting:  Moderate Assistance  Tub/Shower Transfer: (TBA)  Toilet Transfer: Minimal Assistance  Cognition: Within Defined Limits  Orientation: Person, Place, Time, Situation  Discharge Recommendations: Home with:  [address] with[de-identified] Family Support, Home Occupational Therapy    Speech Therapy:  Mode of Communication: Verbal  Cognition: Within Defined Limits  Orientation: Person, Place, Time, Situation  Discharge Recommendations: Home with:  [address] with[de-identified] Family Support(level of assist pending pt's progress)    Physical Exam:  Temp:  [97 8 °F (36 6 °C)-98 6 °F (37 °C)] 97 8 °F (36 6 °C)  HR:  [65-75] 75  Resp:  [16-18] 16  BP: (133-158)/(75-81) 158/81  SpO2:  [97 %-98 %] 97 %    General: alert, no apparent distress, cooperative and comfortable  HEENT:  Head: Normocephalic, no lesions, without obvious abnormality  Eye: Normal external eye, conjunctiva, lidsc cornea  Ears: Normal external ears  Nose: Normal external nose, mucus membranes  CARDIAC:  regular rate and rhythm, S1, S2 normal, no murmur, click, rub or gallop  LUNGS:  no abnormal respiratory pattern, no retractions noted, non-labored breathing   ABDOMEN:  soft, non-tender, non-distended  EXTREMITIES:  extremities normal, warm and well-perfused; no cyanosis, clubbing, or edema  NEURO:  clear speech, following all commands, oriented x4  PSYCH:  Alert and oriented, appropriate affect            Diagnostic Studies: Labs reviewed  No orders to display       Laboratory: Labs reviewed  Results from last 7 days   Lab Units 03/10/21  0538 03/09/21  0515 03/08/21  0540   HEMOGLOBIN g/dL 12 7* 13 2* 12 5*   HEMATOCRIT % 37 2* 39 3* 37 2*   WBC Thousand/uL 5 00 5 50 7 70     Results from last 7 days   Lab Units 03/10/21  0538 03/09/21  0515 03/08/21  0540   BUN mg/dL 44* 41* 41*   SODIUM mmol/L 131* 131* 130*   POTASSIUM mmol/L 4 4 4 6 4 2   CHLORIDE mmol/L 96* 95* 96*   CREATININE mg/dL 1 63* 1 63* 1 66*            ** Please Note: Fluency Direct voice to text software may have been used in the creation of this document   **

## 2021-03-12 NOTE — PROGRESS NOTES
03/12/21 1300   Pain Assessment   Pain Assessment Tool Pain Assessment not indicated - pt denies pain   Restrictions/Precautions   Precautions Fall Risk;Limb alert   Weight Bearing Restrictions No   ROM Restrictions No   Braces or Orthoses Sling  (LUE)   Sit to Stand   Type of Assistance Needed Incidental touching   Comment CG   Sit to Stand CARE Score 4   Toileting Hygiene   Type of Assistance Needed Physical assistance   Amount of Physical Assistance Provided Less than 25%   Comment assist to pull pants up in back when standing; used urinal   Toileting Hygiene CARE Score 3   Toileting   Able to Pull Clothing down yes, up yes  Manage Hygiene Bladder   Limitations Noted In Balance; Coordination;ROM;Safety;UE Strength   Adaptive Equipment   (RW)   Toilet Transfer   Comment obtained urinal and held on his own when standing   Functional Standing Tolerance   Time 4m5s during key matching activity   ROM - Left Upper Extremities    LUE ROM Comment x5 P/AAROM shoulder to digits all planes of motion with prolonged stretch at end range   Neuromuscular Education   RUE Weight Bearing Forearm seated; Extended arm standing   Response to Weight Bearing Technique forearm seated: placed playing cards into matching pockets with RUE while leaning into L forearm; extended arm standing: pt stood to place keys on matching hangers with R hand and weight bearing into extended LUE, required Karen to maintain elbow extension and to maintain pt's balance when standing as he was leaning into L side   Comments ice massage to LUE biceps and triceps 2x5 with intermittent muscle belly tapping and rapid flexion/extension to encourage increased flexor tone and active movement, pt with trace to no AROM after ice massage; e-stim to biceps/triceps, wrist flexion/extension, x10 with intensity = 5 approx 20 second hold, pt with trace elbow flexion, fair elbow extension and wrist flexion/extension after e-stim   Cognition   Overall Cognitive Status [facility] Arousal/Participation Alert; Responsive; Cooperative   Attention Within functional limits   Orientation Level Oriented X4   Memory Within functional limits   Following Commands Follows all commands and directions without difficulty   Assessment   Treatment Assessment Pt agreeable to OT session this PM  Received sitting upright in w/c  Toileting completed with Karen, then propelled self in w/c to OT room to completed ROM, standing tolerance, and neuromuscular reeducation activities  Details listed in respective sections  Pt continues to show little improvement in LUE AROM, however pt did have increased targeted wrist flexion/extension and elbow extension after e-stim  Pt is motivated and an active participate in therapy; pt's wife present for last half hour of session and provided positive encouragement as well  Pt would benefit from continued skilled OT services in order to maximize independence in self care, functional mobility/transfers, ROM/strength/coordination, and activity tolerance  Prognosis Good   Problem List Decreased strength;Decreased range of motion;Decreased endurance; Impaired balance;Decreased coordination;Decreased safety awareness   Plan   Treatment/Interventions ADL retraining;LE strengthening/ROM; Functional transfer training; Therapeutic exercise; Endurance training;Equipment eval/education;Patient/family training; Compensatory technique education;OT   Progress Progressing toward goals   OT Therapy Minutes   OT Time In 1300   OT Time Out 1430   OT Total Time (minutes) 90   OT Mode of treatment - Individual (minutes) 64   OT Mode of treatment - Concurrent (minutes) 26   Therapy Time missed   Time missed?  No

## 2021-03-12 NOTE — PLAN OF CARE
Problem: Potential for Falls  Goal: Patient will remain free of falls  Description: INTERVENTIONS:  - Assess patient frequently for physical needs  -  Identify cognitive and physical deficits and behaviors that affect risk of falls    -  [place] fall precautions as indicated by assessment   - Educate patient/family on patient safety including physical limitations  - Instruct patient to call for assistance with activity based on assessment  - Modify environment to reduce risk of injury  - Consider OT/PT consult to assist with strengthening/mobility  Outcome: Progressing     Problem: Prexisting or High Potential for Compromised Skin Integrity  Goal: Skin integrity is maintained or improved  Description: INTERVENTIONS:  - Identify patients at risk for skin breakdown  - Assess and monitor skin integrity  - Assess and monitor nutrition and hydration status  - Monitor labs   - Assess for incontinence   - Turn and reposition patient  - Assist with mobility/ambulation  - Relieve pressure over bony prominences  - Avoid friction and shearing  - Provide appropriate hygiene as needed including keeping skin clean and dry  - Evaluate need for skin moisturizer/barrier cream  - Collaborate with interdisciplinary team   - Patient/family teaching  - Consider wound care consult   Outcome: Progressing     Problem: PAIN - ADULT  Goal: Verbalizes/displays adequate comfort level or baseline comfort level  Description: Interventions:  - Encourage patient to monitor pain and request assistance  - Assess pain using appropriate pain scale  - Administer analgesics based on type and severity of pain and evaluate response  - Implement non-pharmacological measures as appropriate and evaluate response  - Consider cultural and social influences on pain and pain management  - Notify physician/advanced practitioner if interventions unsuccessful or patient reports new pain  Outcome: Progressing     Problem: INFECTION - ADULT  Goal: Absence or prevention of progression during hospitalization  Description: INTERVENTIONS:  - Assess and monitor for signs and symptoms of infection  - Monitor lab/diagnostic results  - Monitor all insertion sites, i e  indwelling lines, tubes, and drains  - Monitor endotracheal if appropriate and nasal secretions for changes in amount and color  - [place] appropriate cooling/warming therapies per order  - Administer medications as ordered  - Instruct and encourage patient and family to use good hand hygiene technique  - Identify and instruct in appropriate isolation precautions for identified infection/condition  Outcome: Progressing     Problem: SAFETY ADULT  Goal: Patient will remain free of falls  Description: INTERVENTIONS:  - Assess patient frequently for physical needs  -  Identify cognitive and physical deficits and behaviors that affect risk of falls    -  [place] fall precautions as indicated by assessment   - Educate patient/family on patient safety including physical limitations  - Instruct patient to call for assistance with activity based on assessment  - Modify environment to reduce risk of injury  - Consider OT/PT consult to assist with strengthening/mobility  Outcome: Progressing  Goal: Maintain or return to baseline ADL function  Description: INTERVENTIONS:  -  Assess patient's ability to carry out ADLs; assess patient's baseline for ADL function and identify physical deficits which impact ability to perform ADLs (bathing, care of mouth/teeth, toileting, grooming, dressing, etc )  - Assess/evaluate cause of self-care deficits   - Assess range of motion  - Assess patient's mobility; develop plan if impaired  - Assess patient's need for assistive devices and provide as appropriate  - Encourage maximum independence but intervene and supervise when necessary  - Involve family in performance of ADLs  - Assess for home care needs following discharge   - Consider OT consult to assist with ADL evaluation and planning for discharge  - Provide patient education as appropriate  Outcome: Progressing  Goal: Maintain or return mobility status to optimal level  Description: INTERVENTIONS:  - Assess patient's baseline mobility status (ambulation, transfers, stairs, etc )    - Identify cognitive and physical deficits and behaviors that affect mobility  - Identify mobility aids required to assist with transfers and/or ambulation (gait belt, sit-to-stand, lift, walker, cane, etc )  - [place] fall precautions as indicated by assessment  - Record patient progress and toleration of activity level on Mobility SBAR; progress patient to next Phase/Stage  - Instruct patient to call for assistance with activity based on assessment  - Consider rehabilitation consult to assist with strengthening/weightbearing, etc   Outcome: Progressing     Problem: DISCHARGE PLANNING  Goal: Discharge to home or other facility with appropriate resources  Description: INTERVENTIONS:  - Identify barriers to discharge w/patient and caregiver  - Arrange for needed discharge resources and transportation as appropriate  - Identify discharge learning needs (meds, wound care, etc )  - Arrange for interpretive services to assist at discharge as needed  - Refer to Case Management Department for coordinating discharge planning if the patient needs post-hospital services based on physician/advanced practitioner order or complex needs related to functional status, cognitive ability, or social support system  Outcome: Progressing     Problem: Nutrition/Hydration-ADULT  Goal: Nutrient/Hydration intake appropriate for improving, restoring or maintaining nutritional needs  Description: Monitor and assess patient's nutrition/hydration status for malnutrition  Collaborate with interdisciplinary team and initiate plan and interventions as ordered  Monitor patient's weight and dietary intake as ordered or per policy   Utilize nutrition screening tool and intervene as necessary  Determine patient's food preferences and provide high-protein, high-caloric foods as appropriate       INTERVENTIONS:  - Monitor oral intake, urinary output, labs, and treatment plans  - Assess nutrition and hydration status and recommend course of action  - Evaluate amount of meals eaten  - Assist patient with eating if necessary   - Allow adequate time for meals  - Recommend/ encourage appropriate diets, oral nutritional supplements, and vitamin/mineral supplements  - Order, calculate, and assess calorie counts as needed  - Recommend, monitor, and adjust tube feedings and TPN/PPN based on assessed needs  - Assess need for intravenous fluids  - Provide specific nutrition/hydration education as appropriate  - Include patient/family/caregiver in decisions related to nutrition  Outcome: Progressing

## 2021-03-12 NOTE — PROGRESS NOTES
03/12/21 1000   Pain Assessment   Pain Assessment Tool 0-10   Pain Score No Pain   Restrictions/Precautions   Precautions Fall Risk;Limb alert   Braces or Orthoses Sling   Cognition   Overall Cognitive Status WFL   Arousal/Participation Alert; Responsive; Cooperative   Attention Within functional limits   Orientation Level Oriented X4   Memory Within functional limits   Following Commands Follows all commands and directions without difficulty   Sit to Lying   Type of Assistance Needed Physical assistance   Amount of Physical Assistance Provided Less than 25%   Sit to Lying CARE Score 3   Lying to Sitting on Side of Bed   Type of Assistance Needed Physical assistance   Amount of Physical Assistance Provided Less than 25%   Lying to Sitting on Side of Bed CARE Score 3   Sit to Stand   Type of Assistance Needed Incidental touching   Amount of Physical Assistance Provided No physical assistance   Sit to Stand CARE Score 4   Bed-Chair Transfer   Type of Assistance Needed Physical assistance   Amount of Physical Assistance Provided Less than 25%   Chair/Bed-to-Chair Transfer CARE Score 3   Transfer Bed/Chair/Wheelchair   Limitations Noted In Balance;Confidence; Coordination; Endurance; Sequencing;UE Strength;LE Strength   Adaptive Equipment Solo Walker   Stand Pivot Minimal Assist   Sit to [employer]   Stand to [employer] Guard   Supine to Sit Minimal Assist   Sit to Supine Minimal Assist   All Transfer Minimal Assist   Walk 10 Feet   Type of Assistance Needed Incidental touching   Amount of Physical Assistance Provided No physical assistance   Walk 10 Feet CARE Score 4   Walk 50 Feet with Two Turns   Type of Assistance Needed Incidental touching   Amount of Physical Assistance Provided No physical assistance   Walk 50 Feet with Two Turns CARE Score 4   Ambulation   Does the patient walk? 2   Yes   Primary Mode of Locomotion Prior to Admission Walk   Distance Walked (feet) 135 ft  (65)   Assist Device Solo Huggins Gait Pattern Inconsistant Alyx; Slow Alyx;Decreased foot clearance;L foot drag;L hemiparesis; Narrow ERASMO; Improper weight shift   Limitations Noted In Balance; Endurance; Safety;Strength   Provided Assistance with: Balance   Walk Assist Level Contact Guard   Findings Pt wears OTC AFO on L LE for amb   Wheel 50 Feet with Two Turns   Type of Assistance Needed Supervision   Amount of Physical Assistance Provided No physical assistance   Wheel 50 Feet with Two Turns CARE Score 4   Wheel 150 Feet   Type of Assistance Needed Supervision   Amount of Physical Assistance Provided No physical assistance   Wheel 150 Feet CARE Score 4   Wheelchair mobility   Does the patient use a wheelchair? 1  Yes   Type of Wheelchair Used 1  Manual   Method Right upper extremity;Right lower extremity   Distance Level Surface (feet) 220 ft  (175)   Findings S   Therapeutic Interventions   Strengthening seated and supine ther ex 30 reps AAROM on L side   Balance gait and transfers   Neuromuscular Re-Education E-stim to L DF muscles for 10 min   Other w/c mobility   Assessment   Treatment Assessment Pt is pleasant and cooperative with PT session; Pt expressed he was confused today bc he was not washed up yet; OT agreeable to washing him when she has him later; Pt is Min A for transfers and bed mobility; Trial with E-stim today to L DF muscles while laying supine in bed with bolster under his legs; Good contractions in DF on L LE with no c/o discomfort; Pt performed supine and seated ther ex with AAROM on L LE for general conditioning and strengthening; Pt is S for w/c mobility up to 220'; Pt needs CGA for amb today with HW; Pt increased amb distance to 135'; Pt tends to lean back and hip hike his L LE to advance it; Pt is able to advance L LE on his own but fatigues quickly;  Pt appropriate for concurrent PT to increase motivation while working on similar exercises   PT Barriers   Physical Impairment Decreased strength;Decreased range of motion;Decreased endurance; Impaired balance;Decreased mobility; Decreased coordination;Decreased safety awareness   Functional Limitation Wheelchair management; [facility] negotiation   Plan   Treatment/Interventions Functional transfer training;LE strengthening/ROM; Elevations; Therapeutic exercise; Endurance training;Patient/family training;Gait training   Progress Progressing toward goals   PT Therapy Minutes   PT Time In 1000   PT Time Out 1135   PT Total Time (minutes) 95   PT Mode of treatment - Individual (minutes) 55   PT Mode of treatment - Concurrent (minutes) 40   PT Mode of treatment - Group (minutes) 0   PT Mode of treatment - Co-treat (minutes) 0   PT Mode of Treatment - Total time(minutes) 95 minutes   PT Cumulative Minutes 716   Therapy Time missed   Time missed?  No

## 2021-03-12 NOTE — PROGRESS NOTES
03/12/21 1000   Pain Assessment   Pain Assessment Tool 0-10   Pain Score No Pain   Restrictions/Precautions   Precautions Fall Risk;Limb alert   Braces or Orthoses Sling   Cognition   Overall Cognitive Status WFL   Arousal/Participation Alert; Responsive; Cooperative   Attention Within functional limits   Orientation Level Oriented X4   Memory Within functional limits   Following Commands Follows all commands and directions without difficulty   Sit to Lying   Type of Assistance Needed Physical assistance   Amount of Physical Assistance Provided Less than 25%   Sit to Lying CARE Score 3   Lying to Sitting on Side of Bed   Type of Assistance Needed Physical assistance   Amount of Physical Assistance Provided Less than 25%   Lying to Sitting on Side of Bed CARE Score 3   Sit to Stand   Type of Assistance Needed Incidental touching   Amount of Physical Assistance Provided No physical assistance   Sit to Stand CARE Score 4   Bed-Chair Transfer   Type of Assistance Needed Physical assistance   Amount of Physical Assistance Provided Less than 25%   Chair/Bed-to-Chair Transfer CARE Score 3   Transfer Bed/Chair/Wheelchair   Limitations Noted In Balance;Confidence; Coordination; Endurance; Sequencing;UE Strength;LE Strength   Adaptive Equipment Solo Walker   Stand Pivot Minimal Assist   Sit to [employer]   Stand to [employer] Guard   Supine to Sit Minimal Assist   Sit to Supine Minimal Assist   All Transfer Minimal Assist   Walk 10 Feet   Type of Assistance Needed Incidental touching   Amount of Physical Assistance Provided No physical assistance   Walk 10 Feet CARE Score 4   Walk 50 Feet with Two Turns   Type of Assistance Needed Incidental touching   Amount of Physical Assistance Provided No physical assistance   Walk 50 Feet with Two Turns CARE Score 4   Ambulation   Does the patient walk? 2   Yes   Primary Mode of Locomotion Prior to Admission Walk   Distance Walked (feet) 135 ft  (65)   Assist Device Solo Raymundo Gait Pattern Inconsistant Alyx; Slow Alyx;Decreased foot clearance;L foot drag;L hemiparesis; Narrow ERASMO; Improper weight shift   Limitations Noted In Balance; Endurance; Safety;Strength   Provided Assistance with: Balance   Walk Assist Level Contact Guard   Findings level   Wheel 50 Feet with Two Turns   Type of Assistance Needed Supervision   Amount of Physical Assistance Provided No physical assistance   Wheel 50 Feet with Two Turns CARE Score 4   Wheel 150 Feet   Type of Assistance Needed Supervision   Amount of Physical Assistance Provided No physical assistance   Wheel 150 Feet CARE Score 4   Wheelchair mobility   Does the patient use a wheelchair? 1  Yes   Type of Wheelchair Used 1  Manual   Method Right upper extremity;Right lower extremity   Distance Level Surface (feet) 220 ft  (175)   Findings S   Therapeutic Interventions   Strengthening seated and supine ther ex 30 reps AAROM on L side   Balance gait and transfers   Neuromuscular Re-Education E-stim to L DF muscles for 10 min   Other w/c mobility   Assessment   Treatment Assessment Pt is pleasant and cooperative with PT session; Pt expressed he was confused today bc he was not washed up yet; OT agreeable to washing him when she has him later; Pt is Min A for transfers and bed mobility; Trial with E-stim today to L DF muscles while laying supine in bed with bolster under his legs; Good contractions in DF on L LE with no c/o discomfort; Pt performed supine and seated ther ex with AAROM on L LE for general conditioning and strengthening; Pt is S for w/c mobility up to 220'; Pt needs CGA for amb today with HW; Pt increased amb distance to 135'; Pt tends to lean back and hip hike his L LE to advance it; Pt is able to advance L LE on his own but fatigues quickly;  Pt appropriate for concurrent PT to increase motivation while working on similar exercises   PT Barriers   Physical Impairment Decreased strength;Decreased range of motion;Decreased endurance; Impaired balance;Decreased mobility; Decreased coordination;Decreased safety awareness   Functional Limitation Wheelchair management; [facility] negotiation   Plan   Treatment/Interventions Functional transfer training;LE strengthening/ROM; Elevations; Therapeutic exercise; Endurance training;Patient/family training;Gait training   Progress Progressing toward goals   PT Therapy Minutes   PT Time In 1000   PT Time Out 1135   PT Total Time (minutes) 95   PT Mode of treatment - Individual (minutes) 55   PT Mode of treatment - Concurrent (minutes) 40   PT Mode of treatment - Group (minutes) 0   PT Mode of treatment - Co-treat (minutes) 0   PT Mode of Treatment - Total time(minutes) 95 minutes   PT Cumulative Minutes 716   Therapy Time missed   Time missed?  No

## 2021-03-12 NOTE — NURSING NOTE
Pt slept throughout the entire night with no signs of pain or distress observed  Call bell and belongings within reach  Will continue to monitor and follow plan of care

## 2021-03-13 LAB
GLUCOSE SERPL-MCNC: 137 MG/DL (ref 65–140)
GLUCOSE SERPL-MCNC: 203 MG/DL (ref 65–140)
GLUCOSE SERPL-MCNC: 270 MG/DL (ref 65–140)
GLUCOSE SERPL-MCNC: 95 MG/DL (ref 65–140)

## 2021-03-13 PROCEDURE — 82948 REAGENT STRIP/BLOOD GLUCOSE: CPT

## 2021-03-13 RX ADMIN — HEPARIN SODIUM 5000 UNITS: 5000 INJECTION INTRAVENOUS; SUBCUTANEOUS at 21:02

## 2021-03-13 RX ADMIN — CLOPIDOGREL BISULFATE 75 MG: 75 TABLET ORAL at 08:20

## 2021-03-13 RX ADMIN — INSULIN LISPRO 5 UNITS: 100 INJECTION, SOLUTION INTRAVENOUS; SUBCUTANEOUS at 12:53

## 2021-03-13 RX ADMIN — AMLODIPINE BESYLATE 5 MG: 5 TABLET ORAL at 08:20

## 2021-03-13 RX ADMIN — INSULIN LISPRO 1 UNITS: 100 INJECTION, SOLUTION INTRAVENOUS; SUBCUTANEOUS at 08:22

## 2021-03-13 RX ADMIN — INSULIN LISPRO 5 UNITS: 100 INJECTION, SOLUTION INTRAVENOUS; SUBCUTANEOUS at 08:22

## 2021-03-13 RX ADMIN — LISINOPRIL 40 MG: 20 TABLET ORAL at 08:20

## 2021-03-13 RX ADMIN — INSULIN LISPRO 4 UNITS: 100 INJECTION, SOLUTION INTRAVENOUS; SUBCUTANEOUS at 12:54

## 2021-03-13 RX ADMIN — DOCUSATE SODIUM 100 MG: 100 CAPSULE, LIQUID FILLED ORAL at 17:09

## 2021-03-13 RX ADMIN — ATORVASTATIN CALCIUM 40 MG: 40 TABLET, FILM COATED ORAL at 17:09

## 2021-03-13 RX ADMIN — PANTOPRAZOLE SODIUM 40 MG: 40 TABLET, DELAYED RELEASE ORAL at 05:18

## 2021-03-13 RX ADMIN — LEVOTHYROXINE SODIUM 88 MCG: 88 TABLET ORAL at 05:18

## 2021-03-13 RX ADMIN — HEPARIN SODIUM 5000 UNITS: 5000 INJECTION INTRAVENOUS; SUBCUTANEOUS at 05:18

## 2021-03-13 RX ADMIN — DOCUSATE SODIUM 100 MG: 100 CAPSULE, LIQUID FILLED ORAL at 08:20

## 2021-03-13 RX ADMIN — HEPARIN SODIUM 5000 UNITS: 5000 INJECTION INTRAVENOUS; SUBCUTANEOUS at 14:45

## 2021-03-14 LAB
GLUCOSE SERPL-MCNC: 183 MG/DL (ref 65–140)
GLUCOSE SERPL-MCNC: 195 MG/DL (ref 65–140)
GLUCOSE SERPL-MCNC: 258 MG/DL (ref 65–140)
GLUCOSE SERPL-MCNC: 93 MG/DL (ref 65–140)

## 2021-03-14 PROCEDURE — 97535 SELF CARE MNGMENT TRAINING: CPT

## 2021-03-14 PROCEDURE — 82948 REAGENT STRIP/BLOOD GLUCOSE: CPT

## 2021-03-14 RX ADMIN — PANTOPRAZOLE SODIUM 40 MG: 40 TABLET, DELAYED RELEASE ORAL at 05:15

## 2021-03-14 RX ADMIN — HEPARIN SODIUM 5000 UNITS: 5000 INJECTION INTRAVENOUS; SUBCUTANEOUS at 14:52

## 2021-03-14 RX ADMIN — CLOPIDOGREL BISULFATE 75 MG: 75 TABLET ORAL at 08:28

## 2021-03-14 RX ADMIN — LEVOTHYROXINE SODIUM 88 MCG: 88 TABLET ORAL at 05:15

## 2021-03-14 RX ADMIN — POLYETHYLENE GLYCOL 3350 17 G: 17 POWDER, FOR SOLUTION ORAL at 15:00

## 2021-03-14 RX ADMIN — HEPARIN SODIUM 5000 UNITS: 5000 INJECTION INTRAVENOUS; SUBCUTANEOUS at 05:15

## 2021-03-14 RX ADMIN — DOCUSATE SODIUM 100 MG: 100 CAPSULE, LIQUID FILLED ORAL at 17:41

## 2021-03-14 RX ADMIN — ATORVASTATIN CALCIUM 40 MG: 40 TABLET, FILM COATED ORAL at 16:54

## 2021-03-14 RX ADMIN — INSULIN LISPRO 5 UNITS: 100 INJECTION, SOLUTION INTRAVENOUS; SUBCUTANEOUS at 08:26

## 2021-03-14 RX ADMIN — AMLODIPINE BESYLATE 5 MG: 5 TABLET ORAL at 08:28

## 2021-03-14 RX ADMIN — HEPARIN SODIUM 5000 UNITS: 5000 INJECTION INTRAVENOUS; SUBCUTANEOUS at 21:09

## 2021-03-14 RX ADMIN — INSULIN LISPRO 5 UNITS: 100 INJECTION, SOLUTION INTRAVENOUS; SUBCUTANEOUS at 12:37

## 2021-03-14 RX ADMIN — INSULIN LISPRO 2 UNITS: 100 INJECTION, SOLUTION INTRAVENOUS; SUBCUTANEOUS at 08:26

## 2021-03-14 RX ADMIN — INSULIN LISPRO 1 UNITS: 100 INJECTION, SOLUTION INTRAVENOUS; SUBCUTANEOUS at 12:35

## 2021-03-14 RX ADMIN — INSULIN LISPRO 2 UNITS: 100 INJECTION, SOLUTION INTRAVENOUS; SUBCUTANEOUS at 21:09

## 2021-03-14 RX ADMIN — LISINOPRIL 40 MG: 20 TABLET ORAL at 08:27

## 2021-03-14 RX ADMIN — DOCUSATE SODIUM 100 MG: 100 CAPSULE, LIQUID FILLED ORAL at 08:28

## 2021-03-14 NOTE — PROGRESS NOTES
03/14/21 0754   Charting Type   Charting Type Shift assessment   Neurological   Neuro (WDL) X   Level of Consciousness Alert/awake   Orientation Level Oriented X4   Cognition Appropriate judgement; Follows commands   Extraocular Movements Full   Facial Symmetry Left facial drooping   R Pupil Size (mm) 3   L Pupil Size (mm) 3   L Hand  Absent   L Foot Dorsiflexion Weak   L Foot Plantar Flexion Weak   RUE Muscle Strength 5- Normal strength   LUE Motor Response Flaccid   LUE Sensation Full sensation   LUE Muscle Strength 0- No movement   RLE Muscle Strength 5- Normal strength   LLE Motor Response Responds to commands   LLE Sensation Full sensation   LLE Muscle Strength 2- Movement with gravity eliminated   Neuro Symptoms Forgetful   Relieved by Rest   Neuro Additional Assessments No   [place] Coma Scale   Eye Opening 4   Best Verbal Response 5   Best Motor Response 6   Lisa Coma Scale Score 15   HEENT   HEENT (WDL) X   R Eye Mildly impaired vision   L Eye Mildly impaired vision   Vision - Corrective Lenses (at bedside) Glasses   R Ear Mildly impaired hearing   L Ear Mildly impaired hearing   Teeth Missing teeth   Respiratory   Respiratory (WDL) WDL   Respiratory Pattern Normal   Chest Assessment Chest expansion symmetrical   Bilateral Breath Sounds Clear   Respiratory Additional Assessments No   Respiratory Interventions   Respiratory Interventions Cough and deep breathe   Cough and Deep Breathe   Cough and Deep Breathe Yes   Cardiac   Cardiac (WDL) WDL   Cardiac Regularity Regular   Heart Sounds No adventitious heart sounds   Pacemaker/ICD No   Pain Assessment   Pain Assessment Tool Pain Assessment not indicated - pt denies pain   Pain Score No Pain   Peripheral Vascular   Peripheral Vascular (WDL) WDL   Integumentary   Integumentary (WDL) X   Skin Color Appropriate for ethnicity   Skin Condition/Temp Warm;Dry   Skin Integrity Bruising   Skin Location scattered   Skin Turgor Non-tenting   Integumentary Additional Assessments No   Tattoos/Piercings   Does patient have tattoos? Yes   Piercings Remaining No   Jamal Scale   Sensory Perceptions 3   Moisture 4   Activity 3   Mobility 3   Nutrition 3   Friction and Shear 2   Jamal Scale Score 18   Musculoskeletal   Musculoskeletal (WDL) X   Level of Assistance Moderate assist, patient does 50-74%   Assistive Device Front wheel walker   LUE Paralysis   LLE Limited movement   Gastrointestinal   Gastrointestinal (WDL) WDL   Abdomen Inspection Soft;Nondistended   Bowel Sounds (All Quadrants) Normoactive   Tenderness Soft;Nontender   Last BM Date 03/11/21   GI Symptoms None   Gastrointestinal Additional Assessments No   Bowel Shift Assessment   Assistance Needed Stool softener;Laxative   Bowel Incontinence No   Stool Assessment   Bowel Incontinence No   Genitourinary   Genitourinary (WDL) WDL   Bladder Shift Assessment   Bladder Device Urinal   Bladder Incontinence No   Urine Assessment   Urinary Incontinence No   Anal/Rectal   Anal/Rectal (WDL) WDL   Psychosocial   Psychosocial (WDL) X   Patient Behaviors/Mood Calm; Cooperative   Needs Expressed Denies   Psychosocial Additional Assessments No   Ability to Express Feelings Able to express   Ability to Express Needs Able to express   Ability to Express Thoughts Needs assistance   Ability to Understand Others Understands   Nick Suicide Severity Rating Scale   1  Wish to be Dead No   Cough   Cough None       Uses hemiwalker with transfers with therapist this a m  States of numbness left hand fingers, no movement remains flaccid  Offers no complaints  Self insulin pump used by pt  Insulin coverage per ordered sliding scale  Educated on meds , safety  Continue same plan of care

## 2021-03-14 NOTE — PROGRESS NOTES
03/14/21 0817   Pain Assessment   Pain Assessment Tool Pain Assessment not indicated - pt denies pain   Restrictions/Precautions   Precautions Fall Risk   Weight Bearing Restrictions No   ROM Restrictions No   Grooming   Able To Initiate Tasks;Comb/Brush Hair;Wash/Dry Face   Limitation Noted In Coordination; Safety   Shower/Bathe Self   Type of Assistance Needed Physical assistance   Amount of Physical Assistance Provided Less than 25%   Comment assist for RUE   Shower/Bathe Self CARE Score 3   Bathing   Assessed Bath Style Shower   Anticipated D/C Bath Style Shower;Sponge Bath   Able to [place] Jarvis No   Able to [employer] Temperature No   Able to Wash/Rinse/Dry (body part) Left Arm;L Upper Leg;R Upper Leg;L Lower Leg/Foot;R Lower Leg/Foot;Chest;Abdomen;Perineal Area; Buttocks   Limitations Noted in Balance; Coordination;ROM;Safety   Positioning Seated;Standing   Adaptive Equipment Longhand Sponge; Shower Attractive [employer]; Shower Seat;Hand [facility] Care   Limitations Noted In Balance; Coordination;ROM;Safety;UE Strength;LE Strength   Adaptive Equipment Grab Bars;Seat with out Back   Assessed Shower   Findings Karen to maintain balance   Upper Body Dressing   Type of Assistance Needed Physical assistance   Amount of Physical Assistance Provided Less than 25%   Comment assist to get LUE into shirt   Upper Body Dressing CARE Score 3   Lower Body Dressing   Type of Assistance Needed Physical assistance; Adaptive equipment   Amount of Physical Assistance Provided 25%-49%   Comment assist to get L foot into underwear and pants, pull up on L side   Lower Body Dressing CARE Score 3   Putting On/Taking Off Footwear   Type of Assistance Needed Physical assistance; Adaptive equipment   Amount of Physical Assistance Provided 50%-74%   Comment doffed socks with reacher; able to present bilat feet to OT to don new sneakers and socks; elastic laces introduced and utilized   Putting On/Taking Off Footwear CARE Score 2 Dressing/Undressing Clothing   Remove UB Clothes Pullover Shirt   Don UB Clothes Pullover Shirt   Remove LB Clothes Undergarment;Socks   Don LB Clothes Shorts; Undergarment;Socks; Shoes   Limitations Noted In Balance; Coordination; Safety;ROM   Adaptive Equipment Reacher;Elastic Laces   Positioning Supported Sit   Lying to Sitting on Side of Bed   Type of Assistance Needed Physical assistance   Amount of Physical Assistance Provided Less than 25%   Lying to Sitting on Side of Bed CARE Score 3   Sit to Stand   Type of Assistance Needed Incidental touching   Comment CG   Sit to Stand CARE Score 4   Bed-Chair Transfer   Type of Assistance Needed Physical assistance   Amount of Physical Assistance Provided Less than 25%   Chair/Bed-to-Chair Transfer CARE Score 3   Transfer Bed/Chair/Wheelchair   Limitations Noted In Balance; Coordination;UE Strength;LE Strength   Toileting Hygiene   Type of Assistance Needed Incidental touching   Comment CG   Toileting Hygiene CARE Score 4   Toileting   Able to [address] down yes, up yes  Manage Hygiene Bladder   Limitations Noted In Balance;ROM;Safety;LE Strength;UE Strength   Adaptive Equipment Grab Bar   Toilet Transfer   Type of Assistance Needed Physical assistance   Amount of Physical Assistance Provided Less than 25%   Toilet Transfer CARE Score 3   Toilet Transfer   Surface Assessed Raised Toilet   Transfer Technique Stand Pivot   Limitations Noted In Balance;ROM;Safety;LE Strength   Adaptive Equipment Grab Bar   Cognition   Overall Cognitive Status [facility]   Arousal/Participation Alert; Responsive; Cooperative   Attention Within functional limits   Orientation Level Oriented X4   Memory Within functional limits   Following Commands Follows all commands and directions without difficulty   Assessment   Treatment Assessment Pt agreeable to OT session this AM  Received lying supine in bed  ADL session completed; current LOF and details listed in respective sections   Pt is overall Karne bathing, LB dressing, UB dressing; CG toileting; supervision grooming; Karen functional transfers  Pt uses AE as needed for LB tasks and transfers, however declined use of sock aide today  Pt continues with decreased strength, ROM, and coordination on R side with UE > LE, however has demonstrated improved balance and transfer ability  After ADL, discussed the week's OT/bathing schedule, as pt prefers to wash early in the day  Pt would benefit from continued skiled OT services in order to maximize independence in self care, functional mobility/transfers, ROM/strength/coordination, and activity tolerance  Prognosis Good   Problem List Decreased strength;Decreased range of motion;Decreased endurance; Impaired balance;Decreased coordination;Decreased safety awareness   Plan   Treatment/Interventions ADL retraining;Functional transfer training;LE strengthening/ROM; Therapeutic exercise; Endurance training;Patient/family training;Equipment eval/education; Compensatory technique education;OT   Progress Progressing toward goals   OT Therapy Minutes   OT Time In 0930   OT Time Out 1026   OT Total Time (minutes) 56   OT Mode of treatment - Individual (minutes) 56   Therapy Time missed   Time missed?  No

## 2021-03-14 NOTE — PLAN OF CARE
Problem: Potential for Falls  Goal: Patient will remain free of falls  Description: INTERVENTIONS:  - Assess patient frequently for physical needs  -  Identify cognitive and physical deficits and behaviors that affect risk of falls    -  [place] fall precautions as indicated by assessment   - Educate patient/family on patient safety including physical limitations  - Instruct patient to call for assistance with activity based on assessment  - Modify environment to reduce risk of injury  - Consider OT/PT consult to assist with strengthening/mobility  Outcome: Progressing     Problem: Prexisting or High Potential for Compromised Skin Integrity  Goal: Skin integrity is maintained or improved  Description: INTERVENTIONS:  - Identify patients at risk for skin breakdown  - Assess and monitor skin integrity  - Assess and monitor nutrition and hydration status  - Monitor labs   - Assess for incontinence   - Turn and reposition patient  - Assist with mobility/ambulation  - Relieve pressure over bony prominences  - Avoid friction and shearing  - Provide appropriate hygiene as needed including keeping skin clean and dry  - Evaluate need for skin moisturizer/barrier cream  - Collaborate with interdisciplinary team   - Patient/family teaching  - Consider wound care consult   Outcome: Progressing     Problem: PAIN - ADULT  Goal: Verbalizes/displays adequate comfort level or baseline comfort level  Description: Interventions:  - Encourage patient to monitor pain and request assistance  - Assess pain using appropriate pain scale  - Administer analgesics based on type and severity of pain and evaluate response  - Implement non-pharmacological measures as appropriate and evaluate response  - Consider cultural and social influences on pain and pain management  - Notify physician/advanced practitioner if interventions unsuccessful or patient reports new pain  Outcome: Progressing     Problem: INFECTION - ADULT  Goal: Absence or prevention of progression during hospitalization  Description: INTERVENTIONS:  - Assess and monitor for signs and symptoms of infection  - Monitor lab/diagnostic results  - Monitor all insertion sites, i e  indwelling lines, tubes, and drains  - Monitor endotracheal if appropriate and nasal secretions for changes in amount and color  - [place] appropriate cooling/warming therapies per order  - Administer medications as ordered  - Instruct and encourage patient and family to use good hand hygiene technique  - Identify and instruct in appropriate isolation precautions for identified infection/condition  Outcome: Progressing     Problem: SAFETY ADULT  Goal: Patient will remain free of falls  Description: INTERVENTIONS:  - Assess patient frequently for physical needs  -  Identify cognitive and physical deficits and behaviors that affect risk of falls    -  [place] fall precautions as indicated by assessment   - Educate patient/family on patient safety including physical limitations  - Instruct patient to call for assistance with activity based on assessment  - Modify environment to reduce risk of injury  - Consider OT/PT consult to assist with strengthening/mobility  Outcome: Progressing  Goal: Maintain or return to baseline ADL function  Description: INTERVENTIONS:  -  Assess patient's ability to carry out ADLs; assess patient's baseline for ADL function and identify physical deficits which impact ability to perform ADLs (bathing, care of mouth/teeth, toileting, grooming, dressing, etc )  - Assess/evaluate cause of self-care deficits   - Assess range of motion  - Assess patient's mobility; develop plan if impaired  - Assess patient's need for assistive devices and provide as appropriate  - Encourage maximum independence but intervene and supervise when necessary  - Involve family in performance of ADLs  - Assess for home care needs following discharge   - Consider OT consult to assist with ADL evaluation and planning for discharge  - Provide patient education as appropriate  Outcome: Progressing  Goal: Maintain or return mobility status to optimal level  Description: INTERVENTIONS:  - Assess patient's baseline mobility status (ambulation, transfers, stairs, etc )    - Identify cognitive and physical deficits and behaviors that affect mobility  - Identify mobility aids required to assist with transfers and/or ambulation (gait belt, sit-to-stand, lift, walker, cane, etc )  - [place] fall precautions as indicated by assessment  - Record patient progress and toleration of activity level on Mobility SBAR; progress patient to next Phase/Stage  - Instruct patient to call for assistance with activity based on assessment  - Consider rehabilitation consult to assist with strengthening/weightbearing, etc   Outcome: Progressing     Problem: DISCHARGE PLANNING  Goal: Discharge to home or other facility with appropriate resources  Description: INTERVENTIONS:  - Identify barriers to discharge w/patient and caregiver  - Arrange for needed discharge resources and transportation as appropriate  - Identify discharge learning needs (meds, wound care, etc )  - Arrange for interpretive services to assist at discharge as needed  - Refer to Case Management Department for coordinating discharge planning if the patient needs post-hospital services based on physician/advanced practitioner order or complex needs related to functional status, cognitive ability, or social support system  Outcome: Progressing     Problem: Nutrition/Hydration-ADULT  Goal: Nutrient/Hydration intake appropriate for improving, restoring or maintaining nutritional needs  Description: Monitor and assess patient's nutrition/hydration status for malnutrition  Collaborate with interdisciplinary team and initiate plan and interventions as ordered  Monitor patient's weight and dietary intake as ordered or per policy   Utilize nutrition screening tool and intervene as necessary  Determine patient's food preferences and provide high-protein, high-caloric foods as appropriate       INTERVENTIONS:  - Monitor oral intake, urinary output, labs, and treatment plans  - Assess nutrition and hydration status and recommend course of action  - Evaluate amount of meals eaten  - Assist patient with eating if necessary   - Allow adequate time for meals  - Recommend/ encourage appropriate diets, oral nutritional supplements, and vitamin/mineral supplements  - Order, calculate, and assess calorie counts as needed  - Recommend, monitor, and adjust tube feedings and TPN/PPN based on assessed needs  - Assess need for intravenous fluids  - Provide specific nutrition/hydration education as appropriate  - Include patient/family/caregiver in decisions related to nutrition  Outcome: Progressing

## 2021-03-15 ENCOUNTER — EPISODE CHANGES (OUTPATIENT)
Dept: CASE MANAGEMENT | Facility: HOSPITAL | Age: 76
End: 2021-03-15

## 2021-03-15 LAB
ANION GAP SERPL CALCULATED.3IONS-SCNC: 9 MMOL/L (ref 4–13)
BASOPHILS # BLD AUTO: 0.1 THOUSANDS/ΜL (ref 0–0.1)
BASOPHILS NFR BLD AUTO: 1 % (ref 0–2)
BUN SERPL-MCNC: 49 MG/DL (ref 7–25)
CALCIUM SERPL-MCNC: 9.2 MG/DL (ref 8.6–10.5)
CHLORIDE SERPL-SCNC: 98 MMOL/L (ref 98–107)
CO2 SERPL-SCNC: 23 MMOL/L (ref 21–31)
CREAT SERPL-MCNC: 1.75 MG/DL (ref 0.7–1.3)
EOSINOPHIL # BLD AUTO: 0.7 THOUSAND/ΜL (ref 0–0.61)
EOSINOPHIL NFR BLD AUTO: 7 % (ref 0–5)
ERYTHROCYTE [DISTWIDTH] IN BLOOD BY AUTOMATED COUNT: 14.3 % (ref 11.5–14.5)
GFR SERPL CREATININE-BSD FRML MDRD: 38 ML/MIN/1.73SQ M
GLUCOSE P FAST SERPL-MCNC: 244 MG/DL (ref 65–99)
GLUCOSE SERPL-MCNC: 210 MG/DL (ref 65–140)
GLUCOSE SERPL-MCNC: 240 MG/DL (ref 65–140)
GLUCOSE SERPL-MCNC: 243 MG/DL (ref 65–140)
GLUCOSE SERPL-MCNC: 244 MG/DL (ref 65–99)
GLUCOSE SERPL-MCNC: 79 MG/DL (ref 65–140)
HCT VFR BLD AUTO: 39 % (ref 42–47)
HGB BLD-MCNC: 13.2 G/DL (ref 14–18)
LYMPHOCYTES # BLD AUTO: 0.8 THOUSANDS/ΜL (ref 0.6–4.47)
LYMPHOCYTES NFR BLD AUTO: 9 % (ref 21–51)
MAGNESIUM SERPL-MCNC: 2.1 MG/DL (ref 1.9–2.7)
MCH RBC QN AUTO: 31.9 PG (ref 26–34)
MCHC RBC AUTO-ENTMCNC: 33.9 G/DL (ref 31–37)
MCV RBC AUTO: 94 FL (ref 81–99)
MONOCYTES # BLD AUTO: 1.2 THOUSAND/ΜL (ref 0.17–1.22)
MONOCYTES NFR BLD AUTO: 13 % (ref 2–12)
NEUTROPHILS # BLD AUTO: 6.7 THOUSANDS/ΜL (ref 1.4–6.5)
NEUTS SEG NFR BLD AUTO: 70 % (ref 42–75)
PLATELET # BLD AUTO: 235 THOUSANDS/UL (ref 149–390)
PMV BLD AUTO: 8.1 FL (ref 8.6–11.7)
POTASSIUM SERPL-SCNC: 5 MMOL/L (ref 3.5–5.5)
RBC # BLD AUTO: 4.15 MILLION/UL (ref 4.3–5.9)
SODIUM SERPL-SCNC: 130 MMOL/L (ref 134–143)
WBC # BLD AUTO: 9.5 THOUSAND/UL (ref 4.8–10.8)

## 2021-03-15 PROCEDURE — 97110 THERAPEUTIC EXERCISES: CPT

## 2021-03-15 PROCEDURE — 97542 WHEELCHAIR MNGMENT TRAINING: CPT

## 2021-03-15 PROCEDURE — 97537 COMMUNITY/WORK REINTEGRATION: CPT

## 2021-03-15 PROCEDURE — 97032 APPL MODALITY 1+ESTIM EA 15: CPT

## 2021-03-15 PROCEDURE — 80048 BASIC METABOLIC PNL TOTAL CA: CPT | Performed by: PHYSICAL MEDICINE & REHABILITATION

## 2021-03-15 PROCEDURE — 99232 SBSQ HOSP IP/OBS MODERATE 35: CPT | Performed by: FAMILY MEDICINE

## 2021-03-15 PROCEDURE — 97112 NEUROMUSCULAR REEDUCATION: CPT

## 2021-03-15 PROCEDURE — 97116 GAIT TRAINING THERAPY: CPT

## 2021-03-15 PROCEDURE — 97530 THERAPEUTIC ACTIVITIES: CPT

## 2021-03-15 PROCEDURE — 82948 REAGENT STRIP/BLOOD GLUCOSE: CPT

## 2021-03-15 PROCEDURE — 85025 COMPLETE CBC W/AUTO DIFF WBC: CPT | Performed by: PHYSICAL MEDICINE & REHABILITATION

## 2021-03-15 PROCEDURE — 83735 ASSAY OF MAGNESIUM: CPT | Performed by: PHYSICAL MEDICINE & REHABILITATION

## 2021-03-15 RX ADMIN — POLYETHYLENE GLYCOL 3350 17 G: 17 POWDER, FOR SOLUTION ORAL at 16:44

## 2021-03-15 RX ADMIN — HEPARIN SODIUM 5000 UNITS: 5000 INJECTION INTRAVENOUS; SUBCUTANEOUS at 05:42

## 2021-03-15 RX ADMIN — INSULIN LISPRO 3 UNITS: 100 INJECTION, SOLUTION INTRAVENOUS; SUBCUTANEOUS at 10:09

## 2021-03-15 RX ADMIN — INSULIN LISPRO 5 UNITS: 100 INJECTION, SOLUTION INTRAVENOUS; SUBCUTANEOUS at 12:54

## 2021-03-15 RX ADMIN — INSULIN LISPRO 1 UNITS: 100 INJECTION, SOLUTION INTRAVENOUS; SUBCUTANEOUS at 21:13

## 2021-03-15 RX ADMIN — AMLODIPINE BESYLATE 5 MG: 5 TABLET ORAL at 10:08

## 2021-03-15 RX ADMIN — DOCUSATE SODIUM 100 MG: 100 CAPSULE, LIQUID FILLED ORAL at 17:56

## 2021-03-15 RX ADMIN — HEPARIN SODIUM 5000 UNITS: 5000 INJECTION INTRAVENOUS; SUBCUTANEOUS at 15:19

## 2021-03-15 RX ADMIN — INSULIN LISPRO 5 UNITS: 100 INJECTION, SOLUTION INTRAVENOUS; SUBCUTANEOUS at 10:10

## 2021-03-15 RX ADMIN — LISINOPRIL 40 MG: 20 TABLET ORAL at 10:08

## 2021-03-15 RX ADMIN — ATORVASTATIN CALCIUM 40 MG: 40 TABLET, FILM COATED ORAL at 16:44

## 2021-03-15 RX ADMIN — PANTOPRAZOLE SODIUM 40 MG: 40 TABLET, DELAYED RELEASE ORAL at 05:42

## 2021-03-15 RX ADMIN — HEPARIN SODIUM 5000 UNITS: 5000 INJECTION INTRAVENOUS; SUBCUTANEOUS at 21:14

## 2021-03-15 RX ADMIN — DOCUSATE SODIUM 100 MG: 100 CAPSULE, LIQUID FILLED ORAL at 10:08

## 2021-03-15 RX ADMIN — LEVOTHYROXINE SODIUM 88 MCG: 88 TABLET ORAL at 05:42

## 2021-03-15 RX ADMIN — INSULIN LISPRO 3 UNITS: 100 INJECTION, SOLUTION INTRAVENOUS; SUBCUTANEOUS at 12:54

## 2021-03-15 RX ADMIN — CLOPIDOGREL BISULFATE 75 MG: 75 TABLET ORAL at 10:08

## 2021-03-15 NOTE — PROGRESS NOTES
03/15/21 1416   Pain Assessment   Pain Assessment Tool Pain Assessment not indicated - pt denies pain   Restrictions/Precautions   Precautions Fall Risk   Braces or Orthoses Sling  (LUE with transfers/ mobility)   Sit to Stand   Type of Assistance Needed Incidental touching;Supervision   Sit to Stand CARE Score 4   Bed-Chair Transfer   Type of Assistance Needed Incidental touching   Chair/Bed-to-Chair Transfer CARE Score 4   Toileting Hygiene   Type of Assistance Needed Incidental touching   Comment to manage pants onto toilet   Toileting Hygiene CARE Score 4   Toilet Transfer   Type of Assistance Needed Incidental touching   Toilet Transfer CARE Score 4   Toilet Transfer   Surface Assessed Raised Toilet   Transfer Technique Stand Pivot   Limitations Noted In Balance; Endurance; Safety;ROM;Problem Solving;LE Strength;UE Strength   Adaptive Equipment Grab Bar   Exercise Tools   Other Exercise Tool 1 Towel slides all directions with LUE 20 times each way mod A for motions and WB through extended arm   Other Exercise Tool 2 PROM 1 set 15 all plnes and joints with ms contractions noted shoulder forward chest press, IR and add and elbow flexion/ extension   Neuromuscular Education   RUE Weight Bearing Forearm seated; Extended arm standing   Response to Techniques LUE functional use as passive assist while propped on the table to include into task   Comments estim provided to Left forearm with wrist/ digit flexion/ extension incorporated with pronation/ supination 1 set 12 with intesnity set for 3 amd 20 sec hold alternating muscle groups   Assessment   Treatment Assessment Pt participates in neuro lamont for LUE including PROM, estim ROM for wirst flexion/ extension and elbow supination/ pronation, towel slides, PROM and toileting with related transfers  Pt requires assist due to decreased balance, safety, endurance and LUE ROM/ strength/ coordination   Pt ismaking gains towards goals functionally with minimal ms contractiosn noted LUE  Pt will benefit from continued skilled OT services to increase independence with daily tasks  Problem List Decreased strength;Decreased range of motion;Decreased endurance; Impaired balance;Decreased coordination;Decreased safety awareness   Plan   Treatment/Interventions ADL retraining;Functional transfer training; Therapeutic exercise; Endurance training;Patient/family training;Equipment eval/education; Compensatory technique education   Progress Progressing toward goals   OT Therapy Minutes   OT Time In 1416   OT Time Out 1516   OT Total Time (minutes) 60   OT Mode of treatment - Individual (minutes) 60   Therapy Time missed   Time missed?  No

## 2021-03-15 NOTE — PROGRESS NOTES
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  He was continued on a high potency statin and recommendation was given to convert back to simvastatin 40 mg upon discharge  Neurology was consulted and recommended monotherapy (plavix) and therefore ASA was discontinued  TTE was without structural abnormalities  Neurology recommended against ANDREW  Pt may be considered for an OP implantable loop recorder vs  zio patch to r/o a-fib as a potential source of CVA, since he has well controlled vascular risk factors  Pt was allowed permissive HTN and on 3/4, his ACE inhibitor was restarted at half dose  Pt with hyponatremia potentially on the basis of CVA vs dehydration  Initial NA+ was 129  NS was decreased and repeat Na+ was 133  Pt worked with PT/OT and was deemed appropriate for an acute rehabilitation setting  He arrive to  on 3/5/21                        Subjective:  Pt has no complaints today  He enjoys the e-stim and states therapy is going well  ROS: A 10 point ROS was performed; negative except as noted above         Assessment/Plan:    CVA  -LLE/LUE weakness  -Cont simvastatin 40 mg at discharge  - Cont daily clopidogrel  -Monitor blood pressure, blood sugars  -Cont lisinopril  -Cont PPI for GI prophylaxis  -outpatient implantable loop recorder vs zio patch to rule out a fib  -Acute chomprehensive interdisciplinary inpatient rehabilitation to include intensive skilled therapies as outlines with oversight and management by a rehabilitation Physician Assistant overseen by rehabilitation physician as well as inpatient rehabilitation nursing, case management and weekly interdisciplinary team meeting   -Starting e-stim 3/11/21 to left arm    Diabetes mellitus type 2  -HGA1C- 7 3 as of 3/4/21  -Utilizes insulin pump at home, cont this while in rehab  -Maintain on ADA diet  -Coverage with algorithm 3 for with meals, and algorithm 1 for bedtime     -Cont Humalog 1-5 units daily at bedtime  -Cont humalog 1-6 units 3 times daily  Before meals  -Cont Humalog 3 units 3 times daily with meals    Hypothyroidism  -Cont thyroid replacement therapy     GERD  -Cont PPI     Hyperlipidemia  -High potency statin while in acute rehab  -Discharge on 40 mg of Zocor     Hypertension  -Cont   Ace-I     Stage 3 chronic kidney disease  -Current creatinine appears to be at baseline  -Cont ACE-I  -Monitor renal function  -Avoid nephrotoxins  -Renally dose medications when appropriate     DVT prophylaxis  -plavix daily  -SCDs daily     Code  -Full                 Scheduled Meds:  Current Facility-Administered Medications   Medication Dose Route Frequency Provider Last Rate    acetaminophen  650 mg Oral Q6H PRN Robin Fuentes PA-C      amLODIPine  5 mg Oral Daily Baldemar Harris MD      atorvastatin  40 mg Oral Daily With [employer] ANIVAL Atkinson      clopidogrel  75 mg Oral Daily Robin Fuentes PA-C      docusate sodium  100 mg Oral BID Robin Fuentes PA-C      heparin (porcine)  5,000 Units Subcutaneous [facility] Robin Fuentes PA-C      hydrALAZINE  25 mg Oral Q8H PRN Robin Fuentes PA-C      insulin aspart  1,000 Units Subcutaneous Insulin Pump Daily PRN Robin Fuentes PA-C      insulin lispro  1-5 Units Subcutaneous HS Robin Fuentes PA-C      insulin lispro  1-6 Units Subcutaneous TID AC Fabby Atkinson PA-C      insulin lispro  3 Units Subcutaneous Daily With Dinner Margot LinREEMA lang      insulin lispro  5 Units Subcutaneous Daily With Breakfast Margot Linprecious, CRCATHYRN      insulin lispro  5 Units Subcutaneous Daily With Lunch Margot LinREEMA lang      levothyroxine  88 mcg Oral Early Morning Adonay Lopez PA-C      lisinopril  40 mg Oral Daily Adonay Lopez PA-C      pantoprazole  40 mg Oral Daily Before Breakfast Amarjit Lee MD      patient maintained insulin pump  1 each Subcutaneous 4x Daily (with meals and at bedtime) REEMA Duggan      polyethylene glycol  17 g Oral Daily PRN Adonay Lopez PA-C         Objective:    Functional Update:       Physical Therapy:     Weight Bearing Status: Full Weight Bearing  Transfers: Incidental Touching, Minimal Assistance(CG STS with HW/wall or bed rail; MIN A SPT/sit pivot)  Bed Mobility: Minimal Assistance  Amulation Distance (ft): 24 feet  Ambulation: Minimal Assistance, Moderate Assistance  Assistive Device for Ambulation: Solo Walker  Wheelchair Mobility Distance: 157 ft  Wheelchair Mobility: Supervision  Discharge Recommendations: Home with:  78 [address]ina with[de-identified] 24 Hour Assisteance, Family Support, First Floor Setup, Home Physical Therapy  Occupational Therapy:  Eating: Supervision  Grooming: Supervision  Bathing: Minimal Assistance  Bathing: Minimal Assistance  Upper Body Dressing: Minimal Assistance  Lower Body Dressing: Maximum Assistance  Toileting:  Moderate Assistance  Tub/Shower Transfer: (TBA)  Toilet Transfer: Minimal Assistance  Cognition: Within Defined Limits  Orientation: Person, Place, Time, Situation  Discharge Recommendations: Home with:  78 [address] Avina with[de-identified] Family Support, Home Occupational Therapy    Speech Therapy:  Mode of Communication: Verbal  Cognition: Within Defined Limits  Orientation: Person, Place, Time, Situation  Discharge Recommendations: Home with:  78 [address]ina with[de-identified] Family Support(level of assist pending pt's progress)    Physical Exam:  Temp: [98 3 °F (36 8 °C)-98 7 °F (37 1 °C)] 98 7 °F (37 1 °C)  HR:  [67-72] 67  Resp:  [16-18] 16  BP: (127-149)/(68-72) 127/68  SpO2:  [96 %-98 %] 96 %    General: alert, no apparent distress, cooperative and comfortable  HEENT:  Head: Normocephalic, no lesions, without obvious abnormality  Eye: Normal external eye, conjunctiva, lidsc cornea  Ears: Normal external ears  Nose: Normal external nose, mucus membranes  CARDIAC:  regular rate and rhythm, S1, S2 normal, no murmur, click, rub or gallop  LUNGS:  no abnormal respiratory pattern, no retractions noted, non-labored breathing   ABDOMEN:  soft, non-tender, non-distended  EXTREMITIES:  extremities normal, warm and well-perfused; no cyanosis, clubbing, or edema  NEURO:  clear speech, following all commands, oriented x4  PSYCH:  Alert and oriented, appropriate affect  Diagnostic Studies: Labs reviewed  No orders to display       Laboratory: Labs reviewed  Results from last 7 days   Lab Units 03/15/21  0538 03/10/21  0538 03/09/21  0515   HEMOGLOBIN g/dL 13 2* 12 7* 13 2*   HEMATOCRIT % 39 0* 37 2* 39 3*   WBC Thousand/uL 9 50 5 00 5 50     Results from last 7 days   Lab Units 03/15/21  0538 03/10/21  0538 03/09/21  0515   BUN mg/dL 49* 44* 41*   SODIUM mmol/L 130* 131* 131*   POTASSIUM mmol/L 5 0 4 4 4 6   CHLORIDE mmol/L 98 96* 95*   CREATININE mg/dL 1 75* 1 63* 1 63*            ** Please Note: Fluency Direct voice to text software may have been used in the creation of this document   **

## 2021-03-15 NOTE — NURSING NOTE
Pt requested to be washed at 0530, pt washed and dressed at 0545 after administration of am medications and morning blood draw  Pt is currently dressed for the day laying in bed with all needs met at this time  Call bell within reach  Will continue to monitor and follow plan of care

## 2021-03-15 NOTE — PROGRESS NOTES
03/15/21 1100   Pain Assessment   Pain Assessment Tool Pain Assessment not indicated - pt denies pain   Restrictions/Precautions   Precautions Fall Risk   Braces or Orthoses Sling  (LUE with mobility)   Sit to Stand   Type of Assistance Needed Incidental touching   Sit to Stand CARE Score 4   Bed-Chair Transfer   Type of Assistance Needed Incidental touching   Chair/Bed-to-Chair Transfer CARE Score 4   Neuromuscular Education   RUE Weight Bearing Forearm seated; Extended arm standing   Response to Techniques LUE functional use as passive assist while propped ont he table to do search a word puzzles   Comments estim provided to LUE for bicep/ triceps stim with 20 sec hold recipricating movement and intensity at 4  Pt able to assist with muscle contraction for 12 reps  Additional Activities   Additional Activities Other (Comment)   Additional Activities Comments w/c mobility Ot room to room Mod I with RUE/ RLE   Other Comments   Assessment Pt participates in 30 minutes concurrent treatment focusing on BUE therex with similar goals as another to increase UB strength and activity tolerance   Assessment   Treatment Assessment Pt presents from PT agreeable to LUE neuro lamont with ROM, estim and WB  Pt toelrates session without complaints and completion of session planned after lunch  Pt requires assist due to decerased balance, safety, endurance and LUE/ LLE ROM/ strength/ coordination  Pt will benefit from continued skilled OT servcies to increase independence with daily tasks  Problem List Decreased strength;Decreased range of motion;Decreased endurance; Impaired balance;Decreased coordination;Decreased safety awareness   Plan   Treatment/Interventions ADL retraining;Functional transfer training; Therapeutic exercise; Endurance training;Patient/family training;Equipment eval/education; Compensatory technique education   Progress Progressing toward goals   OT Therapy Minutes   OT Time In 1100   OT Time Out 1130   OT Total Time (minutes) 30   OT Mode of treatment - Concurrent (minutes) 30   Therapy Time missed   Time missed?  No

## 2021-03-15 NOTE — PROGRESS NOTES
03/15/21 0922   Pain Assessment   Pain Assessment Tool 0-10   Pain Score No Pain   Restrictions/Precautions   Precautions Fall Risk   Braces or Orthoses Sling  (LUE)   Cognition   Overall Cognitive Status WFL   Orientation Level Oriented X4   Sit to Lying   Type of Assistance Needed Physical assistance   Amount of Physical Assistance Provided 25%-49%   Comment min assist   Sit to Lying CARE Score 3   Lying to Sitting on Side of Bed   Type of Assistance Needed Physical assistance   Amount of Physical Assistance Provided 25%-49%   Comment min assist   Lying to Sitting on Side of Bed CARE Score 3   Sit to Stand   Type of Assistance Needed Incidental touching   Comment cg   Sit to Stand CARE Score 4   Bed-Chair Transfer   Type of Assistance Needed Physical assistance   Amount of Physical Assistance Provided 25%-49%   Comment cg/min assist   Chair/Bed-to-Chair Transfer CARE Score 3   Walk 10 Feet   Type of Assistance Needed Physical assistance   Amount of Physical Assistance Provided 25%-49%   Comment cg/min assist   Walk 10 Feet CARE Score 3   Walk 50 Feet with Two Turns   Type of Assistance Needed Physical assistance   Amount of Physical Assistance Provided 25%-49%   Comment cg/min assist   Walk 50 Feet with Two Turns CARE Score 3   Ambulation   Does the patient walk? 2  Yes   Primary Mode of Locomotion Prior to Admission Walk   Distance Walked (feet) 81 ft  (81' 56'  34')   Assist Device Solo Walker   Gait Pattern Inconsistant Alyx; Slow Alyx;Decreased foot clearance;L foot drag;L hemiparesis;L knee paolo;Narrow ERASMO;Step to   Limitations Noted In Balance; Endurance; Safety;Strength   Provided Assistance with: Balance   Walk Assist Level Contact Guard;Minimum Assist   Findings level surfaces; patient wears off the shelf AFO LLE   Wheel 50 Feet with Two Turns   Type of Assistance Needed Supervision   Wheel 50 Feet with Two Turns CARE Score 4   Wheel 150 Feet   Type of Assistance Needed Supervision   Wheel 150 Feet CARE Score 4   Wheelchair mobility   Does the patient use a wheelchair? 1  Yes   Type of Wheelchair Used 1  Manual   Method Right upper extremity;Right lower extremity   Assistance Provided For Locking Brakes;Obstacles;Remove Leg Rest;Replace Leg Rest   Distance Level Surface (feet) 182 ft  (182' 153')   Distance Wheeled 3% Grade 22 ft   Findings supervision   Curb or Single Stair   Style negotiated Single stair   Type of Assistance Needed Physical assistance   Amount of Physical Assistance Provided 25%-49%   Comment MIN A   1 Step (Curb) CARE Score 3   Stairs   Type Stairs   # of Steps 3   Weight Bearing Precautions WBAT   Assist Devices Single Rail  (AFO LLE)   Findings MIN A   Therapeutic Interventions   Strengthening seated RLE AROM; LLE AAROM   Balance gait and transfer training   Neuromuscular Re-Education E-Stim L DF muscles  x 15 minutes   Other w/c mobility; stair training   Assessment   Treatment Assessment Patient tolerated therapy session well  No complaints of pain noted  Patient tolerated E-stim LLE DF muscles 15 minutes with positive contractions  Completed ther ex for general LE strengthening; gait and transfer training focusing on sequence and technique for improved balance and safety with functional mobility  Patient with improving gait sequence, but still fatigues  Trialed steps with positive results  PT Barriers   Physical Impairment Decreased strength;Decreased range of motion;Decreased endurance; Impaired balance;Decreased mobility; Decreased coordination;Decreased safety awareness   Functional Limitation Wheelchair management; [facility] negotiation   Plan   Treatment/Interventions Functional transfer training;LE strengthening/ROM; Elevations; Therapeutic exercise;Gait training  (e-stim LLE DF muscles)   Progress Progressing toward goals   PT Therapy Minutes   PT Time In 0930   PT Time Out 1100   PT Total Time (minutes) 90   PT Mode of treatment - Individual (minutes) 90   PT Mode of treatment - Concurrent (minutes) 0   PT Mode of treatment - Group (minutes) 0   PT Mode of treatment - Co-treat (minutes) 0   PT Mode of Treatment - Total time(minutes) 90 minutes   PT Cumulative Minutes 806

## 2021-03-16 LAB
GLUCOSE SERPL-MCNC: 158 MG/DL (ref 65–140)
GLUCOSE SERPL-MCNC: 183 MG/DL (ref 65–140)
GLUCOSE SERPL-MCNC: 200 MG/DL (ref 65–140)
GLUCOSE SERPL-MCNC: 225 MG/DL (ref 65–140)
GLUCOSE SERPL-MCNC: 242 MG/DL (ref 65–140)
GLUCOSE SERPL-MCNC: 375 MG/DL (ref 65–140)

## 2021-03-16 PROCEDURE — 97535 SELF CARE MNGMENT TRAINING: CPT

## 2021-03-16 PROCEDURE — 97537 COMMUNITY/WORK REINTEGRATION: CPT

## 2021-03-16 PROCEDURE — 97116 GAIT TRAINING THERAPY: CPT

## 2021-03-16 PROCEDURE — 82948 REAGENT STRIP/BLOOD GLUCOSE: CPT

## 2021-03-16 PROCEDURE — 97112 NEUROMUSCULAR REEDUCATION: CPT

## 2021-03-16 PROCEDURE — 97530 THERAPEUTIC ACTIVITIES: CPT

## 2021-03-16 PROCEDURE — 97542 WHEELCHAIR MNGMENT TRAINING: CPT

## 2021-03-16 RX ADMIN — INSULIN LISPRO 5 UNITS: 100 INJECTION, SOLUTION INTRAVENOUS; SUBCUTANEOUS at 08:44

## 2021-03-16 RX ADMIN — DOCUSATE SODIUM 100 MG: 100 CAPSULE, LIQUID FILLED ORAL at 17:05

## 2021-03-16 RX ADMIN — INSULIN LISPRO 3 UNITS: 100 INJECTION, SOLUTION INTRAVENOUS; SUBCUTANEOUS at 12:53

## 2021-03-16 RX ADMIN — INSULIN LISPRO 5 UNITS: 100 INJECTION, SOLUTION INTRAVENOUS; SUBCUTANEOUS at 12:54

## 2021-03-16 RX ADMIN — ATORVASTATIN CALCIUM 40 MG: 40 TABLET, FILM COATED ORAL at 17:05

## 2021-03-16 RX ADMIN — INSULIN LISPRO 1 UNITS: 100 INJECTION, SOLUTION INTRAVENOUS; SUBCUTANEOUS at 21:21

## 2021-03-16 RX ADMIN — HEPARIN SODIUM 5000 UNITS: 5000 INJECTION INTRAVENOUS; SUBCUTANEOUS at 05:45

## 2021-03-16 RX ADMIN — PANTOPRAZOLE SODIUM 40 MG: 40 TABLET, DELAYED RELEASE ORAL at 05:45

## 2021-03-16 RX ADMIN — CLOPIDOGREL BISULFATE 75 MG: 75 TABLET ORAL at 08:36

## 2021-03-16 RX ADMIN — INSULIN LISPRO 2 UNITS: 100 INJECTION, SOLUTION INTRAVENOUS; SUBCUTANEOUS at 17:05

## 2021-03-16 RX ADMIN — LISINOPRIL 40 MG: 20 TABLET ORAL at 08:36

## 2021-03-16 RX ADMIN — HEPARIN SODIUM 5000 UNITS: 5000 INJECTION INTRAVENOUS; SUBCUTANEOUS at 13:27

## 2021-03-16 RX ADMIN — POLYETHYLENE GLYCOL 3350 17 G: 17 POWDER, FOR SOLUTION ORAL at 17:18

## 2021-03-16 RX ADMIN — DOCUSATE SODIUM 100 MG: 100 CAPSULE, LIQUID FILLED ORAL at 08:36

## 2021-03-16 RX ADMIN — HEPARIN SODIUM 5000 UNITS: 5000 INJECTION INTRAVENOUS; SUBCUTANEOUS at 21:21

## 2021-03-16 RX ADMIN — AMLODIPINE BESYLATE 5 MG: 5 TABLET ORAL at 08:36

## 2021-03-16 RX ADMIN — LEVOTHYROXINE SODIUM 88 MCG: 88 TABLET ORAL at 05:45

## 2021-03-16 RX ADMIN — INSULIN LISPRO 6 UNITS: 100 INJECTION, SOLUTION INTRAVENOUS; SUBCUTANEOUS at 08:34

## 2021-03-16 NOTE — PROGRESS NOTES
03/16/21 0700   Pain Assessment   Pain Assessment Tool Pain Assessment not indicated - pt denies pain   Restrictions/Precautions   Precautions Fall Risk   Weight Bearing Restrictions No   ROM Restrictions No   Braces or Orthoses Sling  (LUE)   Eating   Type of Assistance Needed Set-up / clean-up   Amount of Physical Assistance Provided No physical assistance   Eating CARE Score 5   Eating Assessment   Food To Mouth Yes   Able To Cut   (with side of fork)   Positioning Upright;Out of Bed   Meal Assessed Breakfast   QI: Swallowing/Nutritional Status Regular food   Current Diet Regular; Thin   Intake Mode PO   Opens Packages No   Grooming   Able To Initiate Tasks;Comb/Brush Hair;Wash/Dry Face   Limitation Noted In Coordination; Safety   Shower/Bathe Self   Type of Assistance Needed Physical assistance; Adaptive equipment   Amount of Physical Assistance Provided Less than 25%   Comment assist for RUE   Shower/Bathe Self CARE Score 3   Bathing   Assessed Bath Style Shower   Anticipated D/C Bath Style Shower;Sponge Bath   Able to [place] No   Able to [employer] Temperature Yes   Able to Wash/Rinse/Dry (body part) Left Arm;L Upper Leg;R Upper Leg;L Lower Leg/Foot;R Lower Leg/Foot;Chest;Abdomen;Perineal Area; Buttocks   Limitations Noted in Balance; Coordination;ROM;Safety   Positioning Seated;Standing   Adaptive Equipment Longhand Sponge; Shower Yoyocard; Shower Seat;Hand [facility]   Limitations Noted In Balance; Coordination;ROM;Safety;LE Strength   Adaptive Equipment Grab Bars;Seat with out Back   Assessed Shower   Findings CG   Upper Body Dressing   Type of Assistance Needed Physical assistance   Amount of Physical Assistance Provided Less than 25%   Comment assist to get LUE into sleeve   Upper Body Dressing CARE Score 3   Lower Body Dressing   Type of Assistance Needed Physical assistance; Adaptive equipment   Amount of Physical Assistance Provided Less than 25%   Comment assist to lift L foot to get into underwear and pants   Lower Body Dressing CARE Score 3   Putting On/Taking Off Footwear   Type of Assistance Needed Physical assistance; Adaptive equipment   Amount of Physical Assistance Provided 50%-74%   Comment assist to don bilat socks, tie R shoe   Putting On/Taking Off Footwear CARE Score 2   Dressing/Undressing Clothing   Remove UB Clothes Pullover Shirt   Don UB Clothes Pullover Shirt   Remove LB Clothes Undergarment;Socks   Don LB Clothes Shorts; Undergarment;Socks; Shoes   Limitations Noted In Balance; Coordination; Safety;Strength;ROM   Adaptive Equipment Reacher;LH Shoehorn   Positioning Supported Sit   Lying to Sitting on Side of Bed   Type of Assistance Needed Physical assistance   Amount of Physical Assistance Provided Less than 25%   Lying to Sitting on Side of Bed CARE Score 3   Sit to Stand   Type of Assistance Needed Incidental touching   Comment CG   Sit to Stand CARE Score 4   Bed-Chair Transfer   Type of Assistance Needed Incidental touching   Comment CG   Chair/Bed-to-Chair Transfer CARE Score 4   Transfer Bed/Chair/Wheelchair   Limitations Noted In Balance; Coordination;UE Strength;LE Strength   Neuromuscular Education   Comments e-stim to LUE biceps/triceps to encourage elbow flexion/extension and wrist flexors/extensors to encourage wrist flexion/extension, intensity = 4 5-5, 20 seconds on with 10 second breaks in between repetitions; pt with positive contrations in all targeted muscle groups during stim, however only trace AROM noted in wrist flexion once e-stim removed   Cognition   Overall Cognitive Status [facility]   Arousal/Participation Alert; Responsive; Cooperative   Attention Within functional limits   Orientation Level Oriented X4   Memory Within functional limits   Following Commands Follows all commands and directions without difficulty   Assessment   Treatment Assessment Pt agreeable to OT session this AM  Received lying supine in bed   ADL session completed; current LOF and details listed in respective sections  Pt is overall Karen for bathing, UB and LB dressing, supervision grooming, modA donning footwear, CG transfers  Pt uses AE effectively for LB tasks and transfers as needed and also completes activities very effectively with only use of R side  After ADL, completed e-stim to LUE; details in respective sections  Pt remains overall flaccid in LUE with occasional contractions when attempting to do AROM in wrist flexion, elbow flexion, and shoulder shrug  Set up required for breakfast tray at end of session  Pt would benefit from continued skilled OT services in order to maximize independence in self care, funtional mobility/transfers, ROM/strength/coordination, and activity tolerance  Prognosis Good   Problem List Decreased strength;Decreased range of motion;Decreased endurance; Impaired balance;Decreased coordination;Decreased safety awareness   Plan   Treatment/Interventions ADL retraining;Functional transfer training;LE strengthening/ROM; Endurance training; Therapeutic exercise;Patient/family training;Equipment eval/education; Compensatory technique education;OT   Progress Progressing toward goals   OT Therapy Minutes   OT Time In 0700   OT Time Out 0830   OT Total Time (minutes) 90   OT Mode of treatment - Individual (minutes) 90   Therapy Time missed   Time missed?  No

## 2021-03-16 NOTE — PROGRESS NOTES
03/16/21 0958   Pain Assessment   Pain Assessment Tool 0-10   Pain Score No Pain   Restrictions/Precautions   Precautions Fall Risk   Braces or Orthoses Sling;AFO  (LUE; LLE)   Cognition   Overall Cognitive Status WFL   Orientation Level Oriented X4   Sit to Lying   Type of Assistance Needed Supervision   Comment with increased time   Sit to Lying CARE Score 4   Lying to Sitting on Side of Bed   Type of Assistance Needed Incidental touching   Comment cg with increased time   Lying to Sitting on Side of Bed CARE Score 4   Sit to Stand   Type of Assistance Needed Incidental touching   Comment cg   Sit to Stand CARE Score 4   Bed-Chair Transfer   Type of Assistance Needed Physical assistance   Amount of Physical Assistance Provided 25%-49%   Comment min assist   Chair/Bed-to-Chair Transfer CARE Score 3   Walk 10 Feet   Type of Assistance Needed Physical assistance   Amount of Physical Assistance Provided 25%-49%   Comment cg/min assist   Walk 10 Feet CARE Score 3   Walk 50 Feet with Two Turns   Type of Assistance Needed Physical assistance   Amount of Physical Assistance Provided 25%-49%   Comment cg/min assist   Walk 50 Feet with Two Turns CARE Score 3   Ambulation   Does the patient walk? 2  Yes   Primary Mode of Locomotion Prior to Admission Walk   Distance Walked (feet) 114 ft  (114' 85' 86')   Assist Device Solo Walker   Gait Pattern Ataxic; Inconsistant Alyx; Slow Alyx;Decreased foot clearance;L foot drag;L hemiparesis;L knee paolo;Narrow ERASMO;Step to   Limitations Noted In Balance; Endurance; Safety;Strength   Provided Assistance with: Balance   Walk Assist Level Contact Guard;Minimum Assist   Findings level surfaces with off shelf AFO   Wheel 50 Feet with Two Turns   Type of Assistance Needed Independent   Wheel 50 Feet with Two Turns CARE Score 6   Wheel 150 Feet   Type of Assistance Needed Independent   Wheel 150 Feet CARE Score 6   Wheelchair mobility   Does the patient use a wheelchair? 1   Yes   Type of Wheelchair Used 1  Manual   Method Right upper extremity;Right lower extremity   Assistance Provided For Locking Brakes;Obstacles;Remove Leg Rest;Replace Leg Rest   Distance Level Surface (feet) 188 ft  (188')   Distance Wheeled 3% Grade 22 ft   Findings mod I level and unlevel surfaces   Curb or Single Stair   Style negotiated Single stair   Type of Assistance Needed Physical assistance   Amount of Physical Assistance Provided 25%-49%   Comment min assist   1 Step (Curb) CARE Score 3   4 Steps   Type of Assistance Needed Physical assistance   Amount of Physical Assistance Provided 25%-49%   Comment min assist   4 Steps CARE Score 3   Stairs   Type Stairs   # of Steps 6   Weight Bearing Precautions WBAT   Assist Devices Single Rail  (R side; AFO LLE)   Findings min assist going up forward and down backwards   Therapeutic Interventions   Balance gait and transfer training   Neuromuscular Re-Education E-Stim L DFmuscles x 15 minutes   Other w/c mobility; stairs   Assessment   Treatment Assessment Patient tolerated therapy session well  Was able to tolerated E-stim to LLE up to 15 minutes with positive DF contractions as well as quad contractions  Continues to show improved motion in LLE which is translating to improved overall function  Trialed steps going up forward and down backwards with success  Completed gait and transfer training focusing on sequence and technique for improved balance and safety with functional mobility  PT Barriers   Physical Impairment Decreased strength;Decreased range of motion;Decreased endurance; Impaired balance;Decreased mobility; Decreased coordination;Decreased safety awareness   Functional Limitation Wheelchair management; Walking;Transfers;Stair negotiation;Standing   Plan   Treatment/Interventions Functional transfer training;LE strengthening/ROM; Elevations; Therapeutic exercise; Bed mobility;Gait training   Progress Progressing toward goals   PT Therapy Minutes   PT Time In 6921   PT Time Out 1130   PT Total Time (minutes) 92   PT Mode of treatment - Individual (minutes) 92   PT Mode of treatment - Concurrent (minutes) 0   PT Mode of treatment - Group (minutes) 0   PT Mode of treatment - Co-treat (minutes) 0   PT Mode of Treatment - Total time(minutes) 92 minutes   PT Cumulative Minutes 898   Therapy Time missed   Time missed?  No

## 2021-03-17 LAB
GLUCOSE SERPL-MCNC: 109 MG/DL (ref 65–140)
GLUCOSE SERPL-MCNC: 158 MG/DL (ref 65–140)
GLUCOSE SERPL-MCNC: 167 MG/DL (ref 65–140)
GLUCOSE SERPL-MCNC: 223 MG/DL (ref 65–140)
GLUCOSE SERPL-MCNC: 300 MG/DL (ref 65–140)
GLUCOSE SERPL-MCNC: 300 MG/DL (ref 65–140)
GLUCOSE SERPL-MCNC: 346 MG/DL (ref 65–140)
GLUCOSE SERPL-MCNC: 79 MG/DL (ref 65–140)

## 2021-03-17 PROCEDURE — 82948 REAGENT STRIP/BLOOD GLUCOSE: CPT

## 2021-03-17 PROCEDURE — 97530 THERAPEUTIC ACTIVITIES: CPT

## 2021-03-17 PROCEDURE — 97112 NEUROMUSCULAR REEDUCATION: CPT

## 2021-03-17 PROCEDURE — 97116 GAIT TRAINING THERAPY: CPT

## 2021-03-17 PROCEDURE — 97110 THERAPEUTIC EXERCISES: CPT

## 2021-03-17 PROCEDURE — 99231 SBSQ HOSP IP/OBS SF/LOW 25: CPT | Performed by: PHYSICAL MEDICINE & REHABILITATION

## 2021-03-17 PROCEDURE — 97537 COMMUNITY/WORK REINTEGRATION: CPT

## 2021-03-17 RX ADMIN — LISINOPRIL 40 MG: 20 TABLET ORAL at 09:35

## 2021-03-17 RX ADMIN — HEPARIN SODIUM 5000 UNITS: 5000 INJECTION INTRAVENOUS; SUBCUTANEOUS at 14:24

## 2021-03-17 RX ADMIN — INSULIN LISPRO 2 UNITS: 100 INJECTION, SOLUTION INTRAVENOUS; SUBCUTANEOUS at 13:36

## 2021-03-17 RX ADMIN — INSULIN LISPRO 4 UNITS: 100 INJECTION, SOLUTION INTRAVENOUS; SUBCUTANEOUS at 08:24

## 2021-03-17 RX ADMIN — PANTOPRAZOLE SODIUM 40 MG: 40 TABLET, DELAYED RELEASE ORAL at 05:09

## 2021-03-17 RX ADMIN — POLYETHYLENE GLYCOL 3350 17 G: 17 POWDER, FOR SOLUTION ORAL at 17:07

## 2021-03-17 RX ADMIN — CLOPIDOGREL BISULFATE 75 MG: 75 TABLET ORAL at 09:34

## 2021-03-17 RX ADMIN — HEPARIN SODIUM 5000 UNITS: 5000 INJECTION INTRAVENOUS; SUBCUTANEOUS at 05:09

## 2021-03-17 RX ADMIN — DOCUSATE SODIUM 100 MG: 100 CAPSULE, LIQUID FILLED ORAL at 17:02

## 2021-03-17 RX ADMIN — INSULIN LISPRO 5 UNITS: 100 INJECTION, SOLUTION INTRAVENOUS; SUBCUTANEOUS at 13:37

## 2021-03-17 RX ADMIN — LEVOTHYROXINE SODIUM 88 MCG: 88 TABLET ORAL at 05:09

## 2021-03-17 RX ADMIN — ATORVASTATIN CALCIUM 40 MG: 40 TABLET, FILM COATED ORAL at 17:02

## 2021-03-17 RX ADMIN — INSULIN LISPRO 5 UNITS: 100 INJECTION, SOLUTION INTRAVENOUS; SUBCUTANEOUS at 08:26

## 2021-03-17 RX ADMIN — DOCUSATE SODIUM 100 MG: 100 CAPSULE, LIQUID FILLED ORAL at 09:35

## 2021-03-17 RX ADMIN — AMLODIPINE BESYLATE 5 MG: 5 TABLET ORAL at 09:33

## 2021-03-17 RX ADMIN — HEPARIN SODIUM 5000 UNITS: 5000 INJECTION INTRAVENOUS; SUBCUTANEOUS at 21:33

## 2021-03-17 NOTE — CASE MANAGEMENT
Tx team recommendations reviewed with patient and spouse  Pt expressed understanding & agreement  Target DC date was set for Thursday March 25th upon treatment teams request, with HHC (PT, OT, and NSG) upon Pt request; a list of providers was provided to Pt & a referral will be made based on Pt preference  Pt spouse requested potty chair and reilly walker  SW will continue to monitor & assist as needed with Tx & DC planning

## 2021-03-17 NOTE — PROGRESS NOTES
03/17/21 1312   Pain Assessment   Pain Assessment Tool Pain Assessment not indicated - pt denies pain   Restrictions/Precautions   Precautions Fall Risk   Weight Bearing Restrictions No   ROM Restrictions No   Sit to Stand   Type of Assistance Needed Incidental touching   Comment CG   Sit to Stand CARE Score 4   Functional Standing Tolerance   Time 2m10s during key matching activity   Therapeutic Exercise - ROM   UE-ROM Yes   ROM- Right Upper Extremities   RUE ROM Comment towel slides 2x50 all planes of motion with RUE providing AAROM to LUE; self AAROM shoulder flexion/extension, elbow flexion/extension, wrist flexion/extension, digit flexion/extension from RUE to LUE   ROM - Left Upper Extremities    LUE ROM Comment towel slides 2x50 all planes of motion with RUE providing AAROM to LUE; self AAROM shoulder flexion/extension, elbow flexion/extension, wrist flexion/extension, digit flexion/extension from RUE to LUE   Neuromuscular Education   RUE Weight Bearing Forearm seated; Extended arm standing   Response to Weight Bearing Technique sat to complete key matching activity with LUE weight bearing into forearm while pt leaned to place keys onto hooks, then transitioned to extended arm standing weight bearing into LUE to remove keys; pt with trace contractions noted in both trials   Comments e-stim x10 to LUE to encourage elbow flexion/extension , wrist flexion/extension, pronation/supination; intensity = 4-5, 20 second intervals with 10 second breaks; pt with good response all targeted motions with e-stim but continues with trace to no AROM in LUE once e-stim complete   Cognition   Overall Cognitive Status [facility]   Arousal/Participation Alert; Responsive; Cooperative   Attention Within functional limits   Orientation Level Oriented X4   Memory Within functional limits   Following Commands Follows all commands and directions without difficulty   Assessment   Treatment Assessment Pt agreeable to OT session this PM  Received sitting upright in w/c  Completed ROM, standing tolerance, and neuro re-ed exercises; details in respective sections  Pt continues with slow progress in LUE regarding AROM; however ,pt is continuing to make positive gains daily in standing tolerance and overall function  Pt continues to be motivated; wife present during session as well and provided excellent motivation  Pt would benefit from continued skilled OT services in order to maximize functional mobility/transfers, ROM/strength/coordination, and activity tolerance  Prognosis Good   Problem List Decreased strength;Decreased range of motion;Decreased endurance; Impaired balance;Decreased coordination;Decreased safety awareness   Plan   Treatment/Interventions ADL retraining;Functional transfer training;LE strengthening/ROM; Therapeutic exercise; Endurance training;Patient/family training;Equipment eval/education; Compensatory technique education;OT   Progress Progressing toward goals   OT Therapy Minutes   OT Time In 1312   OT Time Out 1442   OT Total Time (minutes) 90   OT Mode of treatment - Individual (minutes) 59   OT Mode of treatment - Concurrent (minutes) 31

## 2021-03-17 NOTE — PLAN OF CARE
Problem: Potential for Falls  Goal: Patient will remain free of falls  Description: INTERVENTIONS:  - Assess patient frequently for physical needs  -  Identify cognitive and physical deficits and behaviors that affect risk of falls    -  [place] fall precautions as indicated by assessment   - Educate patient/family on patient safety including physical limitations  - Instruct patient to call for assistance with activity based on assessment  - Modify environment to reduce risk of injury  - Consider OT/PT consult to assist with strengthening/mobility  Outcome: Progressing     Problem: Prexisting or High Potential for Compromised Skin Integrity  Goal: Skin integrity is maintained or improved  Description: INTERVENTIONS:  - Identify patients at risk for skin breakdown  - Assess and monitor skin integrity  - Assess and monitor nutrition and hydration status  - Monitor labs   - Assess for incontinence   - Turn and reposition patient  - Assist with mobility/ambulation  - Relieve pressure over bony prominences  - Avoid friction and shearing  - Provide appropriate hygiene as needed including keeping skin clean and dry  - Evaluate need for skin moisturizer/barrier cream  - Collaborate with interdisciplinary team   - Patient/family teaching  - Consider wound care consult   Outcome: Progressing     Problem: PAIN - ADULT  Goal: Verbalizes/displays adequate comfort level or baseline comfort level  Description: Interventions:  - Encourage patient to monitor pain and request assistance  - Assess pain using appropriate pain scale  - Administer analgesics based on type and severity of pain and evaluate response  - Implement non-pharmacological measures as appropriate and evaluate response  - Consider cultural and social influences on pain and pain management  - Notify physician/advanced practitioner if interventions unsuccessful or patient reports new pain  Outcome: Progressing     Problem: INFECTION - ADULT  Goal: Absence or prevention of progression during hospitalization  Description: INTERVENTIONS:  - Assess and monitor for signs and symptoms of infection  - Monitor lab/diagnostic results  - Monitor all insertion sites, i e  indwelling lines, tubes, and drains  - Monitor endotracheal if appropriate and nasal secretions for changes in amount and color  - [place] appropriate cooling/warming therapies per order  - Administer medications as ordered  - Instruct and encourage patient and family to use good hand hygiene technique  - Identify and instruct in appropriate isolation precautions for identified infection/condition  Outcome: Progressing     Problem: SAFETY ADULT  Goal: Patient will remain free of falls  Description: INTERVENTIONS:  - Assess patient frequently for physical needs  -  Identify cognitive and physical deficits and behaviors that affect risk of falls    -  [place] fall precautions as indicated by assessment   - Educate patient/family on patient safety including physical limitations  - Instruct patient to call for assistance with activity based on assessment  - Modify environment to reduce risk of injury  - Consider OT/PT consult to assist with strengthening/mobility  Outcome: Progressing  Goal: Maintain or return to baseline ADL function  Description: INTERVENTIONS:  -  Assess patient's ability to carry out ADLs; assess patient's baseline for ADL function and identify physical deficits which impact ability to perform ADLs (bathing, care of mouth/teeth, toileting, grooming, dressing, etc )  - Assess/evaluate cause of self-care deficits   - Assess range of motion  - Assess patient's mobility; develop plan if impaired  - Assess patient's need for assistive devices and provide as appropriate  - Encourage maximum independence but intervene and supervise when necessary  - Involve family in performance of ADLs  - Assess for home care needs following discharge   - Consider OT consult to assist with ADL evaluation and planning for discharge  - Provide patient education as appropriate  Outcome: Progressing  Goal: Maintain or return mobility status to optimal level  Description: INTERVENTIONS:  - Assess patient's baseline mobility status (ambulation, transfers, stairs, etc )    - Identify cognitive and physical deficits and behaviors that affect mobility  - Identify mobility aids required to assist with transfers and/or ambulation (gait belt, sit-to-stand, lift, walker, cane, etc )  - [place] fall precautions as indicated by assessment  - Record patient progress and toleration of activity level on Mobility SBAR; progress patient to next Phase/Stage  - Instruct patient to call for assistance with activity based on assessment  - Consider rehabilitation consult to assist with strengthening/weightbearing, etc   Outcome: Progressing     Problem: DISCHARGE PLANNING  Goal: Discharge to home or other facility with appropriate resources  Description: INTERVENTIONS:  - Identify barriers to discharge w/patient and caregiver  - Arrange for needed discharge resources and transportation as appropriate  - Identify discharge learning needs (meds, wound care, etc )  - Arrange for interpretive services to assist at discharge as needed  - Refer to Case Management Department for coordinating discharge planning if the patient needs post-hospital services based on physician/advanced practitioner order or complex needs related to functional status, cognitive ability, or social support system  Outcome: Progressing     Problem: Nutrition/Hydration-ADULT  Goal: Nutrient/Hydration intake appropriate for improving, restoring or maintaining nutritional needs  Description: Monitor and assess patient's nutrition/hydration status for malnutrition  Collaborate with interdisciplinary team and initiate plan and interventions as ordered  Monitor patient's weight and dietary intake as ordered or per policy   Utilize nutrition screening tool and intervene as necessary  Determine patient's food preferences and provide high-protein, high-caloric foods as appropriate       INTERVENTIONS:  - Monitor oral intake, urinary output, labs, and treatment plans  - Assess nutrition and hydration status and recommend course of action  - Evaluate amount of meals eaten  - Assist patient with eating if necessary   - Allow adequate time for meals  - Recommend/ encourage appropriate diets, oral nutritional supplements, and vitamin/mineral supplements  - Order, calculate, and assess calorie counts as needed  - Recommend, monitor, and adjust tube feedings and TPN/PPN based on assessed needs  - Assess need for intravenous fluids  - Provide specific nutrition/hydration education as appropriate  - Include patient/family/caregiver in decisions related to nutrition  Outcome: Progressing

## 2021-03-17 NOTE — NURSING NOTE
Pt requested to be washed at 0600  Bathing with moderate assistance at 0603  Constant supervision throughout entire bath, dressing, and oral hygiene  Bed linens changed at this time  Pt is currently back in bed with all needs met and satisfied with his ADL care and assistance given by this RN  All needs currently met  Call bell and jacklyn within reach  Will continue to monitor and follow plan of care  Ana Santiago  MEDICAL ONCOLOGY  [address] [place] [address]  Phone: [phone]  Fax: [phone]  Follow Up Time:

## 2021-03-17 NOTE — TEAM CONFERENCE
Acute RehabilitationTeam Conference Note  Date: 3/17/2021   Time: 11:10 AM       Patient Name:  Danica Allen       Medical Record Number: [id number]   YOB: 1945  Sex:  Male          Room/Bed:  /[facility] 216-01  Payor Info:  Payor: Devra Goldmann  REP / Plan: Marycarmen Alfaro [facility] REP / Product Type: Medicare O /      Admitting Diagnosis: Stroke [facility]) [I63 9]   Admit Date/Time:  3/5/2021  5:34 PM  Admission Comments: No comment available     Primary Diagnosis:  CVA (cerebral vascular accident) ([place] [address] )  Principal Problem: CVA (cerebral vascular accident) [facility])    Patient Active Problem List    Diagnosis Date Noted    CVA (cerebral vascular accident) ([facility] ) 03/03/2021    Erectile dysfunction due to type 2 diabetes mellitus ([place] [address] ) 10/01/2020    Long term current use of insulin ([place] [address] ) 10/01/2020    Medicare annual wellness visit, subsequent 06/28/2018    DMII (diabetes mellitus, type 2) ([place] [address] ) 02/07/2018    Hypothyroidism 02/07/2018    GERD (gastroesophageal reflux disease) 02/07/2018    Hyperlipidemia 02/07/2018    Stage 3 chronic kidney disease 02/07/2018    Adenocarcinoma of prostate ([facility] ) 11/07/2017    Lumbar radicular pain 09/05/2017    Sensorineural hearing loss (SNHL), bilateral 07/21/2017    Vitamin D deficiency 11/04/2014    Arthritis 06/26/2012    Essential hypertension 06/26/2012       Physical Therapy:    Weight Bearing Status: Full Weight Bearing  Transfers: (cg STS; MIN A SPT)  Bed Mobility: Incidental Touching, Supervision  Amulation Distance (ft): 135 feet  Ambulation: Incidental Touching, Minimal Assistance  Assistive Device for Ambulation: Solo Walker(with off shelf AFO LLE and sling LUE)  Wheelchair Mobility Distance: 189 ft  Wheelchair Mobility: Independent  Number of Stairs: 6  Assistive Device for Stairs: Right Hand Rail  Stair Assistance: Minimal Assistance(up forward and down backward)  Discharge Recommendations: Home with:  [address] with[de-identified] Family Support, First Floor Setup, Home Physical Therapy    03/10/2021:   Patient participating in therapy and making positive gains  Patient MIN A bed mobility; CG STS transfer with HW vs wall/bed rail; MIN A SPT/sit pivot transfer; S wheelchair mobility up to 157' level and unlevel surfaces; MIN-occasional MOD A ambulation up to 24' level surfaces with HW and DF wrap assist   Remains limited by decreased ROM/strength, decreased balance and safety, and decreased endurance  May benefit from continued inpatient ARC PT to increase function, safety, and increased independence in prep for safe d/c     03/16/2021:   Patient participating in therapy and making positive gains  Patient tolerating E-stim to LLE with positive contractions  Patient CG/S bed mobility, CG STS transfers with HW, MIN A SPT no device, CG/MIN A ambulation up to 135' level surfaces with HW and off shelf AFO, MOD I wheelchair mobility level and unlevel surfaces, MIN A up and down 6 steps (up forward, down backward) with single rail  Remains limited by decreased ROM/strength, decreased balance and safety, and decreased endurance  Would benefit from continued inpatient ARC PT to increase function, safety, and increased independence in prep for safe d/c  Occupational Therapy:  Eating: Supervision  Grooming: Supervision  Bathing: Minimal Assistance  Bathing: Minimal Assistance  Upper Body Dressing: Minimal Assistance  Lower Body Dressing: Minimal Assistance  Toileting: Minimal Assistance  Tub/Shower Transfer: Incidental Touching  Toilet Transfer: Incidental Touching  Cognition: Within Defined Limits  Orientation: Person, Place, Time, Situation  Discharge Recommendations: Home with:  [address] [facility] [place] with[de-identified] Family Support, Home Occupational Therapy       3/10/21: Pt's current LOF listed above   Barriers include decreased strength, ROM, and coordination on L side with LUE > LLE, impaired tone LUE, decreased balance, decreased independence in self care, and decreased activity tolerance  Pt participating in ADL training, therapeutic exercises, therapeutic activities, functional mobility/transfers, neuromuscular reeducation, and activity tolerance in order to progress towards goals  Pt would benefit from continued skilled OT services in order to address listed barriers and prepare for safe d/c     3/17/21: Pt's current LOF listed above  Continues with barriers of decreased strength, ROM, and coordination on L side with LUE > LLE, decreased balance, decreased independence in self care, and decreased activity tolerance  Pt participating in ADL training, therapeutic exercises, therapeutic activities, functional mobility/transfers, neuromuscular reeducation, and activity tolerance in order to progress towards goals  Pt would benefit from continued skilled OT services in order to address listed barriers and prepare for safe d/c     Speech Therapy:  Mode of Communication: Verbal  Cognition: Within Defined Limits     Orientation: Person, Place, Time, Situation  Discharge Recommendations: Home with:  [address] with[de-identified] Family Support(level of assist pending pt's progress)  Cognitive linguistic evaluation was completed where pt found to be presenting with overall functional cognitive linguistic skills and appears to be at baseline  Pt completed the CLQT+ on initial evaluation with scores correlating to overall cognitive linguistic skills to be WNL, as well as all cognitive linguistic domains  SLP also spoke with pt's wife who was present later in the day-stated that pt appears to be at baseline and she has not observed any deficits  SLP spoke with OT and PT teams-both deny any cognitive linguistic deficits observed  Based on current evaluation, pt is presenting with overall cognitive linguistic skills that are WNL at time of assessment and further skilled ST services are not warranted for structured cognitive linguistic therapy at this time   However, pt will benefit from short term follow up of skilled ST services to provide stroke education as during discussion, pt had appropriate questions related to stroke and secondary stroke prevention  Anticipate brief follow up-staff to inform ST team should cognitive linguistic difficulties be observed with ST team to then follow  Nursing Notes:  Appetite: Good  Diet Type: Diabetic                      Diet Patient/Family Education Complete: Yes                         Type of Wound Patient/Family Education: Yes  Bladder: Continent     Bladder Patient/Family Education: Yes  Bowel: Medication     Bowel Patient/Family Education: Yes     Pain Score: 0                          Pain Patient/Family Education: Yes  Medication Management/Safety  Injectable: Insulin  Safe Administration: Yes  Medication Patient/Family Education Complete: Yes    3/8/21 Pt admitted S/P Stroke  Pt alert and oriented  LUE Flaccid and LLE very weak  Pt is continent of bowel and bladder  Pt uses urinal  Assist of 2 with transfers  3/16/21 Pt admitted S/P Stroke  Pt alert and oriented  LUE Flaccid and LLE very weak  Pt is continent of bowel and bladder  Pt uses urinal standing at the bedside with the assistance of staff  Assist of 2 with transfers  Case Management:     Discharge Planning  Living Arrangements: Spouse/significant other  Support Systems: Spouse/significant other, Children  Assistance Needed: n/a  Type of Current Residence: Private residence  Current [address] : No  Initial assessment & orientation to [place] with Pt & phone contact with Pt's spouse  Pt & his spouse reside in a multi-story home, 0 steps in, 13 inside, but can stay on the FF if needed  Pt's spouse is a retired RN & will be available to provide 24 hour supervision & assistance  Pt has no DME  Pt was completely independent PTA & expressed a very high level of motivation to reach his rehab goals  Pt spoke about his desire to continue mowing lawns in his neighborhood, which he does on a volunray basis   Pt was tearful as he spoke about concerns related to the stroke, but also expresseed a strong sense of hope & optimism  Provided emotional support & encouragement  Offered Pt support sessions as needed  Discussed role of team members & reviewed [address] with Pt & spouse, who expressed understanding & agreement  SW will continue to monitor & assist as needed with [address]  Pt has [employer], see UR section of chart for insurance details  3/10/21 - Tx team recommendations reviewed with patient & spouse, who expressed understanding & agreement  Target DC date is 3/23 with [facility] (Nsg, PT, OT); a list of providers was provided to Pt & a referral will be made based on Pt preference  Pt acknowledged experiencing some symptoms of depression; Pt's spouse stated that he has no history of depression or any mental health issues, but does appear to be experiencing 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 at this time  Provided Pt with emotional support & encouragement & bedside counseling  Pt also recepetive to support sessions with SW intern offering activities such as arts & crafts, etc   Megan Styles will continue to monitor & assist as needed with Tx & DC planning  Is the patient actively participating in therapies? yes  List any modifications to the treatment plan: na    Barriers Interventions   Decreased ROM and strength left side Therapeutic exercise, therapeutic activity, prosthetist to eval, AFO bracing, stim   Decreased balance ADL, transfer, gait training   Decreased safety  Cues, ADL, transfer, gait training   Unstable blood sugars Medication management         Is the patient making expected progress toward goals?  yes  List any update or changes to goals: na    Medical Goals: Patient will be able to manage medical conditions and comorbid conditions with medications and follow up upon completion of rehab program    Weekly Team Goals:   Rehab Team Goals  ADL Team Goal: Patient will require assist with ADLs with least restrictive device upon completion of rehab program  Bowel/Bladder Team Goal: Patient will return to premorbid level for bladder/bowel management upon completion of rehab program  Transfer Team Goal: Patient will require supervision with transfers with least restrictive device upon completion of rehab program  Locomotion Team Goal: Patient will require assist with locomotion with least restrictive device upon completion of rehab program  Cognitive Team Goal: Patient will return to premorbid level of cognitive activity upon completion of rehab program     Mod I bed mobility,wc mobility  CG/S transfers  CG mobility  CG bathing, LB dressing, and toileting   Complete family training     Health and wellness: to be able to return to walking outdoors     Discussion: Plan for return home with wife with [facility] with nursing, PT, and OT      Anticipated Discharge Date:  March 24, 2021

## 2021-03-17 NOTE — PROGRESS NOTES
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  He was continued on a high potency statin and recommendation was given to convert back to simvastatin 40 mg upon discharge  Neurology was consulted and recommended monotherapy (plavix) and therefore ASA was discontinued  TTE was without structural abnormalities  Neurology recommended against ANDREW  Pt may be considered for an OP implantable loop recorder vs  zio patch to r/o a-fib as a potential source of CVA, since he has well controlled vascular risk factors  Pt was allowed permissive HTN and on 3/4, his ACE inhibitor was restarted at half dose  Pt with hyponatremia potentially on the basis of CVA vs dehydration  Initial NA+ was 129  NS was decreased and repeat Na+ was 133  Pt worked with PT/OT and was deemed appropriate for an acute rehabilitation setting  He arrive to  on 3/5/21                        Subjective:  Pt has no complaints  Discussed with pt if he needs any insulin at home when he is discharged  [facility] states [facility] pens [facility] coverage and would not like an extra insulin sent to the [place]  ROS: A 10 point ROS was performed; negative except as noted above         Assessment/Plan:    CVA  -LLE/LUE weakness  -Cont simvastatin 40 mg at discharge  - Cont daily clopidogrel  -Monitor blood pressure, blood sugars  -Cont lisinopril  -Cont PPI for GI prophylaxis  -outpatient implantable loop recorder vs zio patch to rule out a fib  -Acute chomprehensive interdisciplinary inpatient rehabilitation to include intensive skilled therapies as outlines with oversight and management by a rehabilitation Physician Assistant overseen by rehabilitation physician as well as inpatient rehabilitation nursing, case management and weekly interdisciplinary team meeting   -Starting e-stim 3/11/21 to left arm    Diabetes mellitus type 2  -HGA1C- 7 3 as of 3/4/21  -Utilizes insulin pump at home, cont this while in rehab  -Maintain on ADA diet  -Coverage with algorithm 3 for with meals, and algorithm 1 for bedtime     -Cont Humalog 1-5 units daily at bedtime  -Cont humalog 1-6 units 3 times daily  Before meals  -Cont Humalog 3 units 3 times daily with meals    Hypothyroidism  -Cont thyroid replacement therapy     GERD  -Cont PPI     Hyperlipidemia  -High potency statin while in acute rehab  -Discharge on 40 mg of Zocor     Hypertension  -Cont   Ace-I     Stage 3 chronic kidney disease  -Current creatinine appears to be at baseline  -Cont ACE-I  -Monitor renal function  -Avoid nephrotoxins  -Renally dose medications when appropriate     DVT prophylaxis  -plavix daily  -SCDs daily     Code  -Full                 Scheduled Meds:  Current Facility-Administered Medications   Medication Dose Route Frequency Provider Last Rate    acetaminophen  650 mg Oral Q6H PRN Yohana De Anda PA-C      amLODIPine  5 mg Oral Daily Blake Mandujano MD      atorvastatin  40 mg Oral Daily With [employer] ANIVAL Atkinson      clopidogrel  75 mg Oral Daily Yohana De Anda PA-C      docusate sodium  100 mg Oral BID Yohana De Anda PA-C      heparin (porcine)  5,000 Units Subcutaneous [facility] Yohana De Anda PA-C      hydrALAZINE  25 mg Oral Q8H PRN Yohana De Anda PA-C      insulin aspart  1,000 Units Subcutaneous Insulin Pump Daily PRN Yohana De Anda PA-C      insulin lispro  1-5 Units Subcutaneous HS Yohana De Anda PA-C      insulin lispro  1-6 Units Subcutaneous TID AC Fabby Atkinson PA-C      insulin lispro  5 Units Subcutaneous Daily With Breakfast REEMA Parker      insulin lispro  5 Units Subcutaneous Daily With Lunch REEMA Parker      insulin lispro  5 Units Subcutaneous Daily With Jm Meyers PA-C      levothyroxine  88 mcg Oral Early Morning Yohana De Anda PA-C      lisinopril  40 mg Oral Daily Fabby Atkinson PA-C      pantoprazole  40 mg Oral Daily Before Breakfast Rishabh Thapa MD      patient maintained insulin pump  1 each Subcutaneous 4x Daily (with meals and at bedtime) REEMA Parker      polyethylene glycol  17 g Oral Daily PRN Yohana De Anda PA-C         Objective:    Functional Update:  Physical Therapy:     Weight Bearing Status: Full Weight Bearing  Transfers: (cg STS; MIN A SPT)  Bed Mobility: Incidental Touching, Supervision  Amulation Distance (ft): 135 feet  Ambulation: Incidental Touching, Minimal Assistance  Assistive Device for Ambulation: Solo Walker(with off shelf AFO LLE and sling LUE)  Wheelchair Mobility Distance: 189 ft  Wheelchair Mobility: Independent  Number of Stairs: 6  Assistive Device for Stairs: Right Hand Rail  Stair Assistance: Minimal Assistance(up forward and down backward)  Discharge Recommendations: Home with:  [address] with[de-identified] Family Support, First Floor Setup, Home Physical Therapy   Occupational Therapy:  Eating: Supervision  Grooming: Supervision  Bathing: Minimal Assistance  Bathing: Minimal Assistance  Upper Body Dressing: Minimal Assistance  Lower Body Dressing: Minimal Assistance  Toileting: Minimal Assistance  Tub/Shower Transfer: Incidental Touching  Toilet Transfer: Incidental Touching  Cognition: Within Defined Limits  Orientation: Person, Place, Time, Situation  Discharge Recommendations: Home with:  [address] with[de-identified] Family Support, Home Occupational Therapy   Speech Therapy:  Mode of Communication: Verbal  Cognition: Within Defined Limits  Orientation: Person, Place, Time, Situation  Discharge Recommendations: Home with:  [address] with[de-identified] Family Support(level of assist pending pt's progress)    This patient was discussed by the Interdisciplinary Team in weekly case conference today  The care of the patient was extensively discussed with all care providers and an appropriate rehabilitation plan was formulated unique for this patient  Barriers were identified preventing progression of therapy and appropriate interventions were discussed with each discipline  Please see the team note for input from all disciplines regarding barriers, intervention, and discharge planning  Total time spent: 35 Mins, and greater than 50% of this time was spent counseling/coordinating care      Physical Exam:  Temp:  [97 1 °F (36 2 °C)-98 3 °F (36 8 °C)] 98 3 °F (36 8 °C)  HR:  [65-73] 65  Resp:  [16-18] 16  BP: (127-128)/(68-71) 127/71  SpO2:  [96 %-97 %] 96 %    General: alert, no apparent distress, cooperative and comfortable  HEENT:  Head: Normocephalic, no lesions, without obvious abnormality  Eye: Normal external eye, conjunctiva, lidsc cornea  Ears: Normal external ears  Nose: Normal external nose, mucus membranes  CARDIAC:  regular rate and rhythm, S1, S2 normal, no murmur, click, rub or gallop  LUNGS:  no abnormal respiratory pattern, no retractions noted, non-labored breathing   ABDOMEN:  soft, non-tender, non-distended  EXTREMITIES:  extremities normal, warm and well-perfused; no cyanosis, clubbing, or edema  NEURO:  clear speech, following all commands, oriented x4  PSYCH:  Alert and oriented, appropriate affect            Diagnostic Studies: Labs reviewed  No orders to display       Laboratory: Labs reviewed  Results from last 7 days   Lab Units 03/15/21  0538   HEMOGLOBIN g/dL 13 2*   HEMATOCRIT % 39 0*   WBC Thousand/uL 9 50     Results from last 7 days   Lab Units 03/15/21  0538   BUN mg/dL 49*   SODIUM mmol/L 130*   POTASSIUM mmol/L 5  0   CHLORIDE mmol/L 98   CREATININE mg/dL 1 75*            ** Please Note: Fluency Direct voice to text software may have been used in the creation of this document   **

## 2021-03-18 LAB
GLUCOSE SERPL-MCNC: 109 MG/DL (ref 65–140)
GLUCOSE SERPL-MCNC: 162 MG/DL (ref 65–140)
GLUCOSE SERPL-MCNC: 186 MG/DL (ref 65–140)
GLUCOSE SERPL-MCNC: 265 MG/DL (ref 65–140)
GLUCOSE SERPL-MCNC: 95 MG/DL (ref 65–140)

## 2021-03-18 PROCEDURE — 97537 COMMUNITY/WORK REINTEGRATION: CPT

## 2021-03-18 PROCEDURE — 82948 REAGENT STRIP/BLOOD GLUCOSE: CPT

## 2021-03-18 PROCEDURE — 97116 GAIT TRAINING THERAPY: CPT

## 2021-03-18 PROCEDURE — 97535 SELF CARE MNGMENT TRAINING: CPT

## 2021-03-18 PROCEDURE — 97110 THERAPEUTIC EXERCISES: CPT

## 2021-03-18 PROCEDURE — 97530 THERAPEUTIC ACTIVITIES: CPT

## 2021-03-18 PROCEDURE — 97112 NEUROMUSCULAR REEDUCATION: CPT

## 2021-03-18 PROCEDURE — 99231 SBSQ HOSP IP/OBS SF/LOW 25: CPT | Performed by: PHYSICAL MEDICINE & REHABILITATION

## 2021-03-18 RX ADMIN — HEPARIN SODIUM 5000 UNITS: 5000 INJECTION INTRAVENOUS; SUBCUTANEOUS at 05:13

## 2021-03-18 RX ADMIN — ATORVASTATIN CALCIUM 40 MG: 40 TABLET, FILM COATED ORAL at 16:57

## 2021-03-18 RX ADMIN — LEVOTHYROXINE SODIUM 88 MCG: 88 TABLET ORAL at 05:13

## 2021-03-18 RX ADMIN — LISINOPRIL 40 MG: 20 TABLET ORAL at 09:08

## 2021-03-18 RX ADMIN — CLOPIDOGREL BISULFATE 75 MG: 75 TABLET ORAL at 09:08

## 2021-03-18 RX ADMIN — INSULIN LISPRO 3 UNITS: 100 INJECTION, SOLUTION INTRAVENOUS; SUBCUTANEOUS at 09:06

## 2021-03-18 RX ADMIN — HEPARIN SODIUM 5000 UNITS: 5000 INJECTION INTRAVENOUS; SUBCUTANEOUS at 21:27

## 2021-03-18 RX ADMIN — DOCUSATE SODIUM 100 MG: 100 CAPSULE, LIQUID FILLED ORAL at 17:29

## 2021-03-18 RX ADMIN — HEPARIN SODIUM 5000 UNITS: 5000 INJECTION INTRAVENOUS; SUBCUTANEOUS at 14:10

## 2021-03-18 RX ADMIN — POLYETHYLENE GLYCOL 3350 17 G: 17 POWDER, FOR SOLUTION ORAL at 16:05

## 2021-03-18 RX ADMIN — INSULIN LISPRO 5 UNITS: 100 INJECTION, SOLUTION INTRAVENOUS; SUBCUTANEOUS at 09:06

## 2021-03-18 RX ADMIN — INSULIN LISPRO 1 UNITS: 100 INJECTION, SOLUTION INTRAVENOUS; SUBCUTANEOUS at 16:58

## 2021-03-18 RX ADMIN — INSULIN LISPRO 3 UNITS: 100 INJECTION, SOLUTION INTRAVENOUS; SUBCUTANEOUS at 12:48

## 2021-03-18 RX ADMIN — PANTOPRAZOLE SODIUM 40 MG: 40 TABLET, DELAYED RELEASE ORAL at 05:13

## 2021-03-18 RX ADMIN — AMLODIPINE BESYLATE 5 MG: 5 TABLET ORAL at 09:08

## 2021-03-18 RX ADMIN — DOCUSATE SODIUM 100 MG: 100 CAPSULE, LIQUID FILLED ORAL at 09:08

## 2021-03-18 RX ADMIN — INSULIN LISPRO 5 UNITS: 100 INJECTION, SOLUTION INTRAVENOUS; SUBCUTANEOUS at 12:49

## 2021-03-18 NOTE — NURSING NOTE
Pt slept through the night with no signs of pain or distress observed during rounding  Call bell within reach  Will continue to monitor and follow plan of care

## 2021-03-18 NOTE — PROGRESS NOTES
03/18/21 1000   Pain Assessment   Pain Assessment Tool 0-10   Pain Score No Pain   Restrictions/Precautions   Precautions Fall Risk   Braces or Orthoses Sling;AFO  (LUE/LLE)   Cognition   Overall Cognitive Status WFL   Orientation Level Oriented X4   Sit to Stand   Type of Assistance Needed Incidental touching   Comment cg   Sit to Stand CARE Score 4   Walk 10 Feet   Type of Assistance Needed Physical assistance   Amount of Physical Assistance Provided 25%-49%   Comment cg/min assist   Walk 10 Feet CARE Score 3   Walk 50 Feet with Two Turns   Type of Assistance Needed Physical assistance   Amount of Physical Assistance Provided 25%-49%   Comment cg/min assist   Walk 50 Feet with Two Turns CARE Score 3   Ambulation   Does the patient walk? 2  Yes   Primary Mode of Locomotion Prior to Admission Walk   Distance Walked (feet) 130 ft  (086' 129' 110' 30'; last walk with no brace)   Assist Device Solo Walker   Gait Pattern Inconsistant Alyx; Slow Alyx;Decreased foot clearance;L foot drag;L hemiparesis; Narrow ERASMO;Step to   Limitations Noted In Balance; Endurance; Safety;Strength   Provided Assistance with: Balance   Walk Assist Level Contact Guard;Minimum Assist   Findings level surfaces   Wheel 50 Feet with Two Turns   Type of Assistance Needed Independent   Wheel 50 Feet with Two Turns CARE Score 6   Wheel 150 Feet   Type of Assistance Needed Independent   Wheel 150 Feet CARE Score 6   Wheelchair mobility   Does the patient use a wheelchair? 1  Yes   Type of Wheelchair Used 1   Manual   Curb or Single Stair   Style negotiated Single stair   Type of Assistance Needed Physical assistance   Amount of Physical Assistance Provided 25%-49%   Comment cg/min assist   1 Step (Curb) CARE Score 3   4 Steps   Type of Assistance Needed Physical assistance   Amount of Physical Assistance Provided 25%-49%   Comment cg/min assist   4 Steps CARE Score 3   12 Steps   Type of Assistance Needed Physical assistance   Amount of Physical Assistance Provided 25%-49%   Comment cg/min assist   12 Steps CARE Score 3   Stairs   Type Stairs   # of Steps 12   Weight Bearing Precautions WBAT   Assist Devices Single Rail  (AFO)   Findings cg/min assist going up forward and down backwards   Therapeutic Interventions   Strengthening standing ther ex   Balance gait and transfer training   Neuromuscular Re-Education E-stim L DF muscles with positive contractions   Other w/c mobility and stair training   Assessment   Treatment Assessment Patient tolerated therapy session well  Continues to make positive gains  Patient able to walk further today and also able to complete 12 steps  Completed ther ex for general LE strengthening; gait and transfer training focusing on sequence and technique for improved balance and safety with functional mobility  Patient appropriate for concurrent therapy to increase motivation among pts with similar deficits while completing therapy session  PT Barriers   Physical Impairment Decreased strength;Decreased range of motion;Decreased endurance; Impaired balance;Decreased mobility; Decreased coordination;Decreased safety awareness   Functional Limitation Wheelchair management; [facility] negotiation   Plan   Treatment/Interventions Functional transfer training;LE strengthening/ROM; Elevations; Therapeutic exercise;Gait training   Progress Progressing toward goals   PT Therapy Minutes   PT Time In 1000   PT Time Out 1130   PT Total Time (minutes) 90   PT Mode of treatment - Individual (minutes) 60   PT Mode of treatment - Concurrent (minutes) 30   PT Mode of treatment - Group (minutes) 0   PT Mode of treatment - Co-treat (minutes) 0   PT Mode of Treatment - Total time(minutes) 90 minutes   PT Cumulative Minutes 1081   Therapy Time missed   Time missed?  No

## 2021-03-18 NOTE — PLAN OF CARE
Problem: Potential for Falls  Goal: Patient will remain free of falls  Description: INTERVENTIONS:  - Assess patient frequently for physical needs  -  Identify cognitive and physical deficits and behaviors that affect risk of falls    -  [place] fall precautions as indicated by assessment   - Educate patient/family on patient safety including physical limitations  - Instruct patient to call for assistance with activity based on assessment  - Modify environment to reduce risk of injury  - Consider OT/PT consult to assist with strengthening/mobility  Outcome: Progressing     Problem: Prexisting or High Potential for Compromised Skin Integrity  Goal: Skin integrity is maintained or improved  Description: INTERVENTIONS:  - Identify patients at risk for skin breakdown  - Assess and monitor skin integrity  - Assess and monitor nutrition and hydration status  - Monitor labs   - Assess for incontinence   - Turn and reposition patient  - Assist with mobility/ambulation  - Relieve pressure over bony prominences  - Avoid friction and shearing  - Provide appropriate hygiene as needed including keeping skin clean and dry  - Evaluate need for skin moisturizer/barrier cream  - Collaborate with interdisciplinary team   - Patient/family teaching  - Consider wound care consult   Outcome: Progressing     Problem: PAIN - ADULT  Goal: Verbalizes/displays adequate comfort level or baseline comfort level  Description: Interventions:  - Encourage patient to monitor pain and request assistance  - Assess pain using appropriate pain scale  - Administer analgesics based on type and severity of pain and evaluate response  - Implement non-pharmacological measures as appropriate and evaluate response  - Consider cultural and social influences on pain and pain management  - Notify physician/advanced practitioner if interventions unsuccessful or patient reports new pain  Outcome: Progressing     Problem: INFECTION - ADULT  Goal: Absence or prevention of progression during hospitalization  Description: INTERVENTIONS:  - Assess and monitor for signs and symptoms of infection  - Monitor lab/diagnostic results  - Monitor all insertion sites, i e  indwelling lines, tubes, and drains  - Monitor endotracheal if appropriate and nasal secretions for changes in amount and color  - [place] appropriate cooling/warming therapies per order  - Administer medications as ordered  - Instruct and encourage patient and family to use good hand hygiene technique  - Identify and instruct in appropriate isolation precautions for identified infection/condition  Outcome: Progressing     Problem: SAFETY ADULT  Goal: Patient will remain free of falls  Description: INTERVENTIONS:  - Assess patient frequently for physical needs  -  Identify cognitive and physical deficits and behaviors that affect risk of falls    -  [place] fall precautions as indicated by assessment   - Educate patient/family on patient safety including physical limitations  - Instruct patient to call for assistance with activity based on assessment  - Modify environment to reduce risk of injury  - Consider OT/PT consult to assist with strengthening/mobility  Outcome: Progressing  Goal: Maintain or return to baseline ADL function  Description: INTERVENTIONS:  -  Assess patient's ability to carry out ADLs; assess patient's baseline for ADL function and identify physical deficits which impact ability to perform ADLs (bathing, care of mouth/teeth, toileting, grooming, dressing, etc )  - Assess/evaluate cause of self-care deficits   - Assess range of motion  - Assess patient's mobility; develop plan if impaired  - Assess patient's need for assistive devices and provide as appropriate  - Encourage maximum independence but intervene and supervise when necessary  - Involve family in performance of ADLs  - Assess for home care needs following discharge   - Consider OT consult to assist with ADL evaluation and planning for discharge  - Provide patient education as appropriate  Outcome: Progressing  Goal: Maintain or return mobility status to optimal level  Description: INTERVENTIONS:  - Assess patient's baseline mobility status (ambulation, transfers, stairs, etc )    - Identify cognitive and physical deficits and behaviors that affect mobility  - Identify mobility aids required to assist with transfers and/or ambulation (gait belt, sit-to-stand, lift, walker, cane, etc )  - [place] fall precautions as indicated by assessment  - Record patient progress and toleration of activity level on Mobility SBAR; progress patient to next Phase/Stage  - Instruct patient to call for assistance with activity based on assessment  - Consider rehabilitation consult to assist with strengthening/weightbearing, etc   Outcome: Progressing     Problem: DISCHARGE PLANNING  Goal: Discharge to home or other facility with appropriate resources  Description: INTERVENTIONS:  - Identify barriers to discharge w/patient and caregiver  - Arrange for needed discharge resources and transportation as appropriate  - Identify discharge learning needs (meds, wound care, etc )  - Arrange for interpretive services to assist at discharge as needed  - Refer to Case Management Department for coordinating discharge planning if the patient needs post-hospital services based on physician/advanced practitioner order or complex needs related to functional status, cognitive ability, or social support system  Outcome: Progressing     Problem: Nutrition/Hydration-ADULT  Goal: Nutrient/Hydration intake appropriate for improving, restoring or maintaining nutritional needs  Description: Monitor and assess patient's nutrition/hydration status for malnutrition  Collaborate with interdisciplinary team and initiate plan and interventions as ordered  Monitor patient's weight and dietary intake as ordered or per policy   Utilize nutrition screening tool and intervene as necessary  Determine patient's food preferences and provide high-protein, high-caloric foods as appropriate       INTERVENTIONS:  - Monitor oral intake, urinary output, labs, and treatment plans  - Assess nutrition and hydration status and recommend course of action  - Evaluate amount of meals eaten  - Assist patient with eating if necessary   - Allow adequate time for meals  - Recommend/ encourage appropriate diets, oral nutritional supplements, and vitamin/mineral supplements  - Order, calculate, and assess calorie counts as needed  - Recommend, monitor, and adjust tube feedings and TPN/PPN based on assessed needs  - Assess need for intravenous fluids  - Provide specific nutrition/hydration education as appropriate  - Include patient/family/caregiver in decisions related to nutrition  Outcome: Progressing

## 2021-03-18 NOTE — PROGRESS NOTES
03/18/21 1918   Charting Type   Charting Type Shift assessment   Neurological   Neuro (WDL) X   Level of Consciousness Alert/awake   Orientation Level Oriented X4   Cognition Appropriate judgement; Follows commands   Extraocular Movements Full   Facial Symmetry Left facial drooping   R Pupil Size (mm) 3   L Pupil Size (mm) 3   L Hand  Absent   L Foot Plantar Flexion Weak   RUE Muscle Strength 5- Normal strength   LUE Muscle Strength 2- Movement with gravity eliminated   RLE Muscle Strength 5- Normal strength   LLE Muscle Strength 2- Movement with gravity eliminated   Neuro Symptoms Fatigue   Relieved by Rest   Neuro Additional Assessments No   Delirium Assessment- CAM    Acute Onset and Fluctuating Course (1) No   Inattention (2) No   Disorganized Thinking (3) No   Rate Patient's Level of Consciousness (4) Alert (Normal), No   Delirium Present No   Lisa Coma Scale   Eye Opening 4   Best Verbal Response 5   Best Motor Response 6   Lisa Coma Scale Score 15   HEENT   HEENT (WDL) X   Head and Face Symmetrical   R Eye Mildly impaired vision   L Eye Mildly impaired vision   Vision - Corrective Lenses (at bedside) Glasses   R Ear Mildly impaired hearing   L Ear Mildly impaired hearing   Teeth Missing teeth   Respiratory   Respiratory (WDL) WDL   Respiratory Additional Assessments No   Cough and Deep Breathe   Cough and Deep Breathe Yes   Oxygen Therapy   SpO2 96 %   SpO2 Activity At Rest   O2 Device None (Room air)   Cardiac   Cardiac (WDL) WDL   Telemetry/Cardiac Monitor Yes   Heart Sounds No adventitious heart sounds   Pacemaker/ICD No   Pain Assessment   Pain Assessment Tool Pain Assessment not indicated - pt denies pain   Pain Score No Pain   Peripheral Vascular   Peripheral Vascular (WDL) WDL   Temperature Regulation    Temperature 98 7 °F (37 1 °C)   Temp Source Temporal   Integumentary   Integumentary (WDL) X   Skin Color Appropriate for ethnicity   Skin Condition/Temp Warm;Dry   Skin Integrity Bruising Skin Location scattered    Skin Turgor Non-tenting   Integumentary Additional Assessments No   Tattoos/Piercings   Does patient have tattoos?  Yes   Piercings Remaining No   Musculoskeletal   Musculoskeletal (WDL) X   LUE Limited movement   LLE Limited movement   Gastrointestinal   Gastrointestinal (WDL) WDL   Last BM Date 03/17/21   Gastrointestinal Additional Assessments No   Genitourinary   Genitourinary (WDL) WDL   Urine Assessment   Urinary Incontinence No   Anal/Rectal   Anal/Rectal (WDL) WDL   Psychosocial   Psychosocial (WDL) WDL

## 2021-03-18 NOTE — PROGRESS NOTES
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  He was continued on a high potency statin and recommendation was given to convert back to simvastatin 40 mg upon discharge  Neurology was consulted and recommended monotherapy (plavix) and therefore ASA was discontinued  TTE was without structural abnormalities  Neurology recommended against ANDREW  Pt may be considered for an OP implantable loop recorder vs  zio patch to r/o a-fib as a potential source of CVA, since he has well controlled vascular risk factors  Pt was allowed permissive HTN and on 3/4, his ACE inhibitor was restarted at half dose  Pt with hyponatremia potentially on the basis of CVA vs dehydration  Initial NA+ was 129  NS was decreased and repeat Na+ was 133  Pt worked with PT/OT and was deemed appropriate for an acute rehabilitation setting  He arrive to  on 3/5/21                        Subjective:  No complaints  Pt in agreement to have new medications sent to the [place] today to insure they will get time him by discharge date  ROS: A 10 point ROS was performed; negative except as noted above         Assessment/Plan:    CVA  -LLE/LUE weakness  -Cont simvastatin 40 mg at discharge  - Cont daily clopidogrel  -Monitor blood pressure, blood sugars  -Cont lisinopril  -Cont PPI for GI prophylaxis  -outpatient implantable loop recorder vs zio patch to rule out a fib  -Acute chomprehensive interdisciplinary inpatient rehabilitation to include intensive skilled therapies as outlines with oversight and management by a rehabilitation Physician Assistant overseen by rehabilitation physician as well as inpatient rehabilitation nursing, case management and weekly interdisciplinary team meeting   -Starting e-stim 3/11/21 to left arm    Diabetes mellitus type 2  -HGA1C- 7 3 as of 3/4/21  -Utilizes insulin pump at home, cont this while in rehab  -Maintain on ADA diet  -Coverage with algorithm 3 for with meals, and algorithm 1 for bedtime     -Cont Humalog 1-5 units daily at bedtime  -Cont humalog 1-6 units 3 times daily  Before meals  -Cont Humalog 3 units 3 times daily with meals  -Blood sugars running on the higher side 200s-300s  Increased humalog at dinner to 5 units  Will continue to monitor and adjust as needed  Insulin coverage after meals helping, bringing sugars to 79-100s  -Pt requested no insulin be sent to the [facility] supplies insulin pens for extra coverage    -Pt to follow up with endocrinologist upon discharge to monitor glucose levels and adjust as needed    Hypothyroidism  -Cont thyroid replacement therapy     GERD  -Cont PPI     Hyperlipidemia  -High potency statin while in acute rehab  -Discharge on 40 mg of Zocor     Hypertension  -Cont   Ace-I     Stage 3 chronic kidney disease  -Current creatinine appears to be at baseline  -Cont ACE-I  -Monitor renal function  -Avoid nephrotoxins  -Renally dose medications when appropriate     DVT prophylaxis  -plavix daily  -SCDs daily     Code  -Full                 Scheduled Meds:  Current Facility-Administered Medications   Medication Dose Route Frequency Provider Last Rate    acetaminophen  650 mg Oral Q6H PRN Justine Perez PA-C      amLODIPine  5 mg Oral Daily Ghazala Prasad MD      atorvastatin  40 mg Oral Daily With [employer] ANIVAL Atkinson      clopidogrel  75 mg Oral Daily Justine Perez PA-C      docusate sodium  100 mg Oral BID Anne Walsh, ANIVAL      heparin (porcine)  5,000 Units Subcutaneous [facility] Anne Walsh, ANIVAL      hydrALAZINE  25 mg Oral Q8H PRN Kayne ClipperANIVAL      insulin aspart  1,000 Units Subcutaneous Insulin Pump Daily PRN Anne Walsh PA-C      insulin lispro  1-5 Units Subcutaneous HS Kayne Clipestefanía, ANIVAL      insulin lispro  1-6 Units Subcutaneous TID AC Fabby Atkinson PA-C      insulin lispro  5 Units Subcutaneous Daily With Breakfast Areta Parcel, CRNP      insulin lispro  5 Units Subcutaneous Daily With Lunch Areta Parcel, CRNP      insulin lispro  5 Units Subcutaneous Daily With Dinner Fabby Atkinson PA-C      levothyroxine  88 mcg Oral Early Morning Duayne Clipper, ANIVAL      lisinopril  40 mg Oral Daily Kayne Clipestefanía, ANIVAL      pantoprazole  40 mg Oral Daily Before Breakfast Obed Dolan MD      patient maintained insulin pump  1 each Subcutaneous 4x Daily (with meals and at bedtime) Areta Parcel, CRNP      polyethylene glycol  17 g Oral Daily PRN Anne Walsh PA-C         Objective:    Functional Update:       Physical Therapy:     Weight Bearing Status: Full Weight Bearing  Transfers: Incidental Touching, Minimal Assistance(CG STS with HW/wall or bed rail; MIN A SPT/sit pivot)  Bed Mobility: Minimal Assistance  Amulation Distance (ft): 24 feet  Ambulation: Minimal Assistance, Moderate Assistance  Assistive Device for Ambulation: Solo Walker  Wheelchair Mobility Distance: 157 ft  Wheelchair Mobility: Supervision  Discharge Recommendations: Home with:  [address] [facility] with[de-identified] 24 Hour Assisteance, Family Support, First Floor Setup, Home Physical Therapy  Occupational Therapy:  Eating: Supervision  Grooming: Supervision  Bathing: Minimal Assistance  Bathing: Minimal Assistance  Upper Body Dressing: Minimal Assistance  Lower Body Dressing: Maximum Assistance  Toileting:  Moderate Assistance  Tub/Shower Transfer: (TBA)  Toilet Transfer: Minimal Assistance  Cognition: Within Defined Limits  Orientation: Person, Place, Time, Situation  Discharge Recommendations: Home with:  [address]ina with[de-identified] Family Support, Home Occupational Therapy    Speech Therapy:  Mode of Communication: Verbal  Cognition: Within Defined Limits  Orientation: Person, Place, Time, Situation  Discharge Recommendations: Home with:  Gurmeet Avina with[de-identified] Family Support(level of assist pending pt's progress)    Physical Exam:  Temp:  [97 2 °F (36 2 °C)-97 5 °F (36 4 °C)] 97 5 °F (36 4 °C)  HR:  [71-73] 73  Resp:  [18] 18  BP: (119)/(56-58) 119/56  SpO2:  [97 %-99 %] 97 %    General: alert, no apparent distress, cooperative and comfortable  HEENT:  Head: Normocephalic, no lesions, without obvious abnormality  Eye: Normal external eye, conjunctiva, lidsc cornea  Ears: Normal external ears  Nose: Normal external nose, mucus membranes  CARDIAC:  regular rate and rhythm, S1, S2 normal, no murmur, click, rub or gallop  LUNGS:  no abnormal respiratory pattern, no retractions noted, non-labored breathing   ABDOMEN:  soft, non-tender, non-distended  EXTREMITIES:  extremities normal, warm and well-perfused; no cyanosis, clubbing, or edema  NEURO:  clear speech, following all commands, oriented x4  PSYCH:  Alert and oriented, appropriate affect  Diagnostic Studies: Labs reviewed  No orders to display       Laboratory: Labs reviewed  Results from last 7 days   Lab Units 03/15/21  0538   HEMOGLOBIN g/dL 13 2*   HEMATOCRIT % 39 0*   WBC Thousand/uL 9 50     Results from last 7 days   Lab Units 03/15/21  0538   BUN mg/dL 49*   SODIUM mmol/L 130*   POTASSIUM mmol/L 5 0   CHLORIDE mmol/L 98   CREATININE mg/dL 1 75*            ** Please Note: Fluency Direct voice to text software may have been used in the creation of this document   **

## 2021-03-18 NOTE — PLAN OF CARE
Problem: Potential for Falls  Goal: Patient will remain free of falls  Description: INTERVENTIONS:  - Assess patient frequently for physical needs  -  Identify cognitive and physical deficits and behaviors that affect risk of falls    -  [place] fall precautions as indicated by assessment   - Educate patient/family on patient safety including physical limitations  - Instruct patient to call for assistance with activity based on assessment  - Modify environment to reduce risk of injury  - Consider OT/PT consult to assist with strengthening/mobility  Outcome: Progressing     Problem: Prexisting or High Potential for Compromised Skin Integrity  Goal: Skin integrity is maintained or improved  Description: INTERVENTIONS:  - Identify patients at risk for skin breakdown  - Assess and monitor skin integrity  - Assess and monitor nutrition and hydration status  - Monitor labs   - Assess for incontinence   - Turn and reposition patient  - Assist with mobility/ambulation  - Relieve pressure over bony prominences  - Avoid friction and shearing  - Provide appropriate hygiene as needed including keeping skin clean and dry  - Evaluate need for skin moisturizer/barrier cream  - Collaborate with interdisciplinary team   - Patient/family teaching  - Consider wound care consult   Outcome: Progressing     Problem: PAIN - ADULT  Goal: Verbalizes/displays adequate comfort level or baseline comfort level  Description: Interventions:  - Encourage patient to monitor pain and request assistance  - Assess pain using appropriate pain scale  - Administer analgesics based on type and severity of pain and evaluate response  - Implement non-pharmacological measures as appropriate and evaluate response  - Consider cultural and social influences on pain and pain management  - Notify physician/advanced practitioner if interventions unsuccessful or patient reports new pain  Outcome: Progressing     Problem: INFECTION - ADULT  Goal: Absence or prevention of progression during hospitalization  Description: INTERVENTIONS:  - Assess and monitor for signs and symptoms of infection  - Monitor lab/diagnostic results  - Monitor all insertion sites, i e  indwelling lines, tubes, and drains  - Monitor endotracheal if appropriate and nasal secretions for changes in amount and color  - [place] [place] appropriate cooling/warming therapies per order  - Administer medications as ordered  - Instruct and encourage patient and family to use good hand hygiene technique  - Identify and instruct in appropriate isolation precautions for identified infection/condition  Outcome: Progressing     Problem: SAFETY ADULT  Goal: Patient will remain free of falls  Description: INTERVENTIONS:  - Assess patient frequently for physical needs  -  Identify cognitive and physical deficits and behaviors that affect risk of falls    -  [place] fall precautions as indicated by assessment   - Educate patient/family on patient safety including physical limitations  - Instruct patient to call for assistance with activity based on assessment  - Modify environment to reduce risk of injury  - Consider OT/PT consult to assist with strengthening/mobility  Outcome: Progressing  Goal: Maintain or return to baseline ADL function  Description: INTERVENTIONS:  -  Assess patient's ability to carry out ADLs; assess patient's baseline for ADL function and identify physical deficits which impact ability to perform ADLs (bathing, care of mouth/teeth, toileting, grooming, dressing, etc )  - Assess/evaluate cause of self-care deficits   - Assess range of motion  - Assess patient's mobility; develop plan if impaired  - Assess patient's need for assistive devices and provide as appropriate  - Encourage maximum independence but intervene and supervise when necessary  - Involve family in performance of ADLs  - Assess for home care needs following discharge   - Consider OT consult to assist with ADL evaluation and planning for discharge  - Provide patient education as appropriate  Outcome: Progressing  Goal: Maintain or return mobility status to optimal level  Description: INTERVENTIONS:  - Assess patient's baseline mobility status (ambulation, transfers, stairs, etc )    - Identify cognitive and physical deficits and behaviors that affect mobility  - Identify mobility aids required to assist with transfers and/or ambulation (gait belt, sit-to-stand, lift, walker, cane, etc )  - [place] fall precautions as indicated by assessment  - Record patient progress and toleration of activity level on Mobility SBAR; progress patient to next Phase/Stage  - Instruct patient to call for assistance with activity based on assessment  - Consider rehabilitation consult to assist with strengthening/weightbearing, etc   Outcome: Progressing     Problem: DISCHARGE PLANNING  Goal: Discharge to home or other facility with appropriate resources  Description: INTERVENTIONS:  - Identify barriers to discharge w/patient and caregiver  - Arrange for needed discharge resources and transportation as appropriate  - Identify discharge learning needs (meds, wound care, etc )  - Arrange for interpretive services to assist at discharge as needed  - Refer to Case Management Department for coordinating discharge planning if the patient needs post-hospital services based on physician/advanced practitioner order or complex needs related to functional status, cognitive ability, or social support system  Outcome: Progressing     Problem: Nutrition/Hydration-ADULT  Goal: Nutrient/Hydration intake appropriate for improving, restoring or maintaining nutritional needs  Description: Monitor and assess patient's nutrition/hydration status for malnutrition  Collaborate with interdisciplinary team and initiate plan and interventions as ordered  Monitor patient's weight and dietary intake as ordered or per policy   Utilize nutrition screening tool and intervene as necessary  Determine patient's food preferences and provide high-protein, high-caloric foods as appropriate       INTERVENTIONS:  - Monitor oral intake, urinary output, labs, and treatment plans  - Assess nutrition and hydration status and recommend course of action  - Evaluate amount of meals eaten  - Assist patient with eating if necessary   - Allow adequate time for meals  - Recommend/ encourage appropriate diets, oral nutritional supplements, and vitamin/mineral supplements  - Order, calculate, and assess calorie counts as needed  - Recommend, monitor, and adjust tube feedings and TPN/PPN based on assessed needs  - Assess need for intravenous fluids  - Provide specific nutrition/hydration education as appropriate  - Include patient/family/caregiver in decisions related to nutrition  Outcome: Progressing

## 2021-03-18 NOTE — PROGRESS NOTES
03/18/21 0700   Pain Assessment   Pain Assessment Tool Pain Assessment not indicated - pt denies pain   Restrictions/Precautions   Precautions Fall Risk   Weight Bearing Restrictions No   ROM Restrictions No   Braces or Orthoses Sling;AFO  (L side)   Eating   Type of Assistance Needed Set-up / clean-up   Amount of Physical Assistance Provided No physical assistance   Eating CARE Score 5   Grooming   Able To Initiate Tasks; Acquire Items;Comb/Brush Hair;Wash/Dry Face   Limitation Noted In Coordination; Safety   Shower/Bathe Self   Type of Assistance Needed Physical assistance; Adaptive equipment   Amount of Physical Assistance Provided Less than 25%   Comment assist for R upper UE   Shower/Bathe Self CARE Score 3   Bathing   Assessed Bath Style Shower   Anticipated D/C Bath Style Sponge Bath; Shower   Able to Lis [place] No   Able to [employer] Temperature Yes   Able to Wash/Rinse/Dry (body part) Left Arm;L Upper Leg;R Upper Leg;L Lower Leg/Foot;R Lower Leg/Foot;Chest;Abdomen;Perineal Area; Buttocks   Limitations Noted in Balance; Coordination;ROM;Safety;Strength   Positioning Seated;Standing   Adaptive Equipment Longhand Sponge; Shower Enterra Feed; Shower Seat;Hand [employer]   Limitations Noted In Balance; Coordination;ROM;Safety;UE Strength;LE Strength   Adaptive Equipment Grab Bars;Seat with out Back   Assessed Shower   Findings CG   Upper Body Dressing   Type of Assistance Needed Physical assistance   Amount of Physical Assistance Provided Less than 25%   Comment assist to get LUE into shirt   Upper Body Dressing CARE Score 3   Lower Body Dressing   Type of Assistance Needed Physical assistance; Adaptive equipment   Amount of Physical Assistance Provided Less than 25%   Comment assist to get L foot into underwear and pants   Lower Body Dressing CARE Score 3   Putting On/Taking Off Footwear   Type of Assistance Needed Physical assistance; Adaptive equipment   Amount of Physical Assistance Provided 25%-49%   Comment assist to don bilat socks   Putting On/Taking Off Footwear CARE Score 3   Dressing/Undressing Clothing   Remove UB Clothes Pullover Shirt   Don UB Clothes Pullover Shirt   Remove LB Clothes Undergarment;Socks   Don LB [address]; Undergarment;Socks; Shoes   Limitations Noted In Balance; Coordination; Safety;Strength;ROM   Adaptive Equipment Reacher;LH [place]   Positioning Supported Sit   Lying to Sitting on Side of Bed   Type of Assistance Needed Physical assistance   Amount of Physical Assistance Provided Less than 25%   Lying to Sitting on Side of Bed CARE Score 3   Sit to Stand   Type of Assistance Needed Incidental touching   Comment CG   Sit to Stand CARE Score 4   Bed-Chair Transfer   Type of Assistance Needed Incidental touching   Comment CG   Chair/Bed-to-Chair Transfer CARE Score 4   Transfer Bed/Chair/Wheelchair   Limitations Noted In Balance; Coordination;UE Strength;LE Strength   Functional Standing Tolerance   Time 4m5s during card matching activity   Therapeutic Exercise - ROM   UE-ROM Yes   ROM- Right Upper Extremities   RUE ROM Comment towel slides 3x30 all planes of motion with RUE providing AAROM to LUE   ROM - Left Upper Extremities    LUE ROM Comment towel slides 3x30 all planes of motion with RUE providing AAROM to LUE   Neuromuscular Education   Comments e-stim x12 to LUE to encourage elbow flexion/extension, wrist flexion/extension, pronation/supination, shoulder shrug, intensity = 3 5-5, 20 second intervals with 10 second breaks between sets; pt with good response to all targeted motions with e-stim but continues with trace to no AROM in LUE once e-stim complete; overall fair external rotation    Cognition   Overall Cognitive Status WFL   Arousal/Participation Alert; Responsive; Cooperative   Attention Within functional limits   Orientation Level Oriented X4   Memory Within functional limits   Following Commands Follows all commands and directions without difficulty Assessment   Treatment Assessment Pt agreeable to Ot session this AM  Received lying supine in bed  ADL session completed; current LOF and details listed in respective sections  Pt is overall Karen for ADL tasks with use of AE as needed for LB tasks; functional transfers CG  Pt with improvements in L foot lift during shower transfer and demonstrated improved balance and gait as a result entering and exiting the shower  After ADL, completed neuro re-ed to LUE; details in respective sections  Pt continues with decreased AROM in LUE following e-stim  Set up with breakfast tray required at end of session  Pt would benefit from continued skilled OT services in order to maximize independence in self care, functional mobility/transfers, ROM/strength, and activity tolerance  Prognosis Good   Problem List Decreased strength;Decreased range of motion;Decreased endurance; Impaired balance;Decreased coordination;Decreased safety awareness   Plan   Treatment/Interventions ADL retraining;Functional transfer training;LE strengthening/ROM; Therapeutic exercise; Endurance training;Patient/family training;Equipment eval/education; Compensatory technique education;OT   Progress Progressing toward goals   OT Therapy Minutes   OT Time In 0700   OT Time Out 0832   OT Total Time (minutes) 92   OT Mode of treatment - Individual (minutes) 92

## 2021-03-19 LAB
GLUCOSE SERPL-MCNC: 130 MG/DL (ref 65–140)
GLUCOSE SERPL-MCNC: 192 MG/DL (ref 65–140)
GLUCOSE SERPL-MCNC: 200 MG/DL (ref 65–140)
GLUCOSE SERPL-MCNC: 206 MG/DL (ref 65–140)
GLUCOSE SERPL-MCNC: 321 MG/DL (ref 65–140)
GLUCOSE SERPL-MCNC: 343 MG/DL (ref 65–140)
GLUCOSE SERPL-MCNC: 42 MG/DL (ref 65–140)
GLUCOSE SERPL-MCNC: 84 MG/DL (ref 65–140)

## 2021-03-19 PROCEDURE — 97032 APPL MODALITY 1+ESTIM EA 15: CPT

## 2021-03-19 PROCEDURE — 97116 GAIT TRAINING THERAPY: CPT

## 2021-03-19 PROCEDURE — 97530 THERAPEUTIC ACTIVITIES: CPT

## 2021-03-19 PROCEDURE — 82948 REAGENT STRIP/BLOOD GLUCOSE: CPT

## 2021-03-19 PROCEDURE — 97112 NEUROMUSCULAR REEDUCATION: CPT

## 2021-03-19 PROCEDURE — 97537 COMMUNITY/WORK REINTEGRATION: CPT

## 2021-03-19 PROCEDURE — 99231 SBSQ HOSP IP/OBS SF/LOW 25: CPT | Performed by: PHYSICAL MEDICINE & REHABILITATION

## 2021-03-19 PROCEDURE — 97110 THERAPEUTIC EXERCISES: CPT

## 2021-03-19 RX ADMIN — INSULIN LISPRO 1 UNITS: 100 INJECTION, SOLUTION INTRAVENOUS; SUBCUTANEOUS at 21:10

## 2021-03-19 RX ADMIN — LEVOTHYROXINE SODIUM 88 MCG: 88 TABLET ORAL at 05:40

## 2021-03-19 RX ADMIN — INSULIN LISPRO 2 UNITS: 100 INJECTION, SOLUTION INTRAVENOUS; SUBCUTANEOUS at 08:30

## 2021-03-19 RX ADMIN — HEPARIN SODIUM 5000 UNITS: 5000 INJECTION INTRAVENOUS; SUBCUTANEOUS at 21:10

## 2021-03-19 RX ADMIN — INSULIN LISPRO 5 UNITS: 100 INJECTION, SOLUTION INTRAVENOUS; SUBCUTANEOUS at 08:30

## 2021-03-19 RX ADMIN — CLOPIDOGREL BISULFATE 75 MG: 75 TABLET ORAL at 08:30

## 2021-03-19 RX ADMIN — DOCUSATE SODIUM 100 MG: 100 CAPSULE, LIQUID FILLED ORAL at 08:30

## 2021-03-19 RX ADMIN — AMLODIPINE BESYLATE 5 MG: 5 TABLET ORAL at 08:30

## 2021-03-19 RX ADMIN — ATORVASTATIN CALCIUM 40 MG: 40 TABLET, FILM COATED ORAL at 16:22

## 2021-03-19 RX ADMIN — PANTOPRAZOLE SODIUM 40 MG: 40 TABLET, DELAYED RELEASE ORAL at 05:40

## 2021-03-19 RX ADMIN — HEPARIN SODIUM 5000 UNITS: 5000 INJECTION INTRAVENOUS; SUBCUTANEOUS at 16:04

## 2021-03-19 RX ADMIN — INSULIN LISPRO 5 UNITS: 100 INJECTION, SOLUTION INTRAVENOUS; SUBCUTANEOUS at 18:48

## 2021-03-19 RX ADMIN — INSULIN LISPRO 2 UNITS: 100 INJECTION, SOLUTION INTRAVENOUS; SUBCUTANEOUS at 12:53

## 2021-03-19 RX ADMIN — INSULIN LISPRO 5 UNITS: 100 INJECTION, SOLUTION INTRAVENOUS; SUBCUTANEOUS at 12:54

## 2021-03-19 RX ADMIN — LISINOPRIL 40 MG: 20 TABLET ORAL at 08:30

## 2021-03-19 RX ADMIN — HEPARIN SODIUM 5000 UNITS: 5000 INJECTION INTRAVENOUS; SUBCUTANEOUS at 05:40

## 2021-03-19 RX ADMIN — DOCUSATE SODIUM 100 MG: 100 CAPSULE, LIQUID FILLED ORAL at 16:22

## 2021-03-19 NOTE — PROGRESS NOTES
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  He was continued on a high potency statin and recommendation was given to convert back to simvastatin 40 mg upon discharge  Neurology was consulted and recommended monotherapy (plavix) and therefore ASA was discontinued  TTE was without structural abnormalities  Neurology recommended against ANDREW  Pt may be considered for an OP implantable loop recorder vs  zio patch to r/o a-fib as a potential source of CVA, since he has well controlled vascular risk factors  Pt was allowed permissive HTN and on 3/4, his ACE inhibitor was restarted at half dose  Pt with hyponatremia potentially on the basis of CVA vs dehydration  Initial NA+ was 129  NS was decreased and repeat Na+ was 133  Pt worked with PT/OT and was deemed appropriate for an acute rehabilitation setting  He arrive to  on 3/5/21                        Subjective:  Pt has no complaints  He is happy with his blood sugars, today they were only in the 200s as compared to 300s every morning  Told patient we will watch and see if this continues to trend down, if not will adjust his insulin as needed  Pt is in agreement with this plan  ROS: A 10 point ROS was performed; negative except as noted above         Assessment/Plan:    CVA  -LLE/LUE weakness  -Cont simvastatin 40 mg at discharge  - Cont daily clopidogrel  -Monitor blood pressure, blood sugars  -Cont lisinopril  -Cont PPI for GI prophylaxis  -outpatient implantable loop recorder vs zio patch to rule out a fib  -Acute chomprehensive interdisciplinary inpatient rehabilitation to include intensive skilled therapies as outlines with oversight and management by a rehabilitation Physician Assistant overseen by rehabilitation physician as well as inpatient rehabilitation nursing, case management and weekly interdisciplinary team meeting   -Starting e-stim 3/11/21 to left arm    Diabetes mellitus type 2  -HGA1C- 7 3 as of 3/4/21  -Utilizes insulin pump at home, cont this while in rehab  -Maintain on ADA diet  -Coverage with algorithm 3 for with meals, and algorithm 1 for bedtime     -Cont Humalog 1-5 units daily at bedtime  -Cont humalog 1-6 units 3 times daily  Before meals  -Cont Humalog 3 units 3 times daily with meals    Hypothyroidism  -Cont thyroid replacement therapy     GERD  -Cont PPI     Hyperlipidemia  -High potency statin while in acute rehab  -Discharge on 40 mg of Zocor     Hypertension  -Cont   Ace-I     Stage 3 chronic kidney disease  -Current creatinine appears to be at baseline  -Cont ACE-I  -Monitor renal function  -Avoid nephrotoxins  -Renally dose medications when appropriate     DVT prophylaxis  -plavix daily  -SCDs daily     Code  -Full                 Scheduled Meds:  Current Facility-Administered Medications   Medication Dose Route Frequency Provider Last Rate    acetaminophen  650 mg Oral Q6H PRN Jasbir Lynn PA-C      amLODIPine  5 mg Oral Daily Henry Lopez MD      atorvastatin  40 mg Oral Daily With [employer] ANIVAL Atkinson      clopidogrel  75 mg Oral Daily Jasbir Lynn PA-C      docusate sodium  100 mg Oral BID Jasbir Lynn PA-C      heparin (porcine)  5,000 Units Subcutaneous [facility] Jasbir Lynn PA-C      hydrALAZINE  25 mg Oral Q8H PRN Jasbir Lynn PA-C      insulin aspart  1,000 Units Subcutaneous Insulin Pump Daily PRN Lanny Lainez ANIVAL Atkinson      insulin lispro  1-5 Units Subcutaneous HS Estephania Singh PA-C      insulin lispro  1-6 Units Subcutaneous TID AC Fabby Atkinson PA-C      insulin lispro  5 Units Subcutaneous Daily With Breakfast REEMA Nayak      insulin lispro  5 Units Subcutaneous Daily With Lunch REEMA Nayak      insulin lispro  5 Units Subcutaneous Daily With Dinner Fabby Atkinson PA-C      levothyroxine  88 mcg Oral Early Morning Estephania Singh PA-C      lisinopril  40 mg Oral Daily Estephania Singh PA-C      pantoprazole  40 mg Oral Daily Before Breakfast Janell Fried MD      patient maintained insulin pump  1 each Subcutaneous 4x Daily (with meals and at bedtime) REEMA Nayak      polyethylene glycol  17 g Oral Daily PRN Estephania Singh PA-C         Objective:    Functional Update:       Physical Therapy:     Weight Bearing Status: Full Weight Bearing  Transfers: Incidental Touching, Minimal Assistance(CG STS with HW/wall or bed rail; MIN A SPT/sit pivot)  Bed Mobility: Minimal Assistance  Amulation Distance (ft): 24 feet  Ambulation: Minimal Assistance, Moderate Assistance  Assistive Device for Ambulation: Solo Walker  Wheelchair Mobility Distance: 157 ft  Wheelchair Mobility: Supervision  Discharge Recommendations: Home with:  [address] with[de-identified] 24 Hour Assisteance, Family Support, First Floor Setup, Home Physical Therapy  Occupational Therapy:  Eating: Supervision  Grooming: Supervision  Bathing: Minimal Assistance  Bathing: Minimal Assistance  Upper Body Dressing: Minimal Assistance  Lower Body Dressing: Maximum Assistance  Toileting:  Moderate Assistance  Tub/Shower Transfer: (TBA)  Toilet Transfer: Minimal Assistance  Cognition: Within Defined Limits  Orientation: Person, Place, Time, Situation  Discharge Recommendations: Home with:  [address] with[de-identified] Family Support, Home Occupational Therapy    Speech Therapy:  Mode of Communication: Verbal  Cognition: Within Defined Limits  Orientation: Person, Place, Time, Situation  Discharge Recommendations: Home with:  [address] with[de-identified] Family Support(level of assist pending pt's progress)    Physical Exam:  Temp:  [97 8 °F (36 6 °C)-98 7 °F (37 1 °C)] 97 8 °F (36 6 °C)  HR:  [60-67] 60  Resp:  [18] 18  BP: (107-117)/(57-66) 117/66  SpO2:  [96 %-99 %] 99 %    General: alert, no apparent distress, cooperative and comfortable  HEENT:  Head: Normocephalic, no lesions, without obvious abnormality  Eye: Normal external eye, conjunctiva, lidsc cornea  Ears: Normal external ears  Nose: Normal external nose, mucus membranes  CARDIAC:  regular rate and rhythm, S1, S2 normal, no murmur, click, rub or gallop  LUNGS:  no abnormal respiratory pattern, no retractions noted, non-labored breathing   ABDOMEN:  soft, non-tender, non-distended  EXTREMITIES:  extremities normal, warm and well-perfused; no cyanosis, clubbing, or edema  NEURO:  clear speech, following all commands, oriented x4  PSYCH:  Alert and oriented, appropriate affect  Diagnostic Studies: Labs reviewed  No orders to display       Laboratory: Labs reviewed  Results from last 7 days   Lab Units 03/15/21  0538   HEMOGLOBIN g/dL 13 2*   HEMATOCRIT % 39 0*   WBC Thousand/uL 9 50     Results from last 7 days   Lab Units 03/15/21  0538   BUN mg/dL 49*   SODIUM mmol/L 130*   POTASSIUM mmol/L 5 0   CHLORIDE mmol/L 98   CREATININE mg/dL 1 75*            ** Please Note: Fluency Direct voice to text software may have been used in the creation of this document   **

## 2021-03-19 NOTE — PROGRESS NOTES
03/19/21 1130   Pain Assessment   Pain Assessment Tool Pain Assessment not indicated - pt denies pain   Restrictions/Precautions   Precautions Fall Risk   Weight Bearing Restrictions No   ROM Restrictions No   Sit to Stand   Type of Assistance Needed Incidental touching   Comment CG   Sit to Stand CARE Score 4   Functional Standing Tolerance   Time 5m5s during foam pegboard activity   Therapeutic Exercise - ROM   UE-ROM Yes   ROM - Left Upper Extremities    LUE ROM Comment brief stretching x5 shoulder to digits all planes of motion prior to e-stim   Neuromuscular Education   Functional Movement Patterns AAROM LUE to targeted joints after e-stim, trace to no AROM noted with the exception of fair wrist flexion and good internal/external rotation   Comments e-stim to LUE to encourage elbow flexion/extension, wrist flexion/extension, pronation/supination; pt with good feedback during e-stim but with only fair wrist flexion as a result when e-stim not applied   Cognition   Overall Cognitive Status WFL   Arousal/Participation Alert; Responsive; Cooperative   Attention Within functional limits   Orientation Level Oriented X4   Memory Within functional limits   Following Commands Follows all commands and directions without difficulty   Assessment   Treatment Assessment Pt agreeable to OT session this AM  Received sitting upright in w/c  Propelled self to OT room in w/c using RUE and RLE, then completed neuro re-ed and standing tolerance tasks  Pt with good tolerance to all activities, however continues with trace to no AROM throughout LUE with the exception of wrist flexion and internal/external rotation  During standing tolerance task, pt made an effort to place more weight into LLE when standing; lost balance once but was able to right self  Propelled self back to room to await lunch   Pt would benefit from continued skilled OT services in order to maximize functional mobility/transfers, ROM/strength ,and activity tolerane  Prognosis Good   Problem List Decreased strength;Decreased range of motion;Decreased endurance; Impaired balance;Decreased coordination;Decreased safety awareness   Plan   Treatment/Interventions ADL retraining;Functional transfer training;LE strengthening/ROM; Therapeutic exercise; Endurance training;Patient/family training;Equipment eval/education; Compensatory technique education;OT   Progress Progressing toward goals   OT Therapy Minutes   OT Time In 1130   OT Time Out 1230   OT Total Time (minutes) 60   OT Mode of treatment - Individual (minutes) 60   Therapy Time missed   Time missed?  No

## 2021-03-19 NOTE — PROGRESS NOTES
03/19/21 1345   Pain Assessment   Pain Assessment Tool Pain Assessment not indicated - pt denies pain   Restrictions/Precautions   Precautions Fall Risk   Weight Bearing Restrictions No   ROM Restrictions No   ROM - Left Upper Extremities    LUE ROM Comment brief stretching x5 shoulder to digits all planes of motion prior to e-stim   Neuromuscular Education   Functional Movement Patterns AAROM LUE x5 shoulder shrug, elbow to digits all planes of motion; trace to no elbow flexoin/extension but fair the rest of the targeted planes   Comments e-stim to LUE to encourage shoulder shrug x20, intensity = 4; pt with good return both with and without e-stim   Cognition   Overall Cognitive Status [facility]   Arousal/Participation Alert; Responsive; Cooperative   Attention Within functional limits   Orientation Level Oriented X4   Memory Within functional limits   Following Commands Follows all commands and directions without difficulty   Assessment   Treatment Assessment Pt agreeable to OT session this PM  Received sitting upright in w/c  Propelled self to OT room in w/c using rue and RLE, then completed neuro re-ed to L shoulder  Pt with good return both during and after e-stim in shoulder shrug and had fair motion from elbow to digits as well  Pt would benefit from continued skilled OT services in order to maximize functional mobility/transfers, ROM/strength, and activity tolerance  Prognosis Good   Problem List Decreased strength;Decreased range of motion;Decreased endurance; Impaired balance;Decreased coordination;Decreased safety awareness   Plan   Treatment/Interventions ADL retraining;Functional transfer training;LE strengthening/ROM; Therapeutic exercise; Endurance training;Patient/family training;Equipment eval/education; Compensatory technique education;OT   Progress Progressing toward goals   OT Therapy Minutes   OT Time In 1345   OT Time Out 1415   OT Total Time (minutes) 30   OT Mode of treatment - Individual (minutes) 30   Therapy Time missed   Time missed?  No

## 2021-03-19 NOTE — NURSING NOTE
BG decreased to 42  Patient stated feeling "very tired"  DEANDRE and zora marques given  BG increased to 84  PM+R notified  No new orders at present time  Will continue to monitor

## 2021-03-19 NOTE — PROGRESS NOTES
03/19/21 1440   Pain Assessment   Pain Assessment Tool 0-10   Pain Score No Pain   Restrictions/Precautions   Precautions Fall Risk   Braces or Orthoses Sling;AFO   Cognition   Overall Cognitive Status WFL   Orientation Level Oriented X4   Roll Left and Right   Type of Assistance Needed Independent   Roll Left and Right CARE Score 6   Sit to Lying   Type of Assistance Needed Supervision   Sit to Lying CARE Score 4   Lying to Sitting on Side of Bed   Type of Assistance Needed Incidental touching   Comment cg   Lying to Sitting on Side of Bed CARE Score 4   Sit to Stand   Type of Assistance Needed Incidental touching   Comment cg   Sit to Stand CARE Score 4   Bed-Chair Transfer   Type of Assistance Needed Incidental touching   Comment cg   Chair/Bed-to-Chair Transfer CARE Score 4   Walk 10 Feet   Type of Assistance Needed Physical assistance   Amount of Physical Assistance Provided 25%-49%   Comment cg/occasional MIN A   Walk 10 Feet CARE Score 3   Walk 50 Feet with Two Turns   Type of Assistance Needed Physical assistance   Amount of Physical Assistance Provided 25%-49%   Comment cg/occasional MIN A   Walk 50 Feet with Two Turns CARE Score 3   Walk 150 Feet   Type of Assistance Needed Physical assistance   Amount of Physical Assistance Provided 25%-49%   Comment cg/occasional MIN A   Walk 150 Feet CARE Score 3   Ambulation   Does the patient walk? 2  Yes   Primary Mode of Locomotion Prior to Admission Walk   Distance Walked (feet) 331 ft  (557' 77' 109')   Assist Device Solo Walker   Gait Pattern Inconsistant Alyx; Slow Alyx;Decreased foot clearance;L foot drag;L hemiparesis; Step to   Limitations Noted In Balance; Endurance; Safety;Swing   Provided Assistance with: Balance   Walk Assist Level Contact Guard;Minimum Assist   Findings level surfaces   Curb or Single Stair   Style negotiated Single stair   Type of Assistance Needed Incidental touching   Comment cg   1 Step (Curb) CARE Score 4   4 Steps   Type of Assistance Needed Incidental touching   Comment cg   4 Steps CARE Score 4   12 Steps   Type of Assistance Needed Incidental touching   Comment cg   12 Steps CARE Score 4   Stairs   Type Stairs   # of Steps 18   Weight Bearing Precautions WBAT   Assist Devices Single Rail   Findings cg going up forward, down backward   Therapeutic Interventions   Strengthening standing ther ex   Balance gait and transfer training   Neuromuscular Re-Education E-stim LLE DF musculature   Other stair training   Assessment   Treatment Assessment Patient tolerated therapy session well  Pt's wife in [place]  Observed patient walking, transferring, and completing stair training  To begin hands on training next week  Raghu from [employer] in to evaluate patient for customized AFO  Patient with improved ambulation distance and overall improved mobility  Completed ther ex for general LE strengthening; gait and transfer training focusing on sequence and technique for improved balance and safety with functional mobility  PT Barriers   Physical Impairment Decreased strength;Decreased range of motion;Decreased endurance; Impaired balance;Decreased mobility; Decreased coordination;Decreased safety awareness   Functional Limitation Wheelchair management; [facility] negotiation   Plan   Treatment/Interventions Functional transfer training;LE strengthening/ROM; Elevations; Therapeutic exercise; Bed mobility;Gait training   Progress Progressing toward goals   PT Therapy Minutes   PT Time In 1440   PT Time Out 1610   PT Total Time (minutes) 90   PT Mode of treatment - Individual (minutes) 90   PT Mode of treatment - Concurrent (minutes) 0   PT Mode of treatment - Group (minutes) 0   PT Mode of treatment - Co-treat (minutes) 0   PT Mode of Treatment - Total time(minutes) 90 minutes   PT Cumulative Minutes 1171   Therapy Time missed   Time missed?  No

## 2021-03-20 LAB
GLUCOSE SERPL-MCNC: 106 MG/DL (ref 65–140)
GLUCOSE SERPL-MCNC: 167 MG/DL (ref 65–140)
GLUCOSE SERPL-MCNC: 178 MG/DL (ref 65–140)
GLUCOSE SERPL-MCNC: 180 MG/DL (ref 65–140)
GLUCOSE SERPL-MCNC: 192 MG/DL (ref 65–140)

## 2021-03-20 PROCEDURE — 97032 APPL MODALITY 1+ESTIM EA 15: CPT

## 2021-03-20 PROCEDURE — 97530 THERAPEUTIC ACTIVITIES: CPT

## 2021-03-20 PROCEDURE — 97116 GAIT TRAINING THERAPY: CPT

## 2021-03-20 PROCEDURE — 82948 REAGENT STRIP/BLOOD GLUCOSE: CPT

## 2021-03-20 PROCEDURE — 97112 NEUROMUSCULAR REEDUCATION: CPT

## 2021-03-20 PROCEDURE — 97537 COMMUNITY/WORK REINTEGRATION: CPT

## 2021-03-20 PROCEDURE — 97110 THERAPEUTIC EXERCISES: CPT

## 2021-03-20 RX ADMIN — INSULIN LISPRO 2 UNITS: 100 INJECTION, SOLUTION INTRAVENOUS; SUBCUTANEOUS at 08:28

## 2021-03-20 RX ADMIN — INSULIN LISPRO 5 UNITS: 100 INJECTION, SOLUTION INTRAVENOUS; SUBCUTANEOUS at 13:05

## 2021-03-20 RX ADMIN — PANTOPRAZOLE SODIUM 40 MG: 40 TABLET, DELAYED RELEASE ORAL at 05:42

## 2021-03-20 RX ADMIN — HEPARIN SODIUM 5000 UNITS: 5000 INJECTION INTRAVENOUS; SUBCUTANEOUS at 05:42

## 2021-03-20 RX ADMIN — INSULIN LISPRO 6 UNITS: 100 INJECTION, SOLUTION INTRAVENOUS; SUBCUTANEOUS at 13:06

## 2021-03-20 RX ADMIN — AMLODIPINE BESYLATE 5 MG: 5 TABLET ORAL at 08:30

## 2021-03-20 RX ADMIN — ATORVASTATIN CALCIUM 40 MG: 40 TABLET, FILM COATED ORAL at 16:46

## 2021-03-20 RX ADMIN — DOCUSATE SODIUM 100 MG: 100 CAPSULE, LIQUID FILLED ORAL at 08:30

## 2021-03-20 RX ADMIN — INSULIN LISPRO 5 UNITS: 100 INJECTION, SOLUTION INTRAVENOUS; SUBCUTANEOUS at 08:29

## 2021-03-20 RX ADMIN — HEPARIN SODIUM 5000 UNITS: 5000 INJECTION INTRAVENOUS; SUBCUTANEOUS at 21:49

## 2021-03-20 RX ADMIN — LEVOTHYROXINE SODIUM 88 MCG: 88 TABLET ORAL at 05:42

## 2021-03-20 RX ADMIN — CLOPIDOGREL BISULFATE 75 MG: 75 TABLET ORAL at 08:30

## 2021-03-20 RX ADMIN — INSULIN LISPRO 1 UNITS: 100 INJECTION, SOLUTION INTRAVENOUS; SUBCUTANEOUS at 16:47

## 2021-03-20 RX ADMIN — LISINOPRIL 40 MG: 20 TABLET ORAL at 08:30

## 2021-03-20 RX ADMIN — DOCUSATE SODIUM 100 MG: 100 CAPSULE, LIQUID FILLED ORAL at 17:01

## 2021-03-20 RX ADMIN — HEPARIN SODIUM 5000 UNITS: 5000 INJECTION INTRAVENOUS; SUBCUTANEOUS at 13:05

## 2021-03-20 NOTE — PROGRESS NOTES
03/20/21 1327   Pain Assessment   Pain Assessment Tool Pain Assessment not indicated - pt denies pain   Restrictions/Precautions   Precautions Fall Risk   Braces or Orthoses AFO;Sling  (LLE/ LUE)   Sit to Stand   Type of Assistance Needed Incidental touching   Sit to Stand CARE Score 4   Exercise Tools   Other Exercise Tool 1 Towel slides 1 set 20 in/ out, side to side and circles both ways   Other Exercise Tool 2 LUE PROM with muscle contractions noted more at shoulder and elbow and occ wrist and hand 1 set 8 all joints all planes   Neuromuscular Education   LUE Weight Bearing Forearm seated; Extended arm standing   Functional Movement Patterns LUE functional use as passive assist to RUE    Comments estim completed to elbow biceps/ triceps and forearm for supination/ pronation alternating movements with each area completed separately 1 set 12 with 20 sec holds intensity at 4 both areas  Pt is able to assist with ROM during estim    Assessment   Treatment Assessment Pt presents finishing lunch ready for OT session including standing/ transfers and neuro lamont to LUE with estim/ ROM and towel slide  Pt tolerates session with improving standing noted  Pt requires assist due to decerased balance, safety, endurance adn LUE strength/ ROM/ coordination  Pt is progressing towards goals and will benefit from continued skilled OT services to increase independence with daily tasks  Problem List Decreased strength;Decreased range of motion;Decreased endurance; Impaired balance;Decreased coordination;Decreased safety awareness   Plan   Treatment/Interventions ADL retraining;Functional transfer training; Therapeutic exercise; Endurance training;Patient/family training;Equipment eval/education; Compensatory technique education   Progress Progressing toward goals   OT Therapy Minutes   OT Time In 1327   OT Time Out 1425   OT Total Time (minutes) 58   OT Mode of treatment - Individual (minutes) 58   Therapy Time missed   Time missed?  No

## 2021-03-20 NOTE — PROGRESS NOTES
03/20/21 1130   Pain Assessment   Pain Assessment Tool 0-10   Pain Score No Pain   Restrictions/Precautions   Precautions Fall Risk   Braces or Orthoses AFO;Sling   Cognition   Overall Cognitive Status WFL   Arousal/Participation Alert; Responsive; Cooperative   Attention Within functional limits   Orientation Level Oriented X4   Memory Within functional limits   Following Commands Follows all commands and directions without difficulty   Lying to Sitting on Side of Bed   Type of Assistance Needed Physical assistance   Amount of Physical Assistance Provided Less than 25%   Lying to Sitting on Side of Bed CARE Score 3   Sit to Stand   Type of Assistance Needed Physical assistance   Amount of Physical Assistance Provided Less than 25%   Sit to Stand CARE Score 3   Bed-Chair Transfer   Type of Assistance Needed Incidental touching   Amount of Physical Assistance Provided No physical assistance   Chair/Bed-to-Chair Transfer CARE Score 4   Transfer Bed/Chair/Wheelchair   Limitations Noted In Balance;Confidence; Coordination; Endurance; Sequencing;UE Strength;LE Strength   Adaptive Equipment [place] Walker   Stand Pivot Contact Guard   Sit to Stand Minimal Assist   Stand to Sit Contact Guard   Supine to Sit Minimal Assist   All Transfer Minimal Assist   Walk 10 Feet   Type of Assistance Needed Incidental touching   Amount of Physical Assistance Provided No physical assistance   Walk 10 Feet CARE Score 4   Walk 50 Feet with Two Turns   Type of Assistance Needed Incidental touching   Amount of Physical Assistance Provided No physical assistance   Walk 50 Feet with Two Turns CARE Score 4   Walk 150 Feet   Type of Assistance Needed Incidental touching   Amount of Physical Assistance Provided No physical assistance   Walk 150 Feet CARE Score 4   Ambulation   Does the patient walk? 2   Yes   Primary Mode of Locomotion Prior to Admission Walk   Distance Walked (feet) 207 ft  (107)   Assist Device Solo Walker   Gait Pattern Inconsistant Alyx; Slow Alyx;Decreased foot clearance;L foot drag;L hemiparesis; Step to   Limitations Noted In Balance; Endurance; Safety;Swing   Provided Assistance with: Balance   Walk Assist Level Contact Guard   Findings level   Curb or Single Stair   Style negotiated Single stair   Type of Assistance Needed Incidental touching   Amount of Physical Assistance Provided No physical assistance   1 Step (Curb) CARE Score 4   4 Steps   Type of Assistance Needed Incidental touching   Amount of Physical Assistance Provided No physical assistance   4 Steps CARE Score 4   12 Steps   Type of Assistance Needed Incidental touching   Amount of Physical Assistance Provided No physical assistance   12 Steps CARE Score 4   Stairs   Type Stairs   # of Steps 18   Weight Bearing Precautions WBAT   Assist Devices Single Rail   Findings CGA going up forward and down backwards   Therapeutic Interventions   Strengthening seated ther ex 30 reps, AAROM as needed for L LE   Balance gait and transfers   Neuromuscular Re-Education Estim L LE DF muscle    Other stairs   Assessment   Treatment Assessment Pt was supine in bed upon entering room; Estim to LLE DF muscles for 15 min; Pt instructed to contract DF muscles with stim machine and relax during off cycle; Pt tolerates Estim well; Pt needed Min a for L LE out of bed; Pt is CGA/Min A for transfers with VCs for hand placement; Pt amb up to 207' with reilly walker and AFO on L LE; Pt limited by fatigue today from yesterdays session; Pt went up/down 18 steps with 1 HR, he ascends forward and descends backwards with CGA; Rests gievn throughout session due to fatigue; Pt performed seated ther ex for strengthening B LE; AAROM needed for seated marches   PT Barriers   Physical Impairment Decreased strength;Decreased range of motion;Decreased endurance; Impaired balance;Decreased mobility; Decreased coordination;Decreased safety awareness   Functional Limitation Wheelchair management; [facility] negotiation   Plan   Treatment/Interventions Functional transfer training;LE strengthening/ROM; Elevations; Therapeutic exercise; Endurance training;Patient/family training;Gait training   Progress Progressing toward goals   PT Therapy Minutes   PT Time In 1130   PT Time Out 1230   PT Total Time (minutes) 60   PT Mode of treatment - Individual (minutes) 55   PT Mode of treatment - Concurrent (minutes) 5   PT Mode of treatment - Group (minutes) 0   PT Mode of treatment - Co-treat (minutes) 0   PT Mode of Treatment - Total time(minutes) 60 minutes   PT Cumulative Minutes 1231   Therapy Time missed   Time missed?  No

## 2021-03-21 LAB
GLUCOSE SERPL-MCNC: 136 MG/DL (ref 65–140)
GLUCOSE SERPL-MCNC: 188 MG/DL (ref 65–140)
GLUCOSE SERPL-MCNC: 194 MG/DL (ref 65–140)
GLUCOSE SERPL-MCNC: 236 MG/DL (ref 65–140)
GLUCOSE SERPL-MCNC: 254 MG/DL (ref 65–140)
GLUCOSE SERPL-MCNC: 316 MG/DL (ref 65–140)
GLUCOSE SERPL-MCNC: 48 MG/DL (ref 65–140)

## 2021-03-21 PROCEDURE — 97110 THERAPEUTIC EXERCISES: CPT

## 2021-03-21 PROCEDURE — 97530 THERAPEUTIC ACTIVITIES: CPT

## 2021-03-21 PROCEDURE — 97032 APPL MODALITY 1+ESTIM EA 15: CPT

## 2021-03-21 PROCEDURE — 97112 NEUROMUSCULAR REEDUCATION: CPT

## 2021-03-21 PROCEDURE — 97116 GAIT TRAINING THERAPY: CPT

## 2021-03-21 PROCEDURE — 97537 COMMUNITY/WORK REINTEGRATION: CPT

## 2021-03-21 PROCEDURE — 82948 REAGENT STRIP/BLOOD GLUCOSE: CPT

## 2021-03-21 PROCEDURE — 97535 SELF CARE MNGMENT TRAINING: CPT

## 2021-03-21 RX ADMIN — INSULIN LISPRO 5 UNITS: 100 INJECTION, SOLUTION INTRAVENOUS; SUBCUTANEOUS at 12:55

## 2021-03-21 RX ADMIN — CLOPIDOGREL BISULFATE 75 MG: 75 TABLET ORAL at 08:38

## 2021-03-21 RX ADMIN — INSULIN LISPRO 3 UNITS: 100 INJECTION, SOLUTION INTRAVENOUS; SUBCUTANEOUS at 16:59

## 2021-03-21 RX ADMIN — LEVOTHYROXINE SODIUM 88 MCG: 88 TABLET ORAL at 05:04

## 2021-03-21 RX ADMIN — PANTOPRAZOLE SODIUM 40 MG: 40 TABLET, DELAYED RELEASE ORAL at 05:04

## 2021-03-21 RX ADMIN — INSULIN LISPRO 5 UNITS: 100 INJECTION, SOLUTION INTRAVENOUS; SUBCUTANEOUS at 08:05

## 2021-03-21 RX ADMIN — LISINOPRIL 40 MG: 20 TABLET ORAL at 08:38

## 2021-03-21 RX ADMIN — HEPARIN SODIUM 5000 UNITS: 5000 INJECTION INTRAVENOUS; SUBCUTANEOUS at 13:30

## 2021-03-21 RX ADMIN — DOCUSATE SODIUM 100 MG: 100 CAPSULE, LIQUID FILLED ORAL at 08:38

## 2021-03-21 RX ADMIN — INSULIN LISPRO 2 UNITS: 100 INJECTION, SOLUTION INTRAVENOUS; SUBCUTANEOUS at 08:04

## 2021-03-21 RX ADMIN — INSULIN LISPRO 1 UNITS: 100 INJECTION, SOLUTION INTRAVENOUS; SUBCUTANEOUS at 12:55

## 2021-03-21 RX ADMIN — HEPARIN SODIUM 5000 UNITS: 5000 INJECTION INTRAVENOUS; SUBCUTANEOUS at 21:22

## 2021-03-21 RX ADMIN — AMLODIPINE BESYLATE 5 MG: 5 TABLET ORAL at 08:38

## 2021-03-21 RX ADMIN — ATORVASTATIN CALCIUM 40 MG: 40 TABLET, FILM COATED ORAL at 16:28

## 2021-03-21 RX ADMIN — HEPARIN SODIUM 5000 UNITS: 5000 INJECTION INTRAVENOUS; SUBCUTANEOUS at 05:04

## 2021-03-21 NOTE — PLAN OF CARE
Problem: Potential for Falls  Goal: Patient will remain free of falls  Description: INTERVENTIONS:  - Assess patient frequently for physical needs  -  Identify cognitive and physical deficits and behaviors that affect risk of falls    -  [place] fall precautions as indicated by assessment   - Educate patient/family on patient safety including physical limitations  - Instruct patient to call for assistance with activity based on assessment  - Modify environment to reduce risk of injury  - Consider OT/PT consult to assist with strengthening/mobility  Outcome: Progressing     Problem: Prexisting or High Potential for Compromised Skin Integrity  Goal: Skin integrity is maintained or improved  Description: INTERVENTIONS:  - Identify patients at risk for skin breakdown  - Assess and monitor skin integrity  - Assess and monitor nutrition and hydration status  - Monitor labs   - Assess for incontinence   - Turn and reposition patient  - Assist with mobility/ambulation  - Relieve pressure over bony prominences  - Avoid friction and shearing  - Provide appropriate hygiene as needed including keeping skin clean and dry  - Evaluate need for skin moisturizer/barrier cream  - Collaborate with interdisciplinary team   - Patient/family teaching  - Consider wound care consult   Outcome: Progressing     Problem: PAIN - ADULT  Goal: Verbalizes/displays adequate comfort level or baseline comfort level  Description: Interventions:  - Encourage patient to monitor pain and request assistance  - Assess pain using appropriate pain scale  - Administer analgesics based on type and severity of pain and evaluate response  - Implement non-pharmacological measures as appropriate and evaluate response  - Consider cultural and social influences on pain and pain management  - Notify physician/advanced practitioner if interventions unsuccessful or patient reports new pain  Outcome: Progressing     Problem: INFECTION - ADULT  Goal: Absence or prevention of progression during hospitalization  Description: INTERVENTIONS:  - Assess and monitor for signs and symptoms of infection  - Monitor lab/diagnostic results  - Monitor all insertion sites, i e  indwelling lines, tubes, and drains  - Monitor endotracheal if appropriate and nasal secretions for changes in amount and color  - [place] appropriate cooling/warming therapies per order  - Administer medications as ordered  - Instruct and encourage patient and family to use good hand hygiene technique  - Identify and instruct in appropriate isolation precautions for identified infection/condition  Outcome: Progressing     Problem: SAFETY ADULT  Goal: Patient will remain free of falls  Description: INTERVENTIONS:  - Assess patient frequently for physical needs  -  Identify cognitive and physical deficits and behaviors that affect risk of falls    -  [place] fall precautions as indicated by assessment   - Educate patient/family on patient safety including physical limitations  - Instruct patient to call for assistance with activity based on assessment  - Modify environment to reduce risk of injury  - Consider OT/PT consult to assist with strengthening/mobility  Outcome: Progressing  Goal: Maintain or return to baseline ADL function  Description: INTERVENTIONS:  -  Assess patient's ability to carry out ADLs; assess patient's baseline for ADL function and identify physical deficits which impact ability to perform ADLs (bathing, care of mouth/teeth, toileting, grooming, dressing, etc )  - Assess/evaluate cause of self-care deficits   - Assess range of motion  - Assess patient's mobility; develop plan if impaired  - Assess patient's need for assistive devices and provide as appropriate  - Encourage maximum independence but intervene and supervise when necessary  - Involve family in performance of ADLs  - Assess for home care needs following discharge   - Consider OT consult to assist with ADL evaluation and planning for discharge  - Provide patient education as appropriate  Outcome: Progressing  Goal: Maintain or return mobility status to optimal level  Description: INTERVENTIONS:  - Assess patient's baseline mobility status (ambulation, transfers, stairs, etc )    - Identify cognitive and physical deficits and behaviors that affect mobility  - Identify mobility aids required to assist with transfers and/or ambulation (gait belt, sit-to-stand, lift, walker, cane, etc )  - [place] fall precautions as indicated by assessment  - Record patient progress and toleration of activity level on Mobility SBAR; progress patient to next Phase/Stage  - Instruct patient to call for assistance with activity based on assessment  - Consider rehabilitation consult to assist with strengthening/weightbearing, etc   Outcome: Progressing     Problem: DISCHARGE PLANNING  Goal: Discharge to home or other facility with appropriate resources  Description: INTERVENTIONS:  - Identify barriers to discharge w/patient and caregiver  - Arrange for needed discharge resources and transportation as appropriate  - Identify discharge learning needs (meds, wound care, etc )  - Arrange for interpretive services to assist at discharge as needed  - Refer to Case Management Department for coordinating discharge planning if the patient needs post-hospital services based on physician/advanced practitioner order or complex needs related to functional status, cognitive ability, or social support system  Outcome: Progressing     Problem: Nutrition/Hydration-ADULT  Goal: Nutrient/Hydration intake appropriate for improving, restoring or maintaining nutritional needs  Description: Monitor and assess patient's nutrition/hydration status for malnutrition  Collaborate with interdisciplinary team and initiate plan and interventions as ordered  Monitor patient's weight and dietary intake as ordered or per policy   Utilize nutrition screening tool and intervene as necessary  Determine patient's food preferences and provide high-protein, high-caloric foods as appropriate       INTERVENTIONS:  - Monitor oral intake, urinary output, labs, and treatment plans  - Assess nutrition and hydration status and recommend course of action  - Evaluate amount of meals eaten  - Assist patient with eating if necessary   - Allow adequate time for meals  - Recommend/ encourage appropriate diets, oral nutritional supplements, and vitamin/mineral supplements  - Order, calculate, and assess calorie counts as needed  - Recommend, monitor, and adjust tube feedings and TPN/PPN based on assessed needs  - Assess need for intravenous fluids  - Provide specific nutrition/hydration education as appropriate  - Include patient/family/caregiver in decisions related to nutrition  Outcome: Progressing

## 2021-03-21 NOTE — PROGRESS NOTES
03/21/21 1130   Pain Assessment   Pain Assessment Tool 0-10   Pain Score No Pain   Restrictions/Precautions   Precautions Fall Risk;Limb alert   Braces or Orthoses AFO;Sling   Cognition   Overall Cognitive Status WFL   Arousal/Participation Alert; Responsive; Cooperative   Attention Within functional limits   Orientation Level Oriented X4   Memory Within functional limits   Following Commands Follows all commands and directions without difficulty   Lying to Sitting on Side of Bed   Type of Assistance Needed Supervision   Amount of Physical Assistance Provided No physical assistance   Lying to Sitting on Side of Bed CARE Score 4   Sit to Stand   Type of Assistance Needed Incidental touching   Amount of Physical Assistance Provided No physical assistance   Sit to Stand CARE Score 4   Bed-Chair Transfer   Type of Assistance Needed Incidental touching   Amount of Physical Assistance Provided No physical assistance   Chair/Bed-to-Chair Transfer CARE Score 4   Transfer Bed/Chair/Wheelchair   Limitations Noted In Balance;Confidence; Coordination; Endurance; Sequencing;UE Strength;LE Strength   Adaptive Equipment Solo Walker   Stand Pivot Contact Guard   Sit to [facility]   Stand to [facility]   Supine to [facility] Supervision   All Transfer Contact Guard   Walk 10 Feet   Type of Assistance Needed Incidental touching   Amount of Physical Assistance Provided No physical assistance   Walk 10 Feet CARE Score 4   Walk 50 Feet with Two Turns   Type of Assistance Needed Incidental touching   Amount of Physical Assistance Provided No physical assistance   Walk 50 Feet with Two Turns CARE Score 4   Walk 150 Feet   Type of Assistance Needed Incidental touching   Amount of Physical Assistance Provided No physical assistance   Walk 150 Feet CARE Score 4   Ambulation   Does the patient walk? 2   Yes   Primary Mode of Locomotion Prior to Admission Walk   Distance Walked (feet) 210 ft  (104)   Assist Device Solo Walker   Gait Pattern Inconsistant Alyx; Slow Alyx;Decreased foot clearance;L foot drag;L hemiparesis; Step to   Limitations Noted In Balance; Endurance; Safety;Strength   Provided Assistance with: Balance   Walk Assist Level Contact Guard   Findings level   Curb or Single Stair   Style negotiated Single stair   Type of Assistance Needed Incidental touching   Amount of Physical Assistance Provided No physical assistance   1 Step (Curb) CARE Score 4   4 Steps   Type of Assistance Needed Incidental touching   Amount of Physical Assistance Provided No physical assistance   4 Steps CARE Score 4   12 Steps   Type of Assistance Needed Incidental touching   Amount of Physical Assistance Provided No physical assistance   12 Steps CARE Score 4   Stairs   Type Stairs   # of Steps 18   Weight Bearing Precautions WBAT   Assist Devices Single Rail   Findings CGA ascending forward/descending backwards   Therapeutic Interventions   Strengthening seated ther ex 30 reps   Balance gait and transfers   Neuromuscular Re-Education ESTIM LLLE DF muscle   Other stairs   Assessment   Treatment Assessment Pt is pleasant and cooperative with PT session; Started session supine in bed, EStim to LLE DF muscle for 15 min with good contraction and no c/o; Pt is CGA for transfers and amb; Pt amb up to 210' with reilly walker; Pt wears sling on LUE and AFO on LLE throughout session; Pt went up/down 18 steps with 1 HR ascending forward and descending backwards; Pt performed seated ther ex for strengthening B LE as able with rests   PT Barriers   Physical Impairment Decreased strength;Decreased range of motion;Decreased endurance; Impaired balance;Decreased mobility; Decreased coordination;Decreased safety awareness   Functional Limitation Walking;Transfers;Standing;Stair negotiation   Plan   Treatment/Interventions Functional transfer training;LE strengthening/ROM; Elevations; Therapeutic exercise; Endurance training;Patient/family training;Gait training   Progress Progressing toward goals   PT Therapy Minutes   PT Time In 1130   PT Time Out 1230   PT Total Time (minutes) 60   PT Mode of treatment - Individual (minutes) 60   PT Mode of treatment - Concurrent (minutes) 0   PT Mode of treatment - Group (minutes) 0   PT Mode of treatment - Co-treat (minutes) 0   PT Mode of Treatment - Total time(minutes) 60 minutes   PT Cumulative Minutes 1291   Therapy Time missed   Time missed?  No

## 2021-03-21 NOTE — NURSING NOTE
Pt slept through the night with no signs of pain or distress observed during rounding  Pt is currently sleeping, call bell hannah fong, SCDs on for DVT prophylaxis  Will continue to monitor and follow plan of care

## 2021-03-21 NOTE — PROGRESS NOTES
03/21/21 0642   Pain Assessment   Pain Assessment Tool Pain Assessment not indicated - pt denies pain   Restrictions/Precautions   Precautions Fall Risk;Limb alert   Braces or Orthoses MAFO;Sling  (LLE)   Oral Hygiene   Type of Assistance Needed Set-up / clean-up   Oral Hygiene CARE Score 5   Grooming   Able To Initiate Tasks;Comb/Brush Hair;Wash/Dry Face;Brush/Clean Teeth;Wash/Dry Hands   Findings s/u   Shower/Bathe Self   Type of Assistance Needed Supervision   Shower/Bathe Self CARE Score 4   Bathing   Assessed Bath Style Shower   Anticipated D/C Bath Style Shower;Sponge Bath   Able to [place] No   Able to [employer] Temperature No   Able to Wash/Rinse/Dry (body part) Left Arm;Right Arm;L Upper Leg;R Upper Leg;L Lower Leg/Foot;R Lower Leg/Foot;Chest;Abdomen;Perineal Area; Buttocks   Limitations Noted in Coordination;Balance; Endurance; Safety;ROM;Problem Solving;Strength   Positioning Seated;Standing   Adaptive Equipment Longhand Sponge; Tub Bench; Shower [employer]   Limitations Noted In Balance; Coordination; Endurance;ROM;Safety;LE Strength   Adaptive Equipment Grab Bars;Transfer Bench   Assessed Shower   Findings CGA   Upper Body Dressing   Type of Assistance Needed Set-up / clean-up   Upper Body Dressing CARE Score 5   Lower Body Dressing   Type of Assistance Needed Supervision   Lower Body Dressing CARE Score 4   Putting On/Taking Off Footwear   Type of Assistance Needed Physical assistance   Amount of Physical Assistance Provided Less than 25%   Comment LLE   Putting On/Taking Off Footwear CARE Score 3   Picking Up Object   Type of Assistance Needed Incidental touching   Picking Up Object CARE Score 4   Dressing/Undressing Clothing   Remove UB Clothes Pullover [address] UB Clothes Pullover Shirt   Remove LB Clothes Undergarment;Socks   [address]; Undergarment; Shoes;Socks   Limitations Noted In Balance; Coordination; Endurance;Problem Solving; Safety;Strength;ROM   Adaptive Equipment Reacher;Elastic Laces   Positioning Supported Sit;Standing   Lying to Sitting on Side of Bed   Type of Assistance Needed Supervision   Lying to Sitting on Side of Bed CARE Score 4   Sit to Stand   Type of Assistance Needed Incidental touching   Sit to Stand CARE Score 4   Bed-Chair Transfer   Type of Assistance Needed Incidental touching   Chair/Bed-to-Chair Transfer CARE Score 4   Neuromuscular Education   LUE Weight Bearing Forearm seated   Functional Movement Patterns pt encouraged to use LUE as active and passive assist to RUE with all activities   Response to Techniques estim provided to L shoulder for elevation and abd with 1 set 12 20 sec on and 10 off and intesity at 4 5 with assist to encourage increased movements each rep   Comments L hand foam block squeezes 30 times with assist of R hand   Cognition   Overall Cognitive Status WFL   Orientation Level Oriented X4   Additional Activities   Additional Activities Other (Comment)   Additional Activities Comments w/c mobility Mod I in room and hallway RUE/ RLLE   Other Comments   Assessment Pt participates in 35 minutes concurrent treatment focusing on ADL completion to allow for greater time and independence to utilize skills learned and A/E for task completion with similar goals as another patient   Assessment   Treatment Assessment Pt ppresents supine agreeable to ADl, transfers/ mobility and estim with neuro lamont to LUE  Pt tolerates session without complaints and is progressing nicely towards goals with increasing use of LUE as passive/ active assist during daily tasks  Pt continues to require assist due to decreased balance, safety, endurance and LUE/LLE ROM/ strength/ coordination  Pt will benefit from cotninued skilled OT services to increase independence with daily tasks  Problem List Decreased strength;Decreased range of motion;Decreased endurance; Impaired balance;Decreased coordination;Decreased safety awareness   Plan   Treatment/Interventions ADL retraining;Functional transfer training; Therapeutic exercise; Endurance training;Patient/family training;Equipment eval/education; Compensatory technique education   Progress Progressing toward goals   OT Therapy Minutes   OT Time In 0642   OT Time Out 0735   OT Total Time (minutes) 53   OT Mode of treatment - Individual (minutes) 18   OT Mode of treatment - Concurrent (minutes) 35   Therapy Time missed   Time missed?  No

## 2021-03-21 NOTE — PROGRESS NOTES
03/21/21 0747   Charting Type   Charting Type Shift assessment   Neurological   Neuro (WDL) X   Level of Consciousness Alert/awake   Orientation Level Oriented X4   Facial Symmetry Left facial drooping   R Pupil Size (mm) 2   L Pupil Size (mm) 3   L Hand  Absent   L Foot Plantar Flexion Weak   RUE Muscle Strength 5- Normal strength   LUE Muscle Strength 1- Muscle contraction, but no movement   RLE Muscle Strength 5- Normal strength   LLE Muscle Strength 2- Movement with gravity eliminated   Neuro Symptoms Fatigue   Relieved by Rest   Neuro Additional Assessments No   [place] Coma Scale   Eye Opening 4   Best Verbal Response 5   Best Motor Response 6   [place] Coma Scale Score 15   HEENT   HEENT (WDL) X   R Eye Mildly impaired vision   L Eye Mildly impaired vision   Vision - Corrective Lenses (at bedside) Glasses   R Ear Mildly impaired hearing   L Ear Mildly impaired hearing   Teeth Missing teeth   Respiratory   Respiratory (WDL) WDL   Respiratory Additional Assessments No   Respiratory Interventions   Respiratory Interventions Cough and deep breathe   Cough and Deep Breathe   Cough and Deep Breathe Yes   Cardiac   Cardiac (WDL) WDL   Pacemaker/ICD No   Peripheral Vascular   Peripheral Vascular (WDL) WDL   Integumentary   Integumentary (WDL) X   Skin Integrity Bruising   Skin Location scattered   Skin Turgor Non-tenting   Integumentary Additional Assessments No   Tattoos/Piercings   Does patient have tattoos?  Yes   Piercings Remaining No   Musculoskeletal   Musculoskeletal (WDL) X   Level of Assistance Contact guard assist, steadying assist   Assistive Device Wheelchair   LUE Limited movement   LLE Limited movement   Musculoskeletal Additional Assessments No   Gastrointestinal   Gastrointestinal (WDL) WDL   Last BM Date 03/20/21   Gastrointestinal Additional Assessments No   Bowel Shift Assessment   Assistance Needed Stool softener   Bowel Incontinence No   Stool Assessment   Bowel Incontinence No Genitourinary   Genitourinary (WDL) WDL   Bladder Shift Assessment   Bladder Device Urinal   Bladder Incontinence No   Urine Assessment   Urinary Incontinence No   Anal/Rectal   Anal/Rectal (WDL) WDL   Psychosocial   Psychosocial (WDL) WDL   Patient Behaviors/Mood Calm; Cooperative   Needs Expressed Denies   Ability to [employer] Needs assistance   [facility]   1  Wish to be Dead No       Left sided weakness  Unable to make left handed fist but able to slightly move left arm  Left facial droop  All meds explained and safety reinforced, continue same plan of care  Transfers CGA/hemiwalker

## 2021-03-21 NOTE — PLAN OF CARE
Problem: Potential for Falls  Goal: Patient will remain free of falls  Description: INTERVENTIONS:  - Assess patient frequently for physical needs  -  Identify cognitive and physical deficits and behaviors that affect risk of falls    -  [place] fall precautions as indicated by assessment   - Educate patient/family on patient safety including physical limitations  - Instruct patient to call for assistance with activity based on assessment  - Modify environment to reduce risk of injury  - Consider OT/PT consult to assist with strengthening/mobility  Outcome: Progressing     Problem: Prexisting or High Potential for Compromised Skin Integrity  Goal: Skin integrity is maintained or improved  Description: INTERVENTIONS:  - Identify patients at risk for skin breakdown  - Assess and monitor skin integrity  - Assess and monitor nutrition and hydration status  - Monitor labs   - Assess for incontinence   - Turn and reposition patient  - Assist with mobility/ambulation  - Relieve pressure over bony prominences  - Avoid friction and shearing  - Provide appropriate hygiene as needed including keeping skin clean and dry  - Evaluate need for skin moisturizer/barrier cream  - Collaborate with interdisciplinary team   - Patient/family teaching  - Consider wound care consult   Outcome: Progressing     Problem: PAIN - ADULT  Goal: Verbalizes/displays adequate comfort level or baseline comfort level  Description: Interventions:  - Encourage patient to monitor pain and request assistance  - Assess pain using appropriate pain scale  - Administer analgesics based on type and severity of pain and evaluate response  - Implement non-pharmacological measures as appropriate and evaluate response  - Consider cultural and social influences on pain and pain management  - Notify physician/advanced practitioner if interventions unsuccessful or patient reports new pain  Outcome: Progressing     Problem: INFECTION - ADULT  Goal: Absence or prevention of progression during hospitalization  Description: INTERVENTIONS:  - Assess and monitor for signs and symptoms of infection  - Monitor lab/diagnostic results  - Monitor all insertion sites, i e  indwelling lines, tubes, and drains  - Monitor endotracheal if appropriate and nasal secretions for changes in amount and color  - [place] appropriate cooling/warming therapies per order  - Administer medications as ordered  - Instruct and encourage patient and family to use good hand hygiene technique  - Identify and instruct in appropriate isolation precautions for identified infection/condition  Outcome: Progressing     Problem: SAFETY ADULT  Goal: Patient will remain free of falls  Description: INTERVENTIONS:  - Assess patient frequently for physical needs  -  Identify cognitive and physical deficits and behaviors that affect risk of falls    -  [place] fall precautions as indicated by assessment   - Educate patient/family on patient safety including physical limitations  - Instruct patient to call for assistance with activity based on assessment  - Modify environment to reduce risk of injury  - Consider OT/PT consult to assist with strengthening/mobility  Outcome: Progressing  Goal: Maintain or return to baseline ADL function  Description: INTERVENTIONS:  -  Assess patient's ability to carry out ADLs; assess patient's baseline for ADL function and identify physical deficits which impact ability to perform ADLs (bathing, care of mouth/teeth, toileting, grooming, dressing, etc )  - Assess/evaluate cause of self-care deficits   - Assess range of motion  - Assess patient's mobility; develop plan if impaired  - Assess patient's need for assistive devices and provide as appropriate  - Encourage maximum independence but intervene and supervise when necessary  - Involve family in performance of ADLs  - Assess for home care needs following discharge   - Consider OT consult to assist with ADL evaluation and planning for discharge  - Provide patient education as appropriate  Outcome: Progressing  Goal: Maintain or return mobility status to optimal level  Description: INTERVENTIONS:  - Assess patient's baseline mobility status (ambulation, transfers, stairs, etc )    - Identify cognitive and physical deficits and behaviors that affect mobility  - Identify mobility aids required to assist with transfers and/or ambulation (gait belt, sit-to-stand, lift, walker, cane, etc )  - [place] fall precautions as indicated by assessment  - Record patient progress and toleration of activity level on Mobility SBAR; progress patient to next Phase/Stage  - Instruct patient to call for assistance with activity based on assessment  - Consider rehabilitation consult to assist with strengthening/weightbearing, etc   Outcome: Progressing     Problem: DISCHARGE PLANNING  Goal: Discharge to home or other facility with appropriate resources  Description: INTERVENTIONS:  - Identify barriers to discharge w/patient and caregiver  - Arrange for needed discharge resources and transportation as appropriate  - Identify discharge learning needs (meds, wound care, etc )  - Arrange for interpretive services to assist at discharge as needed  - Refer to Case Management Department for coordinating discharge planning if the patient needs post-hospital services based on physician/advanced practitioner order or complex needs related to functional status, cognitive ability, or social support system  Outcome: Progressing     Problem: Nutrition/Hydration-ADULT  Goal: Nutrient/Hydration intake appropriate for improving, restoring or maintaining nutritional needs  Description: Monitor and assess patient's nutrition/hydration status for malnutrition  Collaborate with interdisciplinary team and initiate plan and interventions as ordered  Monitor patient's weight and dietary intake as ordered or per policy   Utilize nutrition screening tool and intervene as necessary  Determine patient's food preferences and provide high-protein, high-caloric foods as appropriate       INTERVENTIONS:  - Monitor oral intake, urinary output, labs, and treatment plans  - Assess nutrition and hydration status and recommend course of action  - Evaluate amount of meals eaten  - Assist patient with eating if necessary   - Allow adequate time for meals  - Recommend/ encourage appropriate diets, oral nutritional supplements, and vitamin/mineral supplements  - Order, calculate, and assess calorie counts as needed  - Recommend, monitor, and adjust tube feedings and TPN/PPN based on assessed needs  - Assess need for intravenous fluids  - Provide specific nutrition/hydration education as appropriate  - Include patient/family/caregiver in decisions related to nutrition  Outcome: Progressing

## 2021-03-22 LAB
ANION GAP SERPL CALCULATED.3IONS-SCNC: 7 MMOL/L (ref 4–13)
BASOPHILS # BLD AUTO: 0.1 THOUSANDS/ΜL (ref 0–0.1)
BASOPHILS NFR BLD AUTO: 2 % (ref 0–2)
BUN SERPL-MCNC: 56 MG/DL (ref 7–25)
CALCIUM SERPL-MCNC: 9.2 MG/DL (ref 8.6–10.5)
CHLORIDE SERPL-SCNC: 100 MMOL/L (ref 98–107)
CO2 SERPL-SCNC: 23 MMOL/L (ref 21–31)
CREAT SERPL-MCNC: 1.98 MG/DL (ref 0.7–1.3)
EOSINOPHIL # BLD AUTO: 0.5 THOUSAND/ΜL (ref 0–0.61)
EOSINOPHIL NFR BLD AUTO: 8 % (ref 0–5)
ERYTHROCYTE [DISTWIDTH] IN BLOOD BY AUTOMATED COUNT: 14.3 % (ref 11.5–14.5)
GFR SERPL CREATININE-BSD FRML MDRD: 33 ML/MIN/1.73SQ M
GLUCOSE P FAST SERPL-MCNC: 237 MG/DL (ref 65–99)
GLUCOSE SERPL-MCNC: 144 MG/DL (ref 65–140)
GLUCOSE SERPL-MCNC: 161 MG/DL (ref 65–140)
GLUCOSE SERPL-MCNC: 164 MG/DL (ref 65–140)
GLUCOSE SERPL-MCNC: 208 MG/DL (ref 65–140)
GLUCOSE SERPL-MCNC: 237 MG/DL (ref 65–99)
GLUCOSE SERPL-MCNC: 240 MG/DL (ref 65–140)
GLUCOSE SERPL-MCNC: 43 MG/DL (ref 65–140)
HCT VFR BLD AUTO: 36.4 % (ref 42–47)
HGB BLD-MCNC: 12.2 G/DL (ref 14–18)
LYMPHOCYTES # BLD AUTO: 1.4 THOUSANDS/ΜL (ref 0.6–4.47)
LYMPHOCYTES NFR BLD AUTO: 24 % (ref 21–51)
MCH RBC QN AUTO: 31.8 PG (ref 26–34)
MCHC RBC AUTO-ENTMCNC: 33.5 G/DL (ref 31–37)
MCV RBC AUTO: 95 FL (ref 81–99)
MONOCYTES # BLD AUTO: 0.5 THOUSAND/ΜL (ref 0.17–1.22)
MONOCYTES NFR BLD AUTO: 10 % (ref 2–12)
NEUTROPHILS # BLD AUTO: 3.2 THOUSANDS/ΜL (ref 1.4–6.5)
NEUTS SEG NFR BLD AUTO: 56 % (ref 42–75)
PLATELET # BLD AUTO: 258 THOUSANDS/UL (ref 149–390)
PMV BLD AUTO: 8 FL (ref 8.6–11.7)
POTASSIUM SERPL-SCNC: 4.5 MMOL/L (ref 3.5–5.5)
RBC # BLD AUTO: 3.84 MILLION/UL (ref 4.3–5.9)
SODIUM SERPL-SCNC: 130 MMOL/L (ref 134–143)
WBC # BLD AUTO: 5.7 THOUSAND/UL (ref 4.8–10.8)

## 2021-03-22 PROCEDURE — 97032 APPL MODALITY 1+ESTIM EA 15: CPT

## 2021-03-22 PROCEDURE — 97116 GAIT TRAINING THERAPY: CPT

## 2021-03-22 PROCEDURE — 97535 SELF CARE MNGMENT TRAINING: CPT

## 2021-03-22 PROCEDURE — 97542 WHEELCHAIR MNGMENT TRAINING: CPT

## 2021-03-22 PROCEDURE — 85025 COMPLETE CBC W/AUTO DIFF WBC: CPT | Performed by: PHYSICAL MEDICINE & REHABILITATION

## 2021-03-22 PROCEDURE — 97537 COMMUNITY/WORK REINTEGRATION: CPT

## 2021-03-22 PROCEDURE — 97530 THERAPEUTIC ACTIVITIES: CPT

## 2021-03-22 PROCEDURE — 97110 THERAPEUTIC EXERCISES: CPT

## 2021-03-22 PROCEDURE — 82948 REAGENT STRIP/BLOOD GLUCOSE: CPT

## 2021-03-22 PROCEDURE — 80048 BASIC METABOLIC PNL TOTAL CA: CPT | Performed by: PHYSICAL MEDICINE & REHABILITATION

## 2021-03-22 PROCEDURE — 97112 NEUROMUSCULAR REEDUCATION: CPT

## 2021-03-22 RX ADMIN — ATORVASTATIN CALCIUM 40 MG: 40 TABLET, FILM COATED ORAL at 17:05

## 2021-03-22 RX ADMIN — LEVOTHYROXINE SODIUM 88 MCG: 88 TABLET ORAL at 05:11

## 2021-03-22 RX ADMIN — HEPARIN SODIUM 5000 UNITS: 5000 INJECTION INTRAVENOUS; SUBCUTANEOUS at 21:27

## 2021-03-22 RX ADMIN — INSULIN LISPRO 5 UNITS: 100 INJECTION, SOLUTION INTRAVENOUS; SUBCUTANEOUS at 13:16

## 2021-03-22 RX ADMIN — DOCUSATE SODIUM 100 MG: 100 CAPSULE, LIQUID FILLED ORAL at 08:56

## 2021-03-22 RX ADMIN — DOCUSATE SODIUM 100 MG: 100 CAPSULE, LIQUID FILLED ORAL at 17:05

## 2021-03-22 RX ADMIN — CLOPIDOGREL BISULFATE 75 MG: 75 TABLET ORAL at 08:56

## 2021-03-22 RX ADMIN — INSULIN LISPRO 5 UNITS: 100 INJECTION, SOLUTION INTRAVENOUS; SUBCUTANEOUS at 09:03

## 2021-03-22 RX ADMIN — INSULIN LISPRO 1 UNITS: 100 INJECTION, SOLUTION INTRAVENOUS; SUBCUTANEOUS at 13:15

## 2021-03-22 RX ADMIN — LISINOPRIL 40 MG: 20 TABLET ORAL at 08:56

## 2021-03-22 RX ADMIN — HEPARIN SODIUM 5000 UNITS: 5000 INJECTION INTRAVENOUS; SUBCUTANEOUS at 14:08

## 2021-03-22 RX ADMIN — PANTOPRAZOLE SODIUM 40 MG: 40 TABLET, DELAYED RELEASE ORAL at 05:11

## 2021-03-22 RX ADMIN — AMLODIPINE BESYLATE 5 MG: 5 TABLET ORAL at 08:56

## 2021-03-22 RX ADMIN — HEPARIN SODIUM 5000 UNITS: 5000 INJECTION INTRAVENOUS; SUBCUTANEOUS at 05:11

## 2021-03-22 RX ADMIN — INSULIN LISPRO 3 UNITS: 100 INJECTION, SOLUTION INTRAVENOUS; SUBCUTANEOUS at 09:00

## 2021-03-22 RX ADMIN — POLYETHYLENE GLYCOL 3350 17 G: 17 POWDER, FOR SOLUTION ORAL at 08:55

## 2021-03-22 NOTE — PROGRESS NOTES
03/22/21 1400   Pain Assessment   Pain Assessment Tool 0-10   Pain Score No Pain   Restrictions/Precautions   Precautions Fall Risk   Braces or Orthoses AFO;Sling   Cognition   Overall Cognitive Status WFL   Orientation Level Oriented X4   Lying to Sitting on Side of Bed   Type of Assistance Needed Supervision   Comment bedrail   Lying to Sitting on Side of Bed CARE Score 4   Sit to Stand   Type of Assistance Needed Supervision   Comment close S/S   Sit to Stand CARE Score 4   Bed-Chair Transfer   Type of Assistance Needed Supervision   Comment close S/S   Chair/Bed-to-Chair Transfer CARE Score 4   Walk 10 Feet   Type of Assistance Needed Incidental touching   Comment    Walk 10 Feet CARE Score 4   Walk 50 Feet with Two Turns   Type of Assistance Needed Incidental touching   Comment cg   Walk 50 Feet with Two Turns CARE Score 4   Walk 150 Feet   Type of Assistance Needed Incidental touching   Comment    Walk 150 Feet CARE Score 4   Ambulation   Does the patient walk? 2  Yes   Primary Mode of Locomotion Prior to Admission Walk   Distance Walked (feet) 283 ft  (283'  163')   Assist Device Solo Walker   Gait Pattern Inconsistant Alyx; Slow Alyx;Decreased foot clearance;L foot drag;L hemiparesis; Step to   Limitations Noted In Balance; Endurance; Safety;Strength   Provided Assistance with: Balance   Walk Assist Level Contact Guard   Findings level and unlevel surfaces   Curb or Single Stair   Style negotiated Single stair   Type of Assistance Needed Incidental touching   Comment    1 Step (Curb) CARE Score 4   4 Steps   Type of Assistance Needed Incidental touching   Comment cg   4 Steps CARE Score 4   12 Steps   Type of Assistance Needed Incidental touching   Comment    12 Steps CARE Score 4   Stairs   Type Stairs   # of Steps 21   Weight Bearing Precautions WBAT   Assist Devices Single Rail   Findings cg ascending forward and descending backwards     Therapeutic Interventions   Balance gait and transfer training   Neuromuscular Re-Education E-stim LLE DF muscles   Other stairs   Assessment   Treatment Assessment Patient tolerated therapy session well  Wife completed gait, transfer, and stair training  Wife feels comfortable with JAYASHREE needed  Will continue to involve wife in therapy sessions  Completed E-stim to LLE with positive DF contractions  PT Barriers   Physical Impairment Decreased strength;Decreased range of motion;Decreased endurance; Impaired balance;Decreased mobility; Decreased coordination;Decreased safety awareness   Functional Limitation Walking;Transfers;Standing;Stair negotiation   Plan   Treatment/Interventions Functional transfer training;Elevations; Bed mobility;Gait training;Patient/family training   Progress Progressing toward goals   PT Therapy Minutes   PT Time In 1400   PT Time Out 1500   PT Total Time (minutes) 60   PT Mode of treatment - Individual (minutes) 60   PT Mode of treatment - Concurrent (minutes) 0   PT Mode of treatment - Group (minutes) 0   PT Mode of treatment - Co-treat (minutes) 0   PT Mode of Treatment - Total time(minutes) 60 minutes   PT Cumulative Minutes 1413   Therapy Time missed   Time missed?  No

## 2021-03-22 NOTE — PROGRESS NOTES
03/22/21 0700   Pain Assessment   Pain Assessment Tool Pain Assessment not indicated - pt denies pain   Restrictions/Precautions   Precautions Fall Risk   Weight Bearing Restrictions No   ROM Restrictions No   Braces or Orthoses AFO;Sling   Eating   Type of Assistance Needed Set-up / clean-up   Amount of Physical Assistance Provided No physical assistance   Eating CARE Score 5   Grooming   Able To Initiate Tasks; Acquire Items;Comb/Brush Hair;Wash/Dry Face   Limitation Noted In Coordination; Safety   Shower/Bathe Self   Type of Assistance Needed Supervision   Amount of Physical Assistance Provided No physical assistance   Shower/Bathe Self CARE Score 4   Bathing   Assessed Bath Style Shower   Anticipated D/C Bath Style Shower;Sponge Bath   Able to [place] Yes   Able to [employer] Temperature Yes   Able to Wash/Rinse/Dry (body part) Left Arm;Right Arm;L Upper Leg;R Upper Leg;L Lower Leg/Foot;R Lower Leg/Foot;Chest;Abdomen;Perineal Area; Buttocks   Limitations Noted in Balance; Coordination;ROM;Safety;Strength   Positioning Seated;Standing   Adaptive Equipment [employer] Sponge; Tub Bench; Shower [employer]   Limitations Noted In Balance; Coordination;ROM;Safety;UE Strength;LE Strength   Adaptive Equipment Grab Bars;Seat with Back   Assessed Shower   Findings CG   Upper Body Dressing   Type of Assistance Needed Supervision   Amount of Physical Assistance Provided No physical assistance   Upper Body Dressing CARE Score 4   Lower Body Dressing   Type of Assistance Needed Physical assistance   Amount of Physical Assistance Provided Less than 25%   Comment assist to get L foot into pants and underwear   Lower Body Dressing CARE Score 3   Putting On/Taking Off Footwear   Type of Assistance Needed Physical assistance   Amount of Physical Assistance Provided 50%-74%   Comment assist to don bilat socks and shoes   Putting On/Taking Off Footwear CARE Score 2   Dressing/Undressing Clothing Remove UB Clothes Pullover [address] UB Clothes Pullover Shirt   Remove LB Clothes Undergarment;Socks   Don LB Clothes Shorts; Undergarment;Socks; Shoes   Limitations Noted In Balance; Coordination; Safety;Strength;ROM   Adaptive Equipment Reacher   Positioning Supported Sit   Lying to Sitting on Side of Bed   Type of Assistance Needed Supervision   Amount of Physical Assistance Provided No physical assistance   Lying to Sitting on Side of Bed CARE Score 4   Sit to Stand   Type of Assistance Needed Supervision   Amount of Physical Assistance Provided No physical assistance   Sit to Stand CARE Score 4   Bed-Chair Transfer   Type of Assistance Needed Supervision   Amount of Physical Assistance Provided No physical assistance   Chair/Bed-to-Chair Transfer CARE Score 4   Transfer Bed/Chair/Wheelchair   Limitations Noted In Balance; Coordination;UE Strength;LE Strength   Neuromuscular Education   Functional Movement Patterns AAROM x10 shoulder to digits all planes of motion LUE   Response to Techniques pt with trace to no shoulder flexion/extension, otherwise all other motions between trace-fair   Comments e-stim to LUE targeting elbow flexion/extension ,wrist flexion/extension, pronation/supination; intensity = 4-5, 20 seconds on with 10 second break beteween intervals; pt with good return during e-stim but trace elbow flexion/extension and fair wrist and forearm AROM without stim   Cognition   Overall Cognitive Status WFL   Arousal/Participation Alert; Responsive; Cooperative   Attention Within functional limits   Orientation Level Oriented X4   Memory Within functional limits   Following Commands Follows all commands and directions without difficulty   Assessment   Treatment Assessment Pt agreeable to OT session this AM  Received lying supine in bed  ADL session completed; current LOF and details listed in respective sections   Pt is overall supervision for bathing, UB dressing, and grooming; CG transfers; Karen LB dressing  AE used as needed for transfers and LB tasks  Pt continues with decreased strength, ROM, and coordination on L side, however is making improvements functionally despite decreased LUE movement  After ADL, propelled self in w/c to OT room and completed neuro re-ed; details in respective sections  Pt continues to be motivated in therapy and enjoys treatment  Set up for breakfast tray at end of session  Pt would benefit from continued skilled OT services in order to maximize independence in self care, functional mobility/transfers, ROM/strength, and activity tolerance  Prognosis Good   Problem List Decreased strength;Decreased range of motion;Decreased endurance; Impaired balance;Decreased coordination;Decreased safety awareness   Plan   Treatment/Interventions ADL retraining;Functional transfer training;LE strengthening/ROM; Therapeutic exercise; Endurance training;Patient/family training;Equipment eval/education; Compensatory technique education;OT   Progress Progressing toward goals   OT Therapy Minutes   OT Time In 0700   OT Time Out 0830   OT Total Time (minutes) 90   OT Mode of treatment - Individual (minutes) 64   OT Mode of treatment - Concurrent (minutes) 26   Therapy Time missed   Time missed?  No

## 2021-03-22 NOTE — PROGRESS NOTES
03/22/21 0858   Pain Assessment   Pain Assessment Tool 0-10   Pain Score No Pain   Restrictions/Precautions   Precautions Fall Risk   Braces or Orthoses AFO;Sling  (AFO LLE; sling LUE)   Cognition   Overall Cognitive Status WFL   Orientation Level Oriented X4   Sit to Stand   Type of Assistance Needed Supervision   Comment close S/S   Sit to Stand CARE Score 4   Walk 10 Feet   Type of Assistance Needed Incidental touching   Comment cg   Walk 10 Feet CARE Score 4   Walk 50 Feet with Two Turns   Type of Assistance Needed Incidental touching   Comment cg   Walk 50 Feet with Two Turns CARE Score 4   Walk 150 Feet   Type of Assistance Needed Incidental touching   Comment cg   Walk 150 Feet CARE Score 4   Ambulation   Does the patient walk? 2  Yes   Primary Mode of Locomotion Prior to Admission Walk   Distance Walked (feet) 301 ft  (506' 152')   Assist Device Solo Walker   Gait Pattern Inconsistant Alyx; Slow Alyx;Decreased foot clearance;L foot drag;L hemiparesis; Step to   Limitations Noted In Balance; Endurance; Safety;Strength   Provided Assistance with: Balance   Walk Assist Level Contact Guard   Findings level surfaces   Wheel 50 Feet with Two Turns   Type of Assistance Needed Independent   Wheel 50 Feet with Two Turns CARE Score 6   Wheel 150 Feet   Type of Assistance Needed Independent   Wheel 150 Feet CARE Score 6   Wheelchair mobility   Method Right upper extremity;Right lower extremity   Distance Level Surface (feet) 216 ft   Distance Wheeled 3% Grade 22 ft   Findings mod I level and unlevel surfaces   Therapeutic Interventions   Strengthening standing ther ex   Balance gait and transfer training   Assessment   Treatment Assessment Patient tolerated therapy session well  Continues to make positive gains  No reports of pain  Will trial patient with leaving AFO on so he can begin ambulating to and from bathroom  Nursing aware  Will check skin periodically to avoid skin breakdown     Completed ther ex for general LE strengthening; gait and transfer training focusing on sequence and technique for improved balance and safety with functional mobility  PT Barriers   Physical Impairment Decreased strength;Decreased range of motion;Decreased endurance; Impaired balance;Decreased mobility; Decreased safety awareness   Functional Limitation Walking;Transfers;Standing;Stair negotiation   Plan   Treatment/Interventions Functional transfer training;LE strengthening/ROM; Therapeutic exercise;Gait training   Progress Progressing toward goals   PT Therapy Minutes   PT Time In 0858   PT Time Out 1000   PT Total Time (minutes) 62   PT Mode of treatment - Individual (minutes) 62   PT Mode of treatment - Concurrent (minutes) 0   PT Mode of treatment - Group (minutes) 0   PT Mode of treatment - Co-treat (minutes) 0   PT Mode of Treatment - Total time(minutes) 62 minutes   PT Cumulative Minutes 1353   Therapy Time missed   Time missed?  No

## 2021-03-22 NOTE — NURSING NOTE
Pt slept through the night with no signs of pain or distress observed during rounding  Call bell within reach, bed alarm in place for pt safety, SCD's on for DVT prophylaxis  Will continue to monitor and follow plan of care

## 2021-03-22 NOTE — NURSING NOTE
Pt BS 48 at 2024, given OJ and zora marques  BS rechecked at 2116 was at 136  Pt did not receive any sliding scale humalog  Pt has insulin pump but does not ever self administer insulin at night  Pt states he "feels fine "  Will continue to monitor and follow plan of care

## 2021-03-22 NOTE — PLAN OF CARE
Problem: Potential for Falls  Goal: Patient will remain free of falls  Description: INTERVENTIONS:  - Assess patient frequently for physical needs  -  Identify cognitive and physical deficits and behaviors that affect risk of falls    -  [place] fall precautions as indicated by assessment   - Educate patient/family on patient safety including physical limitations  - Instruct patient to call for assistance with activity based on assessment  - Modify environment to reduce risk of injury  - Consider OT/PT consult to assist with strengthening/mobility  Outcome: Progressing     Problem: Prexisting or High Potential for Compromised Skin Integrity  Goal: Skin integrity is maintained or improved  Description: INTERVENTIONS:  - Identify patients at risk for skin breakdown  - Assess and monitor skin integrity  - Assess and monitor nutrition and hydration status  - Monitor labs   - Assess for incontinence   - Turn and reposition patient  - Assist with mobility/ambulation  - Relieve pressure over bony prominences  - Avoid friction and shearing  - Provide appropriate hygiene as needed including keeping skin clean and dry  - Evaluate need for skin moisturizer/barrier cream  - Collaborate with interdisciplinary team   - Patient/family teaching  - Consider wound care consult   Outcome: Progressing     Problem: PAIN - ADULT  Goal: Verbalizes/displays adequate comfort level or baseline comfort level  Description: Interventions:  - Encourage patient to monitor pain and request assistance  - Assess pain using appropriate pain scale  - Administer analgesics based on type and severity of pain and evaluate response  - Implement non-pharmacological measures as appropriate and evaluate response  - Consider cultural and social influences on pain and pain management  - Notify physician/advanced practitioner if interventions unsuccessful or patient reports new pain  Outcome: Progressing     Problem: INFECTION - ADULT  Goal: Absence or prevention of progression during hospitalization  Description: INTERVENTIONS:  - Assess and monitor for signs and symptoms of infection  - Monitor lab/diagnostic results  - Monitor all insertion sites, i e  indwelling lines, tubes, and drains  - Monitor endotracheal if appropriate and nasal secretions for changes in amount and color  - [place] appropriate cooling/warming therapies per order  - Administer medications as ordered  - Instruct and encourage patient and family to use good hand hygiene technique  - Identify and instruct in appropriate isolation precautions for identified infection/condition  Outcome: Progressing     Problem: SAFETY ADULT  Goal: Patient will remain free of falls  Description: INTERVENTIONS:  - Assess patient frequently for physical needs  -  Identify cognitive and physical deficits and behaviors that affect risk of falls    -  [place] fall precautions as indicated by assessment   - Educate patient/family on patient safety including physical limitations  - Instruct patient to call for assistance with activity based on assessment  - Modify environment to reduce risk of injury  - Consider OT/PT consult to assist with strengthening/mobility  Outcome: Progressing  Goal: Maintain or return to baseline ADL function  Description: INTERVENTIONS:  -  Assess patient's ability to carry out ADLs; assess patient's baseline for ADL function and identify physical deficits which impact ability to perform ADLs (bathing, care of mouth/teeth, toileting, grooming, dressing, etc )  - Assess/evaluate cause of self-care deficits   - Assess range of motion  - Assess patient's mobility; develop plan if impaired  - Assess patient's need for assistive devices and provide as appropriate  - Encourage maximum independence but intervene and supervise when necessary  - Involve family in performance of ADLs  - Assess for home care needs following discharge   - Consider OT consult to assist with ADL evaluation and planning for discharge  - Provide patient education as appropriate  Outcome: Progressing  Goal: Maintain or return mobility status to optimal level  Description: INTERVENTIONS:  - Assess patient's baseline mobility status (ambulation, transfers, stairs, etc )    - Identify cognitive and physical deficits and behaviors that affect mobility  - Identify mobility aids required to assist with transfers and/or ambulation (gait belt, sit-to-stand, lift, walker, cane, etc )  - [place] fall precautions as indicated by assessment  - Record patient progress and toleration of activity level on Mobility SBAR; progress patient to next Phase/Stage  - Instruct patient to call for assistance with activity based on assessment  - Consider rehabilitation consult to assist with strengthening/weightbearing, etc   Outcome: Progressing     Problem: DISCHARGE PLANNING  Goal: Discharge to home or other facility with appropriate resources  Description: INTERVENTIONS:  - Identify barriers to discharge w/patient and caregiver  - Arrange for needed discharge resources and transportation as appropriate  - Identify discharge learning needs (meds, wound care, etc )  - Arrange for interpretive services to assist at discharge as needed  - Refer to Case Management Department for coordinating discharge planning if the patient needs post-hospital services based on physician/advanced practitioner order or complex needs related to functional status, cognitive ability, or social support system  Outcome: Progressing     Problem: Nutrition/Hydration-ADULT  Goal: Nutrient/Hydration intake appropriate for improving, restoring or maintaining nutritional needs  Description: Monitor and assess patient's nutrition/hydration status for malnutrition  Collaborate with interdisciplinary team and initiate plan and interventions as ordered  Monitor patient's weight and dietary intake as ordered or per policy   Utilize nutrition screening tool and intervene as necessary  Determine patient's food preferences and provide high-protein, high-caloric foods as appropriate       INTERVENTIONS:  - Monitor oral intake, urinary output, labs, and treatment plans  - Assess nutrition and hydration status and recommend course of action  - Evaluate amount of meals eaten  - Assist patient with eating if necessary   - Allow adequate time for meals  - Recommend/ encourage appropriate diets, oral nutritional supplements, and vitamin/mineral supplements  - Order, calculate, and assess calorie counts as needed  - Recommend, monitor, and adjust tube feedings and TPN/PPN based on assessed needs  - Assess need for intravenous fluids  - Provide specific nutrition/hydration education as appropriate  - Include patient/family/caregiver in decisions related to nutrition  Outcome: Progressing

## 2021-03-22 NOTE — PHYSICAL THERAPY NOTE
Patient will need reilly walker and wheelchair for home  Recommend wheelchair for independence in the home to complete MR ADLs as patient is unable to independently ambulate  Patient requires use of reilly walker to assist with transfers  Patient also requires reilly walker to continue  with home physical therapy to increase pt's independence with ambulation

## 2021-03-23 LAB
GLUCOSE SERPL-MCNC: 149 MG/DL (ref 65–140)
GLUCOSE SERPL-MCNC: 200 MG/DL (ref 65–140)
GLUCOSE SERPL-MCNC: 213 MG/DL (ref 65–140)
GLUCOSE SERPL-MCNC: 219 MG/DL (ref 65–140)
GLUCOSE SERPL-MCNC: 227 MG/DL (ref 65–140)
GLUCOSE SERPL-MCNC: 77 MG/DL (ref 65–140)

## 2021-03-23 PROCEDURE — 97537 COMMUNITY/WORK REINTEGRATION: CPT

## 2021-03-23 PROCEDURE — 99231 SBSQ HOSP IP/OBS SF/LOW 25: CPT | Performed by: FAMILY MEDICINE

## 2021-03-23 PROCEDURE — 97112 NEUROMUSCULAR REEDUCATION: CPT

## 2021-03-23 PROCEDURE — 97530 THERAPEUTIC ACTIVITIES: CPT

## 2021-03-23 PROCEDURE — 97032 APPL MODALITY 1+ESTIM EA 15: CPT

## 2021-03-23 PROCEDURE — 97116 GAIT TRAINING THERAPY: CPT

## 2021-03-23 PROCEDURE — 82948 REAGENT STRIP/BLOOD GLUCOSE: CPT

## 2021-03-23 PROCEDURE — 97535 SELF CARE MNGMENT TRAINING: CPT

## 2021-03-23 PROCEDURE — 97110 THERAPEUTIC EXERCISES: CPT

## 2021-03-23 RX ADMIN — HEPARIN SODIUM 5000 UNITS: 5000 INJECTION INTRAVENOUS; SUBCUTANEOUS at 14:59

## 2021-03-23 RX ADMIN — ATORVASTATIN CALCIUM 40 MG: 40 TABLET, FILM COATED ORAL at 16:48

## 2021-03-23 RX ADMIN — PANTOPRAZOLE SODIUM 40 MG: 40 TABLET, DELAYED RELEASE ORAL at 05:45

## 2021-03-23 RX ADMIN — POLYETHYLENE GLYCOL 3350 17 G: 17 POWDER, FOR SOLUTION ORAL at 18:42

## 2021-03-23 RX ADMIN — INSULIN LISPRO 2 UNITS: 100 INJECTION, SOLUTION INTRAVENOUS; SUBCUTANEOUS at 12:51

## 2021-03-23 RX ADMIN — LISINOPRIL 40 MG: 20 TABLET ORAL at 08:30

## 2021-03-23 RX ADMIN — INSULIN LISPRO 2 UNITS: 100 INJECTION, SOLUTION INTRAVENOUS; SUBCUTANEOUS at 16:46

## 2021-03-23 RX ADMIN — HEPARIN SODIUM 5000 UNITS: 5000 INJECTION INTRAVENOUS; SUBCUTANEOUS at 05:45

## 2021-03-23 RX ADMIN — CLOPIDOGREL BISULFATE 75 MG: 75 TABLET ORAL at 08:30

## 2021-03-23 RX ADMIN — INSULIN LISPRO 2 UNITS: 100 INJECTION, SOLUTION INTRAVENOUS; SUBCUTANEOUS at 08:31

## 2021-03-23 RX ADMIN — DOCUSATE SODIUM 100 MG: 100 CAPSULE, LIQUID FILLED ORAL at 16:49

## 2021-03-23 RX ADMIN — DOCUSATE SODIUM 100 MG: 100 CAPSULE, LIQUID FILLED ORAL at 08:30

## 2021-03-23 RX ADMIN — LEVOTHYROXINE SODIUM 88 MCG: 88 TABLET ORAL at 05:45

## 2021-03-23 RX ADMIN — INSULIN LISPRO 5 UNITS: 100 INJECTION, SOLUTION INTRAVENOUS; SUBCUTANEOUS at 08:31

## 2021-03-23 RX ADMIN — HEPARIN SODIUM 5000 UNITS: 5000 INJECTION INTRAVENOUS; SUBCUTANEOUS at 21:44

## 2021-03-23 RX ADMIN — AMLODIPINE BESYLATE 5 MG: 5 TABLET ORAL at 08:30

## 2021-03-23 NOTE — PROGRESS NOTES
03/23/21 1315   Pain Assessment   Pain Assessment Tool Pain Assessment not indicated - pt denies pain   Restrictions/Precautions   Precautions Fall Risk   Weight Bearing Restrictions No   ROM Restrictions No   Sit to Stand   Type of Assistance Needed Supervision   Amount of Physical Assistance Provided No physical assistance   Sit to Stand CARE Score 4   Toileting Hygiene   Type of Assistance Needed Physical assistance   Amount of Physical Assistance Provided Less than 25%   Comment OT supervised wife completing toileting with pt; pt required assist to pull up L side of pants   [place] 83 Score 3   Toileting   Able to [address] down yes, up yes  (with incidental assist from wife)   Able to [address] Other   Manage Hygiene Bladder   Limitations Noted In Balance;ROM;Safety;LE Strength;UE Strength   Adaptive Equipment Grab Bar   Toilet Transfer   Type of Assistance Needed Supervision   Amount of Physical Assistance Provided No physical assistance   Toilet Transfer CARE Score 4   Toilet Transfer   Surface Assessed Raised Toilet   Transfer Technique Standard   Limitations Noted In Balance;ROM;Safety;LE Strength   Adaptive Equipment Grab Bar;Solo Walker   Therapeutic Exercise - ROM   UE-ROM Yes   ROM- Right Upper Extremities   RUE ROM Comment towel slides x40 all planes of motoin with RUE providing AAROM to LUE   ROM - Left Upper Extremities    LUE ROM Comment towel slides x40 all planes of motoin with RUE providing AAROM to LUE   Neuromuscular Education   Comments e-stim to L shoulder to encourage shrug, x20 with intensity = 4 5, 20 seconds on and 10 second break between intervals; pt with good shoulder shrug noted during both e-stim and when e-stim removed   Cognition   Overall Cognitive Status [facility]   Arousal/Participation Alert; Responsive; Cooperative   Attention Within functional limits   Orientation Level Oriented X4   Memory Within functional limits   Following Commands Follows all commands and directions without difficulty   Assessment   Treatment Assessment Pt agreeable to OT session this PM  Received completing functional mobility to bathroom with wife; reilly walker in use and wife providing CG  Wife assisted pt in completing toileting; Karen to pull up L side of pants, transfer supervision  Neuro re-ed and ROM completed, details in respective sections  Pt's L shoulder shrug continues to improve, and pt remains motivated to return to PLOF  Propelled self to PT room at end of session using RUE and RLE  Pt would benefit from continued skilled OT services in order to maximize independence in self care, functional mobility/transfers, ROM/strength, and activity tolerance  Prognosis Good   Problem List Decreased strength;Decreased range of motion;Decreased endurance; Impaired balance;Decreased coordination;Decreased safety awareness   Plan   Treatment/Interventions ADL retraining;Functional transfer training;LE strengthening/ROM; Therapeutic exercise; Endurance training;Patient/family training;Equipment eval/education; Compensatory technique education;OT   Progress Progressing toward goals   OT Therapy Minutes   OT Time In 1315   OT Time Out 1344   OT Total Time (minutes) 29   OT Mode of treatment - Individual (minutes) 14   OT Mode of treatment - Concurrent (minutes) 15

## 2021-03-23 NOTE — PROGRESS NOTES
03/23/21 0700   Pain Assessment   Pain Assessment Tool Pain Assessment not indicated - pt denies pain   Restrictions/Precautions   Precautions Fall Risk   Weight Bearing Restrictions No   ROM Restrictions No   Braces or Orthoses AFO;Sling   Eating   Type of Assistance Needed Set-up / clean-up   Eating CARE Score 5   Grooming   Able To Initiate Tasks; Acquire Items;Comb/Brush Hair;Wash/Dry Face   Limitation Noted In Coordination; Safety   Shower/Bathe Self   Type of Assistance Needed Physical assistance   Amount of Physical Assistance Provided Less than 25%   Comment assist for R upper UE   Shower/Bathe Self CARE Score 3   Bathing   Assessed Bath Style Shower   Anticipated D/C Bath Style Shower;Sponge Bath   Able to [place] No   Able to [employer] Temperature Yes   Able to Wash/Rinse/Dry (body part) Left Arm;L Upper Leg;R Upper Leg;L Lower Leg/Foot;R Lower Leg/Foot;Chest;Abdomen;Perineal Area; Buttocks   Limitations Noted in Balance; Coordination;ROM;Safety;Strength   Positioning Seated;Standing   Adaptive Equipment [employer] Sponge; Shower [employer]; Shower Seat   Tub/Shower Transfer   Limitations Noted In Balance; Coordination;ROM;Safety;LE Strength   Adaptive Equipment Grab Bars;Seat with out Back   Assessed Shower  (small lip)   Findings CG   Upper Body Dressing   Type of Assistance Needed Physical assistance   Amount of Physical Assistance Provided Less than 25%   Comment assist to set up shirt to don over LUE   Upper Body Dressing CARE Score 3   Lower Body Dressing   Type of Assistance Needed Physical assistance; Adaptive equipment   Amount of Physical Assistance Provided Less than 25%   Comment assist to lift L foot into underwear and pants   Lower Body Dressing CARE Score 3   Putting On/Taking Off Footwear   Type of Assistance Needed Physical assistance   Amount of Physical Assistance Provided 50%-74%   Comment assist to don bilat socks and shoes and L AFO   Putting On/Taking Off Footwear CARE Score 2   Dressing/Undressing Clothing   Remove UB Clothes Pullover Shirt   Don UB Clothes Pullover Shirt   Remove LB Clothes Undergarment;Socks   [address]; Undergarment;Socks; Shoes   Limitations Noted In Balance; Coordination; Safety;Strength;ROM   Adaptive Equipment Reacher   Positioning Supported Sit   Lying to Sitting on Side of Bed   Type of Assistance Needed Supervision   Amount of Physical Assistance Provided No physical assistance   Lying to Sitting on Side of Bed CARE Score 4   Sit to Stand   Type of Assistance Needed Supervision   Amount of Physical Assistance Provided No physical assistance   Sit to Stand CARE Score 4   Bed-Chair Transfer   Type of Assistance Needed Incidental touching   Comment cg   Chair/Bed-to-Chair Transfer CARE Score 4   Transfer Bed/Chair/Wheelchair   Limitations Noted In Balance; Coordination;UE Strength;LE Strength   Toileting Hygiene   Comment used urinal with set up   Functional Standing Tolerance   Time 4m5s during card matching activity   Therapeutic Exercise - ROM   UE-ROM Yes   ROM- Right Upper Extremities   RUE ROM Comment towel slides x30 all planes of motion with RUE providing AAROM to LUE   ROM - Left Upper Extremities    LUE ROM Comment towel slides x30 all planes of motion with RUE providing AAROM to LUE   Neuromuscular Education   Functional Movement Patterns AAROM x10 LUE shoulder to digits all planes of motion with prolonged stretch at end ranges   Comments e-stim to LUE targeting elbow flexion/extension, wrist flexion/extension, and pronation/supination; intensity = 4-5, 20 second intervals with 10 seconds rest between intervals; pt continues with good response at targeted motions when e-stim applied but trace/no elbow flexion/extension and fair wrist flexion/extension and pronation/supination after stim   Cognition   Overall Cognitive Status [facility]   Arousal/Participation Alert; Responsive; Cooperative   Attention Within functional limits   Orientation Level Oriented X4   Memory Within functional limits   Following Commands Follows all commands and directions without difficulty   Assessment   Treatment Assessment Pt agreeable to OT session this AM  Received lying supine in bed  ADL session completed; current LOF and details listed in respective sections  Pt continues to require Karen for LB and UB dressing 2* decreased ROM/strength on L side  Pt also Karen for showering; requires assist for upper RUE  Transfers CG/supervision, including shower transfer  After ADL, completed ROM and neuro re-ed; details in respective sections  Pt continues to be motivated during session  Pt would benefit from continued skilled OT services in order to maximize inependence in self ca re ,functional mobility/transfers, ROM/strength, and activity tolerance  Prognosis Good   Problem List Decreased strength;Decreased range of motion;Decreased endurance; Impaired balance;Decreased coordination;Decreased safety awareness   Plan   Treatment/Interventions ADL retraining;Functional transfer training;LE strengthening/ROM; Therapeutic exercise; Endurance training;Patient/family training;Equipment eval/education; Compensatory technique education;OT   Progress Progressing toward goals   OT Therapy Minutes   OT Time In 0700   OT Time Out 0830   OT Total Time (minutes) 90   OT Mode of treatment - Individual (minutes) 90   Therapy Time missed   Time missed?  No

## 2021-03-23 NOTE — PROGRESS NOTES
03/23/21 1345   Pain Assessment   Pain Assessment Tool 0-10   Pain Score No Pain   Restrictions/Precautions   Precautions Fall Risk   Braces or Orthoses AFO;Sling   Cognition   Overall Cognitive Status WFL   Orientation Level Oriented X4   Sit to Stand   Type of Assistance Needed Supervision   Comment close S/S   Sit to Stand CARE Score 4   Bed-Chair Transfer   Type of Assistance Needed Incidental touching;Supervision   Comment cg/close S   Chair/Bed-to-Chair Transfer CARE Score 4   Walk 10 Feet   Type of Assistance Needed Incidental touching   Comment cg   Walk 10 Feet CARE Score 4   Walk 50 Feet with Two Turns   Type of Assistance Needed Incidental touching   Comment cg   Walk 50 Feet with Two Turns CARE Score 4   Walk 150 Feet   Type of Assistance Needed Incidental touching   Comment cg   Walk 150 Feet CARE Score 4   Walking 10 Feet on Uneven Surfaces   Type of Assistance Needed Incidental touching   Comment cg   Walking 10 Feet on Uneven Surfaces CARE Score 4   Ambulation   Does the patient walk? 2  Yes   Primary Mode of Locomotion Prior to Admission Walk   Distance Walked (feet) 176 ft  (998' 133' 108')   Assist Device Solo Walker   Gait Pattern Inconsistant Alyx; Slow Alyx;Decreased foot clearance;L foot drag;L hemiparesis; Step to   Limitations Noted In Balance; Endurance; Safety;Strength   Provided Assistance with: Balance   Walk Assist Level Contact Guard   Findings level and unlevel surfaces   Curb or Single Stair   Style negotiated Single stair   Type of Assistance Needed Incidental touching   Comment cg   1 Step (Curb) CARE Score 4   4 Steps   Type of Assistance Needed Incidental touching   Comment cg   4 Steps CARE Score 4   12 Steps   Type of Assistance Needed Incidental touching   Comment cg   12 Steps CARE Score 4   Stairs   Type Stairs   # of Steps 18   Weight Bearing Precautions WBAT   Assist Devices Single Rail   Findings cg going up forward and down backward   Therapeutic Interventions Strengthening seated ther ex; occasional AAROM LLE   Balance gait and transfer training   Neuromuscular Re-Education E-stim LLE DF muscles   Other stair training   Assessment   Treatment Assessment Patient tolerated therapy session well  No pain reported  Pt's wife attended therapy session and assisted patient with gait and stair training  wife feels comfortable with JAYASHREE needed to assist [facility]  Patient continues to get positive contraction wtih e-stiin to LLE DF muscle  Completed ther ex for general LE strengthening; gait and transfer trainng focusing on sequence and technique for improved balance and safety with functional mobility  PT Barriers   Physical Impairment Decreased strength;Decreased range of motion;Decreased endurance; Impaired balance;Decreased mobility; Decreased coordination   Functional Limitation Walking;Transfers;Standing;Stair negotiation   Plan   Treatment/Interventions Functional transfer training;LE strengthening/ROM; Elevations; Therapeutic exercise;Patient/family training;Bed mobility;Gait training   Progress Progressing toward goals   PT Therapy Minutes   PT Time In 1345   PT Time Out 1515   PT Total Time (minutes) 90   PT Mode of treatment - Individual (minutes) 75   PT Mode of treatment - Concurrent (minutes) 15   PT Mode of treatment - Group (minutes) 0   PT Mode of treatment - Co-treat (minutes) 0   PT Mode of Treatment - Total time(minutes) 90 minutes   PT Cumulative Minutes 1503   Therapy Time missed   Time missed?  No

## 2021-03-23 NOTE — PLAN OF CARE
Problem: Potential for Falls  Goal: Patient will remain free of falls  Description: INTERVENTIONS:  - Assess patient frequently for physical needs  -  Identify cognitive and physical deficits and behaviors that affect risk of falls    -  [facility] fall precautions as indicated by assessment   - Educate patient/family on patient safety including physical limitations  - Instruct patient to call for assistance with activity based on assessment  - Modify environment to reduce risk of injury  - Consider OT/PT consult to assist with strengthening/mobility  Outcome: Progressing     Problem: Prexisting or High Potential for Compromised Skin Integrity  Goal: Skin integrity is maintained or improved  Description: INTERVENTIONS:  - Identify patients at risk for skin breakdown  - Assess and monitor skin integrity  - Assess and monitor nutrition and hydration status  - Monitor labs   - Assess for incontinence   - Turn and reposition patient  - Assist with mobility/ambulation  - Relieve pressure over bony prominences  - Avoid friction and shearing  - Provide appropriate hygiene as needed including keeping skin clean and dry  - Evaluate need for skin moisturizer/barrier cream  - Collaborate with interdisciplinary team   - Patient/family teaching  - Consider wound care consult   Outcome: Progressing     Problem: PAIN - ADULT  Goal: Verbalizes/displays adequate comfort level or baseline comfort level  Description: Interventions:  - Encourage patient to monitor pain and request assistance  - Assess pain using appropriate pain scale  - Administer analgesics based on type and severity of pain and evaluate response  - Implement non-pharmacological measures as appropriate and evaluate response  - Consider cultural and social influences on pain and pain management  - Notify physician/advanced practitioner if interventions unsuccessful or patient reports new pain  Outcome: Progressing     Problem: INFECTION - ADULT  Goal: Absence or prevention of progression during hospitalization  Description: INTERVENTIONS:  - Assess and monitor for signs and symptoms of infection  - Monitor lab/diagnostic results  - Monitor all insertion sites, i e  indwelling lines, tubes, and drains  - Monitor endotracheal if appropriate and nasal secretions for changes in amount and color  - [place] appropriate cooling/warming therapies per order  - Administer medications as ordered  - Instruct and encourage patient and family to use good hand hygiene technique  - Identify and instruct in appropriate isolation precautions for identified infection/condition  Outcome: Progressing     Problem: SAFETY ADULT  Goal: Patient will remain free of falls  Description: INTERVENTIONS:  - Assess patient frequently for physical needs  -  Identify cognitive and physical deficits and behaviors that affect risk of falls    -  [place] fall precautions as indicated by assessment   - Educate patient/family on patient safety including physical limitations  - Instruct patient to call for assistance with activity based on assessment  - Modify environment to reduce risk of injury  - Consider OT/PT consult to assist with strengthening/mobility  Outcome: Progressing  Goal: Maintain or return to baseline ADL function  Description: INTERVENTIONS:  -  Assess patient's ability to carry out ADLs; assess patient's baseline for ADL function and identify physical deficits which impact ability to perform ADLs (bathing, care of mouth/teeth, toileting, grooming, dressing, etc )  - Assess/evaluate cause of self-care deficits   - Assess range of motion  - Assess patient's mobility; develop plan if impaired  - Assess patient's need for assistive devices and provide as appropriate  - Encourage maximum independence but intervene and supervise when necessary  - Involve family in performance of ADLs  - Assess for home care needs following discharge   - Consider OT consult to assist with ADL evaluation and planning for discharge  - Provide patient education as appropriate  Outcome: Progressing  Goal: Maintain or return mobility status to optimal level  Description: INTERVENTIONS:  - Assess patient's baseline mobility status (ambulation, transfers, stairs, etc )    - Identify cognitive and physical deficits and behaviors that affect mobility  - Identify mobility aids required to assist with transfers and/or ambulation (gait belt, sit-to-stand, lift, walker, cane, etc )  - [employer] fall precautions as indicated by assessment  - Record patient progress and toleration of activity level on Mobility SBAR; progress patient to next Phase/Stage  - Instruct patient to call for assistance with activity based on assessment  - Consider rehabilitation consult to assist with strengthening/weightbearing, etc   Outcome: Progressing     Problem: DISCHARGE PLANNING  Goal: Discharge to home or other facility with appropriate resources  Description: INTERVENTIONS:  - Identify barriers to discharge w/patient and caregiver  - Arrange for needed discharge resources and transportation as appropriate  - Identify discharge learning needs (meds, wound care, etc )  - Arrange for interpretive services to assist at discharge as needed  - Refer to Case Management Department for coordinating discharge planning if the patient needs post-hospital services based on physician/advanced practitioner order or complex needs related to functional status, cognitive ability, or social support system  Outcome: Progressing     Problem: Nutrition/Hydration-ADULT  Goal: Nutrient/Hydration intake appropriate for improving, restoring or maintaining nutritional needs  Description: Monitor and assess patient's nutrition/hydration status for malnutrition  Collaborate with interdisciplinary team and initiate plan and interventions as ordered  Monitor patient's weight and dietary intake as ordered or per policy   Utilize nutrition screening tool and intervene as necessary  Determine patient's food preferences and provide high-protein, high-caloric foods as appropriate       INTERVENTIONS:  - Monitor oral intake, urinary output, labs, and treatment plans  - Assess nutrition and hydration status and recommend course of action  - Evaluate amount of meals eaten  - Assist patient with eating if necessary   - Allow adequate time for meals  - Recommend/ encourage appropriate diets, oral nutritional supplements, and vitamin/mineral supplements  - Order, calculate, and assess calorie counts as needed  - Recommend, monitor, and adjust tube feedings and TPN/PPN based on assessed needs  - Assess need for intravenous fluids  - Provide specific nutrition/hydration education as appropriate  - Include patient/family/caregiver in decisions related to nutrition  Outcome: Progressing

## 2021-03-23 NOTE — PROGRESS NOTES
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  He was continued on a high potency statin and recommendation was given to convert back to simvastatin 40 mg upon discharge  Neurology was consulted and recommended monotherapy (plavix) and therefore ASA was discontinued  TTE was without structural abnormalities  Neurology recommended against ANDREW  Pt may be considered for an OP implantable loop recorder vs  zio patch to r/o a-fib as a potential source of CVA, since he has well controlled vascular risk factors  Pt was allowed permissive HTN and on 3/4, his ACE inhibitor was restarted at half dose  Pt with hyponatremia potentially on the basis of CVA vs dehydration  Initial NA+ was 129  NS was decreased and repeat Na+ was 133  Pt worked with PT/OT and was deemed appropriate for an acute rehabilitation setting  He arrive to  on 3/5/21                        Subjective:  Talked with patient about discharge date changed to Thursday the 25th  Pt is aware of changes and states he has an upcoming appointment with his endocrinologist the 30th  Pt is now having low blood sugar readings around dinner time  Discussed with patient lowering his lunch time insulin dose to see if this helps  Pt is most likely slowly converting back to his normal blood sugar patterns and will have his endocrinologist follow this more closely   Discussed that medications are already faxed to [place] but will be printing out a copy for patient to bring with him  Pt has received new brace for the left leg and states he enjoys using this, it helps him and he is able to get the bathroom using this with his sneakers as compared to the socks provided by the hospital     ROS: A 10 point ROS was performed; negative except as noted above  Assessment/Plan:    CVA  -LLE/LUE weakness  -Cont simvastatin 40 mg at discharge  - Cont daily clopidogrel  -Monitor blood pressure, blood sugars  -Cont lisinopril  -Cont PPI for GI prophylaxis  -outpatient implantable loop recorder vs zio patch to rule out a fib  -Acute chomprehensive interdisciplinary inpatient rehabilitation to include intensive skilled therapies as outlines with oversight and management by a rehabilitation Physician Assistant overseen by rehabilitation physician as well as inpatient rehabilitation nursing, case management and weekly interdisciplinary team meeting   -Starting e-stim 3/11/21 to left arm  -Brace applied to left leg to help with ambulation  Diabetes mellitus type 2  -HGA1C- 7 3 as of 3/4/21  -Utilizes insulin pump at home, cont this while in rehab  -Maintain on ADA diet  -Coverage with algorithm 3 for with meals, and algorithm 1 for bedtime     -Cont Humalog 1-5 units daily at bedtime  -Cont humalog 1-6 units 3 times daily  Before meals  -Cont Humalog 3 units 3 times daily with meals  -Drop lunchtime insulin due to lower blood sugars at dinner time  Hypothyroidism  -Cont thyroid replacement therapy     GERD  -Cont PPI     Hyperlipidemia  -High potency statin while in acute rehab  -Discharge on 40 mg of Zocor     Hypertension  -Cont   Ace-I     Stage 3 chronic kidney disease  -Current creatinine appears to be at baseline  -Cont ACE-I  -Monitor renal function  -Avoid nephrotoxins  -Renally dose medications when appropriate     DVT prophylaxis  -plavix daily  -SCDs daily     Code  -Full                 Scheduled Meds:  Current Facility-Administered Medications   Medication Dose Route Frequency Provider Last Rate    acetaminophen  650 mg Oral Q6H PRN Ashlyn Dennison PA-C      amLODIPine  5 mg Oral Daily Renetta Martin MD      atorvastatin  40 mg Oral Daily With SempriusANIVAL      clopidogrel  75 mg Oral Daily Ashlyn Dennison PA-C      docusate sodium  100 mg Oral BID Ashlyn Dennison PA-C      heparin (porcine)  5,000 Units Subcutaneous [facility] Ashlyn Dennison PA-C      hydrALAZINE  25 mg Oral Q8H PRN Ashlyn Dennison PA-C      insulin aspart  1,000 Units Subcutaneous Insulin Pump Daily PRN Ashlyn Dennison PA-C      insulin lispro  1-5 Units Subcutaneous HS Ashlyn Dennison PA-C      insulin lispro  1-6 Units Subcutaneous TID AC Fabby Atkinson PA-C      insulin lispro  5 Units Subcutaneous Daily With Breakfast Donny Jan, CRNP      insulin lispro  5 Units Subcutaneous Daily With Lunch Donny Jan, CRNP      insulin lispro  5 Units Subcutaneous Daily With Dinner Fabby Atkinson PA-C      levothyroxine  88 mcg Oral Early Morning Ashlyn Dennison PA-C      lisinopril  40 mg Oral Daily Ashlyn Dennison PA-C      pantoprazole  40 mg Oral Daily Before Breakfast Tyson Arroyo MD      patient maintained insulin pump  1 each Subcutaneous 4x Daily (with meals and at bedtime) Donny Jan, CRNP      polyethylene glycol  17 g Oral Daily PRN Ashlyn Dennison PA-C         Objective:    Functional Update:       Physical Therapy:     Weight Bearing Status: Full Weight Bearing  Transfers: Incidental Touching, Minimal Assistance(CG STS with HW/wall or bed rail; MIN A SPT/sit pivot)  Bed Mobility: Minimal Assistance  Amulation Distance (ft): 24 feet  Ambulation: Minimal Assistance, Moderate Assistance  Assistive Device for Ambulation: Solo Walker  Wheelchair Mobility Distance: 157 ft  Wheelchair Mobility: Supervision  Discharge Recommendations: Home with:  [address] with[de-identified] 24 Hour Assisteance, Family Support, First Floor Setup, Home Physical Therapy  Occupational Therapy:  Eating: Supervision  Grooming: Supervision  Bathing: Minimal Assistance  Bathing: Minimal Assistance  Upper Body Dressing: Minimal Assistance  Lower Body Dressing: Maximum Assistance  Toileting: Moderate Assistance  Tub/Shower Transfer: (TBA)  Toilet Transfer: Minimal Assistance  Cognition: Within Defined Limits  Orientation: Person, Place, Time, Situation  Discharge Recommendations: Home with:  [address] with[de-identified] Family Support, Home Occupational Therapy    Speech Therapy:  Mode of Communication: Verbal  Cognition: Within Defined Limits  Orientation: Person, Place, Time, Situation  Discharge Recommendations: Home with:  [address] with[de-identified] Family Support(level of assist pending pt's progress)    Physical Exam:  Temp:  [97 5 °F (36 4 °C)-98 °F (36 7 °C)] 97 5 °F (36 4 °C)  HR:  [70-74] 74  Resp:  [18] 18  BP: (108-125)/(58-64) 125/64  SpO2:  [93 %-100 %] 93 %    General: alert, no apparent distress, cooperative and comfortable  HEENT:  Head: Normocephalic, no lesions, without obvious abnormality  Eye: Normal external eye, conjunctiva, lidsc cornea  Ears: Normal external ears  Nose: Normal external nose, mucus membranes  CARDIAC:  regular rate and rhythm, S1, S2 normal, no murmur, click, rub or gallop  LUNGS:  no abnormal respiratory pattern, no retractions noted, non-labored breathing   ABDOMEN:  soft, non-tender, non-distended  EXTREMITIES:  extremities normal, warm and well-perfused; no cyanosis, clubbing, or edema  NEURO:  clear speech, following all commands, oriented x4  PSYCH:  Alert and oriented, appropriate affect            Diagnostic Studies: Labs reviewed  No orders to display       Laboratory: Labs reviewed  Results from last 7 days   Lab Units 03/22/21  0516   HEMOGLOBIN g/dL 12 2*   HEMATOCRIT % 36 4*   WBC Thousand/uL 5 70     Results from last 7 days   Lab Units 03/22/21  0516   BUN mg/dL 56*   SODIUM mmol/L 130*   POTASSIUM mmol/L 4 5   CHLORIDE mmol/L 100   CREATININE mg/dL 1 98*            ** Please Note: Fluency Direct voice to text software may have been used in the creation of this document   **

## 2021-03-24 LAB
GLUCOSE SERPL-MCNC: 101 MG/DL (ref 65–140)
GLUCOSE SERPL-MCNC: 155 MG/DL (ref 65–140)
GLUCOSE SERPL-MCNC: 208 MG/DL (ref 65–140)
GLUCOSE SERPL-MCNC: 212 MG/DL (ref 65–140)
GLUCOSE SERPL-MCNC: 311 MG/DL (ref 65–140)
GLUCOSE SERPL-MCNC: 328 MG/DL (ref 65–140)

## 2021-03-24 PROCEDURE — 97116 GAIT TRAINING THERAPY: CPT

## 2021-03-24 PROCEDURE — 97535 SELF CARE MNGMENT TRAINING: CPT

## 2021-03-24 PROCEDURE — 82948 REAGENT STRIP/BLOOD GLUCOSE: CPT

## 2021-03-24 PROCEDURE — 99231 SBSQ HOSP IP/OBS SF/LOW 25: CPT | Performed by: FAMILY MEDICINE

## 2021-03-24 PROCEDURE — 97110 THERAPEUTIC EXERCISES: CPT

## 2021-03-24 PROCEDURE — 97530 THERAPEUTIC ACTIVITIES: CPT

## 2021-03-24 PROCEDURE — 97112 NEUROMUSCULAR REEDUCATION: CPT

## 2021-03-24 PROCEDURE — 97032 APPL MODALITY 1+ESTIM EA 15: CPT

## 2021-03-24 PROCEDURE — 97537 COMMUNITY/WORK REINTEGRATION: CPT

## 2021-03-24 RX ADMIN — DOCUSATE SODIUM 100 MG: 100 CAPSULE, LIQUID FILLED ORAL at 18:13

## 2021-03-24 RX ADMIN — ATORVASTATIN CALCIUM 40 MG: 40 TABLET, FILM COATED ORAL at 16:54

## 2021-03-24 RX ADMIN — AMLODIPINE BESYLATE 5 MG: 5 TABLET ORAL at 08:34

## 2021-03-24 RX ADMIN — HEPARIN SODIUM 5000 UNITS: 5000 INJECTION INTRAVENOUS; SUBCUTANEOUS at 21:32

## 2021-03-24 RX ADMIN — DOCUSATE SODIUM 100 MG: 100 CAPSULE, LIQUID FILLED ORAL at 08:37

## 2021-03-24 RX ADMIN — LISINOPRIL 40 MG: 20 TABLET ORAL at 08:38

## 2021-03-24 RX ADMIN — INSULIN LISPRO 2 UNITS: 100 INJECTION, SOLUTION INTRAVENOUS; SUBCUTANEOUS at 08:46

## 2021-03-24 RX ADMIN — INSULIN LISPRO 5 UNITS: 100 INJECTION, SOLUTION INTRAVENOUS; SUBCUTANEOUS at 08:47

## 2021-03-24 RX ADMIN — HEPARIN SODIUM 5000 UNITS: 5000 INJECTION INTRAVENOUS; SUBCUTANEOUS at 13:19

## 2021-03-24 RX ADMIN — HEPARIN SODIUM 5000 UNITS: 5000 INJECTION INTRAVENOUS; SUBCUTANEOUS at 05:31

## 2021-03-24 RX ADMIN — INSULIN LISPRO 2 UNITS: 100 INJECTION, SOLUTION INTRAVENOUS; SUBCUTANEOUS at 16:54

## 2021-03-24 RX ADMIN — CLOPIDOGREL BISULFATE 75 MG: 75 TABLET ORAL at 08:36

## 2021-03-24 RX ADMIN — LEVOTHYROXINE SODIUM 88 MCG: 88 TABLET ORAL at 05:31

## 2021-03-24 RX ADMIN — INSULIN LISPRO 5 UNITS: 100 INJECTION, SOLUTION INTRAVENOUS; SUBCUTANEOUS at 12:41

## 2021-03-24 RX ADMIN — PANTOPRAZOLE SODIUM 40 MG: 40 TABLET, DELAYED RELEASE ORAL at 05:31

## 2021-03-24 NOTE — PROGRESS NOTES
03/24/21 1330   Pain Assessment   Pain Assessment Tool 0-10   Pain Score No Pain   Restrictions/Precautions   Precautions Fall Risk   Braces or Orthoses AFO;Sling   Cognition   Overall Cognitive Status WFL   Arousal/Participation Alert; Responsive; Cooperative   Attention Within functional limits   Orientation Level Oriented X4   Memory Within functional limits   Following Commands Follows all commands and directions without difficulty   Roll Left and Right   Type of Assistance Needed Independent   Roll Left and Right CARE Score 6   Sit to Lying   Type of Assistance Needed Supervision   Sit to Lying CARE Score 4   Lying to Sitting on Side of Bed   Type of Assistance Needed Supervision   Lying to Sitting on Side of Bed CARE Score 4   Sit to Stand   Type of Assistance Needed Supervision   Sit to Stand CARE Score 4   Bed-Chair Transfer   Type of Assistance Needed Supervision   Comment close S/S   Chair/Bed-to-Chair Transfer CARE Score 4   Transfer Bed/Chair/Wheelchair   Findings verbally reviewed steps for floor transfer   Walk 10 Feet   Type of Assistance Needed Incidental touching   Comment cg   Walk 10 Feet CARE Score 4   Walk 50 Feet with Two Turns   Type of Assistance Needed Incidental touching   Comment cg   Walk 50 Feet with Two Turns CARE Score 4   Walk 150 Feet   Type of Assistance Needed Incidental touching   Comment    Walk 150 Feet CARE Score 4   Walking 10 Feet on Uneven Surfaces   Type of Assistance Needed Incidental touching   Comment    Walking 10 Feet on Uneven Surfaces CARE Score 4   Ambulation   Does the patient walk? 2  Yes   Primary Mode of Locomotion Prior to Admission Walk   Distance Walked (feet) 208 ft  (208' 113')   Assist Device Solo Walker   Gait Pattern Inconsistant Alyx; Slow Alyx;Decreased foot clearance;L foot drag;L hemiparesis; Step to   Limitations Noted In Balance; Endurance; Safety;Strength   Provided Assistance with: Balance   Walk Assist Level Contact Guard   Findings level and unlevel surfaces   Wheel 50 Feet with Two Turns   Type of Assistance Needed Independent   Wheel 50 Feet with Two Turns CARE Score 6   Wheel 150 Feet   Type of Assistance Needed Independent   Wheel 150 Feet CARE Score 6   Wheelchair mobility   Does the patient use a wheelchair? 1  Yes   Type of Wheelchair Used 1  Manual   Curb or Single Stair   Style negotiated Single stair   Type of Assistance Needed Incidental touching   Comment cg   1 Step (Curb) CARE Score 4   4 Steps   Type of Assistance Needed Incidental touching   Comment cg   4 Steps CARE Score 4   12 Steps   Type of Assistance Needed Incidental touching   Comment cg   12 Steps CARE Score 4   Stairs   Type Stairs   # of Steps 30   Weight Bearing Precautions WBAT   Assist Devices Single Rail   Findings cg up forward and down backward   Therapeutic Interventions   Strengthening supine ther ex; AAROM LLE   Balance gait and transfer training   Neuromuscular Re-Education E-stim LLE DF musculature   Other stair training   Assessment   Treatment Assessment Patient tolerated therapy session well, with continued improvement in function note  E-stim to LLE wtih positive DF contraction  Wife completed all functional mobility with patient  Both wife and patient comfortable with pt's functional status  Patient is anxious for d/c tomorrow  Verbally reviewed steps for floor transfer  PT Barriers   Physical Impairment Decreased strength;Decreased range of motion;Decreased endurance; Impaired balance;Decreased mobility; Decreased safety awareness;Decreased coordination   Functional Limitation Wheelchair management; [facility] negotiation   Plan   Treatment/Interventions Functional transfer training;LE strengthening/ROM; Elevations; Therapeutic exercise;Patient/family training;Bed mobility;Gait training   Progress Progressing toward goals   PT Therapy Minutes   PT Time In 1330   PT Time Out 1500   PT Total Time (minutes) 90   PT Mode of treatment - Individual (minutes) 90   PT Mode of treatment - Concurrent (minutes) 0   PT Mode of treatment - Group (minutes) 0   PT Mode of treatment - Co-treat (minutes) 0   PT Mode of Treatment - Total time(minutes) 90 minutes   PT Cumulative Minutes 1593   Therapy Time missed   Time missed?  No

## 2021-03-24 NOTE — CASE MANAGEMENT
Tx team recommendations reviewed with patient and spouse, who expressed understanding and agreement  Target discharge date was set for Thursday March 25th, 2021 at 2 pm via spouse with PT, OT, and [facility] home care to follow up; a list was provided to Pt and referrals will be made based on Pt preference  Medication sent to [address] [facility] for prescriptions  SW will continue to monitor and assist as needed with Tx and DC planning

## 2021-03-24 NOTE — CASE MANAGEMENT
Tx team recommendations & DCI reviewed with patient & spouse, who expressed understanding & agreement  Tx team has [facility] arranged with LANCE THOMAS (Nsg, PT, OT), per Pt preferences from choice list provided  FU appts indicated on DC instructions, to be scheduled by Pt per scheduling preferences  The Pt's current course of Tx & post DC goals of care have been shared with Post-acute care service providers  Ins co aware of DC plan; all days covered

## 2021-03-24 NOTE — DISCHARGE INSTRUCTIONS
Ischemic Stroke   WHAT YOU NEED TO KNOW:   An ischemic stroke occurs when blood flow to part of your brain is blocked  The block is usually caused by a blood clot that gets stuck in a narrow blood vessel  When oxygen cannot get to an area of the brain, tissue in that area may get damaged  The damage can cause loss of body functions controlled by that area of the brain  A stroke is a medical emergency that needs immediate treatment  Most medicines and treatments work best the sooner they are given  DISCHARGE INSTRUCTIONS:   Call your local emergency number (911 in the [address] [facility]) or have someone else call if:   · You have any of the following signs of a stroke:      ? Numbness or drooping on one side of your face     ? Weakness in an arm or leg    ? Confusion or difficulty speaking    ? Dizziness, a severe headache, or vision loss       · You have a seizure  · You have chest pain or shortness of breath  Seek care immediately if:   · Your arm or leg feels warm, tender, and painful  It may look swollen and red  · You have loss of balance or coordination  · You have double vision or vision loss  · You have unusual or heavy bleeding  Call your doctor if:   · Your blood pressure is higher or lower than you were told it should be  · You have questions or concerns about your condition or care  Warning signs of a stroke: The words BE FAST can help you remember and recognize warning signs of a stroke:  · B = Balance:  Sudden loss of balance    · E = Eyes:  Loss of vision in one or both eyes    · F = Face:  Face droops on one side    · A = Arms:  Arm drops when both arms are raised    · S = Speech:  Speech is slurred or sounds different    · T = Time:  Time to get help immediately     Medicines: You may  need any of the following:  · Antiplatelets , such as aspirin, help prevent blood clots  Take your antiplatelet medicine exactly as directed   These medicines make it more likely for you to bleed or bruise  If you are told to take aspirin, do not take acetaminophen or ibuprofen instead  · Blood thinners  help prevent blood clots  Clots can cause strokes, heart attacks, and death  The following are general safety guidelines to follow while you are taking a blood thinner:    ? Watch for bleeding and bruising while you take blood thinners  Watch for bleeding from your gums or nose  Watch for blood in your urine and bowel movements  Use a soft washcloth on your skin, and a soft toothbrush to brush your teeth  This can keep your skin and gums from bleeding  If you shave, use an electric shaver  Do not play contact sports  ? Tell your dentist and other healthcare providers that you take a blood thinner  Wear a bracelet or necklace that says you take this medicine  ? Do not start or stop any other medicines unless your healthcare provider tells you to  Many medicines cannot be used with blood thinners  ? Take your blood thinner exactly as prescribed by your healthcare provider  Do not skip does or take less than prescribed  Tell your provider right away if you forget to take your blood thinner, or if you take too much  ? Warfarin  is a blood thinner that you may need to take  The following are things you should be aware of if you take warfarin:     § Foods and medicines can affect the amount of warfarin in your blood  Do not make major changes to your diet while you take warfarin  Warfarin works best when you eat about the same amount of vitamin K every day  Vitamin K is found in green leafy vegetables and certain other foods  Ask for more information about what to eat when you are taking warfarin  § You will need to see your healthcare provider for follow-up visits when you are on warfarin  You will need regular blood tests  These tests are used to decide how much medicine you need  · Medicines  may be given to treat health conditions that increase the risk for a stroke   Examples are high cholesterol, high blood pressure, and diabetes  · Take your medicine as directed  Contact your healthcare provider if you think your medicine is not helping or if you have side effects  Tell him or her if you are allergic to any medicine  Keep a list of the medicines, vitamins, and herbs you take  Include the amounts, and when and why you take them  Bring the list or the pill bottles to follow-up visits  Carry your medicine list with you in case of an emergency  Recovery testing: Your healthcare provider will test your recovery 90 days (3 months) after your stroke  This may be done over the phone or in person  Your provider will ask how well you can do the activities you did before the stroke  He or she will also ask how well you can do your daily activities without help  Your provider may make recommendations for you based on your test  For example, you may need someone to help you walk safely  You may also need help with daily activities, such as getting dressed  Based on your answers, your provider may do this test again over time  Manage an ischemic stroke:   · Go to stroke rehabilitation (rehab) if directed  Rehab is a program run by specialists who will help you recover abilities you may have lost  Specialists include physical, occupational, and speech therapists  Physical therapists help you gain strength or keep your balance  Occupational therapists teach you new ways to do daily activities, such as getting dressed  Your therapy may include movements for everyday activities  An example is being able to raise yourself from a chair  A speech therapist helps you improve your ability to talk and swallow  · Wear pressure stockings as directed  Pressure stockings help keep blood from pooling in your leg veins  Your healthcare provider can prescribe stockings that are right for you  Do not buy over-the-counter pressure stockings unless your healthcare provider says it is okay   They may not fit correctly or may have elastic that cuts off your circulation  Ask your healthcare provider when to start wearing pressure stockings and how long to wear them each day  · Make your home safe  Remove anything you might trip over  Tape electrical cords down  Keep paths clear throughout your home  Make sure your home is well lit  Put nonslip materials on surfaces that might be slippery  An example is your bathtub or shower floor  A cane or walker may help you keep your balance as you walk  What you can do to prevent another stroke:   · Manage health conditions  A condition such as diabetes can increase your risk for a stroke  Control your blood sugar level if you have hyperglycemia or diabetes  Take your prescribed medicines and check your blood sugar level as directed  · Check your blood pressure as directed  High blood pressure can increase your risk for a stroke  If you have high blood pressure, follow your healthcare provider's directions for controlling it  · Do not use nicotine products or illegal drugs  Nicotine and other chemicals in cigarettes and cigars can cause blood vessel damage  Nicotine and illegal drugs both increase your risk for a stroke  Ask your healthcare provider for information if you currently smoke or use drugs and need help to quit  E- cigarettes or smokeless tobacco still contain nicotine  Talk to your healthcare provider before you use these products  · Talk to your healthcare provider about alcohol  Alcohol can raise your blood pressure  The recommended limit is 2 drinks in a day for men and 1 drink in a day for women  Do not binge drink or save a week's worth of alcohol to drink in 1 or 2 days  Limit weekly amounts as directed by your provider  · Eat a variety of healthy foods  Healthy foods include whole-grain breads, low-fat dairy products, beans, lean meats, and fish  Eat at least 5 servings of fruits and vegetables each day   Choose foods that are low in fat, cholesterol, salt, and sugar  Eat foods that are high in potassium, such as potatoes and bananas  A dietitian can help you create healthy meal plans  · Maintain a healthy weight  Ask your healthcare provider how much you should weigh  Ask him or her to help you create a weight loss plan if you are overweight  He or she can help you create small goals if you have a lot of weight to lose  · Exercise as directed  Exercise can lower your blood pressure, cholesterol, weight, and blood sugar levels  Healthcare providers will help you create exercise goals  They can also help you make a plan to reach your goals  For example, you can break exercise into 10 minute periods, 3 times in the day  Find an exercise that you enjoy  This will make it easier for you to reach your exercise goals  · Manage stress  Stress can raise your blood pressure  Find new ways to relax, such as deep breathing or listening to music  · Talk to your healthcare provider about risk factors for women  Birth control pills increase your risk, especially if you are older than 35 or smoke cigarettes  Talk to your healthcare provider about other forms of contraception  Estrogen levels drop during menopause  Low estrogen levels may increase your risk for stroke  Talk to your healthcare provider about hormone replacement therapy to reduce your risk for a stroke  What you need to know about depression after a stroke:  Talk to your healthcare provider if you have depression that continues or is getting worse  Your provider may be able to help treat your depression  Your provider can also recommend support groups for you to join  A support group is a place to talk with others who have had a stroke  It may also help to talk to friends and family members about how you are feeling   Tell your family and friends to let your healthcare provider know if they see any signs of depression:  · Extreme sadness    · Avoiding social interaction with family or friends    · A lack of interest in things you once enjoyed    · Irritability    · Trouble sleeping    · Low energy levels    · A change in eating habits or sudden weight gain or loss    Follow up with your doctor or neurologist as directed: You may need to come in over time for brain function or blood tests  Your provider may refer you to a specialist, or to other kinds of care  An example is palliative (comfort) care  Your provider can also give information about respite care to anyone who helps care for you  Respite care is a service to help caregivers take a break or get more rest  Write down your questions so you remember to ask them during your visits  For support and more information:   · National [employer]  [address] , [place] [address]  Phone: 3- 663 - 516-7588  Web Address: Take the Interview [address]  [address] Information is for End User's use only and may not be sold, redistributed or otherwise used for commercial purposes  All illustrations and images included in CareNotes® are the copyrighted property of A D A M , Inc  or [facility] Jerod Daniels   The above information is an  only  It is not intended as medical advice for individual conditions or treatments  Talk to your doctor, nurse or pharmacist before following any medical regimen to see if it is safe and effective for you

## 2021-03-24 NOTE — NURSING NOTE
Patient resting comfortably in bed at this time  No signs of distress noted  Insulin pump managed by patient as ordered  Patient was encouraged to reposition self frequently to promote skin integrity  Patient wore SCDs overnight as ordered for DVT prevention  Call bell within reach  Will continue to monitor patient and follow plan of care

## 2021-03-24 NOTE — TEAM CONFERENCE
Acute RehabilitationTeam Conference Note  Date: 3/24/2021   Time: 10:40 AM       Patient Name:  Niko Wray       Medical Record Number: [id number]   YOB: 1945  Sex:  Male          Room/Bed:  /[facility] 216-01  Payor Info:  Payor: Qamar Almaraz  REP / Plan: Glorine Greening O  REP / Product Type: Medicare O /      Admitting Diagnosis: Stroke [facility]) [I63 9]   Admit Date/Time:  3/5/2021  5:34 PM  Admission Comments: No comment available     Primary Diagnosis:  CVA (cerebral vascular accident) ([place] [address] )  Principal Problem: CVA (cerebral vascular accident) [facility])    Patient Active Problem List    Diagnosis Date Noted    CVA (cerebral vascular accident) ([facility] ) 03/03/2021    Erectile dysfunction due to type 2 diabetes mellitus ([place] [address] ) 10/01/2020    Long term current use of insulin ([place] [address] ) 10/01/2020    Medicare annual wellness visit, subsequent 06/28/2018    DMII (diabetes mellitus, type 2) ([place] [address] ) 02/07/2018    Hypothyroidism 02/07/2018    GERD (gastroesophageal reflux disease) 02/07/2018    Hyperlipidemia 02/07/2018    Stage 3 chronic kidney disease 02/07/2018    Adenocarcinoma of prostate ([facility] ) 11/07/2017    Lumbar radicular pain 09/05/2017    Sensorineural hearing loss (SNHL), bilateral 07/21/2017    Vitamin D deficiency 11/04/2014    Arthritis 06/26/2012    Essential hypertension 06/26/2012       Physical Therapy:    Weight Bearing Status: Full Weight Bearing  Transfers: Incidental Touching, Supervision  Bed Mobility: Supervision  Amulation Distance (ft): 331 feet  Ambulation: Incidental Touching  Assistive Device for Ambulation: Solo Walker(AFO LLE; sling LUE)  Wheelchair Mobility Distance: (>150')  Wheelchair Mobility: Independent  Number of Stairs: 21  Assistive Device for Stairs: Right Hand Rail  Stair Assistance: Incidental Touching  Discharge Recommendations: Home with:  [address] with[de-identified] 24 Hour Supervision, Family Support, First Floor Setup, Home Physical Therapy    03/10/2021:   Patient participating in therapy and making positive gains  Patient MIN A bed mobility; CG STS transfer with HW vs wall/bed rail; MIN A SPT/sit pivot transfer; S wheelchair mobility up to 157' level and unlevel surfaces; MIN-occasional MOD A ambulation up to 24' level surfaces with HW and DF wrap assist   Remains limited by decreased ROM/strength, decreased balance and safety, and decreased endurance  May benefit from continued inpatient ARC PT to increase function, safety, and increased independence in prep for safe d/c     03/16/2021:   Patient participating in therapy and making positive gains  Patient tolerating E-stim to LLE with positive contractions  Patient CG/S bed mobility, CG STS transfers with HW, MIN A SPT no device, CG/MIN A ambulation up to 135' level surfaces with HW and off shelf AFO, MOD I wheelchair mobility level and unlevel surfaces, MIN A up and down 6 steps (up forward, down backward) with single rail  Remains limited by decreased ROM/strength, decreased balance and safety, and decreased endurance  Would benefit from continued inpatient ARC PT to increase function, safety, and increased independence in prep for safe d/c     3/24/2021:  Patient participating in therapy and continuing to make positive gains  Patient S bed mobility, CG/S transfers with reilly walker, MOD I wheelchair mobility level and unlevel surfaces, CG ambulation up to 331' level and unlevel surfaces with reilly walker, CG up/down 21 steps with single rail going up forward and down backwards  Patient uses AFO LLE and sling LUE  Remains limited by decreased ROM/strength, decreased balance and safety  Would benefit from continued inpatient ARC PT to increase function, safety, and improved independence in prep for safe d/c and to complete family training      Occupational Therapy:  Eating: Supervision  Grooming: Supervision  Bathing: Supervision  Bathing: Supervision  Upper Body Dressing: Minimal Assistance  Lower Body Dressing: Minimal Assistance  Toileting: Incidental Touching  Tub/Shower Transfer: Incidental Touching  Toilet Transfer: Incidental Touching  Cognition: Within Defined Limits  Orientation: Person, Place, Time, Situation  Discharge Recommendations: Home with:  [address] with[de-identified] Family Support, Home Occupational Therapy       3/10/21: Pt's current LOF listed above  Barriers include decreased strength, ROM, and coordination on L side with LUE > LLE, impaired tone LUE, decreased balance, decreased independence in self care, and decreased activity tolerance  Pt participating in ADL training, therapeutic exercises, therapeutic activities, functional mobility/transfers, neuromuscular reeducation, and activity tolerance in order to progress towards goals  Pt would benefit from continued skilled OT services in order to address listed barriers and prepare for safe d/c     3/17/21: Pt's current LOF listed above  Continues with barriers of decreased strength, ROM, and coordination on L side with LUE > LLE, decreased balance, decreased independence in self care, and decreased activity tolerance  Pt participating in ADL training, therapeutic exercises, therapeutic activities, functional mobility/transfers, neuromuscular reeducation, and activity tolerance in order to progress towards goals  Pt would benefit from continued skilled OT services in order to address listed barriers and prepare for safe d/c     3/24/21: Pt's current LOF listed above  Continues with barriers of decreased strength, ROM, and coordination on L side with LUE > LLE, decreased balance, decreased independence in self care, and decreased activity tolerance  Pt participating in ADL training, therapeutic exercises, therapeutic activities, functional mobility/transfers, neuromuscular reeducation, and activity tolerance in order to progress towards goals   Pt would benefit from continued skilled OT services in order to address listed barriers and prepare for safe d/c       Speech Therapy:  Mode of Communication: Verbal  Cognition: Within Defined Limits     Orientation: Person, Place, Time, Situation  Discharge Recommendations: Home with:  [address] with[de-identified] Family Support(level of assist pending pt's progress)  Cognitive linguistic evaluation was completed where pt found to be presenting with overall functional cognitive linguistic skills and appears to be at baseline  Pt completed the CLQT+ on initial evaluation with scores correlating to overall cognitive linguistic skills to be WNL, as well as all cognitive linguistic domains  SLP also spoke with pt's wife who was present later in the day-stated that pt appears to be at baseline and she has not observed any deficits  SLP spoke with OT and PT teams-both deny any cognitive linguistic deficits observed  Based on current evaluation, pt is presenting with overall cognitive linguistic skills that are WNL at time of assessment and further skilled ST services are not warranted for structured cognitive linguistic therapy at this time  However, pt will benefit from short term follow up of skilled ST services to provide stroke education as during discussion, pt had appropriate questions related to stroke and secondary stroke prevention  Anticipate brief follow up-staff to inform ST team should cognitive linguistic difficulties be observed with ST team to then follow  Nursing Notes:  Appetite: Good  Diet Type: Diabetic                      Diet Patient/Family Education Complete: Yes                         Type of Wound Patient/Family Education: Yes  Bladder: Continent     Bladder Patient/Family Education: Yes  Bowel: Medication     Bowel Patient/Family Education: Yes     Pain Score: 0                          Pain Patient/Family Education: Yes  Medication Management/Safety  Injectable: Insulin  Safe Administration: Yes  Medication Patient/Family Education Complete: Yes    3/8/21 Pt admitted S/P Stroke  Pt alert and oriented   CINDA Flaccid and LLE very weak  Pt is continent of bowel and bladder  Pt uses urinal  Assist of 2 with transfers  3/16/21 Pt admitted S/P Stroke  Pt alert and oriented  LUE Flaccid and LLE very weak  Pt is continent of bowel and bladder  Pt uses urinal standing at the bedside with the assistance of staff  Assist of 2 with transfers  3/22/21 Pt is A+Ox4, HRR, lungs clear, no edema  Pt remains weak on L side   L side is absent  Cont on B+B, LBM 3/22, uses urinal with minimal assist to hold urinal   Pt ambulates with reilly walker contact guard  No falls  Case Management:     Discharge Planning  Living Arrangements: Spouse/significant other  Support Systems: Spouse/significant other, Family members  Assistance Needed: n/a  Type of Current Residence: Private residence  Current [address] : No  Contacts  Patient Contacts: Sherita/spouse  Realtionship to Patient[de-identified] Family  Contact Method: In Person  Initial assessment & orientation to [facility] with Pt & phone contact with Pt's spouse  Pt & his spouse reside in a multi-story home, 0 steps in, 13 inside, but can stay on the FF if needed  Pt's spouse is a retired RN & will be available to provide 24 hour supervision & assistance  Pt has no DME  Pt was completely independent PTA & expressed a very high level of motivation to reach his rehab goals  Pt spoke about his desire to continue mowing lawns in his neighborhood, which he does on a volunray basis  Pt was tearful as he spoke about concerns related to the stroke, but also expresseed a strong sense of hope & optimism  Provided emotional support & encouragement  Offered Pt support sessions as needed  Discussed role of team members & reviewed [address] with Pt & spouse, who expressed understanding & agreement  SW will continue to monitor & assist as needed with [address]  Pt has [employer], see UR section of chart for insurance details      3/10/21 - Tx team recommendations reviewed with patient & spouse, who expressed understanding & agreement  Target DC date is 3/23 with [facility] (Nsg, PT, OT); a list of providers was provided to Pt & a referral will be made based on Pt preference  Pt acknowledged experiencing some symptoms of depression; Pt's spouse stated that he has no history of depression or any mental health issues, but does appear to be experiencing 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 at this time  Provided Pt with emotional support & encouragement & bedside counseling  Pt also recepetive to support sessions with SW intern offering activities such as arts & crafts, etc   [facility] will continue to monitor & assist as needed with Tx & DC planning  3/17/21 - Tx team recommendations reviewed with patient and spouse  Pt expressed understanding & agreement  Target DC date was set for Thursday March 25th upon treatment teams request, with [facility] (PT, OT, and RENEE) upon Pt request; a list of providers was provided to Pt & a referral will be made based on Pt preference  Pt spouse requested BSC and reilly walker  SW will continue to monitor & assist as needed with Tx & DC planning      Is the patient actively participating in therapies? yes  List any modifications to the treatment plan: na    Barriers Interventions   DM Insulin pump, injections, endocrinology follow-up   Decreased ROM/strength  left side Therapeutic exercise, therapeutic activity, left sling, custom AFO- prosthetist following   Decreased balance ADL, transfer, gait training   Decreased safety Cues, ADL, transfer, gait training         Is the patient making expected progress toward goals?  yes  List any update or changes to goals: na    Medical Goals: Patient will be able to manage medical conditions and comorbid conditions with medications and follow up upon completion of rehab program    Weekly Team Goals:   Rehab Team Goals  ADL Team Goal: Patient will require assist with ADLs with least restrictive device upon completion of rehab program  Bowel/Bladder Team Goal: Patient will return to premorbid level for bladder/bowel management upon completion of rehab program  Transfer Team Goal: Patient will require supervision with transfers with least restrictive device upon completion of rehab program  Locomotion Team Goal: Patient will require assist with locomotion with least restrictive device upon completion of rehab program  Cognitive Team Goal: Patient will return to premorbid level of cognitive activity upon completion of rehab program     Mod I bed mobility  S transfers, CG mobility  Complete family training   CG self care     Health and wellness: to be able to return to cooking     Discussion: Plan for return home with wife with [facility] for PT, OT, and nursing with progression to outpatient therapy      Anticipated Discharge Date:  March 25, 2021

## 2021-03-24 NOTE — PROGRESS NOTES
Physical Medicine and Rehabilitation Progress Note  Frank Shahid 70 y o  male MRN: [id number]  Unit/Bed#: -01 Encounter: [id number]    HPI:   Frank Shahid is a 70 y o  male who presented to the [facility] ER on 3/3/21 with left sided weakness  Patient reported that the weakness began in his LLE later in the day prior to coming to the ER  The weakness then progressed to his LUE on the day of presentation to the ER  Pt also experienced slight left facial droop  CT of head did not show any acute findings  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  He was continued on a high potency statin and recommendation was given to convert back to simvastatin 40 mg upon discharge  Neurology was consulted and recommended monotherapy (plavix) and therefore ASA was discontinued  TTE was without structural abnormalities  Neurology recommended against ANDREW  Pt may be considered for an OP implantable loop recorder vs  zio patch to r/o a-fib as a potential source of CVA, since he has well controlled vascular risk factors  Pt was allowed permissive HTN and on 3/4, his ACE inhibitor was restarted at half dose  Pt with hyponatremia potentially on the basis of CVA vs dehydration  Initial NA+ was 129  NS was decreased and repeat Na+ was 133  Pt worked with PT/OT and was deemed appropriate for an acute rehabilitation setting  He arrive to  on 3/5/21                        Subjective:  Pt has no complaints today  ROS: A 10 point ROS was performed; negative except as noted above         Assessment/Plan:    CVA  -LLE/LUE weakness  -Cont simvastatin 40 mg at discharge  - Cont daily clopidogrel  -Monitor blood pressure, blood sugars  -Cont lisinopril  -Cont PPI for GI prophylaxis  -outpatient implantable loop recorder vs zio patch to rule out a fib  -Acute chomprehensive interdisciplinary inpatient rehabilitation to include intensive skilled therapies as outlines with oversight and management by a rehabilitation Physician Assistant overseen by rehabilitation physician as well as inpatient rehabilitation nursing, case management and weekly interdisciplinary team meeting   -Starting e-stim 3/11/21 to left arm  -Brace applied to left leg to help with ambulation  Diabetes mellitus type 2  -HGA1C- 7 3 as of 3/4/21  -Utilizes insulin pump at home, cont this while in rehab  -Maintain on ADA diet  -Coverage with algorithm 3 for with meals, and algorithm 1 for bedtime     -Cont Humalog 1-5 units daily at bedtime  -Cont humalog 1-6 units 3 times daily  Before meals  -Cont Humalog 3 units 3 times daily with meals  -Drop lunchtime insulin due to lower blood sugars at dinner time  Hypothyroidism  -Cont thyroid replacement therapy     GERD  -Cont PPI     Hyperlipidemia  -High potency statin while in acute rehab  -Discharge on 40 mg of Zocor     Hypertension  -Cont   Ace-I     Stage 3 chronic kidney disease  -Current creatinine appears to be at baseline  -Cont ACE-I  -Monitor renal function  -Avoid nephrotoxins  -Renally dose medications when appropriate     DVT prophylaxis  -plavix daily  -SCDs daily     Code  -Full                 Scheduled Meds:  Current Facility-Administered Medications   Medication Dose Route Frequency Provider Last Rate    acetaminophen  650 mg Oral Q6H PRN Bird Ray PA-C      amLODIPine  5 mg Oral Daily Akosua Wang MD      atorvastatin  40 mg Oral Daily With [employer] ANIVAL Atkinson      clopidogrel  75 mg Oral Daily Bird Ray PA-C      docusate sodium  100 mg Oral BID Bird Ray PA-C      heparin (porcine)  5,000 Units Subcutaneous [facility] Bird Ray PA-C      hydrALAZINE  25 mg Oral Q8H PRN Bird Ray PA-C      insulin aspart  1,000 Units Subcutaneous Insulin Pump Daily PRN Bird Ray PA-C      insulin lispro  1-5 Units Subcutaneous HS Bird Ray PA-C      insulin lispro  1-6 Units Subcutaneous TID AC Fabby Atkinson PA-C      insulin lispro  2 Units Subcutaneous Daily With Lunch Fabby Atkinson PA-C      insulin lispro  5 Units Subcutaneous Daily With Breakfast REEMA Pastrana      insulin lispro  5 Units Subcutaneous Daily With Dinner Fabby Atkinson PA-C      levothyroxine  88 mcg Oral Early Morning Mike Valladares PA-C      lisinopril  40 mg Oral Daily Fabby Atkinson PA-C      pantoprazole  40 mg Oral Daily Before Breakfast Iva Garcia MD      patient maintained insulin pump  1 each Subcutaneous 4x Daily (with meals and at bedtime) REEMA Pastrana      polyethylene glycol  17 g Oral Daily PRN Mike Valladares PA-C         Objective:    Functional Update:  Physical Therapy:     Weight Bearing Status: Full Weight Bearing  Transfers: Incidental Touching, Supervision  Bed Mobility: Supervision  Amulation Distance (ft): 331 feet  Ambulation: Incidental Touching  Assistive Device for Ambulation: Solo Walker(AFO LLE; sling LUE)  Wheelchair Mobility Distance: (>150')  Wheelchair Mobility: Independent  Number of Stairs: 21  Assistive Device for Stairs: Right Hand Rail  Stair Assistance: Incidental Touching  Discharge Recommendations: Home with:  [address] with[de-identified] 24 Hour Supervision, Family Support, First Floor Setup, Home Physical Therapy  Occupational Therapy:  Eating: Supervision  Grooming: Supervision  Bathing: Supervision  Bathing: Supervision  Upper Body Dressing: Minimal Assistance  Lower Body Dressing: Minimal Assistance  Toileting: Incidental Touching  Tub/Shower Transfer: Incidental Touching  Toilet Transfer: Incidental Touching  Cognition: Within Defined Limits  Orientation: Person, Place, Time, Situation  Discharge Recommendations: Home with:  [address] with[de-identified] Family Support, Home Occupational Therapy  Speech Therapy:  Mode of Communication: Verbal  Cognition: Within Defined Limits  Orientation: Person, Place, Time, Situation  Discharge Recommendations: Home with:  [address] Home with[de-identified] Family Support(level of assist pending pt's progress)    This patient was discussed by the Interdisciplinary Team in weekly case conference today  The care of the patient was extensively discussed with all care providers and an appropriate rehabilitation plan was formulated unique for this patient  Barriers were identified preventing progression of therapy and appropriate interventions were discussed with each discipline  Please see the team note for input from all disciplines regarding barriers, intervention, and discharge planning  Total time spent: 35 Mins, and greater than 50% of this time was spent counseling/coordinating care    Physical Exam:  Temp:  [97 °F (36 1 °C)-97 1 °F (36 2 °C)] 97 1 °F (36 2 °C)  HR:  [69-72] 69  Resp:  [12-18] 12  BP: ()/(57-75) 130/75  SpO2:  [97 %] 97 %    General: alert, no apparent distress, cooperative and comfortable  HEENT:  Head: Normocephalic, no lesions, without obvious abnormality  Eye: Normal external eye, conjunctiva, lidsc cornea  Ears: Normal external ears  Nose: Normal external nose, mucus membranes  CARDIAC:  regular rate and rhythm, S1, S2 normal, no murmur, click, rub or gallop  LUNGS:  no abnormal respiratory pattern, no retractions noted, non-labored breathing   ABDOMEN:  soft, non-tender, non-distended  EXTREMITIES:  extremities normal, warm and well-perfused; no cyanosis, clubbing, or edema  NEURO:  clear speech, following all commands, oriented x4  PSYCH:  Alert and oriented, appropriate affect            Diagnostic Studies: Labs reviewed  No orders to display       Laboratory: Labs reviewed  Results from last 7 days   Lab Units 03/22/21  0516   HEMOGLOBIN g/dL 12 2*   HEMATOCRIT % 36 4*   WBC Thousand/uL 5 70     Results from last 7 days   Lab Units 03/22/21  0516   BUN mg/dL 56*   SODIUM mmol/L 130*   POTASSIUM mmol/L 4 5   CHLORIDE mmol/L 100   CREATININE mg/dL 1 98*            ** Please Note: Fluency Direct voice to text software may have been used in the creation of this document   **

## 2021-03-24 NOTE — PROGRESS NOTES
03/24/21 1000   Pain Assessment   Pain Assessment Tool Pain Assessment not indicated - pt denies pain   Restrictions/Precautions   Precautions Fall Risk   Weight Bearing Restrictions No   ROM Restrictions No   Braces or Orthoses AFO;Sling   Neuromuscular Education   Functional Movement Patterns AAROM LUE shoulder flexion/extension, ABD/ADD, horizontal ABD/ADD, shoulder shrug x10; pt with good shrug, fair horizontal ABD/ADD, shoulder extension, ADD; trace to no flexion and ABD   Comments e-stim to L shoulder to encourage shrug, x20 with intensity = 4 5, 20 seconds on and 10 second break between intervals; pt with good shoulder shrug noted during both e-stim and when e-stim removed   Cognition   Overall Cognitive Status [facility]   Arousal/Participation Alert; Responsive; Cooperative   Attention Within functional limits   Orientation Level Oriented X4   Memory Within functional limits   Following Commands Follows all commands and directions without difficulty   Assessment   Treatment Assessment Pt agreeable to OT sessoin this AM  Received sitting upright in w/c  Propelled self to OT room in w/c using RLE and RUE, then completed neuro re-ed; details in respective sections  Pt with improving LUE motion with e-stim and slightly less return when stim removed  Pt continues to be motivated to return home and to PLOF  Pt would benefit from continued skilled OT services in order to maximize functional mobility/transfers, ROM/strength, and activity tolerance  Prognosis Good   Problem List Decreased strength;Decreased range of motion;Decreased endurance; Impaired balance;Decreased coordination;Decreased safety awareness   Plan   Treatment/Interventions ADL retraining;Functional transfer training;LE strengthening/ROM; Therapeutic exercise; Endurance training;Patient/family training;Equipment eval/education; Compensatory technique education;OT   Progress Progressing toward goals   OT Therapy Minutes   OT Time In 1000   OT Time Out 1025 OT Total Time (minutes) 29   OT Mode of treatment - Individual (minutes) 29

## 2021-03-24 NOTE — PROGRESS NOTES
03/24/21 0700   Pain Assessment   Pain Assessment Tool Pain Assessment not indicated - pt denies pain   Restrictions/Precautions   Precautions Fall Risk   Weight Bearing Restrictions No   ROM Restrictions No   Braces or Orthoses AFO;Sling   Eating   Type of Assistance Needed Set-up / clean-up   Amount of Physical Assistance Provided No physical assistance   Eating CARE Score 5   Grooming   Able To Initiate Tasks; Acquire Items;Comb/Brush Hair;Wash/Dry Face   Limitation Noted In Coordination; Safety   Shower/Bathe Self   Type of Assistance Needed Physical assistance; Adaptive equipment   Amount of Physical Assistance Provided Less than 25%   Comment assist for R upper UE   Shower/Bathe Self CARE Score 3   Bathing   Assessed Bath Style Shower   Anticipated D/C Bath Style Shower;Sponge Bath   Able to [place] No   Able to [employer] Temperature Yes   Able to Wash/Rinse/Dry (body part) Left Arm;L Upper Leg;R Upper Leg;L Lower Leg/Foot;R Lower Leg/Foot;Chest;Abdomen;Perineal Area; Buttocks   Limitations Noted in Balance; Coordination;ROM;Safety;Strength   Positioning Seated;Standing   Adaptive Equipment Longhand Sponge; Shower [employer]; Shower Seat;Hand [facility]   Limitations Noted In Balance; Coordination;ROM;Safety;UE Strength;LE Strength   Adaptive Equipment Grab Bars;Seat with out Back   Assessed Shower   Findings CG   Upper Body Dressing   Type of Assistance Needed Physical assistance   Amount of Physical Assistance Provided Less than 25%   Comment assist to get L hand into shirt   Upper Body Dressing CARE Score 3   Lower Body Dressing   Type of Assistance Needed Physical assistance; Adaptive equipment   Amount of Physical Assistance Provided Less than 25%   Comment assist to get L foot into underwear and pants   Lower Body Dressing CARE Score 3   Putting On/Taking Off Footwear   Type of Assistance Needed Physical assistance   Amount of Physical Assistance Provided 50%-74%   Comment assist to don bilat socks, shoes, L AFO   Putting On/Taking Off Footwear CARE Score 2   Dressing/Undressing Clothing   Remove UB Clothes Pullover Shirt   Don UB Clothes Pullover Shirt   Remove LB Clothes Socks; Undergarment   Don LB Clothes Shorts; Undergarment;Socks; Shoes   Limitations Noted In Balance; Coordination; Safety;Strength;ROM   Adaptive Equipment Reacher   Positioning Supported Sit   Lying to Sitting on Side of Bed   Type of Assistance Needed Independent   Amount of Physical Assistance Provided No physical assistance   Lying to Sitting on Side of Bed CARE Score 6   Sit to Stand   Type of Assistance Needed Supervision   Amount of Physical Assistance Provided No physical assistance   Sit to Stand CARE Score 4   Bed-Chair Transfer   Type of Assistance Needed Supervision   Amount of Physical Assistance Provided No physical assistance   Chair/Bed-to-Chair Transfer CARE Score 4   Transfer Bed/Chair/Wheelchair   Limitations Noted In Balance; Coordination;LE Strength;UE Strength   Therapeutic Exercise - ROM   UE-ROM Yes   Neuromuscular Education   Functional Movement Patterns x10 AAROM elbow to digits all planes of motion; trace elbow flexion/extension and digit flexion/extension, all other motions fair   Comments e-stim to LUE to encourage elbow flexion/extension, wrist flexion/extension, pronation/supination; intensity = 4 5-5, x15 20 seconds each with 10 second intervals between; pt with good response while e-stim applied to all targeted motions, fair wrist flexion/extension and pronation when e-stim removed   Cognition   Overall Cognitive Status [facility]   Arousal/Participation Alert; Responsive; Cooperative   Attention Within functional limits   Orientation Level Oriented X4   Memory Within functional limits   Following Commands Follows all commands and directions without difficulty   Assessment   Treatment Assessment Pt agreeable to OT session this AM  Received sitting upright in bed   ADL session completed; current LOF and details listed in respective sections  Pt is Karen bathing, LB dressing, and UB dressing 2* decreased ROM/strength/coordination on L side; AE used for LB tasks effectively  Pt mod I grooming at the sink at w/c level  Functional transfers CG/supervision 2* impaired balance; however, transfers have improved significantly since beginning of stay at ARU  After ADL, completed neuro re-ed to [facility], details in respective sections  Pt continues with fair/trace return in [facility] with e-stim  Propelled self to and from OT and shower room in w/c using RUE and RLE  Set up required for breakfast tray at end of session  Pt would benefit from continued skilled OT services in order to maximize independence in self care, functional mobility/transfers, ROM/strength, and activity tolerance  Prognosis Good   Problem List Decreased strength;Decreased range of motion;Decreased endurance; Impaired balance;Decreased coordination;Decreased safety awareness   Plan   Treatment/Interventions ADL retraining;Functional transfer training;LE strengthening/ROM; Endurance training; Therapeutic exercise;Patient/family training;Equipment eval/education; Compensatory technique education;OT   Progress Progressing toward goals   OT Therapy Minutes   OT Time In 0700   OT Time Out 0830   OT Total Time (minutes) 90   OT Mode of treatment - Individual (minutes) 90   Therapy Time missed   Time missed?  No

## 2021-03-25 VITALS
RESPIRATION RATE: 16 BRPM | WEIGHT: 140.5 LBS | HEART RATE: 69 BPM | BODY MASS INDEX: 22.58 KG/M2 | SYSTOLIC BLOOD PRESSURE: 108 MMHG | HEIGHT: 66 IN | DIASTOLIC BLOOD PRESSURE: 59 MMHG | TEMPERATURE: 96.4 F | OXYGEN SATURATION: 91 %

## 2021-03-25 LAB
GLUCOSE SERPL-MCNC: 155 MG/DL (ref 65–140)
GLUCOSE SERPL-MCNC: 189 MG/DL (ref 65–140)
GLUCOSE SERPL-MCNC: 255 MG/DL (ref 65–140)

## 2021-03-25 PROCEDURE — 97116 GAIT TRAINING THERAPY: CPT

## 2021-03-25 PROCEDURE — 97530 THERAPEUTIC ACTIVITIES: CPT

## 2021-03-25 PROCEDURE — 97032 APPL MODALITY 1+ESTIM EA 15: CPT

## 2021-03-25 PROCEDURE — 97535 SELF CARE MNGMENT TRAINING: CPT

## 2021-03-25 PROCEDURE — 82948 REAGENT STRIP/BLOOD GLUCOSE: CPT

## 2021-03-25 PROCEDURE — 97110 THERAPEUTIC EXERCISES: CPT

## 2021-03-25 PROCEDURE — 97112 NEUROMUSCULAR REEDUCATION: CPT

## 2021-03-25 PROCEDURE — 99238 HOSP IP/OBS DSCHRG MGMT 30/<: CPT | Performed by: FAMILY MEDICINE

## 2021-03-25 PROCEDURE — 97537 COMMUNITY/WORK REINTEGRATION: CPT

## 2021-03-25 PROCEDURE — 99238 HOSP IP/OBS DSCHRG MGMT 30/<: CPT | Performed by: PHYSICAL MEDICINE & REHABILITATION

## 2021-03-25 RX ORDER — AMLODIPINE BESYLATE 5 MG/1
5 TABLET ORAL DAILY
Qty: 30 TABLET | Refills: 0 | Status: SHIPPED | OUTPATIENT
Start: 2021-03-26 | End: 2021-03-25

## 2021-03-25 RX ORDER — PANTOPRAZOLE SODIUM 40 MG/1
40 TABLET, DELAYED RELEASE ORAL
Qty: 30 TABLET | Refills: 0 | Status: ON HOLD | OUTPATIENT
Start: 2021-03-26 | End: 2021-11-04 | Stop reason: ALTCHOICE

## 2021-03-25 RX ORDER — ATORVASTATIN CALCIUM 40 MG/1
40 TABLET, FILM COATED ORAL
Qty: 30 TABLET | Refills: 0 | Status: SHIPPED | OUTPATIENT
Start: 2021-03-25 | End: 2021-03-25

## 2021-03-25 RX ORDER — ATORVASTATIN CALCIUM 40 MG/1
40 TABLET, FILM COATED ORAL
Qty: 30 TABLET | Refills: 0 | Status: SHIPPED | OUTPATIENT
Start: 2021-03-25

## 2021-03-25 RX ORDER — CLOPIDOGREL BISULFATE 75 MG/1
75 TABLET ORAL DAILY
Qty: 30 TABLET | Refills: 0 | Status: SHIPPED | OUTPATIENT
Start: 2021-03-26 | End: 2021-04-15 | Stop reason: SDUPTHER

## 2021-03-25 RX ORDER — PANTOPRAZOLE SODIUM 40 MG/1
40 TABLET, DELAYED RELEASE ORAL
Qty: 30 TABLET | Refills: 0 | Status: SHIPPED | OUTPATIENT
Start: 2021-03-26 | End: 2021-03-25

## 2021-03-25 RX ORDER — CLOPIDOGREL BISULFATE 75 MG/1
75 TABLET ORAL DAILY
Qty: 30 TABLET | Refills: 0 | Status: SHIPPED | OUTPATIENT
Start: 2021-03-26 | End: 2021-03-25

## 2021-03-25 RX ORDER — AMLODIPINE BESYLATE 5 MG/1
5 TABLET ORAL DAILY
Qty: 30 TABLET | Refills: 0 | Status: SHIPPED | OUTPATIENT
Start: 2021-03-26

## 2021-03-25 RX ADMIN — AMLODIPINE BESYLATE 5 MG: 5 TABLET ORAL at 08:51

## 2021-03-25 RX ADMIN — LISINOPRIL 40 MG: 20 TABLET ORAL at 08:50

## 2021-03-25 RX ADMIN — LEVOTHYROXINE SODIUM 88 MCG: 88 TABLET ORAL at 05:13

## 2021-03-25 RX ADMIN — INSULIN LISPRO 5 UNITS: 100 INJECTION, SOLUTION INTRAVENOUS; SUBCUTANEOUS at 08:49

## 2021-03-25 RX ADMIN — INSULIN LISPRO 1 UNITS: 100 INJECTION, SOLUTION INTRAVENOUS; SUBCUTANEOUS at 12:35

## 2021-03-25 RX ADMIN — DOCUSATE SODIUM 100 MG: 100 CAPSULE, LIQUID FILLED ORAL at 08:50

## 2021-03-25 RX ADMIN — HEPARIN SODIUM 5000 UNITS: 5000 INJECTION INTRAVENOUS; SUBCUTANEOUS at 05:13

## 2021-03-25 RX ADMIN — INSULIN LISPRO 1 UNITS: 100 INJECTION, SOLUTION INTRAVENOUS; SUBCUTANEOUS at 08:49

## 2021-03-25 RX ADMIN — CLOPIDOGREL BISULFATE 75 MG: 75 TABLET ORAL at 08:50

## 2021-03-25 RX ADMIN — PANTOPRAZOLE SODIUM 40 MG: 40 TABLET, DELAYED RELEASE ORAL at 05:13

## 2021-03-25 NOTE — PROGRESS NOTES
03/25/21 1108   Pain Assessment   Pain Assessment Tool Pain Assessment not indicated - pt denies pain   Restrictions/Precautions   Precautions Fall Risk   Weight Bearing Restrictions No   ROM Restrictions No   Braces or Orthoses AFO;Sling   Neuromuscular Education   Functional Movement Patterns x10 AAROM shoulder flexion/extension, ABD/ADD, horizontal ABD/ADD, shrug   Response to Techniques pt with good shrug, fair extension, horizontal ABD/ADD; trace all other motions   Comments e-stim to L shoulder to encourage shrug; pt with good return both with and without stim   Cognition   Overall Cognitive Status [facility]   Arousal/Participation Alert; Responsive; Cooperative   Attention Within functional limits   Orientation Level Oriented X4   Memory Within functional limits   Following Commands Follows all commands and directions without difficulty   Assessment   Treatment Assessment Pt agreeable to OT session this AM  Received sitting upright in w/c  Propelled self to OT room in w/c using RUE and RLE, then completed neuro re-ed and e-stim  Pt reported completing self AAROM outside of therapy time and will continue to do so at home in addition to [address]  Pt has made fair return in LUE with use of e-stim and other neuro re-ed techniques during ARU stay; pt continues to be motivated to return to PLOF and continue making gains   Pt set to d/c to home with family assist and [facility] this PM    OT Therapy Minutes   OT Time In 1108   OT Time Out 1129   OT Total Time (minutes) 21   OT Mode of treatment - Individual (minutes) 21

## 2021-03-25 NOTE — PROGRESS NOTES
03/25/21 0700   Pain Assessment   Pain Assessment Tool Pain Assessment not indicated - pt denies pain   Restrictions/Precautions   Precautions Fall Risk   Weight Bearing Restrictions No   ROM Restrictions No   Braces or Orthoses AFO;Sling   Eating   Type of Assistance Needed Set-up / clean-up   Eating CARE Score 5   Oral Hygiene   Type of Assistance Needed Independent   Amount of Physical Assistance Provided No physical assistance   Oral Hygiene CARE Score 6   Grooming   Able To Initiate Tasks; Acquire Items;Comb/Brush Hair;Wash/Dry Face   Limitation Noted In Coordination; Safety   Shower/Bathe Self   Type of Assistance Needed Physical assistance; Adaptive equipment   Amount of Physical Assistance Provided Less than 25%   Comment assist for R upper UE   Shower/Bathe Self CARE Score 3   Bathing   Assessed Bath Style Shower   Anticipated D/C Bath Style Shower;Sponge Bath   Able to [place] No   Able to [employer] Temperature Yes   Able to Wash/Rinse/Dry (body part) Left Arm;L Upper Leg;R Upper Leg;L Lower Leg/Foot;R Lower Leg/Foot;Chest;Abdomen;Perineal Area; Buttocks   Limitations Noted in Balance; Coordination;ROM;Safety   Positioning Seated;Standing   Adaptive Equipment Longhand Sponge; Shower Seat;Shower [employer]   Limitations Noted In Balance; Coordination;ROM;Safety;LE Strength;UE Strength   Adaptive Equipment Grab Bars;Seat with out Back   Assessed Shower  (small lip)   Findings CG   Upper Body Dressing   Type of Assistance Needed Physical assistance   Amount of Physical Assistance Provided Less than 25%   Comment assist to get L hand into sleeve   Upper Body Dressing CARE Score 3   Lower Body Dressing   Type of Assistance Needed Physical assistance; Adaptive equipment   Amount of Physical Assistance Provided Less than 25%   Comment assist to get L foot into shorts   Lower Body Dressing CARE Score 3   Putting On/Taking Off Footwear   Type of Assistance Needed Physical assistance   Amount of Physical Assistance Provided 50%-74%   Comment assist to don socks, brace, shoes; pt declines footwear AE   Putting On/Taking Off Footwear CARE Score 2   Dressing/Undressing Clothing   Remove UB Clothes Pullover Shirt   Don UB Clothes Pullover Shirt   Remove LB Clothes Undergarment;Socks   [address]; Undergarment;Socks; Shoes   Limitations Noted In Balance; Coordination; Safety;ROM   Adaptive Equipment Reacher   Positioning Supported Sit   Sit to Stand   Type of Assistance Needed Supervision   Amount of Physical Assistance Provided No physical assistance   Sit to Stand CARE Score 4   Bed-Chair Transfer   Type of Assistance Needed Supervision   Amount of Physical Assistance Provided No physical assistance   Chair/Bed-to-Chair Transfer CARE Score 4   Transfer Bed/Chair/Wheelchair   Limitations Noted In Balance;LE Strength   Therapeutic Exercise - ROM   UE-ROM Yes   ROM- Right Upper Extremities   RUE ROM Comment towel slides x40 all planes of motion with RUE providing AAROM to LUE   ROM - Left Upper Extremities    LUE ROM Comment towel slides x40 all planes of motion with RUE providing AAROM to LUE   Neuromuscular Education   Functional Movement Patterns x10 LUE elbow to digits all planes of motion AAROM; trace elbow flexion/extension, supination, digit flexion/extension; all other motions fair   Comments e-stim to LUE to encourage elbow flexion/extension, wrist flexion/extension, pronation/supination; pt with good return while e-stim applied, however fair/trace without stim   Cognition   Overall Cognitive Status WFL   Arousal/Participation Alert; Responsive; Cooperative   Attention Within functional limits   Orientation Level Oriented X4   Memory Within functional limits   Following Commands Follows all commands and directions without difficulty   Assessment   Treatment Assessment Pt agreeable to OT session this AM  Received lying supine in bed   ADL session completed; current LOF and details listed in respective sections  Pt is overall mod I grooming at sink w/c level, Karen UB and LB dressing and showering, supervision/CG functional transfers  AE used as needed for LB tasks and transfers  Pt continues with impaired strength/ROM/coordination on L side that impacts independent participation in tasks, however pt has adpated very successfully and continues to be motivated to improve to PLOF  After ADL, completed ROM and neuro re-ed activities; details in respective sections  Continues with good return at targeted motions with e-stim applied  Breakfast tray set up after session  Pt is set to d/c to home this PM with family assist and HHC  Recommendation   OT - OK to Discharge Yes   Discharge Summary Pt cleared for d/c from ARU to home with family assist and HHC  Pt is Karen UB and LB dressing and bathing, CG toileting and toilet transfer, mod I grooming at w/c level  Continues with impaired LUE ROM/strength/coordination and balance that impacts independent participation in ADL tasks and transfers; max benefit reached at ARU  Pt participated in ADL training, therapeutic exercises, therapeutic activities, functional mobility/transfers, neuromuscular reeducation, and activity tolerance in order to progress towards goals  DME has been obtained and is in place at pt's home  OT Therapy Minutes   OT Time In 0700   OT Time Out 0825   OT Total Time (minutes) 85   OT Mode of treatment - Individual (minutes) 85   Therapy Time missed   Time missed?  No

## 2021-03-25 NOTE — NURSING NOTE
Patient resting comfortably in bed at this time  No signs of distress noted  Insulin pump managed by patient as ordered  Patient was encouraged to reposition self frequently to promote skin integrity  Patient wore SCDs overnight as ordered for DVT prevention  One assist provided to assist patient to stand with hemiwalker during shift  Call bell within reach  Will continue to monitor patient and follow plan of care

## 2021-03-25 NOTE — NURSING NOTE
Patient and wife verbalized understanding of dc instructions for home, medications, s/s stroke and risk factors, f/u appointment, Edyta Hearn  Interdisicplinary care team determined safest means of transport is via car   Dc to car outside of lobby, car xfr completed with PT

## 2021-03-25 NOTE — PROGRESS NOTES
03/25/21 0857   Pain Assessment   Pain Assessment Tool 0-10   Pain Score No Pain   Restrictions/Precautions   Precautions Fall Risk   Braces or Orthoses AFO;Sling   Cognition   Overall Cognitive Status WFL   Arousal/Participation Alert; Responsive; Cooperative   Attention Within functional limits   Orientation Level Oriented X4   Memory Within functional limits   Following Commands Follows all commands and directions without difficulty   Roll Left and Right   Type of Assistance Needed Independent   Roll Left and Right CARE Score 6   Sit to Stand   Type of Assistance Needed Supervision   Sit to Stand CARE Score 4   Therapeutic Interventions   Strengthening seated and standing ther ex; occasional AAROM LLE   Balance transfer training   Neuromuscular Re-Education E-stime LLE DF musculature   Assessment   Treatment Assessment Patient tolerated therapy session well  E-stim to LLE with positive contractions  Completed ther ex for general LE strengthening; transfer training focusing on sequence and technique for improved balance and safety with functional transfers  PT Barriers   Physical Impairment Decreased strength;Decreased range of motion;Decreased endurance; Impaired balance;Decreased mobility; Decreased coordination   Functional Limitation Wheelchair management; [facility] negotiation   Plan   Treatment/Interventions Functional transfer training;LE strengthening/ROM; Therapeutic exercise   Progress Improving as expected   PT Therapy Minutes   PT Time In 0857   PT Time Out 0958   PT Total Time (minutes) 61   PT Mode of treatment - Individual (minutes) 61   PT Mode of treatment - Concurrent (minutes) 0   PT Mode of treatment - Group (minutes) 0   PT Mode of treatment - Co-treat (minutes) 0   PT Mode of Treatment - Total time(minutes) 61 minutes   PT Cumulative Minutes 1654   Therapy Time missed   Time missed?  No

## 2021-03-25 NOTE — DISCHARGE SUMMARY
Discharge Summary - PMR   Jaquelin Patricia 70 y o  male MRN: [id number]  Unit/Bed#: [facility] 216-01 Encounter: [id number]    Admission Date: 3/5/2021     Discharge Date: 3/25/21     Etiologic/Rehabilitation Diagnosis: Impairment of mobility, safety and Activities of Daily Living (ADLs) due to Stroke:  01 1  Left Body Involvement (Right Brain)    HPI:   Jaquelin Patricia is a 70 y o  male who presented to the [facility] ER on 3/3/21 with left sided weakness  Patient reported that the weakness began in his LLE later in the day prior to coming to the ER  The weakness then progressed to his LUE on the day of presentation to the ER  Pt also experienced slight left facial droop  CT of head did not show any acute findings  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  He was continued on a high potency statin and recommendation was given to convert back to simvastatin 40 mg upon discharge  Neurology was consulted and recommended monotherapy (plavix) and therefore ASA was discontinued  TTE was without structural abnormalities  Neurology recommended against ANDREW  Pt may be considered for an OP implantable loop recorder vs  zio patch to r/o a-fib as a potential source of CVA, since he has well controlled vascular risk factors  Pt was allowed permissive HTN and on 3/4, his ACE inhibitor was restarted at half dose  Pt with hyponatremia potentially on the basis of CVA vs dehydration  Initial NA+ was 129  NS was decreased and repeat Na+ was 133  Pt worked with PT/OT and was deemed appropriate for an acute rehabilitation setting  He arrive to  on 3/5/21  Procedures Performed During [facility] Admission: none    Acute Rehabilitation Center Course: Patient participated in a comprehensive interdisciplinary inpatient rehabilitation program which included involvment of MD, therapies (PT, OT, and/or SLP), RN, CM, SW, dietary, and psychology services   He was able to be advanced to an overall supervision level of assist and considered safe for discharge home with family  Please see below for patient's day to day management of medical needs  No new Assessment & Plan notes have been filed under this hospital service since the last note was generated  Service: Neurology      Discharge Physical Examination: 3/25/21     Significant Findings, Care, Treatment and Services Provided: No significant findings while in ARU  Pt participated in three hours daily with PT/OT/ST  Pt received DVT prophylaxis and all home medications  Pt received insulin from hospital and insulin pump due to changes with blood glucose  Transitioning slowly to normal state as healing continues  Complications: None    Functional Status Upon Admission to [facility]C:  Mobility: Supervision  Transfers:  Moderate assistance  ADLs: Moderate assistance       Functional Status Upon Discharge from [facility]:   Physical Therapy:     Weight Bearing Status: Full Weight Bearing  Transfers: Incidental Touching, Supervision  Bed Mobility: Supervision  Amulation Distance (ft): 331 feet  Ambulation: Incidental Touching  Assistive Device for Ambulation: Solo Walker(AFO LLE; sling LUE)  Wheelchair Mobility Distance: (>150')  Wheelchair Mobility: Independent  Number of Stairs: 21  Assistive Device for Stairs: Right Hand Rail  Stair Assistance: Incidental Touching  Discharge Recommendations: Home with:  [address] with[de-identified] 24 Hour Supervision, Family Support, First Floor Setup, Home Physical Therapy  Occupational Therapy:  Eating: Supervision  Grooming: Supervision  Bathing: Supervision  Bathing: Supervision  Upper Body Dressing: Minimal Assistance  Lower Body Dressing: Minimal Assistance  Toileting: Incidental Touching  Tub/Shower Transfer: Incidental Touching  Toilet Transfer: Incidental Touching  Cognition: Within Defined Limits  Orientation: Person, Place, Time, Situation  Discharge Recommendations: Home with:  [address] with[de-identified] Family Support, Home Occupational Therapy  Speech Therapy:  Mode of Communication: Verbal  Cognition: Within Defined Limits  Orientation: Person, Place, Time, Situation  Discharge Recommendations: Home with:  [address] with[de-identified] Family Support(level of assist pending pt's progress)       Discharge Diagnosis: Impairment of mobility, safety and Activities of Daily Living (ADLs) due to Stroke:  01 1  Left Body Involvement (Right Brain)    Discharge Medications:   See after visit summary for reconciled discharge medications provided to patient and family  Condition at Discharge: good     Discharge instructions/Information to patient and family:   See after visit summary for information provided to patient and family  Provisions for Follow-Up Care:  See after visit summary for information related to follow-up care and any pertinent home health orders  Future Appointments   Date Time Provider [place]   4/9/2021  9:30 AM DO OPHELIA Snlel [facility]   5/11/2021  3:15 PM Maddy Ash MD [facility]       Disposition: See After Visit Summary for discharge disposition information  Planned Readmission: No    Discharge Statement   I spent 30 minutes or less discharging the patient  This time was spent on the day of discharge  I had direct contact with the patient on the day of discharge  Greater than 50% of the total time was spent examining patient, answering all patient questions, arranging and discussing plan of care with patient as well as directly providing post-discharge instructions  Additional time then spent on discharge activities  Discharge Medications:  See after visit summary for reconciled discharge medications provided to patient and family

## 2021-03-25 NOTE — PROGRESS NOTES
03/25/21 1130   Pain Assessment   Pain Assessment Tool 0-10   Pain Score No Pain   Restrictions/Precautions   Precautions Fall Risk   Braces or Orthoses AFO;Sling   Roll Left and Right   Type of Assistance Needed Independent   Roll Left and Right CARE Score 6   Sit to Lying   Type of Assistance Needed Independent   Sit to Lying CARE Score 6   Lying to Sitting on Side of Bed   Type of Assistance Needed Independent   Lying to Sitting on Side of Bed CARE Score 6   Sit to Stand   Type of Assistance Needed Supervision   Sit to Stand CARE Score 4   Bed-Chair Transfer   Type of Assistance Needed Supervision   Chair/Bed-to-Chair Transfer CARE Score 4   Walk 10 Feet   Type of Assistance Needed Incidental touching   Comment cg   Walk 10 Feet CARE Score 4   Walk 50 Feet with Two Turns   Type of Assistance Needed Incidental touching   Comment cg   Walk 50 Feet with Two Turns CARE Score 4   Walk 150 Feet   Type of Assistance Needed Incidental touching   Comment cg   Walk 150 Feet CARE Score 4   Walking 10 Feet on Uneven Surfaces   Type of Assistance Needed Incidental touching   Comment cg  over and back across uneven surface (37')   Walking 10 Feet on Uneven Surfaces CARE Score 4   Ambulation   Does the patient walk? 2  Yes   Primary Mode of Locomotion Prior to Admission Walk   Distance Walked (feet) 209 ft  (209' 37')   Assist Device Solo Walker   Gait Pattern Inconsistant Alyx; Slow Alyx;Decreased foot clearance;L foot drag;L hemiparesis; Step to   Limitations Noted In Balance; Endurance; Safety;Strength   Provided Assistance with: Balance   Walk Assist Level Contact Guard   Findings level and unlevel surfaces   Wheel 50 Feet with Two Turns   Type of Assistance Needed Independent   Wheel 50 Feet with Two Turns CARE Score 6   Wheel 150 Feet   Type of Assistance Needed Independent   Wheel 150 Feet CARE Score 6   Wheelchair mobility   Does the patient use a wheelchair? 1  Yes   Type of Wheelchair Used 1   Manual   Curb or Single Stair   Style negotiated Single stair   Type of Assistance Needed Incidental touching   Comment cg   1 Step (Curb) CARE Score 4   4 Steps   Type of Assistance Needed Incidental touching   Comment cg   4 Steps CARE Score 4   12 Steps   Type of Assistance Needed Incidental touching   Comment cg   12 Steps CARE Score 4   Stairs   Type Stairs   # of Steps 15   Weight Bearing Precautions WBAT   Assist Devices Single Rail   Findings cg going up forward and down backward   Therapeutic Interventions   Balance gait and transfer training   Other stair training   Assessment   Treatment Assessment Patient tolerated therapy session well    Completed gait and transfer training focusing on sequence and technique for improved balance and safety with functional mobility   Plan   Treatment/Interventions Functional transfer training;Elevations;Gait training;Bed mobility   Progress Improving as expected   PT Therapy Minutes   PT Time In 1130   PT Time Out 1210   PT Total Time (minutes) 40   PT Mode of treatment - Individual (minutes) 30   PT Mode of treatment - Concurrent (minutes) 10   PT Mode of treatment - Group (minutes) 0   PT Mode of treatment - Co-treat (minutes) 0   PT Mode of Treatment - Total time(minutes) 40 minutes   PT Cumulative Minutes 6133

## 2021-03-25 NOTE — PROGRESS NOTES
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  He was continued on a high potency statin and recommendation was given to convert back to simvastatin 40 mg upon discharge  Neurology was consulted and recommended monotherapy (plavix) and therefore ASA was discontinued  TTE was without structural abnormalities  Neurology recommended against ANDREW  Pt may be considered for an OP implantable loop recorder vs  zio patch to r/o a-fib as a potential source of CVA, since he has well controlled vascular risk factors  Pt was allowed permissive HTN and on 3/4, his ACE inhibitor was restarted at half dose  Pt with hyponatremia potentially on the basis of CVA vs dehydration  Initial NA+ was 129  NS was decreased and repeat Na+ was 133  Pt worked with PT/OT and was deemed appropriate for an acute rehabilitation setting  He arrive to  on 3/5/21                        Subjective:  Pt's wife called stating [facility] a [facility] of patient's [facility]  Faxed medication list to patient's PCP for them to forward to the [place]  Also printed new prescriptions, signed and handed to patient  Pt aware if there is any issues regarding this to give us a call  Pt also aware these medications were sent to requested pharmacy  Patient has no further questions or complaints  He is looking forward to going home today       ROS: A 10 point ROS was performed; negative except as noted above        Assessment/Plan:    CVA  -LLE/LUE weakness  -Cont simvastatin 40 mg at discharge  - Cont daily clopidogrel  -Monitor blood pressure, blood sugars  -Cont lisinopril  -Cont PPI for GI prophylaxis  -outpatient implantable loop recorder vs zio patch to rule out a fib  -Acute chomprehensive interdisciplinary inpatient rehabilitation to include intensive skilled therapies as outlines with oversight and management by a rehabilitation Physician Assistant overseen by rehabilitation physician as well as inpatient rehabilitation nursing, case management and weekly interdisciplinary team meeting  -Patient did trial of E-stim while course of ARU with successful results, appreciate therapist notes regarding all activities in therapy  -Pt will be discharged with a brace for the left leg to help with ambulation  -Pt to be discharged with medications bolded above  Medications sent to pharmacy in [place] as well as printed and signed for patient to take to the [place] where he receives his medications normally   -Pt to follow up with neurology and cardiology as an outpatient      Diabetes mellitus type 2  -HGA1C- 7 3 as of 3/4/21  -Utilizes insulin pump at home, cont this while in rehab  -Maintain on ADA diet  -Pt experienced inconsistent blood glucose levels while inpatient setting  Pt normally controlled well at home with own pump and insulin pens provided by [place]  Provided extra coverage for patient while in ARU  Pt to be discharged only on his at home pump and pens as requested by patient and wife  Pt to follow up with endocrinology at the [place] 3/30/21  Pt aware of inconsistent glucose levels and will inform endocrinologist with what he recorded while at ARU  Endocrinology to adjust as they see fitting  Hypothyroidism  -Cont thyroid replacement therapy     GERD  -Cont PPI     Hyperlipidemia  -High potency statin while in acute rehab  -Discharge on 40 mg of Zocor     Hypertension  -Cont   Ace-I     Stage 3 chronic kidney disease  -Current creatinine appears to be at baseline  -Cont ACE-I  -Monitor renal function  -Avoid nephrotoxins  -Renally dose medications when appropriate     DVT prophylaxis  -plavix daily continue after discharge and follow up with neurology     -SCDs daily     Code  -Full                 Scheduled Meds:  Current Facility-Administered Medications   Medication Dose Route Frequency Provider Last Rate    acetaminophen  650 mg Oral Q6H PRN Alyx Cart, PADENNYS      amLODIPine  5 mg Oral Daily Teodoro Suazo MD      atorvastatin  40 mg Oral Daily With [employer], ANIVAL      clopidogrel  75 mg Oral Daily Alyx Cart, PADENNYS      docusate sodium  100 mg Oral BID Alyx Cart, PADENNYS      heparin (porcine)  5,000 Units Subcutaneous [facility] Alyx Cart, PADENNYS      hydrALAZINE  25 mg Oral Q8H PRN Alyx Cart, PADENNYS      insulin aspart  1,000 Units Subcutaneous Insulin Pump Daily PRN Alyx Cart, PADENNYS      insulin lispro  1-5 Units Subcutaneous HS Alyx Cart, PADENNYS      insulin lispro  1-6 Units Subcutaneous TID AC Fabby Atkinson, ANIVAL      insulin lispro  2 Units Subcutaneous Daily With Lunch Fabby Atkinson PA-C      insulin lispro  5 Units Subcutaneous Daily With Breakfast REEMA Steward      insulin lispro  5 Units Subcutaneous Daily With Dinner Fabby Atkinson PA-C      levothyroxine  88 mcg Oral Early Morning Alyx Cart, ANIVAL      lisinopril  40 mg Oral Daily Alyx Cart, PADENNYS      pantoprazole  40 mg Oral Daily Before Breakfast Vania Jimenez MD      patient maintained insulin pump  1 each Subcutaneous 4x Daily (with meals and at bedtime) REEMA Steward      polyethylene glycol  17 g Oral Daily PRN Alyx Cart, ANIVAL         Objective:    Functional Update:  Physical Therapy:     Weight Bearing Status: Full Weight Bearing  Transfers: Incidental Touching, Supervision  Bed Mobility: Supervision  Amulation Distance (ft): 331 feet  Ambulation: Incidental Touching  Assistive Device for Ambulation: Solo Walker(AFO LLE; sling LUE)  Wheelchair Mobility Distance: (>150')  Wheelchair Mobility: Independent  Number of Stairs: 21  Assistive Device for Stairs: Right Hand Rail  Stair Assistance: Incidental Touching  Discharge Recommendations: Home with:  Gurmeet Avina with[de-identified] 24 Hour Supervision, Family Support, First Floor Setup, Home Physical Therapy  Occupational Therapy:  Eating: Supervision  Grooming: Supervision  Bathing: Supervision  Bathing: Supervision  Upper Body Dressing: Minimal Assistance  Lower Body Dressing: Minimal Assistance  Toileting: Incidental Touching  Tub/Shower Transfer: Incidental Touching  Toilet Transfer: Incidental Touching  Cognition: Within Defined Limits  Orientation: Person, Place, Time, Situation  Discharge Recommendations: Home with:  Gurmeet Avina with[de-identified] Family Support, Home Occupational Therapy  Speech Therapy:  Mode of Communication: Verbal  Cognition: Within Defined Limits  Orientation: Person, Place, Time, Situation  Discharge Recommendations: Home with:  Gurmeet Avina with[de-identified] Family Support(level of assist pending pt's progress)      Physical Exam:  Temp:  [96 4 °F (35 8 °C)-98 4 °F (36 9 °C)] 96 4 °F (35 8 °C)  HR:  [69] 69  Resp:  [16] 16  BP: (103-108)/(57-59) 108/59  SpO2:  [91 %-99 %] 91 %    General: alert, no apparent distress, cooperative and comfortable  HEENT:  Head: Normocephalic, no lesions, without obvious abnormality    Eye: Normal external eye, conjunctiva, lidsc cornea  Ears: Normal external ears  Nose: Normal external nose, mucus membranes  CARDIAC:  regular rate and rhythm, S1, S2 normal, no murmur, click, rub or gallop  LUNGS:  no abnormal respiratory pattern, no retractions noted, non-labored breathing   ABDOMEN:  soft, non-tender, non-distended  EXTREMITIES:  extremities normal, warm and well-perfused; no cyanosis, clubbing, or edema  NEURO:  clear speech, following all commands, oriented x4  PSYCH:  Alert and oriented, appropriate affect  Diagnostic Studies: Labs reviewed  No orders to display       Laboratory: Labs reviewed  Results from last 7 days   Lab Units 03/22/21  0516   HEMOGLOBIN g/dL 12 2*   HEMATOCRIT % 36 4*   WBC Thousand/uL 5 70     Results from last 7 days   Lab Units 03/22/21  0516   BUN mg/dL 56*   SODIUM mmol/L 130*   POTASSIUM mmol/L 4 5   CHLORIDE mmol/L 100   CREATININE mg/dL 1 98*            ** Please Note: Fluency Direct voice to text software may have been used in the creation of this document   **

## 2021-03-25 NOTE — PHYSICAL THERAPY NOTE
PT DISCHARGE SUMMARY:      Patient has met max benefit for inpatient ARC PT  Patient met all goals  Patient MOD I bed mobility, S all transfers with hemiwalker, MOD I wheelchair mobility level and unlevel surfaces, CG ambulation up to 331' level and unlevel surfaces with hemiwalker, CG 30 steps with 1 handrail  Patient completed car transfer with CG  Pt AFO to be delivered to home by  orthotics  Patient given off shelf AFO to take home  For d/c to home with family and continued PT services 03/25/2021

## 2021-03-25 NOTE — PROGRESS NOTES
03/25/21 1405   Pain Assessment   Pain Assessment Tool 0-10   Pain Score No Pain   Car Transfer   Type of Assistance Needed Incidental touching   Comment cg   Car Transfer CARE Score 4   Therapeutic Interventions   Other car transfer    Assessment   Treatment Assessment Patient completed car transfer with CG   PT Therapy Minutes   PT Time In 1405   PT Time Out 1419   PT Total Time (minutes) 14   PT Mode of treatment - Individual (minutes) 14   PT Mode of treatment - Concurrent (minutes) 0   PT Mode of treatment - Group (minutes) 0   PT Mode of treatment - Co-treat (minutes) 0   PT Mode of Treatment - Total time(minutes) 14 minutes   PT Cumulative Minutes 1708   Therapy Time missed   Time missed?  No

## 2021-03-26 ENCOUNTER — TRANSITIONAL CARE MANAGEMENT (OUTPATIENT)
Dept: INTERNAL MEDICINE CLINIC | Facility: CLINIC | Age: 76
End: 2021-03-26

## 2021-04-01 ENCOUNTER — OFFICE VISIT (OUTPATIENT)
Dept: INTERNAL MEDICINE CLINIC | Facility: CLINIC | Age: 76
End: 2021-04-01
Payer: COMMERCIAL

## 2021-04-01 VITALS
OXYGEN SATURATION: 97 % | DIASTOLIC BLOOD PRESSURE: 80 MMHG | WEIGHT: 144 LBS | TEMPERATURE: 97.6 F | SYSTOLIC BLOOD PRESSURE: 132 MMHG | HEART RATE: 61 BPM | BODY MASS INDEX: 23.14 KG/M2 | HEIGHT: 66 IN

## 2021-04-01 DIAGNOSIS — E11.65 TYPE 2 DIABETES MELLITUS WITH HYPERGLYCEMIA, WITH LONG-TERM CURRENT USE OF INSULIN (HCC): Chronic | ICD-10-CM

## 2021-04-01 DIAGNOSIS — E78.5 HYPERLIPIDEMIA, UNSPECIFIED HYPERLIPIDEMIA TYPE: Chronic | ICD-10-CM

## 2021-04-01 DIAGNOSIS — Z79.4 TYPE 2 DIABETES MELLITUS WITH HYPERGLYCEMIA, WITH LONG-TERM CURRENT USE OF INSULIN (HCC): Chronic | ICD-10-CM

## 2021-04-01 DIAGNOSIS — I63.9 CEREBROVASCULAR ACCIDENT (CVA), UNSPECIFIED MECHANISM (HCC): Primary | ICD-10-CM

## 2021-04-01 DIAGNOSIS — N18.32 STAGE 3B CHRONIC KIDNEY DISEASE (HCC): Chronic | ICD-10-CM

## 2021-04-01 DIAGNOSIS — I10 ESSENTIAL HYPERTENSION: Chronic | ICD-10-CM

## 2021-04-01 PROCEDURE — 99496 TRANSJ CARE MGMT HIGH F2F 7D: CPT | Performed by: INTERNAL MEDICINE

## 2021-04-01 NOTE — PROGRESS NOTES
Assessment/Plan:         Diagnoses and all orders for this visit:    Cerebrovascular accident (CVA), unspecified mechanism ([address] )  Pt has home therapy and his wife is doing exercises with him as well He seems to be making slow progress  He is monitoring blood sugars (saw endocrine at [facility]) and BP at home has been stable  Neuro appt 4/15 and his wife will schedule cardiology appt as well  Eventual outpt therapy and recommended [facility]   Plavix rx is at [facility] and sent note to release rx     Type 2 diabetes mellitus with hyperglycemia, with long-term current use of insulin (HCC)  Pt A1c in [facility] was 7 3 Endocrine wants to monitor BS at home for now without change and he has followup     Stage 3b chronic kidney disease  Stable during hospital stay     Hyperlipidemia, unspecified hyperlipidemia type  He remains on current rx     Essential hypertension  Initially BP elevated in hospital He is on Norvasc and readings stable per his wife         Rto 2months   Pt got first covid vaccine at [facility]      Patient ID: Comfort Fuentes is a 70 y o  male      HPI   Pt recently admitted with CVA He had left sided sxs the day prior to admission and when he awoke in AM he could not move his left leg His imaging did show stroke and he was evaluated by Neuro He is on Plavix and will get Rx fron the [facility] for longterm He did got acute rehab at [address] [place] [address] and did well there They have walk in shower and now have banister With assistance he is doing stairs His wife is home with him and assisting with care Home therapy will start Monday No falls since home No chest pain or sob He can move his left leg a but now at least swing it for transfers His left hand has some movement now as well They do exercises daily for strengthening He is sleeping well Appetite good No change I bowels or bladder function No vision changes BS trend higher and now getting better since home He did see endocrine at the [facility] and they want to monitor for now He has appt with A1c upcoming They have a wheelchair and walking cane Appt with Neuro 4/15 His wife will call cardio as there was mention of possible zio patch or loop recorder implanting  No chest pain, sob or palpitations       TCM Call (since 3/1/2021)     Date and time call was made  3/26/2021  9:38 AM    Hospital care reviewed  Records reviewed    Patient was hospitialized at  [facility]        Date of Admission  03/05/21    Date of discharge  03/25/21    Diagnosis  left sided weakness, facial droop, rt medullory infarct    Disposition  Home    Were the patients medications reviewed and updated  No    Current Symptoms  Weakness; Fatigue    Weakness severity  Mild    Fatigue severity  Mild      TCM Call (since 3/1/2021)     Post hospital issues  Reduced activity    Should patient be enrolled in anticoag monitoring? No    Scheduled for follow up? Yes    Did you obtain your prescribed medications  Yes    Do you need help managing your prescriptions or medications  No    Is transportation to your appointment needed  No    I have advised the patient to call PCP with any new or worsening symptoms  Sagar Tavarez  II    Living Arrangements  Spouse or Significiant other    Support System  Spouse    The type of support provided  Physical    Do you have social support  Yes, quite a bit    Are you recieving any outpatient services  No    Are you recieving home care services  No    Are you using any community resources  No    Current waiver services  No    Have you fallen in the last 12 months  No    Interperter language line needed  No    Counseling  Patient          Review of Systems   Constitutional: Positive for activity change  Negative for chills and fatigue  HENT: Negative  Respiratory: Negative  Cardiovascular: Negative  Gastrointestinal: Negative for abdominal distention and abdominal pain  Genitourinary: Negative  Musculoskeletal: Positive for arthralgias and gait problem     Neurological: Negative for dizziness, light-headedness and headaches  Psychiatric/Behavioral: Negative for sleep disturbance  The patient is not nervous/anxious          Past Medical History:   Diagnosis Date    Arthritis     BPH (benign prostatic hyperplasia)     last assessed 4/29/2014    Diabetes mellitus ([address] )     Disease of thyroid gland     GERD (gastroesophageal reflux disease)     Hearing aid worn     Hyperlipidemia     Hypertension     Hypothyroidism     Mumps     Prostate cancer ([address] )     Tingling sensation     bilateral feet occassionally    Tinnitus     Wears glasses     Wears partial dentures     upper     Past Surgical History:   Procedure Laterality Date    CATARACT EXTRACTION Bilateral 2000    EYE SURGERY      JOINT REPLACEMENT      KNEE ARTHROSCOPY Right 06/26/2009    knee    [place] LAP,PROSTATECTOMY,RADICAL,W/NERVE SPARE,INCL ROBOTIC N/A 2/7/2018    Procedure: ROBOTIC ASSISTED LAPAROSCOPIC PROSTATECTOMY; BILATERAL PELVIC LYMPH NODE DISSECTION;  Surgeon: Flor Carpenter MD;  Location: [facility];  Service: Urology    [place] REPAIR [address] HERNIA,5+Y/O,REDUCIBL Right 4/19/2019    Procedure: REPAIR HERNIA INGUINAL;  Surgeon: Thanh Hester DO;  Location: [facility];  Service: General    PROSTATE BIOPSY  11/07/2017    needle biopsy of prostate    TRIGGER FINGER RELEASE  05/2013    trigger thumb     Social History     Socioeconomic History    Marital status: /Civil Union     Spouse name: Not on file    Number of children: Not on file    Years of education: Not on file    Highest education level: Not on file   Occupational History    Occupation: printer  retired   Social Needs    Financial resource strain: Not on file    Food insecurity     Worry: Not on file     Inability: Not on file   [employer] needs     Medical: Not on file     Non-medical: Not on file   Tobacco Use    Smoking status: Former Smoker    Smokeless tobacco: Never Used    Tobacco comment: quit about 50 years ago   Substance and Sexual Activity    Alcohol use: Not Currently     Alcohol/week: 2 0 standard drinks     Types: 2 Glasses of wine per week     Comment: week, social drinker    Drug use: No    Sexual activity: Not on file   Lifestyle    Physical activity     Days per week: Not on file     Minutes per session: Not on file    Stress: Not on file   Relationships    Social connections     Talks on phone: Not on file     Gets together: Not on file     Attends [employer] service: Not on file     Active member of club or organization: Not on file     Attends meetings of clubs or organizations: Not on file     Relationship status: Not on file    Intimate partner violence     Fear of current or ex partner: Not on file     Emotionally abused: Not on file     Physically abused: Not on file     Forced sexual activity: Not on file   Other Topics Concern    Not on file   Social History Narrative    Always uses seat belt    Caffeine use    Lives independently with spouse    Retired    Sun protection sunscreen     No Known Allergies         /80   Pulse 61   Temp 97 6 °F (36 4 °C) (Temporal)   Ht 5' 6" (1 676 m)   Wt 65 3 kg (144 lb)   SpO2 97%   BMI 23 24 kg/m²          Physical Exam  Vitals signs reviewed  Constitutional:       General: He is not in acute distress  Appearance: Normal appearance  He is normal weight  He is not ill-appearing, toxic-appearing or diaphoretic  HENT:      Head: Normocephalic and atraumatic  Right Ear: Tympanic membrane and external ear normal       Left Ear: Tympanic membrane and external ear normal       Nose: Nose normal       Mouth/Throat:      Mouth: Mucous membranes are dry  Eyes:      General: No scleral icterus  Extraocular Movements: Extraocular movements intact  Conjunctiva/sclera: Conjunctivae normal       Pupils: Pupils are equal, round, and reactive to light  Neck:      Musculoskeletal: Normal range of motion and neck supple  No neck rigidity  Cardiovascular:      Rate and Rhythm: Normal rate and regular rhythm  Pulses: Normal pulses  Heart sounds: Normal heart sounds  Pulmonary:      Effort: Pulmonary effort is normal  No respiratory distress  Breath sounds: Normal breath sounds  No wheezing  Abdominal:      General: Abdomen is flat  Bowel sounds are normal  There is no distension  Palpations: Abdomen is soft  Tenderness: There is no abdominal tenderness  Musculoskeletal: Normal range of motion  General: Deformity present  Right lower leg: No edema  Left lower leg: Edema present  Comments: One plus edema lle   Lymphadenopathy:      Cervical: No cervical adenopathy  Skin:     General: Skin is warm and dry  Coloration: Skin is not jaundiced  Neurological:      General: No focal deficit present  Mental Status: He is alert and oriented to person, place, and time  Mental status is at baseline  Cranial Nerves: No cranial nerve deficit  Motor: Weakness present  Gait: Gait abnormal       Comments: lue lle weakness   Psychiatric:         Mood and Affect: Mood normal          Behavior: Behavior normal          Thought Content:  Thought content normal          Judgment: Judgment normal

## 2021-04-05 ENCOUNTER — TELEPHONE (OUTPATIENT)
Dept: INTERNAL MEDICINE CLINIC | Facility: CLINIC | Age: 76
End: 2021-04-05

## 2021-04-06 ENCOUNTER — TELEPHONE (OUTPATIENT)
Dept: INTERNAL MEDICINE CLINIC | Facility: CLINIC | Age: 76
End: 2021-04-06

## 2021-04-06 NOTE — TELEPHONE ENCOUNTER
Angela from Pt called that she wants noted pt has a broken blister on his left posterior hip area from the bedside commode  Wife using Zinc Oxide and asking if that's ok  Pt also using miralax, colace, tylenol 250 with caffeine and aspirin for pain and eye drops  All of these meds were to be D/C per discharge note  She asked if its ok to use them still especially the tylenol with aspiring and caff as he was taken off aspiring  If he isnt allowed to use what can he take for pain then

## 2021-04-15 ENCOUNTER — OFFICE VISIT (OUTPATIENT)
Dept: NEUROLOGY | Facility: CLINIC | Age: 76
End: 2021-04-15
Payer: COMMERCIAL

## 2021-04-15 VITALS
DIASTOLIC BLOOD PRESSURE: 70 MMHG | WEIGHT: 145.6 LBS | RESPIRATION RATE: 14 BRPM | HEART RATE: 70 BPM | SYSTOLIC BLOOD PRESSURE: 128 MMHG | HEIGHT: 66 IN | BODY MASS INDEX: 23.4 KG/M2

## 2021-04-15 DIAGNOSIS — I63.9 CEREBROVASCULAR ACCIDENT (CVA), UNSPECIFIED MECHANISM (HCC): ICD-10-CM

## 2021-04-15 PROCEDURE — 1036F TOBACCO NON-USER: CPT | Performed by: PHYSICIAN ASSISTANT

## 2021-04-15 PROCEDURE — 3008F BODY MASS INDEX DOCD: CPT | Performed by: PHYSICIAN ASSISTANT

## 2021-04-15 PROCEDURE — 99214 OFFICE O/P EST MOD 30 MIN: CPT | Performed by: PHYSICIAN ASSISTANT

## 2021-04-15 PROCEDURE — 1160F RVW MEDS BY RX/DR IN RCRD: CPT | Performed by: PHYSICIAN ASSISTANT

## 2021-04-15 RX ORDER — CLOPIDOGREL BISULFATE 75 MG/1
75 TABLET ORAL DAILY
Qty: 90 TABLET | Refills: 1 | Status: SHIPPED | OUTPATIENT
Start: 2021-04-15 | End: 2022-03-30 | Stop reason: SDUPTHER

## 2021-04-15 NOTE — PROGRESS NOTES
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  He denies any new neurologic symptoms to indicate recurrent TIA/CVA  He continues with significant left-sided weakness, although this has been improving  He is currently getting in-home therapies but would like to transition to outpatient therapy at our neuro rehab center in [place]  I provided referrals to PT and OT for him today  He continues to follow with PCP for management of blood pressure and lipids  He was controlled on simvastatin prior to this hospitalization, so I will defer to his PCP whether not he should go back to simvastatin or can now continue atorvastatin since he is tolerating it  He also follows with endocrinology at the [facility] for his diabetic control, is on an insulin pump  Blood sugars well controlled at home, as well as blood pressure  Will defer any long-term monitoring for arrhythmia at this time as there were no structural abnormalities on his echocardiogram   He has no history of palpitations  Plan:  - for ongoing stroke prevention patient will continue Plavix 75 mg daily, Lipitor 40 mg daily along with appropriate blood pressure and glycemic control    Goal BP less than 130/80 routinely in goal A1c less than 7 0 ideally   -will defer management of blood pressure, lipids and blood sugar to his PCP (diabetic control to his endocrinologist )  -heart healthy diet and routine exercise as tolerated   -referrals to outpatient PT and OT at our neuro rehab center  -follow up with vascular Neurology attending in 4-6 months or sooner if needed    Signs and symptoms of stroke were discussed with the patient and his wife and included in his AVS today  Diagnoses and all orders for this visit:    Cerebrovascular accident (CVA), unspecified mechanism ([address] )  -     clopidogrel (PLAVIX) 75 mg tablet; Take 1 tablet (75 mg total) by mouth daily  -     Ambulatory referral to Physical Therapy; Future  -     Ambulatory referral to Occupational Therapy; Future  -     Ambulatory referral to Physical Medicine Rehab; Future           Subjective:    HPI    Patient is a 70year old male with PMH of HTN, HLD, DM, hypothyroidism, prostate cancer, GERD, who presents today for hospital follow-up  Patient present to the ED on 03/03/2021 with the complaint of left-sided weakness  He reported that he woke up in the middle of the night with left-sided weakness  He presented to the ED 6 hours after the onset of symptoms  He also reported he noticed his left leg was dragging a bit the day before  He was not a tPA candidate due to outside of the time window  He was hypertensive on arrival with a BP of 205/97  NIHSS = 6  CTH unremarkable  CTA head and neck with no LVO  He was admitted on stroke pathway  MRI of the brain showed a small right anterior medullary stroke  A1c 7 3  Lipid panel total cholesterol 136, LDL 62  TTE with an EF of 60%, no regional wall motion abnormalities  Grade 1 diastolic dysfunction noted  No atrial dilation  Etiology felt to be small vessel, therefore his home aspirin was changed to Plavix 75 mg daily and he was placed on Lipitor 40 mg daily    There was brief discussion about long-term monitoring for arrhythmia such as atrial fibrillation given his cardiovascular risk factors or well controlled, however there was no structural issue in the heart such as atrial dilation to suggest this  He did spend approximately 3 weeks in the [facility] following discharge from the hospital      The following portions of the patient's history were reviewed and updated as appropriate: current medications, past family history, past medical history, past social history, past surgical history and problem list          Objective:    Blood pressure 128/70, pulse 70, resp  rate 14, height 5' 6" (1 676 m), weight 66 kg (145 lb 9 6 oz)  Physical Exam  Constitutional:       Appearance: Normal appearance  HENT:      Head: Normocephalic and atraumatic  Eyes:      Extraocular Movements: EOM normal       Pupils: Pupils are equal, round, and reactive to light  Neurological:      Mental Status: He is alert  Psychiatric:         Mood and Affect: Mood normal          Speech: Speech normal          Behavior: Behavior normal          Neurological Exam  Mental Status  Alert  Oriented to person, place, time and situation  Speech is normal  Language is fluent with no aphasia  Attention and concentration are normal     Cranial Nerves  CN II: Visual fields full to confrontation  CN III, IV, VI: Extraocular movements intact bilaterally  Pupils equal round and reactive to light bilaterally  CN V: Facial sensation is normal   CN VII: Full and symmetric facial movement  CN VIII: Hearing is normal   CN IX, X: Palate elevates symmetrically  CN XI: Shoulder shrug decreased on the left  CN XII: Tongue midline without atrophy or fasciculations  Motor   Normal muscle tone  5/5 both RUE and RLE  LUE 2/5, LLE 1/5 HF and DF, 3/5 hip abd and add, 3/5 PF  Sensory  Sensation is intact to light touch, pinprick, vibration and proprioception in all four extremities  Reflexes  Reflexes increased on the left      Coordination  Normal FTN on right, could not complete on left due to weakness   Gait  Not tested, in a WC         ROS:    Review of Systems   Constitutional: Negative  Negative for appetite change and fever  HENT: Negative  Negative for hearing loss, tinnitus, trouble swallowing and voice change  Eyes: Negative  Negative for photophobia and pain  Respiratory: Negative  Negative for shortness of breath  Cardiovascular: Negative  Negative for palpitations  Gastrointestinal: Negative  Negative for nausea and vomiting  Endocrine: Negative  Negative for cold intolerance  Genitourinary: Negative  Negative for dysuria, frequency and urgency  Musculoskeletal: Negative  Negative for myalgias and neck pain  Skin: Negative  Negative for rash  Allergic/Immunologic: Negative  Neurological: Negative  Negative for dizziness, tremors, seizures, syncope, facial asymmetry, speech difficulty, weakness, light-headedness, numbness and headaches  Hematological: Negative  Does not bruise/bleed easily  Psychiatric/Behavioral: Negative  Negative for confusion, hallucinations and sleep disturbance       I personally reviewed and updated the ROS as appropriate

## 2021-04-15 NOTE — PATIENT INSTRUCTIONS
Continue Plavix 75mg daily and Lipitor 40mg daily for secondary stroke prevetion  Continue therapies--orders placed for outpatient PT and OT  Will defer management of blood pressure, cholesterol to PCP and diabetes to endocrinology  Heart healthy diet and routine exercise as tolerated  Follow up in 4-6 months    If you experience any facial droop, weakness on one side of the body, speech or swallowing difficulty, painless loss of vision in one eye, double vision, vertigo that does not resolve quickly/imbalance, go to the ER/call 911

## 2021-04-16 ENCOUNTER — TELEPHONE (OUTPATIENT)
Dept: INTERNAL MEDICINE CLINIC | Facility: CLINIC | Age: 76
End: 2021-04-16

## 2021-04-21 ENCOUNTER — EVALUATION (OUTPATIENT)
Dept: PHYSICAL THERAPY | Facility: CLINIC | Age: 76
End: 2021-04-21
Payer: COMMERCIAL

## 2021-04-21 DIAGNOSIS — I63.9 CEREBROVASCULAR ACCIDENT (CVA), UNSPECIFIED MECHANISM (HCC): Primary | ICD-10-CM

## 2021-04-21 PROCEDURE — 97163 PT EVAL HIGH COMPLEX 45 MIN: CPT | Performed by: PHYSICAL MEDICINE & REHABILITATION

## 2021-04-21 NOTE — PROGRESS NOTES
PT Evaluation     Today's date: 2021  Patient name: Jomar Howard  : 1945  MRN: [id number]  Referring provider: Gladys Oneal PA-C  Dx:   Encounter Diagnosis     ICD-10-CM    1  Cerebrovascular accident (CVA), unspecified mechanism ([facility] )  I63 9 Ambulatory referral to Physical Therapy                  Assessment  Assessment details: Jomar Howard is a 70 y o  male presenting to outpatient physical therapy with diagnosis of Cerebrovascular accident (CVA), unspecified mechanism ([facility] )  (primary encounter diagnosis)  He presents with residual strength deficits of L UE/LE  He demonstrates reduced tolerance for gait/mobility  Reduced static and dynamic balance; relies on reilly walker  Increased risk for falls noted  Requires assistance with ADLs and mobility  Patient's current impairments include reduced range of motion, reduced strength, reduced postural awareness, impaired balance, and reduced activity tolerance  Patient's present functional limitations include difficulty with ADLs with increased need for assistance, reliance on assistive devices for gait/mobility, reduced tolerance for functional mobility and activity, and difficulty completing ADLs and self care responsibilities  Patient to benefit from skilled outpatient physical therapy 2x/week for 4 weeks in order to  maximize pain free range of motion, increase strength and stability, improve balance, improve activity tolerance, and improve functional mobility/functional activity in order to maximize return to prior level of function with reduced limitations   Thank you for your referral     Impairments: abnormal gait, abnormal muscle firing, abnormal muscle tone, abnormal or restricted ROM, abnormal movement, activity intolerance, impaired balance, impaired physical strength and lacks appropriate home exercise program    Symptom irritability: highUnderstanding of Dx/Px/POC: good   Prognosis: good    Goals  STGs to be achieved in 4-6 weeks: 1  Pt to demonstrate improved LLE strength grossly by at least 1/2 MMT grade to improve functional independence and stability with gait and mobility  2  Pt to demonstrate improved functional activity tolerance as noted by ability to ambulate at least 100ft consecutively with AD with close S without need for rest    3  Pt to demonstrate improved balance and reduced fall risk as noted by improvement in Mini Best score by at least 10-15% from IE  LTGs to be achieved in 12-14 weeks:   1  Pt to be I with HEP to facilitate maintenance of gains made in PT    2  Pt to demonstrate ability to safely ambulate with least restrictive AD for at least  5 miles without evidence of instability  3  Pt to demonstrate low risk for falls as noted by mini best testing at d/c from PT  Plan  Patient would benefit from: skilled physical therapy  Planned therapy interventions: manual therapy, neuromuscular re-education, patient education, self care, strengthening, stretching, therapeutic activities, therapeutic exercise, home exercise program, graded exercise, balance and gait training  Frequency: 2x week  Duration in visits: 8  Duration in weeks: 4  Plan of Care beginning date: 4/21/2021  Plan of Care expiration date: 5/21/2021  Treatment plan discussed with: family and patient        Subjective Evaluation    History of Present Illness  Mechanism of injury: Pt s/p CVA on March 3rd; spent 3 days in [address] [place] [address]  Transferred to [facility] for rehab x ~22 days  Pt was d/c home with home health care x 2 weeks  Referred to OPPT at this time  Pt notes persistent weakness L UE/LE  Denies any numbness or sensory changes in the limbs  No pain  Has been using reilly walker  Pt is I with mobility indoors with his walker and L arm in sling  Also has MAFO for L foot 2* ankle weakness  Uses WC for longer distances  Pt is 1* housebound at this point with limited outdoor mobility   Lives on a hill and unable to walk up/downhill  Assistance from wife with bathing, showering, and dressing  Independent with toileting  Requires assistance for bed mobility  Assistance for car transfers needed  Independent with getting up/down from a chair  Uses sling with mobility for L UE; does feel his his L UE/ strength is slowly coming back  Monitors his BP regularly at home; has been improved with "the new medication"  Notes imbalance with mobility when first getting up and towards the end of the day when he is tired  Has been negotiating his stairs to his 2nd story bedroom; step to step with RLE and HR  Fatigues more easily than usual  Does try to walk frequently at home  Used to go for a 4 mile walk every day and cut 5 lawns regularly  Goals: "Use my arm, hand and leg again", "walk with at least a cane"     Quality of life: good    Pain  No pain reported  Progression: improved (slightly )    Social Support  Steps to enter house: no  Stairs in house: yes   Lives in: multiple-level home  Lives with: spouse    Employment status: not working  Hand dominance: right    Treatments  Previous treatment: physical therapy, occupational therapy, home therapy and speech therapy  Current treatment: physical therapy  Patient Goals  Patient goals for therapy: improved balance, increased strength, increased motion, independence with ADLs/IADLs and return to sport/leisure activities          Objective     Neurological Testing     Additional Neurological Details  UE/LE light touch sensation grossly intact and symmetrical B UEs/LEs     Strength/Myotome Testing     Left Shoulder     Planes of Motion   Flexion: 2-   Abduction: 2-     Right Shoulder     Planes of Motion   Flexion: 4+   Abduction: 4+     Left Elbow   Flexion: 2-  Extension: 2-    Right Elbow   Flexion: 5  Extension: 5    Left Wrist/Hand   Wrist extension: 2-  Wrist flexion: 2-    Right Wrist/Hand   Wrist extension: 4+  Wrist flexion: 4+    Left Hip   Planes of Motion   Flexion: 3-  Extension: 3  Abduction: 4-    Right Hip   Planes of Motion   Flexion: 4+ and 5  Extension: 4+  Abduction: 4+ and 5    Left Knee   Flexion: 2  Extension: 3-    Right Knee   Flexion: 4+  Extension: 5    Left Ankle/Foot   Dorsiflexion: 2-    Right Ankle/Foot   Dorsiflexion: 4+  Plantar flexion: 4+    Functional Assessment        Comments  Mini Best  Anticipatory  Sub Score: 2/6   Sit to Stand  Instruction:  Cross your arms across your chest   Try not to use your hands unless you must   Do no let your legs lean against the back of the chair when you stand  Please stand up now "  2= Normal:  Comes to stand without use of hands and stabilizes independently     Rise to Toes  Instruction:  "Place your feet shoulder width apart  Place your hands on your hips  Try to rise as high as you can onto your toes  I will count out loud to 3 seconds  Try to hold this pose for at lest 3 seconds  Look straight ahead  Rise now "    0=Severe:  < or equal to 3 seconds     Stand on one leg  Instruction:  "Look straight ahead  Keep your hands on your hips  Lift your leg off of the round behind you without touching or resting your raised leg upon your other standing leg  Stay standing on one leg as long as you can  Look straight ahead  Lift now "      0=Severe:  Unable    Right:  2 seconds  Left:  0 seconds   Score lowest time for worst leg in 2 trials each  Reactive Postural Control  Sub Score:  5/6   Compensatory Stepping Correction - Forward  Instruction:  "Stand with your feet shoulder width apart, arms at your sides  Lean forward against my hand beyond your forward limits  When I let go, do whatever is necessary, including taking a step, to avoid a fall "  2= Normal:  Recovers independently with a single, large step (2nd realignment step is allowed)  Compensatory Stepping Correction - Backward  Instruction:  "Stand with your feet shoulder width apart, arms at your sides  Lean forward against my hand beyond your forward limits  When I let go, do whatever is necessary, including taking a step, to avoid a fall "  1     Compensatory Stepping Correction - Lateral  Instruction:  "Stand with your feet shoulder width apart, arms at your sides  Lean forward against my hand beyond your forward limits  When I let go, do whatever is necessary, including taking a step, to avoid a fall "  2= Normal:  Recovers independently with a single, large step (cross over or lateral OK)  Use the side with the lowest score to calculate score  Sensory Orientation  Sub Score: 4/6  Stance (Feet Together); Eyes open, Firm surface  Instruction:  "Place your hands on your hips  Place your feet together until almost touching  Look straight ahead  Be as stable and still as possible, until I say stop "  2= Normal:  30 seconds       Stance (Feet Together); Eyes closed, Foam surface  Instruction:  "Step onto the foam   Place your hands on your hips  Place your feet together until almost touching  Close your eyes  Be as stable and still as possible, until I say stop  I will start timing when you close your eyes "      1=Moderate: < 30 seconds      Incline - Eyes Closed  Instruction:  "Step onto the incline ramp  Place your hands on your hips  Place your feet shoulder width apart and have your arms down at your sides  I will start timing when you close your eyes  "      1=Moderate:  Stands independently <30 sec OR aligns with surface        Dynamic Gait  Sub Score: 2/10  Change in gait speed  Instruction:  "Begin walking at your normal speed, when I tell you "fast", walk as fast as you can  When I say "slow", walk very slowly "    1= Moderate:  Unable to change walking speed or signs of imbalance     Walk with Head Turns - Horizontal  Instruction:  "Begin walking at your normal speed, when I say "right", turn your head and look to the right  When I say "left" turn your head and look to the left    Try to keep yourself walking in a straight line "    0= Severe:  performs head turns with imbalance     Walk with Pivot Turns  Instruction:  "Begin walking at your normal speed  When I tell you to "turn and stop", turn as quickly as you can, face the opposite direction, and stop  After the turn, your feet should be close together "    0= Severe:  Cannot turn with feet close at any speed without imbalance     Step over obstacles  Instruction:  "Begin walking at your normal speed  When you get to the box, step over it, not around it and keep walking "    1= Moderate: step over box but touches box OR displays cautious behavior by slowing gait     Timed up and go with dual task (3 meter walk)  Instruction TUG: "when I say Go, stand up from the chair, walk at your normal speed across the tape on the floor, turn around, and come back to sit in the chair "    Instruction TUG with Dual Task: "count backwards y 3s starting at ___  When I say "go stand up from the chair, walk at your normal speed across the tape on the floor, turn around, and come back to sit in the chair    Continue counting backwards the entire time "  TU 8 Seconds  Dual Task TU 2 Seconds  2= Normal:  No noticeable change in sitting, standing, or walking while backward counting when compared to TUG without dual task      (When scoring, if subject's gait speed slows more than 10% between the TUG without and with the Dual Task the score should be decreased by a point)  Total Score: 15/28  Neuro Exam:     Sensation   Light touch LE: left WNL and right WNL    Transfers Sit to stand: independent Into the car: moderate assist and minimum assist Out of the car: moderate assist     Functional outcomes   Functional outcome gait comment: Ambulates with reilly walker   MAFO L foot 2* ankle weakness   Sling L UE   Step to gait , 3 point gait   Increased R lateral flexion with L hip hike and circumduction with L swing  Limited L hip and knee flexion with swing   Slow deann   CGA with gait belt Precautions: s/p CVA, HTN (monitor BP) , DM , FALL RISK       Re-eval Date: 5/21    Date 4/21       Visit Count 1       FOTO 4/21       Pain In See IE       Pain Out See IE            Manuals 4/21       Stretching L HS, hip flexors, L calf                                 Neuro Re-Ed        Romberg EC floor/foam        Semi tandem floor-->Foam EO/EC         Dynamic reaching                                         Ther Ex        Cassie Leonard    LAQ         Hip add    Hip ABD         Sit to stands         Standing SLRs         HRs                        Ther Activity                        Gait Training *NV       Add head turns, obstacles,unstable surfaces                 Modalities

## 2021-04-22 ENCOUNTER — APPOINTMENT (OUTPATIENT)
Dept: LAB | Facility: HOSPITAL | Age: 76
End: 2021-04-22
Payer: COMMERCIAL

## 2021-04-22 DIAGNOSIS — C61 PROSTATE CANCER (HCC): ICD-10-CM

## 2021-04-22 LAB — PSA SERPL-MCNC: <0.1 NG/ML (ref 0–4)

## 2021-04-22 PROCEDURE — 84153 ASSAY OF PSA TOTAL: CPT

## 2021-04-28 ENCOUNTER — OFFICE VISIT (OUTPATIENT)
Dept: PHYSICAL THERAPY | Facility: CLINIC | Age: 76
End: 2021-04-28
Payer: COMMERCIAL

## 2021-04-28 ENCOUNTER — VBI (OUTPATIENT)
Dept: ADMINISTRATIVE | Facility: OTHER | Age: 76
End: 2021-04-28

## 2021-04-28 DIAGNOSIS — I63.9 CEREBROVASCULAR ACCIDENT (CVA), UNSPECIFIED MECHANISM (HCC): Primary | ICD-10-CM

## 2021-04-28 PROCEDURE — 97112 NEUROMUSCULAR REEDUCATION: CPT

## 2021-04-28 PROCEDURE — 97140 MANUAL THERAPY 1/> REGIONS: CPT

## 2021-04-28 PROCEDURE — 97110 THERAPEUTIC EXERCISES: CPT

## 2021-04-28 NOTE — PROGRESS NOTES
Daily Note     Today's date: 2021  Patient name: Lauren Clement  : 1945  MRN: [id number]  Referring provider: Maty Peguero PA-C  Dx:   Encounter Diagnosis     ICD-10-CM    1  Cerebrovascular accident (CVA), unspecified mechanism ([facility] )  I63 9        Start Time: 0900  Stop Time: 1000  Total time in clinic (min): 60 minutes    Subjective: "Yesterday was the first time I am able to bend my knee up in bend  I was also able to use my L hand to hold a medicine bottle to open it "      Objective: See treatment diary below      Assessment: Tolerated treatment fair  Pt fatigued quickly needed seated rest for recovery  Challenged with quad activation in sitting to perform LAQ without A  Pt to scheduled for eval next week for UE  Pt would benefit from continued skilled therapy to address deficits  Patient demonstrated fatigue post treatment and would benefit from continued PT      Plan: Continue per plan of care  Progress treatment as tolerated         Precautions: s/p CVA, HTN (monitor BP) , DM , FALL RISK       Re-eval Date:     Date       Visit Count 1 2      FOTO        Pain In See IE       Pain Out See IE            Manuals       Stretching L HS, hip flexors, L calf   Stretching L HS, hip flexors, L calf 15'                              Neuro Re-Ed        Romberg EC floor/foam        Semi tandem floor-->Foam EO/EC         Dynamic reaching                                         Ther Ex        Nustep   L2 10'          LAQ       2x10/3-5"      Hip add    Hip ABD   Ball 30x/5"    grn 30x/3-5"      Sit to stands   x5      Standing SLRs   2-3x10 ea      HRs                        Ther Activity                        Gait Training *NV       Add head turns, obstacles,unstable surfaces                 Modalities

## 2021-04-29 NOTE — PROGRESS NOTES
Daily Note     Today's date: 2021  Patient name: Earnest Nolan  : 1945  MRN: [id number]  Referring provider: Evi Elena PA-C  Dx:   Encounter Diagnosis     ICD-10-CM    1  Cerebrovascular accident (CVA), unspecified mechanism ([facility] )  I63 9                   Subjective: My left ankle/foot hurt at night time when I sleep        Objective: See treatment diary below      Assessment: Tolerated treatment well  Patient provided verbal/tactile cues prn for proper form and technique with exercises completed this date  Progression of PC to pt tolerance with focus on balance/core stability and gait   Patient would benefit from continued PT      Plan: Continue per plan of care        Precautions: s/p CVA, HTN (monitor BP) , DM , FALL RISK       Re-eval Date:     Date      Visit Count 1 2 3     FOTO        Pain In See IE       Pain Out See IE            Manuals       Stretching L HS, hip flexors, L calf   Stretching L HS, hip flexors, L calf 15'                              Neuro Re-Ed        Romberg EC floor/foam        Semi tandem floor-->Foam EO/EC         Dynamic reaching                                         Ther Ex        Nustep   L2 10' L3 10'     Marches    LAQ       2x10/3-5"     2x10 3-5"     Hip add    Hip ABD   Ball 30x/5"    grn 30x/3-5" Sit/disc  AH  ADD/ABD  Ball/Green TB     Sit to stands   x5 5x      Standing SLRs   2-3x10 ea Foam  2x10  BL  1 HHA     HRs                        Ther Activity                        Gait Training *NV  SBQC  15 min  Level  Eye spy  Cues AD placement, step through, posture awareness,  L hip flex     Add head turns, obstacles,unstable surfaces                 Modalities

## 2021-04-30 ENCOUNTER — OFFICE VISIT (OUTPATIENT)
Dept: PHYSICAL THERAPY | Facility: CLINIC | Age: 76
End: 2021-04-30
Payer: COMMERCIAL

## 2021-04-30 DIAGNOSIS — I63.9 CEREBROVASCULAR ACCIDENT (CVA), UNSPECIFIED MECHANISM (HCC): Primary | ICD-10-CM

## 2021-04-30 PROCEDURE — 97112 NEUROMUSCULAR REEDUCATION: CPT

## 2021-04-30 PROCEDURE — 97110 THERAPEUTIC EXERCISES: CPT

## 2021-04-30 PROCEDURE — 97116 GAIT TRAINING THERAPY: CPT

## 2021-05-04 NOTE — PROGRESS NOTES
Daily Note     Today's date: 2021  Patient name: Ligia Hernandez  : 1945  MRN: [id number]  Referring provider: Mirtha Nava PA-C  Dx:   Encounter Diagnosis     ICD-10-CM    1  Cerebrovascular accident (CVA), unspecified mechanism ([facility] [address] )  I63 9                   Subjective: I was achy entire left side  Had to take Tylenol at night when mm soreness/joint pain kicked in      Objective: See treatment diary below      Assessment: Tolerated treatment well denied any increase pain/discomfort, reported mm fatigue end rx session 1* LLE  Pt provided tactile/verbal cues with gait 1* L hip flex, step through, AD placement, postural awarenes  Pt with  Circumduction LLE, pt in AFO during gait  Trial SPC this date  Patient would benefit from continued PT      Plan: Continue per plan of care        Precautions: s/p CVA, HTN (monitor BP) , DM , FALL RISK       Re-eval Date:     Date     Visit Count 1 2 3 4    FOTO        Pain In See IE       Pain Out See IE            Manuals       Stretching L HS, hip flexors, L calf   Stretching L HS, hip flexors, L calf 15'  5 min  L hip flex/quad                            Neuro Re-Ed        Romberg EC floor/foam        Semi tandem floor-->Foam EO/EC         Dynamic reaching                                         Ther Ex        Nustep   L2 10' L3 10' L3 10 min  5 min w/o UE  5 min w/ R UE    [place]    LAQ       2x10/3-5"     2x10 3-5"   review    Hip add    Hip ABD   Ball 30x/5"    grn 30x/3-5" Sit/disc  AH  ADD/ABD  Ball/Green TB Reviewed  MRE Hip abd/ADD 10x 5" ea    Sit to stands   x5 5x  5x    Standing SLRs   2-3x10 ea Foam  2x10  BL  1 HHA Supine  SLR flex w/ eccentric focus  AAROM  Tactile cues L quad    Quad set  2x5  5"  Tactile cues    Leg Press    LLE only  Cues hip IR/ medial heel focus  2x10  30#    HRs        Stairs    2x  Fwd ascending  1x fwd/retro descending  1 HR  CS/CGA              Ther Activity                        Gait Training *NV  SBQC  15 min  Level  Eye spy  Cues AD placement, step through, posture awareness,  L hip flex SPC    Pre gait  10x    Gait/ level/clinic  Cues AD placement, step through, posture awareness,  L hip flex  Encourage scan environement    Add head turns, obstacles,unstable surfaces                 Modalities

## 2021-05-05 ENCOUNTER — OFFICE VISIT (OUTPATIENT)
Dept: PHYSICAL THERAPY | Facility: CLINIC | Age: 76
End: 2021-05-05
Payer: COMMERCIAL

## 2021-05-05 ENCOUNTER — EVALUATION (OUTPATIENT)
Dept: OCCUPATIONAL THERAPY | Facility: CLINIC | Age: 76
End: 2021-05-05
Payer: COMMERCIAL

## 2021-05-05 DIAGNOSIS — I63.9 CEREBROVASCULAR ACCIDENT (CVA), UNSPECIFIED MECHANISM (HCC): Primary | ICD-10-CM

## 2021-05-05 PROCEDURE — 97032 APPL MODALITY 1+ESTIM EA 15: CPT

## 2021-05-05 PROCEDURE — 97110 THERAPEUTIC EXERCISES: CPT

## 2021-05-05 PROCEDURE — 97166 OT EVAL MOD COMPLEX 45 MIN: CPT

## 2021-05-05 PROCEDURE — 97116 GAIT TRAINING THERAPY: CPT

## 2021-05-05 NOTE — PROGRESS NOTES
OT Evaluation     Today's date: 2021  Patient name: Danica Allen  : 1945  MRN: [id number]  Referring provider: Apple Richardson PA-C  Dx:   Encounter Diagnosis     ICD-10-CM    1  Cerebrovascular accident (CVA), unspecified mechanism ([facility] )  I63 9        Start Time: 0900  Stop Time: 1000  Total time in clinic (min): 60 minutes    Assessment  Assessment details: Patient presenting to OP OT services with a dx of right sided CVA  Patient reports symptoms occurred on   Patient reports the day before he noticed his left leg was uncoordinated when walking  He mentioned that when his sugar is low he notices the same  Patient was under the assumption it was related to his blood sugar  Patient reports walking four miles a day  Patient went to bed that night and was unable to get up in the morning  Patient went to [facility] in the AM and was in acute care for two days and was discharged to acute rehab for twenty three days  Patient was than discharged home and he had two weeks of home therapy  Patient's last MD appointment was on 04/15/2021  Patient's next follow-up appointment is on 2021  Patient symptoms included left sided weakness, decreased activity tolerance and decreased participation in ADLs and IADLs  Patient is a retired printer at the [employer]  Patient lives in a two story home with no steps to enter  Patient does go upstairs for bedtime  Patient has a full flight of stairs to second floor  Patient has a shower upstairs with chair and hand rails  Patient requires assistance with socks and shoes  Patient also requires assistance with bathing  Patient has a long handeld sponge and reacher  Patient has a left foot MAFO  Patient has a reilly walker for ambulation and wheelchair to perform daily exercises in     Impairments: abnormal coordination, activity intolerance, impaired balance and impaired physical strength    Symptom irritability: highBarriers to therapy: Past Medical History:  No date: Arthritis  No date: BPH (benign prostatic hyperplasia)      Comment:  last assessed 4/29/2014  No date: Diabetes mellitus ([facility] [address] )  No date: Disease of thyroid gland  No date: GERD (gastroesophageal reflux disease)  No date: Hearing aid worn  No date: Hyperlipidemia  No date: Hypertension  No date: Hypothyroidism  No date: Mumps  No date: Prostate cancer ([facility] [address] )  No date: Tingling sensation      Comment:  bilateral feet occassionally  No date: Tinnitus  No date: Wears glasses  No date: Wears partial dentures      Comment:  upper  Understanding of Dx/Px/POC: good   Prognosis: good    Goals  STGs    Pt will increase  strength by 5-10#  - Not Met    Pt will increase upper extremity strength by 1/2 grade  - Not Met    Pt will increase digit ROM by 50%  - Not Met    Pt will increase upper extremity ROM by 50%  - Not Met    Pt will increase [facility] as evident by improvement in 9-hole peg test to completion - Not Met    Independent with HEP  - Not Met      LTGs     Pt will increase  strength by an additional 5-10#  - Not Met    Pt will increase upper extremity strength by 1-2 grade  - Not Met    Pt will increase digit ROM to WNL  - Not Met    Pt will increase upper extremity ROM to [facility]  - Not Met    Pt will report an increase in ADL/IADL participation  - Not Met          Plan  Plan details: Patient has presenting to OP OT services with a dx of left sided CVA  Patient demonstrating increased pain, decreased strength, decreased ROM and decreased activity  Pt would benefit from continued Occupational Therapy services two times per week for 4 weeks to return to prior level of function and achieve all established goals   Thank you for the referral!    Patient would benefit from: OT eval and skilled occupational therapy  Referral necessary: Yes  Planned modality interventions: electrical stimulation/Russian stimulation  Planned therapy interventions: IADL retraining, ADL retraining, balance, balance/weight bearing training, cognitive skills, coordination, fine motor coordination training, graded exercise, home exercise program, transfer training, therapeutic training, therapeutic exercise, therapeutic activities, stretching, strengthening, sensory integrative techniques, self care, patient education, neuromuscular re-education, motor coordination training and manual therapy  Frequency: 2x week  Duration in visits: 8  Duration in weeks: 4  Treatment plan discussed with: patient        Subjective Evaluation    History of Present Illness  Date of onset: 3/3/2021  Mechanism of injury: CVA with left sided weakness          Not a recurrent problem   Quality of life: good    Pain  Current pain ratin  At best pain ratin  At worst pain ratin  Location: Left Wrist  Quality: throbbing and dull ache    Social Support  Steps to enter house: no  Stairs in house: yes   13  Lives in: multiple-level home  Lives with: spouse    Employment status: not working  Hand dominance: right      Diagnostic Tests  CT scan: abnormal  Treatments  Previous treatment: physical therapy, speech therapy and occupational therapy  Current treatment: home therapy, physical therapy and occupational therapy  Discharged from (in last 30 days): inpatient hospitalization and home health care  Patient Goals  Patient goals for therapy: decreased pain, increased motion, increased strength, independence with ADLs/IADLs and return to sport/leisure activities          Objective     Neurological Testing     Sensation     Shoulder   Left Shoulder   Diminished: light touch    Right Shoulder   Intact: light touch    Wrist/Hand   Left   Diminished: light touch    Right   Intact: light touch    Additional Neurological Details  Patient has tingling in right tips of fingers       Active Range of Motion   Left Shoulder   Flexion: 25 degrees   Extension: 40 degrees   Abduction: 55 degrees   Adduction: 0 degrees   External rotation 0°: 0 degrees Internal rotation 0°: WFL    Right Shoulder   Normal active range of motion    Left Elbow   Flexion: 120 degrees   Extension: -60 degrees   Forearm supination: 40 degrees   Forearm pronation: WFL    Right Elbow   Normal active range of motion    Left Wrist   Wrist flexion: 5 degrees   Wrist extension: 32 degrees   Radial deviation: 10 degrees   Ulnar deviation: 10 degrees     Right Wrist   Normal active range of motion    Left Thumb   Opposition: Opposition to index and middle finger    Right Thumb   Opposition: WNL    Additional Active Range of Motion Details  Soft composite fist noted with 3 cm distance from tips of digit to distal palmar crease      Passive Range of Motion   Left Shoulder   Normal passive range of motion    Right Shoulder   Normal passive range of motion    Left Elbow   Normal passive range of motion    Right Elbow   Normal passive range of motion    Strength/Myotome Testing     Left Shoulder     Planes of Motion   Flexion: 2-   Extension: 2-   Abduction: 2-   Adduction: 2-   External rotation at 0°: 2-   Internal rotation at 0°: 2-     Right Shoulder   Normal muscle strength    Left Elbow   Flexion: 3-  Extension: 2-  Forearm supination: 2-  Forearm pronation: 3-    Right Elbow   Normal strength    Left Wrist/Hand   Wrist extension: 3-  Wrist flexion: 3-  Radial deviation: 3-  Ulnar deviation: 3-     (2nd hand position)     Trial 1: 5    Right Wrist/Hand   Normal wrist strength     (2nd hand position)     Trial 1: 35  Neuro Exam:     Functional outcomes   Right 9 hole peg test: 27 73 (seconds)  Functional outcome comment: 9-Hole Peg Test attempted with left side with assistance for placement for two pegs                Precautions s/p CVA       Manuals 05/05/2021       PROM Shoulder All Planes  Extensor Synergy                                Neuro Re-Ed  05/05/2021       Thera-Ball   Presses  Squeezes  FF  ABD  Rotation        TB  PNF Flexion 1  PNF Flexion 2  PNF Ext 1  PNF Ext 2 AAROM Shoulder Flex  Elbow Flex                                        Ther Ex 05/05/2021       Wrist ROM  Flex/Ext  RD/UD  Sup/Pro        Wrist Maze        Opposition        Digi-Flex        UBE Machine 10 Min L Hand Strapped                                               Ther Activity 05/05/2021       Cone Flip Sup/Pro        Tension Pin Pom Pom        Tennis Ball Thumb Abd/add                                Modalities 05/05/2021               CP        E-STIM [place] Wrist Ext Completed 10 Min

## 2021-05-06 NOTE — PROGRESS NOTES
Daily Note     Today's date: 2021  Patient name: Lauren Clement  : 1945  MRN: [id number]  Referring provider: Maty Peguero PA-C  Dx:   Encounter Diagnosis     ICD-10-CM    1  Cerebrovascular accident (CVA), unspecified mechanism ([facility] [address] )  I63 9                   Subjective: Reports yesterday didn't go as well  Reported he just couldn't get anything right  Objective: See treatment diary below      Assessment: Tolerated treatment well  Patient demonstrated fatigue post treatment and would benefit from continued PT  Patient progressing steadily with program   Overall improvement noted  He does note fatigue  Plan: Continue per plan of care           Precautions s/p CVA  RE-EVAL 21    Precautions: s/p CVA, HTN (monitor BP) , DM , FALL RISK   Re-eval Date:     Date    Visit Count 1 2 3 4 5   FOTO        Pain In See IE       Pain Out See IE            Manuals       Stretching L HS, hip flexors, L calf   Stretching L HS, hip flexors, L calf 15'  5 min  L hip flex/quad 10 mins HS, ITB, piriformis to tolerance                           Neuro Re-Ed        Romberg EC floor/foam        Semi tandem floor-->Foam EO/EC         Dynamic reaching                                         Ther Ex        Nustep   L2 10' L3 10' L3 10 min  5 min w/o UE  5 min w/ R UE L3 10 min  LEs only   Marches    LAQ       2x10/3-5"     2x10 3-5"   review    Hip add    Hip ABD   Ball 30x/5"    grn 30x/3-5" Sit/disc  AH  ADD/ABD  Ball/Green TB Reviewed  MRE Hip abd/ADD 10x 5" ea    Sit to stands   x5 5x  5x 3x10  Touch tap   Standing SLRs   2-3x10 ea Foam  2x10  BL  1 HHA Supine  SLR flex w/ eccentric focus  AAROM  Tactile cues L quad    Quad set  2x5  5"  Tactile cues    Leg Press    LLE only  Cues hip IR/ medial heel focus  2x10  30# LLE only  Cues hip IR/ medial heel focus  2x10  30#   HRs        Stairs    2x  Fwd ascending  1x fwd/retro descending  1 HR  CS/CGA      Knee flex/extn 11# L  22# R  3x10   Ther Activity                        Gait Training *NV  SBQC  15 min  Level  Eye spy  Cues AD placement, step through, posture awareness,  L hip flex SPC    Pre gait  10x    Gait/ level/clinic  Cues AD placement, step through, posture awareness,  L hip flex  Encourage scan environement SPC  15 min  Level  Head turns  Cues AD placement, step through, posture awareness,  L hip flex   Add head turns, obstacles,unstable surfaces                 Modalities

## 2021-05-07 ENCOUNTER — OFFICE VISIT (OUTPATIENT)
Dept: PHYSICAL THERAPY | Facility: CLINIC | Age: 76
End: 2021-05-07
Payer: COMMERCIAL

## 2021-05-07 DIAGNOSIS — I63.9 CEREBROVASCULAR ACCIDENT (CVA), UNSPECIFIED MECHANISM (HCC): Primary | ICD-10-CM

## 2021-05-07 PROCEDURE — 97110 THERAPEUTIC EXERCISES: CPT | Performed by: PHYSICAL THERAPIST

## 2021-05-07 PROCEDURE — 97112 NEUROMUSCULAR REEDUCATION: CPT | Performed by: PHYSICAL THERAPIST

## 2021-05-07 PROCEDURE — 97116 GAIT TRAINING THERAPY: CPT | Performed by: PHYSICAL THERAPIST

## 2021-05-11 ENCOUNTER — OFFICE VISIT (OUTPATIENT)
Dept: UROLOGY | Facility: CLINIC | Age: 76
End: 2021-05-11
Payer: COMMERCIAL

## 2021-05-11 VITALS
WEIGHT: 145.5 LBS | BODY MASS INDEX: 23.38 KG/M2 | DIASTOLIC BLOOD PRESSURE: 80 MMHG | HEIGHT: 66 IN | SYSTOLIC BLOOD PRESSURE: 140 MMHG

## 2021-05-11 DIAGNOSIS — C61 PROSTATE CANCER (HCC): Primary | ICD-10-CM

## 2021-05-11 PROCEDURE — 99213 OFFICE O/P EST LOW 20 MIN: CPT | Performed by: UROLOGY

## 2021-05-11 NOTE — PROGRESS NOTES
[address] [facility]  [facility]  [address] [facility]  [place] [facility]  [phone]  www  [facility]  org      NAME: Maddy Quiros  AGE: 70 y o  SEX: male  : 1945   MRN: [id number]    DATE: 2021  TIME: 3:55 PM    Assessment and Plan:    Prostate cancer 3 years status post laparoscopic robotic radical prostatectomy with bilateral PLND, PSA remains undetectable  Follow-up 1 year with PSA  Chief Complaint     Chief Complaint   Patient presents with    Prostate Cancer       History of Present Illness   Follow-up prostate cancer:  History of [place] 3+4=7 pT2 N0 MX prostate cancer, status post  Robot assistedradical prostatectomy with lymph node dissection 2018 by Dr Edwin Mays, PSA remains undetectable as of 2021  Separate stroke in March and has left-sided deficits  He is undergoing PT and OT  He leaks slightly with stress-with cough and sneeze any wears a pad due to this  No major soaking of pads      The following portions of the patient's history were reviewed and updated as appropriate: allergies, current medications, past family history, past medical history, past social history, past surgical history and problem list   Past Medical History:   Diagnosis Date    Arthritis     BPH (benign prostatic hyperplasia)     last assessed 2014    Diabetes mellitus ([address] )     Disease of thyroid gland     GERD (gastroesophageal reflux disease)     Hearing aid worn     Hyperlipidemia     Hypertension     Hypothyroidism     Mumps     Prostate cancer ([address] )     Tingling sensation     bilateral feet occassionally    Tinnitus     Wears glasses     Wears partial dentures     upper     Past Surgical History:   Procedure Laterality Date    CATARACT EXTRACTION Bilateral     EYE SURGERY      JOINT REPLACEMENT      KNEE ARTHROSCOPY Right 2009    knee    ME LAP,PROSTATECTOMY,RADICAL,W/NERVE SPARE,INCL ROBOTIC N/A 2/7/2018    Procedure: ROBOTIC ASSISTED LAPAROSCOPIC PROSTATECTOMY; BILATERAL PELVIC LYMPH NODE DISSECTION;  Surgeon: Amaya Forrest MD;  Location: [facility];  Service: Urology    DC REPAIR [address] HERNIA,5+Y/O,REDUCIBL Right 4/19/2019    Procedure: REPAIR HERNIA INGUINAL;  Surgeon: Angelina Phillips DO;  Location: [facility];  Service: General    PROSTATE BIOPSY  11/07/2017    needle biopsy of prostate    TRIGGER FINGER RELEASE  05/2013    trigger thumb     shoulder  Review of Systems   Review of Systems   Constitutional: Negative for fever  Respiratory: Negative for shortness of breath  Cardiovascular: Negative for chest pain  Genitourinary: Negative  Neurological: Positive for weakness  Left-sided arm weakness due to stroke       Active Problem List     Patient Active Problem List   Diagnosis    DMII (diabetes mellitus, type 2) ([facility] [address] )    Hypothyroidism    GERD (gastroesophageal reflux disease)    Hyperlipidemia    Stage 3 chronic kidney disease ([facility] [address] )    Arthritis    Essential hypertension    Lumbar radicular pain    Sensorineural hearing loss (SNHL), bilateral    Vitamin D deficiency    Medicare annual wellness visit, subsequent    Adenocarcinoma of prostate ([facility] [address] )    Erectile dysfunction due to type 2 diabetes mellitus ([facility] [address] )    Long term current use of insulin ([facility] [address] )    CVA (cerebral vascular accident) ([facility] [address] )       Objective   /80   Ht 5' 6" (1 676 m)   Wt 66 kg (145 lb 8 1 oz)   BMI 23 48 kg/m²     Physical Exam  Constitutional:       Appearance: Normal appearance  HENT:      Head: Normocephalic and atraumatic  Eyes:      Extraocular Movements: Extraocular movements intact  Neck:      Musculoskeletal: Normal range of motion  Pulmonary:      Effort: Pulmonary effort is normal    Musculoskeletal: Normal range of motion  Skin:     Coloration: Skin is not jaundiced or pale  Neurological:      General: No focal deficit present        Mental Status: He is alert and oriented to person, place, and time  Psychiatric:         Mood and Affect: Mood normal          Behavior: Behavior normal          Thought Content: Thought content normal          Judgment: Judgment normal              Current Medications     Current Outpatient Medications:     amLODIPine (NORVASC) 5 mg tablet, Take 1 tablet (5 mg total) by mouth daily, Disp: 30 tablet, Rfl: 0    atorvastatin (LIPITOR) 40 mg tablet, Take 1 tablet (40 mg total) by mouth daily with dinner, Disp: 30 tablet, Rfl: 0    clopidogrel (PLAVIX) 75 mg tablet, Take 1 tablet (75 mg total) by mouth daily, Disp: 90 tablet, Rfl: 1    Glucagon 1 MG/0 2ML SOSY, INJECT 1 MG UNDER THE SKIN  AS NEEDED FOR SEVERE HYPOGLYCEMIA, Disp: , Rfl:     Levothyroxine Sodium 88 MCG CAPS, Take 1 capsule (88 mcg total) by mouth daily for 90 days, Disp: 90 capsule, Rfl: 3    lisinopril (ZESTRIL) 40 mg tablet, Take 40 mg by mouth daily  , Disp: , Rfl:     Multiple Vitamin (MULTIVITAMIN) tablet, Take 1 tablet by mouth daily  , Disp: , Rfl:     pantoprazole (PROTONIX) 40 mg tablet, Take 1 tablet (40 mg total) by mouth daily before breakfast, Disp: 30 tablet, Rfl: 0    PATIENT MAINTAINED INSULIN PUMP, Inject 0 4 each under the skin continuous , Disp: , Rfl:         Jazmine Driver MD

## 2021-05-12 ENCOUNTER — OFFICE VISIT (OUTPATIENT)
Dept: PHYSICAL THERAPY | Facility: CLINIC | Age: 76
End: 2021-05-12
Payer: COMMERCIAL

## 2021-05-12 ENCOUNTER — OFFICE VISIT (OUTPATIENT)
Dept: OCCUPATIONAL THERAPY | Facility: CLINIC | Age: 76
End: 2021-05-12
Payer: COMMERCIAL

## 2021-05-12 DIAGNOSIS — I63.9 CEREBROVASCULAR ACCIDENT (CVA), UNSPECIFIED MECHANISM (HCC): Primary | ICD-10-CM

## 2021-05-12 PROCEDURE — 97140 MANUAL THERAPY 1/> REGIONS: CPT

## 2021-05-12 PROCEDURE — 97530 THERAPEUTIC ACTIVITIES: CPT

## 2021-05-12 PROCEDURE — 97112 NEUROMUSCULAR REEDUCATION: CPT

## 2021-05-12 PROCEDURE — 97110 THERAPEUTIC EXERCISES: CPT

## 2021-05-13 NOTE — PROGRESS NOTES
Daily Note     Today's date: 2021  Patient name: Iona Dawn  : 1945  MRN: [id number]  Referring provider: Aisha Rivers PA-C  Dx:   Encounter Diagnosis     ICD-10-CM    1  Cerebrovascular accident (CVA), unspecified mechanism ([facility] [address] )  I63 9                   Subjective: I am walking without my AD in the house  Have difficulty walking on inclines    Objective: See treatment diary below      Assessment: Tolerated treatment well  Patient denied any increase pain/discomfort with progression of TE/gait  Pt with 2 LOB with CG/Radha for balance recovery  Progression of gait to outdoors today  Pt motivate and compliant with carryover with daily walking/HEP  Focus L hip flex this date  Patient demonstrated fatigue post treatment      Plan: Continue per plan of care           Precautions s/p CVA  RE-EVAL 21    Precautions: s/p CVA, HTN (monitor BP) , DM , FALL RISK   Re-eval Date:     Date       Visit Count 6 7      FOTO completed       Pain In  0 BL LE      Pain Out            Manuals        Stretching L HS, hip flexors, L calf  Resume prn    HS, ITB, piriformis to tolerance                               Neuro Re-Ed        Romberg EC floor/foam        Semi tandem floor-->Foam EO/EC         Dynamic reaching         [place] 35 min  4 corners, WS S/S, Step up 4"  Side step, step fwd  2 min trials  W/ SPC  CS/CGA  Manual/tactile cues       Side step 4x 10 feet  SPC  CS/CGA  Cues hip flex                       Ther Ex        Nustep  L4 10 min  LEs only   6 min  BL UE 4 min L4 10 min  Cues pacing  Focus L Hip IR  R UE only  L UE co's pain      Marches    LAQ         Hip add    Hip ABD         Sit to stands         Standing SLRs         Leg Press Resume  LLE only  Cues hip IR/ medial heel focus  2x10  30#       HRs        Stairs  3x 1 HR  Up fwd  Down retro    Step up  4" with L LE focus  1 HR  cues      Knee flex/extn Resume  11# L  22# R  3x10 11# L 10x 3-5"  Limited AROM  22# BL focus LL x10 3-5"    11#  BL with LLE focus eccentric  10x 5"      Ther Activity                        Gait Training SPC  Pregait   Step fwd/bkwd  10x ea  CS  Verbal/tactile cues    Resume  Level  Head turns  Cues AD placement, step through, posture awareness,  L hip flex SPC  20 min  Clinic  /outdoors  With curb negotiation fwd/retro, turn negotiation   incline/decline, grassy area level&sloped,  Bend / reach to ground     2 LOB  CG/Radha balance recovery    CS/CGA  Cues for AD placement, scan environment,  Step pattern changes prn to terrain        Add head turns, obstacles,unstable surfaces                 Modalities

## 2021-05-14 ENCOUNTER — OFFICE VISIT (OUTPATIENT)
Dept: PHYSICAL THERAPY | Facility: CLINIC | Age: 76
End: 2021-05-14
Payer: COMMERCIAL

## 2021-05-14 ENCOUNTER — OFFICE VISIT (OUTPATIENT)
Dept: OCCUPATIONAL THERAPY | Facility: CLINIC | Age: 76
End: 2021-05-14
Payer: COMMERCIAL

## 2021-05-14 DIAGNOSIS — I63.9 CEREBROVASCULAR ACCIDENT (CVA), UNSPECIFIED MECHANISM (HCC): Primary | ICD-10-CM

## 2021-05-14 PROCEDURE — 97116 GAIT TRAINING THERAPY: CPT

## 2021-05-14 PROCEDURE — 97110 THERAPEUTIC EXERCISES: CPT

## 2021-05-14 PROCEDURE — 97112 NEUROMUSCULAR REEDUCATION: CPT

## 2021-05-14 PROCEDURE — 97530 THERAPEUTIC ACTIVITIES: CPT

## 2021-05-14 NOTE — PROGRESS NOTES
Daily Note     Today's date: 2021  Patient name: Comfort Fuentes  : 1945  MRN: [id number]  Referring provider: Aaron Downey PA-C  Dx:   Encounter Diagnosis     ICD-10-CM    1  Cerebrovascular accident (CVA), unspecified mechanism ([facility] [address] )  I63 9                   Subjective: "It's really stiff in the morning  Objective: See treatment diary below      Assessment: Tolerated treatment well  Patient exhibited good technique with therapeutic exercises and would benefit from continued OT  Patient did well with additional exercises this session to improve ROM of LUE  Patient was able to tap thumb, index and small finger during isolated taps  Therapist provided tapping on extensor tendons to facilitate movement and assisted middle and ring finger into extension encouraging pt to hold it in position/slowly lower to table  During fine motor pompom activity, pt displayed difficulty grasping and pinching pompom  Therapist noted compensation at shoulder to transfer pompom to container  Pt appeared frustrated and stated his hand wasn't moving so therapist ended activity early  Minimal movement was demonstrated in LUE during TB exercises  Therapist educated patient on proper form for each exercise  Patient verbalized his hand is very stiff and tight in the morning but once he does his home exercises it starts to feel better  Plan: Continue per plan of care  Progress treatment as tolerated            Precautions s/p CVA       Manuals 2021     PROM Shoulder All Planes  Extensor Synergy  Wrist Extension Stretch  Sh FF Stretch  30 sec x 3 Wrist  Extension  Stretch  Sh FF Stretch   30 sec x 3                             Neuro Re-Ed  2021     Thera-Ball   Presses  Squeezes  FF  ABD  Rotation    10 sec x 10    X 20 on Mat Table   10 sec x 10    X 20 on Mat Table     TB  PNF Flexion 1  PNF Flexion 2  PNF Ext 1  PNF Ext 2        AAROM Shoulder Flex  Elbow Flex X 15  X 15                                       Ther Ex 05/05/2021 05/12/2021 05/14/2021     Wrist ROM  Flex/Ext  RD/UD  Sup/Pro        Wrist Maze        Opposition        Digi-Flex        UBE Machine 10 Min L Hand Strapped 10 Min L Hand Strapped 10 Min L Hand Strapped L55     Isolated Finger Taps  X 20 with tapping for faciliation X 20 with tapping for facilitation     Thera-Band  Sh Ext  Retraction  Elbow Flex  Yellow  X 20  X 20  X 20 Yellow  X 20  X 20  X 20                              Ther Activity 05/05/2021 05/12/2021 05/14/2021     [facility]one Flip Sup/Pro        Tension Pin Pom Pom  Hand x 20 Hand x 5      Tennis Ball Thumb Abd/add  X 20 X 20                             Modalities 05/05/2021 05/12/2021 05/14/2021     MH        CP        E-STIM Russian Wrist Ext Completed 10 Min                     Documentation and treatment completed this session by ASHER Palm under the direct supervision of Viri Santiago MS, OTR/L

## 2021-05-18 ENCOUNTER — VBI (OUTPATIENT)
Dept: ADMINISTRATIVE | Facility: OTHER | Age: 76
End: 2021-05-18

## 2021-05-27 ENCOUNTER — RA CDI HCC (OUTPATIENT)
Dept: OTHER | Facility: HOSPITAL | Age: 76
End: 2021-05-27

## 2021-05-27 NOTE — PROGRESS NOTES
[place] [id number]  coding opportunities        DX not used     Chart reviewed, (number of) suggestions sent to provider: 1     Problem listed updated  Provider Accepted, (number of) suggestions accepted: 1        Patients insurance company: [address]  (Med[employer]are [employer] and [employer])     Visit status: Patient arrived for their scheduled appointment        [place] [address]  coding opportunities             Chart reviewed, (number of) suggestions sent to provider: 1     Problem listed updated   Provider Accepted, (number of) suggestions accepted: 1        Patients insurance company: [address]  (Medicare [employer]vantage and [employer])           [place] [address]  coding opportunities        DX: E11 40 Type 2 diabetes mellitus with diabetic neuropathy, unspecified         Chart reviewed, (number of) suggestions sent to provider: 1           Patients insurance company: [address]  (Medicare [employer]vantage and [employer])

## 2021-05-28 PROBLEM — E11.40 TYPE 2 DIABETES MELLITUS WITH DIABETIC NEUROPATHY (HCC): Status: ACTIVE | Noted: 2021-05-28

## 2021-05-31 ENCOUNTER — APPOINTMENT (EMERGENCY)
Dept: CT IMAGING | Facility: HOSPITAL | Age: 76
End: 2021-05-31
Payer: COMMERCIAL

## 2021-05-31 ENCOUNTER — APPOINTMENT (EMERGENCY)
Dept: RADIOLOGY | Facility: HOSPITAL | Age: 76
End: 2021-05-31
Payer: COMMERCIAL

## 2021-05-31 ENCOUNTER — HOSPITAL ENCOUNTER (EMERGENCY)
Facility: HOSPITAL | Age: 76
Discharge: HOME | End: 2021-05-31
Attending: EMERGENCY MEDICINE | Admitting: EMERGENCY MEDICINE
Payer: COMMERCIAL

## 2021-05-31 ENCOUNTER — OFFICE VISIT (OUTPATIENT)
Dept: URGENT CARE | Facility: CLINIC | Age: 76
End: 2021-05-31
Payer: COMMERCIAL

## 2021-05-31 VITALS
HEART RATE: 69 BPM | BODY MASS INDEX: 23.4 KG/M2 | OXYGEN SATURATION: 96 % | SYSTOLIC BLOOD PRESSURE: 156 MMHG | RESPIRATION RATE: 20 BRPM | DIASTOLIC BLOOD PRESSURE: 72 MMHG | WEIGHT: 145 LBS | TEMPERATURE: 98.6 F

## 2021-05-31 VITALS
OXYGEN SATURATION: 97 % | BODY MASS INDEX: 23.28 KG/M2 | HEIGHT: 66 IN | TEMPERATURE: 98.3 F | SYSTOLIC BLOOD PRESSURE: 161 MMHG | HEART RATE: 75 BPM | DIASTOLIC BLOOD PRESSURE: 85 MMHG | WEIGHT: 144.84 LBS | RESPIRATION RATE: 16 BRPM

## 2021-05-31 DIAGNOSIS — Z79.01 CHRONIC ANTICOAGULATION: ICD-10-CM

## 2021-05-31 DIAGNOSIS — M25.532 ACUTE PAIN OF LEFT WRIST: ICD-10-CM

## 2021-05-31 DIAGNOSIS — T07.XXXA MULTIPLE ABRASIONS: ICD-10-CM

## 2021-05-31 DIAGNOSIS — S50.311A ABRASION OF RIGHT ELBOW: ICD-10-CM

## 2021-05-31 DIAGNOSIS — S22.39XA RIB FRACTURE: Primary | ICD-10-CM

## 2021-05-31 DIAGNOSIS — R29.6 UNWITNESSED FALL: Primary | ICD-10-CM

## 2021-05-31 DIAGNOSIS — S60.512A ABRASION OF LEFT HAND: ICD-10-CM

## 2021-05-31 LAB
ANION GAP SERPL CALCULATED.3IONS-SCNC: 8 MMOL/L (ref 4–13)
BASOPHILS # BLD AUTO: 0.06 THOUSANDS/ΜL (ref 0–0.1)
BASOPHILS NFR BLD AUTO: 1 % (ref 0–1)
BUN SERPL-MCNC: 29 MG/DL (ref 5–25)
CALCIUM SERPL-MCNC: 9 MG/DL (ref 8.3–10.1)
CHLORIDE SERPL-SCNC: 95 MMOL/L (ref 100–108)
CO2 SERPL-SCNC: 26 MMOL/L (ref 21–32)
CREAT SERPL-MCNC: 1.53 MG/DL (ref 0.6–1.3)
EOSINOPHIL # BLD AUTO: 0.47 THOUSAND/ΜL (ref 0–0.61)
EOSINOPHIL NFR BLD AUTO: 7 % (ref 0–6)
ERYTHROCYTE [DISTWIDTH] IN BLOOD BY AUTOMATED COUNT: 12.8 % (ref 11.6–15.1)
GFR SERPL CREATININE-BSD FRML MDRD: 44 ML/MIN/1.73SQ M
GLUCOSE SERPL-MCNC: 161 MG/DL (ref 65–140)
HCT VFR BLD AUTO: 39.2 % (ref 36.5–49.3)
HGB BLD-MCNC: 13.1 G/DL (ref 12–17)
IMM GRANULOCYTES # BLD AUTO: 0.01 THOUSAND/UL (ref 0–0.2)
IMM GRANULOCYTES NFR BLD AUTO: 0 % (ref 0–2)
LYMPHOCYTES # BLD AUTO: 1.27 THOUSANDS/ΜL (ref 0.6–4.47)
LYMPHOCYTES NFR BLD AUTO: 20 % (ref 14–44)
MCH RBC QN AUTO: 31.8 PG (ref 26.8–34.3)
MCHC RBC AUTO-ENTMCNC: 33.4 G/DL (ref 31.4–37.4)
MCV RBC AUTO: 95 FL (ref 82–98)
MONOCYTES # BLD AUTO: 0.69 THOUSAND/ΜL (ref 0.17–1.22)
MONOCYTES NFR BLD AUTO: 11 % (ref 4–12)
NEUTROPHILS # BLD AUTO: 3.95 THOUSANDS/ΜL (ref 1.85–7.62)
NEUTS SEG NFR BLD AUTO: 61 % (ref 43–75)
NRBC BLD AUTO-RTO: 0 /100 WBCS
PLATELET # BLD AUTO: 254 THOUSANDS/UL (ref 149–390)
PMV BLD AUTO: 9.1 FL (ref 8.9–12.7)
POTASSIUM SERPL-SCNC: 4 MMOL/L (ref 3.5–5.3)
RBC # BLD AUTO: 4.12 MILLION/UL (ref 3.88–5.62)
SODIUM SERPL-SCNC: 129 MMOL/L (ref 136–145)
WBC # BLD AUTO: 6.45 THOUSAND/UL (ref 4.31–10.16)

## 2021-05-31 PROCEDURE — 74177 CT ABD & PELVIS W/CONTRAST: CPT

## 2021-05-31 PROCEDURE — 73110 X-RAY EXAM OF WRIST: CPT

## 2021-05-31 PROCEDURE — 99213 OFFICE O/P EST LOW 20 MIN: CPT | Performed by: NURSE PRACTITIONER

## 2021-05-31 PROCEDURE — 99284 EMERGENCY DEPT VISIT MOD MDM: CPT

## 2021-05-31 PROCEDURE — 71045 X-RAY EXAM CHEST 1 VIEW: CPT

## 2021-05-31 PROCEDURE — 36415 COLL VENOUS BLD VENIPUNCTURE: CPT | Performed by: PHYSICIAN ASSISTANT

## 2021-05-31 PROCEDURE — 85025 COMPLETE CBC W/AUTO DIFF WBC: CPT | Performed by: PHYSICIAN ASSISTANT

## 2021-05-31 PROCEDURE — 73130 X-RAY EXAM OF HAND: CPT

## 2021-05-31 PROCEDURE — 71260 CT THORAX DX C+: CPT

## 2021-05-31 PROCEDURE — 80048 BASIC METABOLIC PNL TOTAL CA: CPT | Performed by: PHYSICIAN ASSISTANT

## 2021-05-31 PROCEDURE — 70450 CT HEAD/BRAIN W/O DYE: CPT

## 2021-05-31 PROCEDURE — 93005 ELECTROCARDIOGRAM TRACING: CPT

## 2021-05-31 PROCEDURE — S9083 URGENT CARE CENTER GLOBAL: HCPCS | Performed by: NURSE PRACTITIONER

## 2021-05-31 PROCEDURE — 99284 EMERGENCY DEPT VISIT MOD MDM: CPT | Performed by: PHYSICIAN ASSISTANT

## 2021-05-31 RX ADMIN — IOHEXOL 100 ML: 350 INJECTION, SOLUTION INTRAVENOUS at 14:02

## 2021-05-31 NOTE — PROGRESS NOTES
[facility]        NAME: Amanda Dela Cruz is a 70 y o  male  : 1945    MRN: [id number]  DATE: May 31, 2021  TIME: 1:11 PM    Assessment and Plan   Unwitnessed fall [R29 6]  1  Unwitnessed fall  Transfer to other facility   2  Multiple abrasions  Transfer to other facility   3  Acute pain of left wrist  Transfer to other facility   4  Chronic anticoagulation  Transfer to other facility         Patient Instructions       Follow up with PCP in 3-5 days  Proceed to  ER if symptoms worsen  You are to be seen in the ED due to an unwitnessed fall and on a chronic blood thinner  You have chosen to go by car to the ED  Follow up with your PCP           Chief Complaint     Chief Complaint   Patient presents with    Fall     At [facility] forehead Right elbow , Left hand cut    Back Pain     fall         History of Present Illness       This is a 70year old male who comes to care now after falling at [address] today  He has a left wrist injury with swelling and pain  He has abrasions to left hand and right knee  He thinks he hit his head as well  Wife states that pt was behind her and that he had an unwitnessed fall  She states she saw him lying on his back and lying towards his left side  She states he had a stroke recently and is on chronic anticoagulation  She states that he has left sided residual but does well with ambulation  Pt denies any headache or distorted vision  Fall    Back Pain        Review of Systems   Review of Systems   Constitutional: Negative  HENT: Negative  Eyes: Negative  Respiratory: Negative  Cardiovascular: Negative  Gastrointestinal: Negative  Endocrine: Negative  Genitourinary: Negative  Musculoskeletal: Positive for back pain  Left wrist pain and swelling     Skin: Positive for wound  Allergic/Immunologic: Negative  Neurological: Negative  Hematological: Negative  Psychiatric/Behavioral: Negative  Current Medications       Current Outpatient Medications:     amLODIPine (NORVASC) 5 mg tablet, Take 1 tablet (5 mg total) by mouth daily, Disp: 30 tablet, Rfl: 0    atorvastatin (LIPITOR) 40 mg tablet, Take 1 tablet (40 mg total) by mouth daily with dinner, Disp: 30 tablet, Rfl: 0    clopidogrel (PLAVIX) 75 mg tablet, Take 1 tablet (75 mg total) by mouth daily, Disp: 90 tablet, Rfl: 1    Glucagon 1 MG/0 2ML SOSY, INJECT 1 MG UNDER THE SKIN  AS NEEDED FOR SEVERE HYPOGLYCEMIA, Disp: , Rfl:     Levothyroxine Sodium 88 MCG CAPS, Take 1 capsule (88 mcg total) by mouth daily for 90 days, Disp: 90 capsule, Rfl: 3    lisinopril (ZESTRIL) 40 mg tablet, Take 40 mg by mouth daily  , Disp: , Rfl:     Multiple Vitamin (MULTIVITAMIN) tablet, Take 1 tablet by mouth daily  , Disp: , Rfl:     pantoprazole (PROTONIX) 40 mg tablet, Take 1 tablet (40 mg total) by mouth daily before breakfast, Disp: 30 tablet, Rfl: 0    PATIENT MAINTAINED INSULIN PUMP, Inject 0 4 each under the skin continuous , Disp: , Rfl:     Current Allergies     Allergies as of 05/31/2021    (No Known Allergies)            The following portions of the patient's history were reviewed and updated as appropriate: allergies, current medications, past family history, past medical history, past social history, past surgical history and problem list      Past Medical History:   Diagnosis Date    Arthritis     BPH (benign prostatic hyperplasia)     last assessed 4/29/2014    Diabetes mellitus ([facility] [address] )     Disease of thyroid gland     GERD (gastroesophageal reflux disease)     Hearing aid worn     Hyperlipidemia     Hypertension     Hypothyroidism     Mumps     Prostate cancer ([address] )     Stroke ([address] )     Tingling sensation     bilateral feet occassionally    Tinnitus     Wears glasses     Wears partial dentures     upper       Past Surgical History:   Procedure Laterality Date    CATARACT EXTRACTION Bilateral 2000    EYE SURGERY      JOINT REPLACEMENT      KNEE ARTHROSCOPY Right 06/26/2009    knee    ME LAP,PROSTATECTOMY,RADICAL,W/NERVE SPARE,INCL ROBOTIC N/A 2/7/2018    Procedure: ROBOTIC ASSISTED LAPAROSCOPIC PROSTATECTOMY; BILATERAL PELVIC LYMPH NODE DISSECTION;  Surgeon: Dina Muhammad MD;  Location: [facility];  Service: Urology    ME REPAIR ING HERNIA,5+Y/O,REDUCIBL Right 4/19/2019    Procedure: REPAIR HERNIA INGUINAL;  Surgeon: Nicola Giron DO;  Location: [facility];  Service: General    PROSTATE BIOPSY  11/07/2017    needle biopsy of prostate    TRIGGER FINGER RELEASE  05/2013    trigger thumb       Family History   Problem Relation Age of Onset    Cancer Mother     Diabetes Mother     Uterine cancer Mother     Diabetes Father     Cancer Father     Stroke Father         of unknown cause    Hypertension Father     Prostate cancer Father     Diabetes Family         Uncle, DM    Cancer Family         Grandmother         Medications have been verified  Objective   /72   Pulse 69   Temp 98 6 °F (37 °C)   Resp 20   Wt 65 8 kg (145 lb)   SpO2 96%   BMI 23 40 kg/m²   No LMP for male patient  Physical Exam     Physical Exam  Vitals signs and nursing note reviewed  Constitutional:       General: He is not in acute distress  Appearance: Normal appearance  He is normal weight  He is not ill-appearing, toxic-appearing or diaphoretic  HENT:      Head: Normocephalic and atraumatic  Nose: Nose normal       Mouth/Throat:      Mouth: Mucous membranes are moist    Eyes:      Extraocular Movements: Extraocular movements intact  Pupils: Pupils are equal, round, and reactive to light  Neck:      Musculoskeletal: Normal range of motion  Cardiovascular:      Rate and Rhythm: Normal rate and regular rhythm  Pulses: Normal pulses  Heart sounds: Normal heart sounds  Pulmonary:      Breath sounds: Normal breath sounds  Abdominal:      General: There is no distension        Palpations: Abdomen is soft  Tenderness: There is no abdominal tenderness  Musculoskeletal:         General: Swelling, tenderness and signs of injury present  Arms:         Hands:         Legs:    Skin:     General: Skin is warm and dry  Capillary Refill: Capillary refill takes less than 2 seconds  Neurological:      General: No focal deficit present  Mental Status: He is alert and oriented to person, place, and time  Psychiatric:         Mood and Affect: Mood normal          Behavior: Behavior normal          Thought Content:  Thought content normal          Judgment: Judgment normal

## 2021-05-31 NOTE — ED PROVIDER NOTES
History  Chief Complaint   Patient presents with    Fall     patient states that he was walking too fast with his walker and fell  he reports pain on left back, right knee, and left hand  denies hitting head  patient is on thinners  no loc  Patient presents to the emergency department today for evaluation of pain status post fall  This is a very pleasant 79-year-old male who is accompanied to the emergency department today with his wife  He did ambulate into the department here  He provides his own history stating a few hours ago he was walking at a local store when he tripped causing him to fall on his left side  He denies loss of consciousness, wife was an eye witness and 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 Taking? Glucagon 1 MG/0 2ML SOSY   Yes No   Sig: INJECT 1 MG UNDER THE SKIN  AS NEEDED FOR SEVERE HYPOGLYCEMIA   Levothyroxine Sodium 88 MCG CAPS  Self No No   Sig: Take 1 capsule (88 mcg total) by mouth daily for 90 days   Multiple Vitamin (MULTIVITAMIN) tablet  Self Yes No   Sig: Take 1 tablet by mouth daily     PATIENT MAINTAINED INSULIN PUMP  Self Yes No   Sig: Inject 0 4 each under the skin continuous    amLODIPine (NORVASC) 5 mg tablet   No No   Sig: Take 1 tablet (5 mg total) by mouth daily   atorvastatin (LIPITOR) 40 mg tablet   No No   Sig: Take 1 tablet (40 mg total) by mouth daily with dinner   clopidogrel (PLAVIX) 75 mg tablet   No No   Sig: Take 1 tablet (75 mg total) by mouth daily   lisinopril (ZESTRIL) 40 mg tablet  Self Yes No   Sig: Take 40 mg by mouth [facility] Emergency Department    CHIEF COMPLAINT  Cough (c/o cough and needs to be cleared for work) and Nasal Congestion      SHARED SERVICE VISIT  Evaluated by ISAIAS. My supervising physician was available for consultation. HISTORY OF PRESENT ILLNESS  Oma Keller is a 24 y.o. male who presents to the ED complaining of for evaluation of cough, nasal congestion and shortness of breath. Patient has a history of asthma and has been using his daily inhaler with minimal relief. Patient states that he is out of his albuterol inhaler which usually provides some relief. 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 Patient also reports that he recently returned from [place] denies any chest pain, nausea vomiting abdominal pain fevers or chills. States he has a cough which is nonproductive. Denies hemoptysis. No other complaints, modifying factors or associated symptoms. Nursing notes reviewed. Past Medical History:   Diagnosis Date    Asthma      Past Surgical History:   Procedure Laterality Date    TONSILLECTOMY       History reviewed. No pertinent family history.   Social History     Socioeconomic History    Marital status: Single     Spouse name: Not on file    Number of children: Not on file    Years of education: Not on file    Highest education level: Not on file   Occupational History    Not on file   Social Needs    Financial resource strain: Not on file    Food insecurity     Worry: Not on file     Inability: Not on file    Transportation needs     Medical: Not on file     Non-medical: Not on file   Tobacco Use    Smoking status: Never Smoker    Smokeless tobacco: Never Used   Substance and Sexual Activity    Alcohol use: Yes     Comment: rarely     Drug use: No    Sexual activity: Never   Lifestyle    Physical activity     Days per week: Not on file     Minutes per daily     pantoprazole (PROTONIX) 40 mg tablet   No No   Sig: Take 1 tablet (40 mg total) by mouth daily before breakfast      Facility-Administered Medications: None       Past Medical History:   Diagnosis Date    Arthritis     BPH (benign prostatic hyperplasia)     last assessed 4/29/2014    Diabetes mellitus ([facility]     Disease of thyroid gland     GERD (gastroesophageal reflux disease)     Hearing aid worn     Hyperlipidemia     Hypertension     Hypothyroidism     Mumps     Prostate cancer ([facility]     Stroke ([facility] )     Tingling sensation     bilateral feet occassionally    Tinnitus     Wears glasses     Wears partial dentures     upper       Past Surgical History:   Procedure Laterality Date    CATARACT EXTRACTION Bilateral 2000    EYE SURGERY      JOINT REPLACEMENT      KNEE ARTHROSCOPY Right 06/26/2009    knee    MI LAP,PROSTATECTOMY,RADICAL,W/NERVE SPARE,INCL ROBOTIC N/A 2/7/2018    Procedure: ROBOTIC ASSISTED LAPAROSCOPIC PROSTATECTOMY; BILATERAL PELVIC LYMPH NODE DISSECTION;  Surgeon: Britney Bolaños MD;  Location: [facility];  Service: Urology    MI REPAIR ING HERNIA,5+Y/O,REDUCIBL Right 4/19/2019    Procedure: REPAIR HERNIA INGUINAL;  Surgeon: Alcira Ann DO;  Location: [facility];  Service: General    PROSTATE BIOPSY  11/07/2017    needle biopsy of prostate    TRIGGER FINGER RELEASE  05/2013    trigger thumb       Family History   Problem Relation Age of Onset    Cancer Mother     Diabetes Mother     Uterine cancer Mother     Diabetes Father     Cancer Father     Stroke Father         of unknown cause    Hypertension Father     Prostate cancer Father     Diabetes Family         Uncle, DM    Cancer Family         Grandmother     I have reviewed and agree with the history as documented      E-Cigarette/Vaping    E-Cigarette Use Never User      E-Cigarette/Vaping Substances    Nicotine No     THC No     CBD No     Flavoring No     Other No     Unknown No session: Not on file    Stress: Not on file   Relationships    Social connections     Talks on phone: Not on file     Gets together: Not on file     Attends Taoism service: Not on file     Active member of club or organization: Not on file     Attends meetings of clubs or organizations: Not on file     Relationship status: Not on file    Intimate partner violence     Fear of current or ex partner: Not on file     Emotionally abused: Not on file     Physically abused: Not on file     Forced sexual activity: Not on file   Other Topics Concern    Not on file   Social History Narrative    Not on file     No current facility-administered medications for this encounter. Current Outpatient Medications   Medication Sig Dispense Refill    albuterol sulfate HFA (PROAIR HFA) 108 (90 Base) MCG/ACT inhaler Take 2 puffs by mouth every 4 hours when necessary for coughing and/or wheezing Dispense with SPACER and Instruct on use 1 Inhaler 1    montelukast (SINGULAIR) 10 MG tablet Take 10 mg by mouth nightly      mometasone-formoterol (DULERA) 200-5 MCG/ACT inhaler Inhale 2 puffs into the lungs      Cetirizine HCl 10 MG CAPS Take 10 mg by mouth       Allergies   Allergen Reactions    Shellfish-Derived Products Swelling       REVIEW OF SYSTEMS  10 systems reviewed, pertinent positives per HPI otherwise noted to be negative    PHYSICAL EXAM  /80   Pulse 65   Temp 98.5 °F (36.9 °C) (Oral)   Resp 15   Ht 5' 7\" (1.702 m)   Wt 238 lb (108 kg)   SpO2 97%   BMI 37.28 kg/m²   GENERAL APPEARANCE: Awake and alert. Cooperative. No distress. HEAD: Normocephalic. Atraumatic. EYES: PERRL. EOM's grossly intact. ENT: Mucous membranes are moist.   NECK: Supple. HEART: RRR. No murmurs. LUNGS: Respirations unlabored. CTAB. Good air exchange. Speaking comfortably in full sentences. No wheeze or rales appreciated. No decreased air movement. EXTREMITIES: No peripheral edema. Moves all extremities equally.  All Social History     Tobacco Use    Smoking status: Former Smoker    Smokeless tobacco: Never Used    Tobacco comment: quit about 50 years ago   Substance Use Topics    Alcohol use: Not Currently     Alcohol/week: 2 0 standard drinks     Types: 2 Glasses of wine per week     Comment: week, social drinker    Drug use: No       Review of Systems   Constitutional: Negative for chills and fever  HENT: Negative for ear pain and sore throat  Eyes: Negative for pain and visual disturbance  Respiratory: Negative for cough and shortness of breath  Cardiovascular: Negative for chest pain and palpitations  Gastrointestinal: Negative for abdominal pain and vomiting  Genitourinary: Negative for dysuria and hematuria  Musculoskeletal: Positive for back pain  Negative for arthralgias  Skin: Positive for wound  Negative for color change and rash  Neurological: Negative  Negative for seizures and syncope  Hematological: Negative  Psychiatric/Behavioral: Negative  All other systems reviewed and are negative  Physical Exam  Physical Exam  Vitals signs and nursing note reviewed  Constitutional:       General: He is not in acute distress  Appearance: He is well-developed and normal weight  He is not ill-appearing or toxic-appearing  HENT:      Head: Normocephalic and atraumatic  Right Ear: Tympanic membrane, ear canal and external ear normal  There is no impacted cerumen  Left Ear: Tympanic membrane, ear canal and external ear normal  There is no impacted cerumen  Ears:      Comments: Negative hemotympanum bilaterally     Nose: Nose normal  No congestion or rhinorrhea  Comments: Negative septal hematoma     Mouth/Throat:      Mouth: Mucous membranes are moist       Pharynx: No oropharyngeal exudate or posterior oropharyngeal erythema  Comments: Negative intraoral trauma  Eyes:      General: No scleral icterus  Right eye: No discharge           Left eye: No extremities neurovascularly intact. SKIN: Warm and dry. No acute rashes. NEUROLOGICAL: Alert and oriented. CN's 2-12 intact. No gross facial drooping. Strength 5/5, sensation intact. PSYCHIATRIC: Normal mood and affect. RADIOLOGY  No orders to display         LABS  Labs Reviewed   COVID-19, RAPID    Narrative:     Performed at:  [facility]  [address] [place] [address],  [place], [facility] [employer]   Phone [phone]       PROCEDURES  Unless otherwise noted below, none  Procedures    ED COURSE  Triage vitals within normal limits heart rate 65 temp 98.5 oxygen saturation 97% on room air. .  A discussion was had with patient regarding return to work precautions and inhaler use. .  Risk management discussed and shared decision making had with patient and/or surrogate. All questions were answered. Patient will follow up with PCP for further evaluation/treatment. All questions answered. Patient will return to ED for new/worsening symptoms. Patient was sent home with a prescription for albuterol    MDM  MDM   56-year-old male, history of asthma, presents emergency department for evaluation of dry cough, nasal congestion shortness of breath. States he has been out of his albuterol inhaler has developed a dry cough as well as nasal congested after returning from [place]. Denies any sick contacts. Arrival patient's vitals are well within normal limits afebrile nontachycardic. On exam I no wheeze appreciated he is moving air well does not appear to be in any respiratory distress. Declan with the patient that I will provide him with an albuterol inhaler prescription due to being out as well as follows up with his primary care provider. Offer the patient a short course of steroid therapy in which the patient declined due to lack of tolerance to adverse/side effects. States he has not received much benefit from steroids in the past for his asthma exacerbations.   Patient states that he would like discharge  Conjunctiva/sclera: Conjunctivae normal    Neck:      Musculoskeletal: Neck supple  Cardiovascular:      Rate and Rhythm: Normal rate and regular rhythm  Heart sounds: No murmur  Pulmonary:      Effort: Pulmonary effort is normal  No respiratory distress  Breath sounds: Normal breath sounds  No stridor  No wheezing, rhonchi or rales  Chest:      Chest wall: No tenderness  Abdominal:      General: There is no distension  Palpations: Abdomen is soft  There is no mass  Tenderness: There is no abdominal tenderness  There is no right CVA tenderness, left CVA tenderness, guarding or rebound  Hernia: No hernia is present  Musculoskeletal: Normal range of motion  General: Tenderness present  Arms:    Skin:     General: Skin is warm and dry  Capillary Refill: Capillary refill takes less than 2 seconds  Comments: Superficial abrasion of the right elbow that is nontender  Abrasions of the left wrist and left hand are noted dorsally  Left hand and left wrist abrasions are slightly tender however  There is no central spinal tenderness  There is tenderness over the paravertebral muscles left low back laterally   Neurological:      General: No focal deficit present  Mental Status: He is alert and oriented to person, place, and time  Psychiatric:         Mood and Affect: Mood normal        Patient's airway is clear and patent  Breathing examination reveals normal symmetric chest rise bilaterally auscultatory finding negative for trauma  No wheezing rhonchi bilaterally  Adequate air movement  Circulatory examination reveals radial pulses bilaterally that are 4/4  There is no disability on physical examination patient was completely exposed  Patient's cervical spine is considered cleared based upon negative tenderness and nexus criteria  Patient did not go directly to CT and does not require priority 1 transfer      No special is required to be cleared to return to work. When patient that I would obtain a COVID-19 test here in emergency department and pending results of that he would be cleared to return to work. Covid rapid was performed here which was returned as not detected. Was discharged home in stable condition encouraged to follow-up with his primary care provider within a week. DISPOSITION  Patient was discharged to home in good condition. CLINICAL IMPRESSION  1. Intermittent asthma with acute exacerbation, unspecified asthma severity    2. Cough    3.  Nasal congestion           Maranda Monsalve PA-C  03/19/21 1531 to be contacted    Vital Signs  ED Triage Vitals [05/31/21 1337]   Temperature Pulse Respirations Blood Pressure SpO2   98 3 °F (36 8 °C) 87 18 (!) 202/97 98 %      Temp Source Heart Rate Source Patient Position - Orthostatic VS BP Location FiO2 (%)   Temporal Monitor Lying Right arm --      Pain Score       4           Vitals:    05/31/21 1337 05/31/21 1345 05/31/21 1400   BP: (!) 202/97 (!) 202/97 170/74   Pulse: 87 70 76   Patient Position - Orthostatic VS: Lying Sitting Lying         Visual Acuity  Visual Acuity      Most Recent Value   L Pupil Size (mm)  3   R Pupil Size (mm)  3          ED Medications  Medications   iohexol (OMNIPAQUE) 350 MG/ML injection (SINGLE-DOSE) 100 mL (100 mL Intravenous Given 5/31/21 1402)       Diagnostic Studies  Results Reviewed     Procedure Component Value Units Date/Time    Basic metabolic panel [[id number]]  (Abnormal) Collected: 05/31/21 1343    Lab Status: Final result Specimen: Blood from Arm, Right Updated: 05/31/21 1356     Sodium 129 mmol/L      Potassium 4 0 mmol/L      Chloride 95 mmol/L      CO2 26 mmol/L      ANION GAP 8 mmol/L      BUN 29 mg/dL      Creatinine 1 53 mg/dL      Glucose 161 mg/dL      Calcium 9 0 mg/dL      eGFR 44 ml/min/1 73sq m     Narrative:      Allison guidelines for Chronic Kidney Disease (CKD):     Stage 1 with normal or high GFR (GFR > 90 mL/min/1 73 square meters)    Stage 2 Mild CKD (GFR = 60-89 mL/min/1 73 square meters)    Stage 3A Moderate CKD (GFR = 45-59 mL/min/1 73 square meters)    Stage 3B Moderate CKD (GFR = 30-44 mL/min/1 73 square meters)    Stage 4 Severe CKD (GFR = 15-29 mL/min/1 73 square meters)    Stage 5 End Stage CKD (GFR <15 mL/min/1 73 square meters)  Note: GFR calculation is accurate only with a steady state creatinine    CBC and differential [[id number]]  (Abnormal) Collected: 05/31/21 1343    Lab Status: Final result Specimen: Blood from Arm, Right Updated: 05/31/21 1347     WBC 6 45 Thousand/uL      RBC 4 12 Million/uL      Hemoglobin 13 1 g/dL      Hematocrit 39 2 %      MCV 95 fL      MCH 31 8 pg      MCHC 33 4 g/dL      RDW 12 8 %      MPV 9 1 fL      Platelets 777 Thousands/uL      nRBC 0 /100 WBCs      Neutrophils Relative 61 %      Immat GRANS % 0 %      Lymphocytes Relative 20 %      Monocytes Relative 11 %      Eosinophils Relative 7 %      Basophils Relative 1 %      Neutrophils Absolute 3 95 Thousands/µL      Immature Grans Absolute 0 01 Thousand/uL      Lymphocytes Absolute 1 27 Thousands/µL      Monocytes Absolute 0 69 Thousand/µL      Eosinophils Absolute 0 47 Thousand/µL      Basophils Absolute 0 06 Thousands/µL                  XR hand 3+ views LEFT   ED Interpretation by Elizabeth Alvarez PA-C (05/31 7835)   No displaced fracture      XR wrist 3+ views LEFT   ED Interpretation by Elizabeth Alvarez PA-C (05/31 1175)   No definite fracture      TRAUMA - CT head wo contrast   Final Result by Jonas Ruiz MD (05/31 1783)      No acute intracranial abnormality  Microangiopathic changes  Stable ventriculomegaly  Workstation performed: [id number]         TRAUMA - CT chest abdomen pelvis w contrast   Final Result by Jonas Ruiz MD (05/31 3148)         1  No acute visceral injury in the chest, abdomen, or pelvis  2   Nondisplaced fracture left lateral 10th rib at the costochondral junction  3   Cholelithiasis  I personally discussed this study with Yousuf Estes on 5/31/2021 at 2:27 PM                      Workstation performed: [id number]         XR Trauma chest portable   ED Interpretation by Elizabeth Alvarez PA-C (05/31 2506)   Questionable consolidative process noted of the right lower lobe medially however no evidence of pneumothorax or pulmonary contusion    No displaced rib fracture noted                 Procedures  Procedures         ED Course  ED Course as of May 31 1446   Mon May 31, 2021   1353 WBC: 6 45   1353 Hemoglobin: 13 1 1353 Platelet Count: 560   1353 Blood Pressure(!): 202/97   1353 Temperature: 98 3 °F (36 8 °C)   1353 Pulse: 87   1353 Respirations: 18   1353 SpO2: 98 %   1415 Apparently chronically hyponatremic   Sodium(!): 129   1415 Chloride(!): 95   1415 Anion Gap: 8   1415 Calcium: 9 0   1415 Kidney function slightly better than baseline      1415 IMPRESSION:     No acute intracranial abnormality  Microangiopathic changes        Stable ventriculomegaly          1427 Left 10th rib fracture  SBIRT 22yo+      Most Recent Value   SBIRT (24 yo +)   In order to provide better care to our patients, we are screening all of our patients for alcohol and drug use  Would it be okay to ask you these screening questions? Yes Filed at: 05/31/2021 1415   Initial Alcohol Screen: US AUDIT-C    1  How often do you have a drink containing alcohol?  0 Filed at: 05/31/2021 1415   2  How many drinks containing alcohol do you have on a typical day you are drinking? 0 Filed at: 05/31/2021 1415   3b  FEMALE Any Age, or MALE 65+: How often do you have 4 or more drinks on one occassion? 0 Filed at: 05/31/2021 1415   Audit-C Score  0 Filed at: 05/31/2021 1415   ABEL: How many times in the past year have you    Used an illegal drug or used a prescription medication for non-medical reasons?   Never Filed at: 05/31/2021 1415                    MDM    Disposition  Final diagnoses:   Rib fracture - L 10th   Abrasion of right elbow   Abrasion of left hand     Time reflects when diagnosis was documented in both MDM as applicable and the Disposition within this note     Time User Action Codes Description Comment    5/31/2021  2:29 PM Tosin Smoke D Add [S22 39XA] Rib fracture     5/31/2021  2:29 PM Tosin Smoke D Modify [S22 39XA] Rib fracture L 10th    5/31/2021  2:29 PM Beola Feller Add [L06 869A] Abrasion of right elbow     5/31/2021  2:46 PM Beola Feller Add [C59 624T] Abrasion of left hand       ED Disposition     ED Disposition Condition Date/Time Comment    Discharge Stable Mon May 31, 2021  2:46 PM Leticia Salgado discharge to home/self care  Follow-up Information     Follow up With Specialties Details Why [address] [facility], DO Internal Medicine Schedule an appointment as soon as possible for a visit  As needed [address], [place] [id number] [phone]            Patient's Medications   Discharge Prescriptions    No medications on file     No discharge procedures on file      PDMP Review       Value Time User    PDMP Reviewed  Yes 3/25/2021  8:52 AM Adonay Lopez PA-C          ED Provider  Electronically Signed by           Gabriela Downey PA-C  05/31/21 0494

## 2021-05-31 NOTE — PATIENT INSTRUCTIONS
You are to be seen in the ED due to an unwitnessed fall and on a chronic blood thinner  You have chosen to go by car to the ED  Follow up with your PCP

## 2021-06-02 LAB
ATRIAL RATE: 77 BPM
P AXIS: 75 DEGREES
PR INTERVAL: 186 MS
QRS AXIS: 52 DEGREES
QRSD INTERVAL: 98 MS
QT INTERVAL: 390 MS
QTC INTERVAL: 441 MS
T WAVE AXIS: 81 DEGREES
VENTRICULAR RATE: 77 BPM

## 2021-06-02 PROCEDURE — 93010 ELECTROCARDIOGRAM REPORT: CPT | Performed by: INTERNAL MEDICINE

## 2021-06-03 ENCOUNTER — OFFICE VISIT (OUTPATIENT)
Dept: INTERNAL MEDICINE CLINIC | Facility: CLINIC | Age: 76
End: 2021-06-03
Payer: COMMERCIAL

## 2021-06-03 VITALS
DIASTOLIC BLOOD PRESSURE: 62 MMHG | SYSTOLIC BLOOD PRESSURE: 124 MMHG | BODY MASS INDEX: 23.65 KG/M2 | TEMPERATURE: 97.1 F | OXYGEN SATURATION: 98 % | HEIGHT: 66 IN | HEART RATE: 68 BPM | WEIGHT: 147.13 LBS

## 2021-06-03 DIAGNOSIS — E11.65 TYPE 2 DIABETES MELLITUS WITH HYPERGLYCEMIA, WITH LONG-TERM CURRENT USE OF INSULIN (HCC): ICD-10-CM

## 2021-06-03 DIAGNOSIS — Z11.59 ENCOUNTER FOR HEPATITIS C SCREENING TEST FOR LOW RISK PATIENT: ICD-10-CM

## 2021-06-03 DIAGNOSIS — Z79.4 TYPE 2 DIABETES MELLITUS WITH HYPERGLYCEMIA, WITH LONG-TERM CURRENT USE OF INSULIN (HCC): ICD-10-CM

## 2021-06-03 DIAGNOSIS — I63.9 CEREBROVASCULAR ACCIDENT (CVA), UNSPECIFIED MECHANISM (HCC): Primary | ICD-10-CM

## 2021-06-03 DIAGNOSIS — N18.32 STAGE 3B CHRONIC KIDNEY DISEASE (HCC): Chronic | ICD-10-CM

## 2021-06-03 DIAGNOSIS — E03.9 ACQUIRED HYPOTHYROIDISM: ICD-10-CM

## 2021-06-03 PROCEDURE — 1160F RVW MEDS BY RX/DR IN RCRD: CPT | Performed by: INTERNAL MEDICINE

## 2021-06-03 PROCEDURE — 1036F TOBACCO NON-USER: CPT | Performed by: INTERNAL MEDICINE

## 2021-06-03 PROCEDURE — 99214 OFFICE O/P EST MOD 30 MIN: CPT | Performed by: INTERNAL MEDICINE

## 2021-06-03 PROCEDURE — 3008F BODY MASS INDEX DOCD: CPT | Performed by: INTERNAL MEDICINE

## 2021-06-03 NOTE — PROGRESS NOTES
Assessment/Plan:         Diagnoses and all orders for this visit:    Cerebrovascular accident (CVA), unspecified mechanism ([facility] )  Pt making progress with mobility - Continue home exercise program and will start gradually trying to walk on the local track during the summer  Has neurology appt in September  Recheck ldl   Blood sugar followup next week     Type 2 diabetes mellitus with hyperglycemia, with long-term current use of insulin (HCC)  -     HEMOGLOBIN A1C W/ EAG ESTIMATION; Future  -     Comprehensive metabolic panel; Future  -     LDL cholesterol, direct; Future  A1c due after 6/4 Pt will go next week for updated level     Acquired hypothyroidism  -     TSH, 3rd generation; Future  Recheck with next labs    Stage 3b chronic kidney disease ([facility] )  GFR from the ER is improved to 44 which is his highest level in several months  Recheck prior to next visit         Rto 4 months      Patient ID: Danica Allen is a 70 y o  male  HPI  Pt doing a bit better overall he uses mainly the cane and has been more mobile within the home He did fall over the weekend while at [address] and has fx left rib He is doing ok and takingTYlenol prn for pain which is sufficient No chest pain or sob No dizziness He fell while trying to carry a plant with his cane He is doing daily home exercises especially with left hand to strengthen That is still his most noted deficit He has followup with endocrine at [facility] next week but sugars are fair  Overall He denies any new issues He does miss walking for exercise and cutting the grass     Review of Systems   Constitutional: Positive for activity change  Negative for chills, fatigue and fever  HENT: Negative  Eyes: Negative for visual disturbance  Respiratory: Negative for cough and shortness of breath  Cardiovascular: Negative for chest pain, palpitations and leg swelling  Gastrointestinal: Negative for abdominal distention and abdominal pain     Genitourinary: Negative for difficulty urinating, flank pain and frequency  Musculoskeletal: Positive for arthralgias, gait problem and joint swelling  Neurological: Negative for dizziness, light-headedness and headaches  Psychiatric/Behavioral: Negative for sleep disturbance  The patient is not nervous/anxious          Past Medical History:   Diagnosis Date    Arthritis     BPH (benign prostatic hyperplasia)     last assessed 4/29/2014    Diabetes mellitus ([facility] )     Disease of thyroid gland     GERD (gastroesophageal reflux disease)     Hearing aid worn     Hyperlipidemia     Hypertension     Hypothyroidism     Mumps     Prostate cancer ([facility] )     Stroke ([facility] )     Tingling sensation     bilateral feet occassionally    Tinnitus     Wears glasses     Wears partial dentures     upper     Past Surgical History:   Procedure Laterality Date    CATARACT EXTRACTION Bilateral 2000    EYE SURGERY      JOINT REPLACEMENT      KNEE ARTHROSCOPY Right 06/26/2009    knee    CO LAP,PROSTATECTOMY,RADICAL,W/NERVE SPARE,INCL ROBOTIC N/A 2/7/2018    Procedure: ROBOTIC ASSISTED LAPAROSCOPIC PROSTATECTOMY; BILATERAL PELVIC LYMPH NODE DISSECTION;  Surgeon: Timothy Vasques MD;  Location: [facility];  Service: Urology    CO REPAIR [address] HERNIA,5+Y/O,REDUCIBL Right 4/19/2019    Procedure: REPAIR HERNIA INGUINAL;  Surgeon: Ventura Okeefe DO;  Location: [facility];  Service: General    PROSTATE BIOPSY  11/07/2017    needle biopsy of prostate    TRIGGER FINGER RELEASE  05/2013    trigger thumb     Social History     Socioeconomic History    Marital status: /Civil Union     Spouse name: Not on file    Number of children: Not on file    Years of education: Not on file    Highest education level: Not on file   Occupational History    Occupation: printer  retired   Social Needs    Financial resource strain: Not on file    Food insecurity     Worry: Not on file     Inability: Not on file   [employer] needs     Medical: Not on file     Non-medical: Not on file   Tobacco Use    Smoking status: Former Smoker    Smokeless tobacco: Never Used    Tobacco comment: quit about 50 years ago   Substance and Sexual Activity    Alcohol use: Not Currently     Alcohol/week: 2 0 standard drinks     Types: 2 Glasses of wine per week     Comment: week, social drinker    Drug use: No    Sexual activity: Not on file   Lifestyle    Physical activity     Days per week: Not on file     Minutes per session: Not on file    Stress: Not on file   Relationships    Social connections     Talks on phone: Not on file     Gets together: Not on file     Attends Buddhism service: Not on file     Active member of club or organization: Not on file     Attends meetings of clubs or organizations: Not on file     Relationship status: Not on file    Intimate partner violence     Fear of current or ex partner: Not on file     Emotionally abused: Not on file     Physically abused: Not on file     Forced sexual activity: Not on file   Other Topics Concern    Not on file   Social History Narrative    Always uses seat belt    Caffeine use    Lives independently with spouse    Retired    Sun protection sunscreen     No Known Allergies         /62   Pulse 68   Temp (!) 97 1 °F (36 2 °C) (Temporal)   Ht 5' 6" (1 676 m)   Wt 66 7 kg (147 lb 2 oz)   SpO2 98%   BMI 23 75 kg/m²          Physical Exam  Vitals signs reviewed  Constitutional:       Appearance: Normal appearance  He is normal weight  HENT:      Head: Normocephalic and atraumatic  Right Ear: Tympanic membrane, ear canal and external ear normal  There is no impacted cerumen  Left Ear: Tympanic membrane, ear canal and external ear normal  There is no impacted cerumen  Nose: Nose normal       Mouth/Throat:      Mouth: Mucous membranes are dry  Eyes:      General: No scleral icterus  Extraocular Movements: Extraocular movements intact        Conjunctiva/sclera: Conjunctivae normal  Pupils: Pupils are equal, round, and reactive to light  Neck:      Musculoskeletal: Normal range of motion and neck supple  No neck rigidity  Cardiovascular:      Rate and Rhythm: Normal rate and regular rhythm  Pulses: Normal pulses  Pulmonary:      Effort: Pulmonary effort is normal  No respiratory distress  Breath sounds: Normal breath sounds  No wheezing  Abdominal:      General: Bowel sounds are normal  There is no distension  Palpations: Abdomen is soft  Tenderness: There is no abdominal tenderness  Musculoskeletal: Normal range of motion  General: Deformity present  No swelling or tenderness  Right lower leg: No edema  Left lower leg: No edema  Comments: Left wrist bruising nt rom intact   Lymphadenopathy:      Cervical: No cervical adenopathy  Skin:     General: Skin is dry  Capillary Refill: Capillary refill takes less than 2 seconds  Coloration: Skin is not jaundiced or pale  Findings: Bruising present  Neurological:      General: No focal deficit present  Mental Status: He is alert and oriented to person, place, and time  Mental status is at baseline  Sensory: Sensory deficit present  Motor: Weakness present  Gait: Gait abnormal    Psychiatric:         Mood and Affect: Mood normal          Behavior: Behavior normal          Thought Content:  Thought content normal          Judgment: Judgment normal

## 2021-06-07 ENCOUNTER — APPOINTMENT (OUTPATIENT)
Dept: LAB | Facility: HOSPITAL | Age: 76
End: 2021-06-07
Attending: INTERNAL MEDICINE
Payer: COMMERCIAL

## 2021-06-07 DIAGNOSIS — E11.65 TYPE 2 DIABETES MELLITUS WITH HYPERGLYCEMIA, WITH LONG-TERM CURRENT USE OF INSULIN (HCC): ICD-10-CM

## 2021-06-07 DIAGNOSIS — Z79.4 TYPE 2 DIABETES MELLITUS WITH HYPERGLYCEMIA, WITH LONG-TERM CURRENT USE OF INSULIN (HCC): ICD-10-CM

## 2021-06-07 LAB
EST. AVERAGE GLUCOSE BLD GHB EST-MCNC: 163 MG/DL
HBA1C MFR BLD: 7.3 %

## 2021-06-07 PROCEDURE — 83036 HEMOGLOBIN GLYCOSYLATED A1C: CPT

## 2021-06-07 PROCEDURE — 3051F HG A1C>EQUAL 7.0%<8.0%: CPT | Performed by: INTERNAL MEDICINE

## 2021-06-07 PROCEDURE — 36415 COLL VENOUS BLD VENIPUNCTURE: CPT

## 2021-06-09 NOTE — PROGRESS NOTES
OT DISCHARGE    Patient had recent hospitalization and will require a new script to return to OT services  Patient attended three visits since start of care with his last appointment on 05/04/2021  Please refer to last assessment for most recent objective measurements

## 2021-06-29 NOTE — PROGRESS NOTES
PT Discharge    Today's date: 2021  Patient name: Luanne Ortega  : 1945  MRN: [id number]  Referring provider: Corin Jacobson PA-C  Dx:   Encounter Diagnosis     ICD-10-CM    1  Cerebrovascular accident (CVA), unspecified mechanism ([facility] )  I63 [id number]        Start Time: 0800  Stop Time: 0900  Total time in clinic (min): 60 minutes    Assessment  Assessment details:   Dulce Cintron will be discharged from outpatient physical therapy care due to expiration of plan of care  No objective measures updated, Dulce Cintron is not present at time of discharge  Impairments: abnormal gait, abnormal muscle firing, abnormal muscle tone, abnormal or restricted ROM, abnormal movement, activity intolerance, impaired balance, impaired physical strength and lacks appropriate home exercise program    Symptom irritability: highUnderstanding of Dx/Px/POC: good   Prognosis: good    Goals  See recent treatment notes for further details  STGs to be achieved in 4-6 weeks:  1  Pt to demonstrate improved LLE strength grossly by at least 1/2 MMT grade to improve functional independence and stability with gait and mobility  2  Pt to demonstrate improved functional activity tolerance as noted by ability to ambulate at least 100ft consecutively with AD with close S without need for rest    3  Pt to demonstrate improved balance and reduced fall risk as noted by improvement in Mini Best score by at least 10-15% from IE  LTGs to be achieved in 12-14 weeks:   1  Pt to be I with HEP to facilitate maintenance of gains made in PT    2  Pt to demonstrate ability to safely ambulate with least restrictive AD for at least  5 miles without evidence of instability  3  Pt to demonstrate low risk for falls as noted by mini best testing at d/c from PT       Plan  Duration in visits: 1  Duration in weeks: 1  Treatment plan discussed with: family and patient        Subjective Evaluation    History of Present Illness  Mechanism of injury: Pt s/p CVA on March 3rd; spent 3 days in [address] [place] [address]  Transferred to [facility] for rehab x ~22 days  Pt was d/c home with home health care x 2 weeks  Referred to OPPT at this time  Pt notes persistent weakness L UE/LE  Denies any numbness or sensory changes in the limbs  No pain  Has been using reilly walker  Pt is I with mobility indoors with his walker and L arm in sling  Also has MAFO for L foot 2* ankle weakness  Uses WC for longer distances  Pt is 1* housebound at this point with limited outdoor mobility  Lives on a hill and unable to walk up/downhill  Assistance from wife with bathing, showering, and dressing  Independent with toileting  Requires assistance for bed mobility  Assistance for car transfers needed  Independent with getting up/down from a chair  Uses sling with mobility for L UE; does feel his his L UE/ strength is slowly coming back  Monitors his BP regularly at home; has been improved with "the new medication"  Notes imbalance with mobility when first getting up and towards the end of the day when he is tired  Has been negotiating his stairs to his 2nd story bedroom; step to step with RLE and HR  Fatigues more easily than usual  Does try to walk frequently at home  Used to go for a 4 mile walk every day and cut 5 lawns regularly  Goals: "Use my arm, hand and leg again", "walk with at least a cane"     Quality of life: good    Pain  No pain reported  Progression: improved (slightly )    Social Support  Steps to enter house: no  Stairs in house: yes   Lives in: multiple-level home  Lives with: spouse    Employment status: not working  Hand dominance: right    Treatments  Previous treatment: physical therapy, occupational therapy, home therapy and speech therapy  Current treatment: physical therapy  Patient Goals  Patient goals for therapy: improved balance, increased strength, increased motion, independence with ADLs/IADLs and return to sport/leisure activities          Objective Neurological Testing     Additional Neurological Details  UE/LE light touch sensation grossly intact and symmetrical B UEs/LEs     Strength/Myotome Testing     Left Shoulder     Planes of Motion   Flexion: 2-   Abduction: 2-     Right Shoulder     Planes of Motion   Flexion: 4+   Abduction: 4+     Left Elbow   Flexion: 2-  Extension: 2-    Right Elbow   Flexion: 5  Extension: 5    Left Wrist/Hand   Wrist extension: 2-  Wrist flexion: 2-    Right Wrist/Hand   Wrist extension: 4+  Wrist flexion: 4+    Left Hip   Planes of Motion   Flexion: 3-  Extension: 3  Abduction: 4-    Right Hip   Planes of Motion   Flexion: 4+ and 5  Extension: 4+  Abduction: 4+ and 5    Left Knee   Flexion: 2  Extension: 3-    Right Knee   Flexion: 4+  Extension: 5    Left Ankle/Foot   Dorsiflexion: 2-    Right Ankle/Foot   Dorsiflexion: 4+  Plantar flexion: 4+    Functional Assessment        Comments  Mini Best  Anticipatory  Sub Score: 2/6   Sit to Stand  Instruction:  Cross your arms across your chest   Try not to use your hands unless you must   Do no let your legs lean against the back of the chair when you stand  Please stand up now "  2= Normal:  Comes to stand without use of hands and stabilizes independently     Rise to Toes  Instruction:  "Place your feet shoulder width apart  Place your hands on your hips  Try to rise as high as you can onto your toes  I will count out loud to 3 seconds  Try to hold this pose for at lest 3 seconds  Look straight ahead  Rise now "    0=Severe:  < or equal to 3 seconds     Stand on one leg  Instruction:  "Look straight ahead  Keep your hands on your hips  Lift your leg off of the round behind you without touching or resting your raised leg upon your other standing leg  Stay standing on one leg as long as you can  Look straight ahead  Lift now "      0=Severe:  Unable    Right:  2 seconds  Left:  0 seconds   Score lowest time for worst leg in 2 trials each      Reactive Postural Control  Sub Score:  5/6   Compensatory Stepping Correction - Forward  Instruction:  "Stand with your feet shoulder width apart, arms at your sides  Lean forward against my hand beyond your forward limits  When I let go, do whatever is necessary, including taking a step, to avoid a fall "  2= Normal:  Recovers independently with a single, large step (2nd realignment step is allowed)  Compensatory Stepping Correction - Backward  Instruction:  "Stand with your feet shoulder width apart, arms at your sides  Lean forward against my hand beyond your forward limits  When I let go, do whatever is necessary, including taking a step, to avoid a fall "  1     Compensatory Stepping Correction - Lateral  Instruction:  "Stand with your feet shoulder width apart, arms at your sides  Lean forward against my hand beyond your forward limits  When I let go, do whatever is necessary, including taking a step, to avoid a fall "  2= Normal:  Recovers independently with a single, large step (cross over or lateral OK)  Use the side with the lowest score to calculate score  Sensory Orientation  Sub Score: 4/6  Stance (Feet Together); Eyes open, Firm surface  Instruction:  "Place your hands on your hips  Place your feet together until almost touching  Look straight ahead  Be as stable and still as possible, until I say stop "  2= Normal:  30 seconds       Stance (Feet Together); Eyes closed, Foam surface  Instruction:  "Step onto the foam   Place your hands on your hips  Place your feet together until almost touching  Close your eyes  Be as stable and still as possible, until I say stop  I will start timing when you close your eyes "      1=Moderate: < 30 seconds      Incline - Eyes Closed  Instruction:  "Step onto the incline ramp  Place your hands on your hips  Place your feet shoulder width apart and have your arms down at your sides    I will start timing when you close your eyes  "      1=Moderate:  Stands independently <30 sec OR aligns with surface        Dynamic Gait  Sub Score: 2/10  Change in gait speed  Instruction:  "Begin walking at your normal speed, when I tell you "fast", walk as fast as you can  When I say "slow", walk very slowly "    1= Moderate:  Unable to change walking speed or signs of imbalance     Walk with Head Turns - Horizontal  Instruction:  "Begin walking at your normal speed, when I say "right", turn your head and look to the right  When I say "left" turn your head and look to the left  Try to keep yourself walking in a straight line "    0= Severe:  performs head turns with imbalance     Walk with Pivot Turns  Instruction:  "Begin walking at your normal speed  When I tell you to "turn and stop", turn as quickly as you can, face the opposite direction, and stop  After the turn, your feet should be close together "    0= Severe:  Cannot turn with feet close at any speed without imbalance     Step over obstacles  Instruction:  "Begin walking at your normal speed  When you get to the box, step over it, not around it and keep walking "    1= Moderate: step over box but touches box OR displays cautious behavior by slowing gait     Timed up and go with dual task (3 meter walk)  Instruction TUG: "when I say Go, stand up from the chair, walk at your normal speed across the tape on the floor, turn around, and come back to sit in the chair "    Instruction TUG with Dual Task: "count backwards y 3s starting at ___  When I say "go stand up from the chair, walk at your normal speed across the tape on the floor, turn around, and come back to sit in the chair    Continue counting backwards the entire time "  TU 8 Seconds  Dual Task TU 2 Seconds  2= Normal:  No noticeable change in sitting, standing, or walking while backward counting when compared to TUG without dual task      (When scoring, if subject's gait speed slows more than 10% between the TUG without and with the Dual Task the score should be decreased by a point)  Total Score: 15/28  Neuro Exam:     Sensation   Light touch LE: left WNL and right WNL    Transfers Sit to stand: independent Into the car: moderate assist and minimum assist Out of the car: moderate assist     Functional outcomes   Functional outcome gait comment: Ambulates with reilly walker   MAFO L foot 2* ankle weakness   Sling L UE   Step to gait , 3 point gait   Increased R lateral flexion with L hip hike and circumduction with L swing  Limited L hip and knee flexion with swing   Slow deann   CGA with gait belt                 Precautions: s/p CVA, HTN (monitor BP) , DM , FALL RISK       Re-eval Date: 5/21    Date 4/21       Visit Count 1       FOTO 4/21       Pain In See IE       Pain Out See IE            Manuals 4/21       Stretching L HS, hip flexors, L calf                                 Neuro Re-Ed        Romberg EC floor/foam        Semi tandem floor-->Foam EO/EC         Dynamic reaching                                         Ther Ex        Cassie Leonard    LAQ         Hip add    Hip ABD         Sit to stands         Standing SLRs         HRs                        Ther Activity                        Gait Training *NV       Add head turns, obstacles,unstable surfaces                 Modalities

## 2021-08-09 ENCOUNTER — VBI (OUTPATIENT)
Dept: ADMINISTRATIVE | Facility: OTHER | Age: 76
End: 2021-08-09

## 2021-09-16 ENCOUNTER — VBI (OUTPATIENT)
Dept: ADMINISTRATIVE | Facility: OTHER | Age: 76
End: 2021-09-16

## 2021-09-23 ENCOUNTER — TELEPHONE (OUTPATIENT)
Dept: NEUROLOGY | Facility: CLINIC | Age: 76
End: 2021-09-23

## 2021-09-23 NOTE — TELEPHONE ENCOUNTER
I called and left a voicemail message about patients upcoming appointment with Vasyl Blanco on 9/29/21 @ 2:30 pm  I requested a call back to our office if the patient has any issues or concerns or cannot keep this appointment

## 2021-09-29 ENCOUNTER — OFFICE VISIT (OUTPATIENT)
Dept: NEUROLOGY | Facility: CLINIC | Age: 76
End: 2021-09-29
Payer: COMMERCIAL

## 2021-09-29 ENCOUNTER — TELEPHONE (OUTPATIENT)
Dept: NEUROLOGY | Facility: CLINIC | Age: 76
End: 2021-09-29

## 2021-09-29 VITALS
HEIGHT: 66 IN | HEART RATE: 72 BPM | TEMPERATURE: 98 F | DIASTOLIC BLOOD PRESSURE: 80 MMHG | RESPIRATION RATE: 18 BRPM | WEIGHT: 152.4 LBS | SYSTOLIC BLOOD PRESSURE: 150 MMHG | BODY MASS INDEX: 24.49 KG/M2

## 2021-09-29 DIAGNOSIS — Z79.4 TYPE 2 DIABETES MELLITUS WITH HYPERGLYCEMIA, WITH LONG-TERM CURRENT USE OF INSULIN (HCC): Chronic | ICD-10-CM

## 2021-09-29 DIAGNOSIS — Z86.73 HISTORY OF STROKE: Primary | ICD-10-CM

## 2021-09-29 DIAGNOSIS — E11.65 TYPE 2 DIABETES MELLITUS WITH HYPERGLYCEMIA, WITH LONG-TERM CURRENT USE OF INSULIN (HCC): Chronic | ICD-10-CM

## 2021-09-29 DIAGNOSIS — R25.2 SPASTICITY AS LATE EFFECT OF CEREBROVASCULAR ACCIDENT (CVA): ICD-10-CM

## 2021-09-29 DIAGNOSIS — I10 ESSENTIAL HYPERTENSION: Chronic | ICD-10-CM

## 2021-09-29 DIAGNOSIS — I69.398 SPASTICITY AS LATE EFFECT OF CEREBROVASCULAR ACCIDENT (CVA): ICD-10-CM

## 2021-09-29 DIAGNOSIS — E78.5 HYPERLIPIDEMIA, UNSPECIFIED HYPERLIPIDEMIA TYPE: Chronic | ICD-10-CM

## 2021-09-29 PROCEDURE — 1036F TOBACCO NON-USER: CPT | Performed by: PSYCHIATRY & NEUROLOGY

## 2021-09-29 PROCEDURE — 3008F BODY MASS INDEX DOCD: CPT | Performed by: PSYCHIATRY & NEUROLOGY

## 2021-09-29 PROCEDURE — 1160F RVW MEDS BY RX/DR IN RCRD: CPT | Performed by: PSYCHIATRY & NEUROLOGY

## 2021-09-29 PROCEDURE — 99215 OFFICE O/P EST HI 40 MIN: CPT | Performed by: PSYCHIATRY & NEUROLOGY

## 2021-09-29 RX ORDER — GABAPENTIN 300 MG/1
300 CAPSULE ORAL
Qty: 30 CAPSULE | Refills: 3 | Status: SHIPPED | OUTPATIENT
Start: 2021-09-29 | End: 2021-10-21 | Stop reason: SDUPTHER

## 2021-09-29 RX ORDER — DOCUSATE SODIUM 100 MG/1
100 CAPSULE, LIQUID FILLED ORAL 2 TIMES DAILY
COMMUNITY

## 2021-09-29 NOTE — PATIENT INSTRUCTIONS
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  He reports no new stroke-like symptoms  He is taking his medicine as prescribed and did not endorse any severe bleeding or bruising issues  He did have 1 relatively recent fall when he was not using his cane, but otherwise he is walking appropriately and actually does about 3/4 of a mi per day  He is not receiving outpatient physical therapy at this point in time  I did review once again his MRI and his prior risk factors  Considering how well controlled his risk factors were at the time of his stroke it is reasonable to pursue additional evaluation with an implantable loop recorder in spite of his normal echocardiogram     - for ongoing stroke prevention he should continue his combination Plavix, Lipitor, and appropriate blood pressure and glycemic control  -we will defer to his primary care team for monitoring of his cholesterol panel and blood sugar numbers   -I would like for him to occasionally check his blood pressure away from the doctor's office  He should be targeting numbers less than 130/80  If the numbers are frequently higher than that he should follow-up with his primary care team   -he reports that sometimes when he awakens overnight he has pain in the proximal left upper extremity and what sounds like clonus in the left leg  We will begin gabapentin 300 mg to be taken at bedtime  If he feels tired the following morning he can take it earlier in the evening  If he feels dizzy or unwell overnight or overmedicated we could reduce the dose if necessary     - I have requested an implantable loop recorder to be placed for him in order to rule out atrial fibrillation as a potential cause for stroke    If he does have evidence of atrial fibrillation I have a very low threshold to transition him from Plavix to a more appropriate anticoagulant medicine   -he would potentially benefit from high-intensity gait training once his insurance would allow him to return to a physical therapy setting    I will plan for him to return to see us in 6 months but would be happy to see him sooner if the need should arise  If he has an increase in pain or discomfort on the left hand side or other new issues or questions I would like him to contact our office right away  In any event he should call us in no more than 2 or 3 weeks to report on how he is doing on the gabapentin  If he has any stroke-like symptoms such as sudden painless loss of vision or double vision, difficulty speaking or swallowing, vertigo/ room spinning/incoordiantion that does not quickly resolve, or weakness /numbness affecting 1 side of the face or body which is new he should proceed by ambulance to the nearest emergency room immediately

## 2021-09-29 NOTE — TELEPHONE ENCOUNTER
Message left for Jona Cobian at [facility] to contact either office or patient directly to set up the patient to have a cardiac loop recorder implanted

## 2021-09-29 NOTE — PROGRESS NOTES
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  He reports no new stroke-like symptoms  He is taking his medicine as prescribed and did not endorse any severe bleeding or bruising issues  He did have 1 relatively recent fall when he was not using his cane, but otherwise he is walking appropriately and actually does about 3/4 of a mi per day  He is not receiving outpatient physical therapy at this point in time  I did review once again his MRI and his prior risk factors  Considering how well controlled his risk factors were at the time of his stroke it is reasonable to pursue additional evaluation with an implantable loop recorder in spite of his normal echocardiogram     - for ongoing stroke prevention he should continue his combination Plavix, Lipitor, and appropriate blood pressure and glycemic control  -we will defer to his primary care team for monitoring of his cholesterol panel and blood sugar numbers   -I would like for him to occasionally check his blood pressure away from the doctor's office  He should be targeting numbers less than 130/80  If the numbers are frequently higher than that he should follow-up with his primary care team   -he reports that sometimes when he awakens overnight he has pain in the proximal left upper extremity and what sounds like clonus in the left leg  We will begin gabapentin 300 mg to be taken at bedtime  If he feels tired the following morning he can take it earlier in the evening  If he feels dizzy or unwell overnight or overmedicated we could reduce the dose if necessary     - I have requested an implantable loop recorder to be placed for him in order to rule out atrial fibrillation as a potential cause for stroke    If he does have evidence of atrial fibrillation I have a very low threshold to transition him from Plavix to a more appropriate anticoagulant medicine   -he would potentially benefit from high-intensity gait training once his insurance would allow him to return to a physical therapy setting    I will plan for him to return to see us in 6 months but would be happy to see him sooner if the need should arise  If he has an increase in pain or discomfort on the left hand side or other new issues or questions I would like him to contact our office right away  In any event he should call us in no more than 2 or 3 weeks to report on how he is doing on the gabapentin  If he has any stroke-like symptoms such as sudden painless loss of vision or double vision, difficulty speaking or swallowing, vertigo/ room spinning/incoordiantion that does not quickly resolve, or weakness /numbness affecting 1 side of the face or body which is new he should proceed by ambulance to the nearest emergency room immediately  No problem-specific Assessment & Plan notes found for this encounter  Diagnoses and all orders for this visit:    History of stroke  -     Cardiac loop recorder implant; Future  -     gabapentin (Neurontin) 300 mg capsule; Take 1 capsule (300 mg total) by mouth daily at bedtime    Type 2 diabetes mellitus with hyperglycemia, with long-term current use of insulin (HCC)    Essential hypertension    Hyperlipidemia, unspecified hyperlipidemia type    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)  -     gabapentin (Neurontin) 300 mg capsule; Take 1 capsule (300 mg total) by mouth daily at bedtime    Other orders  -     docusate sodium (COLACE) 100 mg capsule; Take 100 mg by mouth 2 (two) times a day           Subjective:    CAROLA Angelpattimichelle Lowe presents for follow-up evaluation with regard to his prior stroke  He reports no new stroke-like symptoms  He takes his medications as prescribed  He did not endorse any bleeding or bruising issues    He works with his wife in terms of walking and exercising and stretching during the day  He walks approximately 3/4 of a mi per day  He did have 1 recent fall where he broke a rib but at the time he was carrying something and as result was not using his cane and was attempting to walk quickly to catch up with his wife  At night at times he will awaken to use the bathroom but note pain in his proximal left upper extremity  At other times he may have jerking movements of the left leg which sound most consistent with clonus  He did not endorse any significant mood issues or problems with appetite  We had an extensive discussion with regard to stroke etiology  Ultimately considering how well controlled his blood pressure/ blood sugar/ cholesterol numbers were at the time of his stroke there is some remaining concerned that this could be related to a cardioembolic source  Bearing this in mind after full discussion we decided that it would be reasonable to pursue cardiac monitoring via an implantable loop recorder  At this time, based on his gait evaluation, he may in the future qualify for Botox or other methods working on his spasticity particularly in the proximal left lower extremity at the level of the quadriceps           Current Outpatient Medications:     amLODIPine (NORVASC) 5 mg tablet, Take 1 tablet (5 mg total) by mouth daily, Disp: 30 tablet, Rfl: 0    atorvastatin (LIPITOR) 40 mg tablet, Take 1 tablet (40 mg total) by mouth daily with dinner, Disp: 30 tablet, Rfl: 0    clopidogrel (PLAVIX) 75 mg tablet, Take 1 tablet (75 mg total) by mouth daily, Disp: 90 tablet, Rfl: 1    docusate sodium (COLACE) 100 mg capsule, Take 100 mg by mouth 2 (two) times a day, Disp: , Rfl:     Glucagon 1 MG/0 2ML SOSY, INJECT 1 MG UNDER THE SKIN  AS NEEDED FOR SEVERE HYPOGLYCEMIA, Disp: , Rfl:     Levothyroxine Sodium 88 MCG CAPS, Take 1 capsule (88 mcg total) by mouth daily for 90 days, Disp: 90 capsule, Rfl: 3    lisinopril (ZESTRIL) 40 mg tablet, Take 40 mg by mouth daily  , Disp: , Rfl:     Multiple Vitamin (MULTIVITAMIN) tablet, Take 1 tablet by mouth daily  , Disp: , Rfl:     PATIENT MAINTAINED INSULIN PUMP, Inject 0 4 each under the skin continuous , Disp: , Rfl:     gabapentin (Neurontin) 300 mg capsule, Take 1 capsule (300 mg total) by mouth daily at bedtime, Disp: 30 capsule, Rfl: 3    pantoprazole (PROTONIX) 40 mg tablet, Take 1 tablet (40 mg total) by mouth daily before breakfast (Patient not taking: Reported on 9/29/2021), Disp: 30 tablet, Rfl: 0         Objective:    Blood pressure 150/80, pulse 72, temperature 98 °F (36 7 °C), temperature source Tympanic, resp  rate 18, height 5' 6" (1 676 m), weight 69 1 kg (152 lb 6 4 oz)  Physical Exam    Neurological Exam      At the time of my evaluation he was awake, alert, and in no distress  He is somewhat hard of hearing  He is a reasonable historian with no severe dysarthria or aphasia  Face is symmetric  Tongue was midline, palate raise was symmetric  His left upper extremity is spastic with some dystonic posturing while walking  He is able to partially close the left hand but not all the way to the point of making a fist or curling the fingers in word  He does have some strength in the palmar flexed position of the fingers  He is able to spontaneously extend the fingers of the left hand after flexing them  He has much more increased tone at the level of the wrist and somewhat increased tone at the level of the elbow and shoulder  He has an ankle orthotic in place on the left hand side  He has intact temperature and vibration sensation in the left upper extremity compared to the right but diminished in the left lower extremity compared to the right  While walking his left arm attains a dystonic posture  He is circumduct ting the left leg with minimally necessary ground clearance    When attempting to lift the left leg from a standing position he nearly fell backwards  When additionally attempting to lift the left leg he was noted to have a quick straightening of the leg at the level of the knee consistent with quadriceps spasticity  His reflexes were exaggerated in the left upper and lower extremity compared to the right with some recurrent quadriceps activity after a patellar reflex activation but not quite of the frequency expected for clonus  ROS:    Review of Systems   Constitutional: Negative for chills and fever  HENT: Negative for ear pain, sore throat and tinnitus  Eyes: Negative for pain and visual disturbance  Respiratory: Negative for cough and shortness of breath  Cardiovascular: Negative for chest pain and palpitations  Gastrointestinal: Negative for abdominal pain and vomiting  Genitourinary: Negative for dysuria and hematuria  Musculoskeletal: Positive for gait problem and myalgias (left arm muscle pain)  Negative for arthralgias and back pain  Skin: Negative for color change and rash  Neurological: Negative for seizures and syncope  Hematological: Bruises/bleeds easily  All other systems reviewed and are negative  Reviewed ROS as entered by medical assistant

## 2021-09-30 ENCOUNTER — TELEPHONE (OUTPATIENT)
Dept: FAMILY MEDICINE CLINIC | Facility: CLINIC | Age: 76
End: 2021-09-30

## 2021-09-30 DIAGNOSIS — Z79.4 TYPE 2 DIABETES MELLITUS WITH STAGE 3A CHRONIC KIDNEY DISEASE, WITH LONG-TERM CURRENT USE OF INSULIN (HCC): Primary | ICD-10-CM

## 2021-09-30 DIAGNOSIS — E11.22 TYPE 2 DIABETES MELLITUS WITH STAGE 3A CHRONIC KIDNEY DISEASE, WITH LONG-TERM CURRENT USE OF INSULIN (HCC): Primary | ICD-10-CM

## 2021-09-30 DIAGNOSIS — N18.31 TYPE 2 DIABETES MELLITUS WITH STAGE 3A CHRONIC KIDNEY DISEASE, WITH LONG-TERM CURRENT USE OF INSULIN (HCC): Primary | ICD-10-CM

## 2021-09-30 PROCEDURE — 3066F NEPHROPATHY DOC TX: CPT | Performed by: PSYCHIATRY & NEUROLOGY

## 2021-10-01 NOTE — PROGRESS NOTES
Problem: Sleep Disturbance (Anxiety Signs/Symptoms)  Goal: Improved Sleep (Anxiety Signs/Symptoms)  Outcome: Improving     Slept all night for 8.5 hours   Compliant with her scheduled early AM medications   Daily Note     Today's date: 2021  Patient name: Wyatt Zaldivar  : 1945  MRN: [id number]  Referring provider: Sherin Carballo PA-C  Dx:   Encounter Diagnosis     ICD-10-CM    1  Cerebrovascular accident (CVA), unspecified mechanism ([facility] [address] )  I63 9                   Subjective: "I have been trying it at home "      Objective: See treatment diary below      Assessment: Tolerated treatment well  Patient exhibited good technique with therapeutic exercises and would benefit from continued OT  Patient required less assistance with left hand to maintain  during UBE machine this date  Patient tolerated additional shoulder strengthening and isolated finger movements this session to increase functional use of left UE  Patient instructed to completed pom pom activity with increased steps to complete task  Patient completed with grasping, elbow flexion, elbow extension and release with improved quality noted  Plan: Continue per plan of care  Progress treatment as tolerated            Precautions s/p CVA       Manuals 2021      PROM Shoulder All Planes  Extensor Synergy  Wrist Extension Stretch  Sh FF Stretch  30 sec x 3                              Neuro Re-Ed  2021      Thera-Ball   Presses  Squeezes  FF  ABD  Rotation    10 sec x 10    X 20 on Mat Table      TB  PNF Flexion 1  PNF Flexion 2  PNF Ext 1  PNF Ext 2        AAROM Shoulder Flex  Elbow Flex                                        Ther Ex 2021      Wrist ROM  Flex/Ext  RD/UD  Sup/Pro        Wrist Maze        Opposition        Digi-Flex        UBE Machine 10 Min L Hand Strapped 10 Min L Hand Strapped      Isolated Finger Taps  X 20 with tapping for faciliation      Thera-Band  Sh Ext  Retraction  Elbow Flex  Yellow  X 20  X 20  X 20                              Ther Activity 2021      Cone Flip Sup/Pro        Tension Pin Pom Pom  Hand x 20      Tennis Ball Thumb Abd/add  X 20 Modalities 05/05/2021 05/12/2021              CP        E-STIM [place] Wrist Ext Completed 10 Min

## 2021-10-01 NOTE — TELEPHONE ENCOUNTER
Pt's wife called and states that she has been trying to contact cardiology re: loop recorder  Wife would like to speak directly with cardiology  Call transferred to [facility] [place] at [phone]

## 2021-10-05 ENCOUNTER — APPOINTMENT (OUTPATIENT)
Dept: LAB | Facility: HOSPITAL | Age: 76
End: 2021-10-05
Attending: INTERNAL MEDICINE
Payer: COMMERCIAL

## 2021-10-05 DIAGNOSIS — E11.65 TYPE 2 DIABETES MELLITUS WITH HYPERGLYCEMIA, WITH LONG-TERM CURRENT USE OF INSULIN (HCC): ICD-10-CM

## 2021-10-05 DIAGNOSIS — Z79.4 TYPE 2 DIABETES MELLITUS WITH HYPERGLYCEMIA, WITH LONG-TERM CURRENT USE OF INSULIN (HCC): ICD-10-CM

## 2021-10-05 DIAGNOSIS — Z11.59 ENCOUNTER FOR HEPATITIS C SCREENING TEST FOR LOW RISK PATIENT: ICD-10-CM

## 2021-10-05 DIAGNOSIS — E03.9 ACQUIRED HYPOTHYROIDISM: ICD-10-CM

## 2021-10-05 LAB
ALBUMIN SERPL BCP-MCNC: 3.8 G/DL (ref 3.5–5)
ALP SERPL-CCNC: 93 U/L (ref 46–116)
ALT SERPL W P-5'-P-CCNC: 30 U/L (ref 12–78)
ANION GAP SERPL CALCULATED.3IONS-SCNC: 7 MMOL/L (ref 4–13)
AST SERPL W P-5'-P-CCNC: 23 U/L (ref 5–45)
BILIRUB SERPL-MCNC: 0.55 MG/DL (ref 0.2–1)
BUN SERPL-MCNC: 28 MG/DL (ref 5–25)
CALCIUM SERPL-MCNC: 8.7 MG/DL (ref 8.3–10.1)
CHLORIDE SERPL-SCNC: 99 MMOL/L (ref 100–108)
CO2 SERPL-SCNC: 28 MMOL/L (ref 21–32)
CREAT SERPL-MCNC: 1.45 MG/DL (ref 0.6–1.3)
GFR SERPL CREATININE-BSD FRML MDRD: 47 ML/MIN/1.73SQ M
GLUCOSE P FAST SERPL-MCNC: 112 MG/DL (ref 65–99)
HCV AB SER QL: NORMAL
LDLC SERPL DIRECT ASSAY-MCNC: 45 MG/DL (ref 0–100)
POTASSIUM SERPL-SCNC: 4 MMOL/L (ref 3.5–5.3)
PROT SERPL-MCNC: 7 G/DL (ref 6.4–8.2)
SODIUM SERPL-SCNC: 134 MMOL/L (ref 136–145)
TSH SERPL DL<=0.05 MIU/L-ACNC: 5.8 UIU/ML (ref 0.36–3.74)

## 2021-10-05 PROCEDURE — 83721 ASSAY OF BLOOD LIPOPROTEIN: CPT

## 2021-10-05 PROCEDURE — 36415 COLL VENOUS BLD VENIPUNCTURE: CPT

## 2021-10-05 PROCEDURE — 80053 COMPREHEN METABOLIC PANEL: CPT

## 2021-10-05 PROCEDURE — 86803 HEPATITIS C AB TEST: CPT

## 2021-10-05 PROCEDURE — 84443 ASSAY THYROID STIM HORMONE: CPT

## 2021-10-06 ENCOUNTER — PREP FOR PROCEDURE (OUTPATIENT)
Dept: CARDIOLOGY CLINIC | Facility: CLINIC | Age: 76
End: 2021-10-06

## 2021-10-06 DIAGNOSIS — I63.9 CEREBROVASCULAR ACCIDENT (CVA), UNSPECIFIED MECHANISM (HCC): Primary | ICD-10-CM

## 2021-10-06 NOTE — TELEPHONE ENCOUNTER
Patient schedule for loop implant at [facility] on 11/4/21 with Dr Darius Farnsworth  Patient wife aware of general instructions and labs require  Please Reynold Echeverria can you check for auth

## 2021-10-11 ENCOUNTER — RA CDI HCC (OUTPATIENT)
Dept: OTHER | Facility: HOSPITAL | Age: 76
End: 2021-10-11

## 2021-10-11 NOTE — TELEPHONE ENCOUNTER
His Loop implant [id number], [id number] on 11/4/21 at [facility] does not need Kalyani Rain and it is a covered service per Bessy Ruby at [place]  Ref# [id number]

## 2021-10-15 ENCOUNTER — OFFICE VISIT (OUTPATIENT)
Dept: FAMILY MEDICINE CLINIC | Facility: CLINIC | Age: 76
End: 2021-10-15
Payer: COMMERCIAL

## 2021-10-15 ENCOUNTER — TELEPHONE (OUTPATIENT)
Dept: ADMINISTRATIVE | Facility: OTHER | Age: 76
End: 2021-10-15

## 2021-10-15 VITALS
DIASTOLIC BLOOD PRESSURE: 78 MMHG | RESPIRATION RATE: 18 BRPM | HEART RATE: 78 BPM | SYSTOLIC BLOOD PRESSURE: 124 MMHG | BODY MASS INDEX: 24.53 KG/M2 | HEIGHT: 66 IN | WEIGHT: 152.6 LBS

## 2021-10-15 DIAGNOSIS — Z00.00 MEDICARE ANNUAL WELLNESS VISIT, SUBSEQUENT: Primary | ICD-10-CM

## 2021-10-15 DIAGNOSIS — E03.9 ACQUIRED HYPOTHYROIDISM: ICD-10-CM

## 2021-10-15 PROCEDURE — 1160F RVW MEDS BY RX/DR IN RCRD: CPT | Performed by: INTERNAL MEDICINE

## 2021-10-15 PROCEDURE — G0439 PPPS, SUBSEQ VISIT: HCPCS | Performed by: INTERNAL MEDICINE

## 2021-10-15 PROCEDURE — 3288F FALL RISK ASSESSMENT DOCD: CPT | Performed by: INTERNAL MEDICINE

## 2021-10-15 PROCEDURE — 1170F FXNL STATUS ASSESSED: CPT | Performed by: INTERNAL MEDICINE

## 2021-10-15 PROCEDURE — 1036F TOBACCO NON-USER: CPT | Performed by: INTERNAL MEDICINE

## 2021-10-15 PROCEDURE — 1125F AMNT PAIN NOTED PAIN PRSNT: CPT | Performed by: INTERNAL MEDICINE

## 2021-10-15 RX ORDER — LEVOTHYROXINE SODIUM 0.1 MG/1
100 TABLET ORAL
Qty: 90 TABLET | Refills: 3 | Status: SHIPPED | OUTPATIENT
Start: 2021-10-15

## 2021-10-19 ENCOUNTER — OFFICE VISIT (OUTPATIENT)
Dept: OTOLARYNGOLOGY | Facility: CLINIC | Age: 76
End: 2021-10-19
Payer: COMMERCIAL

## 2021-10-19 ENCOUNTER — APPOINTMENT (OUTPATIENT)
Dept: LAB | Facility: MEDICAL CENTER | Age: 76
End: 2021-10-19
Payer: COMMERCIAL

## 2021-10-19 VITALS
SYSTOLIC BLOOD PRESSURE: 126 MMHG | HEIGHT: 66 IN | DIASTOLIC BLOOD PRESSURE: 70 MMHG | WEIGHT: 152 LBS | BODY MASS INDEX: 24.43 KG/M2 | TEMPERATURE: 97.7 F | HEART RATE: 50 BPM | OXYGEN SATURATION: 97 %

## 2021-10-19 DIAGNOSIS — I63.9 CEREBROVASCULAR ACCIDENT (CVA), UNSPECIFIED MECHANISM (HCC): ICD-10-CM

## 2021-10-19 DIAGNOSIS — H90.3 SENSORINEURAL HEARING LOSS (SNHL), BILATERAL: Primary | ICD-10-CM

## 2021-10-19 LAB
BASOPHILS # BLD AUTO: 0.06 THOUSANDS/ΜL (ref 0–0.1)
BASOPHILS NFR BLD AUTO: 1 % (ref 0–1)
EOSINOPHIL # BLD AUTO: 0.41 THOUSAND/ΜL (ref 0–0.61)
EOSINOPHIL NFR BLD AUTO: 9 % (ref 0–6)
ERYTHROCYTE [DISTWIDTH] IN BLOOD BY AUTOMATED COUNT: 13.2 % (ref 11.6–15.1)
HCT VFR BLD AUTO: 40.3 % (ref 36.5–49.3)
HGB BLD-MCNC: 13.3 G/DL (ref 12–17)
IMM GRANULOCYTES # BLD AUTO: 0.01 THOUSAND/UL (ref 0–0.2)
IMM GRANULOCYTES NFR BLD AUTO: 0 % (ref 0–2)
LYMPHOCYTES # BLD AUTO: 1.02 THOUSANDS/ΜL (ref 0.6–4.47)
LYMPHOCYTES NFR BLD AUTO: 23 % (ref 14–44)
MCH RBC QN AUTO: 32.3 PG (ref 26.8–34.3)
MCHC RBC AUTO-ENTMCNC: 33 G/DL (ref 31.4–37.4)
MCV RBC AUTO: 98 FL (ref 82–98)
MONOCYTES # BLD AUTO: 0.47 THOUSAND/ΜL (ref 0.17–1.22)
MONOCYTES NFR BLD AUTO: 10 % (ref 4–12)
NEUTROPHILS # BLD AUTO: 2.54 THOUSANDS/ΜL (ref 1.85–7.62)
NEUTS SEG NFR BLD AUTO: 57 % (ref 43–75)
NRBC BLD AUTO-RTO: 0 /100 WBCS
PLATELET # BLD AUTO: 265 THOUSANDS/UL (ref 149–390)
PMV BLD AUTO: 9.5 FL (ref 8.9–12.7)
RBC # BLD AUTO: 4.12 MILLION/UL (ref 3.88–5.62)
WBC # BLD AUTO: 4.51 THOUSAND/UL (ref 4.31–10.16)

## 2021-10-19 PROCEDURE — 85025 COMPLETE CBC W/AUTO DIFF WBC: CPT

## 2021-10-19 PROCEDURE — 99213 OFFICE O/P EST LOW 20 MIN: CPT | Performed by: OTOLARYNGOLOGY

## 2021-10-19 PROCEDURE — 36415 COLL VENOUS BLD VENIPUNCTURE: CPT

## 2021-10-19 PROCEDURE — 3008F BODY MASS INDEX DOCD: CPT | Performed by: INTERNAL MEDICINE

## 2021-10-20 ENCOUNTER — TELEPHONE (OUTPATIENT)
Dept: NEUROLOGY | Facility: CLINIC | Age: 76
End: 2021-10-20

## 2021-10-21 DIAGNOSIS — I69.398 SPASTICITY AS LATE EFFECT OF CEREBROVASCULAR ACCIDENT (CVA): ICD-10-CM

## 2021-10-21 DIAGNOSIS — R25.2 SPASTICITY AS LATE EFFECT OF CEREBROVASCULAR ACCIDENT (CVA): ICD-10-CM

## 2021-10-21 DIAGNOSIS — Z86.73 HISTORY OF STROKE: ICD-10-CM

## 2021-10-21 RX ORDER — GABAPENTIN 300 MG/1
300 CAPSULE ORAL
Qty: 90 CAPSULE | Refills: 3 | Status: SHIPPED | OUTPATIENT
Start: 2021-10-21

## 2021-10-25 DIAGNOSIS — N18.31 TYPE 2 DIABETES MELLITUS WITH STAGE 3A CHRONIC KIDNEY DISEASE, WITH LONG-TERM CURRENT USE OF INSULIN (HCC): Primary | ICD-10-CM

## 2021-10-25 DIAGNOSIS — Z79.4 TYPE 2 DIABETES MELLITUS WITH STAGE 3A CHRONIC KIDNEY DISEASE, WITH LONG-TERM CURRENT USE OF INSULIN (HCC): Primary | ICD-10-CM

## 2021-10-25 DIAGNOSIS — E11.22 TYPE 2 DIABETES MELLITUS WITH STAGE 3A CHRONIC KIDNEY DISEASE, WITH LONG-TERM CURRENT USE OF INSULIN (HCC): Primary | ICD-10-CM

## 2021-10-25 PROCEDURE — 3066F NEPHROPATHY DOC TX: CPT | Performed by: INTERNAL MEDICINE

## 2021-11-04 ENCOUNTER — HOSPITAL ENCOUNTER (OUTPATIENT)
Facility: HOSPITAL | Age: 76
Setting detail: OUTPATIENT SURGERY
Discharge: HOME | End: 2021-11-04
Attending: INTERNAL MEDICINE | Admitting: INTERNAL MEDICINE
Payer: COMMERCIAL

## 2021-11-04 VITALS
HEIGHT: 66 IN | HEART RATE: 74 BPM | DIASTOLIC BLOOD PRESSURE: 91 MMHG | OXYGEN SATURATION: 97 % | BODY MASS INDEX: 24.59 KG/M2 | WEIGHT: 153 LBS | SYSTOLIC BLOOD PRESSURE: 169 MMHG | RESPIRATION RATE: 17 BRPM | TEMPERATURE: 98.6 F

## 2021-11-04 DIAGNOSIS — Z86.73 HISTORY OF STROKE: Primary | ICD-10-CM

## 2021-11-04 DIAGNOSIS — I63.9 CEREBROVASCULAR ACCIDENT (CVA), UNSPECIFIED MECHANISM (HCC): ICD-10-CM

## 2021-11-04 PROCEDURE — 33285 INSJ SUBQ CAR RHYTHM MNTR: CPT | Performed by: INTERNAL MEDICINE

## 2021-11-04 PROCEDURE — NC001 PR NO CHARGE: Performed by: PHYSICIAN ASSISTANT

## 2021-11-04 PROCEDURE — C1764 EVENT RECORDER, CARDIAC: HCPCS | Performed by: INTERNAL MEDICINE

## 2021-11-04 DEVICE — LOOP RECORDER REVEAL LINQ II SYS DEVICE ONLY: Type: IMPLANTABLE DEVICE | Site: CHEST | Status: FUNCTIONAL

## 2021-11-04 RX ORDER — LIDOCAINE HYDROCHLORIDE 10 MG/ML
INJECTION, SOLUTION EPIDURAL; INFILTRATION; INTRACAUDAL; PERINEURAL AS NEEDED
Status: DISCONTINUED | OUTPATIENT
Start: 2021-11-04 | End: 2021-11-04 | Stop reason: HOSPADM

## 2021-11-08 ENCOUNTER — VBI (OUTPATIENT)
Dept: ADMINISTRATIVE | Facility: OTHER | Age: 76
End: 2021-11-08

## 2021-11-11 ENCOUNTER — IN-CLINIC DEVICE VISIT (OUTPATIENT)
Dept: CARDIOLOGY CLINIC | Facility: HOSPITAL | Age: 76
End: 2021-11-11
Payer: COMMERCIAL

## 2021-11-11 DIAGNOSIS — Z95.818 PRESENCE OF OTHER CARDIAC IMPLANTS AND GRAFTS: Primary | ICD-10-CM

## 2021-11-11 PROCEDURE — 93291 INTERROG DEV EVAL SCRMS IP: CPT | Performed by: INTERNAL MEDICINE

## 2021-11-17 ENCOUNTER — TELEPHONE (OUTPATIENT)
Dept: NEUROLOGY | Facility: CLINIC | Age: 76
End: 2021-11-17

## 2021-11-18 DIAGNOSIS — R25.2 SPASTICITY AS LATE EFFECT OF CEREBROVASCULAR ACCIDENT (CVA): Primary | ICD-10-CM

## 2021-11-18 DIAGNOSIS — I69.398 SPASTICITY AS LATE EFFECT OF CEREBROVASCULAR ACCIDENT (CVA): ICD-10-CM

## 2021-11-18 DIAGNOSIS — R25.2 SPASTICITY AS LATE EFFECT OF CEREBROVASCULAR ACCIDENT (CVA): ICD-10-CM

## 2021-11-18 DIAGNOSIS — I69.398 SPASTICITY AS LATE EFFECT OF CEREBROVASCULAR ACCIDENT (CVA): Primary | ICD-10-CM

## 2021-11-18 RX ORDER — BACLOFEN 10 MG/1
TABLET ORAL
Qty: 90 TABLET | Refills: 0 | Status: SHIPPED | OUTPATIENT
Start: 2021-11-18 | End: 2021-11-18 | Stop reason: SDUPTHER

## 2021-11-18 RX ORDER — BACLOFEN 10 MG/1
TABLET ORAL
Qty: 90 TABLET | Refills: 0 | Status: SHIPPED | OUTPATIENT
Start: 2021-11-18 | End: 2021-12-13 | Stop reason: SDUPTHER

## 2021-12-10 ENCOUNTER — TELEPHONE (OUTPATIENT)
Dept: NEUROLOGY | Facility: CLINIC | Age: 76
End: 2021-12-10

## 2021-12-10 DIAGNOSIS — I69.398 SPASTICITY AS LATE EFFECT OF CEREBROVASCULAR ACCIDENT (CVA): ICD-10-CM

## 2021-12-10 DIAGNOSIS — R25.2 SPASTICITY AS LATE EFFECT OF CEREBROVASCULAR ACCIDENT (CVA): ICD-10-CM

## 2021-12-13 DIAGNOSIS — I69.398 SPASTICITY AS LATE EFFECT OF CEREBROVASCULAR ACCIDENT (CVA): ICD-10-CM

## 2021-12-13 DIAGNOSIS — R25.2 SPASTICITY AS LATE EFFECT OF CEREBROVASCULAR ACCIDENT (CVA): ICD-10-CM

## 2021-12-13 RX ORDER — BACLOFEN 10 MG/1
10 TABLET ORAL 3 TIMES DAILY
Qty: 270 TABLET | Refills: 1 | Status: SHIPPED | OUTPATIENT
Start: 2021-12-13 | End: 2022-03-30 | Stop reason: SDUPTHER

## 2021-12-13 RX ORDER — BACLOFEN 10 MG/1
10 TABLET ORAL 3 TIMES DAILY
Qty: 270 TABLET | Refills: 1 | Status: SHIPPED | OUTPATIENT
Start: 2021-12-13 | End: 2021-12-13 | Stop reason: SDUPTHER

## 2021-12-23 LAB
LEFT EYE DIABETIC RETINOPATHY: NORMAL
RIGHT EYE DIABETIC RETINOPATHY: NORMAL
SEVERITY (EYE EXAM): NORMAL

## 2022-01-19 ENCOUNTER — EVALUATION (OUTPATIENT)
Dept: PHYSICAL THERAPY | Facility: HOME HEALTHCARE | Age: 77
End: 2022-01-19
Payer: COMMERCIAL

## 2022-01-19 DIAGNOSIS — G81.14 LEFT SPASTIC HEMIPARESIS (HCC): ICD-10-CM

## 2022-01-19 DIAGNOSIS — I69.398 ABNORMALITY OF GAIT AS LATE EFFECT OF CEREBROVASCULAR ACCIDENT (CVA): Primary | ICD-10-CM

## 2022-01-19 DIAGNOSIS — R26.9 ABNORMALITY OF GAIT AS LATE EFFECT OF CEREBROVASCULAR ACCIDENT (CVA): Primary | ICD-10-CM

## 2022-01-19 PROCEDURE — 97112 NEUROMUSCULAR REEDUCATION: CPT

## 2022-01-19 PROCEDURE — 97162 PT EVAL MOD COMPLEX 30 MIN: CPT

## 2022-01-19 PROCEDURE — 97110 THERAPEUTIC EXERCISES: CPT

## 2022-01-19 NOTE — PROGRESS NOTES
PT Evaluation     Today's date: 2022  Patient name: Robinson Mann  : 1945  MRN: [id number]  Referring provider: Jacoby Harden MD  Dx:   Encounter Diagnosis     ICD-10-CM    1  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)  I69 398     R26 9    2  Left spastic hemiparesis (HCC)  G81 14        Start Time: 1200  Stop Time: 1111  Total time in clinic (min): 45 minutes    Assessment  Assessment details: Pt is a 77 y/o male presenting with gait dysfunction, decreased balance, and overall functional mobility deficits s/p CVA on 2021  Pt is presenting with overall LUE and LLE weakness, spasticity, and functional mobility deficits, which are limiting his ability to perform ADLs  Pt currently ambulates independently with a SPC and L AFO, but is at a fall risk based on his TUG score and romberg performance  Pt requires skilled physical therapy in order to improve in strength, ROM, functional mobility, quality of life, and return to PLOF  Thank you! Impairments: abnormal gait, abnormal muscle firing, abnormal muscle tone, abnormal or restricted ROM, abnormal movement, activity intolerance, impaired balance, impaired physical strength, lacks appropriate home exercise program, safety issue, weight-bearing intolerance, poor posture  and poor body mechanics    Goals  STG: 3-4 weeks  Pt will be independent with HEP in order to continue to progress outside of PT treatment sessions  Pt will improve in functional mobility as demonstrated by L hip flexion PROM of 100 deg or greater  LT-8 weeks  Pt will improve in functional mobility as demonstrated by a TUG score of 15 seconds or less  Pt will improve in functional mobility and decrease in fall risk as demonstrated by tandem balance of 5 seconds or greater without using UE's for support  Pt will improve in functional mobility as demonstrated by strength levels of 3/5 or greater    Pt will improve in functional mobility as demonstrated by ability to independently ambulate on uneven surfaces with SPC  Plan  Patient would benefit from: skilled physical therapy  Planned modality interventions: thermotherapy: hydrocollator packs  Planned therapy interventions: ADL retraining, balance, body mechanics training, balance/weight bearing training, flexibility, functional ROM exercises, gait training, home exercise program, joint mobilization, manual therapy, massage, neuromuscular re-education, orthotic fitting/training, patient education, postural training, strengthening, stretching, therapeutic activities and therapeutic exercise  Frequency: 2x week  Duration in weeks: 4  Plan of Care beginning date: 1/19/2022  Plan of Care expiration date: 2/19/2022  Treatment plan discussed with: patient        Subjective Evaluation    History of Present Illness  Mechanism of injury: Pt with PMH of CVA on March 3rd, 2021 reports that he went to the [place] to get a new brace because it was starting to come apart  Pt was recommended to go to physical therapy again after receiving new L sided AFO brace  Pt reports this one is taller than the previous one  Pt reports that he was walking for a mile or more prior to the colder weather and snow  Pt is able to walk on level ground, but has difficulty walking on uneven surfaces or inclines  Pt wants to keep walking and working on strengthening  Pt walked 4 miles per day prior to CVA  Pt reports that he has more spasticity when the weather is colder  Pt reports no difficulty with stairs  Pt reports that he feels like he has been regressing recently because he has been unable to exercise as much  Pt feels numbness and tingling in his R hand when he wakes up in the morning    Social Support  Steps to enter house: yes (1)  Stairs in house: yes (13)     Treatments  Previous treatment: physical therapy and occupational therapy  Patient Goals  Patient goals for therapy: improved balance, increased motion, increased strength, independence with ADLs/IADLs and return to sport/leisure activities  Patient goal: To be able to walk right, leg and arms functioning        Objective     Neurological Testing     Sensation     Lumbar   Left   Intact: light touch    Right   Intact: light touch    Passive Range of Motion   Left Hip   Flexion: 90 degrees     Right Hip   Flexion: 110 degrees   Left Knee   Flexion: WFL  Extension: WFL    Right Knee   Flexion: WFL  Extension: WFL    Additional Passive Range of Motion Details  SLR: L: 70 deg, R: 80 deg    Strength/Myotome Testing     Left Hip   Planes of Motion   Flexion: 3-  Abduction: 3-    Right Hip   Planes of Motion   Flexion: 5  Abduction: 4+    Left Knee   Flexion: 3-  Extension: 3    Right Knee   Flexion: 5  Extension: 5    Ambulation   Weight-Bearing Status     Additional Weight-Bearing Status Details  SPC    Ambulation: Level Surfaces   Ambulation with assistive device: independent    Ambulation: Stairs   Ascend stairs: independent  Pattern: non-reciprocal  Railings: one rail  Descend stairs: independent  Pattern: non-reciprocal  Railings: one rail  Curbs: independent    Additional Stairs Ambulation Details  W/ SPC    Observational Gait   Decreased walking speed, stride length and left stance time     Left foot contact pattern: foot flat    Quality of Movement During Gait     Additional Quality of Movement During Gait Details  Hemiplegic gait    Functional Assessment        Comments  TU 65s  Romberg: EO: 30s, EC: 30s  Tandem L: <3s, R: <3s  Neuro Exam:     Modified Chris   Left hamstrin  Left quadricepts: 2             Precautions:     Re-eval Date: 2022      Manuals             Karl hamstring/glute stretching                                                    Neuro Re-Ed             Patient education 10 mins            Romberg balance             Tandem balance             Cone taps             [employer]                                       Ther Ex Nustep             Standing hip flex/ext/abd             Squats             Step ups             HR/TR             Bridges 1x10            Sit to stands 1x10            Seated hamstring stretch 3x30"            Supine hip abduction             Supine hip add             Hip abduction machine             Leg press             Ther Activity                                       Gait Training             Ambulation with SPC-laps around clinic                          Modalities

## 2022-01-19 NOTE — LETTER
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 recent visit  Please verify that you agree with the plan of care by signing the attached order  If you have any questions or concerns, please do not hesitate to call  I sincerely appreciate the opportunity to share in the care of one of your patients and hope to have another opportunity to work with you in the near future  Sincerely,    Jorge Cherry, PT      Referring Provider:      I certify that I have read the below Plan of Care and certify the need for these services furnished under this plan of treatment while under my care  Fazal Oreilly MD  [address] [place] [address]  [address]  [address] [address] [place] [address]  Via Fax: [phone]          PT Evaluation     Today's date: 2022  Patient name: Michael Bee  : 1945  MRN: [id number]  Referring provider: Vishnu Alberto MD  Dx:   Encounter Diagnosis     ICD-10-CM    1  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)  I69 398     R26 9    2  Left spastic hemiparesis (HCC)  G81 14        Start Time: 1200  Stop Time: 66  Total time in clinic (min): 45 minutes    Assessment  Assessment details: Pt is a 77 y/o male presenting with gait dysfunction, decreased balance, and overall functional mobility deficits s/p CVA on 2021  Pt is presenting with overall LUE and LLE weakness, spasticity, and functional mobility deficits, which are limiting his ability to perform ADLs    Pt currently ambulates independently with a SPC and NADJA CHEO, but is at a fall risk based on his TUG score and romberg performance  Pt requires skilled physical therapy in order to improve in strength, ROM, functional mobility, quality of life, and return to PLOF  Thank you! Impairments: abnormal gait, abnormal muscle firing, abnormal muscle tone, abnormal or restricted ROM, abnormal movement, activity intolerance, impaired balance, impaired physical strength, lacks appropriate home exercise program, safety issue, weight-bearing intolerance, poor posture  and poor body mechanics    Goals  STG: 3-4 weeks  Pt will be independent with HEP in order to continue to progress outside of PT treatment sessions  Pt will improve in functional mobility as demonstrated by L hip flexion PROM of 100 deg or greater  LT-8 weeks  Pt will improve in functional mobility as demonstrated by a TUG score of 15 seconds or less  Pt will improve in functional mobility and decrease in fall risk as demonstrated by tandem balance of 5 seconds or greater without using UE's for support  Pt will improve in functional mobility as demonstrated by strength levels of 3/5 or greater  Pt will improve in functional mobility as demonstrated by ability to independently ambulate on uneven surfaces with SPC        Plan  Patient would benefit from: skilled physical therapy  Planned modality interventions: thermotherapy: hydrocollator packs  Planned therapy interventions: ADL retraining, balance, body mechanics training, balance/weight bearing training, flexibility, functional ROM exercises, gait training, home exercise program, joint mobilization, manual therapy, massage, neuromuscular re-education, orthotic fitting/training, patient education, postural training, strengthening, stretching, therapeutic activities and therapeutic exercise  Frequency: 2x week  Duration in weeks: 4  Plan of Care beginning date: 2022  Plan of Care expiration date: 2022  Treatment plan discussed with: patient        Subjective Evaluation    History of Present Illness  Mechanism of injury: Pt with PMH of CVA on March 3rd, 2021 reports that he went to the [place] to get a new brace because it was starting to come apart  Pt was recommended to go to physical therapy again after receiving new L sided AFO brace  Pt reports this one is taller than the previous one  Pt reports that he was walking for a mile or more prior to the colder weather and snow  Pt is able to walk on level ground, but has difficulty walking on uneven surfaces or inclines  Pt wants to keep walking and working on strengthening  Pt walked 4 miles per day prior to CVA  Pt reports that he has more spasticity when the weather is colder  Pt reports no difficulty with stairs  Pt reports that he feels like he has been regressing recently because he has been unable to exercise as much  Pt feels numbness and tingling in his R hand when he wakes up in the morning    Social Support  Steps to enter house: yes (1)  Stairs in house: yes (13)     Treatments  Previous treatment: physical therapy and occupational therapy  Patient Goals  Patient goals for therapy: improved balance, increased motion, increased strength, independence with ADLs/IADLs and return to sport/leisure activities  Patient goal: To be able to walk right, leg and arms functioning        Objective     Neurological Testing     Sensation     Lumbar   Left   Intact: light touch    Right   Intact: light touch    Passive Range of Motion   Left Hip   Flexion: 90 degrees     Right Hip   Flexion: 110 degrees   Left Knee   Flexion: WFL  Extension: WFL    Right Knee   Flexion: WFL  Extension: WFL    Additional Passive Range of Motion Details  SLR: L: 70 deg, R: 80 deg    Strength/Myotome Testing     Left Hip   Planes of Motion   Flexion: 3-  Abduction: 3-    Right Hip   Planes of Motion   Flexion: 5  Abduction: 4+    Left Knee   Flexion: 3-  Extension: 3    Right Knee   Flexion: 5  Extension: 5    Ambulation   Weight-Bearing Status     Additional Weight-Bearing Status Details  SPC    Ambulation: Level Surfaces   Ambulation with assistive device: independent    Ambulation: Stairs   Ascend stairs: independent  Pattern: non-reciprocal  Railings: one rail  Descend stairs: independent  Pattern: non-reciprocal  Railings: one rail  Curbs: independent    Additional Stairs Ambulation Details  W/ SPC    Observational Gait   Decreased walking speed, stride length and left stance time     Left foot contact pattern: foot flat    Quality of Movement During Gait     Additional Quality of Movement During Gait Details  Hemiplegic gait    Functional Assessment        Comments  TU 65s  Romberg: EO: 30s, EC: 30s  Tandem L: <3s, R: <3s  Neuro Exam:     Modified Chris   Left hamstrin  Left quadricepts: 2             Precautions:     Re-eval Date: 2022      Manuals             Karl hamstring/glute stretching                                                    Neuro Re-Ed             Patient education 10 mins            Romberg balance             Tandem balance             Cone taps             Biodex                                       Ther Ex             Nustep             Standing hip flex/ext/abd             Squats             Step ups             HR/TR             Bridges 1x10            Sit to stands 1x10            Seated hamstring stretch 3x30"            Supine hip abduction             Supine hip add             Hip abduction machine             Leg press             Ther Activity                                       Gait Training             Ambulation with SPC-laps around clinic                          Modalities

## 2022-01-24 ENCOUNTER — OFFICE VISIT (OUTPATIENT)
Dept: PHYSICAL THERAPY | Facility: HOME HEALTHCARE | Age: 77
End: 2022-01-24
Payer: COMMERCIAL

## 2022-01-24 DIAGNOSIS — I69.398 ABNORMALITY OF GAIT AS LATE EFFECT OF CEREBROVASCULAR ACCIDENT (CVA): Primary | ICD-10-CM

## 2022-01-24 DIAGNOSIS — R26.9 ABNORMALITY OF GAIT AS LATE EFFECT OF CEREBROVASCULAR ACCIDENT (CVA): Primary | ICD-10-CM

## 2022-01-24 PROCEDURE — 97110 THERAPEUTIC EXERCISES: CPT

## 2022-01-24 PROCEDURE — 97112 NEUROMUSCULAR REEDUCATION: CPT

## 2022-01-24 PROCEDURE — 97140 MANUAL THERAPY 1/> REGIONS: CPT

## 2022-01-26 ENCOUNTER — OFFICE VISIT (OUTPATIENT)
Dept: PHYSICAL THERAPY | Facility: HOME HEALTHCARE | Age: 77
End: 2022-01-26
Payer: COMMERCIAL

## 2022-01-26 DIAGNOSIS — R26.9 ABNORMALITY OF GAIT AS LATE EFFECT OF CEREBROVASCULAR ACCIDENT (CVA): Primary | ICD-10-CM

## 2022-01-26 DIAGNOSIS — I69.398 ABNORMALITY OF GAIT AS LATE EFFECT OF CEREBROVASCULAR ACCIDENT (CVA): Primary | ICD-10-CM

## 2022-01-26 PROCEDURE — 97140 MANUAL THERAPY 1/> REGIONS: CPT

## 2022-01-26 PROCEDURE — 97110 THERAPEUTIC EXERCISES: CPT

## 2022-01-26 NOTE — PROGRESS NOTES
Daily Note     Today's date: 2022  Patient name: Luanne Ortega  : 1945  MRN: [id number]  Referring provider: Peter Rueda MD  Dx: No diagnosis found  Start Time:           Subjective: I felt well after last visit and had no discomfort  Objective: See treatment diary below    Assessment: Tolerated treatment well  But does remain with strength and balance limitations and would benefit from continued PT to address deficits  Pt requires assistance with L LE during transfers  Plan: Continue per plan of care        Precautions:     Re-eval Date: 2022      Manuals 22     Karl hamstring/glute stretching  Supine 10' Supine 10'                             Neuro Re-Ed  22     Patient education 10 mins       Romberg balance  10" x2 EO 10" x2  EO     Tandem balance  10" x2 ea  EO   10" x2  EO     Cone taps        Biodex        LOS  Easy/static  51"  42% Easy/static  38"  48%     LOS  Easy static  35"  47% Easy/static  40"  34%     Maze  Easy static  24"  75% Easy/static  21"  71%     Maze  Easy static  24"  71% Easy/static  22"  50%                     Ther Ex       Nustep  L 1  10' L1  10     Standing hip flex/ext/abd  1x10 ea Karl 1x10 ea Karl     Squats  1x10 1x10     Step ups        HR/TR  1x10 1x10     Bridges 1x10 x10 x10     Sit to stands 1x10 1x5 1x5     Seated hamstring stretch 3x30"       Supine hip abduction        Supine hip add  Seated 1x10 Seated 1x10     Hip abduction machine        Leg press        Ther Activity                        Gait Training        Ambulation with SPC-laps around clinic  50' x1  25' x 1  28'x1  14'x1 50" x1  25" x1  28' x1  14' x1               Modalities

## 2022-01-31 ENCOUNTER — OFFICE VISIT (OUTPATIENT)
Dept: PHYSICAL THERAPY | Facility: HOME HEALTHCARE | Age: 77
End: 2022-01-31
Payer: COMMERCIAL

## 2022-01-31 DIAGNOSIS — I69.398 ABNORMALITY OF GAIT AS LATE EFFECT OF CEREBROVASCULAR ACCIDENT (CVA): Primary | ICD-10-CM

## 2022-01-31 DIAGNOSIS — R26.9 ABNORMALITY OF GAIT AS LATE EFFECT OF CEREBROVASCULAR ACCIDENT (CVA): Primary | ICD-10-CM

## 2022-01-31 PROCEDURE — 97110 THERAPEUTIC EXERCISES: CPT

## 2022-01-31 PROCEDURE — 97140 MANUAL THERAPY 1/> REGIONS: CPT

## 2022-01-31 PROCEDURE — 97112 NEUROMUSCULAR REEDUCATION: CPT

## 2022-01-31 NOTE — PROGRESS NOTES
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)  I69 398     R26 9                   Subjective:  I guess I'm doing ok  I haven't noticed an improvement yet since [facility]  Objective: See treatment diary below    Assessment: Tolerated treatment well  Pt was able to hemal increased reps with a few ex and addition of 1 lap during ambulation w/SPC and CG  Pt continues to be challenged with B LE strength, balance and during Biodex balance activities  Patient would benefit from continued PT    Plan: Continue per plan of care        Precautions:     Re-eval Date: 2022      Manuals 22    Karl hamstring/glute stretching  Supine 10' Supine 10' Supine 10'                            Neuro Re-Ed  22    Patient education 10 mins       Romberg balance  10" x2 EO 10" x2  EO 10" x 2   EO    Tandem balance  10" x2 ea  EO   10" x2  EO 10" x2   EO    Cone taps        Biodex    22    LOS  Easy/static  51"  42% Easy/static  38"  48% Easy/static  38"  39%    LOS  Easy static  35"  47% Easy/static  40"  34% Easy/static  31"  40%    Maze  Easy static  24"  75% Easy/static  21"  71% Easy/static  21"  60%    Maze  Easy static  24"  71% Easy/static  22"  50% Easy/static  16" 64%                    Ther Ex  22    Nustep  L 1  10' L1  10 L 2  10'  Seat  #7    Standing hip flex/ext/abd  1x10 ea Karl 1x10 ea Karl 1x10 Karl    Squats  1x10 1x10 1x10    Step ups        HR/TR  1x10 1x10 1x20    Bridges 1x10 x10 x10 1x10    Sit to stands 1x10 1x5 1x5 1x5    LAQ    1x15   w/AAROM    Seated hamstring stretch 3x30"       Supine hip abduction        Supine hip add  Seated 1x10 Seated 1x10 Seated 3x10    Hip abduction machine        Leg press        Ther Activity Gait Training        Ambulation with SPC-laps around clinic  50' x1  25' x 1  28'x1  14'x1 48' x1  25' x1  28' x1  14' x1   50' x1  1 lap  14'x 1  28' x 1            Modalities

## 2022-02-02 ENCOUNTER — OFFICE VISIT (OUTPATIENT)
Dept: PHYSICAL THERAPY | Facility: HOME HEALTHCARE | Age: 77
End: 2022-02-02
Payer: COMMERCIAL

## 2022-02-02 DIAGNOSIS — I69.398 ABNORMALITY OF GAIT AS LATE EFFECT OF CEREBROVASCULAR ACCIDENT (CVA): Primary | ICD-10-CM

## 2022-02-02 DIAGNOSIS — G81.14 LEFT SPASTIC HEMIPARESIS (HCC): ICD-10-CM

## 2022-02-02 DIAGNOSIS — R26.9 ABNORMALITY OF GAIT AS LATE EFFECT OF CEREBROVASCULAR ACCIDENT (CVA): Primary | ICD-10-CM

## 2022-02-02 PROCEDURE — 97112 NEUROMUSCULAR REEDUCATION: CPT

## 2022-02-02 PROCEDURE — 97140 MANUAL THERAPY 1/> REGIONS: CPT

## 2022-02-02 PROCEDURE — 97110 THERAPEUTIC EXERCISES: CPT

## 2022-02-02 NOTE — PROGRESS NOTES
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)  I69 398     R26 9    2  Left spastic hemiparesis ([address] )  G81 14        Start Time: 1045  Stop Time: 1130  Total time in clinic (min): 45 minutes    Subjective: Pt reports that he has difficulty performing the bridges at home because his foot slips forward while in his bed  Objective: See treatment diary below      Assessment: Pt demonstrated improved balance from previous session, however, he continues to have difficulty with weight-bearing on L side due to weakness  Pt was able to perform bridges today without L leg sliding forward due to having improved traction on the vinyl table vs his bed at home  Pt would benefit from continued PT  Plan: Continue per plan of care        Precautions:     Re-eval Date: 2022      Manuals 22   Karl hamstring/glute stretching  Supine 10' Supine 10' Supine 10' Supine 10'                           Neuro Re-Ed  22    Patient education 10 mins       Romberg balance  10" x2 EO 10" x2  EO 10" x 2   EO 20"x2 EO   Tandem balance  10" x2 ea  EO   10" x2  EO 10" x2   EO 20" x2 EO   Cone taps        Biodex    22    LOS  Easy/static  51"  42% Easy/static  38"  48% Easy/static  38"  39% Easy/static  30"  43%   LOS  Easy static  35"  47% Easy/static  40"  34% Easy/static  31"  40% Easy/static  31"  56%   Maze  Easy static  24"  75% Easy/static  21"  71% Easy/static  21"  60% Easy/static  23"  67%   Maze  Easy static  24"  71% Easy/static  22"  50% Easy/static  16" 64% Easy/static  19"  78%                   Ther Ex  22    Nustep  L 1  10' L1  10 L 2  10'  Seat  #7 L2 10' Seat 7   Standing hip flex/ext/abd  1x10 ea Karl 1x10 ea Karl 1x10 Karl 1x15 ea Karl   Squats 1x10 1x10 1x10 1x10   Step ups        HR/TR  1x10 1x10 1x20 1x20   Bridges 1x10 x10 x10 1x10 1x10   Sit to stands 1x10 1x5 1x5 1x5 1x10   LAQ    1x15   w/AAROM 1x15   Seated hamstring stretch 3x30"       Supine hip abduction        Supine hip add  Seated 1x10 Seated 1x10 Seated 3x10 nv   Hip abduction machine        Leg press        Ther Activity                        Gait Training        Ambulation with SPC-laps around clinic  50' x1  25' x 1  28'x1  14'x1 50' x1  25' x1  28' x1  14' x1   50' x1  1 lap  14'x 1  28' x 1 8 laps            Modalities

## 2022-02-07 ENCOUNTER — OFFICE VISIT (OUTPATIENT)
Dept: PHYSICAL THERAPY | Facility: HOME HEALTHCARE | Age: 77
End: 2022-02-07
Payer: COMMERCIAL

## 2022-02-07 DIAGNOSIS — R26.9 ABNORMALITY OF GAIT AS LATE EFFECT OF CEREBROVASCULAR ACCIDENT (CVA): Primary | ICD-10-CM

## 2022-02-07 DIAGNOSIS — I69.398 ABNORMALITY OF GAIT AS LATE EFFECT OF CEREBROVASCULAR ACCIDENT (CVA): Primary | ICD-10-CM

## 2022-02-07 DIAGNOSIS — G81.14 LEFT SPASTIC HEMIPARESIS (HCC): ICD-10-CM

## 2022-02-07 PROCEDURE — 97112 NEUROMUSCULAR REEDUCATION: CPT

## 2022-02-07 PROCEDURE — 97110 THERAPEUTIC EXERCISES: CPT

## 2022-02-07 PROCEDURE — 97140 MANUAL THERAPY 1/> REGIONS: CPT

## 2022-02-07 NOTE — PROGRESS NOTES
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)  I69 398     R26 9    2  Left spastic hemiparesis ([address] )  G81 14        Start Time: 1300  Stop Time: 1355  Total time in clinic (min): 55 minutes    Subjective: Pt has no complaints from previous session  Objective: See treatment diary below      Assessment: Pt demonstrated good tolerance to progressing strengthening and balance exercises  Although pt still has balance deficits, his tandem balance and biodex balance exercises are improving  Pt would benefit from continued PT  Plan: Continue per plan of care        Precautions: none    Re-eval Date: 2022      Manuals 22   Karl hamstring/glute stretching  Supine 10' Supine 10' Supine 10' Supine 10' Supine 10'                              Neuro Re-Ed  22     Patient education 10 mins        Romberg balance  10" x2 EO 10" x2  EO 10" x 2   EO 20"x2 EO 20"x2 EO   Tandem balance  10" x2 ea  EO   10" x2  EO 10" x2   EO 20" x2 EO 30" x2 EO   Cone taps         Biodex    22     LOS  Easy/static  51"  42% Easy/static  38"  48% Easy/static  38"  39% Easy/static  30"  43% Easy/static  33"  53%   LOS  Easy static  35"  47% Easy/static  40"  34% Easy/static  31"  40% Easy/static  31"  56% Easy/static  31"  59%   Maze  Easy static  24"  75% Easy/static  21"  71% Easy/static  21"  60% Easy/static  23"  67% Easy/static  25"  64%   Maze  Easy static  24"  71% Easy/static  22"  50% Easy/static  16" 64% Easy/static  19"  78% Easy/static  16"  78%                     Ther Ex  22     Nustep  L 1  10' L1  10 L 2  10'  Seat  #7 L2 10' Seat 7 L2 10' Seat 7   Standing hip flex/ext/abd  1x10 ea Karl 1x10 ea Karl 1x10 Karl 1x15 ea Karl 1x15 ea Karl   Squats  1x10 1x10 1x10 1x10 1x10   Step ups         HR/TR  1x10 1x10 1x20 1x20 1x20   Bridges 1x10 x10 x10 1x10 1x10 2x10   Sit to stands 1x10 1x5 1x5 1x5 1x10 1x10   LAQ    1x15   w/AAROM 1x15 1x15   Seated hamstring stretch 3x30"        Supine hip abduction         Supine hip add  Seated 1x10 Seated 1x10 Seated 3x10 nv 2x10   Hip abduction machine         Leg press         Ther Activity                           Gait Training         Ambulation with SPC-laps around clinic  50' x1  25' x 1  28'x1  14'x1 50' x1  25' x1  28' x1  14' x1   50' x1  1 lap  14'x 1  28' x 1 8 laps  10 laps            Modalities

## 2022-02-09 ENCOUNTER — OFFICE VISIT (OUTPATIENT)
Dept: PHYSICAL THERAPY | Facility: HOME HEALTHCARE | Age: 77
End: 2022-02-09
Payer: COMMERCIAL

## 2022-02-09 DIAGNOSIS — R26.9 ABNORMALITY OF GAIT AS LATE EFFECT OF CEREBROVASCULAR ACCIDENT (CVA): Primary | ICD-10-CM

## 2022-02-09 DIAGNOSIS — G81.14 LEFT SPASTIC HEMIPARESIS (HCC): ICD-10-CM

## 2022-02-09 DIAGNOSIS — I69.398 ABNORMALITY OF GAIT AS LATE EFFECT OF CEREBROVASCULAR ACCIDENT (CVA): Primary | ICD-10-CM

## 2022-02-09 PROCEDURE — 97112 NEUROMUSCULAR REEDUCATION: CPT

## 2022-02-09 PROCEDURE — 97110 THERAPEUTIC EXERCISES: CPT

## 2022-02-09 NOTE — PROGRESS NOTES
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)  I69 398     R26 9    2  Left spastic hemiparesis ([address] )  G81 14        Start Time: 1050  Stop Time: 1145  Total time in clinic (min): 55 minutes    Subjective: Pt has no complaints from previous session  Objective: See treatment diary below      Assessment: Pt continues to demonstrate significant balance difficulties with tandem balance  Pt also demonstrated inconsistent performance on the biodex today, and had the most difficulty with posterior weight shifts  Plan: Continue per plan of care        Precautions: none    Re-eval Date: 2022      Manuals 22   Karl hamstring/glute stretching Supine 10'  Supine 10' Supine 10' Supine 10' Supine 10'                              Neuro Re-Ed   22     Patient education         Romberg balance 30"x1 EO  10" x2  EO 10" x 2   EO 20"x2 EO 20"x2 EO   Tandem balance 30" x2 EO  10" x2  EO 10" x2   EO 20" x2 EO 30" x2 EO   Cone taps         Biodex    22     LOS Easy/static  35"  45%  Easy/static  38"  48% Easy/static  38"  39% Easy/static  30"  43% Easy/static  33"  53%   LOS Easy/static  33"  46%  Easy/static  40"  34% Easy/static  31"  40% Easy/static  31"  56% Easy/static  31"  59%   Maze Easy/static  19"  85%  Easy/static  21"  71% Easy/static  21"  60% Easy/static  23"  67% Easy/static  25"  64%   Maze Easy/static  20"  78%  Easy/static  22"  50% Easy/static  16" 64% Easy/static  19"  78% Easy/static  16"  78%                     Ther Ex   22     Nustep L2 10' Seat 7  L1  10 L 2  10'  Seat  #7 L2 10' Seat 7 L2 10' Seat 7   Standing hip flex/ext/abd 1x15 ea Karl  1x10 ea Karl 1x10 Karl 1x15 ea Karl 1x15 ea Karl   Squats 1x15  1x10 1x10 1x10 1x10   Step ups HR/TR 1x20  1x10 1x20 1x20 1x20   Bridges 2x10  x10 1x10 1x10 2x10   Sit to stands 1x10  1x5 1x5 1x10 1x10   LAQ 1x15   1x15   w/AAROM 1x15 1x15   Seated hamstring stretch         Supine hip abduction         Supine hip add nv  Seated 1x10 Seated 3x10 nv 2x10   Hip abduction machine         Leg press         Ther Activity                           Gait Training         Ambulation with SPC-laps around clinic 10 laps  50' x1  25' x1  28' x1  14' x1   50' x1  1 lap  14'x 1  28' x 1 8 laps  10 laps            Modalities

## 2022-02-14 ENCOUNTER — OFFICE VISIT (OUTPATIENT)
Dept: PHYSICAL THERAPY | Facility: HOME HEALTHCARE | Age: 77
End: 2022-02-14
Payer: COMMERCIAL

## 2022-02-14 DIAGNOSIS — I69.398 ABNORMALITY OF GAIT AS LATE EFFECT OF CEREBROVASCULAR ACCIDENT (CVA): Primary | ICD-10-CM

## 2022-02-14 DIAGNOSIS — R26.9 ABNORMALITY OF GAIT AS LATE EFFECT OF CEREBROVASCULAR ACCIDENT (CVA): Primary | ICD-10-CM

## 2022-02-14 PROCEDURE — 97140 MANUAL THERAPY 1/> REGIONS: CPT

## 2022-02-14 PROCEDURE — 97110 THERAPEUTIC EXERCISES: CPT

## 2022-02-14 PROCEDURE — 97112 NEUROMUSCULAR REEDUCATION: CPT

## 2022-02-14 NOTE — PROGRESS NOTES
Daily Note     Today's date: 2022  Patient name: Jailene Alvarez  : 1945  MRN: [id number]  Referring provider: Lola Paredes MD  Dx: No diagnosis found  Start Time:           Subjective: I feel about the same  Objective: See treatment diary below    Assessment: Tolerated treatment well  Pt remains with strength, endurance and ROM deficits but is progressing consistently with PT  Patient would benefit from continued PT    Plan: Continue per plan of care        Precautions: none    Re-eval Date: 2022      Manuals 22   Karl hamstring/glute stretching Supine 10' Supine 10' Supine 10' Supine 10' Supine 10' Supine 10'                              Neuro Re-Ed  22     Patient education         Romberg balance 30"x1 EO 30" x1 EO 10" x2  EO 10" x 2   EO 20"x2 EO 20"x2 EO   Tandem balance 30" x2 EO 30" x2 EO 10" x2  EO 10" x2   EO 20" x2 EO 30" x2 EO   Cone taps         Biodex  22     LOS Easy/static  35"  45% Easy/static  31"  55% Easy/static  38"  48% Easy/static  38"  39% Easy/static  30"  43% Easy/static  33"  53%   LOS Easy/static  33"  46% Easy static  29"  53% Easy/static  40"  34% Easy/static  31"  40% Easy/static  31"  56% Easy/static  31"  59%   Maze Easy/static  19"  85% Easy static  22" 64% Easy/static  21"  71% Easy/static  21"  60% Easy/static  23"  67% Easy/static  25"  64%   Maze Easy/static  20"  78% Easy static  20"  78% Easy/static  22"  50% Easy/static  16" 64% Easy/static  19"  78% Easy/static  16"  78%                     Ther Ex  22     Nustep L2 10' Seat 7 L 2  Seat 8 L1  10 L 2  10'  Seat  #7 L2 10' Seat 7 L2 10' Seat 7   Standing hip flex/ext/abd 1x15 ea Karl 1x15 ea Karl 1x10 ea Karl 1x10 Karl 1x15 ea Karl 1x15 ea Karl   Squats 1x15 1x15 1x10 1x10 1x10 1x10   Step ups         HR/TR 1x20 1x20 1x10 1x20 1x20 1x20   Bridges 2x10 2x10 x10 1x10 1x10 2x10   Sit to stands 1x10 1x10 1x5 1x5 1x10 1x10   LAQ 1x15 2x10  1x15   w/AAROM 1x15 1x15   Seated hamstring stretch         Supine hip abduction         Supine hip add nv 1x15 Seated 1x10 Seated 3x10 nv 2x10   Hip abduction machine         Leg press         Ther Activity                           Gait Training         Ambulation with SPC-laps around clinic 10 laps 10 laps  50' x1  25' x1  28' x1  14' x1   50' x1  1 lap  14'x 1  28' x 1 8 laps  10 laps            Modalities

## 2022-02-16 ENCOUNTER — OFFICE VISIT (OUTPATIENT)
Dept: FAMILY MEDICINE CLINIC | Facility: CLINIC | Age: 77
End: 2022-02-16
Payer: COMMERCIAL

## 2022-02-16 ENCOUNTER — EVALUATION (OUTPATIENT)
Dept: PHYSICAL THERAPY | Facility: HOME HEALTHCARE | Age: 77
End: 2022-02-16
Payer: COMMERCIAL

## 2022-02-16 VITALS
HEART RATE: 78 BPM | RESPIRATION RATE: 18 BRPM | DIASTOLIC BLOOD PRESSURE: 78 MMHG | WEIGHT: 153.4 LBS | HEIGHT: 66 IN | BODY MASS INDEX: 24.65 KG/M2 | SYSTOLIC BLOOD PRESSURE: 128 MMHG

## 2022-02-16 DIAGNOSIS — E03.9 ACQUIRED HYPOTHYROIDISM: ICD-10-CM

## 2022-02-16 DIAGNOSIS — G81.14 LEFT SPASTIC HEMIPARESIS (HCC): ICD-10-CM

## 2022-02-16 DIAGNOSIS — Z00.00 MEDICARE ANNUAL WELLNESS VISIT, SUBSEQUENT: Primary | ICD-10-CM

## 2022-02-16 DIAGNOSIS — E78.2 MIXED HYPERLIPIDEMIA: ICD-10-CM

## 2022-02-16 DIAGNOSIS — E11.22 TYPE 2 DIABETES MELLITUS WITH CHRONIC KIDNEY DISEASE, WITHOUT LONG-TERM CURRENT USE OF INSULIN, UNSPECIFIED CKD STAGE (HCC): ICD-10-CM

## 2022-02-16 DIAGNOSIS — I69.398 ABNORMALITY OF GAIT AS LATE EFFECT OF CEREBROVASCULAR ACCIDENT (CVA): Primary | ICD-10-CM

## 2022-02-16 DIAGNOSIS — R26.9 ABNORMALITY OF GAIT AS LATE EFFECT OF CEREBROVASCULAR ACCIDENT (CVA): Primary | ICD-10-CM

## 2022-02-16 DIAGNOSIS — N18.31 STAGE 3A CHRONIC KIDNEY DISEASE (HCC): ICD-10-CM

## 2022-02-16 DIAGNOSIS — C61 PROSTATE CANCER (HCC): ICD-10-CM

## 2022-02-16 DIAGNOSIS — I10 PRIMARY HYPERTENSION: ICD-10-CM

## 2022-02-16 PROBLEM — N52.1 ERECTILE DYSFUNCTION DUE TO TYPE 2 DIABETES MELLITUS (HCC): Status: RESOLVED | Noted: 2020-10-01 | Resolved: 2022-02-16

## 2022-02-16 PROBLEM — E11.69 ERECTILE DYSFUNCTION DUE TO TYPE 2 DIABETES MELLITUS (HCC): Status: RESOLVED | Noted: 2020-10-01 | Resolved: 2022-02-16

## 2022-02-16 PROBLEM — M54.16 LUMBAR RADICULAR PAIN: Status: RESOLVED | Noted: 2017-09-05 | Resolved: 2022-02-16

## 2022-02-16 PROBLEM — K21.9 GERD (GASTROESOPHAGEAL REFLUX DISEASE): Chronic | Status: RESOLVED | Noted: 2018-02-07 | Resolved: 2022-02-16

## 2022-02-16 PROCEDURE — 1125F AMNT PAIN NOTED PAIN PRSNT: CPT | Performed by: INTERNAL MEDICINE

## 2022-02-16 PROCEDURE — 3725F SCREEN DEPRESSION PERFORMED: CPT | Performed by: INTERNAL MEDICINE

## 2022-02-16 PROCEDURE — 3008F BODY MASS INDEX DOCD: CPT | Performed by: INTERNAL MEDICINE

## 2022-02-16 PROCEDURE — 1003F LEVEL OF ACTIVITY ASSESS: CPT | Performed by: INTERNAL MEDICINE

## 2022-02-16 PROCEDURE — 1036F TOBACCO NON-USER: CPT | Performed by: INTERNAL MEDICINE

## 2022-02-16 PROCEDURE — 3288F FALL RISK ASSESSMENT DOCD: CPT | Performed by: INTERNAL MEDICINE

## 2022-02-16 PROCEDURE — 97140 MANUAL THERAPY 1/> REGIONS: CPT

## 2022-02-16 PROCEDURE — 3078F DIAST BP <80 MM HG: CPT | Performed by: INTERNAL MEDICINE

## 2022-02-16 PROCEDURE — 97110 THERAPEUTIC EXERCISES: CPT

## 2022-02-16 PROCEDURE — 1160F RVW MEDS BY RX/DR IN RCRD: CPT | Performed by: INTERNAL MEDICINE

## 2022-02-16 PROCEDURE — 3066F NEPHROPATHY DOC TX: CPT | Performed by: INTERNAL MEDICINE

## 2022-02-16 PROCEDURE — 3074F SYST BP LT 130 MM HG: CPT | Performed by: INTERNAL MEDICINE

## 2022-02-16 PROCEDURE — G0439 PPPS, SUBSEQ VISIT: HCPCS | Performed by: INTERNAL MEDICINE

## 2022-02-16 PROCEDURE — 97112 NEUROMUSCULAR REEDUCATION: CPT

## 2022-02-16 PROCEDURE — 1170F FXNL STATUS ASSESSED: CPT | Performed by: INTERNAL MEDICINE

## 2022-02-16 NOTE — LETTER
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)  I69 398     R26 9    2  Left spastic hemiparesis [facility])  G81 14        Dear Dr Hawkins Current:    Thank you for your recent referral of Reyes Hsu  Please review the attached evaluation summary from [facility] recent visit  Please verify that you agree with the plan of care by signing the attached order  If you have any questions or concerns, please do not hesitate to call  I sincerely appreciate the opportunity to share in the care of one of your patients and hope to have another opportunity to work with you in the near future  Sincerely,    Remington Aguiar, PT      Referring Provider:      I certify that I have read the below Plan of Care and certify the need for these services furnished under this plan of treatment while under my care  Severa Muff, MD  [address] [place] [address]  [address]  [address] [address]  Via Fax: [phone]          PT Re-Evaluation     Today's date: 2022  Patient name: Reyes Hsu  : 1945  MRN: [id number]  Referring provider: Hank Grimaldo MD  Dx:   Encounter Diagnosis     ICD-10-CM    1  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)  I69 398     R26 9    2  Left spastic hemiparesis ([facility] [address] )  G81 14        Start Time: 1045  Stop Time: 1130  Total time in clinic (min): 45 minutes    Assessment  Assessment details: Pt has made some improvements in overall functional mobility, balance, and ROM since beginning PT  He has met his goals for independence with HEP, hip ROM, and tandem balance, however, he is still progressing towards his goals for strength, ROM, and overall mobility    Based on the pt's TUG score, he is still at a moderate fall risk   Pt would benefit from continued PT in order to further improve in strength, ROM, functional mobility, quality of life, and return to PLOF  Thank you! Impairments: abnormal gait, abnormal muscle firing, abnormal muscle tone, abnormal or restricted ROM, abnormal movement, activity intolerance, impaired balance, impaired physical strength, lacks appropriate home exercise program, safety issue, weight-bearing intolerance, poor posture  and poor body mechanics    Goals  STG: 3-4 weeks  Pt will be independent with HEP in order to continue to progress outside of PT treatment sessions  Met  Pt will improve in functional mobility as demonstrated by L hip flexion PROM of 100 deg or greater  Met  LT-8 weeks  Pt will improve in functional mobility as demonstrated by a TUG score of 15 seconds or less  Pt will improve in functional mobility and decrease in fall risk as demonstrated by tandem balance of 5 seconds or greater without using UE's for support  Met   Pt will improve in functional mobility as demonstrated by strength levels of 3/5 or greater  Pt will improve in functional mobility as demonstrated by ability to independently ambulate on uneven surfaces with SPC        Plan  Patient would benefit from: skilled physical therapy  Planned modality interventions: thermotherapy: hydrocollator packs  Planned therapy interventions: ADL retraining, balance, body mechanics training, balance/weight bearing training, flexibility, functional ROM exercises, gait training, home exercise program, joint mobilization, manual therapy, massage, neuromuscular re-education, orthotic fitting/training, patient education, postural training, strengthening, stretching, therapeutic activities and therapeutic exercise  Frequency: 2x week  Duration in weeks: 4  Plan of Care beginning date: 2022  Plan of Care expiration date: 3/16/2022  Treatment plan discussed with: patient        Subjective Evaluation    History of Present Illness  Mechanism of injury: Pt with PMH of CVA on March 3rd, 2021 reports that he went to the [facility] to get a new brace because it was starting to come apart  Pt was recommended to go to physical therapy again after receiving new L sided AFO brace  Pt reports this one is taller than the previous one  Pt reports that he was walking for a mile or more prior to the colder weather and snow  Pt is able to walk on level ground, but has difficulty walking on uneven surfaces or inclines  Pt wants to keep walking and working on strengthening  Pt walked 4 miles per day prior to CVA  Pt reports that he has more spasticity when the weather is colder  Pt reports no difficulty with stairs  Pt reports that he feels like he has been regressing recently because he has been unable to exercise as much  Pt feels numbness and tingling in his R hand when he wakes up in the morning  UPDATE 2/16/2022:  Pt reports that he does not feel like he seen any changes with physical therapy, however, he reports that his wife and his friend think that he is walking better    Social Support  Steps to enter house: yes (1)  Stairs in house: yes (13)     Treatments  Previous treatment: physical therapy and occupational therapy  Patient Goals  Patient goals for therapy: improved balance, increased motion, increased strength, independence with ADLs/IADLs and return to sport/leisure activities  Patient goal: To be able to walk right, leg and arms functioning        Objective     Neurological Testing     Sensation     Lumbar   Left   Intact: light touch    Right   Intact: light touch    Passive Range of Motion   Left Hip   Flexion: 100 degrees     Right Hip   Flexion: 110 degrees   Left Knee   Flexion: WFL  Extension: WFL    Right Knee   Flexion: WFL  Extension: WFL    Additional Passive Range of Motion Details  SLR: L: 70 deg, R: 80 deg    Strength/Myotome Testing     Left Hip   Planes of Motion   Flexion: 3-  Abduction: 3-    Right Hip   Planes of Motion   Flexion: 5  Abduction: 4+    Left Knee   Flexion: 3-  Extension: 3    Right Knee   Flexion: 5  Extension: 5    Ambulation   Weight-Bearing Status     Additional Weight-Bearing Status Details  SPC    Ambulation: Level Surfaces   Ambulation with assistive device: independent    Ambulation: Stairs   Ascend stairs: independent  Pattern: non-reciprocal  Railings: one rail  Descend stairs: independent  Pattern: non-reciprocal  Railings: one rail  Curbs: independent    Additional Stairs Ambulation Details  W/ SPC    Observational Gait   Decreased walking speed, stride length and left stance time     Left foot contact pattern: foot flat    Quality of Movement During Gait     Additional Quality of Movement During Gait Details  Hemiplegic gait    Functional Assessment        Comments  TU 46s  Romberg: EO: 30s, EC: 30s  Tandem L: 10s, R: <30s  Neuro Exam:     Modified Chris   Left hamstrin  Left quadricepts: 2      Flowsheet Rows      Most Recent Value   PT/OT G-Codes    Current Score 73   Projected Score 57             Precautions:     Re-eval Date: 3/16/2022      Manuals 22   Karl hamstring/glute stretching Supine 10' Supine 10' Supine 10'  Supine 10' Supine 10'                              Neuro Re-Ed  22       Patient education         Romberg balance 30"x1 EO 30" x1 EO 30"x1 EO  20"x2 EO 20"x2 EO   Tandem balance 30" x2 EO 30" x2 EO 30"x2 EO  20" x2 EO 30" x2 EO   Cone taps         Biodex  22       LOS Easy/static  35"  45% Easy/static  31"  55% Easy/static  32"  50%  Easy/static  30"  43% Easy/static  33"  53%   LOS Easy/static  33"  46% Easy static  29"  53% Easy/static  32"  54%  Easy/static  31"  56% Easy/static  31"  59%   Maze Easy/static  19"  85% Easy static  22" 64% Easy static  24" 64%  Easy/static  23"  67% Easy/static  25"  64%   Maze Easy/static  20"  78% Easy static  20"  78% Easy static  19" 78%  Easy/static  19"  78% Easy/static  16"  78% Ther Ex  2-14-22       Nustep L2 10' Seat 7 L 2  Seat 8 L2 10' seat 7  L2 10' Seat 7 L2 10' Seat 7   Standing hip flex/ext/abd 1x15 ea Karl 1x15 ea Karl 1x15 ea Karl  1x15 ea Karl 1x15 ea Karl   Squats 1x15 1x15 1x15  1x10 1x10   Step ups         HR/TR 1x20 1x20 1x20 ea  1x20 1x20   Bridges 2x10 2x10 nv  1x10 2x10   Sit to stands 1x10 1x10 nv  1x10 1x10   LAQ 1x15 2x10 2x10  1x15 1x15   Seated hamstring stretch         Supine hip abduction         Supine hip add nv 1x15 nv  nv 2x10   Hip abduction machine         Leg press         Ther Activity                           Gait Training         Ambulation with SPC-laps around clinic 10 laps 10 laps  10 laps  8 laps  10 laps            Modalities

## 2022-02-16 NOTE — PROGRESS NOTES
Assessment and Plan:     Problem List Items Addressed This Visit        Endocrine    DMII (diabetes mellitus, type 2) ([facility] [address] ) (Chronic)    Relevant Orders    HEMOGLOBIN A1C W/ EAG ESTIMATION    Hypothyroidism (Chronic)    Relevant Orders    TSH, 3rd generation       Genitourinary    Stage 3 chronic kidney disease (HCC) (Chronic)       Other    Hyperlipidemia (Chronic)    Relevant Orders    Lipid panel    Medicare annual wellness visit, subsequent - Primary      Other Visit Diagnoses     Primary hypertension        Relevant Orders    Comprehensive metabolic panel    Prostate cancer [facility])          Rx for fbw  Had eye exam in December at [facility]   Walking program once Pt finishes and weather changes  [facility]   UTD with booster and flu shot  Rto 4 months     Falls Plan of Care: balance, strength, and gait training instructions were provided  Preventive health issues were discussed with patient, and age appropriate screening tests were ordered as noted in patient's After Visit Summary  Personalized health advice and appropriate referrals for health education or preventive services given if needed, as noted in patient's After Visit Summary       History of Present Illness:     Patient presents for Medicare Annual Wellness visit    Patient Care Team:  Dellar Fabry, DO as PCP - General (Internal Medicine)  MD Lyudmila Calloway MD Estelita Pol, MD Teddi Kidney, MD Karri Diazr/Tone (Ophthalmology)  Bob Lemus DPM (Podiatry)     Problem List:     Patient Active Problem List   Diagnosis    DMII (diabetes mellitus, type 2) ([facility] [address] )    Hypothyroidism    Hyperlipidemia    Stage 3 [facility]    Essential hypertension    Sensorineural hearing loss (SNHL), bilateral    Medicare annual wellness visit, subsequent    Adenocarcinoma of prostate ([facility]    Long term current use of insulin ([facility] )    History of stroke    Type 2 diabetes mellitus with diabetic neuropathy (HCC)    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)    Abnormal gait    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)      Past Medical and Surgical History:     Past Medical History:   Diagnosis Date    Arthritis     BPH (benign prostatic hyperplasia)     last assessed 4/29/2014    Diabetes mellitus ([facility] [address] )     Disease of thyroid gland     GERD (gastroesophageal reflux disease)     Hearing aid worn     Hyperlipidemia     Hypertension     Hypothyroidism     Mumps     Prostate cancer ([facility] [address] )     Stroke ([facility] [address] )     Tingling sensation     bilateral feet occassionally    Tinnitus     Wears glasses     Wears partial dentures     upper     Past Surgical History:   Procedure Laterality Date    CARDIAC ELECTROPHYSIOLOGY PROCEDURE Left 11/4/2021    Procedure: Reveal implant;  Surgeon: Jonny Hay MD;  Location:  CARDIAC CATH LAB;   Service: Cardiology    CATARACT EXTRACTION Bilateral 2000    EYE SURGERY      JOINT REPLACEMENT      KNEE ARTHROSCOPY Right 06/26/2009    knee    FL LAP,PROSTATECTOMY,RADICAL,W/NERVE SPARE,INCL ROBOTIC N/A 2/7/2018    Procedure: ROBOTIC ASSISTED LAPAROSCOPIC PROSTATECTOMY; BILATERAL PELVIC LYMPH NODE DISSECTION;  Surgeon: Julianna Hernández MD;  Location: [facility];  Service: Urology    FL REPAIR ING HERNIA,5+Y/O,REDUCIBL Right 4/19/2019    Procedure: REPAIR HERNIA INGUINAL;  Surgeon: Yunior Cobb DO;  Location: [facility];  Service: General    PROSTATE BIOPSY  11/07/2017    needle biopsy of prostate    TRIGGER FINGER RELEASE  05/2013    trigger thumb      Family History:     Family History   Problem Relation Age of Onset    Cancer Mother     Diabetes Mother     Uterine cancer Mother     Diabetes Father     Cancer Father     Stroke Father         of unknown cause    Hypertension Father     Prostate cancer Father     Diabetes Family         Uncle, DM    Cancer Family         Grandmother Social History:     Social History     Socioeconomic History    Marital status: /Civil Union     Spouse name: Not on file    Number of children: Not on file    Years of education: Not on file    Highest education level: Not on file   Occupational History    Occupation: printer  retired   Tobacco Use    Smoking status: Former Smoker    Smokeless tobacco: Never Used    Tobacco comment: quit about 50 years ago   Vaping Use    Vaping Use: Never used   Substance and Sexual Activity    Alcohol use: Not Currently     Alcohol/week: 2 0 standard drinks     Types: 2 Glasses of wine per week     Comment: week, social drinker    Drug use: No    Sexual activity: Not on file   Other Topics Concern    Not on file   Social History Narrative    Always uses seat belt    Caffeine use    Lives independently with spouse    Retired    Sun protection sunscreen     Social Determinants of Health     Financial Resource Strain: Not on file   Food Insecurity: Not on file   Transportation Needs: Not on file   Physical Activity: Not on file   Stress: Not on file   Social Connections: Not on file   Intimate Partner Violence: Not on file   Housing Stability: Not on file      Medications and Allergies:     Current Outpatient Medications   Medication Sig Dispense Refill    amLODIPine (NORVASC) 5 mg tablet Take 1 tablet (5 mg total) by mouth daily 30 tablet 0    atorvastatin (LIPITOR) 40 mg tablet Take 1 tablet (40 mg total) by mouth daily with dinner 30 tablet 0    baclofen 10 mg tablet Take 1 tablet (10 mg total) by mouth 3 (three) times a day 270 tablet 1    clopidogrel (PLAVIX) 75 mg tablet Take 1 tablet (75 mg total) by mouth daily 90 tablet 1    docusate sodium (COLACE) 100 mg capsule Take 100 mg by mouth 2 (two) times a day      gabapentin (Neurontin) 300 mg capsule Take 1 capsule (300 mg total) by mouth daily at bedtime 90 capsule 3    Glucagon 1 MG/0 2ML SOSY INJECT 1 MG UNDER THE SKIN  AS NEEDED FOR SEVERE HYPOGLYCEMIA      levothyroxine (Synthroid) 100 mcg tablet Take 1 tablet (100 mcg total) by mouth daily in the early morning 90 tablet 3    lisinopril (ZESTRIL) 40 mg tablet Take 40 mg by mouth daily        Multiple Vitamin (MULTIVITAMIN) tablet Take 1 tablet by mouth daily   PATIENT MAINTAINED INSULIN PUMP Inject 0 4 each under the skin continuous        No current facility-administered medications for this visit  No Known Allergies   Immunizations:     Immunization History   Administered Date(s) Administered    COVID-19 PFIZER VACCINE 0 3 ML IM 03/30/2021, 04/20/2021    COVID-19, unspecified 03/30/2021    INFLUENZA 10/23/2012, 10/06/2014, 10/18/2016, 10/22/2019, 09/30/2020    Influenza Split High Dose Preservative Free IM 10/01/2016, 10/20/2017, 10/01/2018, 10/22/2019    Influenza, high dose seasonal 0 7 mL 10/30/2018, 09/30/2020    Influenza, seasonal, injectable 10/15/2012, 10/13/2014, 10/08/2015    Pneumococcal Conjugate 13-Valent 11/03/2017    Pneumococcal Polysaccharide PPV23 12/15/2009, 10/08/2019, 12/20/2019    Td (adult), Unspecified 05/01/2007    Tdap 03/05/2016, 03/29/2016    Zoster 07/03/2012, 01/01/2013    Zoster Vaccine Recombinant 02/08/2021      Health Maintenance:         Topic Date Due    Colorectal Cancer Screening  07/05/2027    Hepatitis C Screening  Completed         Topic Date Due    Influenza Vaccine (1) 09/01/2021    COVID-19 Vaccine (3 - Booster) 09/20/2021      Medicare Health Risk Assessment:     /78   Pulse 78   Resp 18   Ht 5' 6" (1 676 m)   Wt 69 6 kg (153 lb 6 4 oz)   BMI 24 76 kg/m²      Paty Vargas is here for his Subsequent Wellness visit  Last Medicare Wellness visit information reviewed, patient interviewed, no change since last AWV  Health Risk Assessment:   Patient rates overall health as good  Patient feels that their physical health rating is slightly better  Patient is satisfied with their life  Eyesight was rated as same   Hearing was rated as same  Patient feels that their emotional and mental health rating is same  Patients states they are never, rarely angry  Patient states they are sometimes unusually tired/fatigued  Pain experienced in the last 7 days has been none  Patient states that he has experienced no weight loss or gain in last 6 months  Depression Screening:   PHQ-2 Score: 0      Fall Risk Screening: In the past year, patient has experienced: no history of falling in past year      Home Safety:  Patient does not have trouble with stairs inside or outside of their home  Patient has working smoke alarms and has working carbon monoxide detector  Home safety hazards include: none  Nutrition:   Current diet is Diabetic  Medications:   Patient is currently taking over-the-counter supplements  OTC medications include: see medication list  Patient is able to manage medications  Activities of Daily Living (ADLs)/Instrumental Activities of Daily Living (IADLs):   Walk and transfer into and out of bed and chair?: Yes  Dress and groom yourself?: No    Bathe or shower yourself?: No    Feed yourself? Yes  Do your laundry/housekeeping?: No  Manage your money, pay your bills and track your expenses?: Yes  Make your own meals?: No    Do your own shopping?: No    ADL comments: Needs some assist since cva    Previous Hospitalizations:   Any hospitalizations or ED visits within the last 12 months?: Yes    How many hospitalizations have you had in the last year?: 1-2    Advance Care Planning:   Living will: Yes    Durable POA for healthcare:  Yes    Advanced directive: Yes    End of Life Decisions reviewed with patient: Yes    Provider agrees with end of life decisions: Yes      Comments: ACP on chart    Cognitive Screening:   Provider or family/friend/caregiver concerned regarding cognition?: No    PREVENTIVE SCREENINGS      Cardiovascular Screening:    General: History Lipid Disorder and Risks and Benefits Discussed    Due for: Lipid Panel      Diabetes Screening:     General: History Diabetes and Risks and Benefits Discussed    Due for: Blood Glucose      Colorectal Cancer Screening:     General: Screening Current      Prostate Cancer Screening:    General: History Prostate Cancer      Osteoporosis Screening:    General: Screening Not Indicated      Abdominal Aortic Aneurysm (AAA) Screening:    Risk factors include: age between 73-67 yo and tobacco use        Lung Cancer Screening:     General: Screening Not Indicated      Hepatitis C Screening:    General: Screening Current    Screening, Brief Intervention, and Referral to Treatment (SBIRT)    Screening  Typical number of drinks in a day: 0  Typical number of drinks in a week: 0  Interpretation: Low risk drinking behavior  AUDIT-C Screenin) How often did you have a drink containing alcohol in the past year? never  2) How many drinks did you have on a typical day when you were drinking in the past year? 0  3) How often did you have 6 or more drinks on one occasion in the past year? never    AUDIT-C Score: 0  Interpretation: Score 0-3 (male): Negative screen for alcohol misuse    Single Item Drug Screening:  How often have you used an illegal drug (including marijuana) or a prescription medication for non-medical reasons in the past year? never    Single Item Drug Screen Score: 0  Interpretation: Negative screen for possible drug use disorder    Brief Intervention  Alcohol & drug use screenings were reviewed  No concerns regarding substance use disorder identified  Other Counseling Topics:   Regular weightbearing exercise and calcium and vitamin D intake         Nicanor Lozano,

## 2022-02-16 NOTE — PROGRESS NOTES
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)  I69 398     R26 9    2  Left spastic hemiparesis ([address] )  G81 14        Start Time: 1045  Stop Time: 1130  Total time in clinic (min): 45 minutes    Assessment  Assessment details: Pt has made some improvements in overall functional mobility, balance, and ROM since beginning PT  He has met his goals for independence with HEP, hip ROM, and tandem balance, however, he is still progressing towards his goals for strength, ROM, and overall mobility  Based on the pt's TUG score, he is still at a moderate fall risk  Pt would benefit from continued PT in order to further improve in strength, ROM, functional mobility, quality of life, and return to PLOF  Thank you! Impairments: abnormal gait, abnormal muscle firing, abnormal muscle tone, abnormal or restricted ROM, abnormal movement, activity intolerance, impaired balance, impaired physical strength, lacks appropriate home exercise program, safety issue, weight-bearing intolerance, poor posture  and poor body mechanics    Goals  STG: 3-4 weeks  Pt will be independent with HEP in order to continue to progress outside of PT treatment sessions  Met  Pt will improve in functional mobility as demonstrated by L hip flexion PROM of 100 deg or greater  Met  LT-8 weeks  Pt will improve in functional mobility as demonstrated by a TUG score of 15 seconds or less  Pt will improve in functional mobility and decrease in fall risk as demonstrated by tandem balance of 5 seconds or greater without using UE's for support  Met   Pt will improve in functional mobility as demonstrated by strength levels of 3/5 or greater    Pt will improve in functional mobility as demonstrated by ability to independently ambulate on uneven surfaces with SPC  Plan  Patient would benefit from: skilled physical therapy  Planned modality interventions: thermotherapy: hydrocollator packs  Planned therapy interventions: ADL retraining, balance, body mechanics training, balance/weight bearing training, flexibility, functional ROM exercises, gait training, home exercise program, joint mobilization, manual therapy, massage, neuromuscular re-education, orthotic fitting/training, patient education, postural training, strengthening, stretching, therapeutic activities and therapeutic exercise  Frequency: 2x week  Duration in weeks: 4  Plan of Care beginning date: 2/16/2022  Plan of Care expiration date: 3/16/2022  Treatment plan discussed with: patient        Subjective Evaluation    History of Present Illness  Mechanism of injury: Pt with PMH of CVA on March 3rd, 2021 reports that he went to the [facility] to get a new brace because it was starting to come apart  Pt was recommended to go to physical therapy again after receiving new L sided AFO brace  Pt reports this one is taller than the previous one  Pt reports that he was walking for a mile or more prior to the colder weather and snow  Pt is able to walk on level ground, but has difficulty walking on uneven surfaces or inclines  Pt wants to keep walking and working on strengthening  Pt walked 4 miles per day prior to CVA  Pt reports that he has more spasticity when the weather is colder  Pt reports no difficulty with stairs  Pt reports that he feels like he has been regressing recently because he has been unable to exercise as much  Pt feels numbness and tingling in his R hand when he wakes up in the morning  UPDATE 2/16/2022:  Pt reports that he does not feel like he seen any changes with physical therapy, however, he reports that his wife and his friend think that he is walking better    Social Support  Steps to enter house: yes (1)  Stairs in house: yes (13)     Treatments  Previous treatment: physical therapy and occupational therapy  Patient Goals  Patient goals for therapy: improved balance, increased motion, increased strength, independence with ADLs/IADLs and return to sport/leisure activities  Patient goal: To be able to walk right, leg and arms functioning        Objective     Neurological Testing     Sensation     Lumbar   Left   Intact: light touch    Right   Intact: light touch    Passive Range of Motion   Left Hip   Flexion: 100 degrees     Right Hip   Flexion: 110 degrees   Left Knee   Flexion: WFL  Extension: WFL    Right Knee   Flexion: WFL  Extension: WFL    Additional Passive Range of Motion Details  SLR: L: 70 deg, R: 80 deg    Strength/Myotome Testing     Left Hip   Planes of Motion   Flexion: 3-  Abduction: 3-    Right Hip   Planes of Motion   Flexion: 5  Abduction: 4+    Left Knee   Flexion: 3-  Extension: 3    Right Knee   Flexion: 5  Extension: 5    Ambulation   Weight-Bearing Status     Additional Weight-Bearing Status Details  SPC    Ambulation: Level Surfaces   Ambulation with assistive device: independent    Ambulation: Stairs   Ascend stairs: independent  Pattern: non-reciprocal  Railings: one rail  Descend stairs: independent  Pattern: non-reciprocal  Railings: one rail  Curbs: independent    Additional Stairs Ambulation Details  W/ SPC    Observational Gait   Decreased walking speed, stride length and left stance time     Left foot contact pattern: foot flat    Quality of Movement During Gait     Additional Quality of Movement During Gait Details  Hemiplegic gait    Functional Assessment        Comments  TU 46s  Romberg: EO: 30s, EC: 30s  Tandem L: 10s, R: <30s  Neuro Exam:     Modified Chris   Left hamstrin  Left quadricepts: 2      Flowsheet Rows      Most Recent Value   PT/OT G-Codes    Current Score 73   Projected Score 57             Precautions:     Re-eval Date: 3/16/2022      Manuals 22   Karl hamstring/glute stretching Supine 10' Supine 10' Supine 10'  Supine 10' Supine 10'                              Neuro Re-Ed  2-14-22       Patient education         Romberg balance 30"x1 EO 30" x1 EO 30"x1 EO  20"x2 EO 20"x2 EO   Tandem balance 30" x2 EO 30" x2 EO 30"x2 EO  20" x2 EO 30" x2 EO   Cone taps         Biodex  2-14-22       LOS Easy/static  35"  45% Easy/static  31"  55% Easy/static  32"  50%  Easy/static  30"  43% Easy/static  33"  53%   LOS Easy/static  33"  46% Easy static  29"  53% Easy/static  32"  54%  Easy/static  31"  56% Easy/static  31"  59%   Maze Easy/static  19"  85% Easy static  22" 64% Easy static  24" 64%  Easy/static  23"  67% Easy/static  25"  64%   Maze Easy/static  20"  78% Easy static  20"  78% Easy static  19" 78%  Easy/static  19"  78% Easy/static  16"  78%                     Ther Ex  2-14-22       Nustep L2 10' Seat 7 L 2  Seat 8 L2 10' seat 7  L2 10' Seat 7 L2 10' Seat 7   Standing hip flex/ext/abd 1x15 ea Karl 1x15 ea Karl 1x15 ea Karl  1x15 ea Karl 1x15 ea Karl   Squats 1x15 1x15 1x15  1x10 1x10   Step ups         HR/TR 1x20 1x20 1x20 ea  1x20 1x20   Bridges 2x10 2x10 nv  1x10 2x10   Sit to stands 1x10 1x10 nv  1x10 1x10   LAQ 1x15 2x10 2x10  1x15 1x15   Seated hamstring stretch         Supine hip abduction         Supine hip add nv 1x15 nv  nv 2x10   Hip abduction machine         Leg press         Ther Activity                           Gait Training         Ambulation with SPC-laps around clinic 10 laps 10 laps  10 laps  8 laps  10 laps            Modalities

## 2022-02-17 ENCOUNTER — REMOTE DEVICE CLINIC VISIT (OUTPATIENT)
Dept: CARDIOLOGY CLINIC | Facility: CLINIC | Age: 77
End: 2022-02-17
Payer: COMMERCIAL

## 2022-02-17 ENCOUNTER — TELEPHONE (OUTPATIENT)
Dept: ADMINISTRATIVE | Facility: OTHER | Age: 77
End: 2022-02-17

## 2022-02-17 DIAGNOSIS — Z95.818 PRESENCE OF OTHER CARDIAC IMPLANTS AND GRAFTS: Primary | ICD-10-CM

## 2022-02-17 PROCEDURE — 93298 REM INTERROG DEV EVAL SCRMS: CPT | Performed by: INTERNAL MEDICINE

## 2022-02-17 PROCEDURE — G2066 INTER DEVC REMOTE 30D: HCPCS | Performed by: INTERNAL MEDICINE

## 2022-02-17 NOTE — LETTER
Diabetic Eye Exam Form    Date Requested: 22  Patient: Maverick Blanton  Patient : 1945   Referring Provider: Rufina Trimble,     Dilated Retinal Exam, Optomap-Iris Exam, or Fundus Photography Done         Yes ([place] one above)         No     Date of Diabetic Eye Exam ______________________________  Left Eye      Exam did show retinopathy    Exam did not show retinopathy         Mild       Moderate       None       Proliferative       Severe     Right Eye     Exam did show retinopathy    Exam did not show retinopathy         Mild       Moderate       None       Proliferative       Severe     Comments __________________________________________________________    Practice Providing Exam ______________________________________________    Exam Performed By (print name) _______________________________________      Provider Signature ___________________________________________________      These reports are needed for  compliance  Please fax this completed form and a copy of the Diabetic Eye Exam report to our office located at [place] [address] as soon as possible via [phone] attention Steffanie: Phone [phone]    We thank you for your assistance in treating our mutual patient

## 2022-02-17 NOTE — TELEPHONE ENCOUNTER
Upon review of the In Basket request and the patient's chart, initial outreach has been made via fax, please see Contacts section for details       Thank you  Rhonda Olivera MA

## 2022-02-17 NOTE — PROGRESS NOTES
MDT LNQ22/ ACTIVE SYSTEM IS MRI CONDITIONAL   CARELINK TRANSMISSION: LOOP RECORDER  PRESENTING RHYTHM NSR @ 60 BPM  BATTERY STATUS "OK " NO PATIENT OR DEVICE ACTIVATED EPISODES  NORMAL DEVICE FUNCTION   DL Reviewed the note and latest thyroid function tests .   Her labs were consistent with euthyroid . Less likely that thyroid abnormality could be the cause of her anxiety and muscle spasms based on labs.    If patient wants to check the labs again then it is ok to check the labs . I am not sure if her insurance will cover if she had recent labs and were wnl.   If she wants to recheck the labs. Then order TSH and free t4   Will review the labs and based on that will decide further plans.  Advise rto please discuss with PCP for her symptoms since the anxiety and muscle spasms could be sec multifactorial causes.

## 2022-02-17 NOTE — LETTER
Diabetic Eye Exam Form    Date Requested: 22  Patient: Jaquelin Patricia  Patient : 1945   Referring Provider: Maggi Campa DO      DIABETIC Eye Exam Date _______________________________    Type of Exam MUST be documented for Diabetic Eye Exams  Please CHECK ONE  Dilated Retinal Exam      Optomap-Iris Exam      Fundus Photography Done       Left Eye      Exam did show retinopathy    Exam did not show retinopathy         Mild       Moderate       None       Proliferative       Severe     Right Eye     Exam did show retinopathy    Exam did not show retinopathy         Mild       Moderate       None       Proliferative       Severe     Comments __________________________________________________________    Practice Providing Exam ______________________________________________    Exam Performed By (print name) _______________________________________      Provider Signature ___________________________________________________    These reports are needed for  compliance  Please fax this completed form and a copy of the Diabetic Eye Exam report to our office located at [place] [address] as soon as possible via [phone] attention Steffanie: Phone [phone]    We thank you for your assistance in treating our mutual patient

## 2022-02-17 NOTE — TELEPHONE ENCOUNTER
----- Message from Daly Rebolledo sent at 2/16/2022  9:59 AM EST -----  Regarding: Diabetic Eye Exam  02/16/22 9:59 AM    Adilene, our patient Aislinn Moore has had Diabetic Eye Exam completed/performed  Please assist in updating the patient chart by making an External outreach to Dr Melinda Andrade facility located in [place], [place]  The date of service is December of 2021      Thank you,  Christine Garcia   [facility]

## 2022-02-21 ENCOUNTER — TELEPHONE (OUTPATIENT)
Dept: FAMILY MEDICINE CLINIC | Facility: CLINIC | Age: 77
End: 2022-02-21

## 2022-02-21 ENCOUNTER — APPOINTMENT (OUTPATIENT)
Dept: PHYSICAL THERAPY | Facility: HOME HEALTHCARE | Age: 77
End: 2022-02-21
Payer: COMMERCIAL

## 2022-02-21 DIAGNOSIS — Z74.1 NEED FOR ASSISTANCE WITH PERSONAL CARE: Primary | ICD-10-CM

## 2022-02-21 NOTE — TELEPHONE ENCOUNTER
I am going to place a social service referral as not sure if Harjeet Gill himself would qualify for actual home care since needs skilled need   Lavell Barreto may qualify for home health due to her injury but I am not her PCP

## 2022-02-21 NOTE — TELEPHONE ENCOUNTER
Pt's wife called and is asking how she would go about getting the pt a home health aid to help with everyday tasks like dressing, bathing, meal prep  Wife was helping but she fell and injured her shoulder on Friday and now neither one of them can really help out with these things  Do we need to place a script for this?

## 2022-02-23 ENCOUNTER — APPOINTMENT (OUTPATIENT)
Dept: PHYSICAL THERAPY | Facility: HOME HEALTHCARE | Age: 77
End: 2022-02-23
Payer: COMMERCIAL

## 2022-02-24 ENCOUNTER — PATIENT OUTREACH (OUTPATIENT)
Dept: FAMILY MEDICINE CLINIC | Facility: CLINIC | Age: 77
End: 2022-02-24

## 2022-02-24 NOTE — PROGRESS NOTES
ADRIANNA SCHILLING received referral from patient's PCP due to patient needing assistance at home  Chart was reviewed  Patient is 77 y/o and lives with his wife in [place]  Patient has type 2 diabetes, hearing loss, stage 3 chronic kidney disease, prostate cancer, high cholesterol, Hx of stroke and abnormal gait  ADRIANNA SCHILLING outreached patient via telephone ([phone])  Patient did not answer  ADRIANNA SCHILLING left message asking patient to return call  Patient's wife returned call  She was open to speaking with ADRIANNA SCHILLING  Patient had a stroke about a year ago  Wife recently fell and hurt her shoulder  Patient has left sided weakness and walks with a cane  They are having difficulty putting patient's leg brace on  Patient can shower on his own - uses long handled sponge, wife dries him  Patient can dress himself, but has difficulty getting his socks, shoes and leg brace on  Patient can hold things with his left hand  He assists with preparing meals and can take his medications  Patient's wife stated she is not sure that they actually need home care at this time because they have been managing well while patient improves  Offered information on home care and [employer] program  Patient currently works with the [facility]  Patient and wife were interested in looking into [employer] on their own  SW CM will continue to be available for any needs

## 2022-02-28 ENCOUNTER — APPOINTMENT (OUTPATIENT)
Dept: PHYSICAL THERAPY | Facility: HOME HEALTHCARE | Age: 77
End: 2022-02-28
Payer: COMMERCIAL

## 2022-02-28 NOTE — TELEPHONE ENCOUNTER
As a follow-up, a second attempt has been made for outreach via fax, please see Contacts section for details      Thank you  ARIELA Tucker

## 2022-03-02 ENCOUNTER — APPOINTMENT (OUTPATIENT)
Dept: PHYSICAL THERAPY | Facility: HOME HEALTHCARE | Age: 77
End: 2022-03-02
Payer: COMMERCIAL

## 2022-03-07 ENCOUNTER — OFFICE VISIT (OUTPATIENT)
Dept: PHYSICAL THERAPY | Facility: HOME HEALTHCARE | Age: 77
End: 2022-03-07
Payer: COMMERCIAL

## 2022-03-07 DIAGNOSIS — R26.9 ABNORMALITY OF GAIT AS LATE EFFECT OF CEREBROVASCULAR ACCIDENT (CVA): Primary | ICD-10-CM

## 2022-03-07 DIAGNOSIS — I69.398 ABNORMALITY OF GAIT AS LATE EFFECT OF CEREBROVASCULAR ACCIDENT (CVA): Primary | ICD-10-CM

## 2022-03-07 PROCEDURE — 97112 NEUROMUSCULAR REEDUCATION: CPT

## 2022-03-07 PROCEDURE — 97110 THERAPEUTIC EXERCISES: CPT

## 2022-03-07 PROCEDURE — 97140 MANUAL THERAPY 1/> REGIONS: CPT

## 2022-03-07 NOTE — PROGRESS NOTES
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)  I69 398     R26 9        Start Time: 1515          Subjective: I haven't been doing my HEP because I have been busy taking care of my wife  Objective: See treatment diary below    Assessment: Tolerated treatment well  Pt continues to require assistance with LLE at NuStep and with sit<>supine transfers  Pt reports fatigue after wt bearing ex in P-bars  He declined ambulation in clinic 2* time constraint  Patient would benefit from continued PT    Plan: Continue per plan of care        Precautions:     Re-eval Date: 3/16/2022      Manuals 22 3-7  2   Karl hamstring/glute stretching Supine 10' Supine 10' Supine 10' Supine 10' Supine 10' Supine 10'                              Neuro Re-Ed  22  3-7     Patient education         Romberg balance 30"x1 EO 30" x1 EO 30"x1 EO 30" x1 EO 20"x2 EO 20"x2 EO   Tandem balance 30" x2 EO 30" x2 EO 30"x2 EO 30" x2 EO 20" x2 EO 30" x2 EO   Cone taps         Biodex  22  3-7     LOS Easy/static  35"  45% Easy/static  31"  55% Easy/static  32"  50% Easy/static  30"  66% Easy/static  30"  43% Easy/static  33"  53%   LOS Easy/static  33"  46% Easy static  29"  53% Easy/static  32"  54% Easy/static  30"  53% Easy/static  31"  56% Easy/static  31"  59%   Maze Easy/static  19"  85% Easy static  22" 64% Easy static  24" 64% Easy/static  20"  67% Easy/static  23"  67% Easy/static  25"  64%   Maze Easy/static  20"  78% Easy static  20"  78% Easy static  19" 78% Easy/static  20"  75% Easy/static  19"  78% Easy/static  16"  78%   Catch Game    LOS/Easy  1'              Ther Ex  22  3-7     Nustep L2 10' Seat 7 L 2  Seat 8 L2 10' seat 7 L2  10' Seat #7 L2 10' Seat 7 L2 10' Seat 7   Standing hip flex/ext/abd 1x15 ea Karl 1x15 ea Karl 1x15 ea Karl 1x15 ea Karl 1x15 ea Karl 1x15 ea Karl   Squats 1x15 1x15 1x15 1x15 1x10 1x10   Step ups         HR/TR 1x20 1x20 1x20 ea 1x20 ea 1x20 1x20   Bridges 2x10 2x10 nv  1x10 2x10   Sit to stands 1x10 1x10 nv  1x10 1x10   LAQ 1x15 2x10 2x10 2x10 1x15 1x15   Seated hamstring stretch         Supine hip abduction         Supine hip add nv 1x15 nv np nv 2x10   Hip abduction machine         Leg press         Ther Activity                           Gait Training         Ambulation with SPC-laps around clinic 10 laps 10 laps  10 laps Pt declined 8 laps  10 laps            Modalities

## 2022-03-09 ENCOUNTER — APPOINTMENT (OUTPATIENT)
Dept: PHYSICAL THERAPY | Facility: HOME HEALTHCARE | Age: 77
End: 2022-03-09
Payer: COMMERCIAL

## 2022-03-11 NOTE — TELEPHONE ENCOUNTER
As a final attempt, a third outreach has been made via telephone call  Please see Contacts section for details  This encounter will be closed and completed by end of day  Should we receive the requested information because of previous outreach attempts, the requested patient's chart will be updated appropriately       Thank you  Elmer Worley MA

## 2022-03-14 ENCOUNTER — OFFICE VISIT (OUTPATIENT)
Dept: PHYSICAL THERAPY | Facility: HOME HEALTHCARE | Age: 77
End: 2022-03-14
Payer: COMMERCIAL

## 2022-03-14 DIAGNOSIS — R26.9 ABNORMALITY OF GAIT AS LATE EFFECT OF CEREBROVASCULAR ACCIDENT (CVA): Primary | ICD-10-CM

## 2022-03-14 DIAGNOSIS — I69.398 ABNORMALITY OF GAIT AS LATE EFFECT OF CEREBROVASCULAR ACCIDENT (CVA): Primary | ICD-10-CM

## 2022-03-14 PROCEDURE — 97140 MANUAL THERAPY 1/> REGIONS: CPT

## 2022-03-14 PROCEDURE — 97110 THERAPEUTIC EXERCISES: CPT

## 2022-03-14 PROCEDURE — 97112 NEUROMUSCULAR REEDUCATION: CPT

## 2022-03-14 NOTE — PROGRESS NOTES
Daily Note     Today's date: 3/14/2022  Patient name: Jomar Howard  : 1945  MRN: [id number]  Referring provider: Reinaldo Dubose MD  Dx: No diagnosis found  Start Time: 915          Subjective: I am doing ok  I feel stiff today  Objective: See treatment diary below    Assessment: Tolerated treatment well  Pt continues to require LE assistance and straps for both feet on NuStep  He also requires assist for sit<>supine  Pt has shown improvements with steady gait and balance since SOC  Patient would benefit from continued PT    Plan: Continue per plan of care        Precautions:     Re-eval Date: 3/16/2022      Manuals 2/9 2-14-22 2/16/22 3- 3-   Karl hamstring/glute stretching Supine 10' Supine 10' Supine 10' Supine 10' Supine 10' Supine 10'                              Neuro Re-Ed  22  3-7 3-14    Patient education         Romberg balance 30"x1 EO 30" x1 EO 30"x1 EO 30" x1 EO 20"x2 EO 20"x2 EO   Tandem balance 30" x2 EO 30" x2 EO 30"x2 EO 30" x2 EO 20" x2 EO 30" x2 EO   Cone taps         Biodex  22  3-7 3-14    LOS Easy/static  35"  45% Easy/static  31"  55% Easy/static  32"  50% Easy/static  30"  66% Easy/static  33"  44% Easy/static  33"  53%   LOS Easy/static  33"  46% Easy static  29"  53% Easy/static  32"  54% Easy/static  30"  53% Easy/static  29" 50% Easy/static  31"  59%   Maze Easy/static  19"  85% Easy static  22" 64% Easy static  24" 64% Easy/static  20"  67% Easy/static  19"  75% Easy/static  25"  64%   Maze Easy/static  20"  78% Easy static  20"  78% Easy static  19" 78% Easy/static  20"  75% Easy/static  16"  78% Easy/static  16"  78%   Catch Game    LOS/Easy  1' LOS easy 1'         Random easy 1'    Ther Ex  22  3-7 3-14    Nustep L2 10' Seat 7 L 2  Seat 8 L2 10' seat 7 L2  10' Seat #7 L2 10' Seat 7 L2 10' Seat 7   Standing hip flex/ext/abd 1x15 ea Karl 1x15 ea Karl 1x15 ea Karl 1x15 ea Karl 1x15 ea Karl 1x15 ea Karl   Squats 1x15 1x15 1x15 1x15 1x15 1x10   Step ups         HR/TR 1x20 1x20 1x20 ea 1x20 ea 1x20 1x20   Bridges 2x10 2x10 nv  2x10 2x10   Sit to stands 1x10 1x10 nv   1x10   LAQ 1x15 2x10 2x10 2x10 1x20 1x15   Seated hamstring stretch         Supine hip abduction         Supine hip add nv 1x15 nv np 5" x20 2x10   Hip abduction machine         Leg press         Ther Activity                           Gait Training         Ambulation with SPC-laps around clinic 10 laps 10 laps  10 laps Pt declined declined 10 laps            Modalities

## 2022-03-15 ENCOUNTER — LAB (OUTPATIENT)
Dept: LAB | Facility: HOSPITAL | Age: 77
End: 2022-03-15
Attending: INTERNAL MEDICINE
Payer: COMMERCIAL

## 2022-03-15 DIAGNOSIS — E11.22 TYPE 2 DIABETES MELLITUS WITH CHRONIC KIDNEY DISEASE, WITHOUT LONG-TERM CURRENT USE OF INSULIN, UNSPECIFIED CKD STAGE (HCC): ICD-10-CM

## 2022-03-15 DIAGNOSIS — E03.9 ACQUIRED HYPOTHYROIDISM: ICD-10-CM

## 2022-03-15 DIAGNOSIS — E78.2 MIXED HYPERLIPIDEMIA: ICD-10-CM

## 2022-03-15 DIAGNOSIS — I10 PRIMARY HYPERTENSION: ICD-10-CM

## 2022-03-15 LAB
ALBUMIN SERPL BCP-MCNC: 3.8 G/DL (ref 3.5–5)
ALP SERPL-CCNC: 84 U/L (ref 46–116)
ALT SERPL W P-5'-P-CCNC: 25 U/L (ref 12–78)
ANION GAP SERPL CALCULATED.3IONS-SCNC: 7 MMOL/L (ref 4–13)
AST SERPL W P-5'-P-CCNC: 20 U/L (ref 5–45)
BILIRUB SERPL-MCNC: 0.51 MG/DL (ref 0.2–1)
BUN SERPL-MCNC: 24 MG/DL (ref 5–25)
CALCIUM SERPL-MCNC: 9 MG/DL (ref 8.3–10.1)
CHLORIDE SERPL-SCNC: 98 MMOL/L (ref 100–108)
CHOLEST SERPL-MCNC: 118 MG/DL
CO2 SERPL-SCNC: 29 MMOL/L (ref 21–32)
CREAT SERPL-MCNC: 1.56 MG/DL (ref 0.6–1.3)
EST. AVERAGE GLUCOSE BLD GHB EST-MCNC: 146 MG/DL
GFR SERPL CREATININE-BSD FRML MDRD: 43 ML/MIN/1.73SQ M
GLUCOSE P FAST SERPL-MCNC: 110 MG/DL (ref 65–99)
HBA1C MFR BLD: 6.7 %
HDLC SERPL-MCNC: 64 MG/DL
LDLC SERPL CALC-MCNC: 43 MG/DL (ref 0–100)
NONHDLC SERPL-MCNC: 54 MG/DL
POTASSIUM SERPL-SCNC: 3.9 MMOL/L (ref 3.5–5.3)
PROT SERPL-MCNC: 7.4 G/DL (ref 6.4–8.2)
SODIUM SERPL-SCNC: 134 MMOL/L (ref 136–145)
TRIGL SERPL-MCNC: 54 MG/DL
TSH SERPL DL<=0.05 MIU/L-ACNC: 4.44 UIU/ML (ref 0.36–3.74)

## 2022-03-15 PROCEDURE — 36415 COLL VENOUS BLD VENIPUNCTURE: CPT

## 2022-03-15 PROCEDURE — 84443 ASSAY THYROID STIM HORMONE: CPT

## 2022-03-15 PROCEDURE — 3044F HG A1C LEVEL LT 7.0%: CPT | Performed by: INTERNAL MEDICINE

## 2022-03-15 PROCEDURE — 83036 HEMOGLOBIN GLYCOSYLATED A1C: CPT

## 2022-03-15 PROCEDURE — 80053 COMPREHEN METABOLIC PANEL: CPT

## 2022-03-15 PROCEDURE — 80061 LIPID PANEL: CPT

## 2022-03-16 ENCOUNTER — OFFICE VISIT (OUTPATIENT)
Dept: PHYSICAL THERAPY | Facility: HOME HEALTHCARE | Age: 77
End: 2022-03-16
Payer: COMMERCIAL

## 2022-03-16 DIAGNOSIS — I69.398 ABNORMALITY OF GAIT AS LATE EFFECT OF CEREBROVASCULAR ACCIDENT (CVA): Primary | ICD-10-CM

## 2022-03-16 DIAGNOSIS — G81.14 LEFT SPASTIC HEMIPARESIS (HCC): ICD-10-CM

## 2022-03-16 DIAGNOSIS — R26.9 ABNORMALITY OF GAIT AS LATE EFFECT OF CEREBROVASCULAR ACCIDENT (CVA): Primary | ICD-10-CM

## 2022-03-16 PROCEDURE — 97110 THERAPEUTIC EXERCISES: CPT

## 2022-03-16 PROCEDURE — 97112 NEUROMUSCULAR REEDUCATION: CPT

## 2022-03-16 PROCEDURE — 97140 MANUAL THERAPY 1/> REGIONS: CPT

## 2022-03-16 NOTE — PROGRESS NOTES
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)  I69 398     R26 9    2  Left spastic hemiparesis ([address] )  G81 14        Start Time: 1125  Stop Time: 1215  Total time in clinic (min): 50 minutes    Subjective: Pt has no complaints from previous session  He reports that he hopes to be able to start walking again on the track soon since the weather is getting nicer  Objective: See treatment diary below      Assessment: Pt demonstrated good tolerance to progressing strengthening and mobility exercises today  Pt had significant tightness in left hamstring and glute muscles, but improved with stretching  Pt would benefit from continued PT  Plan: Continue per plan of care        Precautions: Fall Risk    Re-eval Date: 3/16/2022      Manuals 22 3-7 3-14 3/16   Karl hamstring/glute stretching Supine 10' Supine 10' Supine 10' Supine 10' Supine 10' Supine 10'                              Neuro Re-Ed  22  3-7 3-14    Patient education         Romberg balance 30"x1 EO 30" x1 EO 30"x1 EO 30" x1 EO 20"x2 EO 20"x2 EO   Tandem balance 30" x2 EO 30" x2 EO 30"x2 EO 30" x2 EO 20" x2 EO 20" x2 EO   Cone taps         Biodex  22  3-7 3-14    LOS Easy/static  35"  45% Easy/static  31"  55% Easy/static  32"  50% Easy/static  30"  66% Easy/static  33"  44% Easy/static  41"  33%   LOS Easy/static  33"  46% Easy static  29"  53% Easy/static  32"  54% Easy/static  30"  53% Easy/static  29" 50% Easy/static  26" 67%   Maze Easy/static  19"  85% Easy static  22" 64% Easy static  24" 64% Easy/static  20"  67% Easy/static  19"  75% Easy/static  30"  57%   Maze Easy/static  20"  78% Easy static  20"  78% Easy static  19" 78% Easy/static  20"  75% Easy/static  16"  78% Easy/static  18"  75%   Catch Game LOS/Easy  1' LOS easy 1' LOS easy 1'        Random easy 1' Random easy 1'   Ther Ex  2-14-22  3-7 3-14    Nustep L2 10' Seat 7 L 2  Seat 8 L2 10' seat 7 L2  10' Seat #7 L2 10' Seat 7 L2 10' Seat 7   Standing hip flex/ext/abd 1x15 ea Karl 1x15 ea Karl 1x15 ea Karl 1x15 ea Karl 1x15 ea Karl 1x15 ea Karl   Squats 1x15 1x15 1x15 1x15 1x15 1x15   Step ups         HR/TR 1x20 1x20 1x20 ea 1x20 ea 1x20 1x20 ea   Bridges 2x10 2x10 nv  2x10 2x10   Sit to stands 1x10 1x10 nv      LAQ 1x15 2x10 2x10 2x10 1x20 1x20   Seated hamstring stretch         Supine hip abduction         Supine hip add nv 1x15 nv np 5" x20 5" x20   Hip abduction machine         Leg press         Ther Activity                           Gait Training         Ambulation with SPC-laps around clinic 10 laps 10 laps  10 laps Pt declined declined 6 laps            Modalities

## 2022-03-17 NOTE — TELEPHONE ENCOUNTER
Upon review of the In Basket request we were able to locate, review, and update the patient chart as requested for Diabetic Eye Exam     Any additional questions or concerns should be emailed to the Practice Liaisons via [email or web address] email, please do not reply via In Basket      Thank you  Timmy Mclean MA

## 2022-03-21 ENCOUNTER — EVALUATION (OUTPATIENT)
Dept: PHYSICAL THERAPY | Facility: HOME HEALTHCARE | Age: 77
End: 2022-03-21
Payer: COMMERCIAL

## 2022-03-21 DIAGNOSIS — R26.9 ABNORMALITY OF GAIT AS LATE EFFECT OF CEREBROVASCULAR ACCIDENT (CVA): Primary | ICD-10-CM

## 2022-03-21 DIAGNOSIS — I69.398 ABNORMALITY OF GAIT AS LATE EFFECT OF CEREBROVASCULAR ACCIDENT (CVA): Primary | ICD-10-CM

## 2022-03-21 DIAGNOSIS — G81.14 LEFT SPASTIC HEMIPARESIS (HCC): ICD-10-CM

## 2022-03-21 PROCEDURE — 97110 THERAPEUTIC EXERCISES: CPT

## 2022-03-21 PROCEDURE — 97112 NEUROMUSCULAR REEDUCATION: CPT

## 2022-03-21 NOTE — PROGRESS NOTES
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)  I69 398     R26 9    2  Left spastic hemiparesis ([address] )  G81 14        Start Time: 0915  Stop Time: 1000  Total time in clinic (min): 45 minutes    Assessment  Assessment details: Pt has made some improvements in overall functional mobility, balance, and ROM since beginning PT  He has met his goals for independence with HEP, hip ROM, and tandem balance, however, he is still progressing towards his goals for strength, ROM, and overall mobility  Pt has made limited progress since previous evaluation, most likely due to the pt missing 2-3 weeks of physical therapy due to personal factors and bad weather  Pt would benefit from continued PT in order to further improve in strength, ROM, functional mobility, quality of life, and return to PLOF  Thank you! Impairments: abnormal gait, abnormal muscle firing, abnormal muscle tone, abnormal or restricted ROM, abnormal movement, activity intolerance, impaired balance, impaired physical strength, lacks appropriate home exercise program, safety issue, weight-bearing intolerance, poor posture  and poor body mechanics    Goals  STG: 3-4 weeks  Pt will be independent with HEP in order to continue to progress outside of PT treatment sessions  Met  Pt will improve in functional mobility as demonstrated by L hip flexion PROM of 100 deg or greater  Met  LT-8 weeks  Pt will improve in functional mobility as demonstrated by a TUG score of 15 seconds or less  Pt will improve in functional mobility and decrease in fall risk as demonstrated by tandem balance of 5 seconds or greater without using UE's for support  Met   Pt will improve in functional mobility as demonstrated by strength levels of 3/5 or greater    Pt will improve in functional mobility as demonstrated by ability to independently ambulate on uneven surfaces with SPC  Plan  Patient would benefit from: skilled physical therapy  Planned modality interventions: thermotherapy: hydrocollator packs  Planned therapy interventions: ADL retraining, balance, body mechanics training, balance/weight bearing training, flexibility, functional ROM exercises, gait training, home exercise program, joint mobilization, manual therapy, massage, neuromuscular re-education, orthotic fitting/training, patient education, postural training, strengthening, stretching, therapeutic activities and therapeutic exercise  Frequency: 2x week  Duration in weeks: 4  Plan of Care beginning date: 3/21/2022  Plan of Care expiration date: 4/21/2022  Treatment plan discussed with: patient        Subjective Evaluation    History of Present Illness  Mechanism of injury: Pt with PMH of CVA on March 3rd, 2021 reports that he went to the [place] to get a new brace because it was starting to come apart  Pt was recommended to go to physical therapy again after receiving new L sided AFO brace  Pt reports this one is taller than the previous one  Pt reports that he was walking for a mile or more prior to the colder weather and snow  Pt is able to walk on level ground, but has difficulty walking on uneven surfaces or inclines  Pt wants to keep walking and working on strengthening  Pt walked 4 miles per day prior to CVA  Pt reports that he has more spasticity when the weather is colder  Pt reports no difficulty with stairs  Pt reports that he feels like he has been regressing recently because he has been unable to exercise as much  Pt feels numbness and tingling in his R hand when he wakes up in the morning  UPDATE 2/16/2022:  Pt reports that he does not feel like he seen any changes with physical therapy, however, he reports that his wife and his friend think that he is walking better      UPDATE 3/21/2022:  Pt reports that it is difficult for him to tell if he has been improving since beginning PT  Social Support  Steps to enter house: yes (1)  Stairs in house: yes (13)     Treatments  Previous treatment: physical therapy and occupational therapy  Patient Goals  Patient goals for therapy: improved balance, increased motion, increased strength, independence with ADLs/IADLs and return to sport/leisure activities  Patient goal: To be able to walk right, leg and arms functioning        Objective     Neurological Testing     Sensation     Lumbar   Left   Intact: light touch    Right   Intact: light touch    Passive Range of Motion   Left Hip   Flexion: 100 degrees     Right Hip   Flexion: 110 degrees   Left Knee   Flexion: WFL  Extension: WFL    Right Knee   Flexion: WFL  Extension: WFL    Additional Passive Range of Motion Details  SLR: L: 70 deg, R: 80 deg    Strength/Myotome Testing     Left Hip   Planes of Motion   Flexion: 3-  Abduction: 3-    Right Hip   Planes of Motion   Flexion: 5  Abduction: 4+    Left Knee   Flexion: 3-  Extension: 3    Right Knee   Flexion: 5  Extension: 5    Ambulation   Weight-Bearing Status     Additional Weight-Bearing Status Details  SPC    Ambulation: Level Surfaces   Ambulation with assistive device: independent    Ambulation: Stairs   Ascend stairs: independent  Pattern: non-reciprocal  Railings: one rail  Descend stairs: independent  Pattern: non-reciprocal  Railings: one rail  Curbs: independent    Additional Stairs Ambulation Details  W/ SPC    Observational Gait   Decreased walking speed, stride length and left stance time     Left foot contact pattern: foot flat    Quality of Movement During Gait     Additional Quality of Movement During Gait Details  Hemiplegic gait    Functional Assessment        Comments  TU 13s  Romberg: EO: 30s, EC: 30s  Tandem L: 10s, R: <30s  Neuro Exam:     Modified Chris   Left hamstrin  Left quadricepts: 2             Precautions: [facility] CVA    Re-eval Date: 4/21/2022        Manuals 3/21/2022 2-14-22 2/16/22 3-7 3-14 3/16   Karl hamstring/glute stretching nv Supine 10' Supine 10' Supine 10' Supine 10' Supine 10'                              Neuro Re-Ed  2-14-22  3-7 3-14    Patient education         Romberg balance 20"x2 EO 30" x1 EO 30"x1 EO 30" x1 EO 20"x2 EO 20"x2 EO   Tandem balance 20"x2 EO 30" x2 EO 30"x2 EO 30" x2 EO 20" x2 EO 20" x2 EO   Cone taps         Biodex  2-14-22  3-7 3-14    LOS Easy/static  29"  52% Easy/static  31"  55% Easy/static  32"  50% Easy/static  30"  66% Easy/static  33"  44% Easy/static  41"  33%   LOS Easy/static  34"  40% Easy static  29"  53% Easy/static  32"  54% Easy/static  30"  53% Easy/static  29" 50% Easy/static  26" 67%   Maze Easy/static  20"  67% Easy static  22" 64% Easy static  24" 64% Easy/static  20"  67% Easy/static  19"  75% Easy/static  30"  57%   Maze Easy/static  17"  75% Easy static  20"  78% Easy static  19" 78% Easy/static  20"  75% Easy/static  16"  78% Easy/static  18"  75%   Catch Game LOS easy 1'   LOS/Easy  1' LOS easy 1' LOS easy 1'    Random easy 1'    Random easy 1' Random easy 1'   Ther Ex  2-14-22  3-7 3-14    Nustep L2 10' Seat 7 L 2  Seat 8 L2 10' seat 7 L2  10' Seat #7 L2 10' Seat 7 L2 10' Seat 7   Standing hip flex/ext/abd 1x15 ea Karl 1x15 ea Karl 1x15 ea Karl 1x15 ea Karl 1x15 ea Karl 1x15 ea Karl   Squats 1x15 1x15 1x15 1x15 1x15 1x15   Step ups         HR/TR 1x20 ea 1x20 1x20 ea 1x20 ea 1x20 1x20 ea   Bridges 2x10 2x10 nv  2x10 2x10   Sit to stands  1x10 nv      LAQ 1x20 2x10 2x10 2x10 1x20 1x20   Seated hamstring stretch         Supine hip abduction         Supine hip add 5" x20 1x15 nv np 5" x20 5" x20   Hip abduction machine         Leg press         Ther Activity                           Gait Training         Ambulation with SPC-laps around clinic 6 laps 10 laps  10 laps Pt declined declined 6 laps            Modalities

## 2022-03-21 NOTE — LETTER
2022    Sherice Hazel MD  [address]  [address] [address] [address]    Patient: Luanne Ortega   YOB: 1945   Date of Visit: 3/21/2022     Encounter Diagnosis     ICD-10-CM    1  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)  I69 398     R26 9    2  Left spastic hemiparesis [facility])  G81 14        Dear Dr Lona Vega:    Thank you for your recent referral of Luanne Ortega  Please review the attached evaluation summary from [facility] recent visit  Please verify that you agree with the plan of care by signing the attached order  If you have any questions or concerns, please do not hesitate to call  I sincerely appreciate the opportunity to share in the care of one of your patients and hope to have another opportunity to work with you in the near future  Sincerely,    Lakeisha Ruth, PT      Referring Provider:      I certify that I have read the below Plan of Care and certify the need for these services furnished under this plan of treatment while under my care  Sherice Hazel MD  [address] [place] [address]  [address]  [address] [address] [address]  Via Fax: [phone]          PT Re-Evaluation     Today's date: 3/21/2022  Patient name: Luanne Ortega  : 1945  MRN: [id number]  Referring provider: Peter Rueda MD  Dx:   Encounter Diagnosis     ICD-10-CM    1  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)  I69 398     R26 9    2  Left spastic hemiparesis ([facility] [address] )  G81 14        Start Time: 915  Stop Time: 1000  Total time in clinic (min): 45 minutes    Assessment  Assessment details: Pt has made some improvements in overall functional mobility, balance, and ROM since beginning PT  He has met his goals for independence with HEP, hip ROM, and tandem balance, however, he is still progressing towards his goals for strength, ROM, and overall mobility    Pt has made limited progress since previous evaluation, most likely due to the pt missing 2-3 weeks of physical therapy due to personal factors and bad weather  Pt would benefit from continued PT in order to further improve in strength, ROM, functional mobility, quality of life, and return to PLOF  Thank you! Impairments: abnormal gait, abnormal muscle firing, abnormal muscle tone, abnormal or restricted ROM, abnormal movement, activity intolerance, impaired balance, impaired physical strength, lacks appropriate home exercise program, safety issue, weight-bearing intolerance, poor posture  and poor body mechanics    Goals  STG: 3-4 weeks  Pt will be independent with HEP in order to continue to progress outside of PT treatment sessions  Met  Pt will improve in functional mobility as demonstrated by L hip flexion PROM of 100 deg or greater  Met  LT-8 weeks  Pt will improve in functional mobility as demonstrated by a TUG score of 15 seconds or less  Pt will improve in functional mobility and decrease in fall risk as demonstrated by tandem balance of 5 seconds or greater without using UE's for support  Met   Pt will improve in functional mobility as demonstrated by strength levels of 3/5 or greater  Pt will improve in functional mobility as demonstrated by ability to independently ambulate on uneven surfaces with SPC        Plan  Patient would benefit from: skilled physical therapy  Planned modality interventions: thermotherapy: hydrocollator packs  Planned therapy interventions: ADL retraining, balance, body mechanics training, balance/weight bearing training, flexibility, functional ROM exercises, gait training, home exercise program, joint mobilization, manual therapy, massage, neuromuscular re-education, orthotic fitting/training, patient education, postural training, strengthening, stretching, therapeutic activities and therapeutic exercise  Frequency: 2x week  Duration in weeks: 4  Plan of Care beginning date: 3/21/2022  Plan of Care expiration date: 4/21/2022  Treatment plan discussed with: patient        Subjective Evaluation    History of Present Illness  Mechanism of injury: Pt with PMH of CVA on March 3rd, 2021 reports that he went to the [place] to get a new brace because it was starting to come apart  Pt was recommended to go to physical therapy again after receiving new L sided AFO brace  Pt reports this one is taller than the previous one  Pt reports that he was walking for a mile or more prior to the colder weather and snow  Pt is able to walk on level ground, but has difficulty walking on uneven surfaces or inclines  Pt wants to keep walking and working on strengthening  Pt walked 4 miles per day prior to CVA  Pt reports that he has more spasticity when the weather is colder  Pt reports no difficulty with stairs  Pt reports that he feels like he has been regressing recently because he has been unable to exercise as much  Pt feels numbness and tingling in his R hand when he wakes up in the morning  UPDATE 2/16/2022:  Pt reports that he does not feel like he seen any changes with physical therapy, however, he reports that his wife and his friend think that he is walking better  UPDATE 3/21/2022:  Pt reports that it is difficult for him to tell if he has been improving since beginning PT    Social Support  Steps to enter house: yes (1)  Stairs in house: yes (13)     Treatments  Previous treatment: physical therapy and occupational therapy  Patient Goals  Patient goals for therapy: improved balance, increased motion, increased strength, independence with ADLs/IADLs and return to sport/leisure activities  Patient goal: To be able to walk right, leg and arms functioning        Objective     Neurological Testing     Sensation     Lumbar   Left   Intact: light touch    Right   Intact: light touch    Passive Range of Motion   Left Hip   Flexion: 100 degrees     Right Hip   Flexion: 110 degrees   Left Knee   Flexion: WFL  Extension: WFL    Right Knee Flexion: WFL  Extension: [facility]    Additional Passive Range of Motion Details  SLR: L: 70 deg, R: 80 deg    Strength/Myotome Testing     Left Hip   Planes of Motion   Flexion: 3-  Abduction: 3-    Right Hip   Planes of Motion   Flexion: 5  Abduction: 4+    Left Knee   Flexion: 3-  Extension: 3    Right Knee   Flexion: 5  Extension: 5    Ambulation   Weight-Bearing Status     Additional Weight-Bearing Status Details  SPC    Ambulation: Level Surfaces   Ambulation with assistive device: independent    Ambulation: Stairs   Ascend stairs: independent  Pattern: non-reciprocal  Railings: one rail  Descend stairs: independent  Pattern: non-reciprocal  Railings: one rail  Curbs: independent    Additional Stairs Ambulation Details  W/ SPC    Observational Gait   Decreased walking speed, stride length and left stance time     Left foot contact pattern: foot flat    Quality of Movement During Gait     Additional Quality of Movement During Gait Details  Hemiplegic gait    Functional Assessment        Comments  TU 13s  Romberg: EO: 30s, EC: 30s  Tandem L: 10s, R: <30s  Neuro Exam:     Modified Chris   Left hamstrin  Left quadricepts: 2             Precautions: PMH CVA    Re-eval Date: 2022        Manuals 3/21/2022 2-14-22 2/16/22 3-7 3-14 3/16   Karl hamstring/glute stretching nv Supine 10' Supine 10' Supine 10' Supine 10' Supine 10'                              Neuro Re-Ed  22  3-7 3-14    Patient education         Romberg balance 20"x2 EO 30" x1 EO 30"x1 EO 30" x1 EO 20"x2 EO 20"x2 EO   Tandem balance 20"x2 EO 30" x2 EO 30"x2 EO 30" x2 EO 20" x2 EO 20" x2 EO   Cone taps         Biodex  22  3-7 3-14    LOS Easy/static  29"  52% Easy/static  31"  55% Easy/static  32"  50% Easy/static  30"  66% Easy/static  33"  44% Easy/static  41"  33%   LOS Easy/static  34"  40% Easy static  29"  53% Easy/static  32"  54% Easy/static  30"  53% Easy/static  29" 50% Easy/static  26" 67%   Maze Easy/static  20"  67% Easy static  22" 64% Easy static  24" 64% Easy/static  20"  67% Easy/static  19"  75% Easy/static  30"  57%   Maze Easy/static  17"  75% Easy static  20"  78% Easy static  19" 78% Easy/static  20"  75% Easy/static  16"  78% Easy/static  18"  75%   Catch Game LOS easy 1'   LOS/Easy  1' LOS easy 1' LOS easy 1'    Random easy 1'    Random easy 1' Random easy 1'   Ther Ex  2-14-22  3-7 3-14    Nustep L2 10' Seat 7 L 2  Seat 8 L2 10' seat 7 L2  10' Seat #7 L2 10' Seat 7 L2 10' Seat 7   Standing hip flex/ext/abd 1x15 ea Karl 1x15 ea Karl 1x15 ea Karl 1x15 ea Karl 1x15 ea Karl 1x15 ea Karl   Squats 1x15 1x15 1x15 1x15 1x15 1x15   Step ups         HR/TR 1x20 ea 1x20 1x20 ea 1x20 ea 1x20 1x20 ea   Bridges 2x10 2x10 nv  2x10 2x10   Sit to stands  1x10 nv      LAQ 1x20 2x10 2x10 2x10 1x20 1x20   Seated hamstring stretch         Supine hip abduction         Supine hip add 5" x20 1x15 nv np 5" x20 5" x20   Hip abduction machine         Leg press         Ther Activity                           Gait Training         Ambulation with SPC-laps around clinic 6 laps 10 laps  10 laps Pt declined declined 6 laps            Modalities

## 2022-03-23 ENCOUNTER — OFFICE VISIT (OUTPATIENT)
Dept: PHYSICAL THERAPY | Facility: HOME HEALTHCARE | Age: 77
End: 2022-03-23
Payer: COMMERCIAL

## 2022-03-23 DIAGNOSIS — I69.398 ABNORMALITY OF GAIT AS LATE EFFECT OF CEREBROVASCULAR ACCIDENT (CVA): Primary | ICD-10-CM

## 2022-03-23 DIAGNOSIS — G81.14 LEFT SPASTIC HEMIPARESIS (HCC): ICD-10-CM

## 2022-03-23 DIAGNOSIS — R26.9 ABNORMALITY OF GAIT AS LATE EFFECT OF CEREBROVASCULAR ACCIDENT (CVA): Primary | ICD-10-CM

## 2022-03-23 PROCEDURE — 97110 THERAPEUTIC EXERCISES: CPT | Performed by: PHYSICAL THERAPIST

## 2022-03-23 PROCEDURE — 97112 NEUROMUSCULAR REEDUCATION: CPT | Performed by: PHYSICAL THERAPIST

## 2022-03-23 NOTE — PROGRESS NOTES
Daily Note     Today's date: 3/23/2022  Patient name: Robinson Mann  : 1945  MRN: [id number]  Referring provider: Jacoby Harden MD  Dx:   Encounter Diagnosis     ICD-10-CM    1  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)  I69 398     R26 9    2  Left spastic hemiparesis (HCC)  G81 14                   Subjective: The patient states that his ankle brace broke yesterday, he needs to take it back to  to get fixed  He is using the brace he got when he left the hospital, it is a little different from his regular brace  Objective: See treatment diary below      Assessment: Tolerated treatment well  He is challenged with balance TE, mostly with tandem stance with L foot back  No LOB noted but with increased sway when in this position  Patient demonstrated fatigue post treatment and would benefit from continued PT  Plan: Continue per plan of care        Precautions: [facility] CVA    Re-eval Date: 2022        Manuals 3/21/2022 3/23 2/16/22 3-7 3-14 3/16   Karl hamstring/glute stretching nv NV Supine 10' Supine 10' Supine 10' Supine 10'                              Neuro Re-Ed    3-7 3-14    Patient education         Romberg balance 20"x2 EO 20" x 2 EO 30"x1 EO 30" x1 EO 20"x2 EO 20"x2 EO   Tandem balance 20"x2 EO 20" x 2 EO 30"x2 EO 30" x2 EO 20" x2 EO 20" x2 EO   Cone taps         Biodex    3-7 3-14    LOS Easy/static  29"  52% Easy/static  28" 53% Easy/static  32"  50% Easy/static  30"  66% Easy/static  33"  44% Easy/static  41"  33%   LOS Easy/static  34"  40% Easy/Static  34" 44% Easy/static  32"  54% Easy/static  30"  53% Easy/static  29" 50% Easy/static  26" 67%   Maze Easy/static  20"  67% Easy/Static  23" 67% Easy static  24" 64% Easy/static  20"  67% Easy/static  19"  75% Easy/static  30"  57%   Maze Easy/static  17"  75% Easy/Static  20" 64% Easy static  19" 78% Easy/static  20"  75% Easy/static  16"  78% Easy/static  18"  75%   Catch Game LOS easy 1' LOS Easy 1:00  LOS/Easy  1' LOS easy 1' LOS easy 1'    Random easy 1' Random Easy 1:00   Random easy 1' Random easy 1'   Ther Ex    3-7 3-14    Nustep L2 10' Seat 7 Seat 8 L2  10 mins L2 10' seat 7 L2  10' Seat #7 L2 10' Seat 7 L2 10' Seat 7   Standing hip flex/ext/abd 1x15 ea Karl 1 x 15 ea Karl 1x15 ea Karl 1x15 ea Karl 1x15 ea Karl 1x15 ea Karl   Squats 1x15 1 x 15 1x15 1x15 1x15 1x15   Step ups         HR/TR 1x20 ea 1 x 20 ea 1x20 ea 1x20 ea 1x20 1x20 ea   Bridges 2x10 2 x 10 nv  2x10 2x10   Sit to stands   nv      LAQ 1x20 2 x 10 2x10 2x10 1x20 1x20   Seated hamstring stretch         Supine hip abduction         Supine hip add 5" x20 5" x 20  nv np 5" x20 5" x20   Hip abduction machine         Leg press         Ther Activity                           Gait Training         Ambulation with SPC-laps around clinic 6 laps 10 laps 10 laps Pt declined declined 6 laps            Modalities

## 2022-03-28 ENCOUNTER — OFFICE VISIT (OUTPATIENT)
Dept: PHYSICAL THERAPY | Facility: HOME HEALTHCARE | Age: 77
End: 2022-03-28
Payer: COMMERCIAL

## 2022-03-28 DIAGNOSIS — R26.9 ABNORMALITY OF GAIT AS LATE EFFECT OF CEREBROVASCULAR ACCIDENT (CVA): Primary | ICD-10-CM

## 2022-03-28 DIAGNOSIS — I69.398 ABNORMALITY OF GAIT AS LATE EFFECT OF CEREBROVASCULAR ACCIDENT (CVA): Primary | ICD-10-CM

## 2022-03-28 PROCEDURE — 97112 NEUROMUSCULAR REEDUCATION: CPT

## 2022-03-28 PROCEDURE — 97110 THERAPEUTIC EXERCISES: CPT

## 2022-03-28 NOTE — PROGRESS NOTES
Daily Note     Today's date: 3/28/2022  Patient name: Reyes Hsu  : 1945  MRN: [id number]  Referring provider: Hank Grimaldo MD  Dx: No diagnosis found  Start Time: 915          Subjective: It's going slowly but I am improving  Objective: See treatment diary below    Assessment: Tolerated treatment well  Pt with improvement noticed with LE strength and mobility during ambulation and TE  Pt remains with weakness noted and continues to require assistance with L LE during NuStep and with transfers  Patient would benefit from continued PT    Plan: Continue per plan of care        Precautions: PMH CVA    Re-eval Date: 2022        Manuals 3/21/2022 3/23 3-28-22 3-7 3-14 3/16   Karl hamstring/glute stretching nv NV Supine 10' Supine 10' Supine 10' Supine 10'                              Neuro Re-Ed   3-28-22 3-7 3-14    Patient education         Romberg balance 20"x2 EO 20" x 2 EO 30"x1 EO 30" x1 EO 20"x2 EO 20"x2 EO   Tandem balance 20"x2 EO 20" x 2 EO 30"x2 EO 30" x2 EO 20" x2 EO 20" x2 EO   Cone taps         Biodex   3-28-22 3- 3-14    LOS Easy/static  29"  52% Easy/static  28" 53% Easy/static  33"  45% Easy/static  30"  66% Easy/static  33"  44% Easy/static  41"  33%   LOS Easy/static  34"  40% Easy/Static  34" 44% Easy/static  27"  60% Easy/static  30"  53% Easy/static  29" 50% Easy/static  26" 67%   Maze Easy/static  20"  67% Easy/Static  23" 67% Easy static  31"  46% Easy/static  20"  67% Easy/static  19"  75% Easy/static  30"  57%   Maze Easy/static  17"  75% Easy/Static  20" 64% Easy static  14"  89% Easy/static  20"  75% Easy/static  16"  78% Easy/static  18"  75%   Catch Game LOS easy 1' LOS Easy 1:00 LOS Easy 1' LOS/Easy  1' LOS easy 1' LOS easy 1'    Random easy 1' Random Easy 1:00 Random Easy 1'  Random easy 1' Random easy 1'   Ther Ex    3- 3-14    Nustep L2 10' Seat 7 Seat 8 L2  10 mins L2 10' seat 7 L2  10' Seat #7 L2 10' Seat 7 L2 10' Seat 7   Standing hip flex/ext/abd 1x15 ea Karl 1 x 15 ea Karl 1x20 ea Karl 1x15 ea Karl 1x15 ea Karl 1x15 ea Karl   Squats 1x15 1 x 15 1x20 1x15 1x15 1x15   Step ups         HR/TR 1x20 ea 1 x 20 ea 1x20 ea 1x20 ea 1x20 1x20 ea   Bridges 2x10 2 x 10 2x10  2x10 2x10   Sit to stands   nv      LAQ 1x20 2 x 10 2x10 2x10 1x20 1x20   Seated hamstring stretch         Supine hip abduction         Supine hip add 5" x20 5" x 20  Seated 5" x20 np 5" x20 5" x20   Hip abduction machine         Leg press   40# x20  60# x20      Ther Activity                           Gait Training         Ambulation with SPC-laps around clinic 6 laps 10 laps 10 laps Pt declined declined 6 laps            Modalities

## 2022-03-30 ENCOUNTER — OFFICE VISIT (OUTPATIENT)
Dept: NEUROLOGY | Facility: CLINIC | Age: 77
End: 2022-03-30
Payer: COMMERCIAL

## 2022-03-30 VITALS
DIASTOLIC BLOOD PRESSURE: 80 MMHG | SYSTOLIC BLOOD PRESSURE: 142 MMHG | TEMPERATURE: 98 F | HEART RATE: 69 BPM | HEIGHT: 66 IN | BODY MASS INDEX: 24.75 KG/M2 | WEIGHT: 154 LBS

## 2022-03-30 DIAGNOSIS — I69.398 SPASTICITY AS LATE EFFECT OF CEREBROVASCULAR ACCIDENT (CVA): ICD-10-CM

## 2022-03-30 DIAGNOSIS — E78.5 HYPERLIPIDEMIA, UNSPECIFIED HYPERLIPIDEMIA TYPE: Chronic | ICD-10-CM

## 2022-03-30 DIAGNOSIS — I10 ESSENTIAL HYPERTENSION: Chronic | ICD-10-CM

## 2022-03-30 DIAGNOSIS — G81.14 SPASTIC HEMIPLEGIA AFFECTING LEFT NONDOMINANT SIDE (HCC): ICD-10-CM

## 2022-03-30 DIAGNOSIS — Z86.73 HISTORY OF STROKE: Primary | ICD-10-CM

## 2022-03-30 DIAGNOSIS — R26.9 ABNORMAL GAIT: ICD-10-CM

## 2022-03-30 DIAGNOSIS — I63.9 CEREBROVASCULAR ACCIDENT (CVA), UNSPECIFIED MECHANISM (HCC): ICD-10-CM

## 2022-03-30 DIAGNOSIS — E11.40 TYPE 2 DIABETES MELLITUS WITH DIABETIC NEUROPATHY (HCC): ICD-10-CM

## 2022-03-30 DIAGNOSIS — R25.2 SPASTICITY AS LATE EFFECT OF CEREBROVASCULAR ACCIDENT (CVA): ICD-10-CM

## 2022-03-30 PROCEDURE — 99215 OFFICE O/P EST HI 40 MIN: CPT | Performed by: PHYSICIAN ASSISTANT

## 2022-03-30 PROCEDURE — 3008F BODY MASS INDEX DOCD: CPT | Performed by: INTERNAL MEDICINE

## 2022-03-30 RX ORDER — BACLOFEN 10 MG/1
10 TABLET ORAL 3 TIMES DAILY
Qty: 270 TABLET | Refills: 1 | Status: SHIPPED | OUTPATIENT
Start: 2022-03-30

## 2022-03-30 RX ORDER — CLOPIDOGREL BISULFATE 75 MG/1
75 TABLET ORAL DAILY
Qty: 90 TABLET | Refills: 1 | Status: SHIPPED | OUTPATIENT
Start: 2022-03-30

## 2022-03-30 RX ORDER — GABAPENTIN 100 MG/1
CAPSULE ORAL
Qty: 90 CAPSULE | Refills: 0 | Status: SHIPPED | OUTPATIENT
Start: 2022-03-30 | End: 2022-04-18 | Stop reason: SDUPTHER

## 2022-03-30 NOTE — PATIENT INSTRUCTIONS
 Continue with combination of plavix 75mg daily and atorvastatin daily for secondary stroke prevention   BP goal < 130/80, BP is at goal     LDL goal < 100   HgbA1C goal <7 0   Defer to PCP regarding glycemic and blood pressure management   Start PT with the [address]   Referral for PMR for possible botox injections placed   In addition to your gabapentin 300mg at bedtime will add gabapentin 100mg (1 capsule) every night for 7-14 days  May increase by 1 capsule every 7-14 days until you reach a total of 600mg at bedtime or if you achieve improvement at an earlier dose  Then call the office for update  Counseling    I advised patient to avoid using NSAIDs for headaches or other pain and to stick to tylenol if needed   Recommend mediterranean diet & regular exercise regimen atleast 4-5 times a week for 20-30 minutes   Educated patient/family regarding medication compliance    Recommend follow up 6 months  I would be happy to see the patient sooner if any new questions/concerns arise  Patient/Guardian was advised to the call the office if they have any questions and concerns in the meantime  Patient/Guardian does understand that if they have any new stroke like symptoms such as facial droop on one side, weakness/paralysis on either side, speech trouble, numbness on one side, balance issues, any vision changes, extreme dizziness or any new headache, to call 9-1-1 immediately or to proceed to the nearest ER immediately 
Pt denies drinking any alcohol

## 2022-03-30 NOTE — PROGRESS NOTES
Review of Systems   Constitutional: Negative  Negative for appetite change and fever  HENT: Positive for tinnitus  Negative for hearing loss, trouble swallowing and voice change  Eyes: Negative  Negative for photophobia and pain  Respiratory: Negative  Negative for shortness of breath  Cardiovascular: Negative  Negative for palpitations  Gastrointestinal: Negative  Negative for nausea and vomiting  Endocrine: Negative  Negative for cold intolerance  Genitourinary: Negative  Negative for dysuria, frequency and urgency  Musculoskeletal: Positive for myalgias  Negative for neck pain  Skin: Negative  Negative for rash  Neurological: Positive for weakness and numbness (Right hand every morning when he wakes up)  Negative for dizziness, tremors, seizures, syncope, facial asymmetry, speech difficulty, light-headedness and headaches  Hematological: Bruises/bleeds easily  Psychiatric/Behavioral: Negative  Negative for confusion, hallucinations and sleep disturbance

## 2022-03-30 NOTE — PROGRESS NOTES
Patient ID: Maverick Blanton is a 78 y o  male       Assessment/Plan:        Problem List Items Addressed This Visit        Endocrine    Type 2 diabetes mellitus with diabetic neuropathy ([facility] [address] )  · Hgb A1C 6 7  · Monitors morning and with meals  He has Siri   · Every 3 months will see DM educator at [address]         Cardiovascular and Mediastinum    Essential hypertension (Chronic)  · Monitors maybe once a month  · Generally 120's/80's       Nervous and Auditory    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)  · Referral to PMR to see if he is a candidate for botox injections   · Continue baclofen 10mg three times a day  · Gabapentin 300mg at bedtime  Will increase and add an additional 100mg to start with (total 400mg) with slow taper to increase up to 600mg qhs        Other    Hyperlipidemia (Chronic)  · Atorvastatin 40mg daily   · LDL goal <100    History of stroke - Primary  · Continue plavix 75mg and atorvastatin daily for secondary stroke prevention  · Control vascular risk factors    Abnormal gait  · Physical therapy  · Ambulates with cane              History of Present Illness:   Maverick Blanton is a 78 y o  right handed male who presents in outpatient neurology clinic for stroke  Darin Cash  was last seen in clinic on 09/29/21  He is compliant with his plavix and atorvastatin  He denies any significant side effects  He denies any new stroke like symptoms  He ambulates with his cane  He will be starting physical therapy again through the [address]  He does wear a MAFO brace on the left  In regards to his spacticity he state the baclofen has been helpful  He does take it three times a day  He notes in colder weathers his arm and specifically his toes curl under which causes him intense pain  He notes he was started on gabapentin 300mg at bedtime for pain in his left arm described as if someone punched him   He states the gabapentin is helpful but does not last  He will wake up in 4-6 hours with the pain and needs to take tylenol  He does admit to accidentally taking an additional dose of gabapenin 300mg at that 4-6 hour fritz and had issues with extreme fatigue during the day  On occasions he will experience the same painful sensation in his left ankle         Results:     3/15/22 - hgbA1C 6 7  3/15/22 - cholesterol 118, Trig54, HDL 64, LDL 43      Past Medical history:     Past Medical History:   Diagnosis Date    Arthritis     BPH (benign prostatic hyperplasia)     last assessed 4/29/2014    Diabetes mellitus ([facility] )     Disease of thyroid gland     GERD (gastroesophageal reflux disease)     Hearing aid worn     Hyperlipidemia     Hypertension     Hypothyroidism     Mumps     Pro[facility] cancer ([facility] )     Stroke ([facility] )     Tingling sensation     bilateral feet occassionally    Tinnitus     Wears glasses     Wears partial dentures     upper       Patient Active Problem List   Diagnosis    DMII (diabetes mellitus, type 2) ([facility] )    Hypothyroidism    Hyperlipidemia    Stage 3 chronic kidney disease ([facility] )    Essential hypertension    Sensorineural hearing loss (SNHL), bilateral    Medicare annual wellness visit, subsequent    Adenocarcinoma of prostate ([facility] )    Long term current use of insulin ([facility] )    History of stroke    Type 2 diabetes mellitus with diabetic neuropathy (HCC)    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)    Abnormal gait    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)       Medications:      Current Outpatient Medications   Medication Sig Dispense Refill    amLODIPine (NORVASC) 5 mg tablet Take 1 tablet (5 mg total) by mouth daily 30 tablet 0    atorvastatin (LIPITOR) 40 mg tablet Take 1 tablet (40 mg total) by mouth daily with dinner 30 tablet 0    baclofen 10 mg tablet Take 1 tablet (10 mg total) by mouth 3 (three) times a day 270 tablet 1    clopidogrel (PLAVIX) 75 mg tablet Take 1 tablet (75 mg total) by mouth daily 90 tablet 1    docusate sodium (COLACE) 100 mg capsule Take 100 mg by mouth 2 (two) times a day      gabapentin (Neurontin) 300 mg capsule Take 1 capsule (300 mg total) by mouth daily at bedtime 90 capsule 3    Glucagon 1 MG/0 2ML SOSY INJECT 1 MG UNDER THE SKIN  AS NEEDED FOR SEVERE HYPOGLYCEMIA      levothyroxine (Synthroid) 100 mcg tablet Take 1 tablet (100 mcg total) by mouth daily in the early morning 90 tablet 3    lisinopril (ZESTRIL) 40 mg tablet Take 40 mg by mouth daily        Multiple Vitamin (MULTIVITAMIN) tablet Take 1 tablet by mouth daily   PATIENT MAINTAINED INSULIN PUMP Inject 0 4 each under the skin continuous        No current facility-administered medications for this visit          Allergies:    No Known Allergies    Family History:     Family History   Problem Relation Age of Onset    Cancer Mother     Diabetes Mother     Uterine cancer Mother     Diabetes Father     Cancer Father     Stroke Father         of unknown cause    Hypertension Father     Prostate cancer Father     Diabetes Family         Uncle, DM    Cancer Family         Grandmother       Social History:     Social History     Socioeconomic History    Marital status: /Civil Union     Spouse name: Not on file    Number of children: Not on file    Years of education: Not on file    Highest education level: Not on file   Occupational History    Occupation: printer  retired   Tobacco Use    Smoking status: Former Smoker    Smokeless tobacco: Never Used    Tobacco comment: quit about 48 years ago   Vaping Use    Vaping Use: Never used   Substance and Sexual Activity    Alcohol use: Not Currently     Alcohol/week: 2 0 standard drinks     Types: 2 Glasses of wine per week     Comment: week, social drinker    Drug use: No    Sexual activity: Not on file   Other Topics Concern    Not on file   Social History Narrative    Always uses seat belt    Caffeine use    Lives independently with spouse    Retired    Sun protection sunscreen     Social Determinants of Health     Financial Resource Strain: Not on file   Food Insecurity: Not on file   Transportation Needs: Not on file   Physical Activity: Not on file   Stress: Not on file   Social Connections: Not on file   Intimate Partner Violence: Not on file   Housing Stability: Not on file       The following portions of the patient's history were reviewed and updated as appropriate: allergies, current medications, past family history, past medical history, past social history, past surgical history and problem list    Review of Systems:   Review of Systems    Constitutional: Negative  Negative for appetite change and fever  HENT: Positive for tinnitus  Negative for hearing loss, trouble swallowing and voice change  Eyes: Negative  Negative for photophobia and pain  Respiratory: Negative  Negative for shortness of breath  Cardiovascular: Negative  Negative for palpitations  Gastrointestinal: Negative  Negative for nausea and vomiting  Endocrine: Negative  Negative for cold intolerance  Genitourinary: Negative  Negative for dysuria, frequency and urgency  Musculoskeletal: Positive for myalgias  Negative for neck pain  Skin: Negative  Negative for rash  Neurological: Positive for weakness and numbness (Right hand every morning when he wakes up)  Negative for dizziness, tremors, seizures, syncope, facial asymmetry, speech difficulty, light-headedness and headaches  Hematological: Bruises/bleeds easily  Psychiatric/Behavioral: Negative  Negative for confusion, hallucinations and sleep disturbance  ROS reviewed personally and edited as needed      Objective:   Physical Exam:  /80 (BP Location: Left arm, Patient Position: Sitting, Cuff Size: Standard)   Pulse 69   Temp 98 °F (36 7 °C)   Ht 5' 6" (1 676 m)   Wt 69 9 kg (154 lb)   BMI 24 86 kg/m²     Physical Exam  Eyes:      Extraocular Movements: EOM normal       Pupils: Pupils are equal, round, and reactive to light  Neurological:      Mental Status: He is oriented to person, place, and time  Coordination: Finger-Nose-Finger Test normal       Deep Tendon Reflexes:      Reflex Scores:       Tricep reflexes are 1+ on the left side  Bicep reflexes are 1+ on the right side and 2+ on the left side  Brachioradialis reflexes are 1+ on the right side and 2+ on the left side  Patellar reflexes are 1+ on the right side and 2+ (crossed extensor reflex present) on the left side  Achilles reflexes are 1+ on the right side and 1+ on the left side  Psychiatric:         Speech: Speech normal         Neurologic Exam     Mental Status   Oriented to person, place, and time  Follows 2 step commands  Attention: normal  Concentration: normal    Speech: speech is normal   Level of consciousness: alert  Knowledge: good  Normal comprehension  Cranial Nerves     CN III, IV, VI   Pupils are equal, round, and reactive to light  Extraocular motions are normal    Right pupil: Size: 3 mm  Shape: regular  Left pupil: Size: 3 mm  Shape: regular  CN III: no CN III palsy  CN VI: no CN VI palsy  Nystagmus: none   Diplopia: none  Ophthalmoparesis: none  Upgaze: normal  Downgaze: normal  Conjugate gaze: present    CN V   Facial sensation intact  CN VII   Facial expression full, symmetric  CN VIII   Hearing: intact    CN IX, X   Palate: symmetric    CN XI   Right trapezius strength: normal  Left trapezius strength: normal    CN XII   Tongue: not atrophic  Fasciculations: absent  Tongue deviation: none    Motor Exam   Left arm tone: spastic  Right arm pronator drift: absent  Left arm pronator drift: absent  Left leg tone: increased    Strength   Strength 5/5 except as noted     LLE:  HF: 3/5  KF/KE: 3-4/5  DF/PF: 1/5     Sensory Exam   Light touch normal      Gait, Coordination, and Reflexes     Gait  Gait: circumduction    Coordination   Finger to nose coordination: normal    Reflexes   Right brachioradialis: 1+  Left brachioradialis: 2+  Right biceps: 1+  Left biceps: 2+  Left triceps: 1+  Right patellar: 1+  Left patellar: 2+ (crossed extensor reflex present)  Right achilles: 1+  Left achilles: 1+  Left plantar: equivocal  Right Hay: absent  Left Hay: present  Right ankle clonus: absent  Left ankle clonus: absent  circumducts left leg  Left arm held in flexed position at the elbow            I have spent 45 minutes with Patient  today in which greater than 50% of this time was spent in counseling/coordination of care regarding Diagnostic results, Prognosis, Risks and benefits of tx options, Intructions for management, Patient and family education, Importance of tx compliance, Risk factor reductions, Impressions and Plan of care as above

## 2022-04-04 ENCOUNTER — OFFICE VISIT (OUTPATIENT)
Dept: PHYSICAL THERAPY | Facility: HOME HEALTHCARE | Age: 77
End: 2022-04-04
Payer: COMMERCIAL

## 2022-04-04 DIAGNOSIS — R26.9 ABNORMALITY OF GAIT AS LATE EFFECT OF CEREBROVASCULAR ACCIDENT (CVA): Primary | ICD-10-CM

## 2022-04-04 DIAGNOSIS — G81.14 LEFT SPASTIC HEMIPARESIS (HCC): ICD-10-CM

## 2022-04-04 DIAGNOSIS — I69.398 ABNORMALITY OF GAIT AS LATE EFFECT OF CEREBROVASCULAR ACCIDENT (CVA): Primary | ICD-10-CM

## 2022-04-04 PROCEDURE — 97110 THERAPEUTIC EXERCISES: CPT

## 2022-04-04 PROCEDURE — 97112 NEUROMUSCULAR REEDUCATION: CPT

## 2022-04-04 PROCEDURE — 97140 MANUAL THERAPY 1/> REGIONS: CPT

## 2022-04-04 NOTE — PROGRESS NOTES
Daily Note     Today's date: 2022  Patient name: Chris Houston  : 1945  MRN: [id number]  Referring provider: Isabela Landry MD  Dx:   Encounter Diagnosis     ICD-10-CM    1  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)  I69 398     R26 9    2  Left spastic hemiparesis ([address] )  G81 14        Start Time: 1300  Stop Time: 1345  Total time in clinic (min): 45 minutes    Subjective: Pt has no complaints from previous session  He reports that he is looking forward to the weather getting warmer so he can start walking at the track  Objective: See treatment diary below      Assessment: Pt continues to present with some balance deficits, however, is overall balance is improving  When performing training on biodex, he has the most difficulty with hitting the targets on the L side due to decreased weight shifting on the L due to deficits from his CVA  Pt would benefit from continued PT  Plan: Continue per plan of care        Precautions: [facility] CVA    Re-eval Date: 2022        Manuals 3/21/2022 3/23 3-28-22 4/4/22 3-14 3/16   Karl hamstring/glute stretching nv NV Supine 10' Supine 10' Supine 10' Supine 10'                              Neuro Re-Ed   3-28-22  3-14    Patient education         Romberg balance 20"x2 EO 20" x 2 EO 30"x1 EO 30"x1 EO 20"x2 EO 20"x2 EO   Tandem balance 20"x2 EO 20" x 2 EO 30"x2 EO 30"x2 EO 20" x2 EO 20" x2 EO   Cone taps         Biodex   3-28-22  3-14    LOS Easy/static  29"  52% Easy/static  28" 53% Easy/static  33"  45% Easy/static  32"  63% Easy/static  33"  44% Easy/static  41"  33%   LOS Easy/static  34"  40% Easy/Static  34" 44% Easy/static  27"  60% Easy/static  33"  55% Easy/static  29" 50% Easy/static  26" 67%   Maze Easy/static  20"  67% Easy/Static  23" 67% Easy static  31"  46% Easy static  18"  82% Easy/static  19"  75% Easy/static  30"  57%   Maze Easy/static  17"  75% Easy/Static  20" 64% Easy static  14"  89% Easy static  21"  57% Easy/static  16"  78% Easy/static  18"  75%   Catch Game LOS easy 1' LOS Easy 1:00 LOS Easy 1' LOS Easy 1' LOS easy 1' LOS easy 1'    Random easy 1' Random Easy 1:00 Random Easy 1' Random Easy 1' Random easy 1' Random easy 1'   Ther Ex     3-14    Nustep L2 10' Seat 7 Seat 8 L2  10 mins L2 10' seat 7 L2 10' seat 7 L2 10' Seat 7 L2 10' Seat 7   Standing hip flex/ext/abd 1x15 ea Karl 1 x 15 ea Karl 1x20 ea Karl 1x20 ea Karl 1x15 ea Karl 1x15 ea Karl   Squats 1x15 1 x 15 1x20 1x20 1x15 1x15   Step ups         HR/TR 1x20 ea 1 x 20 ea 1x20 ea 1x20 ea 1x20 1x20 ea   Bridges 2x10 2 x 10 2x10 2x10 2x10 2x10   Sit to stands   nv      LAQ 1x20 2 x 10 2x10 2x10 1x20 1x20   Seated hamstring stretch         Supine hip abduction         Supine hip add 5" x20 5" x 20  Seated 5" x20 Seated 5" x20 5" x20 5" x20   Hip abduction machine         Leg press   40# x20  60# x20 60# x40     Ther Activity                           Gait Training         Ambulation with SPC-laps around clinic 6 laps 10 laps 10 laps nv declined 6 laps            Modalities

## 2022-04-05 ENCOUNTER — OFFICE VISIT (OUTPATIENT)
Dept: PHYSICAL THERAPY | Facility: HOME HEALTHCARE | Age: 77
End: 2022-04-05
Payer: COMMERCIAL

## 2022-04-05 DIAGNOSIS — R26.9 ABNORMALITY OF GAIT AS LATE EFFECT OF CEREBROVASCULAR ACCIDENT (CVA): Primary | ICD-10-CM

## 2022-04-05 DIAGNOSIS — I69.398 ABNORMALITY OF GAIT AS LATE EFFECT OF CEREBROVASCULAR ACCIDENT (CVA): Primary | ICD-10-CM

## 2022-04-05 PROCEDURE — 97140 MANUAL THERAPY 1/> REGIONS: CPT

## 2022-04-05 PROCEDURE — 97112 NEUROMUSCULAR REEDUCATION: CPT

## 2022-04-05 PROCEDURE — 97110 THERAPEUTIC EXERCISES: CPT

## 2022-04-05 NOTE — PROGRESS NOTES
Daily Note     Today's date: 2022  Patient name: Chris Houston  : 1945  MRN: [id number]  Referring provider: Isabela Landry MD  Dx: No diagnosis found  Start Time: 1100          Subjective: No c/o  Objective: See treatment diary below    Assessment: Tolerated treatment well  Pt with improved gait, balance and coordination noted during ambulation using SPC  Pt progressing well with PT and was able to hemal added hip abd machine with good hemal  Pt remains with strength and ROM limitations and would benefit from continued PTto improve deficits  Plan: Continue per plan of care        Precautions: [facility] CVA    Re-eval Date: 2022        Manuals 3/21/2022 3/23 3-28-22 4/4/22 4-5 3/16   Karl hamstring/glute stretching nv NV Supine 10' Supine 10' Supine 10' Supine 10'                              Neuro Re-Ed   3-28-22  4-5    Patient education         Romberg balance 20"x2 EO 20" x 2 EO 30"x1 EO 30"x1 EO 20"x2 EO 20"x2 EO   Tandem balance 20"x2 EO 20" x 2 EO 30"x2 EO 30"x2 EO 20" x2 EO 20" x2 EO   Cone taps         Biodex   3-28-22  4-5    LOS Easy/static  29"  52% Easy/static  28" 53% Easy/static  33"  45% Easy/static  32"  63% Easy/static  28"  49% Easy/static  41"  33%   LOS Easy/static  34"  40% Easy/Static  34" 44% Easy/static  27"  60% Easy/static  33"  55% Easy/static  28"  54% Easy/static  26" 67%   Maze Easy/static  20"  67% Easy/Static  23" 67% Easy static  31"  46% Easy static  18"  82% Easy/static  18"  75% Easy/static  30"  57%   Maze Easy/static  17"  75% Easy/Static  20" 64% Easy static  14"  89% Easy static  21"  57% Med/static  42"  48% Easy/static  18"  75%   Catch Game LOS easy 1' LOS Easy 1:00 LOS Easy 1' LOS Easy 1' LOS easy 1'  Med NV LOS easy 1'    Random easy 1' Random Easy 1:00 Random Easy 1' Random Easy 1' Random easy 1'  Med NV Random easy 1'   Ther Ex     4-5    Nustep L2 10' Seat 7 Seat 8 L2  10 mins L2 10' seat 7 L2 10' seat 7 L2 10' Seat #8 L2 10' Seat 7   Standing hip flex/ext/abd 1x15 ea Karl 1 x 15 ea Karl 1x20 ea Karl 1x20 ea Karl 1x30 ea Karl 1x15 ea Karl   Squats 1x15 1 x 15 1x20 1x20 1x20 1x15   Step ups         HR/TR 1x20 ea 1 x 20 ea 1x20 ea 1x20 ea 1x20 1x20 ea   Bridges 2x10 2 x 10 2x10 2x10 2x10 2x10   Sit to stands   nv      LAQ 1x20 2 x 10 2x10 2x10 1x20 1x20   Seated hamstring stretch         Supine hip abduction         Supine hip add 5" x20 5" x 20  Seated 5" x20 Seated 5" x20 5" x20 5" x20   Hip abduction machine     20# 3x10    Leg press   40# x20  60# x20 60# x40 60# x 30    Ther Activity                           Gait Training         Ambulation with SPC-laps around clinic 6 laps 10 laps 10 laps nv declined 6 laps            Modalities

## 2022-04-11 ENCOUNTER — OFFICE VISIT (OUTPATIENT)
Dept: PHYSICAL THERAPY | Facility: HOME HEALTHCARE | Age: 77
End: 2022-04-11
Payer: COMMERCIAL

## 2022-04-11 DIAGNOSIS — I69.398 ABNORMALITY OF GAIT AS LATE EFFECT OF CEREBROVASCULAR ACCIDENT (CVA): Primary | ICD-10-CM

## 2022-04-11 DIAGNOSIS — R26.9 ABNORMALITY OF GAIT AS LATE EFFECT OF CEREBROVASCULAR ACCIDENT (CVA): Primary | ICD-10-CM

## 2022-04-11 PROCEDURE — 97110 THERAPEUTIC EXERCISES: CPT

## 2022-04-11 PROCEDURE — 97140 MANUAL THERAPY 1/> REGIONS: CPT

## 2022-04-11 PROCEDURE — 97112 NEUROMUSCULAR REEDUCATION: CPT

## 2022-04-11 NOTE — PROGRESS NOTES
Daily Note     Today's date: 2022  Patient name: Jf Segal  : 1945  MRN: [id number]  Referring provider: Giana Parker MD  Dx: No diagnosis found  Start Time: 915          Subjective: I have an appt with  tomorrow about my brace  Objective: See treatment diary below    Assessment: Tolerated treatment well  Difficult to progress pt as he remains challenged with current program  Pt did hemal all TE and MT without c/o pain  Patient would benefit from continued PT    Plan: Continue per plan of care        Precautions: [facility] CVA    Re-eval Date: 2022        Manuals 3/21/2022 3/23 3-28-22 4/4/22 4-5 4-11   Karl hamstring/glute stretching nv NV Supine 10' Supine 10' Supine 10' Supine 10'                              Neuro Re-Ed   3-28-22  4-5 4-11   Patient education         Romberg balance 20"x2 EO 20" x 2 EO 30"x1 EO 30"x1 EO 20"x2 EO 20"x2 EO   Tandem balance 20"x2 EO 20" x 2 EO 30"x2 EO 30"x2 EO 20" x2 EO 20" x2 EO   Cone taps         Biodex   3-28-22  4-5    LOS Easy/static  29"  52% Easy/static  28" 53% Easy/static  33"  45% Easy/static  32"  63% Easy/static  28"  49% Easy/static  33"  49%   LOS Easy/static  34"  40% Easy/Static  34" 44% Easy/static  27"  60% Easy/static  33"  55% Easy/static  28"  54% Easy/static  33"  50%   Maze Easy/static  20"  67% Easy/Static  23" 67% Easy static  31"  46% Easy static  18"  82% Easy/static  18"  75% Easy/static  50"  53%   Maze Easy/static  17"  75% Easy/Static  20" 64% Easy static  14"  89% Easy static  21"  57% Med/static  42"  48% Easy/static  17"  85%   Catch Game LOS easy 1' LOS Easy 1:00 LOS Easy 1' LOS Easy 1' LOS easy 1'  Med NV LOS Med 1'    Random easy 1' Random Easy 1:00 Random Easy 1' Random Easy 1' Random easy 1'  Med NV Random Med 1'   Ther Ex     4-5 4-11   Nustep L2 10' Seat 7 Seat 8 L2  10 mins L2 10' seat 7 L2 10' seat 7 L2 10' Seat #8 L2 10' Seat 7   Standing hip flex/ext/abd 1x15 ea Karl 1 x 15 ea Karl 1x20 ea Karl 1x20 ea Karl 1x30 ea Karl 1x20 ea Karl   Squats 1x15 1 x 15 1x20 1x20 1x20 1x15   Step ups         HR/TR 1x20 ea 1 x 20 ea 1x20 ea 1x20 ea 1x20 1x20 ea   Bridges 2x10 2 x 10 2x10 2x10 2x10 2x10   Sit to stands   nv      LAQ 1x20 2 x 10 2x10 2x10 1x20 1x20   Seated hamstring stretch         Supine hip abduction         Supine hip add 5" x20 5" x 20  Seated 5" x20 Seated 5" x20 5" x20 5" x20   Hip abduction machine     20# 3x10 20# 2x10   Leg press   40# x20  60# x20 60# x40 60# x 30 60# x 30   Ther Activity                           Gait Training         Ambulation with SPC-laps around clinic 6 laps 10 laps 10 laps nv declined declined            Modalities

## 2022-04-12 ENCOUNTER — APPOINTMENT (OUTPATIENT)
Dept: LAB | Facility: HOSPITAL | Age: 77
End: 2022-04-12
Attending: UROLOGY
Payer: COMMERCIAL

## 2022-04-12 DIAGNOSIS — C61 PROSTATE CANCER (HCC): ICD-10-CM

## 2022-04-12 LAB — PSA SERPL-MCNC: <0.1 NG/ML (ref 0–4)

## 2022-04-12 PROCEDURE — 84153 ASSAY OF PSA TOTAL: CPT

## 2022-04-13 ENCOUNTER — OFFICE VISIT (OUTPATIENT)
Dept: PHYSICAL THERAPY | Facility: HOME HEALTHCARE | Age: 77
End: 2022-04-13
Payer: COMMERCIAL

## 2022-04-13 DIAGNOSIS — I69.398 ABNORMALITY OF GAIT AS LATE EFFECT OF CEREBROVASCULAR ACCIDENT (CVA): Primary | ICD-10-CM

## 2022-04-13 DIAGNOSIS — R26.9 ABNORMALITY OF GAIT AS LATE EFFECT OF CEREBROVASCULAR ACCIDENT (CVA): Primary | ICD-10-CM

## 2022-04-13 DIAGNOSIS — G81.14 LEFT SPASTIC HEMIPARESIS (HCC): ICD-10-CM

## 2022-04-13 PROCEDURE — 97112 NEUROMUSCULAR REEDUCATION: CPT | Performed by: PHYSICAL THERAPIST

## 2022-04-13 PROCEDURE — 97140 MANUAL THERAPY 1/> REGIONS: CPT | Performed by: PHYSICAL THERAPIST

## 2022-04-13 PROCEDURE — 97110 THERAPEUTIC EXERCISES: CPT | Performed by: PHYSICAL THERAPIST

## 2022-04-13 NOTE — PROGRESS NOTES
Daily Note     Today's date: 2022  Patient name: Jesu Larkin  : 1945  MRN: [id number]  Referring provider: Abigail Antonio MD  Dx:   Encounter Diagnosis     ICD-10-CM    1  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)  I69 398     R26 9    2  Left spastic hemiparesis (HCC)  G81 14                   Subjective: Pt notes that he got his other brace but; going well so far but this brace doesn't have a hinge at the foot so a little hard to move  Objective: See treatment diary below      Assessment: Pt agreeable to entire session without evident of fatigue  Mild balance deficits noted but able to self stabilize as needed with hand help support  B HS tightness remains evident with manual stretching  Plan: Continue per plan of care        Precautions: [facility] CVA    Re-eval Date: 2022        Manuals 4/13 3/23 3-28-22 4/4/22 4-5 4-11   Karl hamstring/glute stretching 10 min  NV Supine 10' Supine 10' Supine 10' Supine 10'                              Neuro Re-Ed   3-28-22  4-5 4-11   Patient education         Romberg balance 30" x 1 EO  20" x 2 EO 30"x1 EO 30"x1 EO 20"x2 EO 20"x2 EO   Tandem balance 30" x 2 EO  20" x 2 EO 30"x2 EO 30"x2 EO 20" x2 EO 20" x2 EO   Cone taps         Biodex   3-28-22  4-5    LOS Easy/static   27"  66%  Easy/static  28" 53% Easy/static  33"  45% Easy/static  32"  63% Easy/static  28"  49% Easy/static  33"  49%   LOS Easy/static   42"  28%    Easy/Static  34" 44% Easy/static  27"  60% Easy/static  33"  55% Easy/static  28"  54% Easy/static  33"  50%   Maze Easy/Static   20"  64%    Easy/Static  23" 67% Easy static  31"  46% Easy static  18"  82% Easy/static  18"  75% Easy/static  50"  53%   Maze Easy/Static   22"  66%  Easy/Static  20" 64% Easy static  14"  89% Easy static  21"  57% Med/static  42"  48% Easy/static  17"  85%   Catch Game Easy   1:00  LOS Easy 1:00 LOS Easy 1' LOS Easy 1' LOS easy 1'  Med NV LOS Med 1'     Random Easy 1:00 Random Easy 1' Random Easy 1' Random easy 1'  Med NV Random Med 1'   Ther Ex     4-5 4-11   Nustep Seated 8 L2   10 minutes  Seat 8 L2  10 mins L2 10' seat 7 L2 10' seat 7 L2 10' Seat #8 L2 10' Seat 7   Standing hip flex/ext/abd 1 x 20 ea karl  1 x 15 ea Karl 1x20 ea Karl 1x20 ea Karl 1x30 ea Karl 1x20 ea Karl   Squats 1 x 20  1 x 15 1x20 1x20 1x20 1x15   Step ups         HR/TR 1 x 20  1 x 20 ea 1x20 ea 1x20 ea 1x20 1x20 ea   Bridges 2 x 10  2 x 10 2x10 2x10 2x10 2x10   Sit to stands   nv      LAQ  2 x 10 2x10 2x10 1x20 1x20   Seated hamstring stretch         Supine hip abduction         Supine hip add 5" x 20  5" x 20  Seated 5" x20 Seated 5" x20 5" x20 5" x20   Hip abduction machine     20# 3x10 20# 2x10   Leg press 60# x 30   70# NV   40# x20  60# x20 60# x40 60# x 30 60# x 30   Ther Activity                           Gait Training         Ambulation with SPC-laps around clinic  10 laps 10 laps nv declined declined            Modalities

## 2022-04-18 ENCOUNTER — OFFICE VISIT (OUTPATIENT)
Dept: PHYSICAL THERAPY | Facility: HOME HEALTHCARE | Age: 77
End: 2022-04-18
Payer: COMMERCIAL

## 2022-04-18 DIAGNOSIS — R26.9 ABNORMALITY OF GAIT AS LATE EFFECT OF CEREBROVASCULAR ACCIDENT (CVA): Primary | ICD-10-CM

## 2022-04-18 DIAGNOSIS — I63.9 CEREBROVASCULAR ACCIDENT (CVA), UNSPECIFIED MECHANISM (HCC): ICD-10-CM

## 2022-04-18 DIAGNOSIS — I69.398 SPASTICITY AS LATE EFFECT OF CEREBROVASCULAR ACCIDENT (CVA): ICD-10-CM

## 2022-04-18 DIAGNOSIS — I69.398 ABNORMALITY OF GAIT AS LATE EFFECT OF CEREBROVASCULAR ACCIDENT (CVA): Primary | ICD-10-CM

## 2022-04-18 DIAGNOSIS — R25.2 SPASTICITY AS LATE EFFECT OF CEREBROVASCULAR ACCIDENT (CVA): ICD-10-CM

## 2022-04-18 DIAGNOSIS — G81.14 LEFT SPASTIC HEMIPARESIS (HCC): ICD-10-CM

## 2022-04-18 PROCEDURE — 97112 NEUROMUSCULAR REEDUCATION: CPT

## 2022-04-18 PROCEDURE — 97140 MANUAL THERAPY 1/> REGIONS: CPT

## 2022-04-18 PROCEDURE — 97110 THERAPEUTIC EXERCISES: CPT

## 2022-04-18 RX ORDER — GABAPENTIN 300 MG/1
CAPSULE ORAL
Qty: 30 CAPSULE | Refills: 2 | Status: SHIPPED | OUTPATIENT
Start: 2022-04-18 | End: 2022-09-30

## 2022-04-18 NOTE — PROGRESS NOTES
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)  I69 398     R26 9    2  Left spastic hemiparesis ([address] )  G81 14        Start Time: 09  Stop Time: 1017  Total time in clinic (min): 61 minutes    Subjective: Patient states that he is okay prior to start of session  He states that over the weekend we went for a walk in a store and had a shooting pain on his (R) medial knee into calf  He said post taking OTC pain med it resolved and has not come back  Objective: See treatment diary below      Assessment: Patient tolerated treatment well  No LOB during LOS but min VCs for weight shifting utilize  PTA stabilized LLE during LP to maintain correct form through reps  Patient would benefit from continued PT  Plan: Continue per plan of care        Precautions: [facility] CVA    Re-eval Date: 2022        Manuals 4/13 4/18 3-28-22 4/4/22 4-5 4-11   Karl hamstring/glute stretching 10 min  10 min Supine 10' Supine 10' Supine 10' Supine 10'                              Neuro Re-Ed  4/18 3-28-22  4-5 4-11   Patient education         Romberg balance 30" x 1 EO  NT, NV 30"x1 EO 30"x1 EO 20"x2 EO 20"x2 EO   Tandem balance 30" x 2 EO  NT, NV 30"x2 EO 30"x2 EO 20" x2 EO 20" x2 EO   Cone taps         Biodex   3-28-22  4-5    LOS Easy/static   27"  66%  Easy/static   27"  19%  Easy/static  33"  45% Easy/static  32"  63% Easy/static  28"  49% Easy/static  33"  49%   LOS Easy/static   42"  28%    Easy/static   48" 28% Easy/static  27"  60% Easy/static  33"  55% Easy/static  28"  54% Easy/static  33"  50%   Maze Easy/Static   20"  64%    Easy/Static   21"  85%  Easy static  31"  46% Easy static  18"  82% Easy/static  18"  75% Easy/static  50"  53%   Maze Easy/Static   22"  66%  Easy/Static   13"  78%  Easy static  14"  89% Easy static  21"  57% Med/static  42"  48% Easy/static  17"  85%   Catch Game Easy   1:00  Easy   1:00  LOS Easy 1' LOS Easy 1' LOS easy 1'  Med NV LOS Med 1'      Random Easy 1' Random Easy 1' Random easy 1'  Med NV Random Med 1'   Ther Ex     4-5 4-11   Nustep Seated 8 L2   10 minutes  Seated 8 L2   10 minutes  L2 10' seat 7 L2 10' seat 7 L2 10' Seat #8 L2 10' Seat 7   Standing hip flex/ext/abd 1 x 20 ea karl  1 x 20 ea karl 1x20 ea Karl 1x20 ea Karl 1x30 ea Karl 1x20 ea Karl   Squats 1 x 20  1x20 1x20 1x20 1x20 1x15   Step ups  NP       HR/TR 1 x 20  1x20 1x20 ea 1x20 ea 1x20 1x20 ea   Bridges 2 x 10  2 x 10  2x10 2x10 2x10 2x10   Sit to stands   nv      LAQ  2x10 2x10 2x10 1x20 1x20   Seated hamstring stretch         Supine hip abduction         Supine hip add 5" x 20  5" x 20  Seated 5" x20 Seated 5" x20 5" x20 5" x20   Hip abduction machine     20# 3x10 20# 2x10   Leg press 60# x 30   70# NV  70# 3x10 40# x20  60# x20 60# x40 60# x 30 60# x 30   Ther Activity                           Gait Training         Ambulation with SPC-laps around clinic  NT, NV 10 laps nv declined declined            Modalities

## 2022-04-18 NOTE — TELEPHONE ENCOUNTER
Patient's wife called for script for gabapentin to be increased as discussed in office visit  Please sign script (I set it to print), Patient's wife said needs to be faxed:    Sarah Coelholl, [address] [place] [address] Physician    Fax #[phone]    Thanks for your help

## 2022-04-20 ENCOUNTER — EVALUATION (OUTPATIENT)
Dept: PHYSICAL THERAPY | Facility: HOME HEALTHCARE | Age: 77
End: 2022-04-20
Payer: COMMERCIAL

## 2022-04-20 DIAGNOSIS — G81.14 LEFT SPASTIC HEMIPARESIS (HCC): ICD-10-CM

## 2022-04-20 DIAGNOSIS — R26.9 ABNORMALITY OF GAIT AS LATE EFFECT OF CEREBROVASCULAR ACCIDENT (CVA): Primary | ICD-10-CM

## 2022-04-20 DIAGNOSIS — I69.398 ABNORMALITY OF GAIT AS LATE EFFECT OF CEREBROVASCULAR ACCIDENT (CVA): Primary | ICD-10-CM

## 2022-04-20 PROCEDURE — 97110 THERAPEUTIC EXERCISES: CPT

## 2022-04-20 PROCEDURE — 97140 MANUAL THERAPY 1/> REGIONS: CPT

## 2022-04-20 PROCEDURE — 97112 NEUROMUSCULAR REEDUCATION: CPT

## 2022-04-20 NOTE — LETTER
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)  I69 398     R26 9    2  Left spastic hemiparesis [facility])  G81 14        Dear Dr Cast Lunch:    Thank you for your recent referral of Radha Jeffrey  Please review the attached evaluation summary from [facility] recent visit  Please verify that you agree with the plan of care by signing the attached order  If you have any questions or concerns, please do not hesitate to call  I sincerely appreciate the opportunity to share in the care of one of your patients and hope to have another opportunity to work with you in the near future  Sincerely,    Severiano Mosley, PT      Referring Provider:      I certify that I have read the below Plan of Care and certify the need for these services furnished under this plan of treatment while under my care  Tony Lion MD  [facility]  [address]  [address] [place] [address]  Via Fax: [phone]          PT Re-Evaluation     Today's date: 2022  Patient name: Radha Jeffrey  : 1945  MRN: [id number]  Referring provider: Marisela Manuel MD  Dx:   Encounter Diagnosis     ICD-10-CM    1  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)  I69 398     R26 9    2  Left spastic hemiparesis (HCC)  G81 14        Start Time: 1000  Stop Time: 1055  Total time in clinic (min): 55 minutes    Assessment  Assessment details: Pt has significant improvements in overall functional mobility, balance, fall risk, and ROM since beginning PT  He has met his goals for independence with HEP, hip ROM, and tandem balance, however, he is still progressing towards his goals for strength, ROM, TUG, and overall mobility    Pt demonstrated a significant improvement in TUG score from previous evaluation, however, he continues to be a moderate fall risk based on his score  Pt would benefit from continued PT in order to further improve in strength, ROM, functional mobility, fall risk, quality of life, and return to PLOF  Thank you! Impairments: abnormal gait, abnormal muscle firing, abnormal muscle tone, abnormal or restricted ROM, abnormal movement, activity intolerance, impaired balance, impaired physical strength, lacks appropriate home exercise program, safety issue, weight-bearing intolerance, poor posture  and poor body mechanics    Goals  STG: 3-4 weeks  Pt will be independent with HEP in order to continue to progress outside of PT treatment sessions  Met  Pt will improve in functional mobility as demonstrated by L hip flexion PROM of 100 deg or greater  Met  LT-8 weeks  Pt will improve in functional mobility as demonstrated by a TUG score of 15 seconds or less  Progressing  Pt will improve in functional mobility and decrease in fall risk as demonstrated by tandem balance of 5 seconds or greater without using UE's for support  Met   Pt will improve in functional mobility as demonstrated by strength levels of 3/5 or greater  Pt will improve in functional mobility as demonstrated by ability to independently ambulate on uneven surfaces with SPC   Progressing      Plan  Patient would benefit from: skilled physical therapy  Planned modality interventions: thermotherapy: hydrocollator packs  Planned therapy interventions: ADL retraining, balance, body mechanics training, balance/weight bearing training, flexibility, functional ROM exercises, gait training, home exercise program, joint mobilization, manual therapy, massage, neuromuscular re-education, orthotic fitting/training, patient education, postural training, strengthening, stretching, therapeutic activities and therapeutic exercise  Frequency: 2x week  Duration in weeks: 4  Plan of Care beginning date: 2022  Plan of Care expiration date: 5/20/2022  Treatment plan discussed with: patient        Subjective Evaluation    History of Present Illness  Mechanism of injury: Pt with PMH of CVA on March 3rd, 2021 reports that he went to the [place] to get a new brace because it was starting to come apart  Pt was recommended to go to physical therapy again after receiving new L sided AFO brace  Pt reports this one is taller than the previous one  Pt reports that he was walking for a mile or more prior to the colder weather and snow  Pt is able to walk on level ground, but has difficulty walking on uneven surfaces or inclines  Pt wants to keep walking and working on strengthening  Pt walked 4 miles per day prior to CVA  Pt reports that he has more spasticity when the weather is colder  Pt reports no difficulty with stairs  Pt reports that he feels like he has been regressing recently because he has been unable to exercise as much  Pt feels numbness and tingling in his R hand when he wakes up in the morning  UPDATE 2/16/2022:  Pt reports that he does not feel like he seen any changes with physical therapy, however, he reports that his wife and his friend think that he is walking better  UPDATE 3/21/2022:  Pt reports that it is difficult for him to tell if he has been improving since beginning PT  UPDATE 4/20/2022:  Pt reports that he feels like he has been improving in his overall balance and mobility  Pt reports that he continues to have the most difficulty with tandem balance    Social Support  Steps to enter house: yes (1)  Stairs in house: yes (13)     Treatments  Previous treatment: physical therapy and occupational therapy  Patient Goals  Patient goals for therapy: improved balance, increased motion, increased strength, independence with ADLs/IADLs and return to sport/leisure activities  Patient goal: To be able to walk right, leg and arms functioning        Objective     Neurological Testing     Sensation     Lumbar Left   Intact: light touch    Right   Intact: light touch    Passive Range of Motion   Left Hip   Flexion: 100 degrees     Right Hip   Flexion: 110 degrees   Left Knee   Flexion: WFL  Extension: WFL    Right Knee   Flexion: WFL  Extension: WFL    Additional Passive Range of Motion Details  SLR: L: 70 deg, R: 80 deg    Strength/Myotome Testing     Left Hip   Planes of Motion   Flexion: 3-  Abduction: 3-    Right Hip   Planes of Motion   Flexion: 5  Abduction: 4+    Left Knee   Flexion: 3-  Extension: 3    Right Knee   Flexion: 5  Extension: 5    Ambulation   Weight-Bearing Status     Additional Weight-Bearing Status Details  SPC    Ambulation: Level Surfaces   Ambulation with assistive device: independent    Ambulation: Stairs   Ascend stairs: independent  Pattern: non-reciprocal  Railings: one rail  Descend stairs: independent  Pattern: non-reciprocal  Railings: one rail  Curbs: independent    Additional Stairs Ambulation Details  W/ SPC    Observational Gait   Decreased walking speed, stride length and left stance time     Left foot contact pattern: foot flat    Quality of Movement During Gait     Additional Quality of Movement During Gait Details  Hemiplegic gait    Functional Assessment        Comments  TU 21s  Romberg: EO: 30s, EC: 30s  Tandem L: 10s, R: <30s  Neuro Exam:     Modified Chris   Left hamstrin  Left quadricepts: 2             Precautions: PMH CVA    Re-eval Date: 2022        Manuals 22 4-5 4-11   Karl hamstring/glute stretching 10 min  10 min 10 min Supine 10' Supine 10' Supine 10'                              Neuro Re-Ed     4-5 4-11   Patient education         Romberg balance 30" x 1 EO  NT, NV 30" x 1 EO  30"x1 EO 20"x2 EO 20"x2 EO   Tandem balance 30" x 2 EO  NT, NV 30" x 2 EO 30"x2 EO 20" x2 EO 20" x2 EO   Cone taps         Biodex     4-5    LOS Easy/static   27"  66%  Easy/static   27"  19%  Easy/static   26"  66%  Easy/static  32"  63% Easy/static  28" 49% Easy/static  33"  49%   LOS Easy/static   42"  28%    Easy/static   48" 28% Easy/static   27"  72%  Easy/static  33"  55% Easy/static  28"  54% Easy/static  33"  50%   Maze Easy/Static   20"  64%    Easy/Static   21"  85%  Easy/Static   16"  96% Easy static  18"  82% Easy/static  18"  75% Easy/static  50"  53%   Maze Easy/Static   22"  66%  Easy/Static   13"  78%  Easy/Static   16"  92% Easy static  21"  57% Med/static  42"  48% Easy/static  17"  85%   Catch Game Easy   1:00  Easy   1:00  LOS Med  1:00 LOS Easy 1' LOS easy 1'  Med NV LOS Med 1'       Random Easy 1' Random easy 1'  Med NV Random Med 1'   Ther Ex     4-5 4-11   Nustep Seated 8 L2   10 minutes  Seated 8 L2   10 minutes  L2 10' seat 7 L2 10' seat 7 L2 10' Seat #8 L2 10' Seat 7   Standing hip flex/ext/abd 1 x 20 ea karl  1 x 20 ea karl 1 x 20 ea karl 1x20 ea Karl 1x30 ea Karl 1x20 ea Karl   Squats 1 x 20  1x20 1x20  1x20 1x20 1x15   Step ups  NP       HR/TR 1 x 20  1x20 1x20  1x20 ea 1x20 1x20 ea   Bridges 2 x 10  2 x 10  2x10 2x10 2x10 2x10   Sit to stands         LAQ  2x10 2x10 2x10 1x20 1x20   Seated hamstring stretch         Supine hip abduction         Supine hip add 5" x 20  5" x 20  5" x 20  Seated 5" x20 5" x20 5" x20   Hip abduction machine   45# 3x10  20# 3x10 20# 2x10   Leg press 60# x 30   70# NV  70# 3x10 70# 3x10 60# x40 60# x 30 60# x 30   Ther Activity                           Gait Training         Ambulation with SPC-laps around clinic  NT, NV  nv declined declined            Modalities

## 2022-04-20 NOTE — PROGRESS NOTES
PT Re-Evaluation     Today's date: 2022  Patient name: Colin Mathew  : 1945  MRN: [id number]  Referring provider: Grant Palafox MD  Dx:   Encounter Diagnosis     ICD-10-CM    1  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)  I69 398     R26 9    2  Left spastic hemiparesis (HCC)  G81 14        Start Time: 1000  Stop Time: 1055  Total time in clinic (min): 55 minutes    Assessment  Assessment details: Pt has significant improvements in overall functional mobility, balance, fall risk, and ROM since beginning PT  He has met his goals for independence with HEP, hip ROM, and tandem balance, however, he is still progressing towards his goals for strength, ROM, TUG, and overall mobility  Pt demonstrated a significant improvement in TUG score from previous evaluation, however, he continues to be a moderate fall risk based on his score  Pt would benefit from continued PT in order to further improve in strength, ROM, functional mobility, fall risk, quality of life, and return to PLOF  Thank you! Impairments: abnormal gait, abnormal muscle firing, abnormal muscle tone, abnormal or restricted ROM, abnormal movement, activity intolerance, impaired balance, impaired physical strength, lacks appropriate home exercise program, safety issue, weight-bearing intolerance, poor posture  and poor body mechanics    Goals  STG: 3-4 weeks  Pt will be independent with HEP in order to continue to progress outside of PT treatment sessions  Met  Pt will improve in functional mobility as demonstrated by L hip flexion PROM of 100 deg or greater  Met  LT-8 weeks  Pt will improve in functional mobility as demonstrated by a TUG score of 15 seconds or less  Progressing  Pt will improve in functional mobility and decrease in fall risk as demonstrated by tandem balance of 5 seconds or greater without using UE's for support   Met   Pt will improve in functional mobility as demonstrated by strength levels of 3/5 or greater  Pt will improve in functional mobility as demonstrated by ability to independently ambulate on uneven surfaces with SPC  Progressing      Plan  Patient would benefit from: skilled physical therapy  Planned modality interventions: thermotherapy: hydrocollator packs  Planned therapy interventions: ADL retraining, balance, body mechanics training, balance/weight bearing training, flexibility, functional ROM exercises, gait training, home exercise program, joint mobilization, manual therapy, massage, neuromuscular re-education, orthotic fitting/training, patient education, postural training, strengthening, stretching, therapeutic activities and therapeutic exercise  Frequency: 2x week  Duration in weeks: 4  Plan of Care beginning date: 4/20/2022  Plan of Care expiration date: 5/20/2022  Treatment plan discussed with: patient        Subjective Evaluation    History of Present Illness  Mechanism of injury: Pt with PMH of CVA on March 3rd, 2021 reports that he went to the [place] to get a new brace because it was starting to come apart  Pt was recommended to go to physical therapy again after receiving new L sided AFO brace  Pt reports this one is taller than the previous one  Pt reports that he was walking for a mile or more prior to the colder weather and snow  Pt is able to walk on level ground, but has difficulty walking on uneven surfaces or inclines  Pt wants to keep walking and working on strengthening  Pt walked 4 miles per day prior to CVA  Pt reports that he has more spasticity when the weather is colder  Pt reports no difficulty with stairs  Pt reports that he feels like he has been regressing recently because he has been unable to exercise as much  Pt feels numbness and tingling in his R hand when he wakes up in the morning      UPDATE 2/16/2022:  Pt reports that he does not feel like he seen any changes with physical therapy, however, he reports that his wife and his friend think that he is walking better  UPDATE 3/21/2022:  Pt reports that it is difficult for him to tell if he has been improving since beginning PT  UPDATE 4/20/2022:  Pt reports that he feels like he has been improving in his overall balance and mobility  Pt reports that he continues to have the most difficulty with tandem balance  Social Support  Steps to enter house: yes (1)  Stairs in house: yes (13)     Treatments  Previous treatment: physical therapy and occupational therapy  Patient Goals  Patient goals for therapy: improved balance, increased motion, increased strength, independence with ADLs/IADLs and return to sport/leisure activities  Patient goal: To be able to walk right, leg and arms functioning        Objective     Neurological Testing     Sensation     Lumbar   Left   Intact: light touch    Right   Intact: light touch    Passive Range of Motion   Left Hip   Flexion: 100 degrees     Right Hip   Flexion: 110 degrees   Left Knee   Flexion: WFL  Extension: WFL    Right Knee   Flexion: WFL  Extension: WFL    Additional Passive Range of Motion Details  SLR: L: 70 deg, R: 80 deg    Strength/Myotome Testing     Left Hip   Planes of Motion   Flexion: 3-  Abduction: 3-    Right Hip   Planes of Motion   Flexion: 5  Abduction: 4+    Left Knee   Flexion: 3-  Extension: 3    Right Knee   Flexion: 5  Extension: 5    Ambulation   Weight-Bearing Status     Additional Weight-Bearing Status Details  SPC    Ambulation: Level Surfaces   Ambulation with assistive device: independent    Ambulation: Stairs   Ascend stairs: independent  Pattern: non-reciprocal  Railings: one rail  Descend stairs: independent  Pattern: non-reciprocal  Railings: one rail  Curbs: independent    Additional Stairs Ambulation Details  W/ SPC    Observational Gait   Decreased walking speed, stride length and left stance time     Left foot contact pattern: foot flat    Quality of Movement During Gait     Additional Quality of Movement During Gait Details  Hemiplegic gait    Functional Assessment        Comments  TU 21s  Romberg: EO: 30s, EC: 30s  Tandem L: 10s, R: <30s  Neuro Exam:     Modified Chris   Left hamstrin  Left quadricepts: 2             Precautions: PMH CVA    Re-eval Date: 2022        Manuals 22 4-5 4-11   Karl hamstring/glute stretching 10 min  10 min 10 min Supine 10' Supine 10' Supine 10'                              Neuro Re-Ed     4-5 4-11   Patient education         Romberg balance 30" x 1 EO  NT, NV 30" x 1 EO  30"x1 EO 20"x2 EO 20"x2 EO   Tandem balance 30" x 2 EO  NT, NV 30" x 2 EO 30"x2 EO 20" x2 EO 20" x2 EO   Cone taps         Biodex     4-5    LOS Easy/static   27"  66%  Easy/static   27"  19%  Easy/static   26"  66%  Easy/static  32"  63% Easy/static  28"  49% Easy/static  33"  49%   LOS Easy/static   42"  28%    Easy/static   48" 28% Easy/static   27"  72%  Easy/static  33"  55% Easy/static  28"  54% Easy/static  33"  50%   Maze Easy/Static   20"  64%    Easy/Static   21"  85%  Easy/Static   16"  96% Easy static  18"  82% Easy/static  18"  75% Easy/static  50"  53%   Maze Easy/Static   22"  66%  Easy/Static   13"  78%  Easy/Static   16"  92% Easy static  21"  57% Med/static  42"  48% Easy/static  17"  85%   Catch Game Easy   1:00  Easy   1:00  LOS Med  1:00 LOS Easy 1' LOS easy 1'  Med NV LOS Med 1'       Random Easy 1' Random easy 1'  Med NV Random Med 1'   Ther Ex     4-5 4-11   Nustep Seated 8 L2   10 minutes  Seated 8 L2   10 minutes  L2 10' seat 7 L2 10' seat 7 L2 10' Seat #8 L2 10' Seat 7   Standing hip flex/ext/abd 1 x 20 ea karl  1 x 20 ea karl 1 x 20 ea karl 1x20 ea Karl 1x30 ea Karl 1x20 ea Karl   Squats 1 x 20  1x20 1x20  1x20 1x20 1x15   Step ups  NP       HR/TR 1 x 20  1x20 1x20  1x20 ea 1x20 1x20 ea   Bridges 2 x 10  2 x 10  2x10 2x10 2x10 2x10   Sit to stands         LAQ  2x10 2x10 2x10 1x20 1x20   Seated hamstring stretch         Supine hip abduction         Supine hip add 5" x 20 5" x 20  5" x 20  Seated 5" x20 5" x20 5" x20   Hip abduction machine   45# 3x10  20# 3x10 20# 2x10   Leg press 60# x 30   70# NV  70# 3x10 70# 3x10 60# x40 60# x 30 60# x 30   Ther Activity                           Gait Training         Ambulation with SPC-laps around clinic  NT, NV  nv declined declined            Modalities

## 2022-04-25 ENCOUNTER — OFFICE VISIT (OUTPATIENT)
Dept: PHYSICAL THERAPY | Facility: HOME HEALTHCARE | Age: 77
End: 2022-04-25
Payer: COMMERCIAL

## 2022-04-25 DIAGNOSIS — R26.9 ABNORMALITY OF GAIT AS LATE EFFECT OF CEREBROVASCULAR ACCIDENT (CVA): Primary | ICD-10-CM

## 2022-04-25 DIAGNOSIS — I69.398 ABNORMALITY OF GAIT AS LATE EFFECT OF CEREBROVASCULAR ACCIDENT (CVA): Primary | ICD-10-CM

## 2022-04-25 PROCEDURE — 97112 NEUROMUSCULAR REEDUCATION: CPT

## 2022-04-25 PROCEDURE — 97110 THERAPEUTIC EXERCISES: CPT

## 2022-04-25 NOTE — PROGRESS NOTES
Daily Note     Today's date: 2022  Patient name: Hyacinth Louise  : 1945  MRN: [id number]  Referring provider: Chet Gamino MD  Dx: No diagnosis found  Start Time: 910          Subjective: Im ok  I haven't been able to get to the track to walk yet  I was busy inside the house yesterday  Objective: See treatment diary below    Assessment: Tolerated treatment well  Pt with improved balance noted but continues with limited hemal 2* Farzana LE fatigue  Patient would benefit from continued PT    Plan: Continue per plan of care        Precautions: PMH CVA    Re-eval Date: 2022        Manuals 22- 4-11   Farzana hamstring/glute stretching 10 min  10 min 10 min Supine 10' Supine 10' Supine 10'                              Neuro Re-Ed   4-   Patient education         Romberg balance 30" x 1 EO  NT, NV 30" x 1 EO  30"x1 EO 20"x2 EO 20"x2 EO   Tandem balance 30" x 2 EO  NT, NV 30" x 2 EO 30"x2 EO 20" x2 EO 20" x2 EO   Cone taps         Biodex    -    LOS Easy/static   27"  66%  Easy/static   27"  19%  Easy/static   26"  66%  Easy/static  29"  79%% Easy/static  28"  49% Easy/static  33"  49%   LOS Easy/static   42"  28%    Easy/static   48" 28% Easy/static   27"  72%  Easy/static  31"  66% Easy/static  28"  54% Easy/static  33"  50%   Maze Easy/Static   20"  64%    Easy/Static   21"  85%  Easy/Static   16"  96% Easy static  13"  100% Easy/static  18"  75% Easy/static  50"  53%   Maze Easy/Static   22"  66%  Easy/Static   13"  78%  Easy/Static   16"  92% Med static  46"  48% Med/static  42"  48% Easy/static  17"  85%   Catch Game Easy   1:00  Easy   1:00  LOS Med  1:00 LOS Med 1' LOS easy 1'  Med NV LOS Med 1'        Random easy 1'  Med NV Random Med 1'   Ther Ex     -   Nustep Seated 8 L2   10 minutes  Seated 8 L2   10 minutes  L2 10' seat 7 L2 10' seat 7 L2 10' Seat #8 L2 10' Seat 7   Standing hip flex/ext/abd 1 x 20 ea farzana  1 x 20 ea farzana 1 x 20 ea farzana 1x20 ea Karl 1x30 ea Karl 1x20 ea Karl   Squats 1 x 20  1x20 1x20  1x20 1x20 1x15   Step ups  NP       HR/TR 1 x 20  1x20 1x20  1x20 ea 1x20 1x20 ea   Bridges 2 x 10  2 x 10  2x10 2x10 2x10 2x10   Sit to stands         LAQ  2x10 2x10 2x10 1x20 1x20   Seated hamstring stretch         Supine hip abduction         Supine hip add 5" x 20  5" x 20  5" x 20  Seated 5" x20 5" x20 5" x20   Hip abduction machine   45# 3x10 45# 3x10 20# 3x10 20# 2x10   Leg press 60# x 30   70# NV  70# 3x10 70# 3x10 70# 3x10 60# x 30 60# x 30   Ther Activity                           Gait Training         Ambulation with SPC-laps around clinic  NT, NV  nv declined declined            Modalities

## 2022-04-27 ENCOUNTER — OFFICE VISIT (OUTPATIENT)
Dept: PHYSICAL THERAPY | Facility: HOME HEALTHCARE | Age: 77
End: 2022-04-27
Payer: COMMERCIAL

## 2022-04-27 DIAGNOSIS — R26.9 ABNORMALITY OF GAIT AS LATE EFFECT OF CEREBROVASCULAR ACCIDENT (CVA): Primary | ICD-10-CM

## 2022-04-27 DIAGNOSIS — I69.398 ABNORMALITY OF GAIT AS LATE EFFECT OF CEREBROVASCULAR ACCIDENT (CVA): Primary | ICD-10-CM

## 2022-04-27 DIAGNOSIS — G81.14 LEFT SPASTIC HEMIPARESIS (HCC): ICD-10-CM

## 2022-04-27 PROCEDURE — 97140 MANUAL THERAPY 1/> REGIONS: CPT

## 2022-04-27 PROCEDURE — 97112 NEUROMUSCULAR REEDUCATION: CPT

## 2022-04-27 PROCEDURE — 97110 THERAPEUTIC EXERCISES: CPT

## 2022-04-27 NOTE — PROGRESS NOTES
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)  I69 398     R26 9    2  Left spastic hemiparesis ([address] )  G81 14        Start Time: 211  Stop Time: 1000  Total time in clinic (min): 55 minutes    Subjective: Pt has no complaints from previous session  He reports that he goes back to the doctor next week and will see what he says about continuing physical therapy  Objective: See treatment diary below      Assessment: Pt demonstrated good tolerance to treatment session  He continues to demonstrate some balance deficits, however, it is improving overall  Some bilateral hamstring tightness noted, but it improved with stretching  Pt would benefit from continued PT  Plan: Continue per plan of care        Precautions: [facility] CVA    Re-eval Date: 2022        Manuals - 4-11   Karl hamstring/glute stretching 10 min  10 min 10 min Supine 10' Supine 10' Supine 10'                              Neuro Re-Ed    4-11   Patient education         Romberg balance 30" x 1 EO  NT, NV 30" x 1 EO  30"x1 EO 30"x1 EO 20"x2 EO   Tandem balance 30" x 2 EO  NT, NV 30" x 2 EO 30"x2 EO 30"x2 EO 20" x2 EO   Cone taps         Biodex    4-     LOS Easy/static   27"  66%  Easy/static   27"  19%  Easy/static   26"  66%  Easy/static  29"  79%% Easy/static  28"  52% Easy/static  33"  49%   LOS Easy/static   42"  28%    Easy/static   48" 28% Easy/static   27"  72%  Easy/static  31"  66% Easy/static  26"  64% Easy/static  33"  50%   Maze Easy/Static   20"  64%    Easy/Static   21"  85%  Easy/Static   16"  96% Easy static  13"  100% Easy static  16"  78% Easy/static  50"  53%   Maze Easy/Static   22"  66%  Easy/Static   13"  78%  Easy/Static   16"  92% Med static  46"  48% Med static  36"  60% Easy/static  17"  85%   Catch Game Easy   1:00  Easy   1:00  LOS Med  1:00 LOS Med 1' LOS Med 1' LOS Med 1'         Random Med 1'   Ther Ex      4-11   Nustep Seated 8 L2   10 minutes  Seated 8 L2   10 minutes  L2 10' seat 7 L2 10' seat 7 L2 10' seat 7 L2 10' Seat 7   Standing hip flex/ext/abd 1 x 20 ea karl  1 x 20 ea karl 1 x 20 ea karl 1x20 ea Karl 1x20 ea Karl 1x20 ea Karl   Squats 1 x 20  1x20 1x20  1x20 1x20 1x15   Step ups  NP       HR/TR 1 x 20  1x20 1x20  1x20 ea 1x20 ea 1x20 ea   Bridges 2 x 10  2 x 10  2x10 2x10 2x10 2x10   Sit to stands         LAQ  2x10 2x10 2x10 2x10 1x20   Seated hamstring stretch         Supine hip abduction         Supine hip add 5" x 20  5" x 20  5" x 20  Seated 5" x20 Seated 5" x20 5" x20   Hip abduction machine   45# 3x10 45# 3x10 45# 3x10 20# 2x10   Leg press 60# x 30   70# NV  70# 3x10 70# 3x10 70# 3x10 70# 3x10 60# x 30   Ther Activity                           Gait Training         Ambulation with SPC-laps around clinic  NT, NV  nv 5 laps declined            Modalities

## 2022-05-02 ENCOUNTER — OFFICE VISIT (OUTPATIENT)
Dept: PHYSICAL THERAPY | Facility: HOME HEALTHCARE | Age: 77
End: 2022-05-02
Payer: COMMERCIAL

## 2022-05-02 DIAGNOSIS — I69.398 ABNORMALITY OF GAIT AS LATE EFFECT OF CEREBROVASCULAR ACCIDENT (CVA): Primary | ICD-10-CM

## 2022-05-02 DIAGNOSIS — R26.9 ABNORMALITY OF GAIT AS LATE EFFECT OF CEREBROVASCULAR ACCIDENT (CVA): Primary | ICD-10-CM

## 2022-05-02 DIAGNOSIS — G81.14 LEFT SPASTIC HEMIPARESIS (HCC): ICD-10-CM

## 2022-05-02 PROCEDURE — 97112 NEUROMUSCULAR REEDUCATION: CPT | Performed by: PHYSICAL THERAPIST

## 2022-05-02 PROCEDURE — 97110 THERAPEUTIC EXERCISES: CPT | Performed by: PHYSICAL THERAPIST

## 2022-05-02 NOTE — PROGRESS NOTES
Daily Note     Today's date: 2022  Patient name: Nataliia Lafleur  : 1945  MRN: [id number]  Referring provider: Panfilo Olvera MD  Dx:   Encounter Diagnosis     ICD-10-CM    1  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)  I69 398     R26 9    2  Left spastic hemiparesis (HCC)  G81 14                   Subjective: Pt reporting that he is going back to the [place] tomorrow  Objective: See treatment diary below      Assessment: Pt agreeable to progression of LF adduction vs ball squeeze this date; assistance to get on/off machine but otherwise no issues  L LE tightness remains evident but improved  Plan: Continue per plan of care        Precautions: [facility] CVA    Re-eval Date: 2022        Manuals -   Karl hamstring/glute stretching 10 min  10 min 10 min Supine 10' Supine 10' Supine 10'                               Neuro Re-Ed       Patient education         Romberg balance 30" x 1 EO  NT, NV 30" x 1 EO  30"x1 EO 30"x1 EO 60" x 1 EO    Tandem balance 30" x 2 EO  NT, NV 30" x 2 EO 30"x2 EO 30"x2 EO 60" R back EO    60" L back  EO    Cone taps         Biodex    -     LOS Easy/static   27"  66%  Easy/static   27"  19%  Easy/static   26"  66%  Easy/static  29"  79%% Easy/static  28"  52%    LOS Easy/static   42"  28%    Easy/static   48" 28% Easy/static   27"  72%  Easy/static  31"  66% Easy/static  26"  64%    Maze Easy/Static   20"  64%    Easy/Static   21"  85%  Easy/Static   16"  96% Easy static  13"  100% Easy static  16"  78%    Maze Easy/Static   22"  66%  Easy/Static   13"  78%  Easy/Static   16"  92% Med static  46"  48% Med static  36"  60%    Catch Game Easy   1:00  Easy   1:00  LOS Med  1:00 LOS Med 1' LOS Med 1'             Ther Ex         Nustep Seated 8 L2   10 minutes  Seated 8 L2   10 minutes  L2 10' seat 7 L2 10' seat 7 L2 10' seat 7 L2 10 min  Seated 7    Standing hip flex/ext/abd 1 x 20 ea karl  1 x 20 ea karl 1 x 20 ea karl 1x20 ea Karl 1x20 ea Karl 1 x 20 ea karl    Squats 1 x 20  1x20 1x20  1x20 1x20 1 x 20    Step ups  NP       HR/TR 1 x 20  1x20 1x20  1x20 ea 1x20 ea    Bridges 2 x 10  2 x 10  2x10 2x10 2x10    Sit to stands         LAQ  2x10 2x10 2x10 2x10 2 x 10    Seated hamstring stretch         Supine hip abduction         Supine hip add 5" x 20  5" x 20  5" x 20  Seated 5" x20 Seated 5" x20 45#    Hip abduction machine   45# 3x10 45# 3x10 45# 3x10 45# 3 x 10    Leg press 60# x 30   70# NV  70# 3x10 70# 3x10 70# 3x10 70# 3x10 70# 3 x 10    Ther Activity                           Gait Training         Ambulation with SPC-laps around clinic  NT, NV  nv 5 laps             Modalities

## 2022-05-03 ENCOUNTER — TELEPHONE (OUTPATIENT)
Dept: NEUROLOGY | Facility: CLINIC | Age: 77
End: 2022-05-03

## 2022-05-04 ENCOUNTER — OFFICE VISIT (OUTPATIENT)
Dept: PHYSICAL THERAPY | Facility: HOME HEALTHCARE | Age: 77
End: 2022-05-04
Payer: COMMERCIAL

## 2022-05-04 DIAGNOSIS — I69.398 ABNORMALITY OF GAIT AS LATE EFFECT OF CEREBROVASCULAR ACCIDENT (CVA): Primary | ICD-10-CM

## 2022-05-04 DIAGNOSIS — R26.9 ABNORMALITY OF GAIT AS LATE EFFECT OF CEREBROVASCULAR ACCIDENT (CVA): Primary | ICD-10-CM

## 2022-05-04 PROCEDURE — 97110 THERAPEUTIC EXERCISES: CPT

## 2022-05-04 PROCEDURE — 97112 NEUROMUSCULAR REEDUCATION: CPT

## 2022-05-04 NOTE — PROGRESS NOTES
Daily Note     Today's date: 2022  Patient name: Doc Franklin  : 1945  MRN: [id number]  Referring provider: Tammi Connor MD  Dx:   Encounter Diagnosis     ICD-10-CM    1  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)  I69 398     R26 9        Start Time: 1145          Subjective: I was at the [place] yesterday and they told me to wear my MAFO during the day and then wear my other brace for at night  This brace feels stiff and I like the other one better  Objective: See treatment diary below    Assessment: Tolerated treatment well  Pt hemal all TE and balance activities without incident  Pt remains with difficulty reaching targets during Biodex balance  Pt continues to require assist with L LE at ex equipment  Patient would benefit from continued PT to address deficits  Plan: Continue per plan of care        Precautions: [facility] CVA    Re-eval Date: 2022        Manuals 5-4 -   Karl hamstring/glute stretching 10 min  10 min 10 min Supine 10' Supine 10' Supine 10'                               Neuro Re-Ed 5-4 -     Patient education         Romberg balance 60" x 1 EO  NT, NV 30" x 1 EO  30"x1 EO 30"x1 EO 60" x 1 EO    Tandem balance 60" x R back   EO   60" L back NT, NV 30" x 2 EO 30"x2 EO 30"x2 EO 60" R back EO    60" L back  EO    Cone taps         Biodex    -     LOS Easy/static   37"  53%  Easy/static   27"  19%  Easy/static   26"  66%  Easy/static  29"  79%% Easy/static  28"  52%    LOS Easy/static   32"  60%   Easy/static   48" 28% Easy/static   27"  72%  Easy/static  31"  66% Easy/static  26"  64%    Maze Easy/Static   19"  89%    Easy/Static   21"  85%  Easy/Static   16"  96% Easy static  13"  100% Easy static  16"  78%    Maze Easy/Static   55"  16%  Easy/Static   13"  78%  Easy/Static   16"  92% Med static  46"  48% Med static  36"  60%    Catch Game LOS Med 2' Easy   1:00  LOS Med  1:00 LOS Med 1' LOS Med 1'             Ther Ex Nustep Seat #7  L2  10' Seated 8 L2   10 minutes  L2 10' seat 7 L2 10' seat 7 L2 10' seat 7 L2 10 min  Seated 7    Standing hip flex/ext/abd 1 x 20 ea karl  1 x 20 ea karl 1 x 20 ea karl 1x20 ea Karl 1x20 ea Karl 1 x 20 ea karl    Squats 1 x 20  1x20 1x20  1x20 1x20 1 x 20    Step ups  NP       HR/TR  1x20 1x20  1x20 ea 1x20 ea    Bridges 2 x 10  2 x 10  2x10 2x10 2x10    Sit to stands         LAQ 2x10 2x10 2x10 2x10 2x10 2 x 10    Seated hamstring stretch         Supine hip abduction         Supine hip add 45# x x20 5" x 20  5" x 20  Seated 5" x20 Seated 5" x20 45#    Hip abduction machine 60# x30  45# 3x10 45# 3x10 45# 3x10 45# 3 x 10    Leg press 70#  2x20 70# 3x10 70# 3x10 70# 3x10 70# 3x10 70# 3 x 10    Ther Activity                           Gait Training         Ambulation with SPC-laps around clinic  NT, NV  nv 5 laps             Modalities

## 2022-05-09 ENCOUNTER — OFFICE VISIT (OUTPATIENT)
Dept: PHYSICAL THERAPY | Facility: HOME HEALTHCARE | Age: 77
End: 2022-05-09
Payer: COMMERCIAL

## 2022-05-09 DIAGNOSIS — R26.9 ABNORMALITY OF GAIT AS LATE EFFECT OF CEREBROVASCULAR ACCIDENT (CVA): Primary | ICD-10-CM

## 2022-05-09 DIAGNOSIS — I69.398 ABNORMALITY OF GAIT AS LATE EFFECT OF CEREBROVASCULAR ACCIDENT (CVA): Primary | ICD-10-CM

## 2022-05-09 DIAGNOSIS — G81.14 LEFT SPASTIC HEMIPARESIS (HCC): ICD-10-CM

## 2022-05-09 PROCEDURE — 97110 THERAPEUTIC EXERCISES: CPT

## 2022-05-09 PROCEDURE — 97140 MANUAL THERAPY 1/> REGIONS: CPT

## 2022-05-09 PROCEDURE — 97112 NEUROMUSCULAR REEDUCATION: CPT

## 2022-05-09 NOTE — PROGRESS NOTES
Daily Note     Today's date: 2022  Patient name: Maci Shaffer  : 1945  MRN: [id number]  Referring provider: Kacy Harmon MD  Dx:   Encounter Diagnosis     ICD-10-CM    1  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)  I69 398     R26 9    2  Left spastic hemiparesis (HCC)  G81 14                   Subjective: Pt reports he is ok this morning  Objective: See treatment diary below      Assessment: Tolerated treatment well  Pt requires some assistance with lifting L LE onto and off of equipment  Pt challenged with Biodex balance machine  Patient would benefit from continued PT      Plan: Continue per plan of care        Precautions: [facility] CVA    Re-eval Date: 2022        Manuals 5-4 22-   Karl hamstring/glute stretching 10 min  10 min 10 min Supine 10' Supine 10' Supine 10'                               Neuro Re-Ed -     Patient education         Romberg balance 60" x 1 EO  60" x 1 EO 30" x 1 EO  30"x1 EO 30"x1 EO 60" x 1 EO    Tandem balance 60" x R back   EO   60" L back 60"  R back EO  60" L back EO 30" x 2 EO 30"x2 EO 30"x2 EO 60" R back EO    60" L back  EO    Cone taps         Biodex         LOS Easy/static   37"  53%  Easy/static   27"  74%  Easy/static   26"  66%  Easy/static  29"  79%% Easy/static  28"  52%    LOS Easy/static   32"  60%   Easy/static   28" 64% Easy/static   27"  72%  Easy/static  31"  66% Easy/static  26"  64%    Maze Easy/Static   19"  89%    Easy/Static   19"  85%  Easy/Static   16"  96% Easy static  13"  100% Easy static  16"  78%    Maze Easy/Static   55"  16%  Easy/Static   31"  35%  Easy/Static   16"  92% Med static  46"  48% Med static  36"  60%    Catch Game LOS Med 2' LOS med 2' LOS Med  1:00 LOS Med 1' LOS Med 1'             Ther Ex         Nustep Seat #7  L2  10' Seated 7 L2   10 minutes  L2 10' seat 7 L2 10' seat 7 L2 10' seat 7 L2 10 min  Seated 7    Standing hip flex/ext/abd 1 x 20 ea karl  1 x 20 ea karl 1 x 20 ea karl 1x20 ea Karl 1x20 ea Karl 1 x 20 ea karl    Squats 1 x 20  1x20 1x20  1x20 1x20 1 x 20    Step ups         HR/TR   1x20  1x20 ea 1x20 ea    Bridges 2 x 10  2 x 10  2x10 2x10 2x10    Sit to stands         LAQ 2x10 2x10 2x10 2x10 2x10 2 x 10    Seated hamstring stretch  Marches seated x20 Karl       Supine hip abduction         Supine hip add 45# x x20 45# x 20 5" x 20  Seated 5" x20 Seated 5" x20 45#    Hip abduction machine 60# x30 60# x 30 45# 3x10 45# 3x10 45# 3x10 45# 3 x 10    Leg press 70#  2x20 70# 2 x 20 70# 3x10 70# 3x10 70# 3x10 70# 3 x 10    Ther Activity                           Gait Training         Ambulation with SPC-laps around clinic    nv 5 laps             Modalities

## 2022-05-11 ENCOUNTER — OFFICE VISIT (OUTPATIENT)
Dept: PHYSICAL THERAPY | Facility: HOME HEALTHCARE | Age: 77
End: 2022-05-11
Payer: COMMERCIAL

## 2022-05-11 DIAGNOSIS — I69.398 ABNORMALITY OF GAIT AS LATE EFFECT OF CEREBROVASCULAR ACCIDENT (CVA): Primary | ICD-10-CM

## 2022-05-11 DIAGNOSIS — R26.9 ABNORMALITY OF GAIT AS LATE EFFECT OF CEREBROVASCULAR ACCIDENT (CVA): Primary | ICD-10-CM

## 2022-05-11 DIAGNOSIS — G81.14 LEFT SPASTIC HEMIPARESIS (HCC): ICD-10-CM

## 2022-05-11 PROCEDURE — 97140 MANUAL THERAPY 1/> REGIONS: CPT

## 2022-05-11 PROCEDURE — 97112 NEUROMUSCULAR REEDUCATION: CPT

## 2022-05-11 PROCEDURE — 97110 THERAPEUTIC EXERCISES: CPT

## 2022-05-11 NOTE — PROGRESS NOTES
Daily Note     Today's date: 2022  Patient name: Dayo De Souza  : 1945  MRN: [id number]  Referring provider: Gumaro Parker MD  Dx:   Encounter Diagnosis     ICD-10-CM    1  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)  I69 398     R26 9    2  Left spastic hemiparesis (HCC)  G81 14                   Subjective: Pt with no new c/o this morning  Objective: See treatment diary below      Assessment: Tolerated treatment well  Pt with no c/o pain t/o session  Assistance needed with lifting L LE onto and off of equipment  Pt still challenged some with Biodex balance activities  Patient would benefit from continued PT      Plan: Continue per plan of care        Precautions: [facility] CVA    Re-eval Date: 2022        Manuals 5-4 22   Karl hamstring/glute stretching 10 min  10 min 10 min Supine 10' Supine 10' Supine 10'                               Neuro Re-Ed      Patient education         Romberg balance 60" x 1 EO  60" x 1 EO 60" x 1 EO  30"x1 EO 30"x1 EO 60" x 1 EO    Tandem balance 60" x R back   EO   60" L back 60"  R back EO  60" L back EO 60" R back EO  60" L back EO 30"x2 EO 30"x2 EO 60" R back EO    60" L back  EO    Cone taps         Biodex    -     LOS Easy/static   37"  53%  Easy/static   27"  74%  Easy/static   27"  61%  Easy/static  29"  79%% Easy/static  28"  52%    LOS Easy/static   32"  60%   Easy/static   28" 64% Easy/static   29"  67%  Easy/static  31"  66% Easy/static  26"  64%    Maze Easy/Static   19"  89%    Easy/Static   19"  85%  Easy/Static   24"  53% Easy static  13"  100% Easy static  16"  78%    Maze Easy/Static   55"  16%  Easy/Static   31"  35%  Easy/Static   16"  85% Med static  46"  48% Med static  36"  60%    Catch Game LOS Med 2' LOS med 2' LOS Med 2' LOS Med 1' LOS Med 1'             Ther Ex         Nustep Seat #7  L2  10' Seated 7 L2   10 minutes  L2 10' seat 7 L2 10' seat 7 L2 10' seat 7 L2 10 min  Seated 7    Standing hip flex/ext/abd 1 x 20 ea karl  1 x 20 ea karl 1 x 20 ea karl 1x20 ea Karl 1x20 ea Karl 1 x 20 ea karl    Squats 1 x 20  1x20 1x20  1x20 1x20 1 x 20    Step ups         HR/TR    1x20 ea 1x20 ea    Bridges 2 x 10  2 x 10  2x10 2x10 2x10    Sit to stands         LAQ 2x10 2x10 2x10 2x10 2x10 2 x 10    Seated hamstring stretch  Marches seated x20 Karl Marches seated x 20 Karl      Supine hip abduction         Supine hip add 45# x x20 45# x 20 45# x 20  Seated 5" x20 Seated 5" x20 45#    Hip abduction machine 60# x30 60# x 30 60# 3x10 45# 3x10 45# 3x10 45# 3 x 10    Leg press 70#  2x20 70# 2 x 20 70# 3x10 70# 3x10 70# 3x10 70# 3 x 10    Ther Activity                           Gait Training         Ambulation with SPC-laps around clinic    nv 5 laps             Modalities

## 2022-05-17 ENCOUNTER — TELEPHONE (OUTPATIENT)
Dept: FAMILY MEDICINE CLINIC | Facility: CLINIC | Age: 77
End: 2022-05-17

## 2022-05-17 ENCOUNTER — OFFICE VISIT (OUTPATIENT)
Dept: PHYSICAL THERAPY | Facility: HOME HEALTHCARE | Age: 77
End: 2022-05-17
Payer: COMMERCIAL

## 2022-05-17 DIAGNOSIS — R26.9 ABNORMALITY OF GAIT AS LATE EFFECT OF CEREBROVASCULAR ACCIDENT (CVA): Primary | ICD-10-CM

## 2022-05-17 DIAGNOSIS — I69.398 ABNORMALITY OF GAIT AS LATE EFFECT OF CEREBROVASCULAR ACCIDENT (CVA): Primary | ICD-10-CM

## 2022-05-17 DIAGNOSIS — Z01.810 PREOP CARDIOVASCULAR EXAM: Primary | ICD-10-CM

## 2022-05-17 PROCEDURE — 97110 THERAPEUTIC EXERCISES: CPT

## 2022-05-17 PROCEDURE — 97140 MANUAL THERAPY 1/> REGIONS: CPT

## 2022-05-17 NOTE — TELEPHONE ENCOUNTER
Pt is having eye surgery on 6/7/22, wife said the only PAT they are requiring is an [facility] lab test  Pt was seen here last on 2/16/22, wife asking if you need a sooner appt to fill out his clearance or if she could drop the paperwork off and have you sign off on it?

## 2022-05-17 NOTE — TELEPHONE ENCOUNTER
Since he was coming in June already would move appt sooner and can complete clearance/do visit together  Does he have order for labs - he had cmp done 3/2022 not sure how soon labs need to be

## 2022-05-17 NOTE — PROGRESS NOTES
Daily Note     Today's date: 2022  Patient name: Gabriel Aguillon  : 1945  MRN: [id number]  Referring provider: Ezio Lassiter MD  Dx: No diagnosis found  Start Time: 915          Subjective: I have been getting night time cramps in my R leg  Objective: See treatment diary below    Assessment: Tolerated treatment well  Pt progressing well with TE with minimal vc's needed for correct available form  Pt continues to require assist to lift L LE at ex equipment and at low mat table during sit<>stand transfers  Pt remains with Karl LE HS and R LE calf  tightness  Patient would benefit from continued PT    Plan: Continue per plan of care        Precautions: [facility] CVA    Re-eval Date: 2022        Manuals 5-4 22-   Karl hamstring/glute stretching 10 min  10 min 10 min Supine 10' Supine 10' Supine 10'                               Neuro Re-Ed      Patient education         Romberg balance 60" x 1 EO  60" x 1 EO 60" x 1 EO  60" x1 EO 30"x1 EO 60" x 1 EO    Tandem balance 60" x R back   EO   60" L back 60"  R back EO  60" L back EO 60" R back EO  60" L back EO 60" R back EO  60" L back EO 30"x2 EO 60" R back EO    60" L back  EO    Cone taps         Biodex    5-17     LOS Easy/static   37"  53%  Easy/static   27"  74%  Easy/static   27"  61%  Easy/static  31" 58% Easy/static  28"  52%    LOS Easy/static   32"  60%     Easy/static   28" 64% Easy/static   29"  67%  Easy/static  26"  66% Easy/static  26"  64%    Maze Easy/Static   19"  89%    Easy/Static   19"  85%  Easy/Static   24"  53% Easy static  21"  85% Easy static  16"  78%    Maze Easy/Static   55"  16%  Easy/Static   31"  35%  Easy/Static   16"  85% Med static  38" 36% Med static  36"  60%    Catch Game LOS Med 2' LOS med 2' LOS Med 2' LOS Med 1' LOS Med 1'             Ther Ex         Nustep Seat #7  L2  10' Seated 7 L2   10 minutes  L2 10' seat 7 L2 10' seat 7 L2 10' seat 7 L2 10 min  Seated 7    Standing hip flex/ext/abd 1 x 20 ea karl  1 x 20 ea karl 1 x 20 ea karl 1x20 ea Karl 1x20 ea Karl 1 x 20 ea karl    Squats 1 x 20  1x20 1x20  1x20 1x20 1 x 20    Step ups         HR/TR     1x20 ea    Bridges 2 x 10  2 x 10  2x10 2x10 2x10    Sit to stands         LAQ 2x10 2x10 2x10 2x10 2x10 2 x 10    Seated hamstring stretch  Marches seated x20 Karl Marches seated x 20 Karl Marches seated  x20 Karl     Supine hip abduction         Supine hip add 45# x x20 45# x 20 45# x 20  45# x20 Seated 5" x20 45#    Hip abduction machine 60# x30 60# x 30 60# 3x10 45# 3x10 45# 3x10 45# 3 x 10    Leg press 70#  2x20 70# 2 x 20 70# 3x10 70# 3x10 70# 3x10 70# 3 x 10    Ther Activity                           Gait Training         Ambulation with SPC-laps around clinic     5 laps             Modalities

## 2022-05-18 ENCOUNTER — OFFICE VISIT (OUTPATIENT)
Dept: PHYSICAL THERAPY | Facility: HOME HEALTHCARE | Age: 77
End: 2022-05-18
Payer: COMMERCIAL

## 2022-05-18 DIAGNOSIS — R26.9 ABNORMALITY OF GAIT AS LATE EFFECT OF CEREBROVASCULAR ACCIDENT (CVA): Primary | ICD-10-CM

## 2022-05-18 DIAGNOSIS — I69.398 ABNORMALITY OF GAIT AS LATE EFFECT OF CEREBROVASCULAR ACCIDENT (CVA): Primary | ICD-10-CM

## 2022-05-18 PROCEDURE — 97112 NEUROMUSCULAR REEDUCATION: CPT

## 2022-05-18 PROCEDURE — 97140 MANUAL THERAPY 1/> REGIONS: CPT

## 2022-05-18 PROCEDURE — 97110 THERAPEUTIC EXERCISES: CPT

## 2022-05-18 NOTE — PROGRESS NOTES
Daily Note     Today's date: 2022  Patient name: Rosendo Ellison  : 1945  MRN: [id number]  Referring provider: Tano Mojica MD  Dx: No diagnosis found  Start Time: 1000          Subjective: I had the nighttime pain in my R leg behind my knee and into my calf again last night  I happens when I am laying down flat and it goes away with position changes  Objective: See treatment diary below    Assessment: Tolerated treatment well  No reportable changes to report today with strength, ROM or need for assistance with LLE at equipment and with transfers  Patient would benefit from continued PT    Plan: Continue per plan of care        Precautions: [facility] CVA    Re-eval Date: 2022        Manuals 5-4 22   Karl hamstring/glute stretching 10 min  10 min 10 min Supine 10' Supine 10' Supine 10'                               Neuro Re-Ed -    Patient education         Romberg balance 60" x 1 EO  60" x 1 EO 60" x 1 EO  60" x1 EO 60"x1 EO 60" x 1 EO    Tandem balance 60" x R back   EO   60" L back 60"  R back EO  60" L back EO 60" R back EO  60" L back EO 60" R back EO  60" L back EO 60"x2 EO ea 60" R back EO    60" L back  EO    Cone taps         Biodex    5-17 5-    LOS Easy/static   37"  53%  Easy/static   27"  74%  Easy/static   27"  61%  Easy/static  31" 58% Easy/static  27"  68%    LOS Easy/static   32"  60%     Easy/static   28" 64% Easy/static   29"  67%  Easy/static  26"  66% Easy/static  25"  78%    Maze Easy/Static   19"  89%    Easy/Static   19"  85%  Easy/Static   24"  53% Easy static  21"  85% Easy static  18"  96%    Maze Easy/Static   55"  16%  Easy/Static   31"  35%  Easy/Static   16"  85% Med static  % Med static  31"  66%    Catch Game LOS Med 2' LOS med 2' LOS Med 2' LOS Med 1' LOS Med 1'             Ther Ex         Nustep Seat #7  L2  10' Seated 7 L2   10 minutes  L2 10' seat 7 L2 10' seat 7 L2 10' seat 7 L2 10 min  Seated 7    Standing hip flex/ext/abd 1 x 20 ea karl  1 x 20 ea karl 1 x 20 ea karl 1x20 ea Karl 1x20 ea Karl 1 x 20 ea karl    Squats 1 x 20  1x20 1x20  1x20 1x20 1 x 20    Step ups         HR/TR         Bridges 2 x 10  2 x 10  2x10 2x10 2x10    Sit to stands         LAQ 2x10 2x10 2x10 2x10 2x10 2 x 10    Seated hamstring stretch  Marches seated x20 Karl Marches seated x 20 Karl Marches seated  x20 Karl Marches  Seated  x20 Karl    Supine hip abduction         Supine hip add 45# x x20 45# x 20 45# x 20  45# x20 45# x20 45#    Hip abduction machine 60# x30 60# x 30 60# 3x10 60# 3x10 60# 3x10 45# 3 x 10    Leg press 70#  2x20 70# 2 x 20 70# 3x10 70# 3x10 70# 3x10 70# 3 x 10    Ther Activity                           Gait Training         Ambulation with SPC-laps around clinic                  Modalities

## 2022-05-19 ENCOUNTER — REMOTE DEVICE CLINIC VISIT (OUTPATIENT)
Dept: CARDIOLOGY CLINIC | Facility: CLINIC | Age: 77
End: 2022-05-19
Payer: COMMERCIAL

## 2022-05-19 DIAGNOSIS — Z95.818 PRESENCE OF OTHER CARDIAC IMPLANTS AND GRAFTS: Primary | ICD-10-CM

## 2022-05-19 PROCEDURE — 93298 REM INTERROG DEV EVAL SCRMS: CPT | Performed by: INTERNAL MEDICINE

## 2022-05-19 PROCEDURE — G2066 INTER DEVC REMOTE 30D: HCPCS | Performed by: INTERNAL MEDICINE

## 2022-05-19 NOTE — PROGRESS NOTES
MDT [id number]/ ACTIVE SYSTEM IS MRI CONDITIONAL   CARELINK TRANSMISSION: LOOP RECORDER  PRESENTING RHYTHM NSR @ 72 BPM  BATTERY STATUS "OK " NO PATIENT OR DEVICE ACTIVATED EPISODES  NORMAL DEVICE FUNCTION   DL

## 2022-05-23 ENCOUNTER — APPOINTMENT (OUTPATIENT)
Dept: LAB | Facility: HOSPITAL | Age: 77
End: 2022-05-23
Attending: INTERNAL MEDICINE
Payer: COMMERCIAL

## 2022-05-23 ENCOUNTER — OFFICE VISIT (OUTPATIENT)
Dept: UROLOGY | Facility: CLINIC | Age: 77
End: 2022-05-23
Payer: COMMERCIAL

## 2022-05-23 VITALS
DIASTOLIC BLOOD PRESSURE: 88 MMHG | HEART RATE: 76 BPM | BODY MASS INDEX: 24.05 KG/M2 | SYSTOLIC BLOOD PRESSURE: 172 MMHG | WEIGHT: 149 LBS

## 2022-05-23 DIAGNOSIS — C61 PROSTATE CANCER (HCC): Primary | ICD-10-CM

## 2022-05-23 DIAGNOSIS — Z01.810 PREOP CARDIOVASCULAR EXAM: ICD-10-CM

## 2022-05-23 LAB
ANION GAP SERPL CALCULATED.3IONS-SCNC: 10 MMOL/L (ref 4–13)
BUN SERPL-MCNC: 26 MG/DL (ref 5–25)
CALCIUM SERPL-MCNC: 9.1 MG/DL (ref 8.3–10.1)
CHLORIDE SERPL-SCNC: 98 MMOL/L (ref 100–108)
CO2 SERPL-SCNC: 27 MMOL/L (ref 21–32)
CREAT SERPL-MCNC: 1.49 MG/DL (ref 0.6–1.3)
GFR SERPL CREATININE-BSD FRML MDRD: 45 ML/MIN/1.73SQ M
GLUCOSE P FAST SERPL-MCNC: 120 MG/DL (ref 65–99)
POTASSIUM SERPL-SCNC: 4.3 MMOL/L (ref 3.5–5.3)
SODIUM SERPL-SCNC: 135 MMOL/L (ref 136–145)

## 2022-05-23 PROCEDURE — 99212 OFFICE O/P EST SF 10 MIN: CPT | Performed by: NURSE PRACTITIONER

## 2022-05-23 PROCEDURE — 80048 BASIC METABOLIC PNL TOTAL CA: CPT

## 2022-05-23 PROCEDURE — 1036F TOBACCO NON-USER: CPT | Performed by: NURSE PRACTITIONER

## 2022-05-23 PROCEDURE — 3079F DIAST BP 80-89 MM HG: CPT | Performed by: NURSE PRACTITIONER

## 2022-05-23 PROCEDURE — 1160F RVW MEDS BY RX/DR IN RCRD: CPT | Performed by: NURSE PRACTITIONER

## 2022-05-23 PROCEDURE — 36415 COLL VENOUS BLD VENIPUNCTURE: CPT

## 2022-05-23 PROCEDURE — 3077F SYST BP >= 140 MM HG: CPT | Performed by: NURSE PRACTITIONER

## 2022-05-23 NOTE — ASSESSMENT & PLAN NOTE
· [place] 3+4=7 pT2 N0 MX  · S/P RALP with bilateral PLND 2018  · PSA < 0 1  · Follow-up 1 year with PSA prior

## 2022-05-23 NOTE — PROGRESS NOTES
Assessment and plan: Adenocarcinoma of prostate ([facility] [address] )  · [place] 3+4=7 pT2 N0 MX  · S/P RALP with bilateral PLND 2018  · PSA < 0 1  · Follow-up 1 year with PSA prior      REEMA De Los Santos    History of Present Illness     Miller Wilkes is a 78 y o  male patient of Dr Tiffany Toribio with a history of prostate cancer  He is 4 years status post laparoscopic robotic radical prostatectomy with bilateral PLND for [place] 3+4=7 pT2 N0 MX prostate cancer  His PSA remains undetectable  Has occasional constipation  Takes stool softners and miralax  Denies any urinary complaints  S/p CVA march 2021  Going for evaluation for possible botox for spasticity in his arm and leg in august 2022  Laboratory     Lab Results   Component Value Date    BUN 26 (H) 05/23/2022    CREATININE 1 49 (H) 05/23/2022       No components found for: GFR    Lab Results   Component Value Date    GLUCOSE 135 10/01/2015    CALCIUM 9 1 05/23/2022     10/01/2015    K 4 3 05/23/2022    CO2 27 05/23/2022    CL 98 (L) 05/23/2022       Lab Results   Component Value Date    WBC 4 51 10/19/2021    HGB 13 3 10/19/2021    HCT 40 3 10/19/2021    MCV 98 10/19/2021     10/19/2021       Lab Results   Component Value Date    PSA <0 1 04/12/2022    PSA <0 1 04/22/2021    PSA <0 1 09/22/2020       No results found for this or any previous visit (from the past 1 hour(s))  @RESULT(URINEMICROSCOPIC)@    @RESULT(URINECULTURE)@    Radiology       Review of Systems     Review of Systems   Constitutional: Negative for activity change, appetite change, chills, fatigue, fever and unexpected weight change  HENT: Negative for facial swelling  Eyes: Negative for discharge  Respiratory: Negative  Negative for cough and shortness of breath  Cardiovascular: Negative for chest pain and leg swelling  Gastrointestinal: Positive for constipation  Negative for abdominal distention, abdominal pain, diarrhea, nausea and vomiting          Occasional constipation   Endocrine: Negative  Genitourinary: Negative  Negative for decreased urine volume, difficulty urinating, dysuria, enuresis, flank pain, frequency, genital sores, hematuria and urgency  Musculoskeletal: Positive for arthralgias and gait problem  Negative for back pain and myalgias  Left sided weakness after a cva march 2021   Skin: Negative for pallor and rash  Allergic/Immunologic: Negative  Negative for immunocompromised state  Neurological: Negative for facial asymmetry and speech difficulty  Spasticity   Psychiatric/Behavioral: Negative for agitation and confusion  Allergies     No Known Allergies    Physical Exam     Physical Exam  Vitals reviewed  Constitutional:       General: He is not in acute distress  Appearance: Normal appearance  He is normal weight  He is not ill-appearing, toxic-appearing or diaphoretic  HENT:      Head: Normocephalic and atraumatic  Eyes:      General: No scleral icterus  Cardiovascular:      Rate and Rhythm: Normal rate  Pulmonary:      Effort: Pulmonary effort is normal  No respiratory distress  Abdominal:      General: Abdomen is flat  There is no distension  Palpations: Abdomen is soft  Musculoskeletal:         General: No swelling  Cervical back: Normal range of motion  Skin:     General: Skin is warm and dry  Coloration: Skin is not jaundiced or pale  Findings: No rash  Neurological:      General: No focal deficit present  Mental Status: He is alert and oriented to person, place, and time  Gait: Gait abnormal       Comments: Ambulates with a cane   Psychiatric:         Mood and Affect: Mood normal          Behavior: Behavior normal          Thought Content:  Thought content normal          Judgment: Judgment normal          Vital Signs     Vitals:    05/23/22 0949   BP: (!) 172/88   Pulse: 76   Weight: 67 6 kg (149 lb)       Current Medications       Current Outpatient Medications:   amLODIPine (NORVASC) 5 mg tablet, Take 1 tablet (5 mg total) by mouth daily, Disp: 30 tablet, Rfl: 0    atorvastatin (LIPITOR) 40 mg tablet, Take 1 tablet (40 mg total) by mouth daily with dinner, Disp: 30 tablet, Rfl: 0    baclofen 10 mg tablet, Take 1 tablet (10 mg total) by mouth 3 (three) times a day, Disp: 270 tablet, Rfl: 1    clopidogrel (PLAVIX) 75 mg tablet, Take 1 tablet (75 mg total) by mouth daily, Disp: 90 tablet, Rfl: 1    docusate sodium (COLACE) 100 mg capsule, Take 100 mg by mouth 2 (two) times a day, Disp: , Rfl:     gabapentin (Neurontin) 300 mg capsule, Take 1 capsule (300 mg total) by mouth daily at bedtime, Disp: 90 capsule, Rfl: 3    gabapentin (Neurontin) 300 mg capsule, In addition to 300mgs -- use 1 cap qhs x 7 days, may increase by 1 caps every 7 days if needed until you reach an extra 300mgs, Disp: 30 capsule, Rfl: 2    Glucagon 1 MG/0 2ML SOSY, INJECT 1 MG UNDER THE SKIN  AS NEEDED FOR SEVERE HYPOGLYCEMIA, Disp: , Rfl:     levothyroxine (Synthroid) 100 mcg tablet, Take 1 tablet (100 mcg total) by mouth daily in the early morning, Disp: 90 tablet, Rfl: 3    lisinopril (ZESTRIL) 40 mg tablet, Take 40 mg by mouth daily  , Disp: , Rfl:     Multiple Vitamin (MULTIVITAMIN) tablet, Take 1 tablet by mouth daily  , Disp: , Rfl:     PATIENT MAINTAINED INSULIN PUMP, Inject 0 4 each under the skin continuous , Disp: , Rfl:     Active Problems     Patient Active Problem List   Diagnosis    DMII (diabetes mellitus, type 2) ([email or web address]    Hypothyroidism    Hyperlipidemia    Stage 3 chronic kidney disease ([email or web address]    Essential hypertension    Sensorineural hearing loss (SNHL), bilateral    Medicare annual wellness visit, subsequent    Adenocarcinoma of prostate ([email or web address]    Long term current use of insulin ([email or web address]    History of stroke    Type 2 diabetes mellitus with diabetic neuropathy ([email or web address]    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)    Abnormal gait    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)       Past Medical History     Past Medical History:   Diagnosis Date    Arthritis     BPH (benign prostatic hyperplasia)     last assessed 4/29/2014    Diabetes mellitus ([facility] [address] )     Disease of thyroid gland     GERD (gastroesophageal reflux disease)     Hearing aid worn     Hyperlipidemia     Hypertension     Hypothyroidism     Mumps     Prostate cancer ([facility] [address] )     Stroke ([facility] [address] )     Tingling sensation     bilateral feet occassionally    Tinnitus     Wears glasses     Wears partial dentures     upper       Surgical History     Past Surgical History:   Procedure Laterality Date    CARDIAC ELECTROPHYSIOLOGY PROCEDURE Left 11/4/2021    Procedure: Reveal implant;  Surgeon: Kian Ch MD;  Location:  CARDIAC CATH LAB;   Service: Cardiology    CATARACT EXTRACTION Bilateral 2000    EYE SURGERY      JOINT REPLACEMENT      KNEE ARTHROSCOPY Right 06/26/2009    knee    IN LAP,PROSTATECTOMY,RADICAL,W/NERVE SPARE,INCL ROBOTIC N/A 2/7/2018    Procedure: ROBOTIC ASSISTED LAPAROSCOPIC PROSTATECTOMY; BILATERAL PELVIC LYMPH NODE DISSECTION;  Surgeon: Danielle Rodgers MD;  Location: [facility];  Service: Urology    IN REPAIR ING HERNIA,5+Y/O,REDUCIBL Right 4/19/2019    Procedure: REPAIR HERNIA INGUINAL;  Surgeon: Leola Coyle DO;  Location: [facility];  Service: General    PROSTATE BIOPSY  11/07/2017    needle biopsy of prostate    TRIGGER FINGER RELEASE  05/2013    trigger thumb       Family History     Family History   Problem Relation Age of Onset    Cancer Mother     Diabetes Mother     Uterine cancer Mother     Diabetes Father     Cancer Father     Stroke Father         of unknown cause    Hypertension Father     Prostate cancer Father     Diabetes Family         Uncle, DM    Cancer Family         Grandmother       Social History     Social History     Social History     Tobacco Use   Smoking Status Former Smoker   Smokeless Tobacco Never Used   Tobacco Comment    quit about 50 years ago       Past Surgical History:   Procedure Laterality Date    CARDIAC ELECTROPHYSIOLOGY PROCEDURE Left 11/4/2021    Procedure: Reveal implant;  Surgeon: Morgan Nielson MD;  Location:  CARDIAC CATH LAB; Service: Cardiology    CATARACT EXTRACTION Bilateral 2000    EYE SURGERY      JOINT REPLACEMENT      KNEE ARTHROSCOPY Right 06/26/2009    knee    NM LAP,PROSTATECTOMY,RADICAL,W/NERVE SPARE,INCL ROBOTIC N/A 2/7/2018    Procedure: ROBOTIC ASSISTED LAPAROSCOPIC PROSTATECTOMY; BILATERAL PELVIC LYMPH NODE DISSECTION;  Surgeon: Liliam Dueñas MD;  Location: [facility];  Service: Urology    NM REPAIR [address] HERNIA,5+Y/O,REDUCIBL Right 4/19/2019    Procedure: REPAIR HERNIA INGUINAL;  Surgeon: Aziza Jean DO;  Location: [facility];  Service: General    PROSTATE BIOPSY  11/07/2017    needle biopsy of prostate    TRIGGER FINGER RELEASE  05/2013    trigger thumb         The following portions of the patient's history were reviewed and updated as appropriate: allergies, current medications, past family history, past medical history, past social history, past surgical history and problem list    Please note :  Voice dictation software has been used to create this document  There may be inadvertent transcription errors      [id number] [address] [place] [id number] [address]

## 2022-05-24 ENCOUNTER — EVALUATION (OUTPATIENT)
Dept: PHYSICAL THERAPY | Facility: HOME HEALTHCARE | Age: 77
End: 2022-05-24
Payer: COMMERCIAL

## 2022-05-24 DIAGNOSIS — I69.398 ABNORMALITY OF GAIT AS LATE EFFECT OF CEREBROVASCULAR ACCIDENT (CVA): Primary | ICD-10-CM

## 2022-05-24 DIAGNOSIS — R26.9 ABNORMALITY OF GAIT AS LATE EFFECT OF CEREBROVASCULAR ACCIDENT (CVA): Primary | ICD-10-CM

## 2022-05-24 DIAGNOSIS — G81.14 LEFT SPASTIC HEMIPARESIS (HCC): ICD-10-CM

## 2022-05-24 PROCEDURE — 97112 NEUROMUSCULAR REEDUCATION: CPT

## 2022-05-24 PROCEDURE — 97110 THERAPEUTIC EXERCISES: CPT

## 2022-05-24 PROCEDURE — 97140 MANUAL THERAPY 1/> REGIONS: CPT

## 2022-05-24 NOTE — LETTER
2022    Glendale Habermann, MD  [address]  [address] [facility]    Patient: Jesu Larkin   YOB: 1945   Date of Visit: 2022     Encounter Diagnosis     ICD-10-CM    1  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)  I69 398     R26 9    2  Left spastic hemiparesis [facility])  G81 14        Dear Dr Mike Herrera:    Thank you for your recent referral of Jesu Larkin  Please review the attached evaluation summary from [facility] recent visit  Please verify that you agree with the plan of care by signing the attached order  If you have any questions or concerns, please do not hesitate to call  I sincerely appreciate the opportunity to share in the care of one of your patients and hope to have another opportunity to work with you in the near future  Sincerely,    Yasmine Davies, PT      Referring Provider:      I certify that I have read the below Plan of Care and certify the need for these services furnished under this plan of treatment while under my care  Glendale Habermann, MD  [address] [place] [address]  [address]  [address] [address] [place] [address]  Via Fax: [phone]          PT Re-Evaluation     Today's date: 2022  Patient name: Jesu Larkin  : 1945  MRN: [id number]  Referring provider: Abigail Antonio MD  Dx:   Encounter Diagnosis     ICD-10-CM    1  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)  I69 398     R26 9    2  Left spastic hemiparesis ([facility] [address] )  G81 14        Start Time: 915  Stop Time: 1000  Total time in clinic (min): 45 minutes    Assessment  Assessment details: Pt has significant improvements in overall functional mobility, balance, fall risk, and ROM since beginning PT, and he has met his goals for independence with HEP, hip ROM, and balance    Although the patient made significant improvements at the start of physical therapy, he has a reached a plateau in his progress due to complications from his PMH of a CVA  The patient will now transition into a maintenance physical therapy program in order to maintain the progress that he has made since the start of his plan of care  The patient would benefit from continued physical therapy in order to maintain his current strength, ROM, balance, and overall mobility  Thank you! Impairments: abnormal gait, abnormal muscle firing, abnormal muscle tone, abnormal or restricted ROM, abnormal movement, activity intolerance, impaired balance, impaired physical strength, lacks appropriate home exercise program, safety issue, weight-bearing intolerance, poor posture  and poor body mechanics    Goals  Transition to Maintenance PT Goals:  Pt will maintain his current level of function and independence as demonstrated by a strength of 3-/5 or greater  Pt will maintain his current level of function as demonstrated by ability to ambulate independently with [facility] ambulation  Pt will maintain a relatively low risk for falls as demonstrated by a TUG score of at least 17s or less  Pt will maintain his current level of independence and safety as demonstrated by no reported falls  Pt will maintain his current level function and balance as demonstrated by a Romberg time of 30s or greater without loss of balance          Plan  Patient would benefit from: skilled physical therapy  Planned modality interventions: thermotherapy: hydrocollator packs  Planned therapy interventions: ADL retraining, balance, body mechanics training, balance/weight bearing training, flexibility, functional ROM exercises, gait training, home exercise program, joint mobilization, manual therapy, massage, neuromuscular re-education, orthotic fitting/training, patient education, postural training, strengthening, stretching, therapeutic activities and therapeutic exercise  Frequency: 2x week  Duration in weeks: 4  Plan of Care beginning date: 5/24/2022  Plan of Care expiration date: 6/24/2022  Treatment plan discussed with: patient        Subjective Evaluation    History of Present Illness  Mechanism of injury: Pt with PMH of CVA on March 3rd, 2021 reports that he went to the [place] to get a new brace because it was starting to come apart  Pt was recommended to go to physical therapy again after receiving new L sided AFO brace  Pt reports this one is taller than the previous one  Pt reports that he was walking for a mile or more prior to the colder weather and snow  Pt is able to walk on level ground, but has difficulty walking on uneven surfaces or inclines  Pt wants to keep walking and working on strengthening  Pt walked 4 miles per day prior to CVA  Pt reports that he has more spasticity when the weather is colder  Pt reports no difficulty with stairs  Pt reports that he feels like he has been regressing recently because he has been unable to exercise as much  Pt feels numbness and tingling in his R hand when he wakes up in the morning  UPDATE 2/16/2022:  Pt reports that he does not feel like he seen any changes with physical therapy, however, he reports that his wife and his friend think that he is walking better  UPDATE 3/21/2022:  Pt reports that it is difficult for him to tell if he has been improving since beginning PT  UPDATE 4/20/2022:  Pt reports that he feels like he has been improving in his overall balance and mobility  Pt reports that he continues to have the most difficulty with tandem balance  UPDATE 5/24/2022:  Pt reports that he feels like he has been improving, but that some days are better than others  He reports that he would like to continue with PT in order to maintain the progress that he has made    Social Support  Steps to enter house: yes (1)  Stairs in house: yes (13)     Treatments  Previous treatment: physical therapy and occupational therapy  Patient Goals  Patient goals for therapy: improved balance, increased motion, increased strength, independence with ADLs/IADLs and return to sport/leisure activities  Patient goal: To be able to walk right, leg and arms functioning        Objective     Neurological Testing     Sensation     Lumbar   Left   Intact: light touch    Right   Intact: light touch    Passive Range of Motion   Left Hip   Flexion: 100 degrees     Right Hip   Flexion: 110 degrees   Left Knee   Flexion: WFL  Extension: WFL    Right Knee   Flexion: WFL  Extension: WFL    Additional Passive Range of Motion Details  SLR: L: 70 deg, R: 80 deg    Strength/Myotome Testing     Left Hip   Planes of Motion   Flexion: 3-  Abduction: 3-    Right Hip   Planes of Motion   Flexion: 5  Abduction: 4+    Left Knee   Flexion: 3-  Extension: 3    Right Knee   Flexion: 5  Extension: 5    Ambulation   Weight-Bearing Status     Additional Weight-Bearing Status Details  SPC    Ambulation: Level Surfaces   Ambulation with assistive device: independent    Ambulation: Stairs   Ascend stairs: independent  Pattern: non-reciprocal  Railings: one rail  Descend stairs: independent  Pattern: non-reciprocal  Railings: one rail  Curbs: independent    Additional Stairs Ambulation Details  W/ SPC    Observational Gait   Decreased walking speed, stride length and left stance time     Left foot contact pattern: foot flat    Quality of Movement During Gait     Additional Quality of Movement During Gait Details  Hemiplegic gait    Functional Assessment        Comments  TU 21s  Romberg: EO: 30s, EC: 30s  Tandem L: 10s, R: <30s  Neuro Exam:     Modified Chris   Left hamstrin  Left quadricepts: 2             Precautions: PMH CVA    Re-eval Date: 2022      Manuals 5-4 22 5-17 -18    Karl hamstring/glute stretching 10 min  10 min 10 min Supine 10' Supine 10' Supine 10'                              Neuro Re-Ed 5-4   5-17 5-18    Patient education         Romberg balance 60" x 1 EO  60" x 1 EO 60" x 1 EO  60" x1 EO 60"x1 EO 60"x1 EO   Tandem balance 60" x R back   EO   60" L back 60"  R back EO  60" L back EO 60" R back EO  60" L back EO 60" R back EO  60" L back EO 60"x2 EO ea 60"x2 EO ea   Cone taps         Biodex    5-17 5-18    LOS Easy/static   37"  53%  Easy/static   27"  74%  Easy/static   27"  61%  Easy/static  31" 58% Easy/static  27"  68% Easy/static  28"  67%   LOS Easy/static   32"  60%     Easy/static   28" 64% Easy/static   29"  67%  Easy/static  26"  66% Easy/static  25"  78% Easy/static  27"  65%   Maze Easy/Static   19"  89%    Easy/Static   19"  85%  Easy/Static   24"  53% Easy static  21"  85% Easy static  18"  96% Easy static  17"  92%   Maze Easy/Static   55"  16%  Easy/Static   31"  35%  Easy/Static   16"  85% Med static  % Med static  31"  66% Med static  33"  54%   Catch Game LOS Med 2' LOS med 2' LOS Med 2' LOS Med 1' LOS Med 1' LOS Med 1'            Ther Ex         Nustep Seat #7  L2  10' Seated 7 L2   10 minutes  L2 10' seat 7 L2 10' seat 7 L2 10' seat 7 L2 10' seat 7   Standing hip flex/ext/abd 1 x 20 ea karl  1 x 20 ea karl 1 x 20 ea karl 1x20 ea Karl 1x20 ea Karl 1x20 ea Karl   Squats 1 x 20  1x20 1x20  1x20 1x20 1x20   Step ups         HR/TR         Bridges 2 x 10  2 x 10  2x10 2x10 2x10 2x10   Sit to stands         LAQ 2x10 2x10 2x10 2x10 2x10 2x10   Seated hamstring stretch  Marches seated x20 Karl Marches seated x 20 Karl Marches seated  x20 Karl Marches  Seated  x20 Karl Marches Seated x20 Karl   Supine hip abduction         Supine hip add 45# x x20 45# x 20 45# x 20  45# x20 45# x20 45# x20   Hip abduction machine 60# x30 60# x 30 60# 3x10 60# 3x10 60# 3x10 60# 3x10   Leg press 70#  2x20 70# 2 x 20 70# 3x10 70# 3x10 70# 3x10 70# 3x10   Ther Activity                           Gait Training         Ambulation with SPC-laps around clinic                  Modalities

## 2022-05-24 NOTE — PROGRESS NOTES
PT Re-Evaluation     Today's date: 2022  Patient name: Chris Houston  : 1945  MRN: [id number]  Referring provider: Isabela Landry MD  Dx:   Encounter Diagnosis     ICD-10-CM    1  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)  I69 398     R26 9    2  Left spastic hemiparesis ([address] )  G81 14        Start Time: 0915  Stop Time: 1000  Total time in clinic (min): 45 minutes    Assessment  Assessment details: Pt has significant improvements in overall functional mobility, balance, fall risk, and ROM since beginning PT, and he has met his goals for independence with HEP, hip ROM, and balance  Although the patient made significant improvements at the start of physical therapy, he has a reached a plateau in his progress due to complications from his PMH of a CVA  The patient will now transition into a maintenance physical therapy program in order to maintain the progress that he has made since the start of his plan of care  The patient would benefit from continued physical therapy in order to maintain his current strength, ROM, balance, and overall mobility  Thank you! Impairments: abnormal gait, abnormal muscle firing, abnormal muscle tone, abnormal or restricted ROM, abnormal movement, activity intolerance, impaired balance, impaired physical strength, lacks appropriate home exercise program, safety issue, weight-bearing intolerance, poor posture  and poor body mechanics    Goals  Transition to Maintenance PT Goals:  Pt will maintain his current level of function and independence as demonstrated by a strength of 3-/5 or greater  Pt will maintain his current level of function as demonstrated by ability to ambulate independently with [facility] community ambulation  Pt will maintain a relatively low risk for falls as demonstrated by a TUG score of at least 17s or less  Pt will maintain his current level of independence and safety as demonstrated by no reported falls    Pt will maintain his current level function and balance as demonstrated by a Romberg time of 30s or greater without loss of balance  Plan  Patient would benefit from: skilled physical therapy  Planned modality interventions: thermotherapy: hydrocollator packs  Planned therapy interventions: ADL retraining, balance, body mechanics training, balance/weight bearing training, flexibility, functional ROM exercises, gait training, home exercise program, joint mobilization, manual therapy, massage, neuromuscular re-education, orthotic fitting/training, patient education, postural training, strengthening, stretching, therapeutic activities and therapeutic exercise  Frequency: 2x week  Duration in weeks: 4  Plan of Care beginning date: 5/24/2022  Plan of Care expiration date: 6/24/2022  Treatment plan discussed with: patient        Subjective Evaluation    History of Present Illness  Mechanism of injury: Pt with PMH of CVA on March 3rd, 2021 reports that he went to the [place] to get a new brace because it was starting to come apart  Pt was recommended to go to physical therapy again after receiving new L sided AFO brace  Pt reports this one is taller than the previous one  Pt reports that he was walking for a mile or more prior to the colder weather and snow  Pt is able to walk on level ground, but has difficulty walking on uneven surfaces or inclines  Pt wants to keep walking and working on strengthening  Pt walked 4 miles per day prior to CVA  Pt reports that he has more spasticity when the weather is colder  Pt reports no difficulty with stairs  Pt reports that he feels like he has been regressing recently because he has been unable to exercise as much  Pt feels numbness and tingling in his R hand when he wakes up in the morning      UPDATE 2/16/2022:  Pt reports that he does not feel like he seen any changes with physical therapy, however, he reports that his wife and his friend think that he is walking better  UPDATE 3/21/2022:  Pt reports that it is difficult for him to tell if he has been improving since beginning PT  UPDATE 4/20/2022:  Pt reports that he feels like he has been improving in his overall balance and mobility  Pt reports that he continues to have the most difficulty with tandem balance  UPDATE 5/24/2022:  Pt reports that he feels like he has been improving, but that some days are better than others  He reports that he would like to continue with PT in order to maintain the progress that he has made    Social Support  Steps to enter house: yes (1)  Stairs in house: yes (13)     Treatments  Previous treatment: physical therapy and occupational therapy  Patient Goals  Patient goals for therapy: improved balance, increased motion, increased strength, independence with ADLs/IADLs and return to sport/leisure activities  Patient goal: To be able to walk right, leg and arms functioning        Objective     Neurological Testing     Sensation     Lumbar   Left   Intact: light touch    Right   Intact: light touch    Passive Range of Motion   Left Hip   Flexion: 100 degrees     Right Hip   Flexion: 110 degrees   Left Knee   Flexion: WFL  Extension: WFL    Right Knee   Flexion: WFL  Extension: WFL    Additional Passive Range of Motion Details  SLR: L: 70 deg, R: 80 deg    Strength/Myotome Testing     Left Hip   Planes of Motion   Flexion: 3-  Abduction: 3-    Right Hip   Planes of Motion   Flexion: 5  Abduction: 4+    Left Knee   Flexion: 3-  Extension: 3    Right Knee   Flexion: 5  Extension: 5    Ambulation   Weight-Bearing Status     Additional Weight-Bearing Status Details  SPC    Ambulation: Level Surfaces   Ambulation with assistive device: independent    Ambulation: Stairs   Ascend stairs: independent  Pattern: non-reciprocal  Railings: one rail  Descend stairs: independent  Pattern: non-reciprocal  Railings: one rail  Curbs: independent    Additional Stairs Ambulation Details  W/ SPC    Observational Gait   Decreased walking speed, stride length and left stance time     Left foot contact pattern: foot flat    Quality of Movement During Gait     Additional Quality of Movement During Gait Details  Hemiplegic gait    Functional Assessment        Comments  TU 21s  Romberg: EO: 30s, EC: 30s  Tandem L: 10s, R: <30s  Neuro Exam:     Modified Chris   Left hamstrin  Left quadricepts: 2             Precautions: PMH CVA    Re-eval Date: 2022      Manuals 5-4 22   Karl hamstring/glute stretching 10 min  10 min 10 min Supine 10' Supine 10' Supine 10'                              Neuro Re-Ed 5--    Patient education         Romberg balance 60" x 1 EO  60" x 1 EO 60" x 1 EO  60" x1 EO 60"x1 EO 60"x1 EO   Tandem balance 60" x R back   EO   60" L back 60"  R back EO  60" L back EO 60" R back EO  60" L back EO 60" R back EO  60" L back EO 60"x2 EO ea 60"x2 EO ea   [facility] taps         Biodex    5-17 -    LOS Easy/static   37"  53%  Easy/static   27"  74%  Easy/static   27"  61%  Easy/static  31" 58% Easy/static  27"  68% Easy/static  28"  67%   LOS Easy/static   32"  60%     Easy/static   28" 64% Easy/static   29"  67%  Easy/static  26"  66% Easy/static  25"  78% Easy/static  27"  65%   Maze Easy/Static   19"  89%    Easy/Static   19"  85%  Easy/Static   24"  53% Easy static  21"  85% Easy static  18"  96% Easy static  17"  92%   Maze Easy/Static   55"  16%  Easy/Static   31"  35%  Easy/Static   16"  85% Med static  % Med static  31"  66% Med static  33"  54%   Catch Game LOS Med 2' LOS med 2' LOS Med 2' LOS Med 1' LOS Med 1' LOS Med 1'            Ther Ex         Nustep Seat #7  L2  10' Seated 7 L2   10 minutes  L2 10' seat 7 L2 10' seat 7 L2 10' seat 7 L2 10' seat 7   Standing hip flex/ext/abd 1 x 20 ea karl  1 x 20 ea karl 1 x 20 ea karl 1x20 ea Karl 1x20 ea Karl 1x20 ea Karl   Squats 1 x 20  1x20 1x20  1x20 1x20 1x20   Step ups         HR/TR         Bridges 2 x 10  2 x 10  2x10 2x10 2x10 2x10   Sit to stands         LAQ 2x10 2x10 2x10 2x10 2x10 2x10   Seated hamstring stretch  Marches seated x20 Karl Marches seated x 20 Karl Marches seated  x20 Karl Marches  Seated  x20 Karl Marches Seated x20 Karl   Supine hip abduction         Supine hip add 45# x x20 45# x 20 45# x 20  45# x20 45# x20 45# x20   Hip abduction machine 60# x30 60# x 30 60# 3x10 60# 3x10 60# 3x10 60# 3x10   Leg press 70#  2x20 70# 2 x 20 70# 3x10 70# 3x10 70# 3x10 70# 3x10   Ther Activity                           Gait Training         Ambulation with SPC-laps around clinic                  Modalities

## 2022-06-01 ENCOUNTER — OFFICE VISIT (OUTPATIENT)
Dept: FAMILY MEDICINE CLINIC | Facility: CLINIC | Age: 77
End: 2022-06-01
Payer: COMMERCIAL

## 2022-06-01 VITALS
DIASTOLIC BLOOD PRESSURE: 70 MMHG | WEIGHT: 147.8 LBS | HEIGHT: 66 IN | HEART RATE: 72 BPM | SYSTOLIC BLOOD PRESSURE: 128 MMHG | BODY MASS INDEX: 23.75 KG/M2 | RESPIRATION RATE: 18 BRPM | TEMPERATURE: 97.9 F

## 2022-06-01 DIAGNOSIS — E11.40 TYPE 2 DIABETES MELLITUS WITH DIABETIC NEUROPATHY, WITH LONG-TERM CURRENT USE OF INSULIN (HCC): ICD-10-CM

## 2022-06-01 DIAGNOSIS — Z86.73 HISTORY OF STROKE: ICD-10-CM

## 2022-06-01 DIAGNOSIS — H27.111 SUBLUXATION OF LENS, RIGHT EYE: Primary | ICD-10-CM

## 2022-06-01 DIAGNOSIS — Z79.4 TYPE 2 DIABETES MELLITUS WITH DIABETIC NEUROPATHY, WITH LONG-TERM CURRENT USE OF INSULIN (HCC): ICD-10-CM

## 2022-06-01 PROBLEM — E11.319 DIABETIC RETINOPATHY (HCC): Status: ACTIVE | Noted: 2022-06-01

## 2022-06-01 PROCEDURE — 99215 OFFICE O/P EST HI 40 MIN: CPT | Performed by: INTERNAL MEDICINE

## 2022-06-01 PROCEDURE — 1160F RVW MEDS BY RX/DR IN RCRD: CPT | Performed by: INTERNAL MEDICINE

## 2022-06-01 PROCEDURE — 1036F TOBACCO NON-USER: CPT | Performed by: INTERNAL MEDICINE

## 2022-06-01 PROCEDURE — 3008F BODY MASS INDEX DOCD: CPT | Performed by: INTERNAL MEDICINE

## 2022-06-01 PROCEDURE — 3074F SYST BP LT 130 MM HG: CPT | Performed by: INTERNAL MEDICINE

## 2022-06-01 PROCEDURE — 3078F DIAST BP <80 MM HG: CPT | Performed by: INTERNAL MEDICINE

## 2022-06-01 NOTE — PROGRESS NOTES
Assessment/Plan:          Diagnoses and all orders for this visit:    Subluxation of lens, right eye  Pt scheduled with [facility] for procedure to correct next week  Stable for planned procedure   Bmp reviewed/GFR stable range at 39   Pt wife will ck with surgery if Plavix ok to continue which is my preference If needs to hold 3 day max given hx of stroke     History of stroke  Pt was doing PT and will resume during the summer once stable post procedure and accumulates days     Type 2 diabetes mellitus with diabetic neuropathy, with long-term current use of insulin (HCC)  A1c stable and BS readings have been more stable overall he has endocrine followup in summer       Rto as scheduled/prn   Patient ID: Gladys Lee is a 78 y o  male  HPI   Pt noted to have dislocation of lens of right eye and having surgery to repair next week thru [facility] He has noted change in vision and this is likely cause He had been going to PT but on hold  At this time No falls He denies any low blood sugars and readings have been stable/A1c improved         Review of Systems   Constitutional: Positive for activity change  Negative for chills and fever  HENT: Negative  Eyes: Positive for visual disturbance  Negative for pain and redness  Respiratory: Negative for cough and shortness of breath  Cardiovascular: Negative for chest pain, palpitations and leg swelling  Gastrointestinal: Negative  Genitourinary: Negative  Musculoskeletal: Positive for gait problem  Neurological: Positive for weakness and numbness  Negative for dizziness, syncope, light-headedness and headaches  Psychiatric/Behavioral: Negative for sleep disturbance  The patient is not nervous/anxious          Past Medical History:   Diagnosis Date    Arthritis     BPH (benign prostatic hyperplasia)     last assessed 4/29/2014    Diabetes mellitus [facility] )     Disease of thyroid gland     GERD (gastroesophageal reflux disease)     Hearing aid worn  Hyperlipidemia     Hypertension     Hypothyroidism     Mumps     Pro[facility]     Stroke ([facility] [address] )     Tingling sensation     bilateral feet occassionally    Tinnitus     Wears glasses     Wears partial dentures     upper     Past Surgical History:   Procedure Laterality Date    CARDIAC ELECTROPHYSIOLOGY PROCEDURE Left 11/4/2021    Procedure: Reveal implant;  Surgeon: Segundo Soto MD;  Location:  CARDIAC CATH LAB;   Service: Cardiology    CATARACT EXTRACTION Bilateral 2000    EYE SURGERY      JOINT REPLACEMENT      KNEE ARTHROSCOPY Right 06/26/2009    knee    TN LAP,PROSTATECTOMY,RADICAL,W/NERVE SPARE,INCL ROBOTIC N/A 2/7/2018    Procedure: ROBOTIC ASSISTED LAPAROSCOPIC PROSTATECTOMY; BILATERAL PELVIC LYMPH NODE DISSECTION;  Surgeon: Saeed Sheikh MD;  Location: [facility];  Service: Urology    TN REPAIR [address] HERNIA,5+Y/O,REDUCIBL Right 4/19/2019    Procedure: REPAIR HERNIA INGUINAL;  Surgeon: Robyn Tapia DO;  Location: [facility];  Service: General    PROSTATE BIOPSY  11/07/2017    needle biopsy of prostate    TRIGGER FINGER RELEASE  05/2013    trigger thumb     Social History     Socioeconomic History    Marital status: /Civil Union     Spouse name: Not on file    Number of children: Not on file    Years of education: Not on file    Highest education level: Not on file   Occupational History    Occupation: printer  retired   Tobacco Use    Smoking status: Former Smoker    Smokeless tobacco: Never Used    Tobacco comment: quit about 50 years ago   Vaping Use    Vaping Use: Never used   Substance and Sexual Activity    Alcohol use: Not Currently     Alcohol/week: 2 0 standard drinks     Types: 2 Glasses of wine per week     Comment: week, social drinker    Drug use: No    Sexual activity: Not Currently   Other Topics Concern    Not on file   Social History Narrative    Always uses seat belt    Caffeine use    Lives independently with spouse    Retired    Sun protection sunscreen     Social Determinants of Health     Financial Resource Strain: Not on file   Food Insecurity: Not on file   Transportation Needs: Not on file   Physical Activity: Not on file   Stress: Not on file   Social Connections: Not on file   Intimate Partner Violence: Not on file   Housing Stability: Not on file     No Known Allergies        /70   Pulse 72   Temp 97 9 °F (36 6 °C) (Temporal)   Resp 18   Ht 5' 6" (1 676 m)   Wt 67 kg (147 lb 12 8 oz)   BMI 23 86 kg/m²          Physical Exam  Vitals reviewed  Constitutional:       General: He is not in acute distress  Appearance: Normal appearance  He is not ill-appearing, toxic-appearing or diaphoretic  HENT:      Head: Normocephalic and atraumatic  Right Ear: External ear normal       Left Ear: External ear normal       Nose: Nose normal       Mouth/Throat:      Mouth: Mucous membranes are dry  Eyes:      General: No scleral icterus  Extraocular Movements: Extraocular movements intact  Conjunctiva/sclera: Conjunctivae normal       Pupils: Pupils are equal, round, and reactive to light  Cardiovascular:      Rate and Rhythm: Normal rate and regular rhythm  Pulses: Normal pulses  Heart sounds: Normal heart sounds  Pulmonary:      Effort: Pulmonary effort is normal  No respiratory distress  Breath sounds: Normal breath sounds  No wheezing  Abdominal:      General: Bowel sounds are normal  There is no distension  Palpations: Abdomen is soft  Tenderness: There is no abdominal tenderness  Musculoskeletal:      Cervical back: Normal range of motion and neck supple  No rigidity  Right lower leg: No edema  Left lower leg: No edema  Lymphadenopathy:      Cervical: No cervical adenopathy  Skin:     General: Skin is warm and dry  Coloration: Skin is not jaundiced  Neurological:      General: No focal deficit present        Mental Status: He is alert and oriented to person, place, and time  Mental status is at baseline  Psychiatric:         Mood and Affect: Mood normal          Behavior: Behavior normal          Thought Content:  Thought content normal          Judgment: Judgment normal

## 2022-06-14 ENCOUNTER — APPOINTMENT (OUTPATIENT)
Dept: PHYSICAL THERAPY | Facility: HOME HEALTHCARE | Age: 77
End: 2022-06-14
Payer: COMMERCIAL

## 2022-06-15 ENCOUNTER — APPOINTMENT (OUTPATIENT)
Dept: PHYSICAL THERAPY | Facility: HOME HEALTHCARE | Age: 77
End: 2022-06-15
Payer: COMMERCIAL

## 2022-06-17 ENCOUNTER — APPOINTMENT (OUTPATIENT)
Dept: PHYSICAL THERAPY | Facility: HOME HEALTHCARE | Age: 77
End: 2022-06-17
Payer: COMMERCIAL

## 2022-06-21 ENCOUNTER — OFFICE VISIT (OUTPATIENT)
Dept: PHYSICAL THERAPY | Facility: HOME HEALTHCARE | Age: 77
End: 2022-06-21
Payer: COMMERCIAL

## 2022-06-21 DIAGNOSIS — I69.398 ABNORMALITY OF GAIT AS LATE EFFECT OF CEREBROVASCULAR ACCIDENT (CVA): Primary | ICD-10-CM

## 2022-06-21 DIAGNOSIS — R26.9 ABNORMALITY OF GAIT AS LATE EFFECT OF CEREBROVASCULAR ACCIDENT (CVA): Primary | ICD-10-CM

## 2022-06-21 DIAGNOSIS — G81.14 LEFT SPASTIC HEMIPARESIS (HCC): ICD-10-CM

## 2022-06-21 PROCEDURE — 97140 MANUAL THERAPY 1/> REGIONS: CPT

## 2022-06-21 PROCEDURE — 97112 NEUROMUSCULAR REEDUCATION: CPT

## 2022-06-21 PROCEDURE — 97110 THERAPEUTIC EXERCISES: CPT

## 2022-06-21 NOTE — PROGRESS NOTES
Daily Note     Today's date: 2022  Patient name: Dipak Artis  : 1945  MRN: [id number]  Referring provider: Melvina Perkins MD  Dx:   Encounter Diagnosis     ICD-10-CM    1  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)  I69 398     R26 9    2  Left spastic hemiparesis ([address] )  G81 14        Start Time: 1045  Stop Time: 1130  Total time in clinic (min): 45 minutes    Subjective: Pt reports that he is doing well  He reports that he was out due to his eye surgery, but is now clear to exercise again  Objective: See treatment diary below      Assessment: Pt demonstrated good tolerance to treatment session  Some bilateral hamstring tightness and L glute tightness noted, but improved with passive stretching  Decreased resistance on machines today due to patient's recent eye surgery  Pt would benefit from continued PT  Plan: Continue per plan of care        Precautions: [facility] CVA    Re-eval Date: 2022      Manuals 22-   Karl hamstring/glute stretching 10' 10 min 10 min Supine 10' Supine 10' Supine 10'                              Neuro Re-Ed        Patient education         Romberg balance 60"x1 EO 60" x 1 EO 60" x 1 EO  60" x1 EO 60"x1 EO 60"x1 EO   Tandem balance 60"x2 EO 60"  R back EO  60" L back EO 60" R back EO  60" L back EO 60" R back EO  60" L back EO 60"x2 EO ea 60"x2 EO ea   [facility] taps         Biodex    5- 5-18    LOS Easy/static  29"  61% Easy/static   27"  74%  Easy/static   27"  61%  Easy/static  31" 58% Easy/static  27"  68% Easy/static  28"  67%   LOS Easy/static  27" 71% Easy/static   28" 64% Easy/static   29"  67%  Easy/static  26"  66% Easy/static  25"  78% Easy/static  27"  65%   Maze Easy static  17"  92% Easy/Static   19"  85%  Easy/Static   24"  53% Easy static  21"  85% Easy static  18"  96% Easy static  17"  92%   Maze Med static  40"  44% Easy/Static   31"  35%  Easy/Static   16"  85% Med static  % Med static  31"  66% Med static  33"  54%   Catch Game LOS Med 1' LOS med 2' LOS Med 2' LOS Med 1' LOS Med 1' LOS Med 1'            Ther Ex         Nustep L2 10' seat 7 Seated 7 L2   10 minutes  L2 10' seat 7 L2 10' seat 7 L2 10' seat 7 L2 10' seat 7   Standing hip flex/ext/abd 1x20 ea Karl 1 x 20 ea karl 1 x 20 ea karl 1x20 ea Karl 1x20 ea Karl 1x20 ea Karl   Squats 1x20 1x20 1x20  1x20 1x20 1x20   Step ups         HR/TR         Bridges 2x10 2 x 10  2x10 2x10 2x10 2x10   Sit to stands         LAQ 2x10 2x10 2x10 2x10 2x10 2x10   Seated hamstring stretch Marches Seated x20 Karl Marches seated x20 Karl Marches seated x 20 Karl Marches seated  x20 Karl Marches  Seated  x20 Karl Marches Seated x20 Karl   Supine hip abduction         Hip add machine 45# x20 45# x 20 45# x 20  45# x20 45# x20 45# x20   Hip abduction machine 45# 3x10 60# x 30 60# 3x10 60# 3x10 60# 3x10 60# 3x10   Leg press 50# 3x10 70# 2 x 20 70# 3x10 70# 3x10 70# 3x10 70# 3x10   Ther Activity                           Gait Training         Ambulation with SPC-laps around clinic                  Modalities

## 2022-06-24 ENCOUNTER — OFFICE VISIT (OUTPATIENT)
Dept: PHYSICAL THERAPY | Facility: HOME HEALTHCARE | Age: 77
End: 2022-06-24
Payer: COMMERCIAL

## 2022-06-24 DIAGNOSIS — R26.9 ABNORMALITY OF GAIT AS LATE EFFECT OF CEREBROVASCULAR ACCIDENT (CVA): Primary | ICD-10-CM

## 2022-06-24 DIAGNOSIS — I69.398 ABNORMALITY OF GAIT AS LATE EFFECT OF CEREBROVASCULAR ACCIDENT (CVA): Primary | ICD-10-CM

## 2022-06-24 DIAGNOSIS — G81.14 LEFT SPASTIC HEMIPARESIS (HCC): ICD-10-CM

## 2022-06-24 PROCEDURE — 97112 NEUROMUSCULAR REEDUCATION: CPT

## 2022-06-24 PROCEDURE — 97140 MANUAL THERAPY 1/> REGIONS: CPT

## 2022-06-24 PROCEDURE — 97110 THERAPEUTIC EXERCISES: CPT

## 2022-06-24 NOTE — PROGRESS NOTES
Daily Note     Today's date: 2022  Patient name: Faiza Hutchison  : 1945  MRN: [id number]  Referring provider: Haider Salinas MD  Dx:   Encounter Diagnosis     ICD-10-CM    1  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)  I69 398     R26 9    2  Left spastic hemiparesis (HCC)  G81 14               Subjective: Pt reports he is doing ok today  Objective: See treatment diary below      Assessment: Tolerated treatment well  Pt with no c/o pain t/o session  Pt still challenged with Biodex balance activities with UE assistance needed to complete  Patient would benefit from continued PT      Plan: Continue per plan of care        Precautions: [place] CVA    Re-eval Date: 2022      Manuals 22   Karl hamstring/glute stretching 10' 10 min 10 min Supine 10' Supine 10' Supine 10'                              Neuro Re-Ed        Patient education         Romberg balance 60"x1 EO 60" x 1 EO 60" x 1 EO  60" x1 EO 60"x1 EO 60"x1 EO   Tandem balance 60"x2 EO 60" x 2 EO 60" R back EO  60" L back EO 60" R back EO  60" L back EO 60"x2 EO ea 60"x2 EO ea   [facility] taps         Biodex    5-    LOS Easy/static  29"  61% Easy/static   26"  74%  Easy/static   27"  61%  Easy/static  31" 58% Easy/static  27"  68% Easy/static  28"  67%   LOS Easy/static  27" 71% Easy/static   30" 63% Easy/static   29"  67%  Easy/static  26"  66% Easy/static  25"  78% Easy/static  27"  65%   Maze Easy static  17"  92% Easy/Static   17"  96%  Easy/Static   24"  53% Easy static  21"  85% Easy static  18"  96% Easy static  17"  92%   Maze Med static  40"  44% Med/Static   48"  37%  Easy/Static   16"  85% Med static  % Med static  31"  66% Med static  33"  54%   Catch Game LOS Med 1' LOS med 1' LOS Med 2' LOS Med 1' LOS Med 1' LOS Med 1'            Ther Ex         Nustep L2 10' seat 7 Seated 7 L2   10 minutes  L2 10' seat 7 L2 10' seat 7 L2 10' seat 7 L2 10' seat 7   Standing hip flex/ext/abd 1x20 ea Karl 1 x 20 ea karl 1 x 20 ea karl 1x20 ea Karl 1x20 ea Karl 1x20 ea Karl   Squats 1x20 1x20 1x20  1x20 1x20 1x20   Step ups         HR/TR         Bridges 2x10 2 x 10  2x10 2x10 2x10 2x10   Sit to stands         LAQ 2x10 2x10 2x10 2x10 2x10 2x10   Seated hamstring stretch Marches Seated x20 Karl Marches seated x20 Karl Marches seated x 20 Karl Marches seated  x20 Karl Marches  Seated  x20 Karl Marches Seated x20 Karl   Supine hip abduction         Hip add machine 45# x20 45# x 20 45# x 20  45# x20 45# x20 45# x20   Hip abduction machine 45# 3x10 60# x 30 60# 3x10 60# 3x10 60# 3x10 60# 3x10   Leg press 50# 3x10 70# 2 x 20 70# 3x10 70# 3x10 70# 3x10 70# 3x10   Ther Activity                           Gait Training         Ambulation with SPC-laps around clinic                  Modalities

## 2022-06-27 ENCOUNTER — OFFICE VISIT (OUTPATIENT)
Dept: PHYSICAL THERAPY | Facility: HOME HEALTHCARE | Age: 77
End: 2022-06-27
Payer: COMMERCIAL

## 2022-06-27 DIAGNOSIS — I69.398 ABNORMALITY OF GAIT AS LATE EFFECT OF CEREBROVASCULAR ACCIDENT (CVA): Primary | ICD-10-CM

## 2022-06-27 DIAGNOSIS — R26.9 ABNORMALITY OF GAIT AS LATE EFFECT OF CEREBROVASCULAR ACCIDENT (CVA): Primary | ICD-10-CM

## 2022-06-27 DIAGNOSIS — G81.14 LEFT SPASTIC HEMIPARESIS (HCC): ICD-10-CM

## 2022-06-27 PROCEDURE — 97112 NEUROMUSCULAR REEDUCATION: CPT

## 2022-06-27 PROCEDURE — 97140 MANUAL THERAPY 1/> REGIONS: CPT

## 2022-06-27 PROCEDURE — 97110 THERAPEUTIC EXERCISES: CPT

## 2022-06-27 NOTE — PROGRESS NOTES
Daily Note     Today's date: 2022  Patient name: Jonathan Pruitt  : 1945  MRN: [id number]  Referring provider: Zonia Crawford MD  Dx:   Encounter Diagnosis     ICD-10-CM    1  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)  I69 398     R26 9    2  Left spastic hemiparesis (HCC)  G81 14               Subjective: Pt reports he is ok today  Objective: See treatment diary below      Assessment: Tolerated treatment well  Pt with no c/o pain t/o session  Some improved scores noted on Biodex today  Patient would benefit from continued PT      Plan: Continue per plan of care        Precautions: [facility] CVA    Re-eval Date: 2022      Manuals 22   Karl hamstring/glute stretching 10' 10 min 10 min Supine 10' Supine 10' Supine 10'                              Neuro Re-Ed        Patient education         Romberg balance 60"x1 EO 60" x 1 EO 60" x 1 EO  60" x1 EO 60"x1 EO 60"x1 EO   Tandem balance 60"x2 EO 60" x 2 EO 60" x 2 EO 60" R back EO  60" L back EO 60"x2 EO ea 60"x2 EO ea   [facility] taps         Biodex    -    LOS Easy/static  29"  61% Easy/static   26"  74%  Easy/static   28"  80%  Easy/static  31" 58% Easy/static  27"  68% Easy/static  28"  67%   LOS Easy/static  27" 71% Easy/static   30" 63% Med/static   35"  70%  Easy/static  26"  66% Easy/static  25"  78% Easy/static  27"  65%   Maze Easy static  17"  92% Easy/Static   17"  96%  Easy/Static   23"  85% Easy static  21"  85% Easy static  18"  96% Easy static  17"  92%   Maze Med static  40"  44% Med/Static   48"  37%  Med/Static   46"  50% Med static  % Med static  31"  66% Med static  33"  54%   Catch Game LOS Med 1' LOS med 1' LOS Med 1' LOS Med 1' LOS Med 1' LOS Med 1'            Ther Ex         Nustep L2 10' seat 7 Seated 7 L2   10 minutes  L2 10' seat 7 L2 10' seat 7 L2 10' seat 7 L2 10' seat 7   Standing hip flex/ext/abd 1x20 ea Karl 1 x 20 ea karl 1 x 20 ea karl 1x20 ea Karl 1x20 ea Karl 1x20 ea Karl   Squats 1x20 1x20 1x20  1x20 1x20 1x20   Step ups         HR/TR         Bridges 2x10 2 x 10  2x10 2x10 2x10 2x10   Sit to stands         LAQ 2x10 2x10 2x10 2x10 2x10 2x10   Seated hamstring stretch Marches Seated x20 Karl Marches seated x20 Karl Marches seated x 20 Karl Marches seated  x20 Karl Marches  Seated  x20 Karl Marches Seated x20 Karl   Supine hip abduction         Hip add machine 45# x20 45# x 20 45# x 20  45# x20 45# x20 45# x20   Hip abduction machine 45# 3x10 60# x 30 60# 3x10 60# 3x10 60# 3x10 60# 3x10   Leg press 50# 3x10 70# 2 x 20 70# 3x10 70# 3x10 70# 3x10 70# 3x10   Ther Activity                           Gait Training         Ambulation with SPC-laps around clinic                  Modalities

## 2022-06-28 ENCOUNTER — APPOINTMENT (OUTPATIENT)
Dept: PHYSICAL THERAPY | Facility: HOME HEALTHCARE | Age: 77
End: 2022-06-28
Payer: COMMERCIAL

## 2022-06-29 ENCOUNTER — OFFICE VISIT (OUTPATIENT)
Dept: PHYSICAL THERAPY | Facility: HOME HEALTHCARE | Age: 77
End: 2022-06-29
Payer: COMMERCIAL

## 2022-06-29 DIAGNOSIS — I69.398 ABNORMALITY OF GAIT AS LATE EFFECT OF CEREBROVASCULAR ACCIDENT (CVA): Primary | ICD-10-CM

## 2022-06-29 DIAGNOSIS — R26.9 ABNORMALITY OF GAIT AS LATE EFFECT OF CEREBROVASCULAR ACCIDENT (CVA): Primary | ICD-10-CM

## 2022-06-29 PROCEDURE — 97140 MANUAL THERAPY 1/> REGIONS: CPT

## 2022-06-29 PROCEDURE — 97110 THERAPEUTIC EXERCISES: CPT

## 2022-06-29 PROCEDURE — 97112 NEUROMUSCULAR REEDUCATION: CPT

## 2022-06-29 NOTE — PROGRESS NOTES
Daily Note     Today's date: 2022  Patient name: Kiera Ruth  : 1945  MRN: [id number]  Referring provider: Geovanna Cantor MD  Dx: No diagnosis found  Start Time:           Subjective: Pt with no c/o  Objective: See treatment diary below    Assessment: Tolerated treatment well  Pt without c/o pain t/o all ex  He continues to requires [facility] during ambulation and assistance with LLE during transfers and with on/off ex equipment  Progressing slowly toward I HEP or wellness  Pt remains with B HS tightness  Patient would benefit from continued PT    Plan: Continue per plan of care        Precautions: [facility] CVA    Re-eval Date: 2022      Manuals 22   Karl hamstring/glute stretching 10' 10 min 10 min 10' Supine 10' Supine 10'                              Neuro Re-Ed        Patient education         Romberg balance 60"x1 EO 60" x 1 EO 60" x 1 EO  60" x1 EO 60"x1 EO 60"x1 EO   Tandem balance 60"x2 EO 60" x 2 EO 60" x 2 EO 60" x2 EO   60"x2 EO ea 60"x2 EO ea   Cone taps         Biodex    -    LOS Easy/static  29"  61% Easy/static   26"  74%  Easy/static   28"  80%  Easy/static  24"  74% Easy/static  27"  68% Easy/static  28"  67%   LOS Easy/static  27" 71% Easy/static   30" 63% Med/static   35"  70%  Med/static  31"  61% Easy/static  25"  78% Easy/static  27"  65%   Maze Easy static  17"  92% Easy/Static   17"  96%  Easy/Static   23"  85% Easy static  21"  75% Easy static  18"  96% Easy static  17"  92%   Maze Med static  40"  44% Med/Static   48"  37%  Med/Static   46"  50% Med static  31"  68% Med static  31"  66% Med static  33"  54%   Catch Game LOS Med 1' LOS med 1' LOS Med 1' LOS Med 1' LOS Med 1' LOS Med 1'            Ther Ex         Nustep L2 10' seat 7 Seated 7 L2   10 minutes  L2 10' seat 7 L2 10' seat 7 L2 10' seat 7 L2 10' seat 7   Standing hip flex/ext/abd 1x20 ea Karl 1 x 20 ea karl 1 x 20 ea karl 1x30 ea Karl 1x20 ea Karl 1x20 ea Karl Squats 1x20 1x20 1x20  1x30 1x20 1x20   Step ups         HR/TR         Bridges 2x10 2 x 10  2x10 2x10 2x10 2x10   Sit to stands         LAQ 2x10 2x10 2x10 2x10 2x10 2x10   Seated hamstring stretch Marches Seated x20 Karl Marches seated x20 Karl Marches seated x 20 Karl Marches seated  x20 Karl Marches  Seated  x20 Karl Marches Seated x20 Karl   Supine hip abduction         Hip add machine 45# x20 45# x 20 45# x 20  45# x20 45# x20 45# x20   Hip abduction machine 45# 3x10 60# x 30 60# 3x10 60# 3x10 60# 3x10 60# 3x10   Leg press 50# 3x10 70# 2 x 20 70# 3x10 70# 3x10 70# 3x10 70# 3x10   Ther Activity                           Gait Training         Ambulation with SPC-laps around clinic                  Modalities

## 2022-07-05 ENCOUNTER — EVALUATION (OUTPATIENT)
Dept: PHYSICAL THERAPY | Facility: HOME HEALTHCARE | Age: 77
End: 2022-07-05
Payer: COMMERCIAL

## 2022-07-05 DIAGNOSIS — I69.398 ABNORMALITY OF GAIT AS LATE EFFECT OF CEREBROVASCULAR ACCIDENT (CVA): Primary | ICD-10-CM

## 2022-07-05 DIAGNOSIS — R26.9 ABNORMALITY OF GAIT AS LATE EFFECT OF CEREBROVASCULAR ACCIDENT (CVA): Primary | ICD-10-CM

## 2022-07-05 DIAGNOSIS — G81.14 LEFT SPASTIC HEMIPARESIS (HCC): ICD-10-CM

## 2022-07-05 PROCEDURE — 97110 THERAPEUTIC EXERCISES: CPT

## 2022-07-05 PROCEDURE — 97140 MANUAL THERAPY 1/> REGIONS: CPT

## 2022-07-05 PROCEDURE — 97112 NEUROMUSCULAR REEDUCATION: CPT

## 2022-07-05 NOTE — PROGRESS NOTES
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)  I69 398     R26 9    2  Left spastic hemiparesis ([address] )  G81 14        Start Time: 1300  Stop Time: 1355  Total time in clinic (min): 55 minutes    Assessment  Assessment details: Pt demonstrated good tolerance to transitioning into a maintenance program and has been able to maintain all of his goals for strength, fall risk, and overall functional mobility  Pt continues to ambulate with a moderate gait dysfunction using a SPC due to complications from PMH of CVA, however, he has continues to be free of any falls  Pt continues to demonstrate L LE and UE weakness due to his CVA, however, he has been able to maintain is current level of strength since initiating the maintenance program   Pt would benefit from continued PT in order to continue to maintain his current strength, fall risk, and overall function with the plan to progress into [facility] as appropriate  Thank you! Impairments: abnormal gait, abnormal muscle firing, abnormal muscle tone, abnormal or restricted ROM, abnormal movement, activity intolerance, impaired balance, impaired physical strength, lacks appropriate home exercise program, safety issue, weight-bearing intolerance, poor posture  and poor body mechanics    Goals  Transition to Maintenance PT Goals:   Pt will maintain his current level of function and independence as demonstrated by a strength of 3-/5 or greater  Ongoing  Pt will maintain his current level of function as demonstrated by ability to ambulate independently with [facility] community ambulation  Ongoing  Pt will maintain a relatively low risk for falls as demonstrated by a TUG score of at least 17s or less   Ongoing  Pt will maintain his current level of independence and safety as demonstrated by no reported falls  Ongoing  Pt will maintain his current level function and balance as demonstrated by a Romberg time of 30s or greater without loss of balance  Ongoing        Plan  Patient would benefit from: skilled physical therapy  Planned modality interventions: thermotherapy: hydrocollator packs  Planned therapy interventions: ADL retraining, balance, body mechanics training, balance/weight bearing training, flexibility, functional ROM exercises, gait training, home exercise program, joint mobilization, manual therapy, massage, neuromuscular re-education, orthotic fitting/training, patient education, postural training, strengthening, stretching, therapeutic activities and therapeutic exercise  Frequency: 2x week  Duration in weeks: 4  Plan of Care beginning date: 7/5/2022  Plan of Care expiration date: 8/5/2022  Treatment plan discussed with: patient        Subjective Evaluation    History of Present Illness  Mechanism of injury: Pt with PMH of CVA on March 3rd, 2021 reports that he went to the [place] to get a new brace because it was starting to come apart  Pt was recommended to go to physical therapy again after receiving new L sided AFO brace  Pt reports this one is taller than the previous one  Pt reports that he was walking for a mile or more prior to the colder weather and snow  Pt is able to walk on level ground, but has difficulty walking on uneven surfaces or inclines  Pt wants to keep walking and working on strengthening  Pt walked 4 miles per day prior to CVA  Pt reports that he has more spasticity when the weather is colder  Pt reports no difficulty with stairs  Pt reports that he feels like he has been regressing recently because he has been unable to exercise as much  Pt feels numbness and tingling in his R hand when he wakes up in the morning      UPDATE 2/16/2022:  Pt reports that he does not feel like he seen any changes with physical therapy, however, he reports that his wife and his friend think that he is walking better  UPDATE 3/21/2022:  Pt reports that it is difficult for him to tell if he has been improving since beginning PT  UPDATE 4/20/2022:  Pt reports that he feels like he has been improving in his overall balance and mobility  Pt reports that he continues to have the most difficulty with tandem balance  UPDATE 5/24/2022:  Pt reports that he feels like he has been improving, but that some days are better than others  He reports that he would like to continue with PT in order to maintain the progress that he has made  UPDATE 7/5/2022:  Pt has no complaints from previous session and feels that he has been maintaining his current function    Social Support  Steps to enter house: yes (1)  Stairs in house: yes (13)     Treatments  Previous treatment: physical therapy and occupational therapy  Patient Goals  Patient goals for therapy: improved balance, increased motion, increased strength, independence with ADLs/IADLs and return to sport/leisure activities  Patient goal: To be able to walk right, leg and arms functioning        Objective     Neurological Testing     Sensation     Lumbar   Left   Intact: light touch    Right   Intact: light touch    Passive Range of Motion   Left Hip   Flexion: 110 degrees     Right Hip   Flexion: 110 degrees   Left Knee   Flexion: WFL  Extension: WFL    Right Knee   Flexion: WFL  Extension: WFL    Additional Passive Range of Motion Details  SLR: L: 70 deg, R: 80 deg    Strength/Myotome Testing     Left Hip   Planes of Motion   Flexion: 3-  Abduction: 3-    Right Hip   Planes of Motion   Flexion: 5  Abduction: 4+    Left Knee   Flexion: 3-  Extension: 3    Right Knee   Flexion: 5  Extension: 5    Ambulation   Weight-Bearing Status     Additional Weight-Bearing Status Details  SPC    Ambulation: Level Surfaces   Ambulation with assistive device: independent    Ambulation: Stairs   Ascend stairs: independent  Pattern: non-reciprocal  Railings: one rail  Descend stairs: independent  Pattern: non-reciprocal  Railings: one rail  Curbs: independent    Additional Stairs Ambulation Details  W/ SPC    Observational Gait   Decreased walking speed, stride length and left stance time     Left foot contact pattern: foot flat    Quality of Movement During Gait     Additional Quality of Movement During Gait Details  Hemiplegic gait    Functional Assessment        Comments  TU 05s  Romberg: EO: 30s, EC: 30s  Tandem L: 10s, R: 30s  Neuro Exam:     Modified Chris   Left hamstrin  Left quadricepts: 2             Precautions: PMH CVA    Re-eval Date: 2022      Manuals 22    Karl hamstring/glute stretching 10' 10 min 10 min 10' 10'                               Neuro Re-Ed         Patient education         Romberg balance 60"x1 EO 60" x 1 EO 60" x 1 EO  60" x1 EO 60" x1 EO    Tandem balance 60"x2 EO 60" x 2 EO 60" x 2 EO 60" x2 EO   60" x2 EO    [facility] taps         Biodex    -     LOS Easy/static  29"  61% Easy/static   26"  74%  Easy/static   28"  80%  Easy/static  24"  74% Easy/static  27"  71%    LOS Easy/static  27" 71% Easy/static   30" 63% Med/static   35"  70%  Med/static  31"  61% Med/static  32"  64%    Maze Easy static  17"  92% Easy/Static   17"  96%  Easy/Static   23"  85% Easy static  21"  75% Easy static  14"  96%    Maze Med static  40"  44% Med/Static   48"  37%  Med/Static   46"  50% Med static  31"  68% Med static  33"  56%    Catch Game LOS Med 1' LOS med 1' LOS Med 1' LOS Med 1' LOS Med 1'             Ther Ex         Nustep L2 10' seat 7 Seated 7 L2   10 minutes  L2 10' seat 7 L2 10' seat 7 L2 10' seat 7    Standing hip flex/ext/abd 1x20 ea Karl 1 x 20 ea karl 1 x 20 ea karl 1x30 ea Karl 1x30 ea Karl    Squats 1x20 1x20 1x20  1x30 1x30    Step ups         HR/TR         Bridges 2x10 2 x 10  2x10 2x10 2x10    Sit to stands         LAQ 2x10 2x10 2x10 2x10 2x10    Seated hamstring stretch Marches Seated x20 Karl Marches seated x20 Karl Marches seated x 20 Karl Marches seated  x20 Karl Marches x20 Karl    Supine hip abduction         Hip add machine 45# x20 45# x 20 45# x 20  45# x20 45# 3x10    Hip abduction machine 45# 3x10 60# x 30 60# 3x10 60# 3x10 60# 3x10    Leg press 50# 3x10 70# 2 x 20 70# 3x10 70# 3x10 70# 3x10    Ther Activity                           Gait Training         Ambulation with SPC-laps around clinic                  Modalities

## 2022-07-05 NOTE — LETTER
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)  I69 398     R26 9    2  Left spastic hemiparesis [facility])  G81 14        Dear Dr Leigh Bahtt:    Thank you for your recent referral of Amanda Dela Cruz  Please review the attached evaluation summary from [facility] recent visit  Please verify that you agree with the plan of care by signing the attached order  If you have any questions or concerns, please do not hesitate to call  I sincerely appreciate the opportunity to share in the care of one of your patients and hope to have another opportunity to work with you in the near future  Sincerely,    Uriel Johnson, PT      Referring Provider:      I certify that I have read the below Plan of Care and certify the need for these services furnished under this plan of treatment while under my care  Olayinka Ramos MD  [address] [facility] [address]  Via Fax: [phone]          PT Re-Evaluation     Today's date: 2022  Patient name: Amanda Dela Cruz  : 1945  MRN: [id number]  Referring provider: Shiela Singh MD  Dx:   Encounter Diagnosis     ICD-10-CM    1  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)  I69 398     R26 9    2  Left spastic hemiparesis ([address] )  G81 14        Start Time: 1300  Stop Time: 1355  Total time in clinic (min): 55 minutes    Assessment  Assessment details: Pt demonstrated good tolerance to transitioning into a maintenance program and has been able to maintain all of his goals for strength, fall risk, and overall functional mobility  Pt continues to ambulate with a moderate gait dysfunction using a SPC due to complications from PMH of CVA, however, he has continues to be free of any falls    Pt continues to demonstrate L LE and UE weakness due to his CVA, however, he has been able to maintain is current level of strength since initiating the maintenance program   Pt would benefit from continued PT in order to continue to maintain his current strength, fall risk, and overall function with the plan to progress into [facility] as appropriate  Thank you! Impairments: abnormal gait, abnormal muscle firing, abnormal muscle tone, abnormal or restricted ROM, abnormal movement, activity intolerance, impaired balance, impaired physical strength, lacks appropriate home exercise program, safety issue, weight-bearing intolerance, poor posture  and poor body mechanics    Goals  Transition to Maintenance PT Goals:   Pt will maintain his current level of function and independence as demonstrated by a strength of 3-/5 or greater  Ongoing  Pt will maintain his current level of function as demonstrated by ability to ambulate independently with [facility] ambulation  Ongoing  Pt will maintain a relatively low risk for falls as demonstrated by a TUG score of at least 17s or less  Ongoing  Pt will maintain his current level of independence and safety as demonstrated by no reported falls  Ongoing  Pt will maintain his current level function and balance as demonstrated by a Romberg time of 30s or greater without loss of balance   Ongoing        Plan  Patient would benefit from: skilled physical therapy  Planned modality interventions: thermotherapy: hydrocollator packs  Planned therapy interventions: ADL retraining, balance, body mechanics training, balance/weight bearing training, flexibility, functional ROM exercises, gait training, home exercise program, joint mobilization, manual therapy, massage, neuromuscular re-education, orthotic fitting/training, patient education, postural training, strengthening, stretching, therapeutic activities and therapeutic exercise  Frequency: 2x week  Duration in weeks: 4  Plan of Care beginning date: 7/5/2022  Plan of Care expiration date: 8/5/2022  Treatment plan discussed with: patient        Subjective Evaluation    History of Present Illness  Mechanism of injury: Pt with PMH of CVA on March 3rd, 2021 reports that he went to the [place] to get a new brace because it was starting to come apart  Pt was recommended to go to physical therapy again after receiving new L sided AFO brace  Pt reports this one is taller than the previous one  Pt reports that he was walking for a mile or more prior to the colder weather and snow  Pt is able to walk on level ground, but has difficulty walking on uneven surfaces or inclines  Pt wants to keep walking and working on strengthening  Pt walked 4 miles per day prior to CVA  Pt reports that he has more spasticity when the weather is colder  Pt reports no difficulty with stairs  Pt reports that he feels like he has been regressing recently because he has been unable to exercise as much  Pt feels numbness and tingling in his R hand when he wakes up in the morning  UPDATE 2/16/2022:  Pt reports that he does not feel like he seen any changes with physical therapy, however, he reports that his wife and his friend think that he is walking better  UPDATE 3/21/2022:  Pt reports that it is difficult for him to tell if he has been improving since beginning PT  UPDATE 4/20/2022:  Pt reports that he feels like he has been improving in his overall balance and mobility  Pt reports that he continues to have the most difficulty with tandem balance  UPDATE 5/24/2022:  Pt reports that he feels like he has been improving, but that some days are better than others  He reports that he would like to continue with PT in order to maintain the progress that he has made  UPDATE 7/5/2022:  Pt has no complaints from previous session and feels that he has been maintaining his current function    Social Support  Steps to enter house: yes (1)  Stairs in house: yes (13) Treatments  Previous treatment: physical therapy and occupational therapy  Patient Goals  Patient goals for therapy: improved balance, increased motion, increased strength, independence with ADLs/IADLs and return to sport/leisure activities  Patient goal: To be able to walk right, leg and arms functioning        Objective     Neurological Testing     Sensation     Lumbar   Left   Intact: light touch    Right   Intact: light touch    Passive Range of Motion   Left Hip   Flexion: 110 degrees     Right Hip   Flexion: 110 degrees   Left Knee   Flexion: WFL  Extension: WFL    Right Knee   Flexion: WFL  Extension: WFL    Additional Passive Range of Motion Details  SLR: L: 70 deg, R: 80 deg    Strength/Myotome Testing     Left Hip   Planes of Motion   Flexion: 3-  Abduction: 3-    Right Hip   Planes of Motion   Flexion: 5  Abduction: 4+    Left Knee   Flexion: 3-  Extension: 3    Right Knee   Flexion: 5  Extension: 5    Ambulation   Weight-Bearing Status     Additional Weight-Bearing Status Details  SPC    Ambulation: Level Surfaces   Ambulation with assistive device: independent    Ambulation: Stairs   Ascend stairs: independent  Pattern: non-reciprocal  Railings: one rail  Descend stairs: independent  Pattern: non-reciprocal  Railings: one rail  Curbs: independent    Additional Stairs Ambulation Details  W/ SPC    Observational Gait   Decreased walking speed, stride length and left stance time     Left foot contact pattern: foot flat    Quality of Movement During Gait     Additional Quality of Movement During Gait Details  Hemiplegic gait    Functional Assessment        Comments  TU 05s  Romberg: EO: 30s, EC: 30s  Tandem L: 10s, R: 30s  Neuro Exam:     Modified Chris   Left hamstrin  Left quadricepts: 2             Precautions: PMH CVA    Re-eval Date: 2022      Manuals 22    Karl hamstring/glute stretching 10' 10 min 10 min 10' 10'                               Neuro Re-Ed 6-29     Patient education         Romberg balance 60"x1 EO 60" x 1 EO 60" x 1 EO  60" x1 EO 60" x1 EO    Tandem balance 60"x2 EO 60" x 2 EO 60" x 2 EO 60" x2 EO   60" x2 EO    [facility] taps         Biodex    6-29     LOS Easy/static  29"  61% Easy/static   26"  74%  Easy/static   28"  80%  Easy/static  24"  74% Easy/static  27"  71%    LOS Easy/static  27" 71% Easy/static   30" 63% Med/static   35"  70%  Med/static  31"  61% Med/static  32"  64%    Maze Easy static  17"  92% Easy/Static   17"  96%  Easy/Static   23"  85% Easy static  21"  75% Easy static  14"  96%    Maze Med static  40"  44% Med/Static   48"  37%  Med/Static   46"  50% Med static  31"  68% Med static  33"  56%    Catch Game LOS Med 1' LOS med 1' LOS Med 1' LOS Med 1' LOS Med 1'             Ther Ex    6-29     Nustep L2 10' seat 7 Seated 7 L2   10 minutes  L2 10' seat 7 L2 10' seat 7 L2 10' seat 7    Standing hip flex/ext/abd 1x20 ea Karl 1 x 20 ea karl 1 x 20 ea karl 1x30 ea Karl 1x30 ea Karl    Squats 1x20 1x20 1x20  1x30 1x30    Step ups         HR/TR         Bridges 2x10 2 x 10  2x10 2x10 2x10    Sit to stands         LAQ 2x10 2x10 2x10 2x10 2x10    Seated hamstring stretch Marches Seated x20 Karl Marches seated x20 Karl Marches seated x 20 Karl Marches seated  x20 Karl Marches x20 Karl    Supine hip abduction         Hip add machine 45# x20 45# x 20 45# x 20  45# x20 45# 3x10    Hip abduction machine 45# 3x10 60# x 30 60# 3x10 60# 3x10 60# 3x10    Leg press 50# 3x10 70# 2 x 20 70# 3x10 70# 3x10 70# 3x10    Ther Activity                           Gait Training         Ambulation with SPC-laps around clinic                  Modalities

## 2022-07-06 ENCOUNTER — OFFICE VISIT (OUTPATIENT)
Dept: PHYSICAL THERAPY | Facility: HOME HEALTHCARE | Age: 77
End: 2022-07-06
Payer: COMMERCIAL

## 2022-07-06 DIAGNOSIS — R26.9 ABNORMALITY OF GAIT AS LATE EFFECT OF CEREBROVASCULAR ACCIDENT (CVA): Primary | ICD-10-CM

## 2022-07-06 DIAGNOSIS — G81.14 LEFT SPASTIC HEMIPARESIS (HCC): ICD-10-CM

## 2022-07-06 DIAGNOSIS — I69.398 ABNORMALITY OF GAIT AS LATE EFFECT OF CEREBROVASCULAR ACCIDENT (CVA): Primary | ICD-10-CM

## 2022-07-06 PROCEDURE — 97110 THERAPEUTIC EXERCISES: CPT

## 2022-07-06 PROCEDURE — 97112 NEUROMUSCULAR REEDUCATION: CPT

## 2022-07-06 PROCEDURE — 97140 MANUAL THERAPY 1/> REGIONS: CPT

## 2022-07-06 NOTE — PROGRESS NOTES
Daily Note     Today's date: 2022  Patient name: Josr Inman  : 1945  MRN: [id number]  Referring provider: Heena Kaur MD  Dx:   Encounter Diagnosis     ICD-10-CM    1  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)  I69 398     R26 9    2  Left spastic hemiparesis ([address] )  G81 14        Start Time: 1045  Stop Time: 1130  Total time in clinic (min): 45 minutes    Subjective: Pt has no complaints from previous visit  Objective: See treatment diary below      Assessment: Pt demonstrated good tolerance to treatment session  He continues to demonstrate bilateral glute and hamstring tightness, but improved with passive stretching  Pt would benefit from continued PT  Plan: Continue per plan of care        Precautions: [facility] CVA    Re-eval Date: 2022      Manuals 22   Karl hamstring/glute stretching 10' 10 min 10 min 10' 10' 10'                              Neuro Re-Ed         Patient education         Romberg balance 60"x1 EO 60" x 1 EO 60" x 1 EO  60" x1 EO 60" x1 EO 60" x1 EO   Tandem balance 60"x2 EO 60" x 2 EO 60" x 2 EO 60" x2 EO   60" x2 EO 60" x2 EO   [facility] taps         Biodex    -     LOS Easy/static  29"  61% Easy/static   26"  74%  Easy/static   28"  80%  Easy/static  24"  74% Easy/static  27"  71% Easy/static  26"  64%   LOS Easy/static  27" 71% Easy/static   30" 63% Med/static   35"  70%  Med/static  31"  61% Med/static  32"  64% Med/static  33"  56%   Maze Easy static  17"  92% Easy/Static   17"  96%  Easy/Static   23"  85% Easy static  21"  75% Easy static  14"  96% Easy static  18"  96%   Maze Med static  40"  44% Med/Static   48"  37%  Med/Static   46"  50% Med static  31"  68% Med static  33"  56% Med static  40"  58%   Catch Game LOS Med 1' LOS med 1' LOS Med 1' LOS Med 1' LOS Med 1' LOS Med 1'            Ther Ex         Nustep L2 10' seat 7 Seated 7 L2   10 minutes  L2 10' seat 7 L2 10' seat 7 L2 10' seat 7 L2 10' seat 7   Standing hip flex/ext/abd 1x20 ea Karl 1 x 20 ea karl 1 x 20 ea karl 1x30 ea Karl 1x30 ea Karl 1x30 ea Karl   Squats 1x20 1x20 1x20  1x30 1x30 1x30   Step ups         HR/TR         Bridges 2x10 2 x 10  2x10 2x10 2x10 2x10   Sit to stands         LAQ 2x10 2x10 2x10 2x10 2x10 2x10   Seated hamstring stretch Marches Seated x20 Karl Marches seated x20 Karl Marches seated x 20 Karl Marches seated  x20 Karl Marches x20 Karl Marches x20 Karl   Supine hip abduction         Hip add machine 45# x20 45# x 20 45# x 20  45# x20 45# 3x10 45# 3x10   Hip abduction machine 45# 3x10 60# x 30 60# 3x10 60# 3x10 60# 3x10 60# 3x10   Leg press 50# 3x10 70# 2 x 20 70# 3x10 70# 3x10 70# 3x10 70# 3x10   Ther Activity                           Gait Training         Ambulation with SPC-laps around clinic                  Modalities

## 2022-07-25 NOTE — PROGRESS NOTES
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)  I69 398     R26 9    2  Left spastic hemiparesis ([address] )  G81 14        Start Time: 1045  Stop Time: 1130  Total time in clinic (min): 45 minutes    Assessment  Assessment details: Pt has made significant improvements in overall strength, ROM, gait quality, and overall functional mobility since beginning PT  Due to the pt's PMH of CVA, he continues to have residual deficits and has likely reached a plateau for this course of physical therapy  The patient demonstrated a good tolerance to transitioning into a maintenance program and he has demonstrated independence with his HEP  Pt was placed on hold after his most recent PT session and was notified to call if he felt he needed any additional PT treatment sessions within the maintenance PT program   Pt did not call for any additional PT sessions, so the pt will be D/C to HEP at this time due to script expiring  Goals  Transition to Maintenance PT Goals:   Pt will maintain his current level of function and independence as demonstrated by a strength of 3-/5 or greater  Ongoing  Pt will maintain his current level of function as demonstrated by ability to ambulate independently with [facility] community ambulation  Ongoing  Pt will maintain a relatively low risk for falls as demonstrated by a TUG score of at least 17s or less  Ongoing  Pt will maintain his current level of independence and safety as demonstrated by no reported falls  Ongoing  Pt will maintain his current level function and balance as demonstrated by a Romberg time of 30s or greater without loss of balance   Ongoing        Plan  Plan details: D/C to HEP  Treatment plan discussed with: patient        Subjective Evaluation    History of Present Illness  Mechanism of injury: Pt with PMH of CVA on March 3rd, 2021 reports that he went to the [place] to get a new brace because it was starting to come apart  Pt was recommended to go to physical therapy again after receiving new L sided AFO brace  Pt reports this one is taller than the previous one  Pt reports that he was walking for a mile or more prior to the colder weather and snow  Pt is able to walk on level ground, but has difficulty walking on uneven surfaces or inclines  Pt wants to keep walking and working on strengthening  Pt walked 4 miles per day prior to CVA  Pt reports that he has more spasticity when the weather is colder  Pt reports no difficulty with stairs  Pt reports that he feels like he has been regressing recently because he has been unable to exercise as much  Pt feels numbness and tingling in his R hand when he wakes up in the morning  UPDATE 2/16/2022:  Pt reports that he does not feel like he seen any changes with physical therapy, however, he reports that his wife and his friend think that he is walking better  UPDATE 3/21/2022:  Pt reports that it is difficult for him to tell if he has been improving since beginning PT  UPDATE 4/20/2022:  Pt reports that he feels like he has been improving in his overall balance and mobility  Pt reports that he continues to have the most difficulty with tandem balance  UPDATE 5/24/2022:  Pt reports that he feels like he has been improving, but that some days are better than others  He reports that he would like to continue with PT in order to maintain the progress that he has made  UPDATE 7/5/2022:  Pt has no complaints from previous session and feels that he has been maintaining his current function    Social Support  Steps to enter house: yes (1)  Stairs in house: yes (13)     Treatments  Previous treatment: physical therapy and occupational therapy  Patient Goals  Patient goals for therapy: improved balance, increased motion, increased strength, independence with ADLs/IADLs and return to sport/leisure activities  Patient goal: To be able to walk right, leg and arms functioning        Objective     Neurological Testing     Sensation     Lumbar   Left   Intact: light touch    Right   Intact: light touch    Passive Range of Motion   Left Hip   Flexion: 110 degrees     Right Hip   Flexion: 110 degrees   Left Knee   Flexion: WFL  Extension: WFL    Right Knee   Flexion: WFL  Extension: WFL    Additional Passive Range of Motion Details  SLR: L: 70 deg, R: 80 deg    Strength/Myotome Testing     Left Hip   Planes of Motion   Flexion: 3-  Abduction: 3-    Right Hip   Planes of Motion   Flexion: 5  Abduction: 4+    Left Knee   Flexion: 3-  Extension: 3    Right Knee   Flexion: 5  Extension: 5    Ambulation   Weight-Bearing Status     Additional Weight-Bearing Status Details  SPC    Ambulation: Level Surfaces   Ambulation with assistive device: independent    Ambulation: Stairs   Ascend stairs: independent  Pattern: non-reciprocal  Railings: one rail  Descend stairs: independent  Pattern: non-reciprocal  Railings: one rail  Curbs: independent    Additional Stairs Ambulation Details  W/ SPC    Observational Gait   Decreased walking speed, stride length and left stance time     Left foot contact pattern: foot flat    Quality of Movement During Gait     Additional Quality of Movement During Gait Details  Hemiplegic gait    Functional Assessment        Comments  TU 05s  Romberg: EO: 30s, EC: 30s  Tandem L: 10s, R: 30s  Neuro Exam:     Modified Chris   Left hamstrin  Left quadricepts: 2             Precautions: PMH CVA    Re-eval Date: 2022

## 2022-08-04 ENCOUNTER — VBI (OUTPATIENT)
Dept: ADMINISTRATIVE | Facility: OTHER | Age: 77
End: 2022-08-04

## 2022-08-11 ENCOUNTER — TELEPHONE (OUTPATIENT)
Dept: NEUROLOGY | Facility: CLINIC | Age: 77
End: 2022-08-11

## 2022-08-18 ENCOUNTER — REMOTE DEVICE CLINIC VISIT (OUTPATIENT)
Dept: CARDIOLOGY CLINIC | Facility: CLINIC | Age: 77
End: 2022-08-18
Payer: COMMERCIAL

## 2022-08-18 DIAGNOSIS — Z95.818 PRESENCE OF OTHER CARDIAC IMPLANTS AND GRAFTS: Primary | ICD-10-CM

## 2022-08-18 PROCEDURE — G2066 INTER DEVC REMOTE 30D: HCPCS | Performed by: INTERNAL MEDICINE

## 2022-08-18 PROCEDURE — 93298 REM INTERROG DEV EVAL SCRMS: CPT | Performed by: INTERNAL MEDICINE

## 2022-08-18 NOTE — PROGRESS NOTES
MDT LNQ22/ ACTIVE SYSTEM IS MRI CONDITIONAL   CARELINK TRANSMISSION: LOOP RECORDER  PRESENTING RHYTHM SB @ 54 BPM  BATTERY STATUS "OK " NO PATIENT OR DEVICE ACTIVATED EPISODES  NORMAL DEVICE FUNCTION   DL

## 2022-08-23 ENCOUNTER — CONSULT (OUTPATIENT)
Dept: NEUROLOGY | Facility: CLINIC | Age: 77
End: 2022-08-23
Payer: COMMERCIAL

## 2022-08-23 VITALS
SYSTOLIC BLOOD PRESSURE: 141 MMHG | DIASTOLIC BLOOD PRESSURE: 74 MMHG | WEIGHT: 148 LBS | BODY MASS INDEX: 23.89 KG/M2 | HEART RATE: 66 BPM | TEMPERATURE: 97.8 F

## 2022-08-23 DIAGNOSIS — I69.354 SPASTIC HEMIPLEGIA OF LEFT NONDOMINANT SIDE AS LATE EFFECT OF CEREBRAL INFARCTION (HCC): Primary | ICD-10-CM

## 2022-08-23 PROCEDURE — 99214 OFFICE O/P EST MOD 30 MIN: CPT | Performed by: PHYSICAL MEDICINE & REHABILITATION

## 2022-08-23 PROCEDURE — 3078F DIAST BP <80 MM HG: CPT | Performed by: PHYSICAL MEDICINE & REHABILITATION

## 2022-08-23 PROCEDURE — 1160F RVW MEDS BY RX/DR IN RCRD: CPT | Performed by: PHYSICAL MEDICINE & REHABILITATION

## 2022-08-23 PROCEDURE — 3077F SYST BP >= 140 MM HG: CPT | Performed by: PHYSICAL MEDICINE & REHABILITATION

## 2022-08-23 NOTE — PROGRESS NOTES
Physical Medicine & Rehabilitation New Patient Evaluation  Michael Daughters 78 y o  male      REFERRAL SOURCE: SELF  ? FOLLOWED BY: Trina Bauman, DO  ?  REASON FOR CONSULTATION: Spasticity evaluation  ? PRINCIPAL NEUROLOGIC DIAGNOSIS:   ?  HISTORY OF ILLNESS:   Date of Onset: 3/3/2021    He presented on 3/3/2021 with L sided weakness  Found to have R anterior medullary infarct, felt to be 2/2 small vessel disease  Stated on plavix and his Lipitor was increased  He was admitted to the [facility] from 3/5/2021-3/25/2021 where he was progressed to a Sup-Karen level of function and discharged home with family support  After his discharge he was started on Home PT/OT  He eventually graduated to outpatient therapies  He was recently restarted with PT in the beginning of this year  He also follows in the [facility] (provided him with the brace)  BRIEF NARRATIVE DESCRIBING HISTORY:  This 78year old right handed male was referred by Neurology for a spasticity consult  The patient was accompanied by wife Kendy You  Medical records from [address] were reviewed  He has continued to have issues with ambulation, although he has progressed to independence with ambulation with a SPC - at least household levels  His tolerance and endurance have decreased - going about 1/2 mile 2-3x a week before tiring out, but used to be able to do 1 mile 4-5x week  They feel this is due to his spasticity and his stiff and inefficient gait  He has trouble particularly with hip flexion/advancing his leg  He does feel the hip flexion weakness has worsened  ? Symptoms/Functional Limitations Related to Spasticity:   Stiffness and decreased AROM in his LLE and LUE  Spasms: In his toes - has a toe flexion reflex which activates and is quite painful when he doesn't wear a brace  Spasticity interferes with sleep: cramps can interrupt his sleep - aching pain in his foot at times  Gabapentin seems to help     Spasticity interferes with function: yes, it impacts his gait mechanics and endurance  Has not had any major falls since 5/2021 (was not using his cane during grocery shopping - broke ribs)  He did have a small a couple weeks ago on his deck, and fell backward when opening his grill  Was able to get back up with the help of his wife  ?  Treatments for Spasticity and Results of Treatments:   Stretching/exercise: Currently in PT in [facility]  Oral medications: Baclofen 10mg TID, Gabapentin 400mg QHS  Botulinum toxin injections: None  Intrathecal baclofen therapy: None  Other: Custom molded plastic, nonarticulated AFO from [id number] [facility] in [place] (Through the [place]) - February 2022  This has helped with his inversion  ? Evolution of disability: Gait disturbance since 2021 - since discharging, has advanced to modified Ind level but uses a cane in his R hand for all ambulation  ?  Current functional status:   Stair Climbing: Independent, 13 stairs  Using a righthandrail  Goes down backward  Ambulation: Independent, uses MAFO and cane  Toilet/Chair/Bed Transfer: Independent, uses MAFO and cane  Bowel Function: None - takes miralax PRN  Bladder Function: No issues  Bathing: Needs a little help to get his back and his R arm  Dressing: Independent, except for footwear  Grooming: Independent  Feeding: Independent  Vision: Glasses, has had cataract surgery in the past    Speech and Hearing: No major issues, save for some hearing loss  Has hearing aides  Medical Problems: No other major issues  Has HTN, T2DM which are both well controlled  Mood and Thought Disturbance: No major issues  Mentation: No major issues  Fatigability: Yes absolutely after a busy day  Used to be able to stand and wash the dishes without difficulty, but really fatigues now and occasionally needs breaks  ?  Skin: No major issues  ?   Nutritional status: appetite is stable, weight is stable, swallowing is stable and eats a regular/thin diet  Driving issues: Does not drive, but cleared to  Wife does driving  Safety concerns regarding living situations and safety at home: No concerns   - Home survey done by home therapies  - Now has a walk-in shower  - 13 steps to second floor   - Has shower seat   - 0 INEZ   - Lives on a hill  Risk of falls: Yes, infrequent, no major injuries sine 2021   ? REVIEW OF SYSTEMS:  A 10 point review of systems was negative except for what is noted in the HPI  ? Review of Diagnostic Studies:  3/5/2021 MRI Brain:  Small infarct within the anterior paramedian medullary was present on the prior study from 3/3/2021 although slightly better visualized on today's exam   Stable moderate chronic microangiopathic change  ?  5/31/2021 CTH:  Decreased attenuation is noted in periventricular and subcortical white matter demonstrating an appearance that is statistically most likely to represent mild microangiopathic change; this appearance is similar when compared to most recent prior examination      No CT signs of acute infarction  No intracranial mass, mass effect or midline shift  No acute parenchymal hemorrhage      VENTRICLES AND EXTRA-AXIAL SPACES:  Stable ventriculomegaly mildly disproportionate to the degree of atrophy  ?   Past Medical History:   Diagnosis Date    Arthritis     BPH (benign prostatic hyperplasia)     last assessed 4/29/2014    Diabetes mellitus ([address] )     Disease of thyroid gland     GERD (gastroesophageal reflux disease)     Hearing aid worn     Hyperlipidemia     Hypertension     Hypothyroidism     Mumps     Prostate cancer ([address] )     Stroke ([address] )     Tingling sensation     bilateral feet occassionally    Tinnitus     Wears glasses     Wears partial dentures     upper     Past Surgical History:   Procedure Laterality Date    CARDIAC ELECTROPHYSIOLOGY PROCEDURE Left 11/4/2021    Procedure: Reveal implant;  Surgeon: Mor Novak MD;  [facility];   Service: Cardiology    CATARACT EXTRACTION Bilateral 2000  EYE SURGERY      JOINT REPLACEMENT      KNEE ARTHROSCOPY Right 06/26/2009    knee    MI LAP,PROSTATECTOMY,RADICAL,W/NERVE SPARE,INCL ROBOTIC N/A 2/7/2018    Procedure: ROBOTIC ASSISTED LAPAROSCOPIC PROSTATECTOMY; BILATERAL PELVIC LYMPH NODE DISSECTION;  Surgeon: Ventura Burton MD;  Location: [facility];  Service: Urology    MI REPAIR [address] HERNIA,5+Y/O,REDUCIBL Right 4/19/2019    Procedure: REPAIR HERNIA INGUINAL;  Surgeon: Georgette Menard DO;  Location: [facility];  Service: General    PROSTATE BIOPSY  11/07/2017    needle biopsy of prostate    TRIGGER FINGER RELEASE  05/2013    trigger thumb     Social History     Socioeconomic History    Marital status: /Civil Union     Spouse name: Not on file    Number of children: Not on file    Years of education: Not on file    Highest education level: Not on file   Occupational History    Occupation: printer  retired   Tobacco Use    Smoking status: Former Smoker    Smokeless tobacco: Never Used    Tobacco comment: quit about 50 years ago   Vaping Use    Vaping Use: Never used   Substance and Sexual Activity    Alcohol use: Not Currently     Alcohol/week: 2 0 standard drinks     Types: 2 Glasses of wine per week     Comment: week, social drinker    Drug use: No    Sexual activity: Not Currently   Other Topics Concern    Not on file   Social History Narrative    Always uses seat belt    Caffeine use    Lives independently with spouse    Retired    Sun protection sunscreen     Social Determinants of Health     Financial Resource Strain: Not on file   Food Insecurity: Not on file   Transportation Needs: Not on file   Physical Activity: Not on file   Stress: Not on file   Social Connections: Not on file   Intimate Partner Violence: Not on file   Housing Stability: Not on file     Family History   Problem Relation Age of Onset    Cancer Mother     Diabetes Mother     Uterine cancer Mother     Diabetes Father     Cancer Father     Stroke Father         of unknown cause    Hypertension Father     Prostate cancer Father     Diabetes Family         Uncle, DM    Cancer Family         Grandmother         PHYSICAL EXAMINATION:  /74 (BP Location: Right arm, Patient Position: Sitting, Cuff Size: Standard)   Pulse 66   Temp 97 8 °F (36 6 °C)   Wt 67 1 kg (148 lb)   BMI 23 89 kg/m²       Gen: No acute distress, Well-nourished, well-appearing  HEENT: Moist mucus membranes, Normocephalic/Atraumatic  Cardiovascular: Regular rate, rhythm, S1/S2  Distal pulses palpable  Heme/Extr: No edema  Pulmonary: Non-labored breathing  : No clifton  GI: Soft, non-tender, non-distended  MSK: PROM in L elbow is limited to about 30 degrees shy of full extension  Shoulder abduction similarly limited to slightly below neutral  His hand intrinsics - specifically his lumbricals are tight  He has limited AROM in his hip flexors related moreso to weakness than tone, but full PROM of his hip flexors  He has co-contraction at his wrist and his ankle which allow him to remain fairly neutral in these joints  His gait is remarkable for decreased AROM at the knee - with the knee maintaining in extension  He has almost no active knee flexion  He has minimal advancement at his L hip  Luckily foot remains neutral throughout gait cycle with AFO  Without AFO, his foot tends to slight plantarflexion/inversion  Stance activates a toe flexor reflex on the left  Given lack of knee flexion, he essentially has a functionally lengthened limb on the L  This with his hip flexion weakness result in a circumducted and hip hiked gait in order to clear  This is more pronounce without his AFO  Integumentary: Skin is warm, dry  Redness noted on his toe knuckles  No breakdown beneath AFO  Neuro:  Cognitive/Behavioral: The patient was alert and oriented with normal language, memory, and praxis  Formal neuropsychological testing was not performed today   The patient did not exhibit signs of pathological anxiety or depression during the interview and examination  ?  Cranial Nerves:  Eye movements were full without nystagmus  Facial sensation was normal  L facial weakness in a central pattern  Impaired hearing bilaterally, hearing aides in place  Gag reflex and palatal movements were normal  Sternocleidomastoid power normal  Weakness in L upper trapezius  Tongue movements were normal  There was no dysarthria  ? Exam  MMT:   Strength:   Right  Left  Site  Right  Left  Site    5 4-  S Ab: Shoulder Abductors  5  2+  HF: Hip Flexors    5 4  EF: Elbow Flexors  5  1 KF: Knee Flexors    5  4-  EE: Elbow Extensors  5  4+  KE: Knee Extensors    5  3-  WE: Wrist Extensors  5  1 DR: Dorsi Flexors    5  3  FF: Finger Flexors  5  1  PF: Plantar Flexors    5  3  HI: Hand Intrinsics  5  3  EHL: Extensor Hallucis Longus     MAS:  Right  Left  Site  Right  Left  Site    0 2 Shoulder 0 0  Hip Adductors   0 1+  EF: Elbow Flexors  0 0 KF: Knee Flexors    0  0  EE: Elbow Extensors  0  2  KE: Knee Extensors    0/0 3/3  WF/WE 0  2  DR: Dorsi Flexors    0  1 FF: Finger Flexors  0  2  PF: Plantar Flexors    0  2  HI: Hand Intrinsics * 0/0  0/0  Toe Flex/Ex   * specifically L lumbricals    MAS  0 - no increased tone  1 - slight increase in tone at the end of the ROM  1+ increase in tone at 1/2 the ROM  2 - increase in tone through most the ROM  3 - moderate increase in muscle tone - passive movement difficult  4 - affected parts ridid in flexion or extension    SPASMS observed:   RUE: No   LUE: No  RLE: No   LLE: Not spasm, but toe flexor reflex noted in stance when not wearing his AFO  ? Cerebellar: There was no significant dysmetria on finger-to-nose and heel-to-shin testing  Coordination could not be tested in the L due to proximal weakness  ?  Sensory: Coarse touch impaired on the L compared to the R globally       Reflexes:  DTRs:   Right Left   Biceps 1 2   Triceps 1 2   Patella 1 2   Achilles 1 2 (no clonus) Upper Tract Signs:   Left with positive Joshua's  ? ASSESSMENT/Plan:  Diagnoses and all orders for this visit:    Spastic hemiplegia of left nondominant side as late effect of cerebral infarction [facility])      Mr Mary Weaver is a very pleasant 68yo M with medical history of R anterior medullary infarct in 2021 with subsequent L spastic hemiplegia/weakness  On evaluation of his gait, while he does have tone, it is primarily in his quad, with cocontraction at his ankle, with slight predominance of his plantar flexors  He has a toe flexor reflex, in combination with sensory impairment that contributes to that position of his toe - particularly when not wearing his AFO, which is likely contributing to extinguishing some of that tone  His AFO adequately corrects for his ankle weakness and his previous inversion  He no longer has clonus on exam (he has a bit in the AM, but this does extinguish after moving for a bit)  The biggest issue affecting his gait is really his hip flexion and knee flexion weakness  I'm not inclined to target his quads with botulinum toxin, as weakening that muscle could contribute to decreased knee stability  This could be simply due to aging in setting of neurologic weakness, but could also be related to his Baclofen  I would actually be inclined to recommend removing the noon time dose of Baclofen to see if this helps his fatiguing type weakness  Based on his description of spasm and clonus and tightness in the AM, I would continue his evening and morning dose  If after decreasing Baclofen, he has increased clonus and foot inversion despite AFO, we could discuss targeting his FDL, Soleus, and Tib Posterior in the future rather than placing him on more systemic meds  I would hold off on addressing his lumbricals, as the position of his hand, does allow him to  a few things   His tone in his elbow and his shoulder do not markedly impact him - although, we could try targeting these to see if it synergistically helps his arm swing and his gait  After discussion of this with him and his wife, including risks/benefits of botox, they opted not to pursue botulinum toxin at this time  They are always welcome to follow-up with me as his clinical picture/gait evolves  In therapy, I would focus on energy conservation education, and strengthening of his hip flexors and knee flexors       Nicole Green MD  Physical Medicine and Rehabilitation

## 2022-08-23 NOTE — PROGRESS NOTES
Review of Systems   Constitutional: Negative  Negative for appetite change and fever  HENT: Negative  Negative for hearing loss, tinnitus, trouble swallowing and voice change  Eyes: Negative  Negative for photophobia and pain  Respiratory: Negative  Negative for shortness of breath  Cardiovascular: Negative  Negative for palpitations  Gastrointestinal: Negative  Negative for nausea and vomiting  Endocrine: Negative  Negative for cold intolerance  Genitourinary: Negative  Negative for dysuria, frequency and urgency  Musculoskeletal: Positive for myalgias (States he is okay with the cane and not so much without  Patients wife states going up and down hills it is hard for him to lift the Left Leg)  Negative for neck pain  Balance Issues sometimes, more so when getting up too fast     Skin: Negative  Negative for rash  Allergic/Immunologic: Negative  Neurological: Positive for numbness (Right hand)  Negative for dizziness, tremors, seizures, syncope, facial asymmetry, speech difficulty, weakness, light-headedness and headaches  Hematological: Bruises/bleeds easily (Due to Plavix)  Psychiatric/Behavioral: Negative  Negative for confusion, hallucinations and sleep disturbance  All other systems reviewed and are negative

## 2022-08-23 NOTE — PATIENT INSTRUCTIONS
The issue is less spasticity, and moreso weakness in your hip flexor and hamstring  You have trouble advancing due to hip flexion weakness  Would not address glute with botox as this is important for his back and for his gait  Would not address quad tone as this would contribute to knee instability, especially given very limited strength in his hamstrings  c  Would focus in on hip flexor and hamstring strengthening, gluteus strengthening  2  Can use botox to address toe flexion reflex if it becomes problematic or if pain worsens   3  Baclofen can technically make you weak  You can consider taking out lunch time dose and just taking it in the AM and PM (twice a day)

## 2022-09-23 DIAGNOSIS — I10 ESSENTIAL HYPERTENSION: Chronic | ICD-10-CM

## 2022-09-23 RX ORDER — AMLODIPINE BESYLATE 5 MG/1
5 TABLET ORAL DAILY
Qty: 30 TABLET | Refills: 5 | Status: SHIPPED | OUTPATIENT
Start: 2022-09-23 | End: 2022-09-27 | Stop reason: SDUPTHER

## 2022-09-27 DIAGNOSIS — I10 ESSENTIAL HYPERTENSION: Chronic | ICD-10-CM

## 2022-09-27 RX ORDER — AMLODIPINE BESYLATE 5 MG/1
5 TABLET ORAL DAILY
Qty: 90 TABLET | Refills: 3 | Status: SHIPPED | OUTPATIENT
Start: 2022-09-27

## 2022-09-30 ENCOUNTER — OFFICE VISIT (OUTPATIENT)
Dept: NEUROLOGY | Facility: CLINIC | Age: 77
End: 2022-09-30
Payer: COMMERCIAL

## 2022-09-30 VITALS
HEIGHT: 66 IN | TEMPERATURE: 98.6 F | SYSTOLIC BLOOD PRESSURE: 166 MMHG | HEART RATE: 70 BPM | BODY MASS INDEX: 24.6 KG/M2 | WEIGHT: 153.1 LBS | DIASTOLIC BLOOD PRESSURE: 91 MMHG

## 2022-09-30 DIAGNOSIS — Z79.4 TYPE 2 DIABETES MELLITUS WITH HYPERGLYCEMIA, WITH LONG-TERM CURRENT USE OF INSULIN (HCC): Chronic | ICD-10-CM

## 2022-09-30 DIAGNOSIS — E11.65 TYPE 2 DIABETES MELLITUS WITH HYPERGLYCEMIA, WITH LONG-TERM CURRENT USE OF INSULIN (HCC): Chronic | ICD-10-CM

## 2022-09-30 DIAGNOSIS — I10 ESSENTIAL HYPERTENSION: Chronic | ICD-10-CM

## 2022-09-30 DIAGNOSIS — Z86.73 HISTORY OF STROKE: Primary | ICD-10-CM

## 2022-09-30 DIAGNOSIS — G81.14 SPASTIC HEMIPLEGIA AFFECTING LEFT NONDOMINANT SIDE (HCC): ICD-10-CM

## 2022-09-30 DIAGNOSIS — R26.9 ABNORMAL GAIT: ICD-10-CM

## 2022-09-30 DIAGNOSIS — I63.9 CEREBROVASCULAR ACCIDENT (CVA), UNSPECIFIED MECHANISM (HCC): ICD-10-CM

## 2022-09-30 DIAGNOSIS — E78.5 HYPERLIPIDEMIA, UNSPECIFIED HYPERLIPIDEMIA TYPE: Chronic | ICD-10-CM

## 2022-09-30 PROCEDURE — 99214 OFFICE O/P EST MOD 30 MIN: CPT | Performed by: PSYCHIATRY & NEUROLOGY

## 2022-09-30 RX ORDER — GABAPENTIN 400 MG/1
400 CAPSULE ORAL
Qty: 90 CAPSULE | Refills: 1 | Status: SHIPPED | OUTPATIENT
Start: 2022-09-30

## 2022-09-30 RX ORDER — CLOPIDOGREL BISULFATE 75 MG/1
75 TABLET ORAL DAILY
Qty: 90 TABLET | Refills: 1 | Status: SHIPPED | OUTPATIENT
Start: 2022-09-30

## 2022-09-30 NOTE — PROGRESS NOTES
Patient ID: Bisi Mckeon is a 78 y o  male  Assessment/Plan:   Patient Instructions   Stroke:  Vitor Wade presents for a follow-up evaluation with regard to his prior stroke  In the interval since his last visit to the office he has not had any new stroke-like symptoms  He is taking his medication as prescribed  They did not report any significant falls today or severe bleeding or bruising  His most recent lab work is in an excellent range  He continues to have spastic hemiparesis on the left hand side as a residual symptom from his stroke however his medications at this point seem to be reasonably well balanced  - for ongoing stroke prevention should continue his combination of clopidogrel, atorvastatin, and appropriate blood pressure and glycemic control   - he should continue to check his blood pressure occasionally away from the doctor's office and should be targeting numbers less than 130/80   -we will defer to his primary care team for monitoring of his cholesterol panel and blood sugar numbers but the most recent numbers look excellent and in the appropriate range   - he will continue baclofen 10 mg 3 times per day and gabapentin 400 mg at night for treatment of spasticity/ pain   - I agree it is reasonable to talk with the [place] about strengthening for the left leg, particularly hip flexion and potentially gluteus  - he should remain physically active in as much as he feels capable of doing so and I would encourage him to work specifically on passive range of motion at the level of the elbow wrist and fingers on that left-hand to help make sure that he avoids contractures of possible  -he is not in need of repeat cerebrovascular imaging at this time from my standpoint and he should continue to be monitored with regard to his implantable loop recorder for any atrial fibrillation    If any AFib is detected we would likely transition him from clopidogrel to a different anticoagulant     I will plan for him to return to the office in 1 years time to see either myself or one of the [facility] but would be happy to see him sooner if the need should arise  If he has any symptoms concerning for TIA or stroke including sudden painless loss of vision or double vision, difficulty speaking or swallowing, vertigo/room spinning that does not quickly resolve, or weakness/numbness/loss of coordination affecting 1 side of the face or body he should proceed by ambulance to the nearest emergency room immediately  Diagnoses and all orders for this visit:    History of stroke  -     Ambulatory Referral to Physiatry  -     gabapentin (NEURONTIN) 400 mg capsule; Take 1 capsule (400 mg total) by mouth daily at bedtime    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)  -     Ambulatory Referral to Physiatry  -     gabapentin (NEURONTIN) 400 mg capsule; Take 1 capsule (400 mg total) by mouth daily at bedtime    Abnormal gait  -     Ambulatory Referral to Physiatry    Type 2 diabetes mellitus with hyperglycemia, with long-term current use of insulin ([facility])    Essential hypertension    Hyperlipidemia, unspecified hyperlipidemia type    Cerebrovascular accident (CVA), unspecified mechanism ([facility])  -     clopidogrel (PLAVIX) 75 mg tablet; Take 1 tablet (75 mg total) by mouth daily         Subjective:    HPI   Have you had any new stroke like symptoms that were not previously present (Sudden loss of vision, difficulty speaking or swallowing,  vertigo/room spinning that did not quickly resolve, weakness or numbness affecting one side of the body)? no    Are you taking all of your medications as prescribed? Yes    Any side effects including bleeding/bruising? no    Alejandrina Mao presents with his wife for follow-up with regard to his prior stroke  He is doing reasonably well at home  He did have some overnight awakening with pain previously and after titrating his gabapentin to 400 mg at bedtime he is actually doing quite well    He reports that he did decrease his afternoon dose baclofen as had been discussed with the physical medicine rehab group but found that his spasticity was worse and actually he does better with the t i d  dosing  I reviewed his most recent lab work in the hemoglobin A1c and cholesterol are in the desired range  They are monitoring his blood pressure at home which is typically around 120/80  He has been advised to work a little bit on strengthening of the left lower extremity and he is going to talk with the [place] in this regard  I reviewed the reports from his implantable loop recorder and at this time no atrial fibrillation has been detected      He reports only 1 minor fall which happened when he was outside cooking on the grill and he open the lid but it came too quickly and he lost his balance  No injuries      Past Medical History:   Diagnosis Date    Arthritis     BPH (benign prostatic hyperplasia)     last assessed 4/29/2014    Diabetes mellitus [facility] [address] [facility]     Disease of thyroid gland     GERD (gastroesophageal reflux disease)     Hearing aid worn     Hyperlipidemia     Hypertension     Hypothyroidism     Mumps     Prostate cancer ([facility] [address] [facility]     [facility]     Tingling sensation     bilateral feet occassionally    Tinnitus     Wears glasses     Wears partial dentures     upper       Social History     Socioeconomic History    Marital status: /Civil Union     Spouse name: None    Number of children: None    Years of education: None    Highest education level: None   Occupational History    Occupation: printer  retired   Tobacco Use    Smoking status: Former Smoker    Smokeless tobacco: Never Used    Tobacco comment: quit about 50 years ago   Vaping Use    Vaping Use: Never used   Substance and Sexual Activity    Alcohol use: Not Currently     Alcohol/week: 2 0 standard drinks     Types: 2 Glasses of wine per week     Comment: week, social drinker    Drug use: No    Sexual activity: Not Currently   Other Topics Concern    None   Social History Narrative    Always uses seat belt    Caffeine use    Lives independently with spouse    Retired    Sun protection sunscreen     Social Determinants of Health     Financial Resource Strain: Not on file   Food Insecurity: Not on file   Transportation Needs: Not on file   Physical Activity: Not on file   Stress: Not on file   Social Connections: Not on file   Intimate Partner Violence: Not on file   Housing Stability: Not on file         Current Outpatient Medications:     amLODIPine (NORVASC) 5 mg tablet, Take 1 tablet (5 mg total) by mouth daily, Disp: 90 tablet, Rfl: 3    atorvastatin (LIPITOR) 40 mg tablet, Take 1 tablet (40 mg total) by mouth daily with dinner, Disp: 30 tablet, Rfl: 0    baclofen 10 mg tablet, Take 1 tablet (10 mg total) by mouth 3 (three) times a day, Disp: 270 tablet, Rfl: 1    clopidogrel (PLAVIX) 75 mg tablet, Take 1 tablet (75 mg total) by mouth daily, Disp: 90 tablet, Rfl: 1    docusate sodium (COLACE) 100 mg capsule, Take 100 mg by mouth if needed, Disp: , Rfl:     gabapentin (NEURONTIN) 400 mg capsule, Take 1 capsule (400 mg total) by mouth daily at bedtime, Disp: 90 capsule, Rfl: 1    Glucagon 1 MG/0 2ML SOSY, INJECT 1 MG UNDER THE SKIN  AS NEEDED FOR SEVERE HYPOGLYCEMIA, Disp: , Rfl:     levothyroxine (Synthroid) 100 mcg tablet, Take 1 tablet (100 mcg total) by mouth daily in the early morning, Disp: 90 tablet, Rfl: 3    lisinopril (ZESTRIL) 40 mg tablet, Take 40 mg by mouth daily  , Disp: , Rfl:     Multiple Vitamin (MULTIVITAMIN) tablet, Take 1 tablet by mouth daily  , Disp: , Rfl:     PATIENT MAINTAINED INSULIN PUMP, Inject 0 4 each under the skin continuous , Disp: , Rfl:     No Known Allergies            Objective:    Blood pressure 166/91, pulse 70, temperature 98 6 °F (37 °C), temperature source Temporal, height 5' 6" (1 676 m), weight 69 4 kg (153 lb 1 6 oz)      Physical Exam    Neurological Exam     at the time of my examination he was awake, alert, and in no distress  His pupil on the right-hand side was irregular and nonreactive, pupil on the left hand side was miotic and the reaction was difficult to discern  Visual fields were full bilaterally with each eye tested individually and otherwise cranial nerves 2-12 were symmetrically intact  Strength was 5/5 throughout the right upper and lower extremity with no drift and finger-to-nose revealing intact proprioceptive function with no tremor or ataxia  He remains spastically paretic on the left hand side affecting the arm and the leg  His biceps function was actually less/weaker than his triceps function on the affected side  The pattern is consistent with spasticity, in that it is velocity dependent  He does have some limitation to range of motion with regard to elbow flexion, wrist extension, and finger extension  There is a little Brusly neck Ng of the fingers on the left hand  With slow steady pressure the range of motion was improved without any significant discomfort  There is a brace on his distal left lower extremity and strength in the left leg is reasonably preserved with the exception of hip flexion which is quite limited  His gait is stable with the assistance of his brace and cane although he does circumduct the left foot with a little bit a snap at the quadriceps/ knee related to increased quadriceps tone  ROS:    Review of Systems   Constitutional: Negative  Negative for appetite change and fever  HENT: Negative  Negative for hearing loss, tinnitus, trouble swallowing and voice change  Eyes: Negative  Negative for photophobia and pain  Respiratory: Negative  Negative for shortness of breath  Cardiovascular: Negative  Negative for palpitations  Gastrointestinal: Negative  Negative for nausea and vomiting  Endocrine: Negative  Negative for cold intolerance  Genitourinary: Negative    Negative for dysuria, frequency and urgency  Musculoskeletal: Positive for gait problem (walks with a cane)  Negative for myalgias and neck pain  Skin: Negative  Negative for rash  Neurological: Positive for weakness (left sided)  Negative for dizziness, tremors, seizures, syncope, facial asymmetry, speech difficulty, light-headedness, numbness and headaches  Hematological: Negative  Does not bruise/bleed easily  Psychiatric/Behavioral: Negative  Negative for confusion, hallucinations and sleep disturbance  Reviewed ROS as entered by medical assistant  I have spent 35 minutes with Patient and family today including counseling/coordination of care regarding Risks and benefits of tx options, Intructions for management, Importance of tx compliance, Risk factor reductions and Impressions as well as on chart review, communication, and documentation

## 2022-09-30 NOTE — PATIENT INSTRUCTIONS
Stroke:  Glenetta Fleischer presents for a follow-up evaluation with regard to his prior stroke  In the interval since his last visit to the office he has not had any new stroke-like symptoms  He is taking his medication as prescribed  They did not report any significant falls today or severe bleeding or bruising  His most recent lab work is in an excellent range  He continues to have spastic hemiparesis on the left hand side as a residual symptom from his stroke however his medications at this point seem to be reasonably well balanced  - for ongoing stroke prevention should continue his combination of clopidogrel, atorvastatin, and appropriate blood pressure and glycemic control   - he should continue to check his blood pressure occasionally away from the doctor's office and should be targeting numbers less than 130/80   -we will defer to his primary care team for monitoring of his cholesterol panel and blood sugar numbers but the most recent numbers look excellent and in the appropriate range   - he will continue baclofen 10 mg 3 times per day and gabapentin 400 mg at night for treatment of spasticity/ pain   - I agree it is reasonable to talk with the [facility] about strengthening for the left leg, particularly hip flexion and potentially gluteus  - he should remain physically active in as much as he feels capable of doing so and I would encourage him to work specifically on passive range of motion at the level of the elbow wrist and fingers on that left-hand to help make sure that he avoids contractures of possible  -he is not in need of repeat cerebrovascular imaging at this time from my standpoint and he should continue to be monitored with regard to his implantable loop recorder for any atrial fibrillation    If any AFib is detected we would likely transition him from clopidogrel to a different anticoagulant     I will plan for him to return to the office in 1 years time to see either myself or one of the [facility] but would be happy to see him sooner if the need should arise  If he has any symptoms concerning for TIA or stroke including sudden painless loss of vision or double vision, difficulty speaking or swallowing, vertigo/room spinning that does not quickly resolve, or weakness/numbness/loss of coordination affecting 1 side of the face or body he should proceed by ambulance to the nearest emergency room immediately

## 2022-10-03 ENCOUNTER — TELEPHONE (OUTPATIENT)
Dept: NEUROLOGY | Facility: CLINIC | Age: 77
End: 2022-10-03

## 2022-10-03 NOTE — TELEPHONE ENCOUNTER
Julieta from [address] [place] [address] would like a copy of the 2 most recent progress notes of patient  She needs it to fill the gabapentin through patient [employer] insurance   Please fax it: [phone]

## 2022-10-11 ENCOUNTER — TELEPHONE (OUTPATIENT)
Dept: OTOLARYNGOLOGY | Facility: CLINIC | Age: 77
End: 2022-10-11

## 2022-10-11 NOTE — TELEPHONE ENCOUNTER
I called and left message for the patient to call the office back regarding an ENT appt on 10/21  Dr Tita Figueroa will not be in the office  Need to call back to reschedule yearly ear cleaning

## 2022-10-12 ENCOUNTER — TELEPHONE (OUTPATIENT)
Dept: FAMILY MEDICINE CLINIC | Facility: CLINIC | Age: 77
End: 2022-10-12

## 2022-10-12 NOTE — TELEPHONE ENCOUNTER
Pt had AWV on 2/16/22, scheduled for another one on 10/17, but won't be due for it yet  Do you want this to just be a follow up? I read the last office note but didn't see when he should be returning to the office next  Please advise

## 2022-10-12 NOTE — TELEPHONE ENCOUNTER
He generally comes every 4 months AWV due February so this visit should be followup visit in schedule

## 2022-10-12 NOTE — TELEPHONE ENCOUNTER
Kera Obrien from [facility] in [place], [place] called in for an update on the fax that was suppose to be sent to them  She stated that she needs most recent progress notes from our office in order to fill gabapentin   Please assist      Main number: [phone]    Fax: [phone]

## 2022-10-17 ENCOUNTER — OFFICE VISIT (OUTPATIENT)
Dept: FAMILY MEDICINE CLINIC | Facility: CLINIC | Age: 77
End: 2022-10-17
Payer: COMMERCIAL

## 2022-10-17 ENCOUNTER — APPOINTMENT (EMERGENCY)
Dept: CT IMAGING | Facility: HOSPITAL | Age: 77
DRG: 091 | End: 2022-10-17
Payer: COMMERCIAL

## 2022-10-17 ENCOUNTER — APPOINTMENT (EMERGENCY)
Dept: RADIOLOGY | Facility: HOSPITAL | Age: 77
DRG: 091 | End: 2022-10-17
Payer: COMMERCIAL

## 2022-10-17 ENCOUNTER — HOSPITAL ENCOUNTER (INPATIENT)
Facility: HOSPITAL | Age: 77
LOS: 1 days | Discharge: HOME | DRG: 091 | End: 2022-10-19
Attending: EMERGENCY MEDICINE | Admitting: INTERNAL MEDICINE
Payer: COMMERCIAL

## 2022-10-17 VITALS — BODY MASS INDEX: 24.17 KG/M2 | HEIGHT: 66 IN | WEIGHT: 150.4 LBS | TEMPERATURE: 97.9 F

## 2022-10-17 DIAGNOSIS — Z79.4 TYPE 2 DIABETES MELLITUS WITH DIABETIC NEUROPATHY, WITH LONG-TERM CURRENT USE OF INSULIN (HCC): Primary | ICD-10-CM

## 2022-10-17 DIAGNOSIS — J69.0 ASPIRATION PNEUMONITIS (HCC): ICD-10-CM

## 2022-10-17 DIAGNOSIS — R68.83 CHILLS: ICD-10-CM

## 2022-10-17 DIAGNOSIS — R26.2 AMBULATORY DYSFUNCTION: ICD-10-CM

## 2022-10-17 DIAGNOSIS — Z86.73 HISTORY OF STROKE: ICD-10-CM

## 2022-10-17 DIAGNOSIS — I67.9 INTRACRANIAL VASCULAR STENOSIS: ICD-10-CM

## 2022-10-17 DIAGNOSIS — C61 ADENOCARCINOMA OF PROSTATE (HCC): ICD-10-CM

## 2022-10-17 DIAGNOSIS — I69.354 SPASTIC HEMIPLEGIA OF LEFT NONDOMINANT SIDE AS LATE EFFECT OF CEREBRAL INFARCTION (HCC): ICD-10-CM

## 2022-10-17 DIAGNOSIS — E11.40 TYPE 2 DIABETES MELLITUS WITH DIABETIC NEUROPATHY, WITH LONG-TERM CURRENT USE OF INSULIN (HCC): Primary | ICD-10-CM

## 2022-10-17 DIAGNOSIS — R29.90 STROKE-LIKE EPISODE: Primary | ICD-10-CM

## 2022-10-17 DIAGNOSIS — I10 ESSENTIAL HYPERTENSION: Chronic | ICD-10-CM

## 2022-10-17 LAB
ALBUMIN SERPL BCP-MCNC: 3.8 G/DL (ref 3.5–5)
ALP SERPL-CCNC: 104 U/L (ref 46–116)
ALT SERPL W P-5'-P-CCNC: 23 U/L (ref 12–78)
ANION GAP SERPL CALCULATED.3IONS-SCNC: 10 MMOL/L (ref 4–13)
APTT PPP: 26 SECONDS (ref 23–37)
AST SERPL W P-5'-P-CCNC: 17 U/L (ref 5–45)
BASOPHILS # BLD AUTO: 0.06 THOUSANDS/ÂΜL (ref 0–0.1)
BASOPHILS NFR BLD AUTO: 0 % (ref 0–1)
BILIRUB SERPL-MCNC: 0.56 MG/DL (ref 0.2–1)
BUN SERPL-MCNC: 31 MG/DL (ref 5–25)
CALCIUM SERPL-MCNC: 8.8 MG/DL (ref 8.3–10.1)
CARDIAC TROPONIN I PNL SERPL HS: 11 NG/L
CHLORIDE SERPL-SCNC: 98 MMOL/L (ref 96–108)
CO2 SERPL-SCNC: 25 MMOL/L (ref 21–32)
CREAT SERPL-MCNC: 1.6 MG/DL (ref 0.6–1.3)
EOSINOPHIL # BLD AUTO: 0.16 THOUSAND/ÂΜL (ref 0–0.61)
EOSINOPHIL NFR BLD AUTO: 1 % (ref 0–6)
ERYTHROCYTE [DISTWIDTH] IN BLOOD BY AUTOMATED COUNT: 13.3 % (ref 11.6–15.1)
GFR SERPL CREATININE-BSD FRML MDRD: 41 ML/MIN/1.73SQ M
GLUCOSE SERPL-MCNC: 126 MG/DL (ref 65–140)
HCT VFR BLD AUTO: 37.3 % (ref 36.5–49.3)
HGB BLD-MCNC: 12.8 G/DL (ref 12–17)
IMM GRANULOCYTES # BLD AUTO: 0.07 THOUSAND/UL (ref 0–0.2)
IMM GRANULOCYTES NFR BLD AUTO: 0 % (ref 0–2)
INR PPP: 1.06 (ref 0.84–1.19)
LYMPHOCYTES # BLD AUTO: 0.83 THOUSANDS/ÂΜL (ref 0.6–4.47)
LYMPHOCYTES NFR BLD AUTO: 5 % (ref 14–44)
MAGNESIUM SERPL-MCNC: 1.8 MG/DL (ref 1.6–2.6)
MCH RBC QN AUTO: 32.4 PG (ref 26.8–34.3)
MCHC RBC AUTO-ENTMCNC: 34.3 G/DL (ref 31.4–37.4)
MCV RBC AUTO: 94 FL (ref 82–98)
MONOCYTES # BLD AUTO: 1.63 THOUSAND/ÂΜL (ref 0.17–1.22)
MONOCYTES NFR BLD AUTO: 10 % (ref 4–12)
NEUTROPHILS # BLD AUTO: 14.06 THOUSANDS/ÂΜL (ref 1.85–7.62)
NEUTS SEG NFR BLD AUTO: 84 % (ref 43–75)
NRBC BLD AUTO-RTO: 0 /100 WBCS
PLATELET # BLD AUTO: 296 THOUSANDS/UL (ref 149–390)
PMV BLD AUTO: 9.3 FL (ref 8.9–12.7)
POTASSIUM SERPL-SCNC: 3.9 MMOL/L (ref 3.5–5.3)
PROT SERPL-MCNC: 7.4 G/DL (ref 6.4–8.4)
PROTHROMBIN TIME: 14 SECONDS (ref 11.6–14.5)
RBC # BLD AUTO: 3.95 MILLION/UL (ref 3.88–5.62)
SODIUM SERPL-SCNC: 133 MMOL/L (ref 135–147)
TSH SERPL DL<=0.05 MIU/L-ACNC: 1.94 UIU/ML (ref 0.45–4.5)
WBC # BLD AUTO: 16.81 THOUSAND/UL (ref 4.31–10.16)

## 2022-10-17 PROCEDURE — 84484 ASSAY OF TROPONIN QUANT: CPT | Performed by: EMERGENCY MEDICINE

## 2022-10-17 PROCEDURE — 85025 COMPLETE CBC W/AUTO DIFF WBC: CPT | Performed by: EMERGENCY MEDICINE

## 2022-10-17 PROCEDURE — 80053 COMPREHEN METABOLIC PANEL: CPT | Performed by: EMERGENCY MEDICINE

## 2022-10-17 PROCEDURE — 87040 BLOOD CULTURE FOR BACTERIA: CPT | Performed by: EMERGENCY MEDICINE

## 2022-10-17 PROCEDURE — 85610 PROTHROMBIN TIME: CPT | Performed by: EMERGENCY MEDICINE

## 2022-10-17 PROCEDURE — 93005 ELECTROCARDIOGRAM TRACING: CPT

## 2022-10-17 PROCEDURE — 71045 X-RAY EXAM CHEST 1 VIEW: CPT

## 2022-10-17 PROCEDURE — G0425 INPT/ED TELECONSULT30: HCPCS | Performed by: PSYCHIATRY & NEUROLOGY

## 2022-10-17 PROCEDURE — 84145 PROCALCITONIN (PCT): CPT | Performed by: EMERGENCY MEDICINE

## 2022-10-17 PROCEDURE — 70498 CT ANGIOGRAPHY NECK: CPT

## 2022-10-17 PROCEDURE — 84443 ASSAY THYROID STIM HORMONE: CPT | Performed by: EMERGENCY MEDICINE

## 2022-10-17 PROCEDURE — 83605 ASSAY OF LACTIC ACID: CPT | Performed by: EMERGENCY MEDICINE

## 2022-10-17 PROCEDURE — 36415 COLL VENOUS BLD VENIPUNCTURE: CPT | Performed by: EMERGENCY MEDICINE

## 2022-10-17 PROCEDURE — 70496 CT ANGIOGRAPHY HEAD: CPT

## 2022-10-17 PROCEDURE — 99285 EMERGENCY DEPT VISIT HI MDM: CPT

## 2022-10-17 PROCEDURE — 99285 EMERGENCY DEPT VISIT HI MDM: CPT | Performed by: EMERGENCY MEDICINE

## 2022-10-17 PROCEDURE — 83735 ASSAY OF MAGNESIUM: CPT | Performed by: EMERGENCY MEDICINE

## 2022-10-17 PROCEDURE — 99214 OFFICE O/P EST MOD 30 MIN: CPT | Performed by: INTERNAL MEDICINE

## 2022-10-17 PROCEDURE — 0241U HB NFCT DS VIR RESP RNA 4 TRGT: CPT | Performed by: EMERGENCY MEDICINE

## 2022-10-17 PROCEDURE — 87154 CUL TYP ID BLD PTHGN 6+ TRGT: CPT | Performed by: EMERGENCY MEDICINE

## 2022-10-17 PROCEDURE — 85730 THROMBOPLASTIN TIME PARTIAL: CPT | Performed by: EMERGENCY MEDICINE

## 2022-10-17 RX ADMIN — IOHEXOL 85 ML: 350 INJECTION, SOLUTION INTRAVENOUS at 22:36

## 2022-10-17 NOTE — PROGRESS NOTES
Name: Jailene Alvarez      : 1945      MRN: [id number]  Encounter Provider: Marixa Henry DO  Encounter Date: 10/17/2022   Encounter department: [address]     1  Type 2 diabetes mellitus with diabetic neuropathy, with long-term current use of insulin (HCC)  Pt stable overall He will have labs at the  [facility] next week  Will get flu shot at the  [address] this month    2  Essential hypertension  Bp stable Continue current rx     3  Spastic hemiplegia of left nondominant side as late effect of cerebral infarction ([facility] [address] )    4  Adenocarcinoma of prostate [facility])  Has appt in      Rto 4 months Having labs     Depression Screening and Follow-up Plan: Patient was screened for depression during today's encounter  They screened negative with a PHQ-2 score of 0  Subjective      HPI   Pt doing ok overall he will get labs at the  [facility] soon and had eye exam recently He got back from [place] last night and overall did well on the trip - a lot of walking No falls Left arm exercises continue He will  get flu shot at  [address] soon Urologic issues stable and overall BS stable range per pt   Review of Systems   Constitutional: Negative for chills and fever  Just got back from week in [place] and did well overall with travel   HENT: Negative  Eyes: Negative for visual disturbance  Respiratory: Negative for cough and shortness of breath  Cardiovascular: Negative for chest pain, palpitations and leg swelling  Gastrointestinal: Negative  Genitourinary: Negative for difficulty urinating, frequency and hematuria  Neurological: Positive for numbness  Diabetic Foot Exam    Patient's shoes and socks removed  Right Foot/Ankle   Right Foot Inspection  Skin Exam: skin normal and skin intact  No dry skin, no warmth, no callus, no erythema, no maceration, no abnormal color, no pre-ulcer, no ulcer and no callus  Toe Exam: ROM and strength within normal limits       Sensory   Vibration: intact  Monofilament testing: diminished    Vascular  The right DP pulse is 2+  The right PT pulse is 1+  Left Foot/Ankle  Left Foot Inspection  Skin Exam: skin normal and skin intact  No dry skin, no warmth, no erythema, no maceration, normal color, no pre-ulcer, no ulcer and no callus  Toe Exam: ROM and strength within normal limits  Sensory   Vibration: diminished  Monofilament testing: diminished    Vascular  The left DP pulse is 2+  The left PT pulse is 1+  Assign Risk Category  No deformity present  Loss of protective sensation  No weak pulses  Risk: 1      Current Outpatient Medications on File Prior to Visit   Medication Sig   • amLODIPine (NORVASC) 5 mg tablet Take 1 tablet (5 mg total) by mouth daily   • atorvastatin (LIPITOR) 40 mg tablet Take 1 tablet (40 mg total) by mouth daily with dinner   • baclofen 10 mg tablet Take 1 tablet (10 mg total) by mouth 3 (three) times a day   • clopidogrel (PLAVIX) 75 mg tablet Take 1 tablet (75 mg total) by mouth daily   • docusate sodium (COLACE) 100 mg capsule Take 100 mg by mouth if needed   • gabapentin (NEURONTIN) 400 mg capsule Take 1 capsule (400 mg total) by mouth daily at bedtime   • Glucagon 1 MG/0 2ML SOSY INJECT 1 MG UNDER THE SKIN  AS NEEDED FOR SEVERE HYPOGLYCEMIA   • levothyroxine (Synthroid) 100 mcg tablet Take 1 tablet (100 mcg total) by mouth daily in the early morning   • lisinopril (ZESTRIL) 40 mg tablet Take 40 mg by mouth daily     • Multiple Vitamin (MULTIVITAMIN) tablet Take 1 tablet by mouth daily  • PATIENT MAINTAINED INSULIN PUMP Inject 0 4 each under the skin continuous        Objective     Temp 97 9 °F (36 6 °C) (Temporal)   Ht 5' 6" (1 676 m)   Wt 68 2 kg (150 lb 6 4 oz)   BMI 24 28 kg/m²     Physical Exam  Cardiovascular:      Pulses: no weak pulses          Dorsalis pedis pulses are 2+ on the right side and 2+ on the left side  Posterior tibial pulses are 1+ on the right side and 1+ on the left side     Feet: Right foot:      Skin integrity: No ulcer, skin breakdown, erythema, warmth, callus or dry skin  Left foot:      Skin integrity: No ulcer, skin breakdown, erythema, warmth, callus or dry skin         Andrew Salinas DO

## 2022-10-18 ENCOUNTER — TELEPHONE (OUTPATIENT)
Dept: ADMINISTRATIVE | Facility: OTHER | Age: 77
End: 2022-10-18

## 2022-10-18 ENCOUNTER — APPOINTMENT (INPATIENT)
Dept: CT IMAGING | Facility: HOSPITAL | Age: 77
DRG: 091 | End: 2022-10-18
Payer: COMMERCIAL

## 2022-10-18 PROBLEM — R16.0 HEPATOMEGALY: Status: ACTIVE | Noted: 2022-10-18

## 2022-10-18 PROBLEM — R65.10 SIRS (SYSTEMIC INFLAMMATORY RESPONSE SYNDROME) (HCC): Status: ACTIVE | Noted: 2022-10-18

## 2022-10-18 PROBLEM — I67.9 INTRACRANIAL VASCULAR STENOSIS: Status: ACTIVE | Noted: 2022-10-18

## 2022-10-18 PROBLEM — K80.20 CHOLELITHIASIS: Status: ACTIVE | Noted: 2022-10-18

## 2022-10-18 PROBLEM — J69.0 ASPIRATION PNEUMONITIS (HCC): Status: ACTIVE | Noted: 2022-10-18

## 2022-10-18 PROBLEM — D72.829 LEUKOCYTOSIS: Status: ACTIVE | Noted: 2022-10-18

## 2022-10-18 PROBLEM — R26.2 AMBULATORY DYSFUNCTION: Status: ACTIVE | Noted: 2022-10-18

## 2022-10-18 PROBLEM — E87.1 HYPONATREMIA: Status: ACTIVE | Noted: 2022-10-18

## 2022-10-18 LAB
2HR DELTA HS TROPONIN: 9 NG/L
4HR DELTA HS TROPONIN: 15 NG/L
ALBUMIN SERPL BCP-MCNC: 3.3 G/DL (ref 3.5–5)
ALP SERPL-CCNC: 113 U/L (ref 46–116)
ALT SERPL W P-5'-P-CCNC: 22 U/L (ref 12–78)
ANION GAP SERPL CALCULATED.3IONS-SCNC: 8 MMOL/L (ref 4–13)
AST SERPL W P-5'-P-CCNC: 17 U/L (ref 5–45)
ATRIAL RATE: 93 BPM
BACTERIA UR QL AUTO: NORMAL /HPF
BASOPHILS # BLD AUTO: 0.06 THOUSANDS/ÂΜL (ref 0–0.1)
BASOPHILS NFR BLD AUTO: 0 % (ref 0–1)
BILIRUB SERPL-MCNC: 0.58 MG/DL (ref 0.2–1)
BILIRUB UR QL STRIP: NEGATIVE
BUN SERPL-MCNC: 30 MG/DL (ref 5–25)
CALCIUM ALBUM COR SERPL-MCNC: 9.3 MG/DL (ref 8.3–10.1)
CALCIUM SERPL-MCNC: 8.7 MG/DL (ref 8.3–10.1)
CARDIAC TROPONIN I PNL SERPL HS: 20 NG/L
CARDIAC TROPONIN I PNL SERPL HS: 26 NG/L
CHLORIDE SERPL-SCNC: 95 MMOL/L (ref 96–108)
CLARITY UR: CLEAR
CO2 SERPL-SCNC: 24 MMOL/L (ref 21–32)
COLOR UR: YELLOW
CREAT SERPL-MCNC: 1.5 MG/DL (ref 0.6–1.3)
EOSINOPHIL # BLD AUTO: 0.06 THOUSAND/ÂΜL (ref 0–0.61)
EOSINOPHIL NFR BLD AUTO: 0 % (ref 0–6)
ERYTHROCYTE [DISTWIDTH] IN BLOOD BY AUTOMATED COUNT: 13.2 % (ref 11.6–15.1)
FLUAV RNA RESP QL NAA+PROBE: NEGATIVE
FLUBV RNA RESP QL NAA+PROBE: NEGATIVE
GFR SERPL CREATININE-BSD FRML MDRD: 45 ML/MIN/1.73SQ M
GLUCOSE P FAST SERPL-MCNC: 264 MG/DL (ref 65–99)
GLUCOSE SERPL-MCNC: 157 MG/DL (ref 65–140)
GLUCOSE SERPL-MCNC: 165 MG/DL (ref 65–140)
GLUCOSE SERPL-MCNC: 178 MG/DL (ref 65–140)
GLUCOSE SERPL-MCNC: 239 MG/DL (ref 65–140)
GLUCOSE SERPL-MCNC: 264 MG/DL (ref 65–140)
GLUCOSE SERPL-MCNC: 264 MG/DL (ref 65–140)
GLUCOSE UR STRIP-MCNC: NEGATIVE MG/DL
HCT VFR BLD AUTO: 34.9 % (ref 36.5–49.3)
HGB BLD-MCNC: 11.9 G/DL (ref 12–17)
HGB UR QL STRIP.AUTO: ABNORMAL
IMM GRANULOCYTES # BLD AUTO: 0.08 THOUSAND/UL (ref 0–0.2)
IMM GRANULOCYTES NFR BLD AUTO: 1 % (ref 0–2)
KETONES UR STRIP-MCNC: ABNORMAL MG/DL
LACTATE SERPL-SCNC: 0.8 MMOL/L (ref 0.5–2)
LEUKOCYTE ESTERASE UR QL STRIP: NEGATIVE
LYMPHOCYTES # BLD AUTO: 1.06 THOUSANDS/ÂΜL (ref 0.6–4.47)
LYMPHOCYTES NFR BLD AUTO: 6 % (ref 14–44)
MAGNESIUM SERPL-MCNC: 2 MG/DL (ref 1.6–2.6)
MCH RBC QN AUTO: 32.2 PG (ref 26.8–34.3)
MCHC RBC AUTO-ENTMCNC: 34.1 G/DL (ref 31.4–37.4)
MCV RBC AUTO: 94 FL (ref 82–98)
MONOCYTES # BLD AUTO: 1.71 THOUSAND/ÂΜL (ref 0.17–1.22)
MONOCYTES NFR BLD AUTO: 10 % (ref 4–12)
NEUTROPHILS # BLD AUTO: 14.26 THOUSANDS/ÂΜL (ref 1.85–7.62)
NEUTS SEG NFR BLD AUTO: 83 % (ref 43–75)
NITRITE UR QL STRIP: NEGATIVE
NON-SQ EPI CELLS URNS QL MICRO: NORMAL /HPF
NRBC BLD AUTO-RTO: 0 /100 WBCS
P AXIS: 76 DEGREES
PH UR STRIP.AUTO: 6.5 [PH]
PHOSPHATE SERPL-MCNC: 3.3 MG/DL (ref 2.3–4.1)
PLATELET # BLD AUTO: 239 THOUSANDS/UL (ref 149–390)
PMV BLD AUTO: 9.1 FL (ref 8.9–12.7)
POTASSIUM SERPL-SCNC: 3.9 MMOL/L (ref 3.5–5.3)
PR INTERVAL: 180 MS
PROCALCITONIN SERPL-MCNC: 0.12 NG/ML
PROCALCITONIN SERPL-MCNC: 0.22 NG/ML
PROT SERPL-MCNC: 6.5 G/DL (ref 6.4–8.4)
PROT UR STRIP-MCNC: NEGATIVE MG/DL
QRS AXIS: 50 DEGREES
QRSD INTERVAL: 86 MS
QT INTERVAL: 336 MS
QTC INTERVAL: 417 MS
RBC # BLD AUTO: 3.7 MILLION/UL (ref 3.88–5.62)
RBC #/AREA URNS AUTO: NORMAL /HPF
RSV RNA RESP QL NAA+PROBE: NEGATIVE
SARS-COV-2 RNA RESP QL NAA+PROBE: NEGATIVE
SODIUM SERPL-SCNC: 127 MMOL/L (ref 135–147)
SP GR UR STRIP.AUTO: 1.01 (ref 1–1.03)
T WAVE AXIS: 68 DEGREES
UROBILINOGEN UR QL STRIP.AUTO: 1 E.U./DL
VENTRICULAR RATE: 93 BPM
WBC # BLD AUTO: 17.23 THOUSAND/UL (ref 4.31–10.16)
WBC #/AREA URNS AUTO: NORMAL /HPF

## 2022-10-18 PROCEDURE — 87086 URINE CULTURE/COLONY COUNT: CPT | Performed by: INTERNAL MEDICINE

## 2022-10-18 PROCEDURE — 97167 OT EVAL HIGH COMPLEX 60 MIN: CPT

## 2022-10-18 PROCEDURE — 71250 CT THORAX DX C-: CPT

## 2022-10-18 PROCEDURE — 87040 BLOOD CULTURE FOR BACTERIA: CPT

## 2022-10-18 PROCEDURE — 74176 CT ABD & PELVIS W/O CONTRAST: CPT

## 2022-10-18 PROCEDURE — 84484 ASSAY OF TROPONIN QUANT: CPT | Performed by: EMERGENCY MEDICINE

## 2022-10-18 PROCEDURE — 85025 COMPLETE CBC W/AUTO DIFF WBC: CPT

## 2022-10-18 PROCEDURE — 0202U NFCT DS 22 TRGT SARS-COV-2: CPT | Performed by: INTERNAL MEDICINE

## 2022-10-18 PROCEDURE — G1004 CDSM NDSC: HCPCS

## 2022-10-18 PROCEDURE — 84100 ASSAY OF PHOSPHORUS: CPT

## 2022-10-18 PROCEDURE — 99223 1ST HOSP IP/OBS HIGH 75: CPT | Performed by: INTERNAL MEDICINE

## 2022-10-18 PROCEDURE — 97163 PT EVAL HIGH COMPLEX 45 MIN: CPT

## 2022-10-18 PROCEDURE — 80053 COMPREHEN METABOLIC PANEL: CPT

## 2022-10-18 PROCEDURE — 81001 URINALYSIS AUTO W/SCOPE: CPT | Performed by: EMERGENCY MEDICINE

## 2022-10-18 PROCEDURE — 83735 ASSAY OF MAGNESIUM: CPT

## 2022-10-18 PROCEDURE — 93010 ELECTROCARDIOGRAM REPORT: CPT | Performed by: INTERNAL MEDICINE

## 2022-10-18 PROCEDURE — 84145 PROCALCITONIN (PCT): CPT

## 2022-10-18 PROCEDURE — 82948 REAGENT STRIP/BLOOD GLUCOSE: CPT

## 2022-10-18 PROCEDURE — 36415 COLL VENOUS BLD VENIPUNCTURE: CPT | Performed by: EMERGENCY MEDICINE

## 2022-10-18 RX ORDER — ONDANSETRON 2 MG/ML
4 INJECTION INTRAMUSCULAR; INTRAVENOUS EVERY 6 HOURS PRN
Status: DISCONTINUED | OUTPATIENT
Start: 2022-10-18 | End: 2022-10-19 | Stop reason: HOSPADM

## 2022-10-18 RX ORDER — SODIUM CHLORIDE 9 MG/ML
75 INJECTION, SOLUTION INTRAVENOUS CONTINUOUS
Status: DISCONTINUED | OUTPATIENT
Start: 2022-10-18 | End: 2022-10-19 | Stop reason: HOSPADM

## 2022-10-18 RX ORDER — INSULIN LISPRO 100 [IU]/ML
1-5 INJECTION, SOLUTION INTRAVENOUS; SUBCUTANEOUS
Status: DISCONTINUED | OUTPATIENT
Start: 2022-10-18 | End: 2022-10-19 | Stop reason: HOSPADM

## 2022-10-18 RX ORDER — INSULIN LISPRO 100 [IU]/ML
1-5 INJECTION, SOLUTION INTRAVENOUS; SUBCUTANEOUS
Status: DISCONTINUED | OUTPATIENT
Start: 2022-10-18 | End: 2022-10-18

## 2022-10-18 RX ORDER — CEFTRIAXONE 1 G/50ML
1000 INJECTION, SOLUTION INTRAVENOUS EVERY 24 HOURS
Status: DISCONTINUED | OUTPATIENT
Start: 2022-10-18 | End: 2022-10-19 | Stop reason: HOSPADM

## 2022-10-18 RX ORDER — DOCUSATE SODIUM 100 MG/1
100 CAPSULE, LIQUID FILLED ORAL AS NEEDED
Status: DISCONTINUED | OUTPATIENT
Start: 2022-10-18 | End: 2022-10-19 | Stop reason: HOSPADM

## 2022-10-18 RX ORDER — AMLODIPINE BESYLATE 5 MG/1
5 TABLET ORAL DAILY
Status: DISCONTINUED | OUTPATIENT
Start: 2022-10-18 | End: 2022-10-19 | Stop reason: HOSPADM

## 2022-10-18 RX ORDER — LEVOTHYROXINE SODIUM 0.1 MG/1
TABLET ORAL
Status: DISPENSED
Start: 2022-10-18 | End: 2022-10-18

## 2022-10-18 RX ORDER — ACETAMINOPHEN 325 MG/1
650 TABLET ORAL EVERY 6 HOURS PRN
Status: DISCONTINUED | OUTPATIENT
Start: 2022-10-18 | End: 2022-10-19 | Stop reason: HOSPADM

## 2022-10-18 RX ORDER — ATORVASTATIN CALCIUM 40 MG/1
40 TABLET, FILM COATED ORAL
Status: DISCONTINUED | OUTPATIENT
Start: 2022-10-18 | End: 2022-10-19 | Stop reason: HOSPADM

## 2022-10-18 RX ORDER — LISINOPRIL 20 MG/1
40 TABLET ORAL DAILY
Status: DISCONTINUED | OUTPATIENT
Start: 2022-10-18 | End: 2022-10-19 | Stop reason: HOSPADM

## 2022-10-18 RX ORDER — BACLOFEN 10 MG/1
10 TABLET ORAL 3 TIMES DAILY
Status: DISCONTINUED | OUTPATIENT
Start: 2022-10-18 | End: 2022-10-19 | Stop reason: HOSPADM

## 2022-10-18 RX ORDER — LEVOTHYROXINE SODIUM 0.1 MG/1
100 TABLET ORAL
Status: DISCONTINUED | OUTPATIENT
Start: 2022-10-18 | End: 2022-10-19 | Stop reason: HOSPADM

## 2022-10-18 RX ORDER — CLOPIDOGREL BISULFATE 75 MG/1
75 TABLET ORAL DAILY
Status: DISCONTINUED | OUTPATIENT
Start: 2022-10-18 | End: 2022-10-19 | Stop reason: HOSPADM

## 2022-10-18 RX ORDER — GABAPENTIN 400 MG/1
400 CAPSULE ORAL
Status: DISCONTINUED | OUTPATIENT
Start: 2022-10-18 | End: 2022-10-19 | Stop reason: HOSPADM

## 2022-10-18 RX ORDER — HEPARIN SODIUM 5000 [USP'U]/ML
5000 INJECTION, SOLUTION INTRAVENOUS; SUBCUTANEOUS EVERY 8 HOURS SCHEDULED
Status: DISCONTINUED | OUTPATIENT
Start: 2022-10-18 | End: 2022-10-19 | Stop reason: HOSPADM

## 2022-10-18 RX ADMIN — BACLOFEN 10 MG: 10 TABLET ORAL at 16:13

## 2022-10-18 RX ADMIN — LISINOPRIL 40 MG: 20 TABLET ORAL at 07:52

## 2022-10-18 RX ADMIN — SODIUM CHLORIDE 75 ML/HR: 0.9 INJECTION, SOLUTION INTRAVENOUS at 10:36

## 2022-10-18 RX ADMIN — INSULIN LISPRO 4.1 UNITS: 100 INJECTION, SOLUTION INTRAVENOUS; SUBCUTANEOUS at 16:46

## 2022-10-18 RX ADMIN — CLOPIDOGREL BISULFATE 75 MG: 75 TABLET ORAL at 07:51

## 2022-10-18 RX ADMIN — CEFTRIAXONE 1000 MG: 1 INJECTION, SOLUTION INTRAVENOUS at 13:56

## 2022-10-18 RX ADMIN — INSULIN LISPRO 1 UNITS: 100 INJECTION, SOLUTION INTRAVENOUS; SUBCUTANEOUS at 09:02

## 2022-10-18 RX ADMIN — SODIUM CHLORIDE 75 ML/HR: 0.9 INJECTION, SOLUTION INTRAVENOUS at 22:40

## 2022-10-18 RX ADMIN — ATORVASTATIN CALCIUM 40 MG: 40 TABLET, FILM COATED ORAL at 16:13

## 2022-10-18 RX ADMIN — HEPARIN SODIUM 5000 UNITS: 5000 INJECTION INTRAVENOUS; SUBCUTANEOUS at 13:58

## 2022-10-18 RX ADMIN — LEVOTHYROXINE SODIUM 100 MCG: 100 TABLET ORAL at 05:42

## 2022-10-18 RX ADMIN — GABAPENTIN 400 MG: 400 CAPSULE ORAL at 21:35

## 2022-10-18 RX ADMIN — HEPARIN SODIUM 5000 UNITS: 5000 INJECTION INTRAVENOUS; SUBCUTANEOUS at 21:39

## 2022-10-18 RX ADMIN — BACLOFEN 10 MG: 10 TABLET ORAL at 07:52

## 2022-10-18 RX ADMIN — HEPARIN SODIUM 5000 UNITS: 5000 INJECTION INTRAVENOUS; SUBCUTANEOUS at 05:42

## 2022-10-18 RX ADMIN — INSULIN LISPRO 3.2 UNITS: 100 INJECTION, SOLUTION INTRAVENOUS; SUBCUTANEOUS at 13:49

## 2022-10-18 RX ADMIN — AMLODIPINE BESYLATE 5 MG: 5 TABLET ORAL at 07:52

## 2022-10-18 RX ADMIN — BACLOFEN 10 MG: 10 TABLET ORAL at 21:35

## 2022-10-18 NOTE — ASSESSMENT & PLAN NOTE
· Patient presented following a fall while at home  He states that he developed chills and attempted to get out of bed  He states that when he got out of bed he slipped and slid down the chair in a common area  Initially there was concern for stroke  Stroke alert was called  Patient does have residual left-sided deficit due to past CVA  No new neurologic abnormalities on exam  · CT, CTA of head showed no new acute abnormalities  · Neurology reviewed case with the ED provider and recommended that no further inpatient workup for stroke was needed  · Patient was being cleared for discharge when his wife expressed that she does not believe she can take care of him and bring him home    · PT consult  · OT consult  · Case management consult

## 2022-10-18 NOTE — PLAN OF CARE
Problem: PHYSICAL THERAPY ADULT  Goal: Performs mobility at highest level of function for planned discharge setting  See evaluation for individualized goals  Description: Treatment/Interventions: Functional transfer training, LE strengthening/ROM, Elevations, Therapeutic exercise, Endurance training, Bed mobility, Gait training          See flowsheet documentation for full assessment, interventions and recommendations  Note: Prognosis: Good  Problem List: Decreased strength, Impaired balance, Decreased endurance, Decreased mobility, Pain  Assessment: Patient is a 78 y o  male evaluated by Physical Therapy s/p admit to [facility] on 10/17/2022 with admitting diagnosis of: Chills, Stroke (cerebrum), Stroke-like episode, Ambulatory dysfunction, and principal problem of: Ambulatory dysfunction  PT was consulted to assess patient's functional mobility and discharge needs  Ordered are PT Evaluation and treatment with activity level of: up and OOB as tolerated  Comorbidities affecting patient's physical performance at time of assessment include: DM, HTN, HLD, prostate cancer, arthritis, GERD, hypothyroidism, BPH, hx of CVA  Personal factors affecting the patient at time of IE include: lives in 2 story home, ambulating with assistive device, step(s) to enter home, inability to navigate community distances and inability/difficulty performing IADLs  Please locate objective findings from PT assessment regarding body systems outlined above  Upon evaluation, pt able to perform all functional mobility with SUP, SBQC, and increased time  Occasional verbal cuing provided for safety awareness and sequencing  At baseline, pt utilizes [facility], however SBQC only available this date to use  Seated rest break taken following ~35' d/t pt preference; no LOB experienced  Pt reports enjoying attending OP PT in the past and is currently trying to have his insurance approve more visits   The patient's AM-PAC Basic Mobility Inpatient Short Form Raw Score is 22  A Raw score of greater than 16 suggests the patient may benefit from discharge to home  Please also refer to the recommendation of the Physical Therapist for safe discharge planning  Co treatment with OT secondary to complex medical condition of pt, possible A of 2 required to achieve and maintain transitional movements, requiring the need of skilled therapeutic intervention of 2 therapists to achieve delivery of services  Pt will benefit from continued PT intervention during LOS to address current deficits, increase LOF, and facilitate safe d/c to next level of care when medically appropriate  D/c recommendation at this time is home with outpatient rehabilitation  PT Discharge Recommendation: Home with outpatient rehabilitation    See flowsheet documentation for full assessment

## 2022-10-18 NOTE — QUICK NOTE
Patient's insulin pump is maintained through [facility]  Patient has no access to adjusting insulin pump settings  He states that this screen is just black and that he does not get to see a specific rate  He does report that he gets 0 5 units per hour basal dose  He states that his blood sugar monitor at home automatically transmits to his insulin pump which then boluses the correct amount of insulin based on his blood sugar at the time after meals  As we do not have this technology here in the hospital telling to his insulin pump the preferred method would be removal insulin pump with subcutaneous insulin given by staff  Patient is refusing removal of his insulin pump  He states that he would like to keep it on while hospitalized  Will allow patient to continue insulin pump at 0 5 units/hour basal dose  Will add sliding scale insulin to be given subcutaneously based on a c  HS fingerstick checks

## 2022-10-18 NOTE — CASE MANAGEMENT
Case Management Assessment & Discharge Planning Note    Patient name David Tran  Location [address]/MS 0 MRN [id number]  : 1945 Date 10/18/2022       Current Admission Date: 10/17/2022  Current Admission Diagnosis:Ambulatory dysfunction   Patient Active Problem List    Diagnosis Date Noted   • Ambulatory dysfunction 10/18/2022   • SIRS (systemic inflammatory response syndrome) ([facility] ) 10/18/2022   • Intracranial vascular stenosis 10/18/2022   • Hyponatremia 10/18/2022   • Diabetic retinopathy ([facility] ) 2022   • Subluxation of lens, right eye 2022   • Abnormal gait 2022   • Spastic hemiplegia affecting left nondominant side ([facility] ) 2022   • 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) 2021   • Type 2 diabetes mellitus with diabetic neuropathy ([place] [address] ) 2021   • History of stroke 2021   • Long term current use of insulin ([place] [address] ) 10/01/2020   • Medicare annual wellness visit, subsequent 2018   • DMII (diabetes mellitus, type 2) ([place] [address] ) 2018   • Hypothyroidism 2018   • Hyperlipidemia 2018   • Stage 3 chronic kidney disease ([facility] ) 2018   • Adenocarcinoma of prostate ([place] [address] ) 2017   • Sensorineural hearing loss (SNHL), bilateral 2017   • Essential hypertension 2012      LOS (days): 0  Geometric Mean LOS (GMLOS) (days):   Days to GMLOS:     OBJECTIVE:              Current admission status: Inpatient       Preferred Pharmacy:   [facility]/pharmacy #[id number]- [place] [address], [place] - [address] [facility]  [address] [address]  Phone: [phone] Fax:  [facility], [address] [address]  Phone: [phone] Fax: [phone]    [address]  Phone: [phone] Fax: [phone]    Primary Care Provider: DO Taina Sanchez Insurance: Devra Goldmann [facility] REP  Secondary Insurance:     ASSESSMENT:  Vinay 26 Proxies    There are no active Health Care Proxies on file  Readmission Root Cause  30 Day Readmission: No    Patient Information  Admitted from[de-identified] Home  Mental Status: Alert  During Assessment patient was accompanied by: Not accompanied during assessment  Assessment information provided by[de-identified] Patient  Primary Caregiver: Self  Support Systems: Spouse/significant other  [place] of Residence: [facility] do you live in?: [address] 115 entry access options  Select all that apply : No steps to enter home  Type of Current Residence: 2 story home  Upon entering residence, is there a bedroom on the main floor (no further steps)?: No  A bedroom is located on the following floor levels of residence (select all that apply):: 2nd Floor  Upon entering residence, is there a bathroom on the main floor (no further steps)?: Yes (Pt has a powder room on 1rst floor and full bathroom on 2nd floor )  Number of steps to 2nd floor from main floor: One Flight  In the last 12 months, was there a time when you were not able to pay the mortgage or rent on time?: No  In the last 12 months, how many places have you lived?: 1  In the last 12 months, was there a time when you did not have a steady place to sleep or slept in a shelter (including now)?: No  Homeless/housing insecurity resource given?: N/A  Living Arrangements: Lives w/ Spouse/significant other  Is patient a ?: Yes (Pt is active with [address] [place] [address] )  Is [facility] active with [facility])?: Yes (Pt reached out to [employer] and they provided him with outpatient PT  )  Is patient service connected?: No    Activities of Daily Living Prior to Admission  Functional Status: Assistance (Pt's wife needs to help with dressing lower body )  Completes ADLs independently?: No  Level of ADL dependence: Assistance (Pt is able to bathe himself and dress his upper body  Pt needs assistance wth dressing the lower body )  Ambulates independently?: Yes  Does patient use assisted devices?: Yes  Assisted Devices (DME) used: Bedside Commode, Straight Cane (leg brace and reilly walker)  Does patient currently own DME?: Yes  What DME does the patient currently own?: Bedside Commode, Straight Cane (Pt has a leg brace and a reilly walker)  Does patient have a history of Outpatient Therapy (PT/OT)?: Yes (Pt goes to Outpatient in [facility])  Does the patient have a history of Short-Term Rehab?: Yes (Pt went to the ARU in [facility]  )  Does patient have a history of HHC?: Yes  Does patient currently have [employer]?: No         Patient Information Continued  Income Source: Pension/FCI  Does patient have prescription coverage?: Yes  Within the past 12 months, you worried that your food would run out before you got the money to buy more : Never true  Within the past 12 months, the food you bought just didn't last and you didn't have money to get more : Never true  Food insecurity resource given?: N/A  Does patient receive dialysis treatments?: No  Does patient have a history of substance abuse?: No  Does patient have a history of Mental Health Diagnosis?: No         Means of Transportation  Means of Transport to Appts[de-identified] Family transport (Wife drives patient to appointments  Wife will drive the patient home from the hospital)  In the past 12 months, has lack of transportation kept you from medical appointments or from getting medications?: No  In the past 12 months, has lack of transportation kept you from meetings, work, or from getting things needed for daily living?: No  Was application for public transport provided?: N/A        DISCHARGE DETAILS:    Discharge planning discussed with[de-identified] Pt  Freedom of Choice: Yes     CM contacted family/caregiver?: No- see comments      I will await PT and OT's recommendations  I will continue to follow for any Case Management needs

## 2022-10-18 NOTE — PHYSICAL THERAPY NOTE
Physical Therapy Evaluation    Patient Name: Danica GREGG Date: 10/18/2022     Problem List  Principal Problem:    Ambulatory dysfunction  Active Problems:    DMII (diabetes mellitus, type 2) ([facility] )    Hypothyroidism    Hyperlipidemia    Stage 3 chronic kidney disease ([facility] )    Essential hypertension    History of stroke    SIRS (systemic inflammatory response syndrome) ([facility] )    Intracranial vascular stenosis    Hyponatremia    Hepatomegaly    Cholelithiasis    Aspiration pneumonitis ([facility] )       Past Medical History  Past Medical History:   Diagnosis Date    Arthritis     BPH (benign prostatic hyperplasia)     last assessed 4/29/2014    Diabetes mellitus ([facility] )     Disease of thyroid gland     GERD (gastroesophageal reflux disease)     Hearing aid worn     Hyperlipidemia     Hypertension     Hypothyroidism     Mumps     Prostate cancer ([facility] )     Stroke [facility] )     Tingling sensation     bilateral feet occassionally    Tinnitus     Wears glasses     Wears partial dentures     upper        Past Surgical History  Past Surgical History:   Procedure Laterality Date    CARDIAC ELECTROPHYSIOLOGY PROCEDURE Left 11/4/2021    Procedure: Reveal implant;  Surgeon: Hanh Kaur MD;  Location:  CARDIAC CATH LAB;   Service: Cardiology    CATARACT EXTRACTION Bilateral 2000    EYE SURGERY      JOINT REPLACEMENT      KNEE ARTHROSCOPY Right 06/26/2009    knee    TN LAP,PROSTATECTOMY,RADICAL,W/NERVE SPARE,INCL ROBOTIC N/A 2/7/2018    Procedure: ROBOTIC ASSISTED LAPAROSCOPIC PROSTATECTOMY; BILATERAL PELVIC LYMPH NODE DISSECTION;  Surgeon: Divya Trimble MD;  Location: [facility] Main OR;  Service: Urology    TN REPAIR [address] HERNIA,5+Y/O,REDUCIBL Right 4/19/2019    Procedure: REPAIR HERNIA INGUINAL;  Surgeon: [facility], ;  Location: [facility] MAIN OR;  Service: General    PROSTATE BIOPSY  11/07/2017    needle biopsy of prostate    TRIGGER FINGER RELEASE  05/2013    trigger thumb           10/18/22 1009   PT Last Visit   PT Visit Date 10/18/22   Note Type   Note type Evaluation   Pain Assessment   Pain Assessment Tool 0-10   Pain Score No Pain   Restrictions/Precautions   Weight Bearing Precautions Per Order No   Other Precautions Fall Risk;Multiple lines   Home Living   Type of [address] Two level;1/2 bath on main level  (0 INEZ)   Bathroom Shower/Tub Tub/shower unit   Bathroom Toilet Standard   Bathroom Equipment Shower chair;Commode   Bathroom Accessibility Accessible   Home Equipment Cane   Additional Comments pt reports use of cane at baseline   Prior Function   Level of [place] Needs assistance with ADLs; Needs assistance with IADLS; Independent with functional mobility   Lives With Spouse   Receives Help From Family   IADLs Family/Friend/Other provides transportation   Falls in the last 6 months 1 to 4   Vocational Retired   General   Family/Caregiver Present No   Cognition   Overall Cognitive Status WFL   Arousal/Participation Alert   Orientation Level Oriented X4   Following Commands Follows all commands and directions without difficulty   Subjective   Subjective "I'm trying to go back to [place]"   RLE Assessment   RLE Assessment WFL   LLE Assessment   LLE Assessment X  (4-/5 at knee; 1/5 hip flexors, 3+/5 plantarflexion)   Bed Mobility   Supine to Sit 5  Supervision   Additional items Increased time required;Verbal cues   Sit to Supine   (pt OOB at end of session)   Transfers   Sit to Stand 5  Supervision   Additional items Increased time required;Verbal cues   Stand to Sit 5  Supervision   Additional items Increased time required;Verbal cues   Additional Comments quad cane used   Ambulation/Elevation   Gait pattern Excessively slow; Short stride; Foward flexed;Decreased foot clearance;L Hemiparesis;Circumduction   Gait Assistance 5  Supervision   Additional items Verbal cues   Assistive Device Small base quad cane   Distance 35'   Balance   Static Sitting Good   Dynamic Sitting Good   Static Standing Fair +   Dynamic Standing Fair   Ambulatory Fair  (with SBQC)   Endurance Deficit   Endurance Deficit Yes   Endurance Deficit Description pt appears to fatigue with increased ambulation   Activity Tolerance   Activity Tolerance Patient limited by fatigue   Assessment   Prognosis Good   Problem List Decreased strength; Impaired balance;Decreased endurance;Decreased mobility;Pain   Assessment Patient is a 78 y o  male evaluated by Physical Therapy s/p admit to [address] [facility] on 10/17/2022 with admitting diagnosis of: Chills, Stroke (cerebrum), Stroke-like episode, Ambulatory dysfunction, and principal problem of: Ambulatory dysfunction  PT was consulted to assess patient's functional mobility and discharge needs  Ordered are PT Evaluation and treatment with activity level of: up and OOB as tolerated  Comorbidities affecting patient's physical performance at time of assessment include: DM, HTN, HLD, prostate cancer, arthritis, GERD, hypothyroidism, BPH, hx of CVA  Personal factors affecting the patient at time of IE include: lives in 2 story home, ambulating with assistive device, step(s) to enter home, inability to navigate community distances and inability/difficulty performing IADLs  Please locate objective findings from PT assessment regarding body systems outlined above  Upon evaluation, pt able to perform all functional mobility with SUP, SBQC, and increased time  Occasional verbal cuing provided for safety awareness and sequencing  At baseline, pt utilizes [facility], however SBQC only available this date to use  Seated rest break taken following ~35' d/t pt preference; no LOB experienced  Pt reports enjoying attending OP PT in the past and is currently trying to have his insurance approve more visits  The patient's AM-PAC Basic Mobility Inpatient Short Form Raw Score is 22  A Raw score of greater than 16 suggests the patient may benefit from discharge to home  Please also refer to the recommendation of the Physical Therapist for safe discharge planning  Co treatment with OT secondary to complex medical condition of pt, possible A of 2 required to achieve and maintain transitional movements, requiring the need of skilled therapeutic intervention of 2 therapists to achieve delivery of services  Pt will benefit from continued PT intervention during LOS to address current deficits, increase LOF, and facilitate safe d/c to next level of care when medically appropriate  D/c recommendation at this time is home with outpatient rehabilitation  Goals   Patient Goals to go home   LTG Expiration Date 11/01/22   Long Term Goal #1 Pt will participate in B LE strengthening exercises to facilitate improved functional activity tolerance  Pt will perform all functional transfers and bed mobility mod(I) with good safety awareness  Pt will ambulate 250' mod(I) with LRAD while maintaining good functional dynamic balance  Pt will ascend/descend a FFOS with HR and SUP to reflect the ability to safely navigate the home  Plan   Treatment/Interventions Functional transfer training;LE strengthening/ROM; Elevations; Therapeutic exercise; Endurance training;Bed mobility;Gait training   PT Frequency 3-5x/wk   Recommendation   PT Discharge Recommendation Home with outpatient rehabilitation   AM-PAC Basic Mobility Inpatient   Turning in Bed Without Bedrails 4   Lying on Back to Sitting on Edge of Flat Bed 4   Moving Bed to Chair 4   Standing Up From Chair 4   Walk in Room 3   Climb 3-5 Stairs 3   Basic Mobility Inpatient Raw Score 22   Basic Mobility Standardized Score 47 4   Highest Level Of Mobility   JH-HLM Goal 7: Walk 25 feet or more   JH-HLM Achieved 7: Walk 25 feet or more   End of Consult   Patient Position at End of Consult Bedside chair; All needs within reach

## 2022-10-18 NOTE — ASSESSMENT & PLAN NOTE
Lab Results   Component Value Date    HGBA1C 6 7 (H) 03/15/2022       No results for input(s): POCGLU in the last 72 hours      Blood Sugar Average: Last 72 hrs:  ·  blood sugar 126 upon admission  · Patient elected to continue home insulin pump  · Will continue home insulin pump  · A c  HS fingerstick glucose checks

## 2022-10-18 NOTE — PROGRESS NOTES
No sliding scale coverage of insulin given for dinner  Patient gave himself a 4 1 unit bolus of insulin via insulin pump for dinner per amt of carbs he consumed for dinner

## 2022-10-18 NOTE — ASSESSMENT & PLAN NOTE
· White blood cells elevated on admission at 16 81  As well, patient tachycardic with heart rate of 102 on presentation  Patient does report that he has had chills over the past day  No fevers upon presentation  No URI symptoms, nausea, vomiting  · Of note, patient does have elevated absolute neutrophils of 14 06   · Chest x-ray shows no evidence of pneumonia  · COVID/flu/RSV negative  · Procalcitonin within normal limits  · Urinalysis shows no evidence of urinary tract infection  · No evidence of any skin wounds suggestive of infection  · Will check blood cultures  · At this time, will hold off on antibiotics as there is no definite source of infection  · Will continue to trend white blood cells, procalcitonin with a m   Labs

## 2022-10-18 NOTE — LETTER
Diabetic Eye Exam Form    Date Requested: 10/18/22  Patient: Kuldip Miranda  Patient : 1945   Referring Provider: Fortino Morrissey DO    DIABETIC Eye Exam Date _______________________________    Type of Exam MUST be documented for Diabetic Eye Exams  Please CHECK ONE  Retinal Exam       Dilated Retinal Exam       OCT       Optomap-Iris Exam      Fundus Photography     Left Eye - Please check Retinopathy AND Type or No Retinopathy      Exam did show retinopathy    Exam did not show retinopathy         Mild     Proliferative           Moderate    Severe            None         Right Eye - Please check Retinopathy AND Type or No Retinopathy     Exam did show retinopathy    Exam did not show retinopathy         Mild     Proliferative        Moderate    Severe        None       Comments __________________________________________________________    Practice Providing Exam ______________________________________________    Exam Performed By (print name) _______________________________________      Provider Signature ___________________________________________________    These reports are needed for  compliance  Please fax this completed form and a copy of the Diabetic Eye Exam report to our office located at [place] [address] as soon as possible via [phone] trace [place]: Phone [phone]  We thank you for your assistance in treating our mutual patient

## 2022-10-18 NOTE — TELEPHONE ENCOUNTER
----- Message from Uday Shook sent at 10/18/2022  8:50 AM EDT -----  Regarding: DM Eye Exam  10/18/22 8:51 AM    Hello, our patient Iona Dawn has had Diabetic Eye Exam completed/performed  Please assist in updating the patient chart by making an External outreach to Dr Galina Camarillo facility located in [place], [place]  The date of service is this past month (September-October 2022)      Thank you,  Christine Garcia   [facility]

## 2022-10-18 NOTE — OCCUPATIONAL THERAPY NOTE
Occupational Therapy Evaluation     Patient Name: Aislinn CARDOZA Date: 10/18/2022  Problem List  Principal Problem:    Ambulatory dysfunction  Active Problems:    DMII (diabetes mellitus, type 2) ([facility] )    Hypothyroidism    Hyperlipidemia    Stage 3 chronic kidney disease ([facility] )    Essential hypertension    History of stroke    SIRS (systemic inflammatory response syndrome) ([facility] )    Intracranial vascular stenosis    Hyponatremia    Hepatomegaly    Cholelithiasis    Aspiration pneumonitis ([facility] )    Past Medical History  Past Medical History:   Diagnosis Date    Arthritis     BPH (benign prostatic hyperplasia)     last assessed 4/29/2014    Diabetes mellitus ([facility] )     Disease of thyroid gland     GERD (gastroesophageal reflux disease)     Hearing aid worn     Hyperlipidemia     Hypertension     Hypothyroidism     Mumps     Prostate cancer ([facility] )     Stroke ([facility] )     Tingling sensation     bilateral feet occassionally    Tinnitus     Wears glasses     Wears partial dentures     upper     Past Surgical History  Past Surgical History:   Procedure Laterality Date    CARDIAC ELECTROPHYSIOLOGY PROCEDURE Left 11/4/2021    Procedure: Reveal implant;  Surgeon: Jamar Locke MD;  Location:  CARDIAC CATH LAB;   Service: Cardiology    CATARACT EXTRACTION Bilateral 2000    EYE SURGERY      JOINT REPLACEMENT      KNEE ARTHROSCOPY Right 06/26/2009    knee    NV LAP,PROSTATECTOMY,RADICAL,W/NERVE SPARE,INCL ROBOTIC N/A 2/7/2018    Procedure: ROBOTIC ASSISTED LAPAROSCOPIC PROSTATECTOMY; BILATERAL PELVIC LYMPH NODE DISSECTION;  Surgeon: Peggyann Bosworth, MD;  Location: [facility];  Service: Urology    NV REPAIR [address] HERNIA,5+Y/O,REDUCIBL Right 4/19/2019    Procedure: REPAIR HERNIA INGUINAL;  Surgeon: Frida Dumont DO;  Location: [facility] MAIN OR;  Service: General    PROSTATE BIOPSY  11/07/2017    needle biopsy of prostate    TRIGGER FINGER RELEASE  05/2013    trigger thumb             10/18/22 1008   OT Last Visit   OT Visit Date 10/18/22   Note Type   Note type Evaluation   Pain Assessment   Pain Score No Pain   Restrictions/Precautions   Weight Bearing Precautions Per Order No   Other Precautions Fall Risk;Multiple lines   Home Living   Type of [address] Two level;1/2 bath on main level; Other (Comment)  (0 INEZ; FOS to 2nd c HR)   Bathroom Shower/Tub Tub/shower unit   Bathroom Toilet Standard   Bathroom Equipment Shower chair;Commode   Bathroom Accessibility Accessible   Home Equipment Cane   Additional Comments pt reports use of SPC at baseline during mobility; recent CVA   Prior Function   Level of [place] Needs assistance with ADLs; Needs assistance with functional mobility; Needs assistance with IADLS   Lives With Spouse   Receives Help From Family   IADLs Family/Friend/Other provides transportation   Falls in the last 6 months 1 to 4   Vocational Retired   Subjective   Subjective "I don't know why everyone is making a big deal"   ADL   Where Assessed Other (Comment)  (and bathroom)   Grooming Assistance 5  Supervision/Setup   LB Dressing Assistance 5  Supervision/Setup   Toileting Assistance  5  Supervision/Setup   Toileting Deficit   (stands to urinate)   Additional Comments pt performs functional ADLs with (S) level and use of quad cane in bathroom   Bed Mobility   Supine to Sit 5  Supervision   Additional items Increased time required;Verbal cues   Sit to Supine   (seated in chair at end of session)   Additional Comments pt on RA with no complaints of SOB; SPO2 WFL with no complaints of SOB   Transfers   Sit to Stand 5  Supervision   Additional items Increased time required;Verbal cues   Stand to Sit 5  Supervision   Additional items Increased time required;Verbal cues   Additional Comments pt performs with quad cane during session; pt reports MAFO at baseline, however not present for L LE   Functional Mobility   Functional Mobility 5  Supervision   Additional Comments pt utilizes quad cane to perform ~50 ft of mobility and limited due to no MAFO present in room for L LE   Additional items   (quad cane)   Balance   Static Sitting Good   Dynamic Sitting Good   Static Standing Fair +   Dynamic Standing Fair   Ambulatory Fair   Activity Tolerance   Activity Tolerance Patient limited by fatigue   RUE Assessment   RUE Assessment WFL   LUE Assessment   LUE Assessment WFL   Hand Function   Gross Motor Coordination Functional   Fine Motor Coordination Functional   Sensation   Light Touch No apparent deficits   Sharp/Dull No apparent deficits   Psychosocial   Psychosocial (WDL) WDL   Cognition   Overall Cognitive Status WFL   Arousal/Participation Alert   Attention Within functional limits   Orientation Level Oriented X4   Memory Within functional limits   Following Commands Follows all commands and directions without difficulty   Assessment   Limitation Decreased ADL status; Decreased UE strength;Decreased Safe judgement during ADL;Decreased endurance;Decreased self-care trans;Decreased high-level ADLs   Assessment Pt is a 78 y o  male seen for OT evaluation s/p admit to [facility] on 10/17/2022 w/ Ambulatory dysfunction  Comorbidities affecting pt's functional performance at time of assessment include: DM, thyroid gland, HTN, prostate CA, mumps, CVA, hypothyroidism, GERD, arthritis  Personal factors affecting pt at time of IE include:difficulty performing ADLS, difficulty performing IADLS , limited insight into deficits, decreased initiation and engagement  and health management   Prior to admission, pt was (A) with ADLs and IADLs with use of SPC at baseline during functional mobility  Upon evaluation: Pt requires (S) level with quad cane during mobility 2* the following deficits impacting occupational performance: weakness, decreased strength, decreased balance, decreased tolerance, impaired initiation, decreased safety awareness and impaired interpersonal skills   Pt to benefit from continued skilled OT tx while in the hospital to address deficits as defined above and maximize level of functional independence w ADL's and functional mobility  Occupational Performance areas to address include: grooming, bathing/shower, toilet hygiene, dressing, functional mobility, community mobility and clothing management  The patient's raw score on the AM-PAC Daily Activity inpatient short form is 21, standardized score is 44 27, greater than 39 4  Patients at this level are likely to benefit from discharge to post-acute rehabilitation services  Please refer to the recommendation of the Occupational Therapist for safe discharge planning  Pt benefited from co-evaluation of skilled OT and PT therapists in order to most appropriately address functional deficits d/t extensive assistance required for safe functional mobility, decreased activity tolerance, and regression from functioning level prior to admission and/or onset of present illness  OT/PT objectives were addressed separately; please see PT note for specific goal areas targeted  Goals   Patient Goals to go home   Short Term Goal  pt will perform functional mobility with RW at mod (I) level   Long Term Goal #1 pt will demonstrate UB/LB bathing and grooming tasks at (I) level   Long Term Goal #2 pt will demonstrate toilet transfers and hygiene at (I) level   Long Term Goal pt will perform UE strengthening exercises   Plan   Treatment Interventions ADL retraining;Functional transfer training;UE strengthening/ROM; Endurance training;Patient/family training;Cognitive reorientation;Equipment evaluation/education; Activityengagement   Goal Expiration Date 11/01/22   OT Frequency 3-5x/wk   Recommendation   OT Discharge Recommendation No rehabilitation needs   Additional Comments  OP PT services are ideal   [facility] Daily Activity Inpatient   Lower Body Dressing 3   Bathing 3   Toileting 3   Upper Body Dressing 4   Grooming 4   Eating 4   Daily Activity Raw Score 21   Daily Activity Standardized Score (Calc for Raw Score >=11) 44 27   AM-PAC Applied Cognition Inpatient   Following a Speech/Presentation 4   Understanding Ordinary Conversation 4   Taking Medications 4   Remembering Where Things Are Placed or Put Away 4   Remembering List of 4-5 Errands 4   Taking Care of Complicated Tasks 4   Applied Cognition Raw Score 24   Applied Cognition Standardized Score 62 21

## 2022-10-18 NOTE — ASSESSMENT & PLAN NOTE
Lab Results   Component Value Date    EGFR 41 10/17/2022    EGFR 45 05/23/2022    EGFR 43 03/15/2022    CREATININE 1 60 (H) 10/17/2022    CREATININE 1 49 (H) 05/23/2022    CREATININE 1 56 (H) 03/15/2022   · Creatinine appears to be around baseline  · Continue to monitor with routine labs

## 2022-10-18 NOTE — ASSESSMENT & PLAN NOTE
· Patient has history of prior CVA    He does have residual left-sided spasticity and weakness due to this event  · Continue Plavix 75 mg oral daily

## 2022-10-18 NOTE — TELEPHONE ENCOUNTER
Upon review of the In Basket request and the patient's chart, initial outreach has been made via fax, please see Contacts section for details       Thank you  Anabella Serra

## 2022-10-18 NOTE — H&P
[address] [facility] [address] New Site  H&P- Amandadarren Dela Cruz 1945, 78 y o  male MRN: [id number]  Unit/Bed#: XR29 Encounter: [id number]  Primary Care Provider: Ross Yates DO   Date and time admitted to hospital: 10/17/2022 10:15 PM    * Ambulatory dysfunction  Assessment & Plan  · Patient presented following a fall while at home  He states that he developed chills and attempted to get out of bed  He states that when he got out of bed he slipped and slid down the chair in a common area  Initially there was concern for stroke  Stroke alert was called  Patient does have residual left-sided deficit due to past CVA  No new neurologic abnormalities on exam  · CT, CTA of head showed no new acute abnormalities  · Neurology reviewed case with the ED provider and recommended that no further inpatient workup for stroke was needed  · Patient was being cleared for discharge when his wife expressed that she does not believe she can take care of him and bring him home  · PT consult  · OT consult  · Case management consult    SIRS (systemic inflammatory response syndrome) (HCC)  Assessment & Plan  · White blood cells elevated on admission at 16 81  As well, patient tachycardic with heart rate of 102 on presentation  Patient does report that he has had chills over the past day  No fevers upon presentation  No URI symptoms, nausea, vomiting  · Of note, patient does have elevated absolute neutrophils of 14 06   · Chest x-ray shows no evidence of pneumonia  · COVID/flu/RSV negative  · Procalcitonin within normal limits  · Urinalysis shows no evidence of urinary tract infection  · No evidence of any skin wounds suggestive of infection  · Will check blood cultures  · At this time, will hold off on antibiotics as there is no definite source of infection  · Will continue to trend white blood cells, procalcitonin with a m  Labs    History of stroke  Assessment & Plan  · Patient has history of prior CVA    He does have residual left-sided spasticity and weakness due to this event  · Continue Plavix 75 mg oral daily    Essential hypertension  Assessment & Plan  · Current blood pressure 134/69  · Continue Norvasc 5 mg oral daily  · Continue lisinopril 40 mg oral daily    Stage 3 chronic kidney disease [facility])  Assessment & Plan  Lab Results   Component Value Date    EGFR 41 10/17/2022    EGFR 45 05/23/2022    EGFR 43 03/15/2022    CREATININE 1 60 (H) 10/17/2022    CREATININE 1 49 (H) 05/23/2022    CREATININE 1 56 (H) 03/15/2022   · Creatinine appears to be around baseline  · Continue to monitor with routine labs    Hyperlipidemia  Assessment & Plan  · Continue Lipitor 40 mg oral daily    Hypothyroidism  Assessment & Plan  · Continue levothyroxine 100 mcg oral daily    DMII (diabetes mellitus, type 2) ([facility])  Assessment & Plan  Lab Results   Component Value Date    HGBA1C 6 7 (H) 03/15/2022       No results for input(s): POCGLU in the last 72 hours  Blood Sugar Average: Last 72 hrs:  ·  blood sugar 126 upon admission  · Patient elected to continue home insulin pump  · Will continue home insulin pump  · A c  HS fingerstick glucose checks    VTE Pharmacologic Prophylaxis: VTE Score: 6 High Risk (Score >/= 5) - Pharmacological DVT Prophylaxis Ordered: heparin  Sequential Compression Devices Ordered  Code Status: Level 1 - Full Code   Discussion with family: Attempted to update  (wife) via phone  Unable to contact  Anticipated Length of Stay: Patient will be admitted on an observation basis with an anticipated length of stay of less than 2 midnights secondary to ambulatory dysfunction, leukocytosis   Total Time for Visit, including Counseling / Coordination of Care: 60 minutes Greater than 50% of this total time spent on direct patient counseling and coordination of care      Chief Complaint: Concern for stroke     History of Present Illness:  Luanne Ortega is a 78 y o  male with a PMH of CVA with residual left-sided weakness, spasticity, hypothyroidism, hypertension, hyperlipidemia, type 2 diabetes who presents with concerns for stroke-like symptoms  According to patient, earlier today he had difficulty walking to the bathroom  He states that he got up to walk to the bathroom and that he suddenly felt very sweaty and had chills  He also states that he lost his footing on a rug in the common area of his house  He states that at that time he slipped and fell backwards and slid down the chair  Denies head strike  Initially, patient's wife was concerned that he was having stroke as he appeared to have slurred speech  He states that his speech was normal and that he never had any issues with this  Denies any new numbness, weakness, tingling  Patient does have residual left-sided weakness and spasticity from a prior stroke  Patient was worked up via stroke alert  CT, CTA of head showed no new acute abnormalities  Case was discussed by Neurology with the ED provider who did not recommend any further inpatient workup for stroke  At that time, it was determined that the patient was stable for discharge  However, the patient's wife was concerned that she will not be able to take him home and was tearful in the ED about her ability to care for him  Of note, was found the patient did have a marked leukocytosis with no evidence of infection in the lungs, skin, urine  Review of Systems:  Review of Systems   Constitutional: Positive for chills  Negative for fever  HENT: Negative for congestion, ear pain and sore throat  Eyes: Negative for pain and visual disturbance  Respiratory: Negative for cough and shortness of breath  Cardiovascular: Negative for chest pain, palpitations and leg swelling  Gastrointestinal: Negative for abdominal distention, abdominal pain, diarrhea, nausea and vomiting  Genitourinary: Negative for dysuria and hematuria  Musculoskeletal: Positive for gait problem   Negative for arthralgias and back pain  Skin: Negative for color change and rash  Neurological: Positive for weakness  Negative for dizziness, seizures, syncope, facial asymmetry, speech difficulty, light-headedness, numbness and headaches  Psychiatric/Behavioral: Negative for agitation and confusion  All other systems reviewed and are negative  Past Medical and Surgical History:   Past Medical History:   Diagnosis Date   • Arthritis    • BPH (benign prostatic hyperplasia)     last assessed 4/29/2014   • Diabetes mellitus ([facility] )    • Disease of thyroid gland    • GERD (gastroesophageal reflux disease)    • Hearing aid worn    • Hyperlipidemia    • Hypertension    • Hypothyroidism    • Mumps    • Prostate cancer ([facility] )    • [facility]    • Tingling sensation     bilateral feet occassionally   • Tinnitus    • Wears glasses    • Wears partial dentures     upper       Past Surgical History:   Procedure Laterality Date   • CARDIAC ELECTROPHYSIOLOGY PROCEDURE Left 11/4/2021    Procedure: Reveal implant;  Surgeon: Kerrie Nash MD;  Location:  CARDIAC CATH LAB; Service: Cardiology   • CATARACT EXTRACTION Bilateral 2000   • EYE SURGERY     • JOINT REPLACEMENT     • KNEE ARTHROSCOPY Right 06/26/2009    knee   • [place] LAP,PROSTATECTOMY,RADICAL,W/NERVE SPARE,INCL ROBOTIC N/A 2/7/2018    Procedure: ROBOTIC ASSISTED LAPAROSCOPIC PROSTATECTOMY; BILATERAL PELVIC LYMPH NODE DISSECTION;  Surgeon: Amaya Forrest MD;  Location: [facility];  Service: Urology   • [place] REPAIR [address] HERNIA,5+Y/O,REDUCIBL Right 4/19/2019    Procedure: REPAIR HERNIA INGUINAL;  Surgeon: Angelina Phillips DO;  Location: [facility];  Service: General   • PROSTATE BIOPSY  11/07/2017    needle biopsy of prostate   • TRIGGER FINGER RELEASE  05/2013    trigger thumb       Meds/Allergies:  Prior to Admission medications    Medication Sig Start Date End Date Taking?  Authorizing Provider   amLODIPine (NORVASC) 5 mg tablet Take 1 tablet (5 mg total) by mouth daily 9/27/22   Maggi Campa DO   atorvastatin (LIPITOR) 40 mg tablet Take 1 tablet (40 mg total) by mouth daily with dinner 3/25/21   Wen Gamino PA-C   baclofen 10 mg tablet Take 1 tablet (10 mg total) by mouth 3 (three) times a day 3/30/22   Nikki Velazquez PA-C   clopidogrel (PLAVIX) 75 mg tablet Take 1 tablet (75 mg total) by mouth daily 9/30/22   Carlos Jain MD   docusate sodium (COLACE) 100 mg capsule Take 100 mg by mouth if needed    Historical Provider, MD   gabapentin (NEURONTIN) 400 mg capsule Take 1 capsule (400 mg total) by mouth daily at bedtime 9/30/22   Carlos Jain MD   Glucagon 1 MG/0 2ML SOSY INJECT 1 MG UNDER THE SKIN  AS NEEDED FOR SEVERE HYPOGLYCEMIA 3/30/21   Historical Provider, MD   levothyroxine (Synthroid) 100 mcg tablet Take 1 tablet (100 mcg total) by mouth daily in the early morning 10/15/21   Maggi Campa DO   lisinopril (ZESTRIL) 40 mg tablet Take 40 mg by mouth daily      Historical Provider, MD   Multiple Vitamin (MULTIVITAMIN) tablet Take 1 tablet by mouth daily  Historical Provider, MD   PATIENT MAINTAINED INSULIN PUMP Inject 0 4 each under the skin continuous     Historical Provider, MD     I have reviewed home medications with patient personally      Allergies: No Known Allergies    Social History:  Marital Status: /Civil Union   Occupation: retired  Patient Pre-hospital Living Situation: Home, With spouse  Patient Pre-hospital Level of Mobility: walks  Patient Pre-hospital Diet Restrictions: none   Substance Use History:   Social History     Substance and Sexual Activity   Alcohol Use Not Currently   • Alcohol/week: 2 0 standard drinks   • Types: 2 Glasses of wine per week    Comment: week, social drinker     Social History     Tobacco Use   Smoking Status Former Smoker   Smokeless Tobacco Never Used   Tobacco Comment    quit about 50 years ago     Social History     Substance and Sexual Activity   Drug Use No       Family History:  Family History Problem Relation Age of Onset   • Cancer Mother    • Diabetes Mother    • Uterine cancer Mother    • Diabetes Father    • Cancer Father    • Stroke Father         of unknown cause   • Hypertension Father    • Prostate cancer Father    • Diabetes Family         Uncle, DM   • Cancer Family         Grandmother       Physical Exam:     Vitals:   Blood Pressure: 134/69 (10/18/22 0300)  Pulse: 83 (10/18/22 0300)  Temperature: 98 8 °F (37 1 °C) (10/18/22 0200)  Temp Source: Oral (10/18/22 0200)  Respirations: 13 (10/18/22 0300)  Weight - Scale: 68 2 kg (150 lb 5 7 oz) (10/17/22 2215)  SpO2: 96 % (10/18/22 0300)    Physical Exam  Vitals and nursing note reviewed  Constitutional:       Appearance: Normal appearance  He is well-developed  He is not ill-appearing  HENT:      Head: Normocephalic and atraumatic  Nose: Nose normal  No congestion or rhinorrhea  Mouth/Throat:      Mouth: Mucous membranes are moist       Pharynx: Oropharynx is clear  No oropharyngeal exudate or posterior oropharyngeal erythema  Eyes:      General: No scleral icterus  Right eye: No discharge  Left eye: No discharge  Extraocular Movements: Extraocular movements intact  Conjunctiva/sclera: Conjunctivae normal       Pupils: Pupils are equal, round, and reactive to light  Cardiovascular:      Rate and Rhythm: Normal rate and regular rhythm  Pulses: Normal pulses  Heart sounds: Normal heart sounds  No murmur heard  No friction rub  No gallop  Pulmonary:      Effort: Pulmonary effort is normal  No respiratory distress  Breath sounds: Normal breath sounds  No wheezing, rhonchi or rales  Abdominal:      General: Abdomen is flat  Bowel sounds are normal  There is no distension  Palpations: Abdomen is soft  Tenderness: There is no abdominal tenderness  There is no guarding or rebound  Musculoskeletal:         General: Normal range of motion        Cervical back: Normal range of motion and neck supple  No rigidity or tenderness  Right lower leg: No edema  Left lower leg: No edema  Skin:     General: Skin is warm and dry  Capillary Refill: Capillary refill takes less than 2 seconds  Neurological:      General: No focal deficit present  Mental Status: He is alert and oriented to person, place, and time  Mental status is at baseline  GCS: GCS eye subscore is 4  GCS verbal subscore is 5  GCS motor subscore is 6  Cranial Nerves: Cranial nerves are intact  No cranial nerve deficit  Coordination: Coordination normal       Comments: 5/5 strength in right upper and lower extremity  4/5 strength in left upper and lower extremity  Sensation intact throughout     Psychiatric:         Mood and Affect: Mood normal          Behavior: Behavior normal           Additional Data:     Lab Results:  Results from last 7 days   Lab Units 10/18/22  0406   WBC Thousand/uL 17 23*   HEMOGLOBIN g/dL 11 9*   HEMATOCRIT % 34 9*   PLATELETS Thousands/uL 239   NEUTROS PCT % 83*   LYMPHS PCT % 6*   MONOS PCT % 10   EOS PCT % 0     Results from last 7 days   Lab Units 10/17/22  2223   SODIUM mmol/L 133*   POTASSIUM mmol/L 3 9   CHLORIDE mmol/L 98   CO2 mmol/L 25   BUN mg/dL 31*   CREATININE mg/dL 1 60*   ANION GAP mmol/L 10   CALCIUM mg/dL 8 8   ALBUMIN g/dL 3 8   TOTAL BILIRUBIN mg/dL 0 56   ALK PHOS U/L 104   ALT U/L 23   AST U/L 17   GLUCOSE RANDOM mg/dL 126     Results from last 7 days   Lab Units 10/17/22  2223   INR  1 06     Results from last 7 days   Lab Units 10/18/22  0424   POC GLUCOSE mg/dl 264*         Results from last 7 days   Lab Units 10/17/22  2351   LACTIC ACID mmol/L 0 8   PROCALCITONIN ng/ml 0 12       Imaging: Reviewed radiology reports from this admission including: chest xray and CT head  XR chest 1 view portable   ED Interpretation by Leena Cheatham MD (10/17 2250)   No acute cardiopulmonary disease        CT stroke alert brain   Final Result by Debra Nieves MD (10/17 1314)      No evidence of acute vascular territorial infarction, intracranial hemorrhage or mass effect  Findings were directly discussed with Tammie Del Valle at 11:03 PM       Workstation performed: [id number]         CTA stroke alert (head/neck)   Final Result by Debra Nieves MD (10/17 6742)         1  No evidence of hemodynamically significant stenosis, dissection or occlusion of the carotid or vertebral arteries  2   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  No evidence of acute thrombus or large vessel occlusion involving the major vessels of the [place]  Findings were directly discussed with Tammie Del Valle at 11:03 PM                         Workstation performed: [id number]             EKG and Other Studies Reviewed on Admission:   · EKG: Sinus Tachycardia  HR 98     ** Please Note: This note has been constructed using a voice recognition system   **

## 2022-10-18 NOTE — ED NOTES
Patient refused to have his insulin pump removed, spoke with HALLEY Rolle  about patients insulin pump order  Patient will get 0 5 U/hr as basal dose and then we will finger stick patient AC and HS and cover with SQ insulin  We are unable to verify that patient is getting 0 5 U/hr, per patient screen on insulin pump doesn't show specific settings, please see comments in [facility] under "Patient Maintained Insulin Pump"    Initial Assessment Sheet filled out and signed by patient and placed on patient's chart        Emiliano Azar RN  10/18/22 1788

## 2022-10-18 NOTE — PLAN OF CARE
Problem: OCCUPATIONAL THERAPY ADULT  Goal: Performs self-care activities at highest level of function for planned discharge setting  See evaluation for individualized goals  Description: Treatment Interventions: ADL retraining, Functional transfer training, UE strengthening/ROM, Endurance training, Patient/family training, Cognitive reorientation, Equipment evaluation/education, Activityengagement          See flowsheet documentation for full assessment, interventions and recommendations  Note: Limitation: Decreased ADL status, Decreased UE strength, Decreased Safe judgement during ADL, Decreased endurance, Decreased self-care trans, Decreased high-level ADLs     Assessment: Pt is a 78 y o  male seen for OT evaluation s/p admit to [facility] on 10/17/2022 w/ Ambulatory dysfunction  Comorbidities affecting pt's functional performance at time of assessment include: DM, thyroid gland, HTN, prostate CA, mumps, CVA, hypothyroidism, GERD, arthritis  Personal factors affecting pt at time of IE include:difficulty performing ADLS, difficulty performing IADLS , limited insight into deficits, decreased initiation and engagement  and health management   Prior to admission, pt was (A) with ADLs and IADLs with use of SPC at baseline during functional mobility  Upon evaluation: Pt requires (S) level with quad cane during mobility 2* the following deficits impacting occupational performance: weakness, decreased strength, decreased balance, decreased tolerance, impaired initiation, decreased safety awareness and impaired interpersonal skills  Pt to benefit from continued skilled OT tx while in the hospital to address deficits as defined above and maximize level of functional independence w ADL's and functional mobility  Occupational Performance areas to address include: grooming, bathing/shower, toilet hygiene, dressing, functional mobility, community mobility and clothing management   The patient's raw score on the AM-PAC Daily Activity inpatient short form is 21, standardized score is 44 27, greater than 39 4  Patients at this level are likely to benefit from discharge to post-acute rehabilitation services  Please refer to the recommendation of the Occupational Therapist for safe discharge planning  Pt benefited from co-evaluation of skilled OT and PT therapists in order to most appropriately address functional deficits d/t extensive assistance required for safe functional mobility, decreased activity tolerance, and regression from functioning level prior to admission and/or onset of present illness  OT/PT objectives were addressed separately; please see PT note for specific goal areas targeted       OT Discharge Recommendation: No rehabilitation needs

## 2022-10-18 NOTE — TELEMEDICINE
TeleConsultation - Stroke   Alivia Rodriguez 78 y o  male MRN: [id number]  Unit/Bed#: RM01 Encounter: [id number]        REQUIRED DOCUMENTATION:     1  This service was provided via Telemedicine  2  Provider located at off campus location  3  TeleMed provider: Ly Galicia MD   4  Identify all parties in room with patient during tele consult:  WIFE  5  Patient was then informed that this was a Telemedicine visit and that the exam was being conducted confidentially over secure lines  My office door was closed  No one else was in the room  Patient acknowledged consent and understanding of privacy and security of the Telemedicine visit, and gave us permission to have the assistant stay in the room in order to assist with the history and to conduct the exam   I informed the patient that I have reviewed their record in Epic and presented the opportunity for them to ask any questions regarding the visit today  The patient agreed to participate  Assessment/Plan   Assessment: Mechanical fall  Pt was feeling cold and says he was wearing socks and couldn't secure his footing getting out of bed this evening to use the restroom and ended up slipping slowly off the bed  No head strike  Patient states given his chronic left upper/lower extremity weakness and spasticity he cannot get up by himself when laying flat  His wife also stated that she herself has a bad rt shoulder and cannot lift him which is why she had to call EMS  Pt is fully compliant with his plavix, statin secondary stroke prevention regimen and recently had seen his vascular neurologist Dr Luis Miguel Apodaca end of September and it appears he's been doing well  Pt states he takes baclofen 10 mg po tid which helps a lot and he takes gabapentin 400 mg po qhs which has fully eliminated his lue/lle pain and hasn't had to use any prn dosing   He also has a loop recorder in place to look for paroxysmal afib however his acute cva on 3/3/21 has been 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 given the location in the rt medullary brainstem region  TPA Decision: no acute neurologic deficits therefore although presenting in therapeutic window, not administered  Plan: pt has mild leukocytosis however no recent symptoms to suggest illness  He had the chills starting shortly before dinner today but no other symptoms  No need for further inpt neurologic workup  History of Present Illness     Reason for Consult / Principal Problem: stroke alert    Patient last known well: 9:45 PM when he fell, no actual neurologic symptoms per wife just couldn't get up given his chronic stroke deficits  Stroke alert called: 10:16 pm  Neurology time of arrival: 10:16 pm via phone  HPI: Josr Inman is a 78 y o  right handed male who presents with a fall when getting up to walk to the bathroom  He slipped and fell to the ground, no head strike  Earlier today he had a pcp office visit  He was recently seen by [facility] stroke director Dr Dino Nugent on 9/30/22 in the office and appeared patient had been doing well  CT head today with no hemorrhage or early evidence of infarct  cta head/neck with no lvo  At baseline he uses a cane and wears a lle brace      Consult to neurology  Consult performed by: Sherice Potter MD  Consult ordered by: Chiquita Smallwood MD          Review of Systems  Per 12 point review, chronic lue/lle weakness, spasticity, gait dysfunction, rest negative  Historical Information   Past Medical History:   Diagnosis Date   • Arthritis    • BPH (benign prostatic hyperplasia)     last assessed 4/29/2014   • [facility]    • Disease of thyroid gland    • GERD (gastroesophageal reflux disease)    • Hearing aid worn    • Hyperlipidemia    • Hypertension    • Hypothyroidism    • Mumps    • Prostate cancer ([facility] )    • Stroke ([facility] )    • Tingling sensation     bilateral feet occassionally   • Tinnitus    • Wears glasses    • Wears partial dentures     upper Past Surgical History:   Procedure Laterality Date   • CARDIAC ELECTROPHYSIOLOGY PROCEDURE Left 11/4/2021    Procedure: Reveal implant;  Surgeon: Jamar Locke MD;  Location:  CARDIAC CATH LAB; Service: Cardiology   • CATARACT EXTRACTION Bilateral 2000   • EYE SURGERY     • JOINT REPLACEMENT     • KNEE ARTHROSCOPY Right 06/26/2009    knee   • OH LAP,PROSTATECTOMY,RADICAL,W/NERVE SPARE,INCL ROBOTIC N/A 2/7/2018    Procedure: ROBOTIC ASSISTED LAPAROSCOPIC PROSTATECTOMY; BILATERAL PELVIC LYMPH NODE DISSECTION;  Surgeon: Peggyann Bosworth, MD;  Location: [facility];  Service: Urology   • OH REPAIR [address] HERNIA,5+Y/O,REDUCIBL Right 4/19/2019    Procedure: REPAIR HERNIA INGUINAL;  Surgeon: Frida Dumont DO;  Location: [facility];  Service: General   • PROSTATE BIOPSY  11/07/2017    needle biopsy of prostate   • TRIGGER FINGER RELEASE  05/2013    trigger thumb     Social History   Social History     Substance and Sexual Activity   Alcohol Use Not Currently   • Alcohol/week: 2 0 standard drinks   • Types: 2 Glasses of wine per week    Comment: week, social drinker     Social History     Substance and Sexual Activity   Drug Use No     E-Cigarette/Vaping   • E-Cigarette Use Never User      E-Cigarette/Vaping Substances   • Nicotine No    • THC No    • CBD No    • Flavoring No    • Other No    • Unknown No      Social History     Tobacco Use   Smoking Status Former Smoker   Smokeless Tobacco Never Used   Tobacco Comment    quit about 50 years ago     Family History: non-contributory      Meds/Allergies   all current active meds have been reviewed    No Known Allergies    Objective   Vitals: There were no vitals taken for this visit  ,There is no height or weight on file to calculate BMI  No intake or output data in the 24 hours ending 10/17/22 3370      Physical Exam   General: no acute distress  HEENT: Atraumatic, normocephalic  Neurologic Exam  MS:Alert and orientedX3  Insight, attention, concentration intact   No expressive/receptive aphasia  CN:2-12 intact  Motor:no drift  No involuntary movements  Unable to fully close left hand and difficulty with finger to finger tapping  Cannot lift lue/lle as high as the rue/rle  Sensory:light touch intact ue/le bilat  Coordination:finger to nose intact bilat uE just slower with left side  rle heel to shin intact  Slight ataxia lle heel to shin in setting of lle weakness/spasticity        NIHSS:  1a Level of Consciousness: 0 = Alert   1b  LOC Questions: 0 = Answers both correctly   1c  LOC Commands: 0 = Obeys both correctly   2  Best Gaze: 0 = Normal   3  Visual: 0 = No visual field loss   4  Facial Palsy: 0=Normal symmetric movement   5a  Motor Right Arm: 0=No drift, limb holds 90 (or 45) degrees for full 10 seconds   5b  Motor Left Arm: 0=No drift, limb holds 90 (or 45) degrees for full 10 seconds   6a  Motor Right Le=No drift, limb holds 90 (or 45) degrees for full 10 seconds   6b  Motor Left Le=No drift, limb holds 90 (or 45) degrees for full 10 seconds   7  Limb Ataxia:  1=Present in one limb   8  Sensory: 0=Normal; no sensory loss   9  Best Language:  0=No aphasia, normal   10  Dysarthria: 0=Normal articulation   11  Extinction and Inattention (formerly Neglect): 0=No abnormality   Total Score: 1       Modified Morton Score:  2 (Unable to carry out all previous activities, but able to look after own affairs without assistance)    Lab Results: I have personally reviewed pertinent reports      Imaging Studies: I have personally reviewed pertinent films in PACS  EKG, Pathology, and Other Studies: I have personally reviewed pertinent films in PACS    Counseling / Coordination of Care  Total 35 min critical care time spent during the stroke alert

## 2022-10-18 NOTE — ED PROVIDER NOTES
History  Chief Complaint   Patient presents with   • STROKE Alert     Brought in with Stroke like symptoms  Left sided weakness  Previous stoke history on plavix     72-year-old male, history of hyperlipidemia, hypertension, hypothyroidism, diabetes, prior stroke with residual left-sided deficits, who presents for concern for stroke  Patient states that he was walking to go to the bathroom, and he suddenly felt very sweaty  States that he also lost his footing on the rug, which is common in this area of the house  States that he fell down at this point, and his wife saw him and was concerned he was having a stroke as he appeared to have slurred speaking  Patient reports that his speech is normal and does not believe it was ever altered  He denies any new numbness, weakness, tingling  He does have residual left-sided weakness from prior stroke  Glucose en route was 130s  Patient is not complaining of any chest pain, any shortness of breath, no nausea or vomiting or abdominal pain  ROS otherwise negative  Prior to Admission Medications   Prescriptions Last Dose Informant Patient Reported? Taking? Glucagon 1 MG/0 2ML SOSY  Spouse/Significant Other Yes No   Sig: INJECT 1 MG UNDER THE SKIN  AS NEEDED FOR SEVERE HYPOGLYCEMIA   Multiple Vitamin (MULTIVITAMIN) tablet  Spouse/Significant Other Yes No   Sig: Take 1 tablet by mouth daily     PATIENT MAINTAINED INSULIN PUMP  Spouse/Significant Other Yes No   Sig: Inject 0 4 each under the skin continuous    amLODIPine (NORVASC) 5 mg tablet   No No   Sig: Take 1 tablet (5 mg total) by mouth daily   atorvastatin (LIPITOR) 40 mg tablet  Spouse/Significant Other No No   Sig: Take 1 tablet (40 mg total) by mouth daily with dinner   baclofen 10 mg tablet   No No   Sig: Take 1 tablet (10 mg total) by mouth 3 (three) times a day   clopidogrel (PLAVIX) 75 mg tablet   No No   Sig: Take 1 tablet (75 mg total) by mouth daily   docusate sodium (COLACE) 100 mg capsule Spouse/Significant Other Yes No   Sig: Take 100 mg by mouth if needed   gabapentin (NEURONTIN) 400 mg capsule   No No   Sig: Take 1 capsule (400 mg total) by mouth daily at bedtime   levothyroxine (Synthroid) 100 mcg tablet  Spouse/Significant Other No No   Sig: Take 1 tablet (100 mcg total) by mouth daily in the early morning   lisinopril (ZESTRIL) 40 mg tablet  Spouse/Significant Other Yes No   Sig: Take 40 mg by mouth daily        Facility-Administered Medications: None       Past Medical History:   Diagnosis Date   • Arthritis    • BPH (benign prostatic hyperplasia)     last assessed 4/29/2014   • Diabetes mellitus ([facility] [address] )    • Disease of thyroid gland    • GERD (gastroesophageal reflux disease)    • Hearing aid worn    • Hyperlipidemia    • Hypertension    • Hypothyroidism    • Mumps    • Prostate cancer ([facility] [address] )    • Stroke ([facility] [address] )    • Tingling sensation     bilateral feet occassionally   • Tinnitus    • Wears glasses    • Wears partial dentures     upper       Past Surgical History:   Procedure Laterality Date   • CARDIAC ELECTROPHYSIOLOGY PROCEDURE Left 11/4/2021    Procedure: Reveal implant;  Surgeon: Michael Zhu MD;  Location:  CARDIAC CATH LAB;   Service: Cardiology   • CATARACT EXTRACTION Bilateral 2000   • EYE SURGERY     • JOINT REPLACEMENT     • KNEE ARTHROSCOPY Right 06/26/2009    knee   • MO LAP,PROSTATECTOMY,RADICAL,W/NERVE SPARE,INCL ROBOTIC N/A 2/7/2018    Procedure: ROBOTIC ASSISTED LAPAROSCOPIC PROSTATECTOMY; BILATERAL PELVIC LYMPH NODE DISSECTION;  Surgeon: Ana Mcdaniels MD;  Location: [facility];  Service: Urology   • MO REPAIR [address] HERNIA,5+Y/O,REDUCIBL Right 4/19/2019    Procedure: REPAIR HERNIA INGUINAL;  Surgeon: Kandice Brunner, DO;  Location: [facility];  Service: General   • PROSTATE BIOPSY  11/07/2017    needle biopsy of prostate   • TRIGGER FINGER RELEASE  05/2013    trigger thumb       Family History   Problem Relation Age of Onset   • Cancer Mother    • Diabetes Mother    • Uterine cancer Mother    • Diabetes Father    • Cancer Father    • Stroke Father         of unknown cause   • Hypertension Father    • Prostate cancer Father    • Diabetes Family         Uncle, DM   • Cancer Family         Grandmother     I have reviewed and agree with the history as documented  E-Cigarette/Vaping   • E-Cigarette Use Never User      E-Cigarette/Vaping Substances   • Nicotine No    • THC No    • CBD No    • Flavoring No    • Other No    • Unknown No      Social History     Tobacco Use   • Smoking status: Former Smoker   • Smokeless tobacco: Never Used   • Tobacco comment: quit about 50 years ago   Vaping Use   • Vaping Use: Never used   Substance Use Topics   • Alcohol use: Not Currently     Alcohol/week: 2 0 standard drinks     Types: 2 Glasses of wine per week     Comment: week, social drinker   • Drug use: No       Review of Systems   Constitutional: Positive for diaphoresis  Negative for chills, fatigue and fever  HENT: Negative for congestion and sore throat  Eyes: Negative for visual disturbance  Respiratory: Negative for cough, chest tightness and shortness of breath  Cardiovascular: Negative for chest pain, palpitations and leg swelling  Gastrointestinal: Negative for abdominal distention, abdominal pain, constipation, diarrhea, nausea and vomiting  Genitourinary: Negative for difficulty urinating and dysuria  Musculoskeletal: Negative for arthralgias and myalgias  Skin: Negative for rash  Neurological: Positive for speech difficulty  Negative for dizziness, weakness, light-headedness, numbness and headaches  Psychiatric/Behavioral: Negative for agitation, behavioral problems and confusion  The patient is not nervous/anxious  All other systems reviewed and are negative  Physical Exam  Physical Exam  Constitutional:       Appearance: He is well-developed  HENT:      Head: Normocephalic and atraumatic        Right Ear: Tympanic membrane normal       Left Ear: Tympanic membrane normal       Nose: Nose normal       Mouth/Throat:      Mouth: Mucous membranes are moist    Cardiovascular:      Rate and Rhythm: Normal rate and regular rhythm  Heart sounds: Normal heart sounds  No murmur heard  Pulmonary:      Effort: Pulmonary effort is normal  No respiratory distress  Breath sounds: Normal breath sounds  Abdominal:      General: Bowel sounds are normal  There is no distension  Palpations: Abdomen is soft  Tenderness: There is no abdominal tenderness  Musculoskeletal:         General: No deformity  Skin:     General: Skin is warm  Findings: No rash  Neurological:      Mental Status: He is alert and oriented to person, place, and time  Mental status is at baseline  Cranial Nerves: No cranial nerve deficit  Sensory: No sensory deficit  Motor: Weakness present  Comments: See NIHSS  Patient has 5/5 strength in the right sided extremities  In the left side, he has 4-5 strength, however reports this is his baseline  He denies any sensation changes on exam   He does not appear to have slurred speech on my exam   He has no facial droop, extraocular movements are intact  He is AAO x3  Psychiatric:         Behavior: Behavior normal          Thought Content:  Thought content normal          Judgment: Judgment normal          Vital Signs  ED Triage Vitals [10/17/22 2215]   Temperature Pulse Respirations Blood Pressure SpO2   97 9 °F (36 6 °C) 102 18 (!) 173/90 97 %      Temp Source Heart Rate Source Patient Position - Orthostatic VS BP Location FiO2 (%)   Temporal Monitor Lying -- --      Pain Score       --           Vitals:    10/18/22 0130 10/18/22 0145 10/18/22 0200 10/18/22 0215   BP: 130/73 139/65 143/69 144/66   Pulse: 97 90 92 84   Patient Position - Orthostatic VS:             Visual Acuity  Visual Acuity    Flowsheet Row Most Recent Value   L Pupil Size (mm) 2   R Pupil Size (mm) 3          ED Medications  Medications iohexol (OMNIPAQUE) 350 MG/ML injection (SINGLE-DOSE) 85 mL (85 mL Intravenous Given 10/17/22 2236)       Diagnostic Studies  Results Reviewed     Procedure Component Value Units Date/Time    HS Troponin I 4hr [[id number]] Collected: 10/18/22 0224    Lab Status: In process Specimen: Blood from Arm, Right Updated: 10/18/22 0228    HS Troponin I 2hr [[id number]]  (Normal) Collected: 10/18/22 0036    Lab Status: Final result Specimen: Blood from Arm, Right Updated: 10/18/22 0105     hs TnI 2hr 20 ng/L      Delta 2hr hsTnI 9 ng/L     Urine Microscopic [[id number]]  (Normal) Collected: 10/18/22 0032    Lab Status: Final result Specimen: Urine, Clean Catch Updated: 10/18/22 0102     RBC, UA 2-4 /hpf      WBC, UA 0-1 /hpf      Epithelial Cells Occasional /hpf      Bacteria, UA None Seen /hpf     UA w Reflex to Microscopic w Reflex to Culture [[id number]]  (Abnormal) Collected: 10/18/22 0032    Lab Status: Final result Specimen: Urine, Clean Catch Updated: 10/18/22 0049     Color, UA Yellow     Clarity, UA Clear     Specific Gravity, UA 1 010     pH, UA 6 5     Leukocytes, UA Negative     Nitrite, UA Negative     Protein, UA Negative mg/dl      Glucose, UA Negative mg/dl      Ketones, UA Trace mg/dl      Urobilinogen, UA 1 0 E U /dl      Bilirubin, UA Negative     Occult Blood, UA Trace-Intact    COVID/FLU/RSV [[id number]]  (Normal) Collected: 10/17/22 2351    Lab Status: Final result Specimen: Nares from Nose Updated: 10/18/22 0043     SARS-CoV-2 Negative     INFLUENZA A PCR Negative     INFLUENZA B PCR Negative     RSV PCR Negative    Narrative:      FOR PEDIATRIC PATIENTS - copy/paste COVID Guidelines URL to browser: [email or web address]    SARS-CoV-2 assay is a Nucleic Acid Amplification assay intended for the  qualitative detection of nucleic acid from SARS-CoV-2 in nasopharyngeal  swabs  Results are for the presumptive identification of SARS-CoV-2 RNA      Positive results are indicative of infection with SARS-CoV-2, the virus  causing COVID-19, but do not rule out bacterial infection or co-infection  with other viruses  Laboratories within the [place] and its  territories are required to report all positive results to the appropriate  public health authorities  Negative results do not preclude SARS-CoV-2  infection and should not be used as the sole basis for treatment or other  patient management decisions  Negative results must be combined with  clinical observations, patient history, and epidemiological information  This test has not been FDA cleared or approved  This test has been authorized by FDA under an Emergency Use Authorization  (EUA)  This test is only authorized for the duration of time the  declaration that circumstances exist justifying the authorization of the  emergency use of an in vitro diagnostic tests for detection of SARS-CoV-2  virus and/or diagnosis of COVID-19 infection under section 564(b)(1) of  the Act, 21 U  S C  960EYA-4(T)(1), unless the authorization is terminated  or revoked sooner  The test has been validated but independent review by FDA  and CLIA is pending  Test performed using [employer] Gene[employer]pert: This RT-PCR assay targets N2,  a region unique to SARS-CoV-2  A conserved region in the E-gene was chosen  for pan-Sarbecovirus detection which includes SARS-CoV-2  According to CMS-2020-01-R, this platform meets the definition of high-throughput technology  Procalcitonin [[id number]]  (Normal) Collected: 10/17/22 2351    Lab Status: Final result Specimen: Blood from Arm, Right Updated: 10/18/22 0028     Procalcitonin 0 12 ng/ml     Lactic acid [[id number]]  (Normal) Collected: 10/17/22 2351    Lab Status: Final result Specimen: Blood from Arm, Right Updated: 10/18/22 0023     LACTIC ACID 0 8 mmol/L     Narrative:      Result may be elevated if tourniquet was used during collection      Blood culture #2 [[id number]] Collected: 10/17/22 2351    Lab Status: In process Specimen: Blood from Arm, Left Updated: 10/17/22 2358    Blood culture #1 [[id number]] Collected: 10/17/22 2351    Lab Status: In process Specimen: Blood from Arm, Right Updated: 10/17/22 2358    HS Troponin 0hr (reflex protocol) [[id number]]  (Normal) Collected: 10/17/22 2223    Lab Status: Final result Specimen: Blood Updated: 10/17/22 2308     hs TnI 0hr 11 ng/L     Magnesium [[id number]]  (Normal) Collected: 10/17/22 2223    Lab Status: Final result Specimen: Blood from Arm, Right Updated: 10/17/22 2256     Magnesium 1 8 mg/dL     TSH [[id number]]  (Normal) Collected: 10/17/22 2223    Lab Status: Final result Specimen: Blood from Arm, Right Updated: 10/17/22 2256     TSH 3RD GENERATON 1 937 uIU/mL     Narrative:      Patients undergoing fluorescein dye angiography may retain small amounts of fluorescein in the body for 48-72 hours post procedure  Samples containing fluorescein can produce falsely depressed TSH values  If the patient had this procedure,a specimen should be resubmitted post fluorescein clearance        Comprehensive metabolic panel [[id number]]  (Abnormal) Collected: 10/17/22 2223    Lab Status: Final result Specimen: Blood from Arm, Right Updated: 10/17/22 2247     Sodium 133 mmol/L      Potassium 3 9 mmol/L      Chloride 98 mmol/L      CO2 25 mmol/L      ANION GAP 10 mmol/L      BUN 31 mg/dL      Creatinine 1 60 mg/dL      Glucose 126 mg/dL      Calcium 8 8 mg/dL      AST 17 U/L      ALT 23 U/L      Alkaline Phosphatase 104 U/L      Total Protein 7 4 g/dL      Albumin 3 8 g/dL      Total Bilirubin 0 56 mg/dL      eGFR 41 ml/min/1 73sq m     Narrative:      Allison guidelines for Chronic Kidney Disease (CKD):   •  Stage 1 with normal or high GFR (GFR > 90 mL/min/1 73 square meters)  •  Stage 2 Mild CKD (GFR = 60-89 mL/min/1 73 square meters)  •  Stage 3A Moderate CKD (GFR = 45-59 mL/min/1 73 square meters)  •  Stage 3B Moderate CKD (GFR = 30-44 mL/min/1 73 square meters)  •  Stage 4 Severe CKD (GFR = 15-29 mL/min/1 73 square meters)  •  Stage 5 End Stage CKD (GFR <15 mL/min/1 73 square meters)  Note: GFR calculation is accurate only with a steady state creatinine    APTT [[id number]]  (Normal) Collected: 10/17/22 2223    Lab Status: Final result Specimen: Blood from Arm, Right Updated: 10/17/22 2243     PTT 26 seconds     Protime-INR [[id number]]  (Normal) Collected: 10/17/22 2223    Lab Status: Final result Specimen: Blood from Arm, Right Updated: 10/17/22 2243     Protime 14 0 seconds      INR 1 06    CBC and differential [[id number]]  (Abnormal) Collected: 10/17/22 2223    Lab Status: Final result Specimen: Blood from Arm, Right Updated: 10/17/22 2230     WBC 16 81 Thousand/uL      RBC 3 95 Million/uL      Hemoglobin 12 8 g/dL      Hematocrit 37 3 %      MCV 94 fL      MCH 32 4 pg      MCHC 34 3 g/dL      RDW 13 3 %      MPV 9 3 fL      Platelets 879 Thousands/uL      nRBC 0 /100 WBCs      Neutrophils Relative 84 %      Immat GRANS % 0 %      Lymphocytes Relative 5 %      Monocytes Relative 10 %      Eosinophils Relative 1 %      Basophils Relative 0 %      Neutrophils Absolute 14 06 Thousands/µL      Immature Grans Absolute 0 07 Thousand/uL      Lymphocytes Absolute 0 83 Thousands/µL      Monocytes Absolute 1 63 Thousand/µL      Eosinophils Absolute 0 16 Thousand/µL      Basophils Absolute 0 06 Thousands/µL                  XR chest 1 view portable   ED Interpretation by Meagan Lacy MD (10/17 2250)   No acute cardiopulmonary disease  CT stroke alert brain   Final Result by Kenia Tatum MD (10/17 2307)      No evidence of acute vascular territorial infarction, intracranial hemorrhage or mass effect  Findings were directly discussed with Todd Zaidi at 11:03 PM       Workstation performed: [id number]         CTA stroke alert (head/neck)   Final Result by Kenia Tatum MD (10/17 2306)         1    No evidence of hemodynamically significant stenosis, dissection or occlusion of the carotid or vertebral arteries  2   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  No evidence of acute thrombus or large vessel occlusion involving the major vessels of the [place]  Findings were directly discussed with Henry Dean at 11:03 PM                         Workstation performed: [id number]                    Procedures  Procedures         ED Course  ED Course as of 10/18/22 0246   Tue Oct 18, 2022   0040 LACTIC ACID: 0 8   0040 Procalcitonin: 0 12                  Stroke Assessment     Row Name 10/18/22 0243             NIH Stroke Scale    Interval Baseline      Level of Consciousness (1a ) 0      LOC Questions (1b ) 0      LOC Commands (1c ) 0      Best Gaze (2 ) 0      Visual (3 ) 0      Facial Palsy (4 ) 0      Motor Arm, Left (5a ) 0      Motor Arm, Right (5b ) 0      Motor Leg, Left (6a ) 0      Motor Leg, Right (6b ) 0      Limb Ataxia (7 ) 0      Sensory (8 ) 0      Best Language (9 ) 0      Dysarthria (10 ) 0      Extinction and Inattention (11 ) (Formerly Neglect) 0      Total 0              Flowsheet Row Most Recent Value   Thrombolytic Decision Options    Thrombolytic Decision Patient not a candidate  Patient is not a candidate options Symptoms resolved/clearly non disabling  MDM  Number of Diagnoses or Management Options  Chills  Stroke-like episode  Diagnosis management comments: Patient's presentation was initially concerning for stroke from EMS call, pre-hospital stroke alert was called  On initial discussion prior to CT scan, patient states that he feels completely normal at this time  Patient's NIHSS was 0 for me  I discussed the case closely with Neurology that followed the CT scans that did not demonstrate any acute pathology    Patient was found have an elevated white cell count of 16, as well as borderline tachycardia in the upper 90s to low 100s   His delta troponin did rise 9, and was plan to repeat this  However, it appeared that the wife was very frustrated and upset by how long the visit was taking, and stated that she was very tired from the recent trip and wanted to go home  At this point she was crying and slumped over in a chair, I thought that the safest thing to do would be to admit the patient in the setting, and when I discussed this with the wife she agreed that this would be the safer option  Patient was admitted to slim  Disposition  Final diagnoses:   Stroke-like episode   Chills     Time reflects when diagnosis was documented in both MDM as applicable and the Disposition within this note     Time User Action Codes Description Comment    10/17/2022 10:49 PM Luis Morrison Add [R29 90] Stroke-like episode     10/18/2022  2:03 AM Luis Morrison Add [R68 83] Chills     10/18/2022  2:11 AM Rony Coles Add [R26 2] Ambulatory dysfunction       ED Disposition     ED Disposition   Admit    Condition   Stable    Date/Time   Tue Oct 18, 2022  2:04 AM    Comment   Case was discussed with MILTON and the patient's admission status was agreed to be Admission Status: observation status to the service of Dr Vesta Kawasaki   Follow-up Information    None         Patient's Medications   Discharge Prescriptions    No medications on file       No discharge procedures on file      PDMP Review       Value Time User    PDMP Reviewed  Yes 3/25/2021  8:52 AM Siliva Landry PA-C          ED Provider  Electronically Signed by           Vel Stubbs MD  10/18/22 4747

## 2022-10-18 NOTE — ASSESSMENT & PLAN NOTE
· Current blood pressure 134/69  · Continue Norvasc 5 mg oral daily  · Continue lisinopril 40 mg oral daily

## 2022-10-18 NOTE — PROGRESS NOTES
Did not give any sliding scale coverage of insulin for lunch  Patient gave himself a 3 2 unit bolus of insulin for lunch via his insulin pump per amt of carbs he consumed for lunch

## 2022-10-18 NOTE — UTILIZATION REVIEW
Initial Clinical Review    OBS order 10/18 0205 converted to IP on 10/18 1032 for continued treatment of sepsis r/t pneumonia requiring IV abx and IVF     Admission: Date/Time/Statement:   Admission Orders (From admission, onward)     Ordered        10/18/22 1032  Inpatient Admission  Once            10/18/22 0205  Place in Observation  Once                       Inpatient Admission     Standing Status:   Standing     Number of Occurrences:   1     Order Specific Question:   Level of Care     Answer:   Med Surg [16]     Order Specific Question:   Estimated length of stay     Answer:   More than 2 Midnights     Order Specific Question:   Certification     Answer:   I certify that inpatient services are medically necessary for this patient for a duration of greater than two midnights  See H&P and MD Progress Notes for additional information about the patient's course of treatment  ED Arrival Information     Expected   -    Arrival   10/17/2022 22:15    Acuity   Emergent            Means of arrival   Ambulance    Escorted by   [facility]    Service   Hospitalist    Admission type   Emergency            Arrival complaint   STROKE like Symptoms           Chief Complaint   Patient presents with   • [address] with Stroke like symptoms  Left sided weakness  Previous stoke history on plavix       Initial Presentation: 78 y o  male to ED from home w/ stroke like symptoms   Earlier in day difficulty walking to BR   Got up to walk to BR and suddenly  flet very sweaty and had chills  Wife concerned he was having a stroke appeared to have slurred speech   Residual L sided weakness from previous stroke  CT, CTA head wnl   Pt was going to be discharged and wife was concerned over ability to care for him   Admitted OBS status   W/ amb dysfunction for PT , OT , CM consult   SIRS check BC , hold off on abx , trend wbc , pct in am   HTN cont norvasc, lisinopril    CKD CR 1 60 appears to be around baseline , monitor  DM cont insulin pump and monitor   Date: 10/18   Day 2: cont stay for IV abx , in setting of possible pneumonia w/ sepsis   Cont IVF and serial testing   Monitor Na level   F/u culture results      10/18 PT eval   Home w/ OP rehab       ED Triage Vitals   Temperature Pulse Respirations Blood Pressure SpO2   10/17/22 2215 10/17/22 2215 10/17/22 2215 10/17/22 2215 10/17/22 2215   97 9 °F (36 6 °C) 102 18 (!) 173/90 97 %      Temp Source Heart Rate Source Patient Position - Orthostatic VS BP Location FiO2 (%)   10/17/22 2215 10/17/22 2215 10/17/22 2215 10/18/22 0732 --   Temporal Monitor Lying Left arm       Pain Score       10/18/22 0726       No Pain            Additional Vital Signs:   10/18/22 0732 98 1 °F (36 7 °C) 75 17 134/76 100 98 % None (Room air) Lying   10/18/22 0600 -- 68 18 119/64 85 98 % None (Room air) --   10/18/22 0515 -- 76 18 136/71 98 96 % None (Room air) Lying   10/18/22 0300 -- 83 13 134/69 -- 96 % None (Room air) --   10/18/22 0215 -- 84 13 144/66 -- 96 % None (Room air) --   10/18/22 0200 98 8 °F (37 1 °C) 92 18 143/69 -- 96 % None (Room air) --   10/18/22 0155 -- 89 12 -- -- 96 % None (Room air) --   10/18/22 0145 98 8 °F (37 1 °C) 90 12 139/65 -- 95 % None (Room air) --   10/18/22 0130 98 8 °F (37 1 °C) 97 20 130/73 -- 95 % None (Room air) --   10/18/22 0115 99 1 °F (37 3 °C) 97 19 144/69 -- 95 % None (Room air) --   10/18/22 0100 99 °F (37 2 °C) 96 20 149/64 -- 95 % None (Room air) --   10/18/22 0045 99 1 °F (37 3 °C) 96 20 133/68 -- 94 % None (Room air) --   10/18/22 0030 97 8 °F (36 6 °C) 100 20 157/72 -- 95 % None (Room air) --   10/18/22 0015 97 8 °F (36 6 °C) 100 20 145/58 -- 96 % None (Room air) Lying   10/18/22 0000 97 8 °F (36 6 °C) 97 20 145/58 -- 96 % None (Room air) Lying   10/17/22 2345 97 2 °F (36 2 °C) Abnormal  99 19 146/74 -- 96 % None (Room air) Lying   10/17/22 2330 97 8 °F (36 6 °C) 97 20 172/78 Abnormal  -- 97 % None (Room air) Lying   10/17/22 2315 97 8 °F (36 6 °C) 97 20 172/78 Abnormal  -- 97 % None (Room air) Lying   10/17/22 2300 97 8 °F (36 6 °C) 97 20 172/78 Abnormal  -- 97 % None (Room air) Lying   10/17/22 22:57:24 -- -- -- -- -- 96 % -- --   10/17/22 2245 97 8 °F (36 6 °C) 98 22 154/75 -- 97 % None (Room air) Lying   10/17/22 2230 97 9 °F (36 6 °C) 102 18 156/80 -- 96 % None (Room air) Lying       Pertinent Labs/Diagnostic Test Results:   10/17 EKG NSR   CT chest abdomen pelvis wo contrast   Final Result by Aaliyah Arreaga MD (10/18 1140)   1  Left lower lobe patchy airspace opacity with bronchial impaction concerning for aspiration pneumonitis  2   Cholelithiasis  3   Moderate fecal stasis  Workstation performed: [id number]         XR chest 1 view portable   ED Interpretation by Yen Johnson MD (10/17 2250)   No acute cardiopulmonary disease  Final Result by Mendel Haff, MD (32/95 3648)      No acute cardiopulmonary disease  Workstation performed: [id number]         CT stroke alert brain   Final Result by Lyubov Odell MD (10/17 2301)      No evidence of acute vascular territorial infarction, intracranial hemorrhage or mass effect  Findings were directly discussed with Kaye Mac at 11:03 PM       Workstation performed: [id number]         CTA stroke alert (head/neck)   Final Result by Lyubov Odell MD (10/17 2300)         1  No evidence of hemodynamically significant stenosis, dissection or occlusion of the carotid or vertebral arteries  2   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  No evidence of acute thrombus or large vessel occlusion involving the major vessels of the [place] [employer] [place]              Findings were directly discussed with Kaye Mac at 11:03 PM                         Workstation performed: [id number]           Results from last 7 days   Lab Units 10/18/22  1031 10/17/22  2351   SARS-COV-2  Not Detected Negative     Results from last 7 days   Lab Units 10/19/22  0628 10/18/22  0406 10/17/22  2223   WBC Thousand/uL 6 93 17 23* 16 81*   HEMOGLOBIN g/dL 12 0 11 9* 12 8   HEMATOCRIT % 34 8* 34 9* 37 3   PLATELETS Thousands/uL 257 239 296   NEUTROS ABS Thousands/µL 4 12 14 26* 14 06*     Results from last 7 days   Lab Units 10/19/22  0628 10/18/22  0406 10/17/22  2223   SODIUM mmol/L 134* 127* 133*   POTASSIUM mmol/L 3 8 3 9 3 9   CHLORIDE mmol/L 101 95* 98   CO2 mmol/L 25 24 25   ANION GAP mmol/L 8 8 10   BUN mg/dL 30* 30* 31*   CREATININE mg/dL 1 45* 1 50* 1 60*   EGFR ml/min/1 73sq m 47 45 41   CALCIUM mg/dL 8 9 8 7 8 8   MAGNESIUM mg/dL 2 0 2 0 1 8   PHOSPHORUS mg/dL 3 1 3 3  --      Results from last 7 days   Lab Units 10/19/22  0628 10/18/22  0406 10/17/22  2223   AST U/L 18 17 17   ALT U/L 22 22 23   ALK PHOS U/L 88 113 104   TOTAL PROTEIN g/dL 6 6 6 5 7 4   ALBUMIN g/dL 3 2* 3 3* 3 8   TOTAL BILIRUBIN mg/dL 0 48 0 58 0 56     Results from last 7 days   Lab Units 10/19/22  0655 10/18/22  2137 10/18/22  1612 10/18/22  1207 10/18/22  0743 10/18/22  0424   POC GLUCOSE mg/dl 178* 239* 165* 157* 178* 264*     Results from last 7 days   Lab Units 10/19/22  0628 10/18/22  0406 10/17/22  2223   GLUCOSE RANDOM mg/dL 186* 264* 126     Results from last 7 days   Lab Units 10/18/22  0224 10/18/22  0036 10/17/22  2223   HS TNI 0HR ng/L  --   --  11   HS TNI 2HR ng/L  --  20  --    HSTNI D2 ng/L  --  9  --    HS TNI 4HR ng/L 26  --   --    HSTNI D4 ng/L 15  --   --      Results from last 7 days   Lab Units 10/17/22  2223   PROTIME seconds 14 0   INR  1 06   PTT seconds 26     Results from last 7 days   Lab Units 10/17/22  2223   TSH 3RD GENERATON uIU/mL 1 937     Results from last 7 days   Lab Units 10/19/22  0628 10/18/22  0406 10/17/22  2351   PROCALCITONIN ng/ml 0 17 0 22 0 12     Results from last 7 days   Lab Units 10/19/22  0628 10/17/22  2351   LACTIC ACID mmol/L 0 6 0 8       Results from last 7 days   Lab Units 10/18/22  0032   CLARITY UA Clear   COLOR UA  Yellow   SPEC GRAV UA  1 010   PH UA  6 5   GLUCOSE UA mg/dl Negative   KETONES UA mg/dl Trace*   BLOOD UA  Trace-Intact*   PROTEIN UA mg/dl Negative   NITRITE UA  Negative   BILIRUBIN UA  Negative   UROBILINOGEN UA E U /dl 1 0   LEUKOCYTES UA  Negative   WBC UA /hpf 0-1   RBC UA /hpf 2-4   BACTERIA UA /hpf None Seen   EPITHELIAL CELLS WET PREP /hpf Occasional     Results from last 7 days   Lab Units 10/18/22  1031 10/17/22  2351   INFLUENZA A PCR   --  Negative   INFLUENZA B PCR   --  Negative   RSV PCR   --  Negative   RESPIRATORY SYNCYTIAL VIRUS  Not Detected  --        Results from last 7 days   Lab Units 10/18/22  0412 10/18/22  0406 10/17/22  2351   BLOOD CULTURE  No Growth at 24 hrs  No Growth at 24 hrs  No Growth at 24 hrs  No Growth at 24 hrs       ED Treatment:   Medication Administration from 10/17/2022 2215 to 10/18/2022 0716       Date/Time Order Dose Route Action     10/18/2022 0542 levothyroxine tablet 100 mcg 100 mcg Oral Given     10/18/2022 0542 heparin (porcine) subcutaneous injection 5,000 Units 5,000 Units Subcutaneous Given     10/18/2022 0525 PATIENT MAINTAINED INSULIN PUMP 1 each 1 each Subcutaneous Given        Past Medical History:   Diagnosis Date   • Arthritis    • BPH (benign prostatic hyperplasia)     last assessed 4/29/2014   • Diabetes mellitus ([place] [address] )    • Disease of thyroid gland    • GERD (gastroesophageal reflux disease)    • Hearing aid worn    • Hyperlipidemia    • Hypertension    • Hypothyroidism    • Mumps    • Prostate cancer ([facility] )    • Stroke ([facility] )    • Tingling sensation     bilateral feet occassionally   • Tinnitus    • Wears glasses    • Wears partial dentures     upper     Present on Admission:  • Essential hypertension  • DMII (diabetes mellitus, type 2) ([facility] )  • Hyperlipidemia  • Hypothyroidism  • Stage 3 chronic kidney disease ([facility] )      Admitting Diagnosis: Chills [R68 83]  Stroke (cerebrum) ([place] [address] ) [I63 9]  Stroke-like episode [R29 90]  Ambulatory dysfunction [R26 2]  Age/Sex: 78 y o  male  Admission Orders:  Scheduled Medications:  amLODIPine, 5 mg, Oral, Daily  atorvastatin, 40 mg, Oral, Daily With Dinner  baclofen, 10 mg, Oral, TID  cefTRIAXone, 1,000 mg, Intravenous, Q24H  clopidogrel, 75 mg, Oral, Daily  gabapentin, 400 mg, Oral, HS  heparin (porcine), 5,000 Units, Subcutaneous, Q8H ZAINA  insulin lispro, 1-5 Units, Subcutaneous, TID AC  levothyroxine, 100 mcg, Oral, Early Morning  lisinopril, 40 mg, Oral, Daily  patient maintained insulin pump, 1 each, Subcutaneous, Q8H      Continuous IV Infusions:  sodium chloride, 75 mL/hr, Intravenous, Continuous      PRN Meds:  acetaminophen, 650 mg, Oral, Q6H PRN  docusate sodium, 100 mg, Oral, PRN  insulin aspart, 40 Units, Subcutaneous Insulin Pump, Daily PRN  ondansetron, 4 mg, Intravenous, Q6H PRN    Fingerstick ac and hs   PT OT eval   Weights       IP CONSULT TO NEUROLOGY  IP CONSULT TO CASE MANAGEMENT    Network Utilization Review Department  ATTENTION: Please call with any questions or concerns to [phone] and carefully listen to the prompts so that you are directed to the right person  All voicemails are confidential   [facility] all requests for admission clinical reviews, approved or denied determinations and any other requests to dedicated fax number below belonging to the campus where the patient is receiving treatment   List of dedicated fax numbers for the Facilities:  [address] DENIALS (Administrative/Medical Necessity) [phone]   [address] [place] [address] (Maternity/NICU/Pediatrics) [phone]   [address] [place] [address] Medical [place] [address] [place] [address] [address] [facility]  [address] 975 [facility] [phone]

## 2022-10-19 ENCOUNTER — TRANSITIONAL CARE MANAGEMENT (OUTPATIENT)
Dept: FAMILY MEDICINE CLINIC | Facility: CLINIC | Age: 77
End: 2022-10-19

## 2022-10-19 VITALS
RESPIRATION RATE: 20 BRPM | WEIGHT: 145.72 LBS | HEIGHT: 66 IN | OXYGEN SATURATION: 97 % | HEART RATE: 71 BPM | DIASTOLIC BLOOD PRESSURE: 83 MMHG | TEMPERATURE: 98 F | SYSTOLIC BLOOD PRESSURE: 156 MMHG | BODY MASS INDEX: 23.42 KG/M2

## 2022-10-19 LAB
ALBUMIN SERPL BCP-MCNC: 3.2 G/DL (ref 3.5–5)
ALP SERPL-CCNC: 88 U/L (ref 46–116)
ALT SERPL W P-5'-P-CCNC: 22 U/L (ref 12–78)
ANION GAP SERPL CALCULATED.3IONS-SCNC: 8 MMOL/L (ref 4–13)
AST SERPL W P-5'-P-CCNC: 18 U/L (ref 5–45)
B BURGDOR IGG+IGM SER-ACNC: 0.2 AI
B PARAP IS1001 DNA NPH QL NAA+NON-PROBE: NOT DETECTED
B PERT.PT PRMT NPH QL NAA+NON-PROBE: NOT DETECTED
BASOPHILS # BLD AUTO: 0.04 THOUSANDS/ÂΜL (ref 0–0.1)
BASOPHILS NFR BLD AUTO: 1 % (ref 0–1)
BILIRUB SERPL-MCNC: 0.48 MG/DL (ref 0.2–1)
BUN SERPL-MCNC: 30 MG/DL (ref 5–25)
C PNEUM DNA NPH QL NAA+NON-PROBE: NOT DETECTED
CALCIUM ALBUM COR SERPL-MCNC: 9.5 MG/DL (ref 8.3–10.1)
CALCIUM SERPL-MCNC: 8.9 MG/DL (ref 8.3–10.1)
CHLORIDE SERPL-SCNC: 101 MMOL/L (ref 96–108)
CK SERPL-CCNC: 80 U/L (ref 39–308)
CO2 SERPL-SCNC: 25 MMOL/L (ref 21–32)
CREAT SERPL-MCNC: 1.45 MG/DL (ref 0.6–1.3)
EOSINOPHIL # BLD AUTO: 0.4 THOUSAND/ÂΜL (ref 0–0.61)
EOSINOPHIL NFR BLD AUTO: 6 % (ref 0–6)
ERYTHROCYTE [DISTWIDTH] IN BLOOD BY AUTOMATED COUNT: 13.5 % (ref 11.6–15.1)
FLUAV RNA NPH QL NAA+NON-PROBE: NOT DETECTED
FLUBV RNA NPH QL NAA+NON-PROBE: NOT DETECTED
GFR SERPL CREATININE-BSD FRML MDRD: 47 ML/MIN/1.73SQ M
GLUCOSE SERPL-MCNC: 178 MG/DL (ref 65–140)
GLUCOSE SERPL-MCNC: 186 MG/DL (ref 65–140)
HADV DNA NPH QL NAA+NON-PROBE: NOT DETECTED
HCOV 229E RNA NPH QL NAA+NON-PROBE: NOT DETECTED
HCOV HKU1 RNA NPH QL NAA+NON-PROBE: NOT DETECTED
HCOV NL63 RNA NPH QL NAA+NON-PROBE: NOT DETECTED
HCOV OC43 RNA NPH QL NAA+NON-PROBE: NOT DETECTED
HCT VFR BLD AUTO: 34.8 % (ref 36.5–49.3)
HGB BLD-MCNC: 12 G/DL (ref 12–17)
HMPV RNA NPH QL NAA+NON-PROBE: NOT DETECTED
HPIV1 RNA NPH QL NAA+NON-PROBE: NOT DETECTED
HPIV2 RNA NPH QL NAA+NON-PROBE: NOT DETECTED
HPIV3 RNA NPH QL NAA+NON-PROBE: NOT DETECTED
HPIV4 RNA NPH QL NAA+NON-PROBE: NOT DETECTED
IMM GRANULOCYTES # BLD AUTO: 0.03 THOUSAND/UL (ref 0–0.2)
IMM GRANULOCYTES NFR BLD AUTO: 0 % (ref 0–2)
LACTATE SERPL-SCNC: 0.6 MMOL/L (ref 0.5–2)
LYMPHOCYTES # BLD AUTO: 1.52 THOUSANDS/ÂΜL (ref 0.6–4.47)
LYMPHOCYTES NFR BLD AUTO: 22 % (ref 14–44)
M PNEUMO DNA NPH QL NAA+NON-PROBE: NOT DETECTED
MAGNESIUM SERPL-MCNC: 2 MG/DL (ref 1.6–2.6)
MCH RBC QN AUTO: 32.5 PG (ref 26.8–34.3)
MCHC RBC AUTO-ENTMCNC: 34.5 G/DL (ref 31.4–37.4)
MCV RBC AUTO: 94 FL (ref 82–98)
MONOCYTES # BLD AUTO: 0.82 THOUSAND/ÂΜL (ref 0.17–1.22)
MONOCYTES NFR BLD AUTO: 12 % (ref 4–12)
NEUTROPHILS # BLD AUTO: 4.12 THOUSANDS/ÂΜL (ref 1.85–7.62)
NEUTS SEG NFR BLD AUTO: 59 % (ref 43–75)
NRBC BLD AUTO-RTO: 0 /100 WBCS
PHOSPHATE SERPL-MCNC: 3.1 MG/DL (ref 2.3–4.1)
PLATELET # BLD AUTO: 257 THOUSANDS/UL (ref 149–390)
PMV BLD AUTO: 9.2 FL (ref 8.9–12.7)
POTASSIUM SERPL-SCNC: 3.8 MMOL/L (ref 3.5–5.3)
PROCALCITONIN SERPL-MCNC: 0.17 NG/ML
PROT SERPL-MCNC: 6.6 G/DL (ref 6.4–8.4)
RBC # BLD AUTO: 3.69 MILLION/UL (ref 3.88–5.62)
RSV RNA NPH QL NAA+NON-PROBE: NOT DETECTED
RV+EV RNA NPH QL NAA+NON-PROBE: NOT DETECTED
SARS-COV-2 RNA NPH QL NAA+NON-PROBE: NOT DETECTED
SODIUM SERPL-SCNC: 134 MMOL/L (ref 135–147)
WBC # BLD AUTO: 6.93 THOUSAND/UL (ref 4.31–10.16)

## 2022-10-19 PROCEDURE — 84100 ASSAY OF PHOSPHORUS: CPT | Performed by: INTERNAL MEDICINE

## 2022-10-19 PROCEDURE — 86618 LYME DISEASE ANTIBODY: CPT | Performed by: INTERNAL MEDICINE

## 2022-10-19 PROCEDURE — 82948 REAGENT STRIP/BLOOD GLUCOSE: CPT

## 2022-10-19 PROCEDURE — 84145 PROCALCITONIN (PCT): CPT | Performed by: INTERNAL MEDICINE

## 2022-10-19 PROCEDURE — 83605 ASSAY OF LACTIC ACID: CPT | Performed by: INTERNAL MEDICINE

## 2022-10-19 PROCEDURE — 97116 GAIT TRAINING THERAPY: CPT

## 2022-10-19 PROCEDURE — 80053 COMPREHEN METABOLIC PANEL: CPT | Performed by: INTERNAL MEDICINE

## 2022-10-19 PROCEDURE — 99239 HOSP IP/OBS DSCHRG MGMT >30: CPT | Performed by: PHYSICIAN ASSISTANT

## 2022-10-19 PROCEDURE — 83735 ASSAY OF MAGNESIUM: CPT | Performed by: INTERNAL MEDICINE

## 2022-10-19 PROCEDURE — 82550 ASSAY OF CK (CPK): CPT | Performed by: INTERNAL MEDICINE

## 2022-10-19 PROCEDURE — 85025 COMPLETE CBC W/AUTO DIFF WBC: CPT | Performed by: INTERNAL MEDICINE

## 2022-10-19 PROCEDURE — 92610 EVALUATE SWALLOWING FUNCTION: CPT

## 2022-10-19 RX ORDER — AMOXICILLIN AND CLAVULANATE POTASSIUM 875; 125 MG/1; MG/1
1 TABLET, FILM COATED ORAL EVERY 12 HOURS SCHEDULED
Qty: 8 TABLET | Refills: 0 | Status: SHIPPED | OUTPATIENT
Start: 2022-10-19 | End: 2022-10-24 | Stop reason: ALTCHOICE

## 2022-10-19 RX ADMIN — LEVOTHYROXINE SODIUM 100 MCG: 100 TABLET ORAL at 06:12

## 2022-10-19 RX ADMIN — HEPARIN SODIUM 5000 UNITS: 5000 INJECTION INTRAVENOUS; SUBCUTANEOUS at 06:12

## 2022-10-19 RX ADMIN — AMLODIPINE BESYLATE 5 MG: 5 TABLET ORAL at 08:12

## 2022-10-19 RX ADMIN — CLOPIDOGREL BISULFATE 75 MG: 75 TABLET ORAL at 08:13

## 2022-10-19 RX ADMIN — LISINOPRIL 40 MG: 20 TABLET ORAL at 08:12

## 2022-10-19 RX ADMIN — BACLOFEN 10 MG: 10 TABLET ORAL at 08:12

## 2022-10-19 RX ADMIN — INSULIN LISPRO 5.2 UNITS: 100 INJECTION, SOLUTION INTRAVENOUS; SUBCUTANEOUS at 08:16

## 2022-10-19 NOTE — PLAN OF CARE
Problem: MOBILITY - ADULT  Goal: Maintain or return to baseline ADL function  Description: INTERVENTIONS:  -  Assess patient's ability to carry out ADLs; assess patient's baseline for ADL function and identify physical deficits which impact ability to perform ADLs (bathing, care of mouth/teeth, toileting, grooming, dressing, etc )  - Assess/evaluate cause of self-care deficits   - Assess range of motion  - Assess patient's mobility; develop plan if impaired  - Assess patient's need for assistive devices and provide as appropriate  - Encourage maximum independence but intervene and supervise when necessary  - Involve family in performance of ADLs  - Assess for home care needs following discharge   - Consider OT consult to assist with ADL evaluation and planning for discharge  - Provide patient education as appropriate  Outcome: Progressing  Goal: Maintains/Returns to pre admission functional level  Description: INTERVENTIONS:  - Perform BMAT or MOVE assessment daily    - Set and communicate daily mobility goal to care team and patient/family/caregiver     - Collaborate with rehabilitation services on mobility goals if consulted  - Ambulate patient 1 times a day  - Out of bed to chair 3 times a day   - Out of bed for meals 3 times a day  - Out of bed for toileting  - Record patient progress and toleration of activity level   Outcome: Progressing     Problem: Potential for Falls  Goal: Patient will remain free of falls  Description: INTERVENTIONS:  - Educate patient/family on patient safety including physical limitations  - Instruct patient to call for assistance with activity   - Consult OT/PT to assist with strengthening/mobility   - Keep Call bell within reach  - Keep bed low and locked with side rails adjusted as appropriate  - Keep care items and personal belongings within reach  - Initiate and maintain comfort rounds  - Make Fall Risk Sign visible to staff  - Initiate/Maintain bedalarm  - Obtain necessary fall risk management equipment: quad cane/ fall sign  - Apply yellow socks and bracelet for high fall risk patients  - Consider moving patient to room near nurses station  Outcome: Progressing

## 2022-10-19 NOTE — PHYSICAL THERAPY NOTE
PHYSICAL THERAPY NOTE          Patient Name: Jaquelin Patricia  AWSPN'S Date: 10/19/2022   10/19/22 0813   PT Last Visit   PT Visit Date 10/19/22   Note Type   Note Type Treatment   Pain Assessment   Pain Assessment Tool 0-10   Pain Score No Pain   Restrictions/Precautions   Weight Bearing Precautions Per Order No   Other Precautions   (Fall Risk; Multiple lines)   General   Family/Caregiver Present No   Cognition   Overall Cognitive Status WFL   Arousal/Participation Alert; Cooperative   Following Commands Follows all commands and directions without difficulty   Subjective   Subjective Agreeable to therapy  States he does better with shoes and brace, but left at home  Self Selected Walking Speed   SSWS Trial 1 (Seconds) 15 99 Seconds   SSWS Trial 2 (Seconds) 16 43 Seconds   SSWS Trial 3 (Seconds) 16 04 Seconds   SSWS Average Time (Seconds) 16 2 seconds   SSWS Average Score (m/sec) 0 3 m/sec   Bed Mobility   Additional Comments OOB in chair at start and end of PT session   Transfers   Sit to Stand 5  Supervision   Additional items Armrests; Increased time required;Verbal cues   Stand to Sit 5  Supervision   Additional items Armrests; Increased time required;Verbal cues   Stand pivot 5  Supervision   Additional items Verbal cues   Ambulation/Elevation   Gait pattern   (Excessively slow; Short stride;  Foward flexed; Decreased foot clearance; L Hemiparesis; Circumduction)   Gait Assistance 5  Supervision   Additional items Verbal cues   Assistive Device Small base quad cane   Distance 200'   Ambulation/Elevation Additional Comments Decreased gait speed without shoes and MAFO   Balance   Static Sitting Good   Dynamic Sitting Fair +   Static Standing Fair   Dynamic Standing Fair   Ambulatory Fair  [facility])   Endurance Deficit   Endurance Deficit Yes   Activity Tolerance   Activity Tolerance Patient limited by fatigue   Assessment   Prognosis Good   Problem List   (Decreased strength; Impaired balance; Decreased endurance; Decreased mobility)   Assessment Pt  seen for PT treatment session this date with interventions consisting of  transfers and  gait training w/ emphasis on improving pt's ability to ambulate  Pt  Requiring occasional cues for sequence and safety  In comparison to previous session, Pt  With improvements in activity tolerance  Increased distance with good tolerance and no episodes LOB  Pt is in need of continued activity in PT to improve strength balance endurance mobility transfers and ambulation with return to maximize LOF  From PT/mobility standpoint, recommendation at time of d/c would be OP PT in order to promote return to PLOF and independence  The patient's AM-[facility] Basic Mobility Inpatient Short Form Raw Score is 22  A Raw score of greater than 16 suggests the patient may benefit from discharge to home  Please also refer to physical therapy recommendation for safe DC planning  Goals   LTG Expiration Date 11/01/22   PT Treatment Day 1   Plan   Treatment/Interventions   (Functional transfer training; LE strengthening/ROM; Elevations; Therapeutic exercise;  Endurance training; Bed mobility; Gait training)   Progress Progressing toward goals   PT Frequency 3-5x/wk   Recommendation   PT Discharge Recommendation Home with outpatient rehabilitation   AM-[facility] Basic Mobility Inpatient   Turning in Bed Without Bedrails 4   Lying on Back to Sitting on Edge of Flat Bed 4   Moving Bed to Chair 4   Standing Up From Chair 4   Walk in Room 3   Climb 3-5 Stairs 3   Basic Mobility Inpatient Raw Score 22   Basic Mobility Standardized Score 47 4   Highest Level Of Mobility   JH-HLM Goal 7: Walk 25 feet or more   JH-HLM Achieved 7: Walk 25 feet or more   Education   Education Provided Mobility training   Patient Demonstrates acceptance/verbal understanding   End of Consult   Patient Position at End of Consult Bedside chair;Bed/Chair alarm activated; All needs within reach   End of Consult Comments discussed POC with PT

## 2022-10-19 NOTE — PLAN OF CARE
Problem: PHYSICAL THERAPY ADULT  Goal: Performs mobility at highest level of function for planned discharge setting  See evaluation for individualized goals  Description: Treatment/Interventions: Functional transfer training, LE strengthening/ROM, Elevations, Therapeutic exercise, Endurance training, Bed mobility, Gait training          See flowsheet documentation for full assessment, interventions and recommendations  Note: Prognosis: Good  Problem List:  (Decreased strength; Impaired balance; Decreased endurance; Decreased mobility)  Assessment: Pt  seen for PT treatment session this date with interventions consisting of  transfers and  gait training w/ emphasis on improving pt's ability to ambulate  Pt  Requiring occasional cues for sequence and safety  In comparison to previous session, Pt  With improvements in activity tolerance  Increased distance with good tolerance and no episodes LOB  Pt is in need of continued activity in PT to improve strength balance endurance mobility transfers and ambulation with return to maximize LOF  From PT/mobility standpoint, recommendation at time of d/c would be OP PT in order to promote return to PLOF and independence  The patient's AM-[facility] Basic Mobility Inpatient Short Form Raw Score is 22  A Raw score of greater than 16 suggests the patient may benefit from discharge to home  Please also refer to physical therapy recommendation for safe DC planning  PT Discharge Recommendation: Home with outpatient rehabilitation    See flowsheet documentation for full assessment

## 2022-10-19 NOTE — DISCHARGE SUMMARY
[address] [facility] [address]  Discharge- Frank Shahid 1945, 78 y o  male MRN: [id number]  Unit/Bed#: [facility] Encounter: [id number]  Primary Care Provider: Nuzhat Moura DO   Date and time admitted to hospital: 10/17/2022 10:15 PM    * Ambulatory dysfunction  Assessment & Plan  · Patient presented following a fall while at home  He states that he developed chills and attempted to get out of bed  He states that when he got out of bed he slipped and slid down the chair in a common area  Initially there was concern for stroke  Stroke alert was called  Patient does have residual left-sided deficit due to past CVA  No new neurologic abnormalities on exam  · CT, CTA of head showed no new acute abnormalities  · Neurology reviewed case with the ED provider and recommended that no further inpatient workup for stroke was needed  · Patient was being cleared for discharge when his wife expressed that she does not believe she can take care of him and bring him home  · PT consult/OT consult - recommend outpatient therapy, patient states he feels they don't help him  · Case management consult  · Patient states he feels improved, wife able to take him home today without issue    SIRS (systemic inflammatory response syndrome) ([facility] [address] )  Assessment & Plan  · White blood cells elevated on admission at 16 81  As well, patient tachycardic with heart rate of 102 on presentation  Patient does report that he has had chills over the past day  No fevers upon presentation    No URI symptoms, nausea, vomiting  · Of note, patient does have elevated absolute neutrophils of 14 06   · Chest x-ray shows no evidence of pneumonia  · COVID/flu/RSV negative  · Procalcitonin within normal limits  · Urinalysis shows no evidence of urinary tract infection  · No evidence of any skin wounds suggestive of infection  · Will check blood cultures  · Provided abx with his aspiration pneumonitis, with patient complaints of chills and significant sweats concerning for fever at home and white count that improved with abx, will empirically cover with 4 additional days of abx at home    Aspiration pneumonitis [facility])  Assessment & Plan  · Speech consult completed, may maintain on regular diet with thin liquids    Cholelithiasis  Assessment & Plan  · As seen on imaging  · No RUQ pain currently    Hyponatremia  Assessment & Plan  · Sodium as low as 127  · Improved at 134 on discharge after fluids    Intracranial vascular stenosis  Assessment & Plan  · Continue statin and aspirin at home    Essential hypertension  Assessment & Plan  · Normotensive while inpatient  · Continue Norvasc 5 mg oral daily  · Continue lisinopril 40 mg oral daily    Stage 3 chronic kidney disease [facility])  Assessment & Plan  Lab Results   Component Value Date    EGFR 47 10/19/2022    EGFR 45 10/18/2022    EGFR 41 10/17/2022    CREATININE 1 45 (H) 10/19/2022    CREATININE 1 50 (H) 10/18/2022    CREATININE 1 60 (H) 10/17/2022   · Creatinine appears to be around baseline  · Continue to monitor with routine labs    DMII (diabetes mellitus, type 2) [facility])  Assessment & Plan  Lab Results   Component Value Date    HGBA1C 6 7 (H) 03/15/2022       Recent Labs     10/18/22  1207 10/18/22  1612 10/18/22  2137 10/19/22  0655   POCGLU 157* 165* 239* 178*       Blood Sugar Average: Last 72 hrs:  · (P) 959 [id number]   · a1c at goal  · Patient elected to continue home insulin pump  · Will continue home insulin pump  · A c  HS fingerstick glucose checks        Medical Problems             Resolved Problems  Date Reviewed: 10/18/2022   None               Discharging Physician / Practitioner: Doreen Khan  PCP: Jarett Clark DO  Admission Date:   Admission Orders (From admission, onward)     Ordered        10/18/22 1032  Inpatient Admission  Once            10/18/22 0205  Place in Observation  Once                      Discharge Date: 10/19/22    Consultations During [facility] Stay:  · none    Procedures Performed:   · none    Significant Findings / Test Results:     CT chest abdomen pelvis wo contrast   Final Result   1  Left lower lobe patchy airspace opacity with bronchial impaction concerning for aspiration pneumonitis  2   Cholelithiasis  3   Moderate fecal stasis  Workstation performed: [id number]         XR chest 1 view portable   ED Interpretation   No acute cardiopulmonary disease  Final Result      No acute cardiopulmonary disease  Workstation performed: [id number]         CT stroke alert brain   Final Result      No evidence of acute vascular territorial infarction, intracranial hemorrhage or mass effect  Findings were directly discussed with Yasmine Jones at 11:03 PM       Workstation performed: [id number]         CTA stroke alert (head/neck)   Final Result         1  No evidence of hemodynamically significant stenosis, dissection or occlusion of the carotid or vertebral arteries  2   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  No evidence of acute thrombus or large vessel occlusion involving the major vessels of the [place]  Findings were directly discussed with Yasmine Jones at 11:03 PM                         Workstation performed: [id number]               Incidental Findings:   · none     Test Results Pending at Discharge (will require follow up): · Blood cultures     Outpatient Tests Requested:  · none    Complications:  none    Reason for Admission: ambulatory dysfunction    Hospital Course: Wyatt Zaldivar is a 78 y o  male patient who originally presented to the hospital on 10/17/2022 due to fall at home  Noted chills and significant night sweats, no temperature taken  He got out of bed to use his bedside commode but felt so sweaty that he states he slipped out of bed  Patient's wife was concerned because she did note slurred speech and he has a stroke history   No other notable neurologic deficits  Glucose level reportedly normal during the event  CT head with no new stroke noted  CT chest did incidentally show asipiration pneumonitis  Patient also had elevated white count on admission  No fevers noted  He was empirically placed on IV ceftriaxone  White count improved and patient states he felt improved as well  Speech therapy saw the patient and had no diet modification recommendations  Patient sent home, PT/OT recommend outpatient therapy but he declined  Will continue empiric coverage for aspiration pneumonia on discharge      Please see above list of diagnoses and related plan for additional information  Condition at Discharge: stable    Discharge Day Visit / Exam:   Subjective:  Patient reports feeling improved, both he and wife agreeable for discharge today  No events overnight  Vitals: Blood Pressure: 156/83 (10/19/22 0657)  Pulse: 71 (10/19/22 0657)  Temperature: 98 °F (36 7 °C) (10/19/22 0657)  Temp Source: Temporal (10/19/22 0657)  Respirations: 20 (10/19/22 0657)  Height: 5' 6" (167 6 cm) (10/18/22 0732)  Weight - Scale: 66 1 kg (145 lb 11 6 oz) (10/19/22 0600)  SpO2: 97 % (10/19/22 0657)  Exam:   Physical Exam  Vitals and nursing note reviewed  Constitutional:       General: He is not in acute distress  Appearance: He is not ill-appearing  HENT:      Head: Normocephalic and atraumatic  Cardiovascular:      Rate and Rhythm: Normal rate and regular rhythm  Pulses: Normal pulses  Heart sounds: Normal heart sounds  Pulmonary:      Effort: Pulmonary effort is normal       Breath sounds: Normal breath sounds  Abdominal:      General: Bowel sounds are normal       Palpations: Abdomen is soft  Musculoskeletal:      Right lower leg: No edema  Left lower leg: No edema  Skin:     General: Skin is warm and dry  Neurological:      General: No focal deficit present  Mental Status: He is alert and oriented to person, place, and time  Comments: Chronic left sided weakness   Psychiatric:         Mood and Affect: Mood normal          Behavior: Behavior normal           Discussion with Family: Updated  (wife) at bedside  Discharge instructions/Information to patient and family:   See after visit summary for information provided to patient and family  Provisions for Follow-Up Care:  See after visit summary for information related to follow-up care and any pertinent home health orders  Disposition:   Home    Planned Readmission: none     Discharge Statement:  I spent 60 minutes discharging the patient  This time was spent on the day of discharge  I had direct contact with the patient on the day of discharge  Greater than 50% of the total time was spent examining patient, answering all patient questions, arranging and discussing plan of care with patient as well as directly providing post-discharge instructions  Additional time then spent on discharge activities  Discharge Medications:  See after visit summary for reconciled discharge medications provided to patient and/or family        **Please Note: This note may have been constructed using a voice recognition system**

## 2022-10-19 NOTE — ASSESSMENT & PLAN NOTE
· Normotensive while inpatient  · Continue Norvasc 5 mg oral daily  · Continue lisinopril 40 mg oral daily

## 2022-10-19 NOTE — ASSESSMENT & PLAN NOTE
· Patient presented following a fall while at home  He states that he developed chills and attempted to get out of bed  He states that when he got out of bed he slipped and slid down the chair in a common area  Initially there was concern for stroke  Stroke alert was called  Patient does have residual left-sided deficit due to past CVA  No new neurologic abnormalities on exam  · CT, CTA of head showed no new acute abnormalities  · Neurology reviewed case with the ED provider and recommended that no further inpatient workup for stroke was needed  · Patient was being cleared for discharge when his wife expressed that she does not believe she can take care of him and bring him home    · PT consult/OT consult - recommend outpatient therapy, patient states he feels they don't help him  · Case management consult  · Patient states he feels improved, wife able to take him home today without issue

## 2022-10-19 NOTE — PROGRESS NOTES
Patient received no sliding scale insulin coverage  Patient gave himself a 5 2 unit bolus of insulin via insulin pump per amount of carb count from breakfast tray

## 2022-10-19 NOTE — CASE MANAGEMENT
Case Management Discharge Planning Note    Patient name Maggy Leonardo  Location [address]/MS 0 MRN [id number]  : 1945 Date 10/19/2022       Current Admission Date: 10/17/2022  Current Admission Diagnosis:Ambulatory dysfunction   Patient Active Problem List    Diagnosis Date Noted   • Ambulatory dysfunction 10/18/2022   • SIRS (systemic inflammatory response syndrome) ([facility] [address] ) 10/18/2022   • Intracranial vascular stenosis 10/18/2022   • Hyponatremia 10/18/2022   • Hepatomegaly 10/18/2022   • Cholelithiasis 10/18/2022   • Aspiration pneumonitis ([facility] [address] ) 10/18/2022   • Diabetic retinopathy ([facility] [address] ) 2022   • Subluxation of lens, right eye 2022   • Abnormal gait 2022   • Spastic hemiplegia affecting left nondominant side ([address] ) 2022   • 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) 2021   • Type 2 diabetes mellitus with diabetic neuropathy ([facility] [address] ) 2021   • History of stroke 2021   • Long term current use of insulin ([facility] ) 10/01/2020   • Medicare annual wellness visit, subsequent 2018   • DMII (diabetes mellitus, type 2) ([facility] [address] ) 2018   • Hypothyroidism 2018   • Hyperlipidemia 2018   • Stage 3 chronic kidney disease ([facility] [address] ) 2018   • Adenocarcinoma of prostate ([facility] ) 2017   • Sensorineural hearing loss (SNHL), bilateral 2017   • Essential hypertension 2012      LOS (days): 1  Geometric Mean LOS (GMLOS) (days): 2 90  Days to GMLOS:1 9     OBJECTIVE:  Risk of Unplanned Readmission Score: 17 76         Current admission status: Inpatient   Preferred Pharmacy:   [facility]/pharmacy #[id number]- [place], [place] - [address] [place] [address]  Phone: [phone] Fax:  [facility], [address] [place] [address] [address] [place] [address]  Phone: [phone] Fax: [phone]    [facility] [address], [address] [facility] [address] [address]  Phone: [phone] Fax: [phone]    Primary Care Provider: Jennifer Darden DO    Primary Insurance: Andres Morocho W Acosta  REP  Secondary Insurance:     DISCHARGE DETAILS:    Discharge planning discussed with[de-identified] Pt  Freedom of Choice: Yes     CM contacted family/caregiver?: No- see comments            Treatment Team Recommendation: Home  Discharge Destination Plan[de-identified] Home  Transport at Discharge : Automobile, Family         I in basket messaged patient's PCP to call patient with a follow up appointment

## 2022-10-19 NOTE — SPEECH THERAPY NOTE
Speech-Language Pathology Bedside Swallow Evaluation    Patient Name: Kuldip Miranda  [id number] Date: 10/19/2022     Problem List  Principal Problem:    Ambulatory dysfunction  Active Problems:    DM[facility] type 2) [facility]  )    Hypothyroidism    Hyperlipidemia    Stage 3 chronic kidney disease ([facility] )    Essential hypertension    History of stroke    SIRS (systemic inflammatory response syndrome) ([facility] )    Intracranial vascular stenosis    Hyponatremia    Hepatomegaly    Cholelithiasis    Aspiration pneumonitis ([facility] )    Past Medical History  Past Medical History:   Diagnosis Date   • Arthritis    • BPH (benign prostatic hyperplasia)     last assessed 4/29/2014   • Diabetes mellitus ([facility] )    • Disease of thyroid gland    • GERD (gastroesophageal reflux disease)    • Hearing aid worn    • Hyperlipidemia    • Hypertension    • Hypothyroidism    • Mumps    • Prostate cancer ([place] [address] )    • Stroke ([place] [address] )    • Tingling sensation     bilateral feet occassionally   • Tinnitus    • Wears glasses    • Wears partial dentures     upper     Past Surgical History  Past Surgical History:   Procedure Laterality Date   • CARDIAC ELECTROPHYSIOLOGY PROCEDURE Left 11/4/2021    Procedure: Reveal implant;  Surgeon: Addie Pabon MD;  Location:  CARDIAC CATH LAB;   Service: Cardiology   • CATARACT EXTRACTION Bilateral 2000   • EYE SURGERY     • JOINT REPLACEMENT     • KNEE ARTHROSCOPY Right 06/26/2009    knee   • ND LAP,PROSTATECTOMY,RADICAL,W/NERVE SPARE,INCL ROBOTIC N/A 2/7/2018    Procedure: ROBOTIC ASSISTED LAPAROSCOPIC PROSTATECTOMY; BILATERAL PELVIC LYMPH NODE DISSECTION;  Surgeon: Alexi Saucedo MD;  Location: [facility] Main [facility];  Service: Urology   • ND REPAIR [address] HERNIA,5+Y/O,REDUCIBL Right 4/19/2019    Procedure: REPAIR HERNIA INGUINAL;  Surgeon: Margo Chen DO;  Location: [facility] MAIN OR;  Service: General   • PROSTATE BIOPSY  11/07/2017    needle biopsy of prostate   • TRIGGER FINGER RELEASE  05/2013    trigger thumb Summary   Pt presented with functional appearing oral and pharyngeal stage swallowing skills with materials administered today/baseline diet of regular/thin  Pt reports no recent significant aspiration events, but does report that "once in a while" s/s aspiration may occur  ST services not indicated at this time but orders and consult appreciated  Risk/s for Aspiration: low    Recommended Diet: regular diet and thin liquids   Recommended Form of Meds: whole with liquid   Aspiration precautions and swallowing strategies: upright posture  Other Recommendations: Continue frequent oral care    Current Medical Status  Per 10/18/22 H&P - Pt is a 78 y o  male with a PMH of CVA with residual left-sided weakness, spasticity, hypothyroidism, hypertension, hyperlipidemia, type 2 diabetes who presents with concerns for stroke-like symptoms  According to patient, earlier today he had difficulty walking to the bathroom  He states that he got up to walk to the bathroom and that he suddenly felt very sweaty and had chills  He also states that he lost his footing on a rug in the common area of his house  He states that at that time he slipped and fell backwards and slid down the chair  Denies head strike  Initially, patient's wife was concerned that he was having stroke as he appeared to have slurred speech  He states that his speech was normal and that he never had any issues with this  Denies any new numbness, weakness, tingling  Patient does have residual left-sided weakness and spasticity from a prior stroke  Patient was worked up via stroke alert  CT, CTA of head showed no new acute abnormalities  Case was discussed by Neurology with the ED provider who did not recommend any further inpatient workup for stroke  At that time, it was determined that the patient was stable for discharge    However, the patient's wife was concerned that she will not be able to take him home and was tearful in the ED about her ability to care for him  Of note, was found the patient did have a marked leukocytosis with no evidence of infection in the lungs, skin, urine  Current Precautions:  Fall      Allergies:  No known food allergies    Special Studies:  CT chest/abdomen/pelvis impression 10/18/22 -   1  Left lower lobe patchy airspace opacity with bronchial impaction concerning for aspiration pneumonitis  2   Cholelithiasis  3   Moderate fecal stasis  XR chest impression 10/17/22 - No acute cardiopulmonary disease  Social/Education/Vocational Hx:  Pt lives with family    Swallow Information   Current Risks for Dysphagia & Aspiration: aspiration pneumonitis  Current Symptoms/Concerns: aspiration pneumonitis  Current Diet: regular diet and thin liquids   Baseline Diet: regular diet and thin liquids    Baseline Assessment   Behavior/Cognition: alert and pleasant and oriented x4  Speech/Language Status: able to participate in conversation and able to follow commands  Patient Positioning: upright in recliner  Pain Status/Interventions/Response to Interventions: No report of or nonverbal indications of pain  Swallow Mechanism Exam  Facial: symmetrical  Labial: WFL  Lingual: WFL  Velum: symmetrical  Mandible: adequate ROM  Dentition: adequate  Upper partial present  Vocal quality:clear/adequate   Volitional Cough: strong/productive   Respiratory Status: on RA   Tracheostomy: n/a    Consistencies Assessed and Performance   Consistencies Administered: thin liquids, puree, mechanical soft solids and hard solids  Materials administered included: water via straw, applesauce, eggs, and shelley    Oral Stage: WFL  Mastication was adequate with the materials administered today  Bolus formation and transfer were functional with no significant oral residue noted  No overt s/s reduced oral control  Pharyngeal Stage: WFL  Swallow Mechanics:  Swallowing initiation appeared prompt    Laryngeal rise was palpated and judged to be within functional limits  No coughing, throat clearing, change in vocal quality or respiratory status noted today       Esophageal Concerns: none reported    Strategies and Efficacy: patient independently implemented small bites/sips    Summary and Recommendations (see above)    Results Reviewed with: patient     Treatment Recommended: not at this time

## 2022-10-19 NOTE — DISCHARGE INSTR - AVS FIRST PAGE
Can continue with a regular diet  If you develop fever or worsening cough please consider returning to the hospital  No further blood work needed  Stein Oil full four days of antibiotics starting today Yes Yes

## 2022-10-19 NOTE — ASSESSMENT & PLAN NOTE
Lab Results   Component Value Date    EGFR 47 10/19/2022    EGFR 45 10/18/2022    EGFR 41 10/17/2022    CREATININE 1 45 (H) 10/19/2022    CREATININE 1 50 (H) 10/18/2022    CREATININE 1 60 (H) 10/17/2022   · Creatinine appears to be around baseline  · Continue to monitor with routine labs

## 2022-10-19 NOTE — NURSING NOTE
Discharge instructions review with patient and wife, verbally acknowledged understanding, taken to lobby via wc/accompanied by PCA, belongings in hand

## 2022-10-19 NOTE — TELEPHONE ENCOUNTER
Upon review of the In Basket request we were able to locate, review, and update the patient chart as requested for Diabetic Eye Exam     Any additional questions or concerns should be emailed to the Practice Liaisons via the appropriate education email address, please do not reply via In Basket      Thank you  Sj Bautista

## 2022-10-19 NOTE — ASSESSMENT & PLAN NOTE
Lab Results   Component Value Date    HGBA1C 6 7 (H) 03/15/2022       Recent Labs     10/18/22  1207 10/18/22  1612 10/18/22  2137 10/19/22  0655   POCGLU 157* 165* 239* 178*       Blood Sugar Average: Last 72 hrs:  · (P) 270 [id number]   · a1c at goal  · Patient elected to continue home insulin pump  · Will continue home insulin pump  · A c  HS fingerstick glucose checks

## 2022-10-19 NOTE — ASSESSMENT & PLAN NOTE
· White blood cells elevated on admission at 16 81  As well, patient tachycardic with heart rate of 102 on presentation  Patient does report that he has had chills over the past day  No fevers upon presentation    No URI symptoms, nausea, vomiting  · Of note, patient does have elevated absolute neutrophils of 14 06   · Chest x-ray shows no evidence of pneumonia  · COVID/flu/RSV negative  · Procalcitonin within normal limits  · Urinalysis shows no evidence of urinary tract infection  · No evidence of any skin wounds suggestive of infection  · Will check blood cultures  · Provided abx with his aspiration pneumonitis, with patient complaints of chills and significant sweats concerning for fever at home and white count that improved with abx, will empirically cover with 4 additional days of abx at home

## 2022-10-19 NOTE — TELEPHONE ENCOUNTER
I called and left message for the 2nd time for the patient to call the office back regarding appt on 10/21  Dr Lizette Radford will not be in the office  Need to get his yearly ear cleaning rescheduled

## 2022-10-20 LAB — BACTERIA UR CULT: ABNORMAL

## 2022-10-21 ENCOUNTER — RA CDI HCC (OUTPATIENT)
Dept: OTHER | Facility: HOSPITAL | Age: 77
End: 2022-10-21

## 2022-10-21 NOTE — PROGRESS NOTES
[facility]  coding opportunities     [id number]     Chart Reviewed number of suggestions sent to Provider: 1     Patients Insurance     Medicare Insurance: [address] [place] [address]

## 2022-10-22 LAB
BACTERIA BLD CULT: ABNORMAL
GRAM STN SPEC: ABNORMAL
S AUREUS+CONS DNA BLD POS NAA+NON-PROBE: DETECTED

## 2022-10-23 LAB
BACTERIA BLD CULT: NORMAL

## 2022-10-24 ENCOUNTER — OFFICE VISIT (OUTPATIENT)
Dept: FAMILY MEDICINE CLINIC | Facility: CLINIC | Age: 77
End: 2022-10-24
Payer: COMMERCIAL

## 2022-10-24 VITALS
SYSTOLIC BLOOD PRESSURE: 126 MMHG | RESPIRATION RATE: 18 BRPM | WEIGHT: 152 LBS | TEMPERATURE: 97.9 F | HEART RATE: 78 BPM | DIASTOLIC BLOOD PRESSURE: 78 MMHG | BODY MASS INDEX: 24.43 KG/M2 | HEIGHT: 66 IN

## 2022-10-24 DIAGNOSIS — E87.1 HYPONATREMIA: ICD-10-CM

## 2022-10-24 DIAGNOSIS — Z79.4 TYPE 2 DIABETES MELLITUS WITH DIABETIC NEUROPATHY, WITH LONG-TERM CURRENT USE OF INSULIN (HCC): Primary | ICD-10-CM

## 2022-10-24 DIAGNOSIS — I69.354 SPASTIC HEMIPLEGIA OF LEFT NONDOMINANT SIDE AS LATE EFFECT OF CEREBRAL INFARCTION (HCC): ICD-10-CM

## 2022-10-24 DIAGNOSIS — E11.40 TYPE 2 DIABETES MELLITUS WITH DIABETIC NEUROPATHY, WITH LONG-TERM CURRENT USE OF INSULIN (HCC): Primary | ICD-10-CM

## 2022-10-24 DIAGNOSIS — R65.10 SIRS (SYSTEMIC INFLAMMATORY RESPONSE SYNDROME) (HCC): ICD-10-CM

## 2022-10-24 PROCEDURE — 99496 TRANSJ CARE MGMT HIGH F2F 7D: CPT | Performed by: INTERNAL MEDICINE

## 2022-10-24 NOTE — PROGRESS NOTES
Name: Dionte Downey      : 1945      MRN: [id number]  Encounter Provider: Deja Sheldon DO  Encounter Date: 10/24/2022   Encounter department: [address]     1  Type 2 diabetes mellitus with diabetic neuropathy, with long-term current use of insulin (HCC)  Pt will have upcoming labs thru the [place] and bring copy to office   Continue to monitor BS but have been stable     2  SIRS (systemic inflammatory response syndrome) ([facility])  Sxs resolved and he completed Augmentin RX  Increase fluids and caution while eating/aspiration precautions     3   Hyponatremia  He has upcoming labs at the [place]     4  Spastic hemiplegia of left nondominant side as late effect of cerebral infarction [facility])  Home exercises and fall precautions       Rto as scheduled     Subjective      HPI   Pt admitted after last visit following slip from bed after chills,fever and weakness Pt felt ill earlier in day and went to bed early due to chills and fatigue Sxs continued and he went to get up to use bathroom and slipped from bed His wife called ambulance He was admitted and found to have aspiration pneumonia Pt does recall the day prior having a small choking issue eating salad No sob or worsening cough He was tx with Augmentin and now feels at baseline No chills or fever since home BS stable overall and having labs at the [place] in couple weeks and will get flu shot there He has been drinking fluids and getting rest following recent trip   TCM Call     Date and time call was made  10/19/2022 11:23 AM    Hospital care reviewed  Records reviewed    Patient was hospitialized at  [address] [place] [address]    Date of Admission  10/17/22    Date of discharge  10/19/22    Diagnosis  stroke like sxs, dysfunction, SIRS, h/o stroke    Disposition  Home    Were the patients medications reviewed and updated  No    Current Symptoms  None    Weakness severity  Mild    Fatigue severity  Mild      TCM Call     Post hospital issues None    Should patient be enrolled in anticoag monitoring? No    Scheduled for follow up? Yes    Did you obtain your prescribed medications  Yes    Do you need help managing your prescriptions or medications  No    Is transportation to your appointment needed  No    I have advised the patient to call PCP with any new or worsening symptoms  Charline Bruno,     Living Arrangements  Spouse or Significiant other    Support System  Spouse    The type of support provided  Physical    Do you have social support  Yes, quite a bit    Are you recieving any outpatient services  No    Are you recieving home care services  No    Are you using any community resources  No    Current waiver services  No    Have you fallen in the last 12 months  No    Interperter language line needed  No    Counseling  Patient          Review of Systems   Constitutional: Negative for chills and fever  HENT: Negative for congestion  Eyes: Negative for visual disturbance  Respiratory: Negative for cough and shortness of breath  Cardiovascular: Negative for chest pain, palpitations and leg swelling  Gastrointestinal: Negative for abdominal distention and abdominal pain  Genitourinary: Negative  Negative for difficulty urinating  Neurological: Negative for dizziness, light-headedness and headaches  Psychiatric/Behavioral: Negative for sleep disturbance  The patient is not nervous/anxious          Current Outpatient Medications on File Prior to Visit   Medication Sig   • amLODIPine (NORVASC) 5 mg tablet Take 1 tablet (5 mg total) by mouth daily   • atorvastatin (LIPITOR) 40 mg tablet Take 1 tablet (40 mg total) by mouth daily with dinner   • baclofen 10 mg tablet Take 1 tablet (10 mg total) by mouth 3 (three) times a day   • clopidogrel (PLAVIX) 75 mg tablet Take 1 tablet (75 mg total) by mouth daily   • docusate sodium (COLACE) 100 mg capsule Take 100 mg by mouth if needed   • gabapentin (NEURONTIN) 400 mg capsule Take 1 capsule (400 mg total) by mouth daily at bedtime   • Glucagon 1 MG/0 2ML SOSY INJECT 1 MG UNDER THE SKIN  AS NEEDED FOR SEVERE HYPOGLYCEMIA   • levothyroxine (Synthroid) 100 mcg tablet Take 1 tablet (100 mcg total) by mouth daily in the early morning   • lisinopril (ZESTRIL) 40 mg tablet Take 40 mg by mouth daily     • Multiple Vitamin (MULTIVITAMIN) tablet Take 1 tablet by mouth daily  • PATIENT MAINTAINED INSULIN PUMP Inject 0 4 each under the skin continuous    • [] amoxicillin-clavulanate (AUGMENTIN) 875-125 mg per tablet Take 1 tablet by mouth every 12 (twelve) hours for 4 days (Patient not taking: Reported on 10/24/2022)       Objective     /78   Pulse 78   Temp 97 9 °F (36 6 °C) (Temporal)   Resp 18   Ht 5' 6" (1 676 m)   Wt 68 9 kg (152 lb)   BMI 24 53 kg/m²     Physical Exam  Vitals reviewed  Constitutional:       General: He is not in acute distress  Appearance: Normal appearance  He is not ill-appearing, toxic-appearing or diaphoretic  HENT:      Head: Normocephalic and atraumatic  Right Ear: External ear normal       Left Ear: External ear normal       Nose: Nose normal       Mouth/Throat:      Mouth: Mucous membranes are dry  Eyes:      General: No scleral icterus  Extraocular Movements: Extraocular movements intact  Conjunctiva/sclera: Conjunctivae normal       Pupils: Pupils are equal, round, and reactive to light  Cardiovascular:      Rate and Rhythm: Normal rate and regular rhythm  Pulses: Normal pulses  Pulmonary:      Effort: Pulmonary effort is normal  No respiratory distress  Breath sounds: Normal breath sounds  No wheezing or rhonchi  Abdominal:      General: Bowel sounds are normal  There is no distension  Palpations: Abdomen is soft  Tenderness: There is no abdominal tenderness  Musculoskeletal:      Cervical back: Neck supple  Right lower leg: No edema  Left lower leg: No edema     Lymphadenopathy: Cervical: No cervical adenopathy  Skin:     General: Skin is dry  Coloration: Skin is not jaundiced or pale  Neurological:      General: No focal deficit present  Mental Status: He is alert and oriented to person, place, and time  Mental status is at baseline  Cranial Nerves: No cranial nerve deficit  Sensory: No sensory deficit  Psychiatric:         Mood and Affect: Mood normal          Behavior: Behavior normal          Thought Content:  Thought content normal          Judgment: Judgment normal        Odmatteo Mock, DO

## 2022-10-28 ENCOUNTER — REMOTE DEVICE CLINIC VISIT (OUTPATIENT)
Dept: CARDIOLOGY CLINIC | Facility: CLINIC | Age: 77
End: 2022-10-28
Payer: COMMERCIAL

## 2022-10-28 DIAGNOSIS — Z95.818 PRESENCE OF OTHER CARDIAC IMPLANTS AND GRAFTS: Primary | ICD-10-CM

## 2022-10-28 PROCEDURE — 93298 REM INTERROG DEV EVAL SCRMS: CPT | Performed by: INTERNAL MEDICINE

## 2022-10-28 PROCEDURE — G2066 INTER DEVC REMOTE 30D: HCPCS | Performed by: INTERNAL MEDICINE

## 2022-10-28 NOTE — PROGRESS NOTES
MDT LNQ22/ ACTIVE SYSTEM IS MRI CONDITIONAL   CARELINK TRANSMISSION: LOOP RECORDER  PRESENTING RHYTHM NSR @ 60 BPM  BATTERY STATUS "OK " 1 TACHY EPISODE PREVIOUSLY ADDRESSED IN ALERT  NO PATIENT OR NEW DEVICE ACTIVATED EPISODES  NORMAL DEVICE FUNCTION   DL

## 2022-12-17 ENCOUNTER — APPOINTMENT (EMERGENCY)
Dept: CT IMAGING | Facility: HOSPITAL | Age: 77
End: 2022-12-17

## 2022-12-17 ENCOUNTER — APPOINTMENT (EMERGENCY)
Dept: RADIOLOGY | Facility: HOSPITAL | Age: 77
End: 2022-12-17

## 2022-12-17 ENCOUNTER — HOSPITAL ENCOUNTER (EMERGENCY)
Facility: HOSPITAL | Age: 77
Discharge: HOME | End: 2022-12-17
Attending: EMERGENCY MEDICINE | Admitting: EMERGENCY MEDICINE

## 2022-12-17 VITALS
RESPIRATION RATE: 13 BRPM | HEART RATE: 65 BPM | HEIGHT: 66 IN | TEMPERATURE: 97.6 F | OXYGEN SATURATION: 98 % | WEIGHT: 154.32 LBS | DIASTOLIC BLOOD PRESSURE: 86 MMHG | SYSTOLIC BLOOD PRESSURE: 170 MMHG | BODY MASS INDEX: 24.8 KG/M2

## 2022-12-17 DIAGNOSIS — E87.6 HYPOKALEMIA: ICD-10-CM

## 2022-12-17 DIAGNOSIS — N18.30 CKD (CHRONIC KIDNEY DISEASE) STAGE 3, GFR 30-59 ML/MIN (HCC): ICD-10-CM

## 2022-12-17 DIAGNOSIS — I10 CHRONIC HYPERTENSION: ICD-10-CM

## 2022-12-17 DIAGNOSIS — E11.649 HYPOGLYCEMIA ASSOCIATED WITH DIABETES (HCC): ICD-10-CM

## 2022-12-17 DIAGNOSIS — M54.50 ACUTE BILATERAL LOW BACK PAIN WITHOUT SCIATICA: ICD-10-CM
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
## 2022-12-17 LAB
ANION GAP SERPL CALCULATED.3IONS-SCNC: 6 MMOL/L (ref 4–13)
APTT PPP: 26 SECONDS (ref 23–37)
ATRIAL RATE: 64 BPM
BASOPHILS # BLD AUTO: 0.06 THOUSANDS/ÂΜL (ref 0–0.1)
BASOPHILS NFR BLD AUTO: 1 % (ref 0–1)
BUN SERPL-MCNC: 32 MG/DL (ref 5–25)
CALCIUM SERPL-MCNC: 8.8 MG/DL (ref 8.3–10.1)
CHLORIDE SERPL-SCNC: 97 MMOL/L (ref 96–108)
CO2 SERPL-SCNC: 29 MMOL/L (ref 21–32)
CREAT SERPL-MCNC: 1.46 MG/DL (ref 0.6–1.3)
EOSINOPHIL # BLD AUTO: 0.42 THOUSAND/ÂΜL (ref 0–0.61)
EOSINOPHIL NFR BLD AUTO: 7 % (ref 0–6)
ERYTHROCYTE [DISTWIDTH] IN BLOOD BY AUTOMATED COUNT: 13 % (ref 11.6–15.1)
GFR SERPL CREATININE-BSD FRML MDRD: 46 ML/MIN/1.73SQ M
GLUCOSE SERPL-MCNC: 109 MG/DL (ref 65–140)
GLUCOSE SERPL-MCNC: 54 MG/DL (ref 65–140)
GLUCOSE SERPL-MCNC: 54 MG/DL (ref 65–140)
GLUCOSE SERPL-MCNC: 90 MG/DL (ref 65–140)
HCT VFR BLD AUTO: 39 % (ref 36.5–49.3)
HGB BLD-MCNC: 13.2 G/DL (ref 12–17)
IMM GRANULOCYTES # BLD AUTO: 0.02 THOUSAND/UL (ref 0–0.2)
IMM GRANULOCYTES NFR BLD AUTO: 0 % (ref 0–2)
INR PPP: 1.01 (ref 0.84–1.19)
LYMPHOCYTES # BLD AUTO: 1.76 THOUSANDS/ÂΜL (ref 0.6–4.47)
LYMPHOCYTES NFR BLD AUTO: 27 % (ref 14–44)
MAGNESIUM SERPL-MCNC: 2.1 MG/DL (ref 1.6–2.6)
MCH RBC QN AUTO: 32.3 PG (ref 26.8–34.3)
MCHC RBC AUTO-ENTMCNC: 33.8 G/DL (ref 31.4–37.4)
MCV RBC AUTO: 95 FL (ref 82–98)
MONOCYTES # BLD AUTO: 0.7 THOUSAND/ÂΜL (ref 0.17–1.22)
MONOCYTES NFR BLD AUTO: 11 % (ref 4–12)
NEUTROPHILS # BLD AUTO: 3.52 THOUSANDS/ÂΜL (ref 1.85–7.62)
NEUTS SEG NFR BLD AUTO: 54 % (ref 43–75)
NRBC BLD AUTO-RTO: 0 /100 WBCS
P AXIS: 82 DEGREES
PLATELET # BLD AUTO: 273 THOUSANDS/UL (ref 149–390)
PMV BLD AUTO: 9.4 FL (ref 8.9–12.7)
POTASSIUM SERPL-SCNC: 3 MMOL/L (ref 3.5–5.3)
PR INTERVAL: 142 MS
PROTHROMBIN TIME: 13.5 SECONDS (ref 11.6–14.5)
QRS AXIS: 44 DEGREES
QRSD INTERVAL: 90 MS
QT INTERVAL: 406 MS
QTC INTERVAL: 418 MS
RBC # BLD AUTO: 4.09 MILLION/UL (ref 3.88–5.62)
SODIUM SERPL-SCNC: 132 MMOL/L (ref 135–147)
T WAVE AXIS: 63 DEGREES
VENTRICULAR RATE: 64 BPM
WBC # BLD AUTO: 6.48 THOUSAND/UL (ref 4.31–10.16)

## 2022-12-17 RX ORDER — OXYCODONE HYDROCHLORIDE AND ACETAMINOPHEN 5; 325 MG/1; MG/1
1 TABLET ORAL ONCE
Status: DISCONTINUED | OUTPATIENT
Start: 2022-12-17 | End: 2022-12-17

## 2022-12-17 RX ORDER — POTASSIUM CHLORIDE 20 MEQ/1
40 TABLET, EXTENDED RELEASE ORAL ONCE
Status: COMPLETED | OUTPATIENT
Start: 2022-12-17 | End: 2022-12-17

## 2022-12-17 RX ORDER — ACETAMINOPHEN 325 MG/1
650 TABLET ORAL ONCE
Status: COMPLETED | OUTPATIENT
Start: 2022-12-17 | End: 2022-12-17

## 2022-12-17 RX ORDER — DEXTROSE MONOHYDRATE 25 G/50ML
25 INJECTION, SOLUTION INTRAVENOUS ONCE
Status: COMPLETED | OUTPATIENT
Start: 2022-12-17 | End: 2022-12-17

## 2022-12-17 RX ADMIN — DEXTROSE MONOHYDRATE 25 ML: 25 INJECTION, SOLUTION INTRAVENOUS at 18:17

## 2022-12-17 RX ADMIN — ACETAMINOPHEN 650 MG: 325 TABLET ORAL at 19:05

## 2022-12-17 RX ADMIN — POTASSIUM CHLORIDE 40 MEQ: 1500 TABLET, EXTENDED RELEASE ORAL at 19:05

## 2022-12-17 NOTE — ED PROVIDER NOTES
Emergency Department Trauma Note  Mima Purvis 78 y o  male MRN: [id number]  Unit/Bed#: OM69/BE71 Encounter: [id number]      Trauma Alert: Trauma Acuity: Trauma Evaluation trauma evaluation  Model of Arrival:   via   EMS  Trauma Team: Current Providers  Attending Provider: Yahir Mittal MD  Physician Assistant: Jakob Robledo PA-C  Registered Nurse: Margie Townsend, RN  Registered Nurse: Mechelle Davis RN  Consultants:     None      History of Present Illness     Chief Complaint:   Chief Complaint   Patient presents with   • Fall     Patient fell this evening in his kitchen at home and hit the back of his head on carpeted floor; takes Plavix; patient alert and oriented upon triage and c/o back pain     HPI:  Mima Purvis is a 78 y o  male who presents for evaluation of low back pain after a fall  Mechanism:Details of Incident: pt  fell in his kitchen this evening and hit his head on carpeted floor; takes Plavix Injury Date: 12/17/22   Injury Occurence Location - [address] [place] [address] Way: [address] [place] [address] Road    78year old male with PMH DM, prior stroke with left sided residual deficits, HTN, HLD, CKD, h/o prostate cancer presents via EMS from home for evaluation after a fall  Pt notes this occurred tonight just prior to arrival   Pt reports he was walking and tripped due to his shoes and brace on his left foot  He reports he fell backwards  He reports striking the back of his head on the carpet and landing on his back  He denies LOC  Only complaint related to his fall is pain in his low back  Does not radiate  C/o pain across low back  He takes plavix  Denies headache, visual disturbance  Denies neck pain  He states he wasn't able to get back up on his own but after he was assisted up he was able to bear weight on his legs  Denies numbness, tingling  He reports feeling dizzy and lightheaded at present  Denies any prodromal symptoms prior to falling  Denies chest pain, SOB, N/V/D, abdominal pain  Denies cough, congestion or recent illness  No reported aggravating or alleviating factors  No specific treatments tried  History provided by:  Patient   used: No    Fall  Mechanism of injury: fall    Injury location:  Head/neck  Head/neck injury location:  Head  Incident location:  Home  Arrived directly from scene: yes    Fall:     Fall occurred:  Tripped    Impact surface:  Carpet    Point of impact:  Back and head  Prior to arrival data:     Blood loss:  None    Responsiveness at scene:  Alert    Orientation at scene:  Person, place, situation and time    Loss of consciousness: no      Amnesic to event: no      Medications administered:  None  Associated symptoms: back pain    Associated symptoms: no abdominal pain, no chest pain, no headaches, no nausea, no neck pain and no vomiting    Risk factors: anticoagulation therapy      Review of Systems   Constitutional: Negative  Negative for chills, fatigue and fever  HENT: Negative  Negative for congestion, rhinorrhea and sore throat  Eyes: Negative  Negative for visual disturbance  Respiratory: Negative  Negative for cough, shortness of breath and wheezing  Cardiovascular: Negative  Negative for chest pain, palpitations and leg swelling  Gastrointestinal: Negative  Negative for abdominal pain, diarrhea, nausea and vomiting  Genitourinary: Negative  Negative for dysuria, flank pain and hematuria  Musculoskeletal: Positive for back pain and gait problem  Negative for myalgias and neck pain  Skin: Negative  Negative for rash  Neurological: Positive for dizziness and light-headedness  Negative for syncope, numbness and headaches  Psychiatric/Behavioral: Negative  All other systems reviewed and are negative        Historical Information     Immunizations:   Immunization History   Administered Date(s) Administered   • COVID-19 PFIZER VACCINE 0 3 ML IM 03/30/2021, 04/20/2021, 11/02/2021   • COVID-19, unspecified 03/30/2021   • INFLUENZA 10/01/2007, 10/01/2008, 10/04/2009, 10/03/2010, 10/23/2012, 10/04/2013, 10/06/2014, 10/18/2016, 10/22/2019, 09/30/2020   • Influenza Split High Dose Preservative Free IM 10/01/2016, 10/01/2017, 10/20/2017, 10/01/2018, 10/22/2019   • Influenza, Seasonal Vaccine, Quadrivalent, Adjuvanted,  5e 09/28/2021   • Influenza, high dose seasonal 0 7 mL 10/30/2018, 09/30/2020   • Influenza, seasonal, injectable 10/15/2012, 10/13/2014, 10/08/2015   • Pneumococcal Conjugate 13-Valent 11/03/2017   • Pneumococcal Polysaccharide PPV23 12/15/2009, 10/08/2019, 12/20/2019   • Td (adult), Unspecified 05/01/2007   • Tdap 03/05/2016, 03/29/2016   • Zoster 07/03/2012, 01/01/2013   • Zoster Vaccine Recombinant 10/08/2020, 02/08/2021       Past Medical History:   Diagnosis Date   • Arthritis    • BPH (benign prostatic hyperplasia)     last assessed 4/29/2014   • Diabetes mellitus ([address] )    • Disease of thyroid gland    • GERD (gastroesophageal reflux disease)    • Hearing aid worn    • Hyperlipidemia    • Hypertension    • Hypothyroidism    • Mumps    • Prostate cancer ([address] )    • Stroke ([facility] )    • Tingling sensation     bilateral feet occassionally   • Tinnitus    • Wears glasses    • Wears partial dentures     upper       Family History   Problem Relation Age of Onset   • Cancer Mother    • Diabetes Mother    • Uterine cancer Mother    • Diabetes Father    • Cancer Father    • Stroke Father         of unknown cause   • Hypertension Father    • Prostate cancer Father    • Diabetes Family         Uncle, DM   • Cancer Family         Grandmother     Past Surgical History:   Procedure Laterality Date   • CARDIAC ELECTROPHYSIOLOGY PROCEDURE Left 11/4/2021    Procedure: Reveal implant;  Surgeon: Alphonso Buckley MD;  Location: [facility];   Service: Cardiology   • CATARACT EXTRACTION Bilateral 2000   • EYE SURGERY     • JOINT REPLACEMENT     • KNEE ARTHROSCOPY Right 06/26/2009    knee   • NV LAP,PROSTATECTOMY,RADICAL,W/NERVE SPARE,INCL ROBOTIC N/A 2/7/2018    Procedure: ROBOTIC ASSISTED LAPAROSCOPIC PROSTATECTOMY; BILATERAL PELVIC LYMPH NODE DISSECTION;  Surgeon: Joseph Chandler MD;  Location: [facility];  Service: Urology   • NH REPAIR [address] HERNIA,5+Y/O,REDUCIBL Right 4/19/2019    Procedure: REPAIR HERNIA INGUINAL;  Surgeon: Yaniv Moreno DO;  Location: [facility] OR;  Service: General   • PROSTATE BIOPSY  11/07/2017    needle biopsy of prostate   • TRIGGER FINGER RELEASE  05/2013    trigger thumb     Social History     Tobacco Use   • Smoking status: Former   • Smokeless tobacco: Never   • Tobacco comments:     quit about 50 years ago   Vaping Use   • Vaping Use: Never used   Substance Use Topics   • Alcohol use: Not Currently     Alcohol/week: 2 0 standard drinks     Types: 2 Glasses of wine per week     Comment: week, social drinker   • Drug use: No     E-Cigarette/Vaping   • E-Cigarette Use Never User      E-Cigarette/Vaping Substances   • Nicotine No    • THC No    • CBD No    • Flavoring No    • Other No    • Unknown No        Family History: non-contributory    Meds/Allergies   Prior to Admission Medications   Prescriptions Last Dose Informant Patient Reported? Taking? Glucagon 1 MG/0 2ML SOSY   Yes No   Sig: INJECT 1 MG UNDER THE SKIN  AS NEEDED FOR SEVERE HYPOGLYCEMIA   Multiple Vitamin (MULTIVITAMIN) tablet   Yes No   Sig: Take 1 tablet by mouth daily     PATIENT MAINTAINED INSULIN PUMP   Yes No   Sig: Inject 0 4 each under the skin continuous    amLODIPine (NORVASC) 5 mg tablet   No No   Sig: Take 1 tablet (5 mg total) by mouth daily   atorvastatin (LIPITOR) 40 mg tablet   No No   Sig: Take 1 tablet (40 mg total) by mouth daily with dinner   baclofen 10 mg tablet   No No   Sig: Take 1 tablet (10 mg total) by mouth 3 (three) times a day   clopidogrel (PLAVIX) 75 mg tablet   No No   Sig: Take 1 tablet (75 mg total) by mouth daily   docusate sodium (COLACE) 100 mg capsule   Yes No   Sig: Take 100 mg by mouth if needed   gabapentin (NEURONTIN) 400 mg capsule   No No   Sig: Take 1 capsule (400 mg total) by mouth daily at bedtime   levothyroxine (Synthroid) 100 mcg tablet   No No   Sig: Take 1 tablet (100 mcg total) by mouth daily in the early morning   lisinopril (ZESTRIL) 40 mg tablet   Yes No   Sig: Take 40 mg by mouth daily        Facility-Administered Medications: None       No Known Allergies    PHYSICAL EXAM    Objective   Vitals:   First set: Temperature: 97 6 °F (36 4 °C) (12/17/22 1810)  Pulse: 63 (12/17/22 1810)  Respirations: 18 (12/17/22 1810)  Blood Pressure: (!) 195/91 (12/17/22 1810)  SpO2: 98 % (12/17/22 1810)    Primary Survey:   (A) Airway: airway patent  Pt phonating normally  (B) Breathing: lung sounds present bilaterally  (C) Circulation: Pulses:   normal, carotid  2/4, pedal  2/4, radial  2/4 and femoral  2/4; there is no visible external hemorrhage  (D) Disabliity:  GCS Total:  15, Eye Opening:   Spontaneous = 4, Motor Response: Obeys commands = 6 and Verbal Response:  Oriented = 5  (E) Expose:  Completed  Secondary survey was completed for all other injuries  Secondary Survey: (Click on Physical Exam tab above)  Physical Exam  Vitals and nursing note reviewed  Constitutional:       General: He is awake  He is not in acute distress  Appearance: Normal appearance  He is well-developed  He is not toxic-appearing or diaphoretic  HENT:      Head: Normocephalic and atraumatic  No raccoon eyes, Dumont's sign, abrasion, contusion or laceration  Jaw: There is normal jaw occlusion  Right Ear: Tympanic membrane, ear canal and external ear normal  Decreased hearing noted  Left Ear: Tympanic membrane, ear canal and external ear normal  Decreased hearing noted  Ears:      Comments: +hearing aid     Nose: Nose normal       Mouth/Throat:      Mouth: Mucous membranes are moist       Dentition: Has dentures (upper)        Tongue: Tongue does not deviate from midline  Pharynx: Oropharynx is clear  Uvula midline  Eyes:      General: Lids are normal  No scleral icterus  Extraocular Movements: Extraocular movements intact  Conjunctiva/sclera: Conjunctivae normal       Pupils: Pupils are equal, round, and reactive to light  Neck:      Trachea: Trachea and phonation normal    Cardiovascular:      Rate and Rhythm: Normal rate and regular rhythm  Pulses: Normal pulses  Radial pulses are 2+ on the right side and 2+ on the left side  Dorsalis pedis pulses are 2+ on the right side and 2+ on the left side  Posterior tibial pulses are 2+ on the right side and 2+ on the left side  Heart sounds: Normal heart sounds, S1 normal and S2 normal  No murmur heard  Pulmonary:      Effort: Pulmonary effort is normal  No tachypnea or respiratory distress  Breath sounds: Normal breath sounds  No wheezing, rhonchi or rales  Abdominal:      General: Bowel sounds are normal  There is no distension  Palpations: Abdomen is soft  Tenderness: There is no abdominal tenderness  There is no guarding or rebound  Comments: +insulin pump noted   Musculoskeletal:      Cervical back: Normal, normal range of motion and neck supple  No bony tenderness  No pain with movement or spinous process tenderness  Thoracic back: Normal  No bony tenderness  Lumbar back: Tenderness present  No swelling, deformity, signs of trauma or lacerations  Decreased range of motion  Back:       Right lower leg: No edema  Left lower leg: No edema  Skin:     General: Skin is warm and dry  Capillary Refill: Capillary refill takes less than 2 seconds  Findings: No abrasion, bruising or rash  Neurological:      General: No focal deficit present  Mental Status: He is alert and oriented to person, place, and time  GCS: GCS eye subscore is 4  GCS verbal subscore is 5  GCS motor subscore is 6     Psychiatric:         Mood and Affect: Mood normal          Speech: Speech normal          Behavior: Behavior normal  Behavior is cooperative  Cervical spine cleared by clinical criteria?  Yes     Invasive Devices     Peripheral Intravenous Line  Duration           Peripheral IV Right Forearm -- days                Lab Results:   Results Reviewed     Procedure Component Value Units Date/Time    Magnesium [[id number]]  (Normal) Collected: 12/17/22 1814    Lab Status: Final result Specimen: Blood from Arm, Right Updated: 12/17/22 1911     Magnesium 2 1 mg/dL     Fingerstick Glucose (POCT) [[id number]]  (Normal) Collected: 12/17/22 1901    Lab Status: Final result Updated: 12/17/22 1902     POC Glucose 90 mg/dl     Basic metabolic panel [[id number]]  (Abnormal) Collected: 12/17/22 1814    Lab Status: Final result Specimen: Blood from Arm, Right Updated: 12/17/22 1857     Sodium 132 mmol/L      Potassium 3 0 mmol/L      Chloride 97 mmol/L      CO2 29 mmol/L      ANION GAP 6 mmol/L      BUN 32 mg/dL      Creatinine 1 46 mg/dL      Glucose 54 mg/dL      Calcium 8 8 mg/dL      eGFR 46 ml/min/1 73sq m     Narrative:      [facility] guidelines for Chronic Kidney Disease (CKD):   •  Stage 1 with normal or high GFR (GFR > 90 mL/min/1 73 square meters)  •  Stage 2 Mild CKD (GFR = 60-89 mL/min/1 73 square meters)  •  Stage 3A Moderate CKD (GFR = 45-59 mL/min/1 73 square meters)  •  Stage 3B Moderate CKD (GFR = 30-44 mL/min/1 73 square meters)  •  Stage 4 Severe CKD (GFR = 15-29 mL/min/1 73 square meters)  •  Stage 5 End Stage CKD (GFR <15 mL/min/1 73 square meters)  Note: GFR calculation is accurate only with a steady state creatinine    Fingerstick Glucose (POCT) [[id number]]  (Normal) Collected: 12/17/22 1841    Lab Status: Final result Updated: 12/17/22 1843     POC Glucose 109 mg/dl     Protime-INR [[id number]]  (Normal) Collected: 12/17/22 1814    Lab Status: Final result Specimen: Blood from Arm, Right Updated: 12/17/22 1842 Protime 13 5 seconds      INR 1 01    APTT [[id number]]  (Normal) Collected: 12/17/22 1814    Lab Status: Final result Specimen: Blood from Arm, Right Updated: 12/17/22 1842     PTT 26 seconds     CBC and differential [[id number]]  (Abnormal) Collected: 12/17/22 1814    Lab Status: Final result Specimen: Blood from Arm, Right Updated: 12/17/22 1832     WBC 6 48 Thousand/uL      RBC 4 09 Million/uL      Hemoglobin 13 2 g/dL      Hematocrit 39 0 %      MCV 95 fL      MCH 32 3 pg      MCHC 33 8 g/dL      RDW 13 0 %      MPV 9 4 fL      Platelets 688 Thousands/uL      nRBC 0 /100 WBCs      Neutrophils Relative 54 %      Immat GRANS % 0 %      Lymphocytes Relative 27 %      Monocytes Relative 11 %      Eosinophils Relative 7 %      Basophils Relative 1 %      Neutrophils Absolute 3 52 Thousands/µL      Immature Grans Absolute 0 02 Thousand/uL      Lymphocytes Absolute 1 76 Thousands/µL      Monocytes Absolute 0 70 Thousand/µL      Eosinophils Absolute 0 42 Thousand/µL      Basophils Absolute 0 06 Thousands/µL     Fingerstick Glucose (POCT) [[id number]]  (Abnormal) Collected: 12/17/22 1813    Lab Status: Final result Updated: 12/17/22 1816     POC Glucose 54 mg/dl                  Imaging Studies:   Direct to CT: Yes  TRAUMA - CT head wo contrast   Final Result by Andrew Acevedo MD (12/17 1852)      No acute intracranial abnormality  Workstation performed: [id number]         TRAUMA - CT spine cervical wo contrast   Final Result by Andrew Acevedo MD (12/17 1849)      No cervical spine fracture or traumatic malalignment  Workstation performed: [id number]         TRAUMA - CT spine lumbar wo contrast   Final Result by Andrew Acevedo MD (12/17 1901)      No acute findings with degenerative disc disease and facet joint spondylosis similar to the comparison study           Workstation performed: [id number]         XR Trauma chest portable   Final Result by Andrew Acevedo MD (12/17 1842)      No acute cardiopulmonary disease  Workstation performed: [id number]               Procedures  ECG 12 Lead Documentation Only    Date/Time: 12/17/2022 6:14 PM  Performed by: Lizbet Howard PA-C  Authorized by: Lizbet Howard PA-C     Indications / Diagnosis:  Fall  ECG reviewed by me, the ED Provider: yes    Patient location:  ED  Previous ECG:     Comparison to cardiac monitor: Yes    Interpretation:     Interpretation: normal    Rate:     ECG rate:  64    ECG rate assessment: normal    Rhythm:     Rhythm: sinus rhythm    Ectopy:     Ectopy: none    QRS:     QRS axis:  Normal    QRS intervals:  Normal  Conduction:     Conduction: normal    ST segments:     ST segments:  Normal  T waves:     T waves: normal    Comments:      , QRS 90, QT/QTc 406/418             ED Course  ED Course as of 12/17/22 1928   Sat Dec 17, 2022   1814 POCT glucose = 54; D50 ordered  1824 XR Trauma chest portable  Independently viewed and interpreted by me - no acute cardiopulmonary process, no significant interval change; pending official read  1835 WBC: 6 48   1836 Hemoglobin: 13 2   1836 Platelet Count: 956   1843 POCT INR: 1 01   1843 PROTIME: 13 5   1843 PTT: 26   1844 POC Glucose: 109   1845 CT scans performed and pending interpretation  [address] CT spine cervical wo contrast  IMPRESSION:     No cervical spine fracture or traumatic malalignment  [address] CT head wo contrast  IMPRESSION:     No acute intracranial abnormality  1858 Creatinine(!): 1 46  Appears baseline, 1 45 one month ago, 1 60 two months ago   1858 BUN(!): 32   1858 Sodium(!): 132   1858 Potassium(!): 3 0  Will check mag and replete   1858 Chloride: 97   1858 CO2: 29   1858 Calcium: 8 8   1858 eGFR: 55   1902 Pt declined the percocet that was ordered for his back pain; okay with tylenol     1903 TRAUMA - CT spine lumbar wo contrast  IMPRESSION:     No acute findings with degenerative disc disease and facet joint spondylosis similar to the comparison study  1903 POC Glucose: 90   1904 Pt and spouse updated on results  No acute traumatic findings  Pt's spouse does note she witnessed fall and he fell gently on the carpet  She does note he bolused his insulin for dinner but only ate a piece of broccoli and bite of chicken then got up to use the bathroom and fell during that time  Likely reason for hypoglycemia  Pt feels improved at this time  Spouse also notes she is a retired nurse and comfortable with monitoring him at home  1912 Magnesium: 2 1     No red flags on exam   Discussed continued symptomatic/supportive care  Advised to alternate ice/heat, topical muscle rubs, lidocaine patches, etc   Continue OTC tylenol  No acute traumatic findings related to fall  Degenerative changes on imaging which pt notes he is aware of  Hypokalemia repleted  Hypoglycemia corrected  Renal function stable  Pt instructed to eat meal and monitor blood sugars  Instructed to monitor blood pressures at home as well  Strict return precautions outlined  Advised outpatient follow up with PCP or return to ER for change in condition as outlined  Pt and spouse verbalized understanding and had no further questions  Pt left in stable, improved condition          MDM  Number of Diagnoses or Management Options  Acute bilateral low back pain without sciatica: new and requires workup  Chronic hypertension: established and worsening  CKD (chronic kidney disease) stage 3, GFR 30-59 ml/min ([facility] [address] ): established and improving  Fall from standing, initial encounter: new and requires workup  Hypoglycemia associated with diabetes [facility]): new and requires workup  Hypokalemia: new and requires workup     Amount and/or Complexity of Data Reviewed  Clinical lab tests: ordered and reviewed  Tests in the radiology section of CPT®: ordered and reviewed  Decide to obtain previous medical records or to obtain history from someone other than the patient: yes  Obtain history from someone other than the patient: yes  Review and summarize past medical records: yes  Independent visualization of images, tracings, or specimens: yes    Patient Progress  Patient progress: improved          Disposition  [facility] One Transfer: No  Final diagnoses:   Fall from standing, initial encounter   Acute bilateral low back pain without sciatica   Hypoglycemia associated with diabetes ([facility] [address] )   CKD (chronic kidney disease) stage 3, GFR 30-59 ml/min (HCC)   Hypokalemia   Chronic hypertension     Time reflects when diagnosis was documented in both MDM as applicable and the Disposition within this note     Time User Action Codes Description Comment    12/17/2022  7:00 PM Buddie Mo Add [W19  MQRY] Fall from standing, initial encounter     12/17/2022  7:00 PM Buddie Mo Add [M54 50] Acute bilateral low back pain without sciatica     12/17/2022  7:00 PM Buddie Mo Add [E11 649] Hypoglycemia associated with diabetes ([facility] [address] )     12/17/2022  7:00 PM Buddie Mo Add [N18 30] CKD (chronic kidney disease) stage 3, GFR 30-59 ml/min (HCC)     12/17/2022  7:00 PM Buddie Mo Add [E87 6] Hypokalemia     12/17/2022  7:01 PM Buddie Mo Add [I10] Chronic hypertension       ED Disposition     ED Disposition   Discharge    Condition   Stable    Date/Time   Sat Dec 17, 2022  7:12 PM    Comment   Benoit Comment discharge to home/self care                 Follow-up Information     Follow up With Specialties Details Why Contact Info Additional Information    Maddie Perez,  Internal Medicine, Hospice Services Schedule an appointment as soon as possible for a visit   [address] [place] [address] Drive [address] [facility] 2307 [facility] Emergency Department Emergency Medicine  As needed [phone]  70 [facility] Emergency Department, [address], [address] Patient's Medications   Discharge Prescriptions    No medications on file     No discharge procedures on file      PDMP Review       Value Time User    PDMP Reviewed  Yes 3/25/2021  8:52 AM Kayla Navarro PA-C          ED Provider  Electronically Signed by         Raquel Morales PA-C  12/17/22 [id number]

## 2022-12-18 NOTE — DISCHARGE INSTRUCTIONS
Continue home medications  Monitor blood sugar closely  Make sure you eat a meal tonight  OTC tylenol as needed for pain relief  Follow up with PCP or return to ER as needed

## 2022-12-19 LAB
ATRIAL RATE: 64 BPM
P AXIS: 82 DEGREES
PR INTERVAL: 142 MS
QRS AXIS: 44 DEGREES
QRSD INTERVAL: 90 MS
QT INTERVAL: 406 MS
QTC INTERVAL: 418 MS
T WAVE AXIS: 63 DEGREES
VENTRICULAR RATE: 64 BPM

## 2023-01-03 ENCOUNTER — TELEPHONE (OUTPATIENT)
Dept: NEUROLOGY | Facility: CLINIC | Age: 78
End: 2023-01-03

## 2023-01-03 NOTE — TELEPHONE ENCOUNTER
Received vm-My name is Cam Medel I'm calling for my   Estrella Grissom [birth year]  I'm calling to see if we could set up an appointment to have botox for my  for his feet  he saw Zhanna Becker this past year  And she recommended if he's still having problems with his toes curling that we could call her at any time and she would schedule it   again, [employer] calling for Estrella Grissom, [birth year]   [phone]  Thank you

## 2023-02-01 ENCOUNTER — REMOTE DEVICE CLINIC VISIT (OUTPATIENT)
Dept: CARDIOLOGY CLINIC | Facility: CLINIC | Age: 78
End: 2023-02-01

## 2023-02-01 DIAGNOSIS — Z95.818 PRESENCE OF OTHER CARDIAC IMPLANTS AND GRAFTS: Primary | ICD-10-CM

## 2023-02-07 ENCOUNTER — RA CDI HCC (OUTPATIENT)
Dept: OTHER | Facility: HOSPITAL | Age: 78
End: 2023-02-07

## 2023-02-07 NOTE — PROGRESS NOTES
[facility]  coding opportunities     E11 3293, E11 40 and E11 22     Chart Reviewed number of suggestions sent to Provider: 3     Patients Insurance     Medicare Insurance: [address] [place] [address]

## 2023-02-13 ENCOUNTER — OFFICE VISIT (OUTPATIENT)
Dept: FAMILY MEDICINE CLINIC | Facility: CLINIC | Age: 78
End: 2023-02-13

## 2023-02-13 VITALS
TEMPERATURE: 97.7 F | RESPIRATION RATE: 18 BRPM | DIASTOLIC BLOOD PRESSURE: 68 MMHG | HEIGHT: 66 IN | HEART RATE: 76 BPM | SYSTOLIC BLOOD PRESSURE: 126 MMHG | BODY MASS INDEX: 24.43 KG/M2 | WEIGHT: 152 LBS

## 2023-02-13 DIAGNOSIS — C61 ADENOCARCINOMA OF PROSTATE (HCC): ICD-10-CM

## 2023-02-13 DIAGNOSIS — N18.31 STAGE 3A CHRONIC KIDNEY DISEASE (HCC): ICD-10-CM

## 2023-02-13 DIAGNOSIS — I10 PRIMARY HYPERTENSION: Primary | ICD-10-CM

## 2023-02-13 DIAGNOSIS — I69.354 SPASTIC HEMIPLEGIA OF LEFT NONDOMINANT SIDE AS LATE EFFECT OF CEREBRAL INFARCTION (HCC): ICD-10-CM

## 2023-02-13 RX ORDER — LISINOPRIL 40 MG/1
40 TABLET ORAL DAILY
Qty: 90 TABLET | Refills: 3 | Status: SHIPPED | OUTPATIENT
Start: 2023-02-13

## 2023-02-13 NOTE — PROGRESS NOTES
Name: Roselia López      : 1945      MRN: [id number]  Encounter Provider: Louis Valencia DO  Encounter Date: 2023   Encounter department: [address] [place] [address] Road     1  Primary hypertension  -     lisinopril (ZESTRIL) 40 mg tablet; Take 1 tablet (40 mg total) by mouth daily  Bp stable Stay hydrated     2  Spastic hemiplegia of left nondominant side as late effect of cerebral infarction [facility])  [facility] and may be able to get PT for lower leg weakness left and further Botox    3  Adenocarcinoma of prostate [facility])  Follows with Urology   4  Stage 3a chronic kidney disease ([facility] [address] )  Getting labs thru [facility] 3months/awv    Depression Screening and Follow-up Plan: Patient was screened for depression during today's encounter  They screened negative with a PHQ-2 score of 0  Subjective      HPI   [facility] app[facility] tomorrow for labs and will get copy for us when available BS stable for the most part He is possibly candidate for further PT [facility]ut [facility] Has lesion ?skin tage near corner right eye No vision change  Review of Systems   Constitutional: Negative for chills and fever  Eyes: Negative for visual disturbance  Lesion right canthus   Respiratory: Negative for cough and shortness of breath  Cardiovascular: Negative for chest pain, palpitations and leg swelling  Gastrointestinal: Negative  Genitourinary: Negative  Musculoskeletal: Positive for arthralgias  Neurological: Positive for weakness  Negative for dizziness, light-headedness and headaches  Psychiatric/Behavioral: Negative for sleep disturbance  The patient is not nervous/anxious          Current Outpatient Medications on File Prior to Visit   Medication Sig   • amLODIPine (NORVASC) 5 mg tablet Take 1 tablet (5 mg total) by mouth daily   • atorvastatin (LIPITOR) 40 mg tablet Take 1 tablet (40 mg total) by mouth daily with dinner   • baclofen 10 mg tablet Take 1 tablet (10 mg total) by mouth 3 (three) times a day   • clopidogrel (PLAVIX) 75 mg tablet Take 1 tablet (75 mg total) by mouth daily   • docusate sodium (COLACE) 100 mg capsule Take 100 mg by mouth if needed   • gabapentin (NEURONTIN) 400 mg capsule Take 1 capsule (400 mg total) by mouth daily at bedtime   • Glucagon 1 MG/0 2ML SOSY INJECT 1 MG UNDER THE SKIN  AS NEEDED FOR SEVERE HYPOGLYCEMIA   • levothyroxine (Synthroid) 100 mcg tablet Take 1 tablet (100 mcg total) by mouth daily in the early morning   • Multiple Vitamin (MULTIVITAMIN) tablet Take 1 tablet by mouth daily  • PATIENT MAINTAINED INSULIN PUMP Inject 0 4 each under the skin continuous    • [DISCONTINUED] lisinopril (ZESTRIL) 40 mg tablet Take 40 mg by mouth daily         Objective     /68   Pulse 76   Temp 97 7 °F (36 5 °C) (Temporal)   Resp 18   Ht 5' 6" (1 676 m)   Wt 68 9 kg (152 lb)   BMI 24 53 kg/m²     Physical Exam  Vitals reviewed  Constitutional:       General: He is not in acute distress  Appearance: Normal appearance  He is not ill-appearing  HENT:      Head: Normocephalic and atraumatic  Right Ear: External ear normal       Left Ear: External ear normal       Nose: Nose normal       Mouth/Throat:      Mouth: Mucous membranes are dry  Eyes:      General: No scleral icterus  Extraocular Movements: Extraocular movements intact  Conjunctiva/sclera: Conjunctivae normal       Pupils: Pupils are equal, round, and reactive to light  Cardiovascular:      Rate and Rhythm: Normal rate and regular rhythm  Pulses: Normal pulses  Pulmonary:      Effort: Pulmonary effort is normal  No respiratory distress  Breath sounds: Normal breath sounds  No wheezing  Abdominal:      General: Bowel sounds are normal  There is no distension  Palpations: Abdomen is soft  Tenderness: There is no abdominal tenderness  Musculoskeletal:      Cervical back: Neck supple  No rigidity  Left lower leg: No edema        Comments: lue paresis Lymphadenopathy:      Cervical: No cervical adenopathy  Skin:     General: Skin is dry  Coloration: Skin is not jaundiced or pale  Neurological:      General: No focal deficit present  Mental Status: He is alert and oriented to person, place, and time  Mental status is at baseline  Psychiatric:         Mood and Affect: Mood normal          Behavior: Behavior normal          Thought Content:  Thought content normal          Judgment: Judgment normal        Felicie Prom, DO

## 2023-02-14 LAB — HBA1C MFR BLD HPLC: 7.2 %

## 2023-02-14 PROCEDURE — 3051F HG A1C>EQUAL 7.0%<8.0%: CPT | Performed by: INTERNAL MEDICINE

## 2023-05-02 ENCOUNTER — OFFICE VISIT (OUTPATIENT)
Dept: NEUROLOGY | Facility: CLINIC | Age: 78
End: 2023-05-02

## 2023-05-02 VITALS
DIASTOLIC BLOOD PRESSURE: 80 MMHG | BODY MASS INDEX: 24.3 KG/M2 | WEIGHT: 151.2 LBS | SYSTOLIC BLOOD PRESSURE: 155 MMHG | HEART RATE: 70 BPM | TEMPERATURE: 98.4 F | HEIGHT: 66 IN

## 2023-05-02 DIAGNOSIS — I69.354 SPASTIC HEMIPLEGIA OF LEFT NONDOMINANT SIDE AS LATE EFFECT OF CEREBRAL INFARCTION (HCC): Primary | ICD-10-CM

## 2023-05-02 NOTE — PROGRESS NOTES
Physical Medicine & Rehabilitation Follow-up Note  Sarah Locke 78 y o  male    ASSESSMENT/PLAN:     Diagnoses and all orders for this visit:    Spastic hemiplegia of left nondominant side as late effect of cerebral infarction [facility])      Mr Charlotte Chao is a very pleasant 68yo M with medical history of R anterior medullary infarct in 2021 with subsequent L spastic hemiplegia/weakness  On evaluation of his gait, while he does have tone, it is primarily in his quad, with cocontraction at his ankle, with slight predominance of his plantar flexors  He has a toe flexor reflex, in combination with sensory impairment that contributes to that position of his toe - particularly when not wearing his AFO  AFO does help extinguish some of that tone, although today he is here, as the toe curling has become more painful and persistent  His podiatrist made him something that he can wear to essentially prevent the toes from curling, but he is frustrated as he cannot wear it in the shower  The AFO adequately corrects for his ankle weakness and previous inversion       At this visit, we discussed botulinum toxin for his toe flexor reflex and pain and skin issues he has developed due to it  After reviewing risk/benefit, he elected to proceed  I would start with 25-30 units into his FDL  I reached out to the Botox coordinators and scheduled the patient for 5/24 in hopes that we will be able to procure his botulinum toxin by then  In regards to the rest of his gait - more so than his tone is his hip flexion, hamstring, and gluteus weakness  He did try decreasing his baclofen,but that worsened the toe curling, so he remains on this medication TID and is tolerating it  I would not increase this muscle further so as to avoid adverse effects/increased weakness  Will follow-up with him for botulinum toxin injections   I reviewed that it may be good to wait to start therapy until after initiating botulinum toxin, depending on how many sessions he can have covered by insurance, but if procuring/getting his botulinum toxin approved takes longer than 2 weeks, he can start therapies to work on energy conservation techniques, compensatory techniques, and strengthening for the muscles listed above  *I have spent 34 minutes with Patient and Family  today in which greater than 50% of this time was spent in counseling/coordination of care regarding Risks and benefits of tx options, Instructions for management, Patient and family education, Importance of tx compliance, Impressions, Counseling / Coordination of care, Documenting in the medical record, Reviewing / ordering tests, medicine, procedures  , Obtaining or reviewing history   and Communicating with other healthcare professionals   HPI/SUBJECTIVE:  Last seen on 8/23/2022  At that point he and his wife wanted to hold off on pursuing botulinum toxin  On 10/17 fell after developing chills  Found to meet SIRS criteria - infectious work-up was negative  Question of possible aspiration pneumonitis - and given antibiotics  Eventually discharged home  His symptoms did resolve/improve  On 12/17/2022 he went to the ER again after a fall - walking and tripped on his shoes/L foot brace and fell backward with head strike  CTH and CT C-Spine without acute findings, but stable degenerative disc disease  CT L-Spine stable as well  He was found to be hypokalemic which was supplemented  His hypoglycemia was corrected  He was discharged home with instructions to monitor his BP and BG  At his last visit for his fatiguing weakness, we discussed decreasing his Baclofen to twice daily, he did so, but then noticed worsening tone/toe curling  So they are back on 10mg TID, and doing fine on this dose  He is here today as they wanted to discuss botulinum toxin for his toe flexor reflex which causes him pain and puts him at risk for skin breakdown   He sees a podiatrist with regularity who monitors his "skin, shaves the callus this has caused, and provided him with a handmade \"brace\" that helps his toes from curling in  No new injuries, illnesses, or wounds at this time  Imaging: I have personally reviewed imaging with results as follows:  12/17/2022 CT L-Spine:  No acute findings with degenerative disc disease and facet joint spondylosis similar to the comparison study  12/17/2022 CT C-Spine:  No cervical spine fracture or traumatic malalignment  12/17 CTH:  No acute intracranial abnormality  12/17 CXR:  Cardiomediastinal silhouette appears unremarkable      Opacity left lung base has nearly completely cleared with minimal residual in the periphery likely representing atelectasis or scar  The lungs otherwise clear  No pneumothorax or pleural effusion      Osseous structures appear within normal limits for patient age  ROS: A 10 point review of systems was negative except for what is noted in the HPI  Function:   Current Level of Function:   Stable  Independent with ambulation with AFO and cane  Independent with ADLs, needs some help with AFO/footwear  Goes down the stairs backwards  Lives in home with 0 INEZ and 13 stairs to 2nd floor  OBJECTIVE:   /80 (BP Location: Left arm, Patient Position: Sitting)   Pulse 70   Temp 98 4 °F (36 9 °C) (Temporal)   Ht 5' 6\" (1 676 m)   Wt 68 6 kg (151 lb 3 2 oz)   BMI 24 40 kg/m²     Gen: No acute distress, Well-nourished, well-appearing  HEENT: Moist mucus membranes, Normocephalic/Atraumatic  Cardiovascular:  Distal pulses palpable  Heme/Extr: No edema  Pulmonary: Non-labored breathing  Lungs CTAB  : No clifton  GI: Soft, non-tender, non-distended  BS+  MSK: Excellent range of motion in L ankle  AFO fits well  Gait stiff in L, but able to get foot flat with AFO  Integumentary: Skin is warm, dry  NO skin breakdown in L foot  Neuro: AAOx3, Speech is intact  Appropriate to questioning   Stance without AFo activates a toe flexor reflex on " the L  MAS is 2 in his L KE, 1+ in dorsiflexion/plantarflexion  MMT:   Strength:   Left  Site    2  HF: Hip Flexors    1 KF: Knee Flexors    4+  KE: Knee Extensors    1 DR: Dorsi Flexors    1  PF: Plantar Flexors    2  EHL: Extensor Hallucis Longus   Psych: Normal mood and affect  The following portions of the patient's history were reviewed and updated as appropriate: allergies, current medications, past family history, past medical history, past social history, past surgical history and problem list       Current Outpatient Medications:     amLODIPine (NORVASC) 5 mg tablet, Take 1 tablet (5 mg total) by mouth daily, Disp: 90 tablet, Rfl: 3    atorvastatin (LIPITOR) 40 mg tablet, Take 1 tablet (40 mg total) by mouth daily with dinner, Disp: 30 tablet, Rfl: 0    baclofen 10 mg tablet, Take 1 tablet (10 mg total) by mouth 3 (three) times a day, Disp: 270 tablet, Rfl: 1    clopidogrel (PLAVIX) 75 mg tablet, Take 1 tablet (75 mg total) by mouth daily, Disp: 90 tablet, Rfl: 1    docusate sodium (COLACE) 100 mg capsule, Take 100 mg by mouth if needed, Disp: , Rfl:     gabapentin (NEURONTIN) 400 mg capsule, Take 1 capsule (400 mg total) by mouth daily at bedtime, Disp: 90 capsule, Rfl: 1    Glucagon 1 MG/0 2ML SOSY, INJECT 1 MG UNDER THE SKIN  AS NEEDED FOR SEVERE HYPOGLYCEMIA, Disp: , Rfl:     levothyroxine (Synthroid) 100 mcg tablet, Take 1 tablet (100 mcg total) by mouth daily in the early morning, Disp: 90 tablet, Rfl: 3    lisinopril (ZESTRIL) 40 mg tablet, Take 1 tablet (40 mg total) by mouth daily, Disp: 90 tablet, Rfl: 3    Multiple Vitamin (MULTIVITAMIN) tablet, Take 1 tablet by mouth daily  , Disp: , Rfl:     PATIENT MAINTAINED INSULIN PUMP, Inject 0 4 each under the skin continuous , Disp: , Rfl:     Past Surgical History:   Procedure Laterality Date    CARDIAC ELECTROPHYSIOLOGY PROCEDURE Left 11/4/2021    Procedure: Reveal implant;  Surgeon: Marisela Raya MD;  [facility]; Service: Cardiology    CATARACT EXTRACTION Bilateral 2000    EYE SURGERY      JOINT REPLACEMENT      KNEE ARTHROSCOPY Right 06/26/2009    knee    MN LAPS SURG [id number] RPBIC RAD W/NRV SPARING ROBOT N/A 2/7/2018    Procedure: ROBOTIC ASSISTED LAPAROSCOPIC PROSTATECTOMY; BILATERAL PELVIC LYMPH NODE DISSECTION;  Surgeon: Lisa Lovelace MD;  [facility];  Service: Urology    MN RPR 1ST Tomas Trent AGE 5 YRS/> REDUCIBLE Right 4/19/2019    Procedure: REPAIR HERNIA INGUINAL;  Surgeon: Valorie Madrigal DO;  Location: [facility];  Service: General    PROSTATE BIOPSY  11/07/2017    needle biopsy of prostate    TRIGGER FINGER RELEASE  05/2013    trigger thumb       Patient Active Problem List    Diagnosis Date Noted    Ambulatory dysfunction 10/18/2022    SIRS (systemic inflammatory response syndrome) ([facility] [address] ) 10/18/2022    Intracranial vascular stenosis 10/18/2022    Hyponatremia 10/18/2022    Hepatomegaly 10/18/2022    Cholelithiasis 10/18/2022    Aspiration pneumonitis ([facility] [address] ) 10/18/2022    Diabetic retinopathy ([facility] [address] ) 06/01/2022    Subluxation of lens, right eye 06/01/2022    Abnormal gait 02/16/2022    Spastic hemiplegia affecting left nondominant side ([address] ) 02/16/2022    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) 09/29/2021    Type 2 diabetes mellitus with diabetic neuropathy ([address] ) 05/28/2021    History of stroke 03/03/2021    Long term current use of insulin ([facility] [address] ) 10/01/2020    Medicare annual wellness visit, subsequent 06/28/2018    DMII (diabetes mellitus, type 2) ([address] ) 02/07/2018    Hypothyroidism 02/07/2018    Hyperlipidemia 02/07/2018    Stage 3 chronic kidney disease ([address] ) 02/07/2018    Adenocarcinoma of prostate ([address] ) 11/07/2017    Sensorineural hearing loss (SNHL), bilateral 07/21/2017    Essential hypertension 06/26/2012

## 2023-05-02 NOTE — PROGRESS NOTES
Review of Systems   Constitutional: Negative  Negative for appetite change, chills and fever  HENT: Negative  Negative for ear pain, hearing loss, sore throat, tinnitus, trouble swallowing and voice change  Eyes: Negative  Negative for photophobia, pain and visual disturbance  Respiratory: Negative  Negative for cough and shortness of breath  Cardiovascular: Negative  Negative for chest pain and palpitations  Gastrointestinal: Negative  Negative for abdominal pain, nausea and vomiting  Endocrine: Negative  Negative for cold intolerance  Genitourinary: Negative  Negative for dysuria, frequency, hematuria and urgency  Musculoskeletal: Positive for gait problem  Negative for arthralgias, back pain, myalgias and neck pain  Skin: Negative  Negative for color change and rash  Allergic/Immunologic: Negative  Neurological: Negative for dizziness, tremors, seizures, syncope, facial asymmetry, speech difficulty, weakness, light-headedness, numbness and headaches  Hematological: Negative  Does not bruise/bleed easily  Psychiatric/Behavioral: Negative  Negative for confusion, hallucinations and sleep disturbance  All other systems reviewed and are negative

## 2023-05-03 ENCOUNTER — REMOTE DEVICE CLINIC VISIT (OUTPATIENT)
Dept: CARDIOLOGY CLINIC | Facility: CLINIC | Age: 78
End: 2023-05-03

## 2023-05-03 DIAGNOSIS — Z95.818 PRESENCE OF OTHER CARDIAC IMPLANTS AND GRAFTS: Primary | ICD-10-CM

## 2023-05-03 NOTE — PROGRESS NOTES
"MDT LNQ22/ ACTIVE SYSTEM IS MRI CONDITIONAL   CARELINK TRANSMISSION: LOOP RECORDER  PRESENTING RHYTHM NSR @ 66 BPM  BATTERY STATUS \"OK  \" NO PATIENT OR DEVICE ACTIVATED EPISODES  NORMAL DEVICE FUNCTION   DL   "

## 2023-05-04 ENCOUNTER — TELEPHONE (OUTPATIENT)
Dept: NEUROLOGY | Facility: CLINIC | Age: 78
End: 2023-05-04

## 2023-05-04 NOTE — TELEPHONE ENCOUNTER
Patient is a new start to Botox, as per requested for Botox you would like to start this patient on: May we please have note specify this as primary DX  Botox 100 UNITS  Qty  1  DX  Z01 [id number] or G81 11    Sig: Inject up to 100 UNITS I M into the various sites in the head and neck once every three months for one year with  Refills: 3     If you agree to this order transcribed above please respond directly if you agree or not, so I may proceed further with authorization and for use of verbal order  Thank you           ----- Message from Jme Camarena MD sent at 5/2/2023  4:00 PM EDT -----  Regarding: Botox  Plan is for botox for his toe flexors - 100 units requested  I would like him scheduled for 5/24 at 3:15pm  We can move appointment if not approved/available by then      Julianne Trent MD  Physical Medicine and Rehabilitation

## 2023-05-08 ENCOUNTER — OFFICE VISIT (OUTPATIENT)
Dept: UROLOGY | Facility: CLINIC | Age: 78
End: 2023-05-08

## 2023-05-08 VITALS
SYSTOLIC BLOOD PRESSURE: 150 MMHG | BODY MASS INDEX: 23.95 KG/M2 | DIASTOLIC BLOOD PRESSURE: 90 MMHG | WEIGHT: 149 LBS | HEIGHT: 66 IN

## 2023-05-08 DIAGNOSIS — C61 PROSTATE CANCER (HCC): Primary | ICD-10-CM

## 2023-05-08 NOTE — PROGRESS NOTES
[address] [facility]  [facility]  [address] [facility], [address] [place] [address]  [phone]  www  [facility]  org      NAME: Margot Degroot  AGE: 78 y o  SEX: male  : 1945   MRN: [id number]    DATE: 2023  TIME: 10:39 AM    Assessment and Plan:    Prostate cancer as below: Doing well from my standpoint  Follow-up 1 year with another PSA  Chief Complaint   No chief complaint on file  History of Present Illness   Prostate cancer follow-up: [place] 3+4 equal [id number] prostate cancer status post robotic assisted radical prostatectomy with lymph node dissection 2018 by Dr Jennie Whitehead  PSA remains undetectable as of 2023  He had a CVA with left-sided deficits 2021  The voiding complaints, small amount of leakage if he coughs sneezes or coughs  Excellent stream       The following portions of the patient's history were reviewed and updated as appropriate: allergies, current medications, past family history, past medical history, past social history, past surgical history and problem list   Past Medical History:   Diagnosis Date   • Arthritis    • BPH (benign prostatic hyperplasia)     last assessed 2014   • Diabetes mellitus ([address] )    • Disease of thyroid gland    • GERD (gastroesophageal reflux disease)    • Hearing aid worn    • Hyperlipidemia    • Hypertension    • Hypothyroidism    • Mumps    • Prostate cancer ([address] )    • Stroke ([address] )    • Tingling sensation     bilateral feet occassionally   • Tinnitus    • Wears glasses    • Wears partial dentures     upper     Past Surgical History:   Procedure Laterality Date   • CARDIAC ELECTROPHYSIOLOGY PROCEDURE Left 2021    Procedure: Reveal implant;  Surgeon: Deyanira Bustillo MD;  [facility];   Service: Cardiology   • CATARACT EXTRACTION Bilateral    • EYE SURGERY     • JOINT REPLACEMENT     • KNEE ARTHROSCOPY Right 2009    knee   • CT "LAPS SURG [id number] RPBIC RAD W/NRV SPARING ROBOT N/A 2/7/2018    Procedure: ROBOTIC ASSISTED LAPAROSCOPIC PROSTATECTOMY; BILATERAL PELVIC LYMPH NODE DISSECTION;  Surgeon: Kofi Hou MD;  [facility];  Service: Urology   • PA RPR 1ST INGUN HRNA AGE 5 YRS/> REDUCIBLE Right 4/19/2019    Procedure: REPAIR HERNIA INGUINAL;  Surgeon: Anabela Harrell DO;  [facility];  Service: General   • PROSTATE BIOPSY  11/07/2017    needle biopsy of prostate   • TRIGGER FINGER RELEASE  05/2013    trigger thumb     shoulder  Review of Systems   Review of Systems   Gastrointestinal: Positive for constipation  Genitourinary: Negative  Neurological: Positive for weakness  Active Problem List     Patient Active Problem List   Diagnosis   • DMII (diabetes mellitus, type 2) ([facility] [address] )   • Hypothyroidism   • Hyperlipidemia   • Stage 3 chronic kidney disease ([address] )   • Essential hypertension   • Sensorineural hearing loss (SNHL), bilateral   • Medicare annual wellness visit, subsequent   • Adenocarcinoma of prostate ([facility] [address] )   • Long term current use of insulin ([address] )   • History of stroke   • Type 2 diabetes mellitus with diabetic neuropathy ([address] )   • 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)   • Abnormal gait   • 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)   • Diabetic retinopathy ([address] )   • Subluxation of lens, right eye   • Ambulatory dysfunction   • SIRS (systemic inflammatory response syndrome) ([address] )   • Intracranial vascular stenosis   • Hyponatremia   • Hepatomegaly   • Cholelithiasis   • Aspiration pneumonitis (HCC)       Objective   /90 (BP Location: Right arm, Patient Position: Sitting, Cuff Size: Adult)   Ht 5' 6\" (1 676 m)   Wt 67 6 kg (149 lb)   BMI 24 05 kg/m²     Physical Exam  Vitals reviewed  Constitutional:       Appearance: Normal appearance  He is normal weight  HENT:      Head: Normocephalic and atraumatic     Eyes:      Extraocular Movements: " Extraocular movements intact  Pulmonary:      Effort: Pulmonary effort is normal    Musculoskeletal:         General: Normal range of motion  Cervical back: Normal range of motion  Skin:     Coloration: Skin is not jaundiced or pale  Neurological:      Mental Status: He is alert and oriented to person, place, and time  Mental status is at baseline  Coordination: Coordination abnormal       Gait: Gait abnormal    Psychiatric:         Mood and Affect: Mood normal          Behavior: Behavior normal          Thought Content: Thought content normal          Judgment: Judgment normal              Current Medications     Current Outpatient Medications:   •  amLODIPine (NORVASC) 5 mg tablet, Take 1 tablet (5 mg total) by mouth daily, Disp: 90 tablet, Rfl: 3  •  atorvastatin (LIPITOR) 40 mg tablet, Take 1 tablet (40 mg total) by mouth daily with dinner, Disp: 30 tablet, Rfl: 0  •  baclofen 10 mg tablet, Take 1 tablet (10 mg total) by mouth 3 (three) times a day, Disp: 270 tablet, Rfl: 1  •  clopidogrel (PLAVIX) 75 mg tablet, Take 1 tablet (75 mg total) by mouth daily, Disp: 90 tablet, Rfl: 1  •  docusate sodium (COLACE) 100 mg capsule, Take 100 mg by mouth if needed, Disp: , Rfl:   •  gabapentin (NEURONTIN) 400 mg capsule, Take 1 capsule (400 mg total) by mouth daily at bedtime, Disp: 90 capsule, Rfl: 1  •  levothyroxine (Synthroid) 100 mcg tablet, Take 1 tablet (100 mcg total) by mouth daily in the early morning, Disp: 90 tablet, Rfl: 3  •  lisinopril (ZESTRIL) 40 mg tablet, Take 1 tablet (40 mg total) by mouth daily, Disp: 90 tablet, Rfl: 3  •  Multiple Vitamin (MULTIVITAMIN) tablet, Take 1 tablet by mouth daily  , Disp: , Rfl:   •  PATIENT MAINTAINED INSULIN PUMP, Inject 0 4 each under the skin continuous , Disp: , Rfl:   •  Glucagon 1 MG/0 2ML SOSY, INJECT 1 MG UNDER THE SKIN  AS NEEDED FOR SEVERE HYPOGLYCEMIA (Patient not taking: Reported on 5/8/2023), Disp: , Rfl:         Kavya Zepeda MD

## 2023-05-11 ENCOUNTER — TELEPHONE (OUTPATIENT)
Dept: NEUROLOGY | Facility: CLINIC | Age: 78
End: 2023-05-11

## 2023-05-11 NOTE — TELEPHONE ENCOUNTER
Received VM Transcription:    My name is Song Valentine  I'm calling about my  Chiqui Moraes, [phone]  We have an appointment with Dr Michelle Amaro  on the 24th of this month for Botox injections  We just got a letter from [employer] that it was denied because we were not started on the preferred drug Dysport, and if that doesn't work, then they'll authorize it  So I'm either gonna have to cancel the appointment or if doctor is using that drug at all and if she wanted to inject with that drug  You can just call me back  Okay, thank you very much    _________________________________    Hero Pond call  Advised I will send a message to Dr Michelle Roman voiced understanding  # [phone]  OK to leave a detailed message  Dr Michelle Amaro, please advise  Thank you!

## 2023-05-12 NOTE — TELEPHONE ENCOUNTER
Message forwarded to Neurology Botox  Holden Aguilar, would you kindly assist with Prior Authorization  Thank you!

## 2023-05-12 NOTE — TELEPHONE ENCOUNTER
Rosalio Patterson MD  You; Neurology Botox; Ruth Orr LPN 20 hours ago (2:70 PM)     Hello, Botox was denied for this patient, will need to be dysport can start with 500 units  If this can be obtained by 5/24, no need to cancel appointment       Kolton Saldana MD   Physical Medicine and Rehabilitation

## 2023-05-15 ENCOUNTER — TELEPHONE (OUTPATIENT)
Dept: NEUROLOGY | Facility: CLINIC | Age: 78
End: 2023-05-15

## 2023-05-15 NOTE — TELEPHONE ENCOUNTER
Good morning Dr Josefina Sykes,    Please note that a denial was sent via email today for botox 100mg for the following reasons:    -Requires a trial of a preferred drug to treat a medical conditions before covering another non-preferred drug      -As per most recent office notes and medication list, the preferred medication Dysport has not been tried  Would you like for me to send a prior auth for dysport 100mg, instead?     Please advise, thank you

## 2023-05-15 NOTE — TELEPHONE ENCOUNTER
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

## 2023-05-16 ENCOUNTER — OFFICE VISIT (OUTPATIENT)
Dept: FAMILY MEDICINE CLINIC | Facility: CLINIC | Age: 78
End: 2023-05-16

## 2023-05-16 VITALS
HEART RATE: 74 BPM | DIASTOLIC BLOOD PRESSURE: 74 MMHG | HEIGHT: 66 IN | RESPIRATION RATE: 18 BRPM | WEIGHT: 152.6 LBS | SYSTOLIC BLOOD PRESSURE: 118 MMHG | BODY MASS INDEX: 24.53 KG/M2 | TEMPERATURE: 98 F

## 2023-05-16 DIAGNOSIS — Z12.11 COLON CANCER SCREENING: ICD-10-CM

## 2023-05-16 DIAGNOSIS — Z79.4 TYPE 2 DIABETES MELLITUS WITH DIABETIC NEUROPATHY, WITH LONG-TERM CURRENT USE OF INSULIN (HCC): ICD-10-CM

## 2023-05-16 DIAGNOSIS — E11.40 TYPE 2 DIABETES MELLITUS WITH DIABETIC NEUROPATHY, WITH LONG-TERM CURRENT USE OF INSULIN (HCC): ICD-10-CM

## 2023-05-16 DIAGNOSIS — Z00.00 MEDICARE ANNUAL WELLNESS VISIT, SUBSEQUENT: Primary | ICD-10-CM

## 2023-05-16 LAB — HBA1C MFR BLD HPLC: 7.4 %

## 2023-05-16 PROCEDURE — 3051F HG A1C>EQUAL 7.0%<8.0%: CPT | Performed by: PHYSICAL MEDICINE & REHABILITATION

## 2023-05-16 NOTE — PATIENT INSTRUCTIONS
Medicare Preventive Visit Patient Instructions  Thank you for completing your Welcome to Medicare Visit or Medicare Annual Wellness Visit today  Your next wellness visit will be due in one year (5/16/2024)  The screening/preventive services that you may require over the next 5-10 years are detailed below  Some tests may not apply to you based off risk factors and/or age  Screening tests ordered at today's visit but not completed yet may show as past due  Also, please note that scanned in results may not display below  Preventive Screenings:  Service Recommendations Previous Testing/Comments   Colorectal Cancer Screening  · Colonoscopy    · Fecal Occult Blood Test (FOBT)/Fecal Immunochemical Test (FIT)  · Fecal DNA/Cologuard Test  · Flexible Sigmoidoscopy Age: 39-70 years old   Colonoscopy: every 10 years (May be performed more frequently if at higher risk)  OR  FOBT/FIT: every 1 year  OR  Cologuard: every 3 years  OR  Sigmoidoscopy: every 5 years  Screening may be recommended earlier than age 39 if at higher risk for colorectal cancer  Also, an individualized decision between you and your healthcare provider will decide whether screening between the ages of 74-80 would be appropriate   Colonoscopy: 07/05/2017  FOBT/FIT: 10/23/2020  Cologuard: Not on file  Sigmoidoscopy: Not on file          Prostate Cancer Screening Individualized decision between patient and health care provider in men between ages of 53-78   Medicare will cover every 12 months beginning on the day after your 50th birthday PSA: <0 1 ng/mL     History Prostate Cancer  Screening Not Indicated     Hepatitis C Screening Once for adults born between 1945 and 1965  More frequently in patients at high risk for Hepatitis C Hep C Antibody: 10/05/2021    Screening Current   Diabetes Screening 1-2 times per year if you're at risk for diabetes or have pre-diabetes Fasting glucose: 264 mg/dL (10/18/2022)  A1C: 7 2 (2/14/2023)  Screening Not Indicated  History Diabetes   Cholesterol Screening Once every 5 years if you don't have a lipid disorder  May order more often based on risk factors  Lipid panel: 03/15/2022  Screening Not Indicated  History Lipid Disorder      Other Preventive Screenings Covered by Medicare:  1  Abdominal Aortic Aneurysm (AAA) Screening: covered once if your at risk  You're considered to be at risk if you have a family history of AAA or a male between the age of 73-68 who smoking at least 100 cigarettes in your lifetime  2  Lung Cancer Screening: covers low dose CT scan once per year if you meet all of the following conditions: (1) Age 50-69; (2) No signs or symptoms of lung cancer; (3) Current smoker or have quit smoking within the last 15 years; (4) You have a tobacco smoking history of at least 20 pack years (packs per day x number of years you smoked); (5) You get a written order from a healthcare provider  3  Glaucoma Screening: covered annually if you're considered high risk: (1) You have diabetes OR (2) Family history of glaucoma OR (3)  aged 48 and older OR (3)  American aged 72 and older  3  Osteoporosis Screening: covered every 2 years if you meet one of the following conditions: (1) Have a vertebral abnormality; (2) On glucocorticoid therapy for more than 3 months; (3) Have primary hyperparathyroidism; (4) On osteoporosis medications and need to assess response to drug therapy  5  HIV Screening: covered annually if you're between the age of 12-76  Also covered annually if you are younger than 13 and older than 72 with risk factors for HIV infection  For pregnant patients, it is covered up to 3 times per pregnancy      Immunizations:  Immunization Recommendations   Influenza Vaccine Annual influenza vaccination during flu season is recommended for all persons aged >= 6 months who do not have contraindications   Pneumococcal Vaccine   * Pneumococcal conjugate vaccine = PCV13 (Prevnar 13), PCV15 (Vaxneuvance), PCV20 (Prevnar 20)  * Pneumococcal polysaccharide vaccine = PPSV23 (Pneumovax) Adults 2364 years old: 1-3 doses may be recommended based on certain risk factors  Adults 72 years old: 1-2 doses may be recommended based off what pneumonia vaccine you previously received   Hepatitis B Vaccine 3 dose series if at intermediate or high risk (ex: diabetes, end stage renal disease, liver disease)   Tetanus (Td) Vaccine - COST NOT COVERED BY MEDICARE PART B Following completion of primary series, a booster dose should be given every 10 years to maintain immunity against tetanus  Td may also be given as tetanus wound prophylaxis  Tdap Vaccine - COST NOT COVERED BY MEDICARE PART B Recommended at least once for all adults  For pregnant patients, recommended with each pregnancy  Shingles Vaccine (Shingrix) - COST NOT COVERED BY MEDICARE PART B  2 shot series recommended in those aged 48 and above     Health Maintenance Due:      Topic Date Due   • Colorectal Cancer Screening  10/24/2020   • Hepatitis C Screening  Completed     Immunizations Due:      Topic Date Due   • COVID-19 Vaccine (5 - Booster) 12/28/2021     Advance Directives   What are advance directives? Advance directives are legal documents that state your wishes and plans for medical care  These plans are made ahead of time in case you lose your ability to make decisions for yourself  Advance directives can apply to any medical decision, such as the treatments you want, and if you want to donate organs  What are the types of advance directives? There are many types of advance directives, and each state has rules about how to use them  You may choose a combination of any of the following:  · Living will: This is a written record of the treatment you want  You can also choose which treatments you do not want, which to limit, and which to stop at a certain time  This includes surgery, medicine, IV fluid, and tube feedings     · Durable power of  for [facility]): This is a written record that states who you want to make healthcare choices for you when you are unable to make them for yourself  This person, called a proxy, is usually a family member or a friend  You may choose more than 1 proxy  · Do not resuscitate (DNR) order:  A DNR order is used in case your heart stops beating or you stop breathing  It is a request not to have certain forms of treatment, such as CPR  A DNR order may be included in other types of advance directives  · Medical directive: This covers the care that you want if you are in a coma, near death, or unable to make decisions for yourself  You can list the treatments you want for each condition  Treatment may include pain medicine, surgery, blood transfusions, dialysis, IV or tube feedings, and a ventilator (breathing machine)  · Values history: This document has questions about your views, beliefs, and how you feel and think about life  This information can help others choose the care that you would choose  Why are advance directives important? An advance directive helps you control your care  Although spoken wishes may be used, it is better to have your wishes written down  Spoken wishes can be misunderstood, or not followed  Treatments may be given even if you do not want them  An advance directive may make it easier for your family to make difficult choices about your care  Fall Prevention    Fall prevention  includes ways to make your home and other areas safer  It also includes ways you can move more carefully to prevent a fall  Health conditions that cause changes in your blood pressure, vision, or muscle strength and coordination may increase your risk for falls  Medicines may also increase your risk for falls if they make you dizzy, weak, or sleepy  Fall prevention tips:   · Stand or sit up slowly  · Use assistive devices as directed  · Wear shoes that fit well and have soles that   · Wear a personal alarm  · Stay active  · Manage your medical conditions  Home Safety Tips:  · Add items to prevent falls in the bathroom  · Keep paths clear  · Install bright lights in your home  · Keep items you use often on shelves within reach  · Paint or place reflective tape on the edges of your stairs  © Copyright [employer] 2018 Information is for End User's use only and may not be sold, redistributed or otherwise used for commercial purposes   All illustrations and images included in CareNotes® are the copyrighted property of [employer] M  [employer]  or [address] [place] [address]

## 2023-05-16 NOTE — TELEPHONE ENCOUNTER
Received the following authorization approval info via [employer] through [employer]: [facility]: Medicare Part-D Pharmacy Benefits for:     New-Start: Approved  Dysport-500 units  Auth: PA Case ID: [id number]  Valid: 01/01/2023 until 12/31/2023  3 Visits     Please use [facility]  Also received another separate authorization approval via fax through [id number] for:    New-Start: Approved:  Proc-Codes: [id number], [address]    Dysport-500 units  Auth: [id number]  Valid: 05/15/2023 until 05/15/2024  4 Visits

## 2023-05-16 NOTE — TELEPHONE ENCOUNTER
Called [facility] and gave verbal RX to Abdelrahman (pharmacist) for:     Dysport-500 UNITS  Qty  1  DX: G81 14, Spastic Hemiplegia L side  Sig: Inject up to 500 UNITS I M into the various sites of left lower extremity once every three months for one year with  Refills: 3    [place] did expedite this order to be processed STAT per my request     Abdelrahman also informed me that patient account does show a $300 00 Co-Pay amount owed for the Dysport-500 unit medication/order  Will follow up with the patient along w/ [facility] on the status of this order

## 2023-05-16 NOTE — TELEPHONE ENCOUNTER
Reached out and contacted patient to update them on the status of their Approved Dysport medication as well as informing patient and his spouse Ronnie Pedro (per patient request) via telephone call that [facility] will be contacting them within the next 48 hours as they will be dispensing/delivering the Dysport medication to our office for patients DARIUS-Michelle  I also made patient and his spouse aware that [facility] stated patient will owe a co-pay amount of $300 00 for the Dysport medication  Patient was understanding and thankful for the updated call and let me know that they be watching for a call from [facility] so they can provide them with any and all info needed so patients Dysport can be scheduled/delivered  Will follow up with the patient along w/ [facility] on the status of this order

## 2023-05-16 NOTE — PROGRESS NOTES
Assessment and Plan:     Problem List Items Addressed This Visit        Endocrine    Type 2 diabetes mellitus with diabetic neuropathy ([address] )    Relevant Orders    Albumin / creatinine urine ratio    Comprehensive metabolic panel    HEMOGLOBIN A1C W/ EAG ESTIMATION    Lipid panel       Other    Medicare annual wellness visit, subsequent - Primary   Other Visit Diagnoses     Colon cancer screening        Relevant Orders    Cologuard      [facility]  Monitor BS   Obtain eye exam from this week   Urine ordered   Rto 4 months     Depression Screening and Follow-up Plan: Patient was screened for depression during today's encounter  They screened negative with a PHQ-2 score of 1  Falls Plan of Care: balance, strength, and gait training instructions were provided  Preventive health issues were discussed with patient, and age appropriate screening tests were ordered as noted in patient's After Visit Summary  Personalized health advice and appropriate referrals for health education or preventive services given if needed, as noted in patient's After Visit Summary  History of Present Illness:     Patient presents for a Medicare Wellness Visit    HPI   Pt doing ok AT the [place] yesterday for eye exam BS fairly stable although occasional lows with minimal sxs   Patient Care Team:  Kalyn Fischer DO as PCP - General (Internal Medicine)  MD Esau Fajardo MD Rafael Gail, MD Mabelene Perkins, MD Hannah Bleacher, MD Houser/Tone (Ophthalmology)  Kevin Miranda DPM (Podiatry)     Review of Systems:     Review of Systems   Constitutional: Negative for chills and fever  HENT: Negative  Eyes: Negative for visual disturbance  Respiratory: Negative for cough and shortness of breath  Cardiovascular: Negative for chest pain, palpitations and leg swelling  Gastrointestinal: Negative for abdominal distention and abdominal pain  Genitourinary: Negative      Musculoskeletal: Negative  Neurological: Negative for dizziness, light-headedness and headaches  Psychiatric/Behavioral: Negative for sleep disturbance  The patient is not nervous/anxious  Problem List:     Patient Active Problem List   Diagnosis   • DMII (diabetes mellitus, type 2) ([place] [address] )   • Hypothyroidism   • Hyperlipidemia   • Stage 3 chronic kidney disease ([place] [address] )   • Essential hypertension   • Sensorineural hearing loss (SNHL), bilateral   • Medicare annual wellness visit, subsequent   • Adenocarcinoma of prostate ([place] [address] )   • Long term current use of insulin ([place] [address] )   • History of stroke   • Type 2 diabetes mellitus with diabetic neuropathy ([place] [address] )   • 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)   • Abnormal gait   • 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)   • Diabetic retinopathy ([place] [address] )   • Subluxation of lens, right eye   • Ambulatory dysfunction   • SIRS (systemic inflammatory response syndrome) ([place] [address] )   • Intracranial vascular stenosis   • Hyponatremia   • Hepatomegaly   • Cholelithiasis   • Aspiration pneumonitis ([place] [address] )      Past Medical and Surgical History:     Past Medical History:   Diagnosis Date   • Arthritis    • BPH (benign prostatic hyperplasia)     last assessed 4/29/2014   • Diabetes mellitus ([place] [address] )    • Disease of thyroid gland    • GERD (gastroesophageal reflux disease)    • Hearing aid worn    • Hyperlipidemia    • Hypertension    • Hypothyroidism    • Mumps    • Prostate cancer ([place] [address] )    • Stroke ([place] [address] )    • Tingling sensation     bilateral feet occassionally   • Tinnitus    • Wears glasses    • Wears partial dentures     upper     Past Surgical History:   Procedure Laterality Date   • CARDIAC ELECTROPHYSIOLOGY PROCEDURE Left 11/4/2021    Procedure: Reveal implant;  Surgeon: Raquel Madden MD;  [facility];   Service: Cardiology   • CATARACT EXTRACTION Bilateral 2000   • EYE SURGERY     • JOINT REPLACEMENT     • KNEE ARTHROSCOPY Right 06/26/2009    knee   • SD LAPS SURG [id number] RPBIC RAD W/NRV SPARING ROBOT N/A 2/7/2018    Procedure: ROBOTIC ASSISTED LAPAROSCOPIC PROSTATECTOMY; BILATERAL PELVIC LYMPH NODE DISSECTION;  Surgeon: Soraida Mtz MD;  Location: [facility];  Service: Urology   • WY RPR 1ST INGUN HRNA AGE 5 YRS/> REDUCIBLE Right 4/19/2019    Procedure: REPAIR HERNIA INGUINAL;  Surgeon: Stephan Goodwin DO;  Location: [facility];  Service: General   • PROSTATE BIOPSY  11/07/2017    needle biopsy of prostate   • TRIGGER FINGER RELEASE  05/2013    trigger thumb      Family History:     Family History   Problem Relation Age of Onset   • Cancer Mother    • Diabetes Mother    • Uterine cancer Mother    • Diabetes Father    • Cancer Father    • Stroke Father         of unknown cause   • Hypertension Father    • Prostate cancer Father    • Diabetes Family         Uncle, DM   • Cancer Family         Grandmother      Social History:     Social History     Socioeconomic History   • Marital status: /Civil Union     Spouse name: None   • Number of children: None   • Years of education: None   • Highest education level: None   Occupational History   • Occupation: printer  retired   Tobacco Use   • Smoking status: Former   • Smokeless tobacco: Never   • Tobacco comments:     quit about 50 years ago   Vaping Use   • Vaping Use: Never used   Substance and Sexual Activity   • Alcohol use: Not Currently     Alcohol/week: 2 0 standard drinks     Types: 2 Glasses of wine per week     Comment: week, social drinker   • Drug use: No   • Sexual activity: Not Currently     Partners: Female   Other Topics Concern   • None   Social History Narrative    Always uses seat belt    Caffeine use    Lives independently with spouse    Retired    Sun protection sunscreen     Social Determinants of Health     Financial Resource Strain: Low Risk    • Difficulty of Paying Living Expenses: Not very hard   Food Insecurity: No Food Insecurity   • Worried About Running Out of Food in the Last Year: Never true   • Ran Out of Food in the Last Year: Never true   Transportation Needs: No Transportation Needs   • Lack of Transportation (Medical): No   • Lack of Transportation (Non-Medical): No   Physical Activity: Not on file   Stress: Not on file   Social Connections: Not on file   Intimate Partner Violence: Not on file   Housing Stability: Low Risk    • Unable to Pay for Housing in the Last Year: No   • Number of Places Lived in the Last Year: 1   • Unstable Housing in the Last Year: No      Medications and Allergies:     Current Outpatient Medications   Medication Sig Dispense Refill   • amLODIPine (NORVASC) 5 mg tablet Take 1 tablet (5 mg total) by mouth daily 90 tablet 3   • atorvastatin (LIPITOR) 40 mg tablet Take 1 tablet (40 mg total) by mouth daily with dinner 30 tablet 0   • baclofen 10 mg tablet Take 1 tablet (10 mg total) by mouth 3 (three) times a day 270 tablet 1   • clopidogrel (PLAVIX) 75 mg tablet Take 1 tablet (75 mg total) by mouth daily 90 tablet 1   • docusate sodium (COLACE) 100 mg capsule Take 100 mg by mouth if needed     • gabapentin (NEURONTIN) 400 mg capsule Take 1 capsule (400 mg total) by mouth daily at bedtime 90 capsule 1   • Glucagon 1 MG/0 2ML SOSY      • levothyroxine (Synthroid) 100 mcg tablet Take 1 tablet (100 mcg total) by mouth daily in the early morning 90 tablet 3   • lisinopril (ZESTRIL) 40 mg tablet Take 1 tablet (40 mg total) by mouth daily 90 tablet 3   • Multiple Vitamin (MULTIVITAMIN) tablet Take 1 tablet by mouth daily  • PATIENT MAINTAINED INSULIN PUMP Inject 0 4 each under the skin continuous        No current facility-administered medications for this visit       No Known Allergies   Immunizations:     Immunization History   Administered Date(s) Administered   • COVID-19 PFIZER VACCINE 0 3 ML IM 03/30/2021, 04/20/2021, 11/02/2021   • COVID-19, unspecified 03/30/2021   • INFLUENZA 10/01/2007, 10/01/2008, 10/04/2009, 10/03/2010, 10/23/2012, 10/04/2013, 10/06/2014, 10/18/2016, 10/22/2019, 09/30/2020, 11/01/2022   • Influenza Split High Dose Preservative Free IM 10/01/2016, 10/01/2017, 10/20/2017, 10/01/2018, 10/22/2019   • Influenza, Seasonal Vaccine, Quadrivalent, Adjuvanted,  5e 09/28/2021   • Influenza, high dose seasonal 0 7 mL 10/30/2018, 09/30/2020   • Influenza, seasonal, injectable 10/15/2012, 10/13/2014, 10/08/2015   • Pneumococcal Conjugate 13-Valent 11/03/2017   • Pneumococcal Polysaccharide PPV23 12/15/2009, 10/08/2019, 12/20/2019   • Td (adult), Unspecified 05/01/2007   • Tdap 03/05/2016, 03/29/2016   • Zoster 07/03/2012, 01/01/2013   • Zoster Vaccine Recombinant 10/08/2020, 02/08/2021      Health Maintenance:         Topic Date Due   • Colorectal Cancer Screening  10/24/2020   • Hepatitis C Screening  Completed         Topic Date Due   • COVID-19 Vaccine (5 - Booster) 12/28/2021      Medicare Screening Tests and Risk Assessments:     Heri Clement is here for his Subsequent Wellness visit  Last Medicare Wellness visit information reviewed, patient interviewed, no change since last AWV  Health Risk Assessment:   Patient rates overall health as fair  Patient feels that their physical health rating is same  Patient is satisfied with their life  Eyesight was rated as same  Hearing was rated as same  Patient feels that their emotional and mental health rating is same  Patients states they are sometimes angry  Patient states they are sometimes unusually tired/fatigued  Pain experienced in the last 7 days has been none  Patient states that he has experienced no weight loss or gain in last 6 months  Depression Screening:   PHQ-2 Score: 1      Fall Risk Screening: In the past year, patient has experienced: history of falling in past year    Number of falls: 1  Injured during fall?: Yes    Feels unsteady when standing or walking?: Yes    Worried about falling?: Yes      Home Safety:  Patient does not have trouble with stairs inside or outside of their home  Patient has no working smoke alarms and has no working carbon monoxide detector  Home safety hazards include: none  Nutrition:   Current diet is Diabetic  Medications:   Patient is not currently taking any over-the-counter supplements  Patient is able to manage medications  Activities of Daily Living (ADLs)/Instrumental Activities of Daily Living (IADLs):   Walk and transfer into and out of bed and chair?: Yes  Dress and groom yourself?: No    Bathe or shower yourself?: Yes    Feed yourself? Yes  Do your laundry/housekeeping?: No  Manage your money, pay your bills and track your expenses?: Yes  Make your own meals?: Yes    Do your own shopping?: No    Previous Hospitalizations:   Any hospitalizations or ED visits within the last 12 months?: Yes    How many hospitalizations have you had in the last year?: 1-2    Advance Care Planning:   Living will: Yes    Durable POA for healthcare:  Yes    Advanced directive: Yes    End of Life Decisions reviewed with patient: Yes    Provider agrees with end of life decisions: Yes      Cognitive Screening:   Provider or family/friend/caregiver concerned regarding cognition?: No    PREVENTIVE SCREENINGS      Cardiovascular Screening:    General: History Lipid Disorder and Screening Current      Diabetes Screening:     General: History Diabetes and Screening Current      Colorectal Cancer Screening:     General: Risks and Benefits Discussed    Due for: Cologuard      Prostate Cancer Screening:    General: History Prostate Cancer and Screening Not Indicated      Osteoporosis Screening:    General: Screening Not Indicated      Abdominal Aortic Aneurysm (AAA) Screening:    Risk factors include: age between 73-69 yo and tobacco use        Lung Cancer Screening:     General: Screening Not Indicated      Hepatitis C Screening:    General: Screening Current    Screening, Brief Intervention, and Referral to Treatment (SBIRT)    Screening  Typical number of drinks in a day: "0  Typical number of drinks in a week: 2  Interpretation: Low risk drinking behavior  AUDIT-C Screenin) How often did you have a drink containing alcohol in the past year? monthly or less  2) How many drinks did you have on a typical day when you were drinking in the past year? 0  3) How often did you have 6 or more drinks on one occasion in the past year? never    AUDIT-C Score: 1  Interpretation: Score 0-3 (male): Negative screen for alcohol misuse    Single Item Drug Screening:  How often have you used an illegal drug (including marijuana) or a prescription medication for non-medical reasons in the past year? never    Single Item Drug Screen Score: 0  Interpretation: Negative screen for possible drug use disorder    Brief Intervention  Alcohol & drug use screenings were reviewed  No concerns regarding substance use disorder identified  Other Counseling Topics:   Regular weightbearing exercise  No results found  Physical Exam:     /74   Pulse 74   Temp 98 °F (36 7 °C) (Temporal)   Resp 18   Ht 5' 6\" (1 676 m)   Wt 69 2 kg (152 lb 9 6 oz)   BMI 24 63 kg/m²     Physical Exam  Vitals reviewed  Constitutional:       General: He is not in acute distress  Appearance: Normal appearance  He is not ill-appearing, toxic-appearing or diaphoretic  HENT:      Head: Normocephalic and atraumatic  Right Ear: External ear normal       Left Ear: External ear normal       Nose: Nose normal       Mouth/Throat:      Mouth: Mucous membranes are moist    Eyes:      General: No scleral icterus  Extraocular Movements: Extraocular movements intact  Conjunctiva/sclera: Conjunctivae normal       Pupils: Pupils are equal, round, and reactive to light  Cardiovascular:      Rate and Rhythm: Normal rate and regular rhythm  Pulses: Normal pulses  Heart sounds: Normal heart sounds  No murmur heard  Pulmonary:      Effort: Pulmonary effort is normal  No respiratory distress        " Breath sounds: Normal breath sounds  No wheezing  Abdominal:      General: Bowel sounds are normal  There is no distension  Palpations: Abdomen is soft  Tenderness: There is no abdominal tenderness  Musculoskeletal:      Cervical back: Normal range of motion and neck supple  Right lower leg: No edema  Left lower leg: No edema  Lymphadenopathy:      Cervical: No cervical adenopathy  Skin:     General: Skin is warm and dry  Coloration: Skin is not jaundiced or pale  Neurological:      General: No focal deficit present  Mental Status: He is alert and oriented to person, place, and time  Mental status is at baseline  Cranial Nerves: No cranial nerve deficit  Sensory: No sensory deficit  Psychiatric:         Mood and Affect: Mood normal          Behavior: Behavior normal          Thought Content:  Thought content normal          Judgment: Judgment normal           Kalyn Fischer DO

## 2023-05-17 ENCOUNTER — TELEPHONE (OUTPATIENT)
Dept: ADMINISTRATIVE | Facility: OTHER | Age: 78
End: 2023-05-17

## 2023-05-17 NOTE — LETTER
Diabetic Eye Exam Form  Second Request    Date Requested: 23  Patient: Toy Mendoza  Patient : 1945   Referring Provider: Geovanna Talley DO      DIABETIC Eye Exam Date _______________________________      Type of Exam MUST be documented for Diabetic Eye Exams  Please CHECK ONE  Retinal Exam       Dilated Retinal Exam       OCT       Optomap-Iris Exam      Fundus Photography       Left Eye - Please check Retinopathy or No Retinopathy        Exam did show retinopathy    Exam did not show retinopathy       Right Eye - Please check Retinopathy or No Retinopathy       Exam did show retinopathy    Exam did not show retinopathy       Comments __________________________________________________________    Practice Providing Exam ______________________________________________    Exam Performed By (print name) _______________________________________      Provider Signature ___________________________________________________      These reports are needed for  compliance  Please fax this completed form and a copy of the Diabetic Eye Exam report to our office located at [place] [address] as soon as possible via Fax [phone] attention Yamilka Rodriguez: Phone [phone]  We thank you for your assistance in treating our mutual patient

## 2023-05-17 NOTE — LETTER
Diabetic Eye Exam Form    Date Requested: 23  Patient: Deepti Alfaro  Patient : 1945   Referring Provider: Norma Chong DO      DIABETIC Eye Exam Date _______________________________      Type of Exam MUST be documented for Diabetic Eye Exams  Please CHECK ONE  Retinal Exam       Dilated Retinal Exam       OCT       Optomap-Iris Exam      Fundus Photography       Left Eye - Please check Retinopathy or No Retinopathy        Exam did show retinopathy    Exam did not show retinopathy       Right Eye - Please check Retinopathy or No Retinopathy       Exam did show retinopathy    Exam did not show retinopathy       Comments __________________________________________________________    Practice Providing Exam ______________________________________________    Exam Performed By (print name) _______________________________________      Provider Signature ___________________________________________________      These reports are needed for  compliance  Please fax this completed form and a copy of the Diabetic Eye Exam report to our office located at [place] [address] as soon as possible via Fax [phone] attention Sophia Lever: Phone [phone]  We thank you for your assistance in treating our mutual patient

## 2023-05-17 NOTE — TELEPHONE ENCOUNTER
----- Message from siOPTICA sent at 5/16/2023  1:52 PM EDT -----  Regarding: DM Eye Exam  05/16/23 1:53 PM    Hello, our patient Rosaura Felty has had Diabetic Eye Exam completed/performed  Please assist in updating the patient chart by making an External outreach to the [facility] facility located in [place], [place]  The date of service is 5/15/23      Thank you,  Christine Garcia   [facility]

## 2023-05-18 NOTE — TELEPHONE ENCOUNTER
Upon review of the In Basket request and the patient's chart, initial outreach has been made via fax to facility  Please see Contacts section for details       Thank you  Dena Cheema

## 2023-05-18 NOTE — TELEPHONE ENCOUNTER
Called [facility] and scheduled delivery with Marylin Hong for:    Dysport-500 units  Qty:1  Delivery to [place]  Scheduled for 5/23/2023  Via: [employer]    Please advise if medication doesn't arrive

## 2023-05-22 NOTE — TELEPHONE ENCOUNTER
As a follow-up, a second attempt has been made for outreach via fax to facility  Please see Contacts section for details      Thank you  Dena Cheema

## 2023-05-24 ENCOUNTER — PROCEDURE VISIT (OUTPATIENT)
Dept: NEUROLOGY | Facility: CLINIC | Age: 78
End: 2023-05-24

## 2023-05-24 VITALS
SYSTOLIC BLOOD PRESSURE: 151 MMHG | HEIGHT: 66 IN | DIASTOLIC BLOOD PRESSURE: 79 MMHG | WEIGHT: 152.9 LBS | HEART RATE: 64 BPM | TEMPERATURE: 98.4 F | BODY MASS INDEX: 24.57 KG/M2

## 2023-05-24 DIAGNOSIS — I69.354 SPASTIC HEMIPLEGIA OF LEFT NONDOMINANT SIDE AS LATE EFFECT OF CEREBRAL INFARCTION (HCC): Primary | ICD-10-CM

## 2023-05-24 RX ORDER — BOTULINUM TOXIN TYPE A 500 U/1
INJECTION, POWDER, LYOPHILIZED, FOR SOLUTION INTRAMUSCULAR
Status: ON HOLD | COMMUNITY
Start: 2023-05-18

## 2023-05-24 NOTE — PROGRESS NOTES
Physical Medicine & Rehabilitation Spasticity Follow-up/Procedure  Renetta Munguia 78 y o  male    ASSESSMENT/PLAN:   Ольга Hobbs was seen today for botulinum toxin injection  Diagnoses and all orders for this visit:    Spastic hemiplegia of left nondominant side as late effect of cerebral infarction ([facility] [address] )  -     AbobotulinumtoxinA SOLR 500 Units      Mr Mayank Hassan is a very pleasant 68yo M with medical history of R anterior medullary infarct in 2021 with subsequent L spastic hemiplegia/weakness  He is here today for botulinum toxin to specifically address his toe flexor reflex which causes him pain when not wearing his AFO, and puts him at risk for skin breakdown   His gait is quite good, he does compensate for hip flexion weakness by using his adductors, but is able to clear  His knee is stiff with cocontraction resulting in difficulty flexing, but the external rotation helps shorten the limb for clearance too  His AFO is adequately correcting the tone in his L ankle  He has tone in his lumbricals, but this is not a functional concern or hygiene concern for him at this time  We did a trial of dysport to the FDL today  I may want to listen to his FDB during the next round of botulinum toxin  I will see him in 4 weeks to evaluate the botulinum toxin at its peak and monitor his response       1  Oral antispasticity medications: Continue Baclofen 10mg TID  No need for refills today  2  Schedule 500 units of dysport  Transfers independently   3  Starts PT tomorrow   ? *I have spent 50 minutes with Patient and family today in which greater than 50% of this time was spent in counseling/coordination of care regarding Risks and benefits of tx options, Instructions for management, Patient and family education, Importance of tx compliance, Impressions, Reviewing / ordering tests, medicine, procedures   and Obtaining or reviewing history          HPI/SUBJECTIVE:    Patient presents today in the office with chief "complaint of toe curling which is quite painful, worse when he is not wearing his brace  Last seen for chemodenervation: Never  Results of chemodenervation: NA  How long did effects last: NA  Side affect/Adverse Effects: NA     Any issues with driving, swallowing, appetite: Doesn't drive  No issues with swallowing or appetite  Stretching/Exercise Program: Active on a daily basis  - walks around his home, able to go up and down stairs  Currently in therapy: Yes starting next wednesday  Any falls with significant injuries: No falls  Any new weakness: None  Any changes to care since last seen: None  Safe at home: Yes  Any oral meds: Baclofen 10mg TID  No need for refills at this time  On anticoagulation/blood thinners: Plavix   Current functional status:  Independent with an AFO and a cane  Independent with ADLs, just some assistance with AFO/footwear  Go down the stairs backwards  Lives in a home with 0 INEZ and 13 stairs to the 2nd floor       ROS: A 10 point review of systems was negative except for what is noted in the HPI  Imaging: I have personally reviewed imaging with results as follows: No new pertinent imaging since previously seen    OBJECTIVE:   /79 (BP Location: Left arm, Patient Position: Sitting)   Pulse 64   Temp 98 4 °F (36 9 °C) (Temporal)   Ht 5' 6\" (1 676 m)   Wt 69 4 kg (152 lb 14 4 oz)   BMI 24 68 kg/m²     Gen: No acute distress, Well-nourished, well-appearing  HEENT: Moist mucus membranes, Normocephalic/Atraumatic  Cardiovascular: Distal pulses palpable  Heme/Extr: No edema  Prominent varicose veins  Pulmonary: Non-labored breathing  : No clifton  GI: Soft, non-tender, non-distended  MSK:   Limited ROM in his elbow extension, wrist extension, tight lumbricals all on the L  Able to access palm easily  He is able to clench fist and open fist    Ankle ROM able to just about get to neutral/foot flat  No change regardless of KF/KE  Integumentary: Skin is warm, dry   " Psych: Normal mood and affect  Neuro: AAOx3,  Speech is intact  Appropriate to questioning  Toe flexor reflex noted  MMT:   Strength:   Right  Left  Site  Right  Left  Site    5 4  S Ab: Shoulder Abductors  5  1  HF: Hip Flexors    5 4  EF: Elbow Flexors  5  1 KF: Knee Flexors    5  5  EE: Elbow Extensors  5  5  KE: Knee Extensors    5  4  WE: Wrist Extensors  5  0  DR: Dorsi Flexors    5  2  FF: Finger Flexors  5  1  PF: Plantar Flexors    5  2  HI: Hand Intrinsics  5  0  EHL: Extensor Hallucis Longus     MAS:  Right  Left  Site  Right  Left  Site    0 1 Shoulder 0 2  Hip Adductors   0 1+  EF: Elbow Flexors  0 1 KF: Knee Flexors    0  0  EE: Elbow Extensors  0  1  KE: Knee Extensors    0/0 0/1+  WF/WE 0  1+  DR: Dorsi Flexors    0  0 FF: Finger Flexors  0  0 PF: Plantar Flexors    0  1*  HI: Hand Intrinsics  0/0  1+/0  Toe Flex/Ex   *lumbricals   Toe flexion reflex  MAS  0 - no increased tone  1 - slight increase in tone at the end of the ROM  1+ increase in tone at 1/2 the ROM  2 - increase in tone through most the ROM  3 - moderate increase in muscle tone - passive movement difficult  4 - affected parts ridid in flexion or extension    SPASMS observed:   RUE: no   LUE: no  RLE: no   LLE: no  ?  Botulinum Toxin Injection Procedure    Pre-operative diagnosis: Spasticity    Post-operative diagnosis: Same    Procedure: Chemodenervation    After risks and benefits were explained including bleeding, infection, worsening of pain, damage to the areas being injected, weakness of muscles, loss of muscle control, dysphagia if injecting the head or neck, facial droop if injecting the facial area, painful injection, allergic or other reaction to the medications being injected, and the failure of the procedure to help the problem, a signed consent was obtained on 05/24/2023    The patient was placed in a seated position and the sites to be treated were identified  A time out was called and performed   The area to be treated was prepped three times with alcohol and the alcohol allowed to dry  Next, a 26 gauge, 50mm disposable electrode needle was used to inject the medication in the area to be treated  Guidance: EMG  Patient position: seated  Area(s) injected:    Muscle Dose (units) # Sites Technique   R  units 2 EMG       EMG       EMG       EMG       EMG       EMG       EMG       EMG         EMG         EMG         EMG    Waste 375 units   EMG         EMG        Medications used: 500 units of dysport diluted in 1 mL of preservative free saline    Dysport Lot/Exp: Please see MAR  The patient did tolerate the procedure well  There were no complications      The following portions of the patient's history were reviewed and updated as appropriate: allergies, current medications, past family history, past medical history, past social history, past surgical history and problem list       Current Outpatient Medications:   •  amLODIPine (NORVASC) 5 mg tablet, Take 1 tablet (5 mg total) by mouth daily, Disp: 90 tablet, Rfl: 3  •  atorvastatin (LIPITOR) 40 mg tablet, Take 1 tablet (40 mg total) by mouth daily with dinner, Disp: 30 tablet, Rfl: 0  •  baclofen 10 mg tablet, Take 1 tablet (10 mg total) by mouth 3 (three) times a day, Disp: 270 tablet, Rfl: 1  •  clopidogrel (PLAVIX) 75 mg tablet, Take 1 tablet (75 mg total) by mouth daily, Disp: 90 tablet, Rfl: 1  •  docusate sodium (COLACE) 100 mg capsule, Take 100 mg by mouth if needed, Disp: , Rfl:   •  gabapentin (NEURONTIN) 400 mg capsule, Take 1 capsule (400 mg total) by mouth daily at bedtime, Disp: 90 capsule, Rfl: 1  •  Glucagon 1 MG/0 2ML SOSY, , Disp: , Rfl:   •  levothyroxine (Synthroid) 100 mcg tablet, Take 1 tablet (100 mcg total) by mouth daily in the early morning, Disp: 90 tablet, Rfl: 3  •  lisinopril (ZESTRIL) 40 mg tablet, Take 1 tablet (40 mg total) by mouth daily, Disp: 90 tablet, Rfl: 3  •  Multiple Vitamin (MULTIVITAMIN) tablet, Take 1 tablet by mouth daily , Disp: , Rfl:   •  PATIENT MAINTAINED INSULIN PUMP, Inject 0 4 each under the skin continuous , Disp: , Rfl:   •  Dysport 500 units SOLR, , Disp: , Rfl:     Past Surgical History:   Procedure Laterality Date   • CARDIAC ELECTROPHYSIOLOGY PROCEDURE Left 11/4/2021    Procedure: Reveal implant;  Surgeon: Anum Bright MD;  Location:  CARDIAC CATH LAB;   Service: Cardiology   • CATARACT EXTRACTION Bilateral 2000   • EYE SURGERY     • JOINT REPLACEMENT     • KNEE ARTHROSCOPY Right 06/26/2009    knee   • OK LAPS SURG [id number] RPBIC RAD W/NRV SPARING ROBOT N/A 2/7/2018    Procedure: ROBOTIC ASSISTED LAPAROSCOPIC PROSTATECTOMY; BILATERAL PELVIC LYMPH NODE DISSECTION;  Surgeon: Antonia Magaña MD;  Location: [facility];  Service: Urology   • OK RPR 1ST Tish Wendi AGE 5 YRS/> REDUCIBLE Right 4/19/2019    Procedure: REPAIR HERNIA INGUINAL;  Surgeon: Samantha Zurita DO;  Location: [facility];  Service: General   • PROSTATE BIOPSY  11/07/2017    needle biopsy of prostate   • TRIGGER FINGER RELEASE  05/2013    trigger thumb       Patient Active Problem List    Diagnosis Date Noted   • Ambulatory dysfunction 10/18/2022   • SIRS (systemic inflammatory response syndrome) ([facility] [address] ) 10/18/2022   • Intracranial vascular stenosis 10/18/2022   • Hyponatremia 10/18/2022   • Hepatomegaly 10/18/2022   • Cholelithiasis 10/18/2022   • Aspiration pneumonitis ([address] ) 10/18/2022   • Diabetic retinopathy ([facility] [address] ) 06/01/2022   • Subluxation of lens, right eye 06/01/2022   • Abnormal gait 02/16/2022   • Spastic hemiplegia affecting left nondominant side ([address] ) 02/16/2022   • 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) 09/29/2021   • Type 2 diabetes mellitus with diabetic neuropathy ([address] ) 05/28/2021   • History of stroke 03/03/2021   • Long term current use of insulin ([facility] [address] ) 10/01/2020   • Medicare annual wellness visit, subsequent 06/28/2018   • DMII (diabetes mellitus, type 2) ([address] ) 02/07/2018   • Hypothyroidism 02/07/2018   • Hyperlipidemia 02/07/2018   • Stage 3 chronic kidney disease ([facility] ) 02/07/2018   • Adenocarcinoma of prostate ([facility] ) 11/07/2017   • Sensorineural hearing loss (SNHL), bilateral 07/21/2017   • Essential hypertension 06/26/2012

## 2023-05-24 NOTE — PATIENT INSTRUCTIONS
You have received botulinum toxin injections today  The skin around the site of injections should be monitored for a couple of days  If redness or swelling occur, the skin should be examined by a health care professional to rule out infection  You can call my office if this occurs  Please contact our office via [phone] in 2 weeks to report on the effects of the injections or any time with any questions or concerns  Please note that your next injections should not be scheduled less than 90 days from today  If your health insurance changes before the next injections, please contact our office as soon as possible so we can submit for a new prior authorization if needed  Please note that we may not be able to perform the injections if your insurance changes and the treatment is not pre-authorized

## 2023-05-31 ENCOUNTER — EVALUATION (OUTPATIENT)
Dept: PHYSICAL THERAPY | Facility: HOME HEALTHCARE | Age: 78
End: 2023-05-31

## 2023-05-31 DIAGNOSIS — R26.9 ABNORMAL GAIT: Primary | ICD-10-CM

## 2023-05-31 NOTE — PROGRESS NOTES
PT Evaluation     Today's date: 2023  Patient name: Deedee Slaughter  : 1945  MRN: [id number]  Referring provider: Redd Youssef MD  Dx:   Encounter Diagnosis     ICD-10-CM    1  Abnormal gait  R26 9           Start Time: 1200  Stop Time: 1245  Total time in clinic (min): 45 minutes    Assessment  Assessment details: Pt is a 79 y/o male presenting with gait dysfunction, decreased balance, and overall functional mobility deficits s/p CVA on 2021  Pt is presenting with overall LUE and LLE weakness, ROM deficits, balance deficits, and functional mobility deficits, which are limiting his ability to perform ADLs  Pt currently ambulates independently with a SPC and L AFO, but is at a fall risk based on his TUG score and romberg performance  The pt requires skilled physical therapy in order to improve in strength, ROM, functional mobility, quality of life, and return to PLOF  Thank you! Impairments: abnormal gait, abnormal or restricted ROM, abnormal movement, activity intolerance, impaired balance, impaired physical strength, lacks appropriate home exercise program, pain with function, weight-bearing intolerance and poor body mechanics  Understanding of Dx/Px/POC: good   Prognosis: good    Goals  STG: 3-4 weeks  Pt will be independent with HEP in order to continue to progress outside of PT treatment sessions  Pt will improve in functional mobility as demonstrated by L passive SLR of 60 deg or greater  Pt will improve in functional mobility and balance as demonstrated by TUG time of 17s or less  LT-8 weeks  Pt will improve in functional mobility as demonstrated by 1/2 point or greater improvement in LE strength  Pt will improve in functional mobility and balance as demonstrated by TUG time of 13s or less  Pt will improve in functional mobility and gait quality as demonstrated 3MWT of 375 feet or greater    Pt will improve in functional mobility and fall risk as demonstrated by no reported falls  Plan  Patient would benefit from: skilled physical therapy  Planned therapy interventions: balance, balance/weight bearing training, flexibility, functional ROM exercises, gait training, home exercise program, joint mobilization, manual therapy, massage, neuromuscular re-education, patient education, postural training, strengthening, stretching, therapeutic activities, therapeutic exercise and body mechanics training  Frequency: 2x week  Duration in weeks: 12  Plan of Care beginning date: 5/31/2023  Plan of Care expiration date: 8/23/2023  Treatment plan discussed with: patient        Subjective Evaluation    History of Present Illness  Mechanism of injury: Pt is returning to OPPT s/p CVA for overall gait, mobility, and strengthening  Pt reports that he did receive his first round of Botox injections and received his first one in his foot for his toe flexor reflex  Pt reports that he still has difficulty with overall mobility and when doing marches  Pt reports that he has difficulty pushing with his LLE when he is on the leg press machine due to his foot sliding off of the platform  Pt reports that he would like to continue with physical therapy in order to try to keep working on lifting his leg  Pt reports that the injection was to help with the curling of his toes because it hurts a lot when walking  Pt is currently independent for ambulation using an AFO and SPC, but reports that he does have the tendency to sometimes get up without using the cane  Pt reports that he would really like to work on strengthening his glute muscles  Treatments  Current treatment: injection treatment  Current treatment comments: Botox injections       Patient Goals  Patient goals for therapy: improved balance, increased motion, increased strength and independence with ADLs/IADLs  Patient goal: To get glute muscles stronger, get his leg back        Objective     Passive Range of Motion   Left Knee   Flexion: "115 degrees   Extension: WFL    Right Knee   Flexion: WFL  Extension: WFL    Strength/Myotome Testing     Left Hip   Planes of Motion   Flexion: 1  Abduction: 3  Adduction: 4    Right Hip   Planes of Motion   Flexion: 4+  Abduction: 4+  Adduction: 5    Left Knee   Flexion: 3+  Extension: 4    Right Knee   Flexion: 5  Extension: 5    Left Ankle/Foot   Dorsiflexion: 0  Plantar flexion: 1    Right Ankle/Foot   Dorsiflexion: 5  Plantar flexion: 5    Tests     Left Hip   90/90 SLR: Positive  SLR: Positive  Additional Tests Details  SLR: L: 50, R: 65  90/90: L: 40, R: 20    Ambulation     Ambulation: Level Surfaces   Ambulation with assistive device: independent    Additional Level Surfaces Ambulation Details  SPC    Ambulation: Stairs   Ascend stairs: contact guard assist  Pattern: non-reciprocal  Railings: one rail  Descend stairs: contact guard assist  Pattern: non-reciprocal  Railings: one rail  Curbs: independent    Observational Gait   Decreased walking speed and stride length  Additional Observational Gait Details  L hemiparetic gait    Quality of Movement During Gait     Hip    Hip (Left): Positive circumducted  Ankle    Ankle (Left): Positive foot drop       Functional Assessment        Comments  TU 3s  Romberg: EO: 30s  EC: 30s  Tandem: EO: L: 3s R: 30s EC: L: <1s, R: 9s  3MWT: approx 325ft or 3 25 laps in clinic              Precautions: PMH of CVA        Manuals           Karl hs/glute stretch NV                                           Neuro Re-Ed           Romberg balance 1x30\" EO, 1x30\" EC          Tandem balance EO: 1x10\" karl          Cone taps           Biodex: LOS           Biodex: Maze           Biodex: catch game                                 Ther Ex           Nustep           Standing hip flex/ext/abd           Squats           HR/TR           Bridges 1x5          LAQ           HS curls           Seated marches           Hip add machine NV          Hip abduction machine NV      " Leg press NV          LF knee ext           Ther Activity           Step up           Step down           Gait Training           Ambulation with SPC-laps around clinic                      Modalities

## 2023-05-31 NOTE — LETTER
May 31, 2023    Amber Her MD  [address] [place] [address]  [address]  [address] [address] [address]    Patient: Tanika Russell   YOB: 1945   Date of Visit: 2023     Encounter Diagnosis     ICD-10-CM    1  Abnormal gait  R26 9           Dear Dr Connie Meredith:    Thank you for your recent referral of Tanika Russell  Please review the attached evaluation summary from [facility] recent visit  Please verify that you agree with the plan of care by signing the attached order  If you have any questions or concerns, please do not hesitate to call  I sincerely appreciate the opportunity to share in the care of one of your patients and hope to have another opportunity to work with you in the near future  Sincerely,    Ruby Bianchi, PT      Referring Provider:      I certify that I have read the below Plan of Care and certify the need for these services furnished under this plan of treatment while under my care  Amber Her MD  [address] [place] [address]  [address]  [address] [address] [address]  Via Fax: [phone]          PT Evaluation     Today's date: 2023  Patient name: Tanika Russell  : 1945  MRN: [id number]  Referring provider: Camacho Castorena MD  Dx:   Encounter Diagnosis     ICD-10-CM    1  Abnormal gait  R26 9           Start Time: 1200  Stop Time: 1245  Total time in clinic (min): 45 minutes    Assessment  Assessment details: Pt is a 77 y/o male presenting with gait dysfunction, decreased balance, and overall functional mobility deficits s/p CVA on 2021  Pt is presenting with overall LUE and LLE weakness, ROM deficits, balance deficits, and functional mobility deficits, which are limiting his ability to perform ADLs  Pt currently ambulates independently with a SPC and L AFO, but is at a fall risk based on his TUG score and romberg performance    The pt requires skilled physical therapy in order to improve in strength, ROM, functional mobility, quality of life, and return to PLOF  Thank you! Impairments: abnormal gait, abnormal or restricted ROM, abnormal movement, activity intolerance, impaired balance, impaired physical strength, lacks appropriate home exercise program, pain with function, weight-bearing intolerance and poor body mechanics  Understanding of Dx/Px/POC: good   Prognosis: good    Goals  STG: 3-4 weeks  Pt will be independent with HEP in order to continue to progress outside of PT treatment sessions  Pt will improve in functional mobility as demonstrated by L passive SLR of 60 deg or greater  Pt will improve in functional mobility and balance as demonstrated by TUG time of 17s or less  LT-8 weeks  Pt will improve in functional mobility as demonstrated by 1/2 point or greater improvement in LE strength  Pt will improve in functional mobility and balance as demonstrated by TUG time of 13s or less  Pt will improve in functional mobility and gait quality as demonstrated 3MWT of 375 feet or greater  Pt will improve in functional mobility and fall risk as demonstrated by no reported falls  Plan  Patient would benefit from: skilled physical therapy  Planned therapy interventions: balance, balance/weight bearing training, flexibility, functional ROM exercises, gait training, home exercise program, joint mobilization, manual therapy, massage, neuromuscular re-education, patient education, postural training, strengthening, stretching, therapeutic activities, therapeutic exercise and body mechanics training  Frequency: 2x week  Duration in weeks: 12  Plan of Care beginning date: 2023  Plan of Care expiration date: 2023  Treatment plan discussed with: patient        Subjective Evaluation    History of Present Illness  Mechanism of injury: Pt is returning to OPPT s/p CVA for overall gait, mobility, and strengthening    Pt reports that he did receive his first round of Botox injections and received his first one in his foot for his toe flexor reflex  Pt reports that he still has difficulty with overall mobility and when doing marches  Pt reports that he has difficulty pushing with his LLE when he is on the leg press machine due to his foot sliding off of the platform  Pt reports that he would like to continue with physical therapy in order to try to keep working on lifting his leg  Pt reports that the injection was to help with the curling of his toes because it hurts a lot when walking  Pt is currently independent for ambulation using an AFO and SPC, but reports that he does have the tendency to sometimes get up without using the cane  Pt reports that he would really like to work on strengthening his glute muscles  Treatments  Current treatment: injection treatment  Current treatment comments: Botox injections  Patient Goals  Patient goals for therapy: improved balance, increased motion, increased strength and independence with ADLs/IADLs  Patient goal: To get glute muscles stronger, get his leg back        Objective     Passive Range of Motion   Left Knee   Flexion: 115 degrees   Extension: WFL    Right Knee   Flexion: WFL  Extension: WFL    Strength/Myotome Testing     Left Hip   Planes of Motion   Flexion: 1  Abduction: 3  Adduction: 4    Right Hip   Planes of Motion   Flexion: 4+  Abduction: 4+  Adduction: 5    Left Knee   Flexion: 3+  Extension: 4    Right Knee   Flexion: 5  Extension: 5    Left Ankle/Foot   Dorsiflexion: 0  Plantar flexion: 1    Right Ankle/Foot   Dorsiflexion: 5  Plantar flexion: 5    Tests     Left Hip   90/90 SLR: Positive  SLR: Positive       Additional Tests Details  SLR: L: 50, R: 65  90/90: L: 40, R: 20    Ambulation     Ambulation: Level Surfaces   Ambulation with assistive device: independent    Additional Level Surfaces Ambulation Details  SPC    Ambulation: Stairs   Ascend stairs: contact guard assist  Pattern: non-reciprocal  Railings: one rail  Descend stairs: contact guard assist  Pattern: "non-reciprocal  Railings: one rail  Curbs: independent    Observational Gait   Decreased walking speed and stride length  Additional Observational Gait Details  L hemiparetic gait    Quality of Movement During Gait     Hip    Hip (Left): Positive circumducted  Ankle    Ankle (Left): Positive foot drop       Functional Assessment        Comments  TU 3s  Romberg: EO: 30s  EC: 30s  Tandem: EO: L: 3s R: 30s EC: L: <1s, R: 9s  3MWT: approx 325ft or 3 25 laps in clinic             Precautions: PMH of CVA        Manuals           Karl hs/glute stretch NV                                           Neuro Re-Ed           Romberg balance 1x30\" EO, 1x30\" EC          Tandem balance EO: 1x10\" karl          Cone taps           Biodex: LOS           Biodex: Maze           Biodex: catch game                                 Ther Ex           Nustep           Standing hip flex/ext/abd           Squats           HR/TR           Bridges 1x5          LAQ           HS curls           Seated marches           Hip add machine NV          Hip abduction machine NV          Leg press NV          LF knee ext           Ther Activity           Step up           Step down           Gait Training           Ambulation with SPC-laps around clinic                      Modalities                                                   "

## 2023-05-31 NOTE — TELEPHONE ENCOUNTER
As a final attempt, a third outreach has been made via fax to facility and telephone call to facility  Please see Contacts section for details  This encounter will be closed and completed by end of day  Should we receive the requested information because of previous outreach attempts, the requested patient's chart will be updated appropriately       Thank you  Anny Morales

## 2023-06-01 ENCOUNTER — APPOINTMENT (EMERGENCY)
Dept: RADIOLOGY | Facility: HOSPITAL | Age: 78
DRG: 637 | End: 2023-06-01
Payer: COMMERCIAL

## 2023-06-01 ENCOUNTER — HOSPITAL ENCOUNTER (INPATIENT)
Facility: HOSPITAL | Age: 78
LOS: 1 days | Discharge: HOME | DRG: 637 | End: 2023-06-03
Attending: EMERGENCY MEDICINE | Admitting: FAMILY MEDICINE
Payer: COMMERCIAL

## 2023-06-01 ENCOUNTER — APPOINTMENT (EMERGENCY)
Dept: CT IMAGING | Facility: HOSPITAL | Age: 78
DRG: 637 | End: 2023-06-01
Payer: COMMERCIAL

## 2023-06-01 DIAGNOSIS — R55 SYNCOPE: Primary | ICD-10-CM

## 2023-06-01 DIAGNOSIS — E11.9 DMII (DIABETES MELLITUS, TYPE 2) (HCC): ICD-10-CM

## 2023-06-01 DIAGNOSIS — J18.9 PNEUMONIA OF LEFT LOWER LOBE DUE TO INFECTIOUS ORGANISM: ICD-10-CM

## 2023-06-01 DIAGNOSIS — S09.90XA CLOSED HEAD INJURY, INITIAL ENCOUNTER: ICD-10-CM

## 2023-06-01 DIAGNOSIS — E16.2 HYPOGLYCEMIA: ICD-10-CM

## 2023-06-01 DIAGNOSIS — Z96.41 INSULIN PUMP IN PLACE: ICD-10-CM

## 2023-06-01 DIAGNOSIS — J18.9 LEFT LOWER LOBE PNEUMONIA: ICD-10-CM

## 2023-06-01 DIAGNOSIS — E87.1 HYPONATREMIA: ICD-10-CM

## 2023-06-01 LAB
2HR DELTA HS TROPONIN: -2 NG/L
ANION GAP SERPL CALCULATED.3IONS-SCNC: 10 MMOL/L (ref 4–13)
BASOPHILS # BLD AUTO: 0.07 THOUSANDS/ÂΜL (ref 0–0.1)
BASOPHILS NFR BLD AUTO: 1 % (ref 0–1)
BUN SERPL-MCNC: 34 MG/DL (ref 5–25)
CALCIUM SERPL-MCNC: 9.3 MG/DL (ref 8.4–10.2)
CARDIAC TROPONIN I PNL SERPL HS: 6 NG/L
CARDIAC TROPONIN I PNL SERPL HS: 8 NG/L
CHLORIDE SERPL-SCNC: 94 MMOL/L (ref 96–108)
CO2 SERPL-SCNC: 24 MMOL/L (ref 21–32)
COLOGUARD RESULT REPORTABLE: NORMAL
CREAT SERPL-MCNC: 1.43 MG/DL (ref 0.6–1.3)
EOSINOPHIL # BLD AUTO: 0.51 THOUSAND/ÂΜL (ref 0–0.61)
EOSINOPHIL NFR BLD AUTO: 5 % (ref 0–6)
ERYTHROCYTE [DISTWIDTH] IN BLOOD BY AUTOMATED COUNT: 12.9 % (ref 11.6–15.1)
GFR SERPL CREATININE-BSD FRML MDRD: 47 ML/MIN/1.73SQ M
GLUCOSE SERPL-MCNC: 209 MG/DL (ref 65–140)
GLUCOSE SERPL-MCNC: 220 MG/DL (ref 65–140)
GLUCOSE SERPL-MCNC: 230 MG/DL (ref 65–140)
GLUCOSE SERPL-MCNC: 49 MG/DL (ref 65–140)
GLUCOSE SERPL-MCNC: 50 MG/DL (ref 65–140)
GLUCOSE SERPL-MCNC: 74 MG/DL (ref 65–140)
HCT VFR BLD AUTO: 37.9 % (ref 36.5–49.3)
HGB BLD-MCNC: 13.1 G/DL (ref 12–17)
IMM GRANULOCYTES # BLD AUTO: 0.03 THOUSAND/UL (ref 0–0.2)
IMM GRANULOCYTES NFR BLD AUTO: 0 % (ref 0–2)
LACTATE SERPL-SCNC: 0.8 MMOL/L (ref 0.5–2)
LYMPHOCYTES # BLD AUTO: 1.93 THOUSANDS/ÂΜL (ref 0.6–4.47)
LYMPHOCYTES NFR BLD AUTO: 19 % (ref 14–44)
MAGNESIUM SERPL-MCNC: 2.1 MG/DL (ref 1.9–2.7)
MCH RBC QN AUTO: 32.7 PG (ref 26.8–34.3)
MCHC RBC AUTO-ENTMCNC: 34.6 G/DL (ref 31.4–37.4)
MCV RBC AUTO: 95 FL (ref 82–98)
MONOCYTES # BLD AUTO: 1.12 THOUSAND/ÂΜL (ref 0.17–1.22)
MONOCYTES NFR BLD AUTO: 11 % (ref 4–12)
NEUTROPHILS # BLD AUTO: 6.6 THOUSANDS/ÂΜL (ref 1.85–7.62)
NEUTS SEG NFR BLD AUTO: 64 % (ref 43–75)
NRBC BLD AUTO-RTO: 0 /100 WBCS
PLATELET # BLD AUTO: 301 THOUSANDS/UL (ref 149–390)
PMV BLD AUTO: 9.1 FL (ref 8.9–12.7)
POTASSIUM SERPL-SCNC: 3.4 MMOL/L (ref 3.5–5.3)
PROCALCITONIN SERPL-MCNC: 0.08 NG/ML
RBC # BLD AUTO: 4.01 MILLION/UL (ref 3.88–5.62)
SODIUM SERPL-SCNC: 128 MMOL/L (ref 135–147)
TSH SERPL DL<=0.05 MIU/L-ACNC: 5.62 UIU/ML (ref 0.45–4.5)
WBC # BLD AUTO: 10.26 THOUSAND/UL (ref 4.31–10.16)

## 2023-06-01 PROCEDURE — 85025 COMPLETE CBC W/AUTO DIFF WBC: CPT | Performed by: EMERGENCY MEDICINE

## 2023-06-01 PROCEDURE — 70450 CT HEAD/BRAIN W/O DYE: CPT

## 2023-06-01 PROCEDURE — 84443 ASSAY THYROID STIM HORMONE: CPT | Performed by: EMERGENCY MEDICINE

## 2023-06-01 PROCEDURE — 36415 COLL VENOUS BLD VENIPUNCTURE: CPT | Performed by: EMERGENCY MEDICINE

## 2023-06-01 PROCEDURE — 71045 X-RAY EXAM CHEST 1 VIEW: CPT

## 2023-06-01 PROCEDURE — 83605 ASSAY OF LACTIC ACID: CPT | Performed by: EMERGENCY MEDICINE

## 2023-06-01 PROCEDURE — 96375 TX/PRO/DX INJ NEW DRUG ADDON: CPT

## 2023-06-01 PROCEDURE — 96365 THER/PROPH/DIAG IV INF INIT: CPT

## 2023-06-01 PROCEDURE — 72125 CT NECK SPINE W/O DYE: CPT

## 2023-06-01 PROCEDURE — 80048 BASIC METABOLIC PNL TOTAL CA: CPT | Performed by: EMERGENCY MEDICINE

## 2023-06-01 PROCEDURE — 83735 ASSAY OF MAGNESIUM: CPT | Performed by: EMERGENCY MEDICINE

## 2023-06-01 PROCEDURE — 82948 REAGENT STRIP/BLOOD GLUCOSE: CPT

## 2023-06-01 PROCEDURE — 99222 1ST HOSP IP/OBS MODERATE 55: CPT | Performed by: FAMILY MEDICINE

## 2023-06-01 PROCEDURE — 87154 CUL TYP ID BLD PTHGN 6+ TRGT: CPT | Performed by: EMERGENCY MEDICINE

## 2023-06-01 PROCEDURE — 84484 ASSAY OF TROPONIN QUANT: CPT | Performed by: EMERGENCY MEDICINE

## 2023-06-01 PROCEDURE — 94760 N-INVAS EAR/PLS OXIMETRY 1: CPT

## 2023-06-01 PROCEDURE — 84145 PROCALCITONIN (PCT): CPT | Performed by: EMERGENCY MEDICINE

## 2023-06-01 PROCEDURE — 94664 DEMO&/EVAL PT USE INHALER: CPT

## 2023-06-01 PROCEDURE — 99284 EMERGENCY DEPT VISIT MOD MDM: CPT

## 2023-06-01 PROCEDURE — 87040 BLOOD CULTURE FOR BACTERIA: CPT | Performed by: EMERGENCY MEDICINE

## 2023-06-01 RX ORDER — CEFTRIAXONE 2 G/50ML
2000 INJECTION, SOLUTION INTRAVENOUS EVERY 24 HOURS
Status: DISCONTINUED | OUTPATIENT
Start: 2023-06-02 | End: 2023-06-03 | Stop reason: HOSPADM

## 2023-06-01 RX ORDER — LISINOPRIL 20 MG/1
40 TABLET ORAL DAILY
Status: DISCONTINUED | OUTPATIENT
Start: 2023-06-02 | End: 2023-06-03 | Stop reason: HOSPADM

## 2023-06-01 RX ORDER — ATORVASTATIN CALCIUM 40 MG/1
40 TABLET, FILM COATED ORAL
Status: DISCONTINUED | OUTPATIENT
Start: 2023-06-02 | End: 2023-06-03 | Stop reason: HOSPADM

## 2023-06-01 RX ORDER — GABAPENTIN 400 MG/1
400 CAPSULE ORAL
Status: DISCONTINUED | OUTPATIENT
Start: 2023-06-01 | End: 2023-06-03 | Stop reason: HOSPADM

## 2023-06-01 RX ORDER — ACETAMINOPHEN 325 MG/1
650 TABLET ORAL EVERY 6 HOURS PRN
Status: DISCONTINUED | OUTPATIENT
Start: 2023-06-01 | End: 2023-06-03 | Stop reason: HOSPADM

## 2023-06-01 RX ORDER — CEFTRIAXONE 2 G/50ML
2000 INJECTION, SOLUTION INTRAVENOUS ONCE
Status: COMPLETED | OUTPATIENT
Start: 2023-06-01 | End: 2023-06-01

## 2023-06-01 RX ORDER — DEXTROSE MONOHYDRATE 25 G/50ML
25 INJECTION, SOLUTION INTRAVENOUS ONCE
Status: COMPLETED | OUTPATIENT
Start: 2023-06-01 | End: 2023-06-01

## 2023-06-01 RX ORDER — BENZONATATE 100 MG/1
100 CAPSULE ORAL 3 TIMES DAILY PRN
Status: DISCONTINUED | OUTPATIENT
Start: 2023-06-01 | End: 2023-06-03 | Stop reason: HOSPADM

## 2023-06-01 RX ORDER — AZITHROMYCIN 250 MG/1
250 TABLET, FILM COATED ORAL EVERY 24 HOURS
Status: DISCONTINUED | OUTPATIENT
Start: 2023-06-02 | End: 2023-06-03 | Stop reason: HOSPADM

## 2023-06-01 RX ORDER — LEVOTHYROXINE SODIUM 0.1 MG/1
100 TABLET ORAL
Status: DISCONTINUED | OUTPATIENT
Start: 2023-06-02 | End: 2023-06-03 | Stop reason: HOSPADM

## 2023-06-01 RX ORDER — SODIUM CHLORIDE 9 MG/ML
3 INJECTION INTRAVENOUS
Status: DISCONTINUED | OUTPATIENT
Start: 2023-06-01 | End: 2023-06-01

## 2023-06-01 RX ORDER — AMLODIPINE BESYLATE 5 MG/1
5 TABLET ORAL DAILY
Status: DISCONTINUED | OUTPATIENT
Start: 2023-06-02 | End: 2023-06-03 | Stop reason: HOSPADM

## 2023-06-01 RX ORDER — HEPARIN SODIUM 5000 [USP'U]/ML
5000 INJECTION, SOLUTION INTRAVENOUS; SUBCUTANEOUS EVERY 8 HOURS SCHEDULED
Status: DISCONTINUED | OUTPATIENT
Start: 2023-06-01 | End: 2023-06-03 | Stop reason: HOSPADM

## 2023-06-01 RX ORDER — CLOPIDOGREL BISULFATE 75 MG/1
75 TABLET ORAL DAILY
Status: DISCONTINUED | OUTPATIENT
Start: 2023-06-02 | End: 2023-06-03 | Stop reason: HOSPADM

## 2023-06-01 RX ADMIN — HEPARIN SODIUM 5000 UNITS: 5000 INJECTION INTRAVENOUS; SUBCUTANEOUS at 22:01

## 2023-06-01 RX ADMIN — GABAPENTIN 400 MG: 400 CAPSULE ORAL at 22:01

## 2023-06-01 RX ADMIN — DEXTROSE MONOHYDRATE 25 ML: 25 INJECTION, SOLUTION INTRAVENOUS at 19:18

## 2023-06-01 RX ADMIN — CEFTRIAXONE 2000 MG: 2 INJECTION, SOLUTION INTRAVENOUS at 19:36

## 2023-06-01 RX ADMIN — AZITHROMYCIN MONOHYDRATE 500 MG: 500 INJECTION, POWDER, LYOPHILIZED, FOR SOLUTION INTRAVENOUS at 20:17

## 2023-06-01 NOTE — ED PROVIDER NOTES
Emergency Department Trauma Note  Jennifer Marie 78 y o  male MRN: [id number]  Unit/Bed#: /416-01 Encounter: [id number]      Trauma Alert: Trauma Acuity: Trauma Evaluation  Model of Arrival: Mode of Arrival: ALS via Trauma Squad Name and Number: León Krause Team: Current Providers  Attending Provider: Shashi Cordoba DO  Attending Provider: Gustavo Belle MD  Registered Nurse: Ana Noel RN  Registered Nurse: Katina Rodriguez RN  Patient Care Assistant: Hill Mcqueen  Consultants:     None      History of Present Illness     Chief Complaint:   Chief Complaint   Patient presents with   • Trauma     Patient had a syncopal episode and fell and hit his head  On thinners     HPI:  Jennifer Marie is a 78 y o  male who presents from home via EMS after syncopal episode occurring while he was in his kitchen  Patient was standing at his sink when he suddenly lost consciousness and fell, striking his head against the floor  EMS found him on the floor, awake and conversant  Initial BPs were noted to be in the 39E systolic but improved subsequently to the 150s prior to administration of any IV fluids  Heart rates remained in the 60s-70s and patient remained consistently awake/alert throughout EMS transport  When I questioned the patient, he has no complaints at all  He did not have any prodromal symptoms including any chest pain/dyspnea/palpitations  He stated he had felt well earlier today and had not noted anything that was out of the ordinary  He has not had recurrent or frequent syncopal episodes  Has been taking his medications as prescribed  Does take clopidogrel due to prior history of CVA and has 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  Trauma activation given head injury with antiplatelet use  Will obtain CT head/C-spine  Chest x-ray  Will obtain ECG and electrolytes as well as troponin  Disposition pending      Mechanism:Details of Incident: patient had a syncopal episode for about 30 seconds  Patient fell and hit his head  ON thinners  Injury Date: 06/01/23 Injury Time: 1714 Injury Occurence Location - [address] [place] [address] Way: [address]      History provided by:  Medical records, patient and EMS personnel    Review of Systems   Eyes: Negative for photophobia and visual disturbance  Respiratory: Negative  Negative for chest tightness, shortness of breath and wheezing  Cardiovascular: Negative  Negative for chest pain and palpitations  Gastrointestinal: Negative for abdominal pain, nausea and vomiting  Musculoskeletal: Negative for arthralgias, back pain, myalgias, neck pain and neck stiffness  Neurological: Positive for syncope  Negative for dizziness, seizures, facial asymmetry, speech difficulty, weakness and light-headedness  Hematological: Negative  All other systems reviewed and are negative        Historical Information     Immunizations:   Immunization History   Administered Date(s) Administered   • COVID-19 PFIZER VACCINE 0 3 ML IM 03/30/2021, 04/20/2021, 11/02/2021   • COVID-19, unspecified 03/30/2021   • INFLUENZA 10/01/2007, 10/01/2008, 10/04/2009, 10/03/2010, 10/23/2012, 10/04/2013, 10/06/2014, 10/18/2016, 10/22/2019, 09/30/2020, 11/01/2022   • Influenza Split High Dose Preservative Free IM 10/01/2016, 10/01/2017, 10/20/2017, 10/01/2018, 10/22/2019   • Influenza, Seasonal Vaccine, Quadrivalent, Adjuvanted,  5e 09/28/2021   • Influenza, high dose seasonal 0 7 mL 10/30/2018, 09/30/2020   • Influenza, seasonal, injectable 10/15/2012, 10/13/2014, 10/08/2015   • Pneumococcal Conjugate 13-Valent 11/03/2017   • Pneumococcal Polysaccharide PPV23 12/15/2009, 10/08/2019, 12/20/2019   • Td (adult), Unspecified 05/01/2007   • Tdap 03/05/2016, 03/29/2016   • Zoster 07/03/2012, 01/01/2013   • Zoster Vaccine Recombinant 10/08/2020, 02/08/2021       Past Medical History:   Diagnosis Date   • Arthritis    • BPH (benign prostatic hyperplasia)     last assessed 4/29/2014 • Diabetes mellitus (HCC)    • Disease of thyroid gland    • GERD (gastroesophageal reflux disease)    • Hearing aid worn    • Hyperlipidemia    • Hypertension    • Hypothyroidism    • Mumps    • Prostate cancer ([facility] [address] )    • Stroke ([facility] [address] )    • Tingling sensation     bilateral feet occassionally   • Tinnitus    • Wears glasses    • Wears partial dentures     upper       Family History   Problem Relation Age of Onset   • Cancer Mother    • Diabetes Mother    • Uterine cancer Mother    • Diabetes Father    • Cancer Father    • Stroke Father         of unknown cause   • Hypertension Father    • Prostate cancer Father    • Diabetes Family         Uncle, DM   • Cancer Family         Grandmother     Past Surgical History:   Procedure Laterality Date   • CARDIAC ELECTROPHYSIOLOGY PROCEDURE Left 11/4/2021    Procedure: Reveal implant;  Surgeon: Jeff Haskins MD;  Location:  CARDIAC CATH LAB; Service: Cardiology   • CATARACT EXTRACTION Bilateral 2000   • EYE SURGERY     • JOINT REPLACEMENT     • KNEE ARTHROSCOPY Right 06/26/2009    knee   • OK LAPS SURG [id number] RPBIC RAD W/NRV SPARING ROBOT N/A 2/7/2018    Procedure: ROBOTIC ASSISTED LAPAROSCOPIC PROSTATECTOMY; BILATERAL PELVIC LYMPH NODE DISSECTION;  Surgeon: Lindsey Cheung MD;  Location: [facility];  Service: Urology   • OK RPR 1ST [place] AGE 5 YRS/> REDUCIBLE Right 4/19/2019    Procedure: REPAIR HERNIA INGUINAL;  Surgeon: Lennox Ortega DO;  Location: [facility];  Service: General   • PROSTATE BIOPSY  11/07/2017    needle biopsy of prostate   • TRIGGER FINGER RELEASE  05/2013    trigger thumb     Social History     Tobacco Use   • Smoking status: Former   • Smokeless tobacco: Never   • Tobacco comments:     quit about 50 years ago   Vaping Use   • Vaping Use: Never used   Substance Use Topics   • Alcohol use: Not Currently     Alcohol/week: 2 0 standard drinks of alcohol     Types: 2 Glasses of wine per week     Comment: week, social drinker   • Drug use:  No E-Cigarette/Vaping   • E-Cigarette Use Never User      E-Cigarette/Vaping Substances   • Nicotine No    • THC No    • CBD No    • Flavoring No    • Other No    • Unknown No        Family History: non-contributory    Meds/Allergies   Prior to Admission Medications   Prescriptions Last Dose Informant Patient Reported? Taking? Dysport 500 units SOLR 6/1/2023  Yes Yes   Glucagon 1 MG/0 2ML SOSY 6/1/2023 Spouse/Significant Other, Self Yes Yes   Multiple Vitamin (MULTIVITAMIN) tablet 6/1/2023 Spouse/Significant Other, Self Yes Yes   Sig: Take 1 tablet by mouth daily     PATIENT MAINTAINED INSULIN PUMP 6/1/2023 Spouse/Significant Other, Self Yes Yes   Sig: Inject 0 4 each under the skin continuous    amLODIPine (NORVASC) 5 mg tablet 6/1/2023 Self No Yes   Sig: Take 1 tablet (5 mg total) by mouth daily   atorvastatin (LIPITOR) 40 mg tablet 6/1/2023 Spouse/Significant Other, Self No Yes   Sig: Take 1 tablet (40 mg total) by mouth daily with dinner   baclofen 10 mg tablet 6/1/2023 Self No Yes   Sig: Take 1 tablet (10 mg total) by mouth 3 (three) times a day   clopidogrel (PLAVIX) 75 mg tablet 6/1/2023 Self No Yes   Sig: Take 1 tablet (75 mg total) by mouth daily   docusate sodium (COLACE) 100 mg capsule 6/1/2023 Spouse/Significant Other, Self Yes Yes   Sig: Take 100 mg by mouth if needed   gabapentin (NEURONTIN) 400 mg capsule 6/1/2023 Self No Yes   Sig: Take 1 capsule (400 mg total) by mouth daily at bedtime   levothyroxine (Synthroid) 100 mcg tablet 6/1/2023 Spouse/Significant Other, Self No Yes   Sig: Take 1 tablet (100 mcg total) by mouth daily in the early morning   lisinopril (ZESTRIL) 40 mg tablet 6/1/2023 Self No Yes   Sig: Take 1 tablet (40 mg total) by mouth daily      Facility-Administered Medications: None       No Known Allergies    PHYSICAL EXAM    Objective   Vitals:   First set: Temperature: (!) 96 5 °F (35 8 °C) (06/01/23 1800)  Pulse: 70 (06/01/23 1800)  Respirations: 18 (06/01/23 1800)  Blood Pressure: 163/86 (06/01/23 1800)  SpO2: 96 % (06/01/23 1800)    Primary Survey:   Primary survey completed at time of initial evaluation:  Airway patent  Patient phonating normally with no stridor/dysphonia  Lung sounds are present bilaterally with no wheeze/crackles/rhonchi  Distal pulses are present in all extremities: 2+ radial/DP/PT  There is no visible external hemorrhage  GCS 15  RUE/RLE normal strength; spastic hemiplegia in LUE/LLE  Sensation intact in all extremities  Exposure completed to assess for other occult injuries  Secondary survey was completed for all other injuries  Notable findings are reviewed in the full examination section  Secondary Survey:   Physical Exam  Vitals and nursing note reviewed  Constitutional:       General: He is awake  He is not in acute distress  Appearance: Normal appearance  He is well-developed  HENT:      Head: Normocephalic and atraumatic  Right Ear: Hearing and external ear normal       Left Ear: Hearing and external ear normal    Neck:      Thyroid: No thyroid mass, thyromegaly or thyroid tenderness  Trachea: Trachea and phonation normal    Cardiovascular:      Rate and Rhythm: Normal rate and regular rhythm  Pulses:           Radial pulses are 2+ on the right side and 2+ on the left side  Dorsalis pedis pulses are 2+ on the right side and 2+ on the left side  Posterior tibial pulses are 2+ on the right side and 2+ on the left side  Heart sounds: Normal heart sounds, S1 normal and S2 normal  No murmur heard  No friction rub  No gallop  Pulmonary:      Effort: Pulmonary effort is normal  No respiratory distress  Breath sounds: Normal breath sounds  No stridor  No decreased breath sounds, wheezing, rhonchi or rales  Abdominal:      General: There is no distension  Palpations: There is no mass  Tenderness: There is no abdominal tenderness  There is no guarding or rebound        Comments: RLQ insulin pump Musculoskeletal:      Cervical back: No spinous process tenderness or muscular tenderness  Comments: No posterior midline C/T/L-spine tender to palpation or step-off  Pelvis stable and nontender to compression   Skin:     General: Skin is warm and dry  Neurological:      Mental Status: He is alert and oriented to person, place, and time  GCS: GCS eye subscore is 4  GCS verbal subscore is 5  GCS motor subscore is 6  Cranial Nerves: Cranial nerves 2-12 are intact  No cranial nerve deficit  Sensory: Sensation is intact  No sensory deficit  Motor: Weakness and abnormal muscle tone present  Comments: Slightly slow to respond verbally  Attentive to surroundings but slightly delayed  PERRLA; EOMI  Sensation intact to light touch over face in V1-V3 distribution bilaterally  Facial expressions symmetric  Tongue/uvula midline  Shoulder shrug equal bilaterally  Sensation intact to light touch in UE/LE bilaterally  Spastic hemiplegia in left upper/left lower extremity         Cervical spine cleared by clinical criteria? No (imaging required)      Invasive Devices     Peripheral Intravenous Line  Duration           Peripheral IV 06/01/23 Left Wrist <1 day                Lab Results:   Results Reviewed     Procedure Component Value Units Date/Time    HS Troponin I 2hr [[id number]]  (Normal) Collected: 06/01/23 2016    Lab Status: Final result Specimen: Blood from Arm, Right Updated: 06/01/23 2100     hs TnI 2hr 6 ng/L      Delta 2hr hsTnI -2 ng/L     Fingerstick Glucose (POCT) [[id number]]  (Abnormal) Collected: 06/01/23 2015    Lab Status: Final result Updated: 06/01/23 2016     POC Glucose 209 mg/dl     Lactic acid, plasma (w/reflex if result > 2 0) [[id number]]  (Normal) Collected: 06/01/23 1935    Lab Status: Final result Specimen: Blood from Arm, Left Updated: 06/01/23 2007     LACTIC ACID 0 8 mmol/L     Narrative:      Result may be elevated if tourniquet was used during collection      Blood culture #2 [[id number]] Collected: 06/01/23 1935    Lab Status: In process Specimen: Blood from Arm, Right Updated: 06/1947    Blood culture #1 [[id number]] Collected: 06/01/23 1935    Lab Status:  In process Specimen: Blood from Arm, Left Updated: 06/1947    Procalcitonin [[id number]]  (Normal) Collected: 06/01/23 1804    Lab Status: Final result Specimen: Blood from Arm, Left Updated: 06/01/23 1938     Procalcitonin 0 08 ng/ml     Fingerstick Glucose (POCT) [[id number]]  (Abnormal) Collected: 06/01/23 1926    Lab Status: Final result Updated: 06/01/23 1928     POC Glucose 220 mg/dl     Fingerstick Glucose (POCT) [[id number]]  (Abnormal) Collected: 06/01/23 1906    Lab Status: Final result Updated: 06/01/23 1907     POC Glucose 50 mg/dl     Fingerstick Glucose (POCT) [[id number]]  (Abnormal) Collected: 06/01/23 1844    Lab Status: Final result Updated: 06/01/23 1845     POC Glucose 49 mg/dl     TSH [[id number]]  (Abnormal) Collected: 06/01/23 1804    Lab Status: Final result Specimen: Blood from Arm, Left Updated: 06/01/23 1839     TSH 3RD GENERATON 5 622 uIU/mL     HS Troponin 0hr (reflex protocol) [[id number]]  (Normal) Collected: 06/01/23 1804    Lab Status: Final result Specimen: Blood from Arm, Left Updated: 06/01/23 1832     hs TnI 0hr 8 ng/L     Basic metabolic panel [[id number]]  (Abnormal) Collected: 06/01/23 1804    Lab Status: Final result Specimen: Blood from Arm, Left Updated: 06/01/23 1824     Sodium 128 mmol/L      Potassium 3 4 mmol/L      Chloride 94 mmol/L      CO2 24 mmol/L      ANION GAP 10 mmol/L      BUN 34 mg/dL      Creatinine 1 43 mg/dL      Glucose 74 mg/dL      Calcium 9 3 mg/dL      eGFR 47 ml/min/1 73sq m     Narrative:      [place] guidelines for Chronic Kidney Disease (CKD):   •  Stage 1 with normal or high GFR (GFR > 90 mL/min/1 73 square meters)  •  Stage 2 Mild CKD (GFR = 60-89 mL/min/1 73 square meters)  •  Stage 3A Moderate CKD (GFR = 45-59 mL/min/1 73 square meters)  •  Stage 3B Moderate CKD (GFR = 30-44 mL/min/1 73 square meters)  •  Stage 4 Severe CKD (GFR = 15-29 mL/min/1 73 square meters)  •  Stage 5 End Stage CKD (GFR <15 mL/min/1 73 square meters)  Note: GFR calculation is accurate only with a steady state creatinine    Magnesium [[id number]]  (Normal) Collected: 06/01/23 1804    Lab Status: Final result Specimen: Blood from Arm, Left Updated: 06/01/23 1824     Magnesium 2 1 mg/dL     CBC and differential [[id number]]  (Abnormal) Collected: 06/01/23 1804    Lab Status: Final result Specimen: Blood from Arm, Left Updated: 06/01/23 1808     WBC 10 26 Thousand/uL      RBC 4 01 Million/uL      Hemoglobin 13 1 g/dL      Hematocrit 37 9 %      MCV 95 fL      MCH 32 7 pg      MCHC 34 6 g/dL      RDW 12 9 %      MPV 9 1 fL      Platelets 035 Thousands/uL      nRBC 0 /100 WBCs      Neutrophils Relative 64 %      Immat GRANS % 0 %      Lymphocytes Relative 19 %      Monocytes Relative 11 %      Eosinophils Relative 5 %      Basophils Relative 1 %      Neutrophils Absolute 6 60 Thousands/µL      Immature Grans Absolute 0 03 Thousand/uL      Lymphocytes Absolute 1 93 Thousands/µL      Monocytes Absolute 1 12 Thousand/µL      Eosinophils Absolute 0 51 Thousand/µL      Basophils Absolute 0 07 Thousands/µL                  Imaging Studies:   Direct to CT: No  TRAUMA - CT head wo contrast   Final Result by Kyle Bolden MD (06/01 1834)      No acute intracranial abnormality  The study was marked in [facility] for immediate notification  Workstation performed: [id number]         TRAUMA - CT spine cervical wo contrast   Final Result by Kyle Bolden MD (06/01 1841)      No cervical spine fracture or traumatic malalignment  The study was marked in [facility] for immediate notification  Workstation performed: [id number]         XR Trauma chest portable   ED Interpretation by Rufus Turk DO (06/01 1806)   Airway is midline   Lungs are clear bilaterally with no evidence of pulmonary vascular congestion/focal infiltrate/pleural effusion/pneumothorax  Cardiac and mediastinal silhouettes are within normal limits  Osseous structures appear normal         Final Result by Nathalia Nunez MD (06/01 1845)      Left lower lobe pulmonary opacity  Clinical correlation for pneumonia  The study was marked in [facility] for immediate notification  Workstation performed: [id number]               Procedures  Procedures         ED Course  ED Course as of 06/01/23 2153   Thu Jun 01, 2023   1804 ECG NSR 70 bpm    QTc 419  No acute st/t changes  Baseline variability  No ectopy  Normal axis  Interpreted by me   1820 CBC and differential(!)  Mild leukocytosis  Hg/Hct wnl  Plt wnl   1822 CT completed and awaiting interpretation   1837 TRAUMA - CT head wo contrast     FINDINGS:     PARENCHYMA: Decreased attenuation is noted in periventricular and subcortical white matter demonstrating an appearance that is statistically most likely to represent mild microangiopathic change; this appearance is similar when compared to most recent   prior examination      No CT signs of acute infarction  No intracranial mass, mass effect or midline shift  No acute parenchymal hemorrhage      VENTRICLES AND EXTRA-AXIAL SPACES:  Normal for the patient's age      VISUALIZED ORBITS: Normal visualized orbits      PARANASAL SINUSES: Mild mucosal thickening of the visualized paranasal sinuses      CALVARIUM AND EXTRACRANIAL SOFT TISSUES:  Normal      IMPRESSION:     No acute intracranial abnormality      The study was marked in EPIC for immediate notification  1837 HS Troponin 0hr (reflex protocol)  Non-ischemic; will require repeat value   3610 Basic metabolic panel(!)  Hyponatremia slightly worsened compared to prior  Mild hypoK  Renal function at baseline   1848 Fingerstick Glucose (POCT)(! )  Hypoglycemia may account for the syncopal episode patient experienced earlier today; will give him something to eat and recheck   1848 TSH(!)  Mildly elevated   1858 TRAUMA - CT spine cervical wo contrast  FINDINGS:     ALIGNMENT:  Normal alignment of the cervical spine  No subluxation      VERTEBRAE:  No fracture      DEGENERATIVE CHANGES: Mild multilevel cervical degenerative changes are noted without critical central canal stenosis      PREVERTEBRAL AND PARASPINAL SOFT TISSUES: Unremarkable     THORACIC INLET:  Normal      IMPRESSION:     No cervical spine fracture or traumatic malalignment      The study was marked in EPIC for immediate notification  1859 XR Trauma chest portable(*)  FINDINGS: Loop recorder left chest      Cardiomediastinal silhouette appears unremarkable      Left lower lobe airspace opacity  Clinical correlation for pneumonia  No pleural effusion or pneumothorax      Osseous structures appear within normal limits for patient age      IMPRESSION:     Left lower lobe pulmonary opacity  Clinical correlation for pneumonia      The study was marked in EPIC for immediate notification       1915 Discussed with patient's wife at bedside  Patient has had a cough for at least the past week productive of phlegm not with any dyspnea or fever  Patient's wife notes that he is sometimes less responsive or slightly more confused in the setting of other illness: she is concerned about the possibility of concussion as well given that he did strike his head  Chest x-ray findings of pneumonia combined with mild hyponatremia and mild leukocytosis concerning for an atypical infection  This may be contributing to his hypoglycemia as well and have precipitated the fall earlier this evening  Will cover for typical CAP pathogens and plan to admit  IV dextrose was given as patient felt nauseated and did not wish to eat  His wife confirmed that he did eat a full meal at dinner however including all of the carbohydrates for which he budgeted insulin coverage    He had become hypoglycemic despite eating dinner again suggesting an underlying pathology  1938 Fingerstick Glucose (POCT)(! )  Improved after IV dextrose  36 D/w Dr Mahi Li of internal medicine who accepted patient to observation status under Dr Tiffani Juarez           MDM            Disposition  [facility] Transfer: No  Final diagnoses:   Syncope   Hypoglycemia   Closed head injury, initial encounter   Left lower lobe pneumonia     Time reflects when diagnosis was documented in both MDM as applicable and the Disposition within this note     Time User Action Codes Description Comment    6/1/2023  7:56 PM Martha Savanna Add [R55] Syncope     6/1/2023  7:56 PM Martha Savanna Add [E16 2] Hypoglycemia     6/1/2023  7:57 PM Martha Savanna Add [E51 47CL] Closed head injury, initial encounter     6/1/2023  7:57 PM Martha Savanna Add [J18 9] Left lower lobe pneumonia       ED Disposition     ED Disposition   Admit    Condition   Stable    Date/Time   Thu Jun 1, 2023  7:56 PM    Comment   Case was discussed with Dr Mahi Li and the patient's admission status was agreed to be Admission Status: observation status to the service of Dr Tiffani Juarez              Follow-up Information    None       Current Discharge Medication List      CONTINUE these medications which have NOT CHANGED    Details   amLODIPine (NORVASC) 5 mg tablet Take 1 tablet (5 mg total) by mouth daily  Qty: 90 tablet, Refills: 3    Associated Diagnoses: Essential hypertension      atorvastatin (LIPITOR) 40 mg tablet Take 1 tablet (40 mg total) by mouth daily with dinner  Qty: 30 tablet, Refills: 0    Associated Diagnoses: Hyperlipidemia, unspecified hyperlipidemia type      baclofen 10 mg tablet Take 1 tablet (10 mg total) by mouth 3 (three) times a day  Qty: 270 tablet, Refills: 1    Associated Diagnoses: 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)      clopidogrel (PLAVIX) 75 mg tablet Take 1 tablet (75 mg total) by mouth daily  Qty: 90 tablet, Refills: 1    Associated Diagnoses: Cerebrovascular accident (CVA), unspecified mechanism ([facility] [address] )      docusate sodium (COLACE) 100 mg capsule Take 100 mg by mouth if needed      Dysport 500 units SOLR       gabapentin (NEURONTIN) 400 mg capsule Take 1 capsule (400 mg total) by mouth daily at bedtime  Qty: 90 capsule, Refills: 1    Associated Diagnoses: Spastic hemiplegia affecting left nondominant side ([facility] [address] ); History of stroke      Glucagon 1 MG/0 2ML SOSY       levothyroxine (Synthroid) 100 mcg tablet Take 1 tablet (100 mcg total) by mouth daily in the early morning  Qty: 90 tablet, Refills: 3    Associated Diagnoses: Acquired hypothyroidism      lisinopril (ZESTRIL) 40 mg tablet Take 1 tablet (40 mg total) by mouth daily  Qty: 90 tablet, Refills: 3    Associated Diagnoses: Primary hypertension      Multiple Vitamin (MULTIVITAMIN) tablet Take 1 tablet by mouth daily  PATIENT MAINTAINED INSULIN PUMP Inject 0 4 each under the skin continuous            No discharge procedures on file      PDMP Review       Value Time User    PDMP Reviewed  Yes 3/25/2021  8:52 AM Morey Dandy, PA-C          ED Provider  Electronically Signed by         Zaid Lai DO  06/01/23 2686

## 2023-06-02 LAB
ANION GAP SERPL CALCULATED.3IONS-SCNC: 6 MMOL/L (ref 4–13)
BASOPHILS # BLD AUTO: 0.02 THOUSANDS/ÂΜL (ref 0–0.1)
BASOPHILS NFR BLD AUTO: 0 % (ref 0–1)
BUN SERPL-MCNC: 30 MG/DL (ref 5–25)
CALCIUM SERPL-MCNC: 8.8 MG/DL (ref 8.4–10.2)
CHLORIDE SERPL-SCNC: 92 MMOL/L (ref 96–108)
CO2 SERPL-SCNC: 25 MMOL/L (ref 21–32)
CREAT SERPL-MCNC: 1.47 MG/DL (ref 0.6–1.3)
EOSINOPHIL # BLD AUTO: 0.01 THOUSAND/ÂΜL (ref 0–0.61)
EOSINOPHIL NFR BLD AUTO: 0 % (ref 0–6)
ERYTHROCYTE [DISTWIDTH] IN BLOOD BY AUTOMATED COUNT: 13.1 % (ref 11.6–15.1)
GFR SERPL CREATININE-BSD FRML MDRD: 46 ML/MIN/1.73SQ M
GLUCOSE P FAST SERPL-MCNC: 404 MG/DL (ref 65–99)
GLUCOSE SERPL-MCNC: 197 MG/DL (ref 65–140)
GLUCOSE SERPL-MCNC: 268 MG/DL (ref 65–140)
GLUCOSE SERPL-MCNC: 285 MG/DL (ref 65–140)
GLUCOSE SERPL-MCNC: 297 MG/DL (ref 65–140)
GLUCOSE SERPL-MCNC: 315 MG/DL (ref 65–140)
GLUCOSE SERPL-MCNC: 385 MG/DL (ref 65–140)
GLUCOSE SERPL-MCNC: 397 MG/DL (ref 65–140)
GLUCOSE SERPL-MCNC: 404 MG/DL (ref 65–140)
HCT VFR BLD AUTO: 34.3 % (ref 36.5–49.3)
HGB BLD-MCNC: 11.8 G/DL (ref 12–17)
IMM GRANULOCYTES # BLD AUTO: 0.02 THOUSAND/UL (ref 0–0.2)
IMM GRANULOCYTES NFR BLD AUTO: 0 % (ref 0–2)
L PNEUMO1 AG UR QL IA.RAPID: NEGATIVE
LYMPHOCYTES # BLD AUTO: 0.61 THOUSANDS/ÂΜL (ref 0.6–4.47)
LYMPHOCYTES NFR BLD AUTO: 6 % (ref 14–44)
MCH RBC QN AUTO: 32.5 PG (ref 26.8–34.3)
MCHC RBC AUTO-ENTMCNC: 34.4 G/DL (ref 31.4–37.4)
MCV RBC AUTO: 95 FL (ref 82–98)
MONOCYTES # BLD AUTO: 0.63 THOUSAND/ÂΜL (ref 0.17–1.22)
MONOCYTES NFR BLD AUTO: 7 % (ref 4–12)
NEUTROPHILS # BLD AUTO: 8.21 THOUSANDS/ÂΜL (ref 1.85–7.62)
NEUTS SEG NFR BLD AUTO: 87 % (ref 43–75)
NRBC BLD AUTO-RTO: 0 /100 WBCS
OSMOLALITY UR: 337 MMOL/KG
PLATELET # BLD AUTO: 274 THOUSANDS/UL (ref 149–390)
PMV BLD AUTO: 9.5 FL (ref 8.9–12.7)
POTASSIUM SERPL-SCNC: 4.4 MMOL/L (ref 3.5–5.3)
RBC # BLD AUTO: 3.63 MILLION/UL (ref 3.88–5.62)
S PNEUM AG UR QL: NEGATIVE
SODIUM 24H UR-SCNC: 28 MOL/L
SODIUM SERPL-SCNC: 123 MMOL/L (ref 135–147)
WBC # BLD AUTO: 9.5 THOUSAND/UL (ref 4.31–10.16)

## 2023-06-02 PROCEDURE — G0425 INPT/ED TELECONSULT30: HCPCS | Performed by: STUDENT IN AN ORGANIZED HEALTH CARE EDUCATION/TRAINING PROGRAM

## 2023-06-02 PROCEDURE — 87449 NOS EACH ORGANISM AG IA: CPT | Performed by: FAMILY MEDICINE

## 2023-06-02 PROCEDURE — 85025 COMPLETE CBC W/AUTO DIFF WBC: CPT | Performed by: FAMILY MEDICINE

## 2023-06-02 PROCEDURE — 99233 SBSQ HOSP IP/OBS HIGH 50: CPT | Performed by: FAMILY MEDICINE

## 2023-06-02 PROCEDURE — 84300 ASSAY OF URINE SODIUM: CPT | Performed by: FAMILY MEDICINE

## 2023-06-02 PROCEDURE — 82948 REAGENT STRIP/BLOOD GLUCOSE: CPT

## 2023-06-02 PROCEDURE — 83935 ASSAY OF URINE OSMOLALITY: CPT | Performed by: FAMILY MEDICINE

## 2023-06-02 PROCEDURE — 87186 SC STD MICRODIL/AGAR DIL: CPT | Performed by: FAMILY MEDICINE

## 2023-06-02 PROCEDURE — 97167 OT EVAL HIGH COMPLEX 60 MIN: CPT

## 2023-06-02 PROCEDURE — 87070 CULTURE OTHR SPECIMN AEROBIC: CPT | Performed by: FAMILY MEDICINE

## 2023-06-02 PROCEDURE — 87077 CULTURE AEROBIC IDENTIFY: CPT | Performed by: FAMILY MEDICINE

## 2023-06-02 PROCEDURE — 80048 BASIC METABOLIC PNL TOTAL CA: CPT | Performed by: FAMILY MEDICINE

## 2023-06-02 PROCEDURE — 87205 SMEAR GRAM STAIN: CPT | Performed by: FAMILY MEDICINE

## 2023-06-02 PROCEDURE — 97163 PT EVAL HIGH COMPLEX 45 MIN: CPT

## 2023-06-02 RX ORDER — INSULIN LISPRO 100 [IU]/ML
1-5 INJECTION, SOLUTION INTRAVENOUS; SUBCUTANEOUS
Status: DISCONTINUED | OUTPATIENT
Start: 2023-06-02 | End: 2023-06-02

## 2023-06-02 RX ORDER — SODIUM CHLORIDE 9 MG/ML
75 INJECTION, SOLUTION INTRAVENOUS CONTINUOUS
Status: DISCONTINUED | OUTPATIENT
Start: 2023-06-02 | End: 2023-06-03 | Stop reason: HOSPADM

## 2023-06-02 RX ADMIN — CEFTRIAXONE 2000 MG: 2 INJECTION, SOLUTION INTRAVENOUS at 18:08

## 2023-06-02 RX ADMIN — AMLODIPINE BESYLATE 5 MG: 5 TABLET ORAL at 08:22

## 2023-06-02 RX ADMIN — GABAPENTIN 400 MG: 400 CAPSULE ORAL at 21:34

## 2023-06-02 RX ADMIN — INSULIN LISPRO 1 UNITS: 100 INJECTION, SOLUTION INTRAVENOUS; SUBCUTANEOUS at 16:52

## 2023-06-02 RX ADMIN — SODIUM CHLORIDE 75 ML/HR: 0.9 INJECTION, SOLUTION INTRAVENOUS at 17:05

## 2023-06-02 RX ADMIN — INSULIN LISPRO 5 UNITS: 100 INJECTION, SOLUTION INTRAVENOUS; SUBCUTANEOUS at 08:24

## 2023-06-02 RX ADMIN — CLOPIDOGREL BISULFATE 75 MG: 75 TABLET ORAL at 08:22

## 2023-06-02 RX ADMIN — HEPARIN SODIUM 5000 UNITS: 5000 INJECTION INTRAVENOUS; SUBCUTANEOUS at 05:17

## 2023-06-02 RX ADMIN — LEVOTHYROXINE SODIUM 100 MCG: 100 TABLET ORAL at 05:16

## 2023-06-02 RX ADMIN — INSULIN LISPRO 2 UNITS: 100 INJECTION, SOLUTION INTRAVENOUS; SUBCUTANEOUS at 11:58

## 2023-06-02 RX ADMIN — HEPARIN SODIUM 5000 UNITS: 5000 INJECTION INTRAVENOUS; SUBCUTANEOUS at 21:34

## 2023-06-02 RX ADMIN — HEPARIN SODIUM 5000 UNITS: 5000 INJECTION INTRAVENOUS; SUBCUTANEOUS at 14:07

## 2023-06-02 RX ADMIN — ATORVASTATIN CALCIUM 40 MG: 40 TABLET, FILM COATED ORAL at 16:52

## 2023-06-02 RX ADMIN — AZITHROMYCIN MONOHYDRATE 250 MG: 250 TABLET ORAL at 20:26

## 2023-06-02 RX ADMIN — MULTIPLE VITAMINS W/ MINERALS TAB 1 TABLET: TAB at 08:22

## 2023-06-02 RX ADMIN — LISINOPRIL 40 MG: 20 TABLET ORAL at 08:22

## 2023-06-02 NOTE — OCCUPATIONAL THERAPY NOTE
Occupational Therapy Evaluation     Patient Name: Feliberto MANRIQUEZ Date: 6/2/2023  Problem List  Principal Problem:    Pneumonia of left lower lobe due to infectious organism  Active Problems:    DMII (diabetes mellitus, type 2) ([facility] [address] )    Hypothyroidism    Hyperlipidemia    Stage 3 chronic kidney disease ([facility] [address] )    Essential hypertension    History of stroke    Ambulatory dysfunction    Hyponatremia    Insulin pump in place    Hypoglycemia    Left lower lobe pneumonia    Past Medical History  Past Medical History:   Diagnosis Date    Arthritis     BPH (benign prostatic hyperplasia)     last assessed 4/29/2014    Diabetes mellitus ([facility] [address] )     Disease of thyroid gland     GERD (gastroesophageal reflux disease)     Hearing aid worn     Hyperlipidemia     Hypertension     Hypothyroidism     Mumps     Prostate cancer ([facility] [address] )     Stroke ([facility] [address] )     Tingling sensation     bilateral feet occassionally    Tinnitus     Wears glasses     Wears partial dentures     upper     Past Surgical History  Past Surgical History:   Procedure Laterality Date    CARDIAC ELECTROPHYSIOLOGY PROCEDURE Left 11/4/2021    Procedure: Reveal implant;  Surgeon: Eugene Gordon MD;  Location: [facility];   Service: Cardiology    CATARACT EXTRACTION Bilateral 2000    EYE SURGERY      JOINT REPLACEMENT      KNEE ARTHROSCOPY Right 06/26/2009    knee    ME LAPS SURG [id number] RPBIC RAD W/NRV SPARING ROBOT N/A 2/7/2018    Procedure: ROBOTIC ASSISTED LAPAROSCOPIC PROSTATECTOMY; BILATERAL PELVIC LYMPH NODE DISSECTION;  Surgeon: Tejas Salinas MD;  Location: [facility];  Service: Urology    ME RPR 1ST Azael Chaka AGE 7 YRS/> REDUCIBLE Right 4/19/2019    Procedure: REPAIR HERNIA INGUINAL;  Surgeon: Paul Koenig DO;  Location: [facility] MAIN OR;  Service: General    PROSTATE BIOPSY  11/07/2017    needle biopsy of prostate    TRIGGER FINGER RELEASE  05/2013    trigger thumb             06/02/23 1050   OT Last Visit   OT Visit Date 06/02/23   Note Type "  Note type Evaluation   Pain Assessment   Pain Score No Pain   Restrictions/Precautions   Weight Bearing Precautions Per Order No   Braces or Orthoses Other (Comment)  (MAFO to L LE)   Other Precautions Fall Risk;Telemetry   Home Living   Type of [address] [facility] Two level;1/2 bath on main level;Bed/bath upstairs; Other (Comment)  (0 INEZ c HR; FOS to 2nd c HR)   Bathroom Shower/Tub Tub/shower unit   Bathroom Toilet Standard   Bathroom Equipment Commode; Shower chair   [address] Cane;Grab bars   Additional Comments pt performs functional mobility with SPC at baseline   Prior Function   Level of [place] Needs assistance with ADLs; Needs assistance with IADLS;Needs assistance with functional mobility   Lives With Spouse   Receives Help From Family   IADLs Family/Friend/Other provides transportation; Family/Friend/Other provides meals; Family/Friend/Other provides medication management   Falls in the last 6 months 1 to 4   Comments pt's wife (A) with ADLs and donrefugio MAFO; pt recently evaluated by OP PT services   Subjective   Subjective \"My wife and I have a system\"   ADL   Where Assessed Chair   LB Dressing Assistance 3  Moderate Assistance   LB Dressing Deficit Don/doff R sock; Don/doff L sock; Don/doff R shoe;Don/doff L shoe  (donning MAFO)   Bed Mobility   Additional Comments pt seated in chair at start and end of session; pt on RA with no complaints of SOB   Transfers   Sit to Stand 5  Supervision   Additional items Increased time required;Verbal cues  (SPC)   Stand to Sit 5  Supervision   Additional items Increased time required;Verbal cues  [facility])   Additional Comments pt performs functional transfers with (S) level; no significant LOB or instability   Functional Mobility   Functional Mobility 5  Supervision   Additional Comments pt performs functional mobility with SPC; performs ~250ft with no significant LOB or instability   Additional items [facility]   Balance   Static " Sitting Good   Dynamic Sitting Good   Static Standing Fair +   Dynamic Standing Fair +   Ambulatory Fair +   Activity Tolerance   Activity Tolerance Patient limited by fatigue   RUE Assessment   RUE Assessment WFL   LUE Assessment   LUE Assessment X  (pt with limtied AROM grossly and inability to perform tasks appropriately with UE)   Hand Function   Gross Motor Coordination Impaired   Fine Motor Coordination Impaired   Hand Function Comments L UE-previous CVA   Sensation   Light Touch No apparent deficits   Sharp/Dull No apparent deficits   Psychosocial   Psychosocial (WDL) WDL   Cognition   Overall Cognitive Status WFL   Arousal/Participation Alert   Attention Within functional limits   Orientation Level Oriented X4   Memory Within functional limits   Following Commands Follows all commands and directions without difficulty   Assessment   Limitation Decreased ADL status; Decreased UE ROM; Decreased UE strength;Decreased Safe judgement during ADL;Decreased endurance;Decreased high-level ADLs; Decreased self-care trans;Decreased fine motor control   Assessment Pt is a 78 y o  male seen for OT evaluation s/p admit to [facility] on 6/1/2023 w/ Pneumonia of left lower lobe due to infectious organism  Comorbidities affecting pt's functional performance at time of assessment include: DM, disease of thyroid gland, HLD, prostate CA, arthritis, GERD, hypothyroidism, mumps, CVA  Personal factors affecting pt at time of IE include:difficulty performing ADLS and difficulty performing IADLS   Prior to admission, pt was (A) with ADLs and IADLs with use of SPC during mobility  Upon evaluation: Pt requires (S)-max (A)  with use of SPC during mobility 2* the following deficits impacting occupational performance: weakness, decreased strength, decreased balance, decreased tolerance, impaired initiation, decreased safety awareness and impaired interpersonal skills   Pt to benefit from continued skilled OT tx while in the hospital to address deficits as defined above and maximize level of functional independence w ADL's and functional mobility  Occupational Performance areas to address include: grooming, bathing/shower, toilet hygiene, dressing, functional mobility, community mobility and clothing management  The patient's raw score on the AM-PAC Daily Activity Inpatient Short Form is 16  A raw score of less than 19 suggests the patient may benefit from discharge to post-acute rehabilitation services  Please refer to the recommendation of the Occupational Therapist for safe discharge planning  Pt benefited from co-evaluation of skilled OT and PT therapists in order to most appropriately address functional deficits d/t extensive assistance required for safe functional mobility, decreased activity tolerance, and regression from functioning level prior to admission and/or onset of present illness  OT/PT objectives were addressed separately; please see PT note for specific goal areas targeted  Goals   Patient Goals to go home   Short Term Goal  pt will perform UE strengthening exercises   Long Term Goal #1 pt will demonstrate UB/LB bathing and grooming tasks at min (A) level   Long Term Goal #2 pt will demonstrate toilet transfers at min (A) level   Long Term Goal pt will demonstrate functional mobility with SPC at mod (I) level   Plan   Treatment Interventions ADL retraining;Functional transfer training;UE strengthening/ROM; Endurance training;Patient/family training;Equipment evaluation/education; Activityengagement; Compensatory technique education   Goal Expiration Date 06/16/23   OT Frequency 3-5x/wk   Recommendation   OT Discharge Recommendation Home with outpatient rehabilitation   Additional Comments  continue with OP PT services   [facility] Daily Activity Inpatient   Lower Body Dressing 2   Bathing 2   Toileting 3   Upper Body Dressing 3   Grooming 3   Eating 3   Daily Activity Raw Score 16   Daily Activity Standardized Score (Calc for Raw Score >=11) 35 96   AM-PAC Applied Cognition Inpatient   Following a Speech/Presentation 4   Understanding Ordinary Conversation 4   Taking Medications 4   Remembering Where Things Are Placed or Put Away 4   Remembering List of 4-5 Errands 4   Taking Care of Complicated Tasks 4   Applied Cognition Raw Score 24   Applied Cognition Standardized Score 62 21

## 2023-06-02 NOTE — ASSESSMENT & PLAN NOTE
As noted on chest x-ray  Continue with azithromycin and ceftriaxone  Antitussive medications as needed

## 2023-06-02 NOTE — UTILIZATION REVIEW
Initial Clinical Review    Admission: Date/Time/Statement:     OBSERVATIO N 6-1-23 CHANGED TO INPATIENT 6-2-23 FOR CONTINUED EVALUATION OF SYNCOPE, TELEMETRY,  MANAGEMENT OF DIABETES AND  ENDOCRINOLOGY CONSULT  Admission Orders (From admission, onward)     Ordered        06/02/23 1329  Inpatient Admission  Once            06/01/23 1956  Place in Observation  Once                      • Inpatient Admission     Standing Status:   Standing     Number of Occurrences:   1     Order Specific Question:   Level of Care     Answer:   Med Surg [16]     Order Specific Question:   Estimated length of stay     Answer:   More than 2 Midnights     Order Specific Question:   Certification     Answer:   I certify that inpatient services are medically necessary for this patient for a duration of greater than two midnights  See H&P and MD Progress Notes for additional information about the patient's course of treatment  ED Arrival Information     Expected   -    Arrival   6/1/2023 17:57    Acuity   Emergent            Means of arrival   Ambulance    Escorted by   [facility]  ([address] ambulance )    Service   Hospitalist    Admission type   Emergency            Arrival complaint   -           Chief Complaint   Patient presents with   • Trauma     Patient had a syncopal episode and fell and hit his head  On thinners       Initial Presentation: 78 y o  male presents to ed from home via ems via evaluation and treatment of syncope  Patient was standing at his sink when he suddenly lost consciousness and fell striking his head on the floor  Compliant with medication  Ems found him awake and conversant  SBP 90s and heart rate 60-70s improved with iv fluids  PMHX: STROKE, L HEMIPLEGIA / PLAVIX  Clinical assessment significant for hypertension, temp 95 8, gcs=15, slow verbal responses, spastic LUE, LLE  Imaging shows LLL opacity possibly pneumonia  WBC 10 26, , K 3 4, BUN 34, CR 1 43, GLUCOSE 49  Initially treated with iv D50% , iv ceftriaxone, iv azithromycin  Admit to observation for pneumonia LLL  Date: 6-2-23 observation changed to  inpatient med surg    PT/OT evaluations completed with outpatient rehab  Continue iv ceftriaxone and po azithromycin  Glucose 385> 397> 268  Insulin pump in place  Hold baclofen  Orthostatic vital signs wnl  Creatinine at baseline  Plan to monitor on telemetry, consult Endocrinology  ED Triage Vitals   06/01/23 1800 06/01/23 1800 06/01/23 1800 06/01/23 1800 06/01/23 1800   (!) 96 5 °F (35 8 °C) 70 18 163/86 96 %      Temporal Monitor         No Pain          06/01/23 68 kg (149 lb 14 6 oz)     Additional Vital Signs:       Date/Time Temp Pulse Resp BP MAP SpO2 O2 Device   06/02/23 04:32:46 --  18 145/72 96 93 % --   06/01/23 23:23:10 98 7 °F (37 1 °C) 84 17 130/68 89 94 % None (Room air)   06/01/23 2132 -- 87 -- -- -- 96 % None (Room air)   06/01/23 20:44:24 98 7 °F (37 1 °C) 82 20 151/80 104 95 % --   06/01/23 2014 97 2 °F (36 2 °C)   Abnormal  -- -- -- -- -- --   06/01/23 2000 -- 79 20 181/75 Abnormal  -- 95 % None (Room air)   06/01/23 1930 -- 73 16 187/86 Abnormal  -- 96 % None (Room air)   06/01/23 19:10:33 95 8 °F (35 4 °C)   Abnormal  -- -- -- -- -- --   06/01/23 1900 -- 70 18 201/90 Abnormal  -- 96 % None (Room air)   06/01/23 1845 -- 66 18 203/102 Abnormal  -- 93 % None (Room air)   06/01/23 1830 -- 64 16 174/86 Abnormal  -- 97 % None (Room air)   06/01/23 1815 -- 65 16 170/91 -- 95 % None (Room air)   06/01/23 1800 96 5 °F (35 8 °C)   Abnormal  70 18 163/86 -- 96 % None (Room air)       Date/Time BP Patient Position - Orthostatic VS   06/02/23 06:11:23 155/78 Standing - Orthostatic VS   06/02/23 06:10:14 163/83 Sitting - Orthostatic VS   06/02/23 06:09:06 164/83 Lying - Orthostatic VS     Pertinent Labs/Diagnostic Test Results:        Thu Jun 01, 2023   1804 ECG NSR 70 bpm    QTc 419  No acute st/t changes  Baseline variability  No ectopy  Normal axis         TRAUMA - CT head wo contrast   Final  (06/01 1834)      No acute intracranial abnormality  TRAUMA - CT spine cervical wo contrast   Final  (06/01 1841)      No cervical spine fracture or traumatic malalignment  XR Trauma chest portable      Final  (06/01 1845)      Left lower lobe pulmonary opacity  Clinical correlation for pneumonia  Results from last 7 days   Lab Units 06/02/23  0425 06/01/23  1804   HEMATOCRIT % 34 3* 37 9   HEMOGLOBIN g/dL 11 8* 13 1   NEUTROS ABS Thousands/µL 8 21* 6 60   PLATELETS Thousands/uL 274 301   WBC Thousand/uL 9 50 10 26*         Results from last 7 days   Lab Units 06/02/23  0425 06/01/23  1804   ANION GAP mmol/L 6 10   BUN mg/dL 30* 34*   CALCIUM mg/dL 8 8 9 3   CHLORIDE mmol/L 92* 94*   CO2 mmol/L 25 24   CREATININE mg/dL 1 47* 1 43*   EGFR ml/min/1 73sq m 46 47   POTASSIUM mmol/L 4 4 3 4*   MAGNESIUM mg/dL  --  2 1   SODIUM mmol/L 123* 128*         Results from last 7 days   Lab Units 06/02/23  1104 06/02/23  0818 06/02/23  0415 06/02/23  0010 06/01/23  2042 06/01/23 2015 06/01/23  1926 06/01/23  1906 06/01/23  1844   POC GLUCOSE mg/dl 268* 397* 385* 297* 230* 209* 220* 50* 49*     Results from last 7 days   Lab Units 06/02/23  0425 06/01/23  1804   GLUCOSE RANDOM mg/dL 404* 74       Results from last 7 days   Lab Units 06/01/23  2016 06/01/23  1804   HS TNI 0HR ng/L  --  8   HS TNI 2HR ng/L 6  --    HSTNI D2 ng/L -2  --              Results from last 7 days   Lab Units 06/01/23  1804   TSH 3RD GENERATON uIU/mL 5 622*     Results from last 7 days   Lab Units 06/01/23  1804   PROCALCITONIN ng/ml 0 08     Results from last 7 days   Lab Units 06/01/23  1935   LACTIC ACID mmol/L 0 8       Results from last 7 days   Lab Units 06/02/23  0232   LEGIONELLA URINARY ANTIGEN  Negative   STREP PNEUMONIAE ANTIGEN, URINE  Negative     Results from last 7 days   Lab Units 06/01/23  1932   BLOOD CULTURE  Received in Microbiology Lab  Culture in Progress  Received in Microbiology Lab  Culture in Progress         ED Treatment:   Medication Administration from 06/01/2023 1756 to 06/01/2023 2032       Date/Time Order Dose Route Action     06/01/2023 1918 EDT dextrose 50 % IV solution 25 mL 25 mL Intravenous Given     06/01/2023 1936 EDT cefTRIAXone (ROCEPHIN) IVPB (premix in dextrose) 2,000 mg 50 mL 2,000 mg Intravenous New Bag     06/01/2023 2017 EDT azithromycin (ZITHROMAX) 500 mg in sodium chloride 0 9% 250mL IVPB 500 mg 500 mg Intravenous New Bag        Past Medical History:   Diagnosis   • Arthritis   • BPH (benign prostatic hyperplasia)    last assessed 4/29/2014   • Diabetes mellitus ([facility] [address] )   • Disease of thyroid gland   • GERD (gastroesophageal reflux disease)   • Hearing aid worn   • Hyperlipidemia   • Hypertension   • Hypothyroidism   • Mumps   • Prostate cancer ([facility] [address] )   • Stroke ([facility] [address] )   • Tingling sensation    bilateral feet occassionally   • Tinnitus   • Wears glasses   • Wears partial dentures    upper     Present on Admission:  • Ambulatory dysfunction  • Pneumonia of left lower lobe due to infectious organism  • DMII (diabetes mellitus, type 2) ([facility] [address] )  • Essential hypertension  • Hyponatremia  • Hypothyroidism  • Stage 3 chronic kidney disease (HCC)  • Hyperlipidemia      Admitting Diagnosis:     Head pain [R51 9]  Syncope [R55]  Hypoglycemia [E16 2]  Left lower lobe pneumonia [J18 9]  Closed head injury, initial encounter [S09 90XA]    Age/Sex: 78 y o  male    Scheduled Medications:    amLODIPine, 5 mg, Oral, Daily  atorvastatin, 40 mg, Oral, Daily With Dinner  azithromycin, 250 mg, Oral, Q24H  cefTRIAXone, 2,000 mg, Intravenous, Q24H  clopidogrel, 75 mg, Oral, Daily  gabapentin, 400 mg, Oral, HS  heparin (porcine), 5,000 Units, Subcutaneous, Q8H [address]  insulin lispro, 1-5 Units, Subcutaneous, TID AC  levothyroxine, 100 mcg, Oral, Early Morning  lisinopril, 40 mg, Oral, Daily  multivitamin-minerals, 1 tablet, Oral, Daily  patient maintained insulin pump, 1 each, Subcutaneous, Q8H      Continuous IV Infusions:     PRN Meds:  acetaminophen, 650 mg, Oral, Q6H PRN  benzonatate, 100 mg, Oral, TID PRN        IP CONSULT TO ENDOCRINOLOGY    Network Utilization Review Department  ATTENTION: Please call with any questions or concerns to [phone] and carefully listen to the prompts so that you are directed to the right person  All voicemails are confidential   Ford Dyer all requests for admission clinical reviews, approved or denied determinations and any other requests to dedicated fax number below belonging to the campus where the patient is receiving treatment   List of dedicated fax numbers for the Facilities:  [address] DENIALS (Administrative/Medical Necessity) [phone]   [address] (Maternity/NICU/Pediatrics) [phone]   [address] Jeni Pol[address] [phone]   [facility] [phone]   [address] [address] Kevyn Rajendra [facility] 28 [phone]   155 [facility] 815 [facility] [phone]

## 2023-06-02 NOTE — ASSESSMENT & PLAN NOTE
Suspected cause of syncope, patient with insulin pump - requested Endocrinology consult for possible need of management adjustment

## 2023-06-02 NOTE — ASSESSMENT & PLAN NOTE
Patient presents after a syncopal episode at home witness by his wife - patient has insulin pump in place  At that time glucose level was around 80     · Likely this occurred due to hypoglycemia  · Of note, recent appointment with patient[facility] [place] -noted for reassuring range of blood glucose, patient reports his hemoglobin A1c is at goal of 7 4  · No evidence of orthostatic hypotension  · We will place on telemetry to rule out cardiac causes  · PT/OT evaluations

## 2023-06-02 NOTE — RESPIRATORY THERAPY NOTE
RT Protocol Note  Demi Olvera 78 y o  male MRN: [id number]  Unit/Bed#: 416-01 Encounter: [id number]    Assessment    Principal Problem:    Pneumonia of left lower lobe due to infectious organism  Active Problems:    DMII (diabetes mellitus, type 2) ([facility])    Hypothyroidism    Hyperlipidemia    Stage 3 chronic kidney disease ([facility] [address] )    Essential hypertension    History of stroke    Ambulatory dysfunction    Hyponatremia    Insulin pump in place      Home Pulmonary Medications: None       Past Medical History:   Diagnosis Date   • Arthritis    • BPH (benign prostatic hyperplasia)     last assessed 4/29/2014   • Diabetes mellitus ([facility] [address] )    • Disease of thyroid gland    • GERD (gastroesophageal reflux disease)    • Hearing aid worn    • Hyperlipidemia    • Hypertension    • Hypothyroidism    • Mumps    • Prostate cancer ([facility] )    • Stroke ([facility] )    • Tingling sensation     bilateral feet occassionally   • Tinnitus    • Wears glasses    • Wears partial dentures     upper     Social History     Socioeconomic History   • Marital status: /Civil Union     Spouse name: None   • Number of children: None   • Years of education: None   • Highest education level: None   Occupational History   • Occupation: printer  retired   Tobacco Use   • Smoking status: Former   • Smokeless tobacco: Never   • Tobacco comments:     quit about 50 years ago   Vaping Use   • Vaping Use: Never used   Substance and Sexual Activity   • Alcohol use: Not Currently     Alcohol/week: 2 0 standard drinks of alcohol     Types: 2 Glasses of wine per week     Comment: week, social drinker   • Drug use: No   • Sexual activity: Not Currently     Partners: Female   Other Topics Concern   • None   Social History Narrative    Always uses seat belt    Caffeine use    Lives independently with spouse    Retired    Sun protection sunscreen     Social Determinants of Health     Financial Resource Strain: Low Risk  (5/9/2023)    Overall Financial Resource Strain "(CARDIA)    • Difficulty of Paying Living Expenses: Not very hard   Food Insecurity: No Food Insecurity (10/18/2022)    Hunger Vital Sign    • Worried About Running Out of Food in the Last Year: Never true    • Ran Out of Food in the Last Year: Never true   Transportation Needs: No Transportation Needs (5/9/2023)    PRAPARE - Transportation    • Lack of Transportation (Medical): No    • Lack of Transportation (Non-Medical): No   Physical Activity: Not on file   Stress: Not on file   Social Connections: Not on file   Intimate Partner Violence: Not on file   Housing Stability: Low Risk  (10/18/2022)    Housing Stability Vital Sign    • Unable to Pay for Housing in the Last Year: No    • Number of Places Lived in the Last Year: 1    • Unstable Housing in the Last Year: No       Subjective         Objective    Physical Exam:   Assessment Type: Assess only  General Appearance: Alert, Awake  Respiratory Pattern: Normal  Chest Assessment: Chest expansion symmetrical  R Breath Sounds: Diminished, Coarse  Cough: Strong, Productive  O2 Device: RA    Vitals:  Blood pressure 151/80, pulse 87, temperature 98 7 °F (37 1 °C), resp  rate 20, height 5' 6\" (1 676 m), weight 68 kg (149 lb 14 6 oz), SpO2 96 %  Imaging and other studies: I have personally reviewed pertinent reports        O2 Device: RA     Plan    Respiratory Plan: No distress/Pulmonary history  Airway Clearance Plan: Incentive Spirometer           "

## 2023-06-02 NOTE — PLAN OF CARE
Problem: PAIN - ADULT  Goal: Verbalizes/displays adequate comfort level or baseline comfort level  Description: Interventions:  - Encourage patient to monitor pain and request assistance  - Assess pain using appropriate pain scale  - Administer analgesics based on type and severity of pain and evaluate response  - Implement non-pharmacological measures as appropriate and evaluate response  - Consider cultural and social influences on pain and pain management  - Notify physician/advanced practitioner if interventions unsuccessful or patient reports new pain  Outcome: Progressing     Problem: INFECTION - ADULT  Goal: Absence or prevention of progression during hospitalization  Description: INTERVENTIONS:  - Assess and monitor for signs and symptoms of infection  - Monitor lab/diagnostic results  - Monitor all insertion sites, i e  indwelling lines, tubes, and drains  - Monitor endotracheal if appropriate and nasal secretions for changes in amount and color  - [place] appropriate cooling/warming therapies per order  - Administer medications as ordered  - Instruct and encourage patient and family to use good hand hygiene technique  - Identify and instruct in appropriate isolation precautions for identified infection/condition  Outcome: Progressing  Goal: Absence of fever/infection during neutropenic period  Description: INTERVENTIONS:  - Monitor WBC    Outcome: Progressing     Problem: SAFETY ADULT  Goal: Patient will remain free of falls  Description: INTERVENTIONS:  - Educate patient/family on patient safety including physical limitations  - Instruct patient to call for assistance with activity   - Consult OT/PT to assist with strengthening/mobility   - Keep Call bell within reach  - Keep bed low and locked with side rails adjusted as appropriate  - Keep care items and personal belongings within reach  - Initiate and maintain comfort rounds  - Make Fall Risk Sign visible to staff  - Offer Toileting every 2 Hours, in advance of need  - Initiate/Maintain fall alarm  - Obtain necessary fall risk management equipment: non-skid footwear  - Apply yellow socks and bracelet for high fall risk patients  - Consider moving patient to room near nurses station  Outcome: Progressing  Goal: Maintain or return to baseline ADL function  Description: INTERVENTIONS:  -  Assess patient's ability to carry out ADLs; assess patient's baseline for ADL function and identify physical deficits which impact ability to perform ADLs (bathing, care of mouth/teeth, toileting, grooming, dressing, etc )  - Assess/evaluate cause of self-care deficits   - Assess range of motion  - Assess patient's mobility; develop plan if impaired  - Assess patient's need for assistive devices and provide as appropriate  - Encourage maximum independence but intervene and supervise when necessary  - Involve family in performance of ADLs  - Assess for home care needs following discharge   - Consider OT consult to assist with ADL evaluation and planning for discharge  - Provide patient education as appropriate  Outcome: Progressing  Goal: Maintains/Returns to pre admission functional level  Description: INTERVENTIONS:  - Perform BMAT or MOVE assessment daily    - Set and communicate daily mobility goal to care team and patient/family/caregiver  - Collaborate with rehabilitation services on mobility goals if consulted  - Perform Range of Motion 3 times a day  - Reposition patient every 2 hours    - Dangle patient 3 times a day  - Stand patient 3 times a day  - Ambulate patient 3 times a day  - Out of bed to chair 3 times a day   - Out of bed for meals 3 times a day  - Out of bed for toileting  - Record patient progress and toleration of activity level   Outcome: Progressing     Problem: DISCHARGE PLANNING  Goal: Discharge to home or other facility with appropriate resources  Description: INTERVENTIONS:  - Identify barriers to discharge w/patient and caregiver  - Arrange for needed discharge resources and transportation as appropriate  - Identify discharge learning needs (meds, wound care, etc )  - Arrange for interpretive services to assist at discharge as needed  - Refer to Case Management Department for coordinating discharge planning if the patient needs post-hospital services based on physician/advanced practitioner order or complex needs related to functional status, cognitive ability, or social support system  Outcome: Progressing     Problem: Knowledge Deficit  Goal: Patient/family/caregiver demonstrates understanding of disease process, treatment plan, medications, and discharge instructions  Description: Complete learning assessment and assess knowledge base  Interventions:  - Provide teaching at level of understanding  - Provide teaching via preferred learning methods  Outcome: Progressing     Problem: NEUROSENSORY - ADULT  Goal: Achieves stable or improved neurological status  Description: INTERVENTIONS  - Monitor and report changes in neurological status  - Monitor vital signs such as temperature, blood pressure, glucose, and any other labs ordered   - Initiate measures to prevent increased intracranial pressure  - Monitor for seizure activity and implement precautions if appropriate      Outcome: Progressing  Goal: Achieves maximal functionality and self care  Description: INTERVENTIONS  - Monitor swallowing and airway patency with patient fatigue and changes in neurological status  - Encourage and assist patient to increase activity and self care     - Encourage visually impaired, hearing impaired and aphasic patients to use assistive/communication devices  Outcome: Progressing     Problem: METABOLIC, FLUID AND ELECTROLYTES - ADULT  Goal: Electrolytes maintained within normal limits  Description: INTERVENTIONS:  - Monitor labs and assess patient for signs and symptoms of electrolyte imbalances  - Administer electrolyte replacement as ordered  - Monitor response to electrolyte replacements, including repeat lab results as appropriate  - Instruct patient on fluid and nutrition as appropriate  Outcome: Progressing  Goal: Fluid balance maintained  Description: INTERVENTIONS:  - Monitor labs   - Monitor I/O and WT  - Instruct patient on fluid and nutrition as appropriate  - Assess for signs & symptoms of volume excess or deficit  Outcome: Progressing  Goal: Glucose maintained within target range  Description: INTERVENTIONS:  - Monitor Blood Glucose as ordered  - Assess for signs and symptoms of hyperglycemia and hypoglycemia  - Administer ordered medications to maintain glucose within target range  - Assess nutritional intake and initiate nutrition service referral as needed  Outcome: Progressing     Problem: HEMATOLOGIC - ADULT  Goal: Maintains hematologic stability  Description: INTERVENTIONS  - Assess for signs and symptoms of bleeding or hemorrhage  - Monitor labs  - Administer supportive blood products/factors as ordered and appropriate  Outcome: Progressing     Problem: MUSCULOSKELETAL - ADULT  Goal: Maintain or return mobility to safest level of function  Description: INTERVENTIONS:  - Assess patient's ability to carry out ADLs; assess patient's baseline for ADL function and identify physical deficits which impact ability to perform ADLs (bathing, care of mouth/teeth, toileting, grooming, dressing, etc )  - Assess/evaluate cause of self-care deficits   - Assess range of motion  - Assess patient's mobility  - Assess patient's need for assistive devices and provide as appropriate  - Encourage maximum independence but intervene and supervise when necessary  - Involve family in performance of ADLs  - Assess for home care needs following discharge   - Consider OT consult to assist with ADL evaluation and planning for discharge  - Provide patient education as appropriate  Outcome: Progressing  Goal: Maintain proper alignment of affected body part  Description: INTERVENTIONS:  - Support, maintain and protect limb and body alignment  - Provide patient/ family with appropriate education  Outcome: Progressing     Problem: MOBILITY - ADULT  Goal: Maintain or return to baseline ADL function  Description: INTERVENTIONS:  -  Assess patient's ability to carry out ADLs; assess patient's baseline for ADL function and identify physical deficits which impact ability to perform ADLs (bathing, care of mouth/teeth, toileting, grooming, dressing, etc )  - Assess/evaluate cause of self-care deficits   - Assess range of motion  - Assess patient's mobility; develop plan if impaired  - Assess patient's need for assistive devices and provide as appropriate  - Encourage maximum independence but intervene and supervise when necessary  - Involve family in performance of ADLs  - Assess for home care needs following discharge   - Consider OT consult to assist with ADL evaluation and planning for discharge  - Provide patient education as appropriate  Outcome: Progressing  Goal: Maintains/Returns to pre admission functional level  Description: INTERVENTIONS:  - Perform BMAT or MOVE assessment daily    - Set and communicate daily mobility goal to care team and patient/family/caregiver  - Collaborate with rehabilitation services on mobility goals if consulted  - Perform Range of Motion 3 times a day  - Reposition patient every 2 hours    - Dangle patient 3 times a day  - Stand patient 3 times a day  - Ambulate patient 3 times a day  - Out of bed to chair 3 times a day   - Out of bed for meals 3 times a day  - Out of bed for toileting  - Record patient progress and toleration of activity level   Outcome: Progressing

## 2023-06-02 NOTE — PLAN OF CARE
Problem: OCCUPATIONAL THERAPY ADULT  Goal: Performs self-care activities at highest level of function for planned discharge setting  See evaluation for individualized goals  Description: Treatment Interventions: ADL retraining, Functional transfer training, UE strengthening/ROM, Endurance training, Patient/family training, Equipment evaluation/education, Activityengagement, Compensatory technique education          See flowsheet documentation for full assessment, interventions and recommendations  Note: Limitation: Decreased ADL status, Decreased UE ROM, Decreased UE strength, Decreased Safe judgement during ADL, Decreased endurance, Decreased high-level ADLs, Decreased self-care trans, Decreased fine motor control     Assessment: Pt is a 78 y o  male seen for OT evaluation s/p admit to [facility] on 6/1/2023 w/ Pneumonia of left lower lobe due to infectious organism  Comorbidities affecting pt's functional performance at time of assessment include: DM, disease of thyroid gland, HLD, prostate CA, arthritis, GERD, hypothyroidism, mumps, CVA  Personal factors affecting pt at time of IE include:difficulty performing ADLS and difficulty performing IADLS   Prior to admission, pt was (A) with ADLs and IADLs with use of SPC during mobility  Upon evaluation: Pt requires (S)-max (A)  with use of SPC during mobility 2* the following deficits impacting occupational performance: weakness, decreased strength, decreased balance, decreased tolerance, impaired initiation, decreased safety awareness and impaired interpersonal skills  Pt to benefit from continued skilled OT tx while in the hospital to address deficits as defined above and maximize level of functional independence w ADL's and functional mobility  Occupational Performance areas to address include: grooming, bathing/shower, toilet hygiene, dressing, functional mobility, community mobility and clothing management   The patient's raw score on the AM-PAC Daily Activity Inpatient Short Form is 16  A raw score of less than 19 suggests the patient may benefit from discharge to post-acute rehabilitation services  Please refer to the recommendation of the Occupational Therapist for safe discharge planning  Pt benefited from co-evaluation of skilled OT and PT therapists in order to most appropriately address functional deficits d/t extensive assistance required for safe functional mobility, decreased activity tolerance, and regression from functioning level prior to admission and/or onset of present illness  OT/PT objectives were addressed separately; please see PT note for specific goal areas targeted       OT Discharge Recommendation: Home with outpatient rehabilitation

## 2023-06-02 NOTE — ASSESSMENT & PLAN NOTE
Lab Results   Component Value Date    HGBA1C 7 4 05/16/2023       Recent Labs     06/02/23  0010 06/02/23  0415 06/02/23  0818 06/02/23  1104   POCGLU 297* 385* 397* 268*       Blood Sugar Average: Last 72 hrs:  (P) 928 [id number]     · Noted hypoglycemic events in the emergency department  · Received ampule of D50  · Patient currently has an insulin pump with following settings: 0 6 basal units, 1 unit for every 25 g of carbs  · Continue with the current settings for now  · Monitor blood glucose closely   · Requested Endocrinology evaluatino

## 2023-06-02 NOTE — PLAN OF CARE
Problem: PAIN - ADULT  Goal: Verbalizes/displays adequate comfort level or baseline comfort level  Description: Interventions:  - Encourage patient to monitor pain and request assistance  - Assess pain using appropriate pain scale  - Administer analgesics based on type and severity of pain and evaluate response  - Implement non-pharmacological measures as appropriate and evaluate response  - Consider cultural and social influences on pain and pain management  - Notify physician/advanced practitioner if interventions unsuccessful or patient reports new pain  Outcome: Progressing     Problem: INFECTION - ADULT  Goal: Absence or prevention of progression during hospitalization  Description: INTERVENTIONS:  - Assess and monitor for signs and symptoms of infection  - Monitor lab/diagnostic results  - Monitor all insertion sites, i e  indwelling lines, tubes, and drains  - Monitor endotracheal if appropriate and nasal secretions for changes in amount and color  - [place] appropriate cooling/warming therapies per order  - Administer medications as ordered  - Instruct and encourage patient and family to use good hand hygiene technique  - Identify and instruct in appropriate isolation precautions for identified infection/condition  Outcome: Progressing     Problem: SAFETY ADULT  Goal: Patient will remain free of falls  Description: INTERVENTIONS:  - Educate patient/family on patient safety including physical limitations  - Instruct patient to call for assistance with activity   - Consult OT/PT to assist with strengthening/mobility   - Keep Call bell within reach  - Keep bed low and locked with side rails adjusted as appropriate  - Keep care items and personal belongings within reach  - Initiate and maintain comfort rounds  - Make Fall Risk Sign visible to staff  - Offer Toileting every 2 Hours, in advance of need  - Initiate/Maintain fall alarm  - Obtain necessary fall risk management equipment: non-skid footwear  - Apply yellow socks and bracelet for high fall risk patients  - Consider moving patient to room near nurses station  Outcome: Progressing  Goal: Maintain or return to baseline ADL function  Description: INTERVENTIONS:  -  Assess patient's ability to carry out ADLs; assess patient's baseline for ADL function and identify physical deficits which impact ability to perform ADLs (bathing, care of mouth/teeth, toileting, grooming, dressing, etc )  - Assess/evaluate cause of self-care deficits   - Assess range of motion  - Assess patient's mobility; develop plan if impaired  - Assess patient's need for assistive devices and provide as appropriate  - Encourage maximum independence but intervene and supervise when necessary  - Involve family in performance of ADLs  - Assess for home care needs following discharge   - Consider OT consult to assist with ADL evaluation and planning for discharge  - Provide patient education as appropriate  Outcome: Progressing  Goal: Maintains/Returns to pre admission functional level  Description: INTERVENTIONS:  - Perform BMAT or MOVE assessment daily    - Set and communicate daily mobility goal to care team and patient/family/caregiver  - Collaborate with rehabilitation services on mobility goals if consulted  - Perform Range of Motion 3 times a day  - Reposition patient every 2 hours    - Dangle patient 3 times a day  - Stand patient 3 times a day  - Ambulate patient 3 times a day  - Out of bed to chair 3 times a day   - Out of bed for meals 3 times a day  - Out of bed for toileting  - Record patient progress and toleration of activity level   Outcome: Progressing     Problem: DISCHARGE PLANNING  Goal: Discharge to home or other facility with appropriate resources  Description: INTERVENTIONS:  - Identify barriers to discharge w/patient and caregiver  - Arrange for needed discharge resources and transportation as appropriate  - Identify discharge learning needs (meds, wound care, etc )  - Arrange for interpretive services to assist at discharge as needed  - Refer to Case Management Department for coordinating discharge planning if the patient needs post-hospital services based on physician/advanced practitioner order or complex needs related to functional status, cognitive ability, or social support system  Outcome: Progressing     Problem: Knowledge Deficit  Goal: Patient/family/caregiver demonstrates understanding of disease process, treatment plan, medications, and discharge instructions  Description: Complete learning assessment and assess knowledge base  Interventions:  - Provide teaching at level of understanding  - Provide teaching via preferred learning methods  Outcome: Progressing     Problem: NEUROSENSORY - ADULT  Goal: Achieves stable or improved neurological status  Description: INTERVENTIONS  - Monitor and report changes in neurological status  - Monitor vital signs such as temperature, blood pressure, glucose, and any other labs ordered   - Initiate measures to prevent increased intracranial pressure  - Monitor for seizure activity and implement precautions if appropriate      Outcome: Progressing  Goal: Achieves maximal functionality and self care  Description: INTERVENTIONS  - Monitor swallowing and airway patency with patient fatigue and changes in neurological status  - Encourage and assist patient to increase activity and self care     - Encourage visually impaired, hearing impaired and aphasic patients to use assistive/communication devices  Outcome: Progressing     Problem: METABOLIC, FLUID AND ELECTROLYTES - ADULT  Goal: Electrolytes maintained within normal limits  Description: INTERVENTIONS:  - Monitor labs and assess patient for signs and symptoms of electrolyte imbalances  - Administer electrolyte replacement as ordered  - Monitor response to electrolyte replacements, including repeat lab results as appropriate  - Instruct patient on fluid and nutrition as appropriate  Outcome: Progressing  Goal: Fluid balance maintained  Description: INTERVENTIONS:  - Monitor labs   - Monitor I/O and WT  - Instruct patient on fluid and nutrition as appropriate  - Assess for signs & symptoms of volume excess or deficit  Outcome: Progressing  Goal: Glucose maintained within target range  Description: INTERVENTIONS:  - Monitor Blood Glucose as ordered  - Assess for signs and symptoms of hyperglycemia and hypoglycemia  - Administer ordered medications to maintain glucose within target range  - Assess nutritional intake and initiate nutrition service referral as needed  Outcome: Progressing     Problem: HEMATOLOGIC - ADULT  Goal: Maintains hematologic stability  Description: INTERVENTIONS  - Assess for signs and symptoms of bleeding or hemorrhage  - Monitor labs  - Administer supportive blood products/factors as ordered and appropriate  Outcome: Progressing     Problem: MUSCULOSKELETAL - ADULT  Goal: Maintain or return mobility to safest level of function  Description: INTERVENTIONS:  - Assess patient's ability to carry out ADLs; assess patient's baseline for ADL function and identify physical deficits which impact ability to perform ADLs (bathing, care of mouth/teeth, toileting, grooming, dressing, etc )  - Assess/evaluate cause of self-care deficits   - Assess range of motion  - Assess patient's mobility  - Assess patient's need for assistive devices and provide as appropriate  - Encourage maximum independence but intervene and supervise when necessary  - Involve family in performance of ADLs  - Assess for home care needs following discharge   - Consider OT consult to assist with ADL evaluation and planning for discharge  - Provide patient education as appropriate  Outcome: Progressing  Goal: Maintain proper alignment of affected body part  Description: INTERVENTIONS:  - Support, maintain and protect limb and body alignment  - Provide patient/ family with appropriate education  Outcome: Progressing     Problem: MOBILITY - ADULT  Goal: Maintain or return to baseline ADL function  Description: INTERVENTIONS:  -  Assess patient's ability to carry out ADLs; assess patient's baseline for ADL function and identify physical deficits which impact ability to perform ADLs (bathing, care of mouth/teeth, toileting, grooming, dressing, etc )  - Assess/evaluate cause of self-care deficits   - Assess range of motion  - Assess patient's mobility; develop plan if impaired  - Assess patient's need for assistive devices and provide as appropriate  - Encourage maximum independence but intervene and supervise when necessary  - Involve family in performance of ADLs  - Assess for home care needs following discharge   - Consider OT consult to assist with ADL evaluation and planning for discharge  - Provide patient education as appropriate  Outcome: Progressing  Goal: Maintains/Returns to pre admission functional level  Description: INTERVENTIONS:  - Perform BMAT or MOVE assessment daily    - Set and communicate daily mobility goal to care team and patient/family/caregiver  - Collaborate with rehabilitation services on mobility goals if consulted  - Perform Range of Motion 3 times a day  - Reposition patient every 2 hours    - Dangle patient 3 times a day  - Stand patient 3 times a day  - Ambulate patient 3 times a day  - Out of bed to chair 3 times a day   - Out of bed for meals 3 times a day  - Out of bed for toileting  - Record patient progress and toleration of activity level   Outcome: Progressing     Problem: RESPIRATORY - ADULT  Goal: Achieves optimal ventilation and oxygenation  Description: INTERVENTIONS:  - Assess for changes in respiratory status  - Assess for changes in mentation and behavior  - Position to facilitate oxygenation and minimize respiratory effort  - Oxygen administered by appropriate delivery if ordered  - Initiate smoking cessation education as indicated  - Encourage broncho-pulmonary hygiene including cough, deep breathe, Incentive Spirometry  - Assess the need for suctioning and aspirate as needed  - Assess and instruct to report SOB or any respiratory difficulty  - Respiratory Therapy support as indicated  Outcome: Progressing

## 2023-06-02 NOTE — ASSESSMENT & PLAN NOTE
Lab Results   Component Value Date    HGBA1C 7 4 05/16/2023       Recent Labs     06/01/23  1906 06/01/23  1926 06/01/23 2015 06/01/23 2042   POCGLU 50* 220* 209* 230*       Blood Sugar Average: Last 72 hrs:  (P) 151 6     Noted hypoglycemic events in the emergency department  Received ampule of D50  Patient currently has an insulin pump with following settings: 0 6 basal units, 1 unit for every 25 g of carbs  Continue with the current settings  Monitor blood glucose closely

## 2023-06-02 NOTE — NURSING NOTE
When asking pt to fill out insulin pump form to manage pump himself, pt stated he thought insulin pump basal rate was 0 5u  Rate different from 0 6u listed in order for pump  Contacted [employer] via [employer] to alert provider of situation  Provider responded she confirmed rate with pt wife  No order change at this time  Checked pt insulin pump settings and found pump currently set to 0 45u, which pt states was programmed by [place]  Informed provider [employer] via [facility] and asked to have insulin pump med order changed to reflect accurate rate in pump  Provider stated order will be changed, no order change as of yet  Pt basal rate insulin order changed to 0 5u on chart, pt insulin pump currently running 0 45u  Pt bs taken for 385, provider made aware, asked if pt should receive more sq insulin in addition to basal rate  Per provider, no further action is needed

## 2023-06-02 NOTE — PLAN OF CARE
Problem: PAIN - ADULT  Goal: Verbalizes/displays adequate comfort level or baseline comfort level  Description: Interventions:  - Encourage patient to monitor pain and request assistance  - Assess pain using appropriate pain scale  - Administer analgesics based on type and severity of pain and evaluate response  - Implement non-pharmacological measures as appropriate and evaluate response  - Consider cultural and social influences on pain and pain management  - Notify physician/advanced practitioner if interventions unsuccessful or patient reports new pain  Outcome: Progressing     Problem: INFECTION - ADULT  Goal: Absence or prevention of progression during hospitalization  Description: INTERVENTIONS:  - Assess and monitor for signs and symptoms of infection  - Monitor lab/diagnostic results  - Monitor all insertion sites, i e  indwelling lines, tubes, and drains  - Monitor endotracheal if appropriate and nasal secretions for changes in amount and color  - [place] appropriate cooling/warming therapies per order  - Administer medications as ordered  - Instruct and encourage patient and family to use good hand hygiene technique  - Identify and instruct in appropriate isolation precautions for identified infection/condition  Outcome: Progressing  Goal: Absence of fever/infection during neutropenic period  Description: INTERVENTIONS:  - Monitor WBC    Outcome: Progressing     Problem: SAFETY ADULT  Goal: Patient will remain free of falls  Description: INTERVENTIONS:  - Educate patient/family on patient safety including physical limitations  - Instruct patient to call for assistance with activity   - Consult OT/PT to assist with strengthening/mobility   - Keep Call bell within reach  - Keep bed low and locked with side rails adjusted as appropriate  - Keep care items and personal belongings within reach  - Initiate and maintain comfort rounds  - Make Fall Risk Sign visible to staff  - Offer Toileting every 2 Hours, in advance of need  - Initiate/Maintain fall alarm  - Obtain necessary fall risk management equipment: non-skid footwear  - Apply yellow socks and bracelet for high fall risk patients  - Consider moving patient to room near nurses station  Outcome: Progressing  Goal: Maintain or return to baseline ADL function  Description: INTERVENTIONS:  -  Assess patient's ability to carry out ADLs; assess patient's baseline for ADL function and identify physical deficits which impact ability to perform ADLs (bathing, care of mouth/teeth, toileting, grooming, dressing, etc )  - Assess/evaluate cause of self-care deficits   - Assess range of motion  - Assess patient's mobility; develop plan if impaired  - Assess patient's need for assistive devices and provide as appropriate  - Encourage maximum independence but intervene and supervise when necessary  - Involve family in performance of ADLs  - Assess for home care needs following discharge   - Consider OT consult to assist with ADL evaluation and planning for discharge  - Provide patient education as appropriate  Outcome: Progressing  Goal: Maintains/Returns to pre admission functional level  Description: INTERVENTIONS:  - Perform BMAT or MOVE assessment daily    - Set and communicate daily mobility goal to care team and patient/family/caregiver  - Collaborate with rehabilitation services on mobility goals if consulted  - Perform Range of Motion 3 times a day  - Reposition patient every 2 hours    - Dangle patient 3 times a day  - Stand patient 3 times a day  - Ambulate patient 3 times a day  - Out of bed to chair 3 times a day   - Out of bed for meals 3 times a day  - Out of bed for toileting  - Record patient progress and toleration of activity level   Outcome: Progressing     Problem: DISCHARGE PLANNING  Goal: Discharge to home or other facility with appropriate resources  Description: INTERVENTIONS:  - Identify barriers to discharge w/patient and caregiver  - Arrange for needed discharge resources and transportation as appropriate  - Identify discharge learning needs (meds, wound care, etc )  - Arrange for interpretive services to assist at discharge as needed  - Refer to Case Management Department for coordinating discharge planning if the patient needs post-hospital services based on physician/advanced practitioner order or complex needs related to functional status, cognitive ability, or social support system  Outcome: Progressing     Problem: Knowledge Deficit  Goal: Patient/family/caregiver demonstrates understanding of disease process, treatment plan, medications, and discharge instructions  Description: Complete learning assessment and assess knowledge base  Interventions:  - Provide teaching at level of understanding  - Provide teaching via preferred learning methods  Outcome: Progressing     Problem: NEUROSENSORY - ADULT  Goal: Achieves stable or improved neurological status  Description: INTERVENTIONS  - Monitor and report changes in neurological status  - Monitor vital signs such as temperature, blood pressure, glucose, and any other labs ordered   - Initiate measures to prevent increased intracranial pressure  - Monitor for seizure activity and implement precautions if appropriate      Outcome: Progressing  Goal: Achieves maximal functionality and self care  Description: INTERVENTIONS  - Monitor swallowing and airway patency with patient fatigue and changes in neurological status  - Encourage and assist patient to increase activity and self care     - Encourage visually impaired, hearing impaired and aphasic patients to use assistive/communication devices  Outcome: Progressing     Problem: METABOLIC, FLUID AND ELECTROLYTES - ADULT  Goal: Electrolytes maintained within normal limits  Description: INTERVENTIONS:  - Monitor labs and assess patient for signs and symptoms of electrolyte imbalances  - Administer electrolyte replacement as ordered  - Monitor response to electrolyte replacements, including repeat lab results as appropriate  - Instruct patient on fluid and nutrition as appropriate  Outcome: Progressing  Goal: Fluid balance maintained  Description: INTERVENTIONS:  - Monitor labs   - Monitor I/O and WT  - Instruct patient on fluid and nutrition as appropriate  - Assess for signs & symptoms of volume excess or deficit  Outcome: Progressing  Goal: Glucose maintained within target range  Description: INTERVENTIONS:  - Monitor Blood Glucose as ordered  - Assess for signs and symptoms of hyperglycemia and hypoglycemia  - Administer ordered medications to maintain glucose within target range  - Assess nutritional intake and initiate nutrition service referral as needed  Outcome: Progressing     Problem: HEMATOLOGIC - ADULT  Goal: Maintains hematologic stability  Description: INTERVENTIONS  - Assess for signs and symptoms of bleeding or hemorrhage  - Monitor labs  - Administer supportive blood products/factors as ordered and appropriate  Outcome: Progressing     Problem: MUSCULOSKELETAL - ADULT  Goal: Maintain or return mobility to safest level of function  Description: INTERVENTIONS:  - Assess patient's ability to carry out ADLs; assess patient's baseline for ADL function and identify physical deficits which impact ability to perform ADLs (bathing, care of mouth/teeth, toileting, grooming, dressing, etc )  - Assess/evaluate cause of self-care deficits   - Assess range of motion  - Assess patient's mobility  - Assess patient's need for assistive devices and provide as appropriate  - Encourage maximum independence but intervene and supervise when necessary  - Involve family in performance of ADLs  - Assess for home care needs following discharge   - Consider OT consult to assist with ADL evaluation and planning for discharge  - Provide patient education as appropriate  Outcome: Progressing  Goal: Maintain proper alignment of affected body part  Description: INTERVENTIONS:  - Support, maintain and protect limb and body alignment  - Provide patient/ family with appropriate education  Outcome: Progressing

## 2023-06-02 NOTE — PHYSICAL THERAPY NOTE
Physical Therapy Evaluation    Patient Name: Kelly GUIDO'S Date: 6/2/2023     Problem List  Principal Problem:    Pneumonia of left lower lobe due to infectious organism  Active Problems:    DMII (diabetes mellitus, type 2) ([facility] [address] )    Hypothyroidism    Hyperlipidemia    Stage 3 chronic kidney disease ([facility] [address] )    Essential hypertension    History of stroke    Ambulatory dysfunction    Hyponatremia    Insulin pump in place    Hypoglycemia    Left lower lobe pneumonia       Past Medical History  Past Medical History:   Diagnosis Date    Arthritis     BPH (benign prostatic hyperplasia)     last assessed 4/29/2014    Diabetes mellitus ([facility] [address] )     Disease of thyroid gland     GERD (gastroesophageal reflux disease)     Hearing aid worn     Hyperlipidemia     Hypertension     Hypothyroidism     Mumps     Prostate cancer ([facility] [address] )     Stroke ([facility] [address] )     Tingling sensation     bilateral feet occassionally    Tinnitus     Wears glasses     Wears partial dentures     upper        Past Surgical History  Past Surgical History:   Procedure Laterality Date    CARDIAC ELECTROPHYSIOLOGY PROCEDURE Left 11/4/2021    Procedure: Reveal implant;  Surgeon: Zion Neri MD;  Location:  CARDIAC CATH LAB;   Service: Cardiology    CATARACT EXTRACTION Bilateral 2000    EYE SURGERY      JOINT REPLACEMENT      KNEE ARTHROSCOPY Right 06/26/2009    knee    MT LAPS SURG [id number] RPBIC RAD W/NRV SPARING ROBOT N/A 2/7/2018    Procedure: ROBOTIC ASSISTED LAPAROSCOPIC PROSTATECTOMY; BILATERAL PELVIC LYMPH NODE DISSECTION;  Surgeon: Devan Brito MD;  Location: [facility];  Service: Urology    MT RPR [facility] AGE 5 Y[facility]/> REDUCIBLE Right 4/19/2019    Procedure: REPAIR HERNIA INGUINAL;  Surgeon: Irineo Castro DO;  Location: [facility] MAIN OR;  Service: General    PROSTATE BIOPSY  11/07/2017    needle biopsy of prostate    TRIGGER FINGER RELEASE  05/2013    trigger thumb           06/02/23 1051   PT Last Visit   PT Visit Date 06/02/23   Note Type   Note type Evaluation   Pain Assessment   Pain Assessment Tool 0-10   Pain Score No Pain   Restrictions/Precautions   Weight Bearing Precautions Per Order No   Braces or Orthoses Other (Comment)  (L LE AFO)   Other Precautions Fall Risk;Multiple lines;Telemetry   Home Living   Type of [address] Two level;1/2 bath on main level  (0 INEZ)   Bathroom Shower/Tub Tub/shower unit   Bathroom Toilet Standard   Bathroom Equipment Shower chair;Commode   Bathroom Accessibility Accessible   Home Equipment Cane   Additional Comments pt uses cane at baseline   Prior Function   Level of [place] Independent with functional mobility; Needs assistance with ADLs; Needs assistance with IADLS   Lives With Spouse   Receives Help From Family   IADLs Family/Friend/Other provides transportation   Falls in the last 6 months 1 to 4   Vocational Retired   General   Family/Caregiver Present No   Cognition   Overall Cognitive Status WFL   Arousal/Participation Alert   Orientation Level Oriented X4   Following Commands Follows all commands and directions without difficulty   Subjective   Subjective pt reports he finally got approved to receive more OP PT   RLE Assessment   RLE Assessment WFL   LLE Assessment   LLE Assessment X  (baseline drop foot d/t previous CVA)   Bed Mobility   Additional Comments pt OOB at start/end of session   Transfers   Sit to Stand 5  Supervision   Additional items Armrests; Verbal cues   Stand to Sit 5  Supervision   Additional items Armrests; Verbal cues   Additional Comments performed without device   Ambulation/Elevation   Gait pattern Short stride;Circumduction;Decreased L stance;Decreased foot clearance; Improper Weight shift   Gait Assistance 5  Supervision   Additional items Verbal cues   Assistive Device [facility]   Distance 250'   Balance   Static Sitting Good   Dynamic Sitting Good   Static Standing Fair +   Dynamic Standing [facility]   Ambulatory Fair  (with SPC)   Endurance Deficit   Endurance Deficit Yes   Activity Tolerance   Activity Tolerance Patient limited by fatigue   Assessment   Prognosis Good   Problem List Decreased strength;Decreased endurance; Impaired balance;Decreased mobility; Impaired tone   Assessment Patient is a 78 y o  male evaluated by Physical Therapy s/p admit to [facility] [place] on 6/1/2023 with admitting diagnosis of: Head pain, Syncope, Hypoglycemia, Left lower lobe pneumonia, Closed head injury, initial encounter, and principal problem of: Pneumonia of left lower lobe due to infectious organism  PT was consulted to assess patient's functional mobility and discharge needs  Ordered are PT Evaluation and treatment with activity level of: up and OOB as tolerated  Comorbidities affecting patient's physical performance at time of assessment include: DM, HTN, HLD, prostate cancer, arthritis, GERD, tinnitus, hypothyroidism, BPH, hx of CVA  Personal factors affecting the patient at time of IE include: lives in 2 story home, ambulating with assistive device, history of fall(s), inability/difficulty performing IADLs and inability/difficulty performing ADLs  Please locate objective findings from PT assessment regarding body systems outlined above  Upon evaluation, pt able to perform all functional mobility with SUP, SPC, and increased time  Pt requiring assist to don B shoes and L AFO prior to ambulation, then able to ambulate 250' before taking seated rest break  Pt has baseline gait deviations d/t prior CVA, however no true LOB experienced  Pt reports having a better AFO that his wife will be bringing  HR and SpO2 remained WFL on RA throughout  Pt is motivated to continue attending OP PT upon d/c from hospital  The patient's AM-PAC Basic Mobility Inpatient Short Form Raw Score is 22  A Raw score of greater than 16 suggests the patient may benefit from discharge to home   Please also refer to the recommendation of the Physical Therapist for safe discharge planning  Co treatment with OT secondary to complex medical condition of pt, possible A of 2 required to achieve and maintain transitional movements, requiring the need of skilled therapeutic intervention of 2 therapists to achieve delivery of services  Pt will benefit from continued PT intervention during LOS to address current deficits, increase LOF, and facilitate safe d/c to next level of care when medically appropriate  D/c recommendation at this time is home with outpatient rehabilitation services  Goals   Patient Goals to go home   LTG Expiration Date 06/16/23   Long Term Goal #1 Pt will participate in B LE strengthening exercises to facilitate improved functional activity tolerance  Pt will perform all functional transfers and bed mobility mod(I) with good safety awareness  Pt will ambulate 450' mod(I) with LRAD while maintaining good functional dynamic balance  Pt will ascend/descend a FFOS with HR and SUP to reflect the ability to safely navigate the home  Plan   Treatment/Interventions Functional transfer training;LE strengthening/ROM; Elevations; Therapeutic exercise; Endurance training;Bed mobility;Gait training   PT Frequency 3-5x/wk   Recommendation   PT Discharge Recommendation Home with outpatient rehabilitation   AM-PAC Basic Mobility Inpatient   Turning in Flat Bed Without Bedrails 4   Lying on Back to Sitting on Edge of Flat Bed Without Bedrails 4   Moving Bed to Chair 4   Standing Up From Chair Using Arms 4   Walk in Room 3   Climb 3-5 Stairs With Railing 3   Basic Mobility Inpatient Raw Score 22   Basic Mobility Standardized Score 47 4   Highest Level Of Mobility   JH-HLM Goal 7: Walk 25 feet or more   JH-HLM Achieved 7: Walk 25 feet or more   End of Consult   Patient Position at End of Consult Bedside chair;Bed/Chair alarm activated; All needs within reach

## 2023-06-02 NOTE — PLAN OF CARE
Problem: PHYSICAL THERAPY ADULT  Goal: Performs mobility at highest level of function for planned discharge setting  See evaluation for individualized goals  Description: Treatment/Interventions: Functional transfer training, LE strengthening/ROM, Elevations, Therapeutic exercise, Endurance training, Bed mobility, Gait training          See flowsheet documentation for full assessment, interventions and recommendations  Note: Prognosis: Good  Problem List: Decreased strength, Decreased endurance, Impaired balance, Decreased mobility, Impaired tone  Assessment: Patient is a 78 y o  male evaluated by Physical Therapy s/p admit to [facility] [place] on 6/1/2023 with admitting diagnosis of: Head pain, Syncope, Hypoglycemia, Left lower lobe pneumonia, Closed head injury, initial encounter, and principal problem of: Pneumonia of left lower lobe due to infectious organism  PT was consulted to assess patient's functional mobility and discharge needs  Ordered are PT Evaluation and treatment with activity level of: up and OOB as tolerated  Comorbidities affecting patient's physical performance at time of assessment include: DM, HTN, HLD, prostate cancer, arthritis, GERD, tinnitus, hypothyroidism, BPH, hx of CVA  Personal factors affecting the patient at time of IE include: lives in 2 story home, ambulating with assistive device, history of fall(s), inability/difficulty performing IADLs and inability/difficulty performing ADLs  Please locate objective findings from PT assessment regarding body systems outlined above  Upon evaluation, pt able to perform all functional mobility with SUP, SPC, and increased time  Pt requiring assist to don B shoes and L AFO prior to ambulation, then able to ambulate 250' before taking seated rest break  Pt has baseline gait deviations d/t prior CVA, however no true LOB experienced  Pt reports having a better AFO that his wife will be bringing  HR and SpO2 remained WFL on RA throughout  Pt is motivated to continue attending OP PT upon d/c from hospital  The patient's AM-PAC Basic Mobility Inpatient Short Form Raw Score is 22  A Raw score of greater than 16 suggests the patient may benefit from discharge to home  Please also refer to the recommendation of the Physical Therapist for safe discharge planning  Co treatment with OT secondary to complex medical condition of pt, possible A of 2 required to achieve and maintain transitional movements, requiring the need of skilled therapeutic intervention of 2 therapists to achieve delivery of services  Pt will benefit from continued PT intervention during LOS to address current deficits, increase LOF, and facilitate safe d/c to next level of care when medically appropriate  D/c recommendation at this time is home with outpatient rehabilitation services  PT Discharge Recommendation: Home with outpatient rehabilitation    See flowsheet documentation for full assessment

## 2023-06-02 NOTE — PROGRESS NOTES
[address] [facility] [address]  Progress Note  Name: Laurie Osei  MRN: [id number]  Unit/Bed#: 586-65 I Date of Admission: 6/1/2023   Date of Service: 6/2/2023 I Hospital Day: 0    Assessment/Plan   * Syncope  Assessment & Plan  Patient presents after a syncopal episode at home witness by his wife - patient has insulin pump in place  At that time glucose level was around 80     · Likely this occurred due to hypoglycemia  · Of note, recent appointment with patient's endocrinologist [facility] weeks [facility] -noted for reassuring range of blood glucose, patient reports his hemoglobin A1c is at goal of 7 4  · No evidence of orthostatic hypotension  · We will place on telemetry to rule out cardiac causes  · PT/OT evaluations    Hypoglycemia  Assessment & Plan  Suspected cause of syncope, patient with insulin pump - requested Endocrinology consult for possible need of management adjustment    Insulin pump in place  Assessment & Plan  · Patient follows with Dr Sara Patel [place], [place]  · Recent appointment approximately 2 weeks ago, at that time insulin control was reassuring  · Patient presents with episodes of hypoglycemia, will request inpatient endocrinology evaluation  · Patient usually replaces his insulin pump every 3 days, patient's wife at bedside states that she is just now replaced the pump already after hospital admission    Pneumonia of left lower lobe due to infectious organism  Assessment & Plan  · Low suspicion for pneumonia despite the radiologic finding, consider episode of aspiration during syncopal event  · Negative procalcitonin, continue to trend  · Patient reports chronic cough, continue supportive management  · Complete pneumonia work-up  · Continue empiric antibiotics for now    Hyponatremia  Assessment & Plan  · Today Na level at 123 however when adjusted for hyperglycemia at 130  · Will start on gentle IVF with NS  · Recheck labs in am    Ambulatory dysfunction  Assessment & Plan  · PT/OT evaluation appreciated - home with outpatient therapies recommended     History of stroke  Assessment & Plan  Continue with statin and Plavix    Essential hypertension  Assessment & Plan  · Continue with amlodipine and lisinopril  · No evidence of orthostatic hypotension    Stage 3 chronic kidney disease [facility])  Assessment & Plan  Lab Results   Component Value Date    CREATININE 1 47 (H) 06/02/2023    CREATININE 1 43 (H) 06/01/2023    CREATININE 1 46 (H) 12/17/2022    EGFR 46 06/02/2023    EGFR 47 06/01/2023    EGFR 46 12/17/2022     At baseline    Hyperlipidemia  Assessment & Plan  Continue Lipitor    Hypothyroidism  Assessment & Plan  Continue with Synthroid  TSH elevated, may need to adjust dosing    DMII (diabetes mellitus, type 2) [facility])  Assessment & Plan  Lab Results   Component Value Date    HGBA1C 7 4 05/16/2023       Recent Labs     06/02/23  0010 06/02/23  0415 06/02/23  0818 06/02/23  1104   POCGLU 297* 385* 397* 268*       Blood Sugar Average: Last 72 hrs:  (P) 154 [id number]     · Noted hypoglycemic events in the emergency department  · Received ampule of D50  · Patient currently has an insulin pump with following settings: 0 6 basal units, 1 unit for every 25 g of carbs  · Continue with the current settings for now  · Monitor blood glucose closely   · Requested Endocrinology evaluatino           VTE Pharmacologic Prophylaxis: VTE Score: 4 Moderate Risk (Score 3-4) - Pharmacological DVT Prophylaxis Ordered: heparin  Patient Centered Rounds: I performed bedside rounds with nursing staff today  Discussions with Specialists or Other Care Team Provider: Endocrinology     Education and Discussions with Family / Patient: Updated  (wife) at bedside      Total Time Spent on Date of Encounter in care of patient: 55 minutes This time was spent on one or more of the following: performing physical exam; counseling and coordination of care; obtaining or reviewing history; documenting in the medical record; reviewing/ordering tests, medications or procedures; communicating with other healthcare professionals and discussing with patient's family/caregivers  Current Length of Stay: 0 day(s)  Current Patient Status: Inpatient   Certification Statement: The patient will continue to require additional inpatient hospital stay due to dizziness, syncopal episode, fluctuating blood glucose   Discharge Plan: Anticipate discharge in 24-48 hrs to home  Code Status: Level 1 - Full Code    Subjective:   Patient reports feeling improved but still with some lightheadedness  Hopes to be discharged home tomorrow  Objective:     Vitals:   Temp (24hrs), Av 4 °F (36 3 °C), Min:95 8 °F (35 4 °C), Max:98 7 °F (37 1 °C)    Temp:  [95 8 °F (35 4 °C)-98 7 °F (37 1 °C)] 98 7 °F (37 1 °C)  HR:  [64-89] 85  Resp:  [16-20] 18  BP: (130-203)/() 155/78  SpO2:  [93 %-97 %] 96 %  Body mass index is 24 2 kg/m²  Input and Output Summary (last 24 hours): Intake/Output Summary (Last 24 hours) at 2023 1429  Last data filed at 2023 1247  Gross per 24 hour   Intake 762 ml   Output 650 ml   Net 112 ml       Physical Exam:   Physical Exam  Constitutional:       General: He is not in acute distress  HENT:      Head: Normocephalic and atraumatic  Nose: No congestion  Eyes:      Conjunctiva/sclera: Conjunctivae normal    Cardiovascular:      Rate and Rhythm: Normal rate and regular rhythm  Heart sounds: No murmur heard  Pulmonary:      Effort: No respiratory distress  Breath sounds: No wheezing or rales  Abdominal:      General: There is no distension  Tenderness: There is no abdominal tenderness  There is no guarding  Musculoskeletal:      Right lower leg: No edema  Left lower leg: No edema  Skin:     General: Skin is warm and dry  Neurological:      Mental Status: He is oriented to person, place, and time  Psychiatric:         Mood and Affect: Mood normal           Additional Data:     Labs:  Results from last 7 days   Lab Units 06/02/23  0425   EOS PCT % 0   HEMATOCRIT % 34 3*   HEMOGLOBIN g/dL 11 8*   LYMPHS PCT % 6*   MONOS PCT % 7   NEUTROS PCT % 87*   PLATELETS Thousands/uL 274   WBC Thousand/uL 9 50     Results from last 7 days   Lab Units 06/02/23  0425   ANION GAP mmol/L 6   BUN mg/dL 30*   CALCIUM mg/dL 8 8   CHLORIDE mmol/L 92*   CO2 mmol/L 25   CREATININE mg/dL 1 47*   GLUCOSE RANDOM mg/dL 404*   POTASSIUM mmol/L 4 4   SODIUM mmol/L 123*         Results from last 7 days   Lab Units 06/02/23  1104 06/02/23  0818 06/02/23  0415 06/02/23  0010 06/01/23  2042 06/01/23 2015 06/01/23  1926 06/01/23  1906 06/01/23  1844   POC GLUCOSE mg/dl 268* 397* 385* 297* 230* 209* 220* 50* 49*         Results from last 7 days   Lab Units 06/01/23  1935 06/01/23  1804   LACTIC ACID mmol/L 0 8  --    PROCALCITONIN ng/ml  --  0 08       Lines/Drains:  Invasive Devices     Peripheral Intravenous Line  Duration           Peripheral IV 06/01/23 Left Wrist <1 day                  Telemetry:  Telemetry Orders (From admission, onward)             24 Hour Telemetry Monitoring  Continuous x 24 Hours (Telem)        Question:  Reason for 24 Hour Telemetry  Answer:  Syncope suspected to be cardiac in origin                 Telemetry Reviewed: Normal Sinus Rhythm  Indication for Continued Telemetry Use: Syncope             Imaging: Reviewed radiology reports from this admission including: CT head and Personally reviewed the following imaging: CT head    Recent Cultures (last 7 days):   Results from last 7 days   Lab Units 06/02/23  0232 06/01/23 1935   BLOOD CULTURE   --  Received in Microbiology Lab  Culture in Progress  Received in Microbiology Lab  Culture in Progress     LEGIONELLA URINARY ANTIGEN  Negative  --        Last 24 Hours Medication List:   Current Facility-Administered Medications   Medication Dose Route Frequency Provider Last Rate   • acetaminophen  650 mg Oral Q6H PRN Helga Call MD     • amLODIPine  5 mg Oral Daily Helga Call MD     • atorvastatin  40 mg Oral Daily With Yue Whitlock MD     • azithromycin  250 mg Oral Q24H Helga Call MD     • benzonatate  100 mg Oral TID PRN Helga Call MD     • cefTRIAXone  2,000 mg Intravenous Q24H Helga Call MD     • clopidogrel  75 mg Oral Daily Helga Call MD     • gabapentin  400 mg Oral HS Helga Call MD     • heparin (porcine)  5,000 Units Subcutaneous Q8H [facility] Helga Call MD     • insulin lispro  1-5 Units Subcutaneous TID AC Helga Call MD     • levothyroxine  100 mcg Oral Early Morning Helga Call MD     • lisinopril  40 mg Oral Daily Helga Call MD     • multivitamin-minerals  1 tablet Oral Daily Becky Reyez MD     • patient maintained insulin pump  1 each Subcutaneous Tanya Dominguez MD     • sodium chloride  75 mL/hr Intravenous Continuous Kierra Alegre MD          Today, Patient Was Seen By: Kierra Alegre MD    **Please Note: This note may have been constructed using a voice recognition system  **

## 2023-06-02 NOTE — ASSESSMENT & PLAN NOTE
Lab Results   Component Value Date    CREATININE 1 43 (H) 06/01/2023    CREATININE 1 46 (H) 12/17/2022    CREATININE 1 45 (H) 10/19/2022    EGFR 47 06/01/2023    EGFR 46 12/17/2022    EGFR 47 10/19/2022     Stable  Dose medications appropriately based on renal function  Avoid nephrotoxic medications

## 2023-06-02 NOTE — ASSESSMENT & PLAN NOTE
· Today Na level at 123 however when adjusted for hyperglycemia at 130  · Will start on gentle IVF with NS  · Recheck labs in am

## 2023-06-02 NOTE — H&P
[address]  History and physical note  Name: Rosa Finley  MRN: [id number]  Unit/Bed#: 320-00 I Date of Admission: 6/1/2023   Date of Service: 6/1/2023 I Hospital Day: 0    Assessment/Plan   * Pneumonia of left lower lobe due to infectious organism  Assessment & Plan  As noted on chest x-ray  Continue with azithromycin and ceftriaxone  Antitussive medications as needed    Insulin pump in place  Assessment & Plan  Management as per diabetes    Hyponatremia  Assessment & Plan  Could be secondary to dehydration  Encourage p o  intake  Repeat BMP    Ambulatory dysfunction  Assessment & Plan  PT and OT evaluation added  Hold off on baclofen  Orthostatic vital signs ordered    History of stroke  Assessment & Plan  Continue with statin and Plavix    Essential hypertension  Assessment & Plan  Continue with amlodipine and lisinopril    Stage 3 chronic kidney disease ([facility] [address] )  Assessment & Plan  Lab Results   Component Value Date    CREATININE 1 43 (H) 06/01/2023    CREATININE 1 46 (H) 12/17/2022    CREATININE 1 45 (H) 10/19/2022    EGFR 47 06/01/2023    EGFR 46 12/17/2022    EGFR 47 10/19/2022     Stable  Dose medications appropriately based on renal function  Avoid nephrotoxic medications    Hyperlipidemia  Assessment & Plan  Continue Lipitor    Hypothyroidism  Assessment & Plan  Continue with Synthroid    DMII (diabetes mellitus, type 2) [facility])  Assessment & Plan  Lab Results   Component Value Date    HGBA1C 7 4 05/16/2023       Recent Labs     06/01/23  1906 06/01/23  1926 06/01/23 2015 06/01/23  2042   POCGLU 50* 220* 209* 230*       Blood Sugar Average: Last 72 hrs:  (P) 151 6     Noted hypoglycemic events in the emergency department  Received ampule of D50  Patient currently has an insulin pump with following settings: 0 6 basal units, 1 unit for every 25 g of carbs  Continue with the current settings  Monitor blood glucose closely         VTE Pharmacologic Prophylaxis: VTE Score: 4 High Risk (Score >/= 5) - Pharmacological DVT Prophylaxis Ordered: heparin  Sequential Compression Devices Ordered  Code Status: Level 1 - Full Code  Discussion with family: Updated  (wife) via phone  Anticipated Length of Stay: Patient will be admitted on an inpatient basis with an anticipated length of stay of greater than 2 midnights secondary to Pneumonia   Total Time Spent on Date of Encounter in care of patient: 40 minutes This time was spent on one or more of the following: performing physical exam; counseling and coordination of care; obtaining or reviewing history; documenting in the medical record; reviewing/ordering tests, medications or procedures; communicating with other healthcare professionals and discussing with patient's family/caregivers  Chief Complaint: Fall    History of Present Illness:  Ar Munguia is a 78 y o  male with a PMH of diabetes, hypertension, previous history of stroke, dyslipidemia, hypothyroidism, CKD who presents with a fall  Patient does not recall much of the events, most of the history was obtained from his wife  Patient denied any shortness of breath, chest pain, abdominal pain, diarrhea, nausea or vomiting  Wife did note that the patient had some difficulty standing up today and had a syncopal episode earlier on the day of admission  Wife did note that patient did hit his head after passing out  She denied any other symptoms or worsening of his chronic conditions  Review of Systems:  Review of Systems   All other systems reviewed and are negative      Past Medical and Surgical History:   Past Medical History:   Diagnosis Date   • Arthritis    • BPH (benign prostatic hyperplasia)     last assessed 4/29/2014   • Diabetes mellitus ([facility] )    • Disease of thyroid gland    • GERD (gastroesophageal reflux disease)    • Hearing aid worn    • Hyperlipidemia    • Hypertension    • Hypothyroidism    • Mumps    • Prostate cancer ([facility] [address] )    • Stroke ([facility] ) • Tingling sensation     bilateral feet occassionally   • Tinnitus    • Wears glasses    • Wears partial dentures     upper     Past Surgical History:   Procedure Laterality Date   • CARDIAC ELECTROPHYSIOLOGY PROCEDURE Left 11/4/2021    Procedure: Reveal implant;  Surgeon: Hunter Nugent MD;  Location:  CARDIAC CATH LAB; Service: Cardiology   • CATARACT EXTRACTION Bilateral 2000   • EYE SURGERY     • JOINT REPLACEMENT     • KNEE ARTHROSCOPY Right 06/26/2009    knee   • NV LAPS SURG [id number] RPBIC RAD W/NRV SPARING ROBOT N/A 2/7/2018    Procedure: ROBOTIC ASSISTED LAPAROSCOPIC PROSTATECTOMY; BILATERAL PELVIC LYMPH NODE DISSECTION;  Surgeon: Ambreen Whitten MD;  Location: [facility] Main OR;  Service: Urology   • NV RPR 1ST INGUN HRNA AGE 5 YRS/> REDUCIBLE Right 4/19/2019    Procedure: REPAIR HERNIA INGUINAL;  Surgeon: Tyrone Vasques DO;  Location: [facility] MAIN OR;  Service: General   • PROSTATE BIOPSY  11/07/2017    needle biopsy of prostate   • TRIGGER FINGER RELEASE  05/2013    trigger thumb     Meds/Allergies:  Prior to Admission medications    Medication Sig Start Date End Date Taking?  Authorizing Provider   amLODIPine (NORVASC) 5 mg tablet Take 1 tablet (5 mg total) by mouth daily 9/27/22  Yes Montez Meckel, DO   atorvastatin (LIPITOR) 40 mg tablet Take 1 tablet (40 mg total) by mouth daily with dinner 3/25/21  Yes Denisa Singer PA-C   baclofen 10 mg tablet Take 1 tablet (10 mg total) by mouth 3 (three) times a day 3/30/22  Yes Michael March PA-C   clopidogrel (PLAVIX) 75 mg tablet Take 1 tablet (75 mg total) by mouth daily 9/30/22  Yes Jessie Muñoz MD   docusate sodium (COLACE) 100 mg capsule Take 100 mg by mouth if needed   Yes Historical Provider, MD   Dysport 500 units SOLR  5/18/23  Yes Historical Provider, MD   gabapentin (NEURONTIN) 400 mg capsule Take 1 capsule (400 mg total) by mouth daily at bedtime 9/30/22  Yes Jessie Muñoz MD   Glucagon 1 MG/0 2ML SOSY  3/30/21  Yes Historical Provider, MD "  levothyroxine (Synthroid) 100 mcg tablet Take 1 tablet (100 mcg total) by mouth daily in the early morning 10/15/21  Yes Denis Christensen, DO   lisinopril (ZESTRIL) 40 mg tablet Take 1 tablet (40 mg total) by mouth daily 2/13/23  Yes Denis Christensen, DO   Multiple Vitamin (MULTIVITAMIN) tablet Take 1 tablet by mouth daily  Yes Historical Provider, MD   PATIENT MAINTAINED INSULIN PUMP Inject 0 4 each under the skin continuous    Yes Historical Provider, MD     I have reviewed home medications with patient family member      Allergies: No Known Allergies    Social History:  Marital Status: /Civil Union   Occupation: None  Patient Pre-hospital Living Situation: Home  Patient Pre-hospital Level of Mobility: walks with person assist  Patient Pre-hospital Diet Restrictions: None  Substance Use History:   Social History     Substance and Sexual Activity   Alcohol Use Not Currently   • Alcohol/week: 2 0 standard drinks of alcohol   • Types: 2 Glasses of wine per week    Comment: week, social drinker     Social History     Tobacco Use   Smoking Status Former   Smokeless Tobacco Never   Tobacco Comments    quit about 50 years ago     Social History     Substance and Sexual Activity   Drug Use No     Family History:  Family History   Problem Relation Age of Onset   • Cancer Mother    • Diabetes Mother    • Uterine cancer Mother    • Diabetes Father    • Cancer Father    • Stroke Father         of unknown cause   • Hypertension Father    • Prostate cancer Father    • Diabetes Family         Uncle, DM   • Cancer Family         Grandmother     Physical Exam:   Vitals:   Blood Pressure: 151/80 (06/01/23 2044)  Pulse: 87 (06/01/23 2132)  Temperature: 98 7 °F (37 1 °C) (06/01/23 2044)  Temp Source: Oral (06/01/23 2014)  Respirations: 20 (06/01/23 2044)  Height: 5' 6\" (167 6 cm) (06/01/23 2044)  Weight - Scale: 68 kg (149 lb 14 6 oz) (06/01/23 2044)  SpO2: 96 % (06/01/23 2132)    Physical Exam  Vitals and nursing note " reviewed  Constitutional:       General: He is not in acute distress  Appearance: He is well-developed  HENT:      Head: Normocephalic and atraumatic  Nose: Nose normal       Mouth/Throat:      Mouth: Mucous membranes are moist    Eyes:      Conjunctiva/sclera: Conjunctivae normal       Pupils: Pupils are equal, round, and reactive to light  Cardiovascular:      Rate and Rhythm: Normal rate and regular rhythm  Heart sounds: No murmur heard  Pulmonary:      Effort: Pulmonary effort is normal  No respiratory distress  Breath sounds: Normal breath sounds  No wheezing or rhonchi  Chest:      Chest wall: No tenderness  Abdominal:      General: Abdomen is flat  Bowel sounds are normal       Palpations: Abdomen is soft  Tenderness: There is no abdominal tenderness  There is no right CVA tenderness, left CVA tenderness or guarding  Musculoskeletal:         General: No swelling  Normal range of motion  Cervical back: Normal range of motion and neck supple  Right lower leg: No edema  Left lower leg: No edema  Skin:     General: Skin is warm and dry  Capillary Refill: Capillary refill takes less than 2 seconds  Findings: No erythema or rash  Neurological:      Mental Status: He is alert     Psychiatric:         Mood and Affect: Mood normal        Additional Data:   Lab Results:  Results from last 7 days   Lab Units 06/01/23  1804   EOS PCT % 5   HEMATOCRIT % 37 9   HEMOGLOBIN g/dL 13 1   LYMPHS PCT % 19   MONOS PCT % 11   NEUTROS PCT % 64   PLATELETS Thousands/uL 301   WBC Thousand/uL 10 26*     Results from last 7 days   Lab Units 06/01/23  1804   ANION GAP mmol/L 10   BUN mg/dL 34*   CALCIUM mg/dL 9 3   CHLORIDE mmol/L 94*   CO2 mmol/L 24   CREATININE mg/dL 1 43*   GLUCOSE RANDOM mg/dL 74   POTASSIUM mmol/L 3 4*   SODIUM mmol/L 128*         Results from last 7 days   Lab Units 06/01/23  2042 06/01/23 2015 06/01/23  1926 06/01/23  1906 06/01/23  1844   POC GLUCOSE mg/dl 230* 209* 220* 50* 49*         Results from last 7 days   Lab Units 06/01/23  1935 06/01/23  1804   LACTIC ACID mmol/L 0 8  --    PROCALCITONIN ng/ml  --  0 08     Lines/Drains:  Invasive Devices     Peripheral Intravenous Line  Duration           Peripheral IV 06/01/23 Left Wrist <1 day              Imaging: Reviewed radiology reports from this admission including: chest xray, CT head and CT spine  TRAUMA - CT head wo contrast   Final Result by Omar Toribio MD (06/01 1834)      No acute intracranial abnormality  The study was marked in [facility] for immediate notification  Workstation performed: [id number]         TRAUMA - CT spine cervical wo contrast   Final Result by Omar Toriboi MD (06/01 1841)      No cervical spine fracture or traumatic malalignment  The study was marked in [facility] for immediate notification  Workstation performed: [id number]         XR Trauma chest portable   ED Interpretation by Soy Garcia DO (06/01 1806)   Airway is midline  Lungs are clear bilaterally with no evidence of pulmonary vascular congestion/focal infiltrate/pleural effusion/pneumothorax  Cardiac and mediastinal silhouettes are within normal limits  Osseous structures appear normal         Final Result by Omar Toribio MD (06/01 1845)      Left lower lobe pulmonary opacity  Clinical correlation for pneumonia  The study was marked in [facility] for immediate notification  Workstation performed: [id number]           EKG and Other Studies Reviewed on Admission:   · EKG: No EKG obtained  ** Please Note: This note has been constructed using a voice recognition system   **

## 2023-06-02 NOTE — ASSESSMENT & PLAN NOTE
· Patient follows with Dr Malick Hale [place], [place]  · Recent appointment approximately 2 weeks ago, at that time insulin control was reassuring  · Patient presents with episodes of hypoglycemia, will request inpatient endocrinology evaluation  · Patient usually replaces his insulin pump every 3 days, patient's wife at bedside states that she is just now replaced the pump already after hospital admission

## 2023-06-02 NOTE — ASSESSMENT & PLAN NOTE
Lab Results   Component Value Date    CREATININE 1 47 (H) 06/02/2023    CREATININE 1 43 (H) 06/01/2023    CREATININE 1 46 (H) 12/17/2022    EGFR 46 06/02/2023    EGFR 47 06/01/2023    EGFR 46 12/17/2022     At baseline

## 2023-06-02 NOTE — CASE MANAGEMENT
Case Management Assessment    Patient name Demi Palencia /[id number] MRN [id number]  : 1945 Date 2023       Current Admission Date: 2023  Current Admission Diagnosis:Pneumonia of left lower lobe due to infectious organism   Patient Active Problem List    Diagnosis Date Noted   • Pneumonia of left lower lobe due to infectious organism 2023   • Insulin pump in place 2023   • Hypoglycemia    • Left lower lobe pneumonia    • Ambulatory dysfunction 10/18/2022   • SIRS (systemic inflammatory response syndrome) ([facility] ) 10/18/2022   • Intracranial vascular stenosis 10/18/2022   • Hyponatremia 10/18/2022   • Hepatomegaly 10/18/2022   • Cholelithiasis 10/18/2022   • Aspiration pneumonitis ([facility] ) 10/18/2022   • Diabetic retinopathy [facility] ) 2022   • Subluxation of lens, right eye 2022   • Abnormal gait 2022   • Spastic hemiplegia affecting left nondominant side ([facility] ) 2022   • 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) 2021   • Type 2 diabetes mellitus with diabetic neuropathy ([facility] ) 2021   • History of stroke 2021   • Long term current use of insulin ([facility] ) 10/01/2020   • Medicare annual wellness visit, subsequent 2018   • DMII (diabetes mellitus, type 2) ([facility] ) 2018   • Hypothyroidism 2018   • Hyperlipidemia 2018   • Stage 3 chronic kidney disease ([facility] ) 2018   • Adenocarcinoma of prostate ([facility] ) 2017   • Sensorineural hearing loss (SNHL), bilateral 2017   • Essential hypertension 2012      LOS (days): 0  Geometric Mean LOS (GMLOS) (days):   Days to GMLOS:     OBJECTIVE:              Current admission status: Observation       Preferred Pharmacy:   [facility]/pharmacy #[id number]- [place] [address]  Phone: [phone] Fax:  [facility] [address] [address] [address]  Phone: [phone] Fax: [phone]    [facility] [address] [place] [address]  Phone: [phone] Fax: [phone]    Primary Care Provider: Mj Cortez DO    Primary Insurance: Shannon Oleary [facility] REP  Secondary Insurance:     ASSESSMENT:  [address], [address] Representative - Spouse   Primary Phone: [phone] [facility]  Home Phone: [phone]               Advance Directives  Does patient have a [address]?: Yes  Does patient have Advance Directives?: Yes  Advance Directives: Living will, Power of  for health care (living will on file, POA not  pt states his wife is POA)  Primary Contact: Philomena Bird         Readmission Root Cause  30 Day Readmission: No    Patient Information  Admitted from[de-identified] Home  Mental Status: Alert  During Assessment patient was accompanied by: Not accompanied during assessment  Assessment information provided by[de-identified] Patient  Primary Caregiver: Family  Caregiver's Name[de-identified] Debbie Sy Relationship to Patient[de-identified] Family Member  Support Systems: Self, Spouse/significant other  [place] of Residence: [facility] do you live in?: [facility] entry access options   Select all that apply : No steps to enter home  Type of Current Residence: 2 story home  Upon entering residence, is there a bedroom on the main floor (no further steps)?: No  A bedroom is located on the following floor levels of residence (select all that apply):: 2nd Floor  Upon entering residence, is there a bathroom on the main floor (no further steps)?: No  Indicate which floors of current residence have a bathroom (select all the apply):: 2nd Floor  Number of steps to 2nd floor from main floor: One Flight  In the last 12 months, was there a time when you were not able to pay the mortgage or rent on time?: No  In the last 12 months, how many places have you lived?: 1  In the last 12 months, was there a time when you did not have a steady place to sleep or slept in a shelter (including now)?: No  Homeless/housing insecurity resource given?: N/A  Living Arrangements: Lives w/ Spouse/significant other  Is patient a ?: Yes  Is patient active with [facility])?: Yes (Maykel Galvin)  Is patient service connected?: Yes    Activities of Daily Living Prior to Admission  Functional Status: Assistance  Completes ADLs independently?: No  Level of ADL dependence: Assistance  Ambulates independently?: Yes  Does patient use assisted devices?: Yes  Assisted Devices (DME) used: Quad Cane, Bedside Commode, Shower Chair  Does patient currently own DME?: Yes  What DME does the patient currently own?: Bedside Commode, Quad Cane, Shower Chair  Does patient have a history of Outpatient Therapy (PT/OT)?: Yes (leeann)  Does the patient have a history of Short-Term Rehab?: Yes (acute rehab in [place] [address])  Does patient have a history of HHC?: Yes (davey)  Does patient currently have [facility]?: No         Patient Information Continued  Income Source: Pension/custodial  Does patient have prescription coverage?: Yes  Within the past 12 months, you worried that your food would run out before you got the money to buy more : Never true  Within the past 12 months, the food you bought just didn't last and you didn't have money to get more : Never true  Food insecurity resource given?: N/A  Does patient receive dialysis treatments?: No  Does patient have a history of substance abuse?: No  Does patient have a history of Mental Health Diagnosis?: No         Means of Transportation  Means of Transport to Appts[de-identified] Family transport  In the past 12 months, has lack of transportation kept you from medical appointments or from getting medications?: No  In the past 12 months, has lack of transportation kept you from meetings, work, or from getting things needed for daily living?: No  Was application for public transport provided?: N/A  Cm met with the patient to evaluate the patients prior function and living situation and any barriers to d/c and form a safe d/c plan  Cm also evaluated the patient for any services in the home or needs for services  CM also discussed with patient that their preferences will be taken into account/consideration  Pt admitted with pneumonia  Iv abx, no oxygen needs  CM to follow for any discharge needs

## 2023-06-02 NOTE — CONSULTS
TeleConsultation - Endocrine  Ching Dueñas 78 y o  male MRN: [id number]  Unit/Bed#: 416-01 Encounter: [id number]        REQUIRED DOCUMENTATION:     1  This service was provided via Telemedicine  2  Provider located at [place], [place]   3  TeleMed provider: Kayley Louise DO   4  Identify all parties in room with patient during tele consult:  Patient and myself  5  Patient was then informed that this was a Telemedicine visit and that the exam was being conducted confidentially over secure lines  My office door was closed  No one else was in the room  Patient acknowledged consent and understanding of privacy and security of the Telemedicine visit, and gave us permission to have the assistant stay in the room in order to assist with the history and to conduct the exam   I informed the patient that I have reviewed their record in Epic and presented the opportunity for them to ask any questions regarding the visit today  The patient agreed to participate  Assessment/Plan     Assessment/Plan:    1  Diabetes mellitus c/b CKD3, history of stroke - recommend dc SQ correction plan  Monitor BG on present pump settings, which are believed to be basal 0 45 u/h and ICR 25, uncertain ISF or target  Patient has been bolusing correction with pump in addition to receiving SQ correction  I reviewed concern for stacking with Bill  Can continue ACHS monitoring  I may be contacted later if any concens develop  If stable overnight, okay for dc home with ASAP follow up with primary endocrinologist    2  Insulin pump in place     3  Hypoglycemia     4  Hypothyroidism - mild elevation of TSH  May observe on present dosing of levothyroxine, and arrange outpatient follow up for this issue      History of Present Illness     HPI: Ching Dueñas is a 78y o  year old male who presented to [facility] 6 1 23 with syncope and fall  He is on insulin pump   Plasma glucose 74 on presentation, but had POC glucose 49 in ED, leading to ampule of D50 being given, with resultant hyperglycemia  There is c/f hypoglycemia as cause for syncope  Farhan Kang was diagnosed with type 2 diabetes around age 50  He is on insulin pump, but has been told that by his endo that he is actually more like a type 1  He has never had dka  He is on a minimed medtronic pump (? Model) and he monitors using jay 2 is-CGM, and contour next glucometer, which is linked to his pump  Per patient, he recalls feeling dizzy before losing consciousness, which occurred after dinner  His pre-supper BG was apparently in the 80s  His wife, Ligia Greer ( a former [facility] nurse), who I spoke with separately on a telephone call, states that her concern was that he had a stroke, as he has had a stroke in the past, leading to left sided hemiparesis  She is uncertain whether or not he was hypoglycemic  He has had hypoglycemia in the past  Ligia Greer helps with Bill's pump placement and understands how to change its settings  She states his basal rates are 0 6 u/hr and ICR is 25  She is uncertain of ISF and target  She states his Rolene Zafar reflects sound control with over 90% TIR, although he may tend to be low  He does has glucagon at home  Per nursing, basal rates actually 0 45  I am unable to confirm ICR or ISF  Patient is presently bolusing meals through pump, but receiving correction ISF 60 >150 via SQ  Patient also reports to me giving himself pump correction several hours ago, even though he also received SQ treatment  Farhan Kang presently feels well and is without concerns  Inpatient consult to Endocrinology  Consult performed by: Courtney Camp DO  Consult ordered by: Phil Carmichael MD          Review of Systems   Constitutional: Negative for diaphoresis and unexpected weight change  HENT: Negative for trouble swallowing and voice change  Cardiovascular: Negative for palpitations  Gastrointestinal: Negative for nausea and vomiting     Endocrine: Negative for polydipsia and polyuria  Neurological: Positive for syncope  All other systems reviewed and are negative  Historical Information   Past Medical History:   Diagnosis Date   • Arthritis    • BPH (benign prostatic hyperplasia)     last assessed 4/29/2014   • Diabetes mellitus ([facility] )    • Disease of thyroid gland    • GERD (gastroesophageal reflux disease)    • Hearing aid worn    • Hyperlipidemia    • Hypertension    • Hypothyroidism    • Mumps    • Prostate cancer ([facility] )    • Stroke ([facility] )    • Tingling sensation     bilateral feet occassionally   • Tinnitus    • Wears glasses    • Wears partial dentures     upper     Past Surgical History:   Procedure Laterality Date   • CARDIAC ELECTROPHYSIOLOGY PROCEDURE Left 11/4/2021    Procedure: Reveal implant;  Surgeon: Javy Reyes MD;  Location:  CARDIAC CATH LAB;   Service: Cardiology   • CATARACT EXTRACTION Bilateral 2000   • EYE SURGERY     • JOINT REPLACEMENT     • KNEE ARTHROSCOPY Right 06/26/2009    knee   • IA LAPS SURG [id number] RPBIC RAD W/NRV SPARING ROBOT N/A 2/7/2018    Procedure: ROBOTIC ASSISTED LAPAROSCOPIC PROSTATECTOMY; BILATERAL PELVIC LYMPH NODE DISSECTION;  Surgeon: Roberto Rodriguez MD;  Location: [facility] Main OR;  Service: Urology   • IA RPR 1ST [facility] AGE 5 YRS/> REDUCIBLE Right 4/19/2019    Procedure: REPAIR HERNIA INGUINAL;  Surgeon: Doa Trujillo DO;  Location: [facility] MAIN OR;  Service: General   • PROSTATE BIOPSY  11/07/2017    needle biopsy of prostate   • TRIGGER FINGER RELEASE  05/2013    trigger thumb     Social History   Social History     Substance and Sexual Activity   Alcohol Use Not Currently   • Alcohol/week: 2 0 standard drinks of alcohol   • Types: 2 Glasses of wine per week    Comment: week, social drinker     Social History     Substance and Sexual Activity   Drug Use No     E-Cigarette/Vaping   • E-Cigarette Use Never User      E-Cigarette/Vaping Substances   • Nicotine No    • THC No    • CBD No    • Flavoring No    • Other No    • Unknown "No      Social History     Tobacco Use   Smoking Status Former   Smokeless Tobacco Never   Tobacco Comments    quit about 50 years ago     Family History: non-contributory    Meds/Allergies   all current active meds have been reviewed  No Known Allergies    Objective   Vitals: Blood pressure 159/84, pulse 70, temperature 98 3 °F (36 8 °C), resp  rate 18, height 5' 6\" (1 676 m), weight 68 kg (149 lb 14 6 oz), SpO2 96 %  Intake/Output Summary (Last 24 hours) at 6/2/2023 1611  Last data filed at 6/2/2023 1543  Gross per 24 hour   Intake 762 ml   Output 1150 ml   Net -388 ml     Invasive Devices     Peripheral Intravenous Line  Duration           Peripheral IV 06/01/23 Left Wrist <1 day                Physical Exam  Vitals reviewed  Constitutional:       General: He is not in acute distress  Comments: Patient is hard of hearing   HENT:      Head: Normocephalic and atraumatic  Pulmonary:      Effort: Pulmonary effort is normal  No respiratory distress  Neurological:      Mental Status: He is alert  Comments: Fluent and cogent speech   Psychiatric:         Mood and Affect: Mood normal          Behavior: Behavior normal          Lab Results: I have personally reviewed pertinent reports  Lab Results   Component Value Date    BUN 30 (H) 06/02/2023    CALCIUM 8 8 06/02/2023    CL 92 (L) 06/02/2023    CO2 25 06/02/2023    CREATININE 1 47 (H) 06/02/2023    GLUC 404 (H) 06/02/2023    K 4 4 06/02/2023    SODIUM 123 (L) 06/02/2023     Lab Results   Component Value Date    HGBA1C 7 4 05/16/2023         Imaging Studies: I have personally reviewed pertinent reports        Counseling / Coordination of Care  I have spent a total time of 45 minutes on 06/02/23 in caring for this patient including Diagnostic results, Risks and benefits of tx options, Instructions for management, Patient and family education, Importance of tx compliance, Impressions, Counseling / Coordination of care, Documenting in the medical " record, Reviewing / ordering tests, medicine, procedures  , Obtaining or reviewing history   and Communicating with other healthcare professionals

## 2023-06-02 NOTE — ASSESSMENT & PLAN NOTE
· Low suspicion for pneumonia despite the radiologic finding, consider episode of aspiration during syncopal event  · Negative procalcitonin, continue to trend  · Patient reports chronic cough, continue supportive management  · Complete pneumonia work-up  · Continue empiric antibiotics for now

## 2023-06-03 VITALS
OXYGEN SATURATION: 96 % | WEIGHT: 149.91 LBS | HEART RATE: 73 BPM | HEIGHT: 66 IN | BODY MASS INDEX: 24.09 KG/M2 | DIASTOLIC BLOOD PRESSURE: 88 MMHG | TEMPERATURE: 97.8 F | SYSTOLIC BLOOD PRESSURE: 161 MMHG | RESPIRATION RATE: 18 BRPM

## 2023-06-03 LAB
ALBUMIN SERPL BCP-MCNC: 3.7 G/DL (ref 3.5–5)
ALP SERPL-CCNC: 88 U/L (ref 34–104)
ALT SERPL W P-5'-P-CCNC: 12 U/L (ref 7–52)
ANION GAP SERPL CALCULATED.3IONS-SCNC: 6 MMOL/L (ref 4–13)
AST SERPL W P-5'-P-CCNC: 13 U/L (ref 13–39)
BASOPHILS # BLD AUTO: 0.03 THOUSANDS/ÂΜL (ref 0–0.1)
BASOPHILS NFR BLD AUTO: 0 % (ref 0–1)
BILIRUB SERPL-MCNC: 0.39 MG/DL (ref 0.2–1)
BUN SERPL-MCNC: 27 MG/DL (ref 5–25)
CALCIUM SERPL-MCNC: 9 MG/DL (ref 8.4–10.2)
CHLORIDE SERPL-SCNC: 98 MMOL/L (ref 96–108)
CO2 SERPL-SCNC: 27 MMOL/L (ref 21–32)
CREAT SERPL-MCNC: 1.3 MG/DL (ref 0.6–1.3)
EOSINOPHIL # BLD AUTO: 0.26 THOUSAND/ÂΜL (ref 0–0.61)
EOSINOPHIL NFR BLD AUTO: 4 % (ref 0–6)
ERYTHROCYTE [DISTWIDTH] IN BLOOD BY AUTOMATED COUNT: 13.2 % (ref 11.6–15.1)
GFR SERPL CREATININE-BSD FRML MDRD: 53 ML/MIN/1.73SQ M
GLUCOSE SERPL-MCNC: 187 MG/DL (ref 65–140)
GLUCOSE SERPL-MCNC: 242 MG/DL (ref 65–140)
GLUCOSE SERPL-MCNC: 258 MG/DL (ref 65–140)
HCT VFR BLD AUTO: 33.9 % (ref 36.5–49.3)
HGB BLD-MCNC: 11.6 G/DL (ref 12–17)
IMM GRANULOCYTES # BLD AUTO: 0.03 THOUSAND/UL (ref 0–0.2)
IMM GRANULOCYTES NFR BLD AUTO: 0 % (ref 0–2)
LYMPHOCYTES # BLD AUTO: 1.16 THOUSANDS/ÂΜL (ref 0.6–4.47)
LYMPHOCYTES NFR BLD AUTO: 17 % (ref 14–44)
MCH RBC QN AUTO: 32.5 PG (ref 26.8–34.3)
MCHC RBC AUTO-ENTMCNC: 34.2 G/DL (ref 31.4–37.4)
MCV RBC AUTO: 95 FL (ref 82–98)
MONOCYTES # BLD AUTO: 0.71 THOUSAND/ÂΜL (ref 0.17–1.22)
MONOCYTES NFR BLD AUTO: 10 % (ref 4–12)
NEUTROPHILS # BLD AUTO: 4.83 THOUSANDS/ÂΜL (ref 1.85–7.62)
NEUTS SEG NFR BLD AUTO: 69 % (ref 43–75)
NRBC BLD AUTO-RTO: 0 /100 WBCS
PLATELET # BLD AUTO: 243 THOUSANDS/UL (ref 149–390)
PMV BLD AUTO: 9.4 FL (ref 8.9–12.7)
POTASSIUM SERPL-SCNC: 3.8 MMOL/L (ref 3.5–5.3)
PROT SERPL-MCNC: 6.2 G/DL (ref 6.4–8.4)
RBC # BLD AUTO: 3.57 MILLION/UL (ref 3.88–5.62)
SODIUM SERPL-SCNC: 131 MMOL/L (ref 135–147)
WBC # BLD AUTO: 7.02 THOUSAND/UL (ref 4.31–10.16)

## 2023-06-03 PROCEDURE — 82948 REAGENT STRIP/BLOOD GLUCOSE: CPT

## 2023-06-03 PROCEDURE — 85025 COMPLETE CBC W/AUTO DIFF WBC: CPT | Performed by: FAMILY MEDICINE

## 2023-06-03 PROCEDURE — 80053 COMPREHEN METABOLIC PANEL: CPT | Performed by: FAMILY MEDICINE

## 2023-06-03 PROCEDURE — 99239 HOSP IP/OBS DSCHRG MGMT >30: CPT | Performed by: INTERNAL MEDICINE

## 2023-06-03 RX ORDER — BENZONATATE 100 MG/1
100 CAPSULE ORAL 3 TIMES DAILY PRN
Qty: 20 CAPSULE | Refills: 0 | Status: SHIPPED | OUTPATIENT
Start: 2023-06-03 | End: 2023-06-06

## 2023-06-03 RX ORDER — AZITHROMYCIN 250 MG/1
250 TABLET, FILM COATED ORAL EVERY 24 HOURS
Qty: 3 TABLET | Refills: 0 | Status: SHIPPED | OUTPATIENT
Start: 2023-06-03 | End: 2023-06-06

## 2023-06-03 RX ADMIN — SODIUM CHLORIDE 75 ML/HR: 0.9 INJECTION, SOLUTION INTRAVENOUS at 06:12

## 2023-06-03 RX ADMIN — AMLODIPINE BESYLATE 5 MG: 5 TABLET ORAL at 09:17

## 2023-06-03 RX ADMIN — HEPARIN SODIUM 5000 UNITS: 5000 INJECTION INTRAVENOUS; SUBCUTANEOUS at 05:02

## 2023-06-03 RX ADMIN — MULTIPLE VITAMINS W/ MINERALS TAB 1 TABLET: TAB at 09:17

## 2023-06-03 RX ADMIN — LEVOTHYROXINE SODIUM 100 MCG: 100 TABLET ORAL at 05:02

## 2023-06-03 RX ADMIN — CLOPIDOGREL BISULFATE 75 MG: 75 TABLET ORAL at 09:17

## 2023-06-03 RX ADMIN — LISINOPRIL 40 MG: 20 TABLET ORAL at 09:17

## 2023-06-03 NOTE — DISCHARGE SUMMARY
[address] [facility] [address] [place]  Discharge- Cas Feliz 1945, 78 y o  male MRN: [id number]  Unit/Bed#: 313-88 Encounter: [id number]  Primary Care Provider: Shahrzad Pires DO   Date and time admitted to hospital: 6/1/2023  5:57 PM    Hypoglycemia  Assessment & Plan  Suspected cause of syncope, patient with insulin pump   Appreciate endocrine recommendations-stable for discharge  Follow up with outpatient endocrinologist at [place] upon discharge    Insulin pump in place  Assessment & Plan  · Patient follows with Dr Quan Dos Santos [facility], [address] [place] [address]  · Recent appointment approximately 2 weeks ago, at that time insulin control was reassuring  · Patient presents with episodes of hypoglycemia, will request inpatient endocrinology evaluation  · Patient usually replaces his insulin pump every 3 days, patient's wife at bedside states that she is just now replaced the pump already after hospital admission  · Appreciate endocrine recommendations     Pneumonia of left lower lobe due to infectious organism  Assessment & Plan  · Low suspicion for pneumonia despite the radiologic finding, consider episode of aspiration during syncopal event  · Negative procalcitonin  · Patient reports chronic cough, continue supportive management  · Complete pneumonia work-up  Sputum culture pending   Blood cultures negative at 24 hours  · Discontinue rocephin, finish course of PO azithromycin at home    Hyponatremia  Assessment & Plan   today    Ambulatory dysfunction  Assessment & Plan  · PT/OT evaluation appreciated - home with outpatient therapies recommended     History of stroke  Assessment & Plan  Continue with statin and Plavix    Essential hypertension  Assessment & Plan  · Continue with amlodipine and lisinopril  · No evidence of orthostatic hypotension    Stage 3 chronic kidney disease [facility])  Assessment & Plan  Lab Results   Component Value Date    CREATININE 1 30 06/03/2023    CREATININE 1 47 (H) 06/02/2023 CREATININE 1 43 (H) 06/01/2023    EGFR 53 06/03/2023    EGFR 46 06/02/2023    EGFR 47 06/01/2023     At baseline  Avoid nephrotoxins    Hyperlipidemia  Assessment & Plan  Continue Lipitor    Hypothyroidism  Assessment & Plan  Continue with Synthroid  TSH is slightly elevated at 5 622  Follow up with PCP upon discharge    DMII (diabetes mellitus, type 2) [facility])  Assessment & Plan  Lab Results   Component Value Date    HGBA1C 7 4 05/16/2023       Recent Labs     06/02/23 2020 06/02/23  2354 06/03/23  0358 06/03/23  0730   POCGLU 315* 285* 242* 187*       Blood Sugar Average: Last 72 hrs:  (P) 237 [id number]     · Noted hypoglycemic events in the emergency department  · Received ampule of D50  · Patient currently has an insulin pump with following settings: 0 6 basal units, 1 unit for every 25 g of carbs  · Continue with the current settings for now  · Monitor blood glucose closely       * Syncope  Assessment & Plan  Patient presents after a syncopal episode at home witness by his wife - patient has insulin pump in place  At that time glucose level was around 80     · Likely this occurred due to hypoglycemia  · Of note, recent appointment with patient's [facility]ocr[facility] [place] -noted for reassuring range of blood glucose, patient reports his hemoglobin A1c is at goal of 7 4  · No evidence of orthostatic hypotension  · No additional episodes of syncope/lightheadedness while admitted  · Patient ready for discharge home        Discharging Physician / Practitioner: Nehemiah Blanc PA-C  PCP: Enzo Chacon DO  Admission Date:   Admission Orders (From admission, onward)     Ordered        06/02/23 1329  Inpatient Admission  Once            06/01/23 1956  Place in Observation  Once                      Discharge Date: 06/03/23    Medical Problems     Resolved Problems  Date Reviewed: 6/3/2023   None         Consultations During Hospital Stay:  · Endocrinology on "6/2 Dr Caren Connelly    Procedures Performed:   · none    Significant Findings / Test Results:   · CXR 6/1- left lower lobe opacity, concerning for pneumonia  · CT head 6/1- no acute intracranial abnormality  · CT C-spine 6/1- no cervical spine fracture or traumatic malalignment     Incidental Findings:   · TSH slightly elevated at 5 622    Test Results Pending at Discharge (will require follow up): · Blood cultures negative at 24 hours- not yet final  · Sputum cultures pending     Outpatient Tests Requested:  · none    Complications:  none    Reason for Admission: syncope    Hospital Course: Andriy Jimenez is a 78 y o  male patient who originally presented to the hospital on 6/1/2023 due to syncopal event at home, witnessed by wife  Wife reported he hit his head  Has hx of diabetes and stroke  Has insulin pump at home  Was hypoglycemic in ED, required amp of D50  Hypoglycemia thought to be reason for syncope  His CXR did show left lower lobe consolidation, concern for pneumonia  Has received rocephin/axithromycin  He reports chronic cough which is unchanged from baseline  No fever  No SOB  He feels well and wants to go home  Please see above list of diagnoses and related plan for additional information  Condition at Discharge: stable     Discharge Day Visit / Exam:     Subjective:  No complaints this AM  Ready to go home  Cough is chronic and baseline  Appetite good  Vitals: Blood Pressure: 161/88 (06/03/23 0710)  Pulse: 73 (06/03/23 0710)  Temperature: 97 8 °F (36 6 °C) (06/03/23 0710)  Temp Source: Oral (06/01/23 2323)  Respirations: 18 (06/03/23 0710)  Height: 5' 6\" (167 6 cm) (06/01/23 2044)  Weight - Scale: 68 kg (149 lb 14 6 oz) (06/01/23 2044)  SpO2: 96 % (06/03/23 0710)  Exam:   Physical Exam  Vitals reviewed  Constitutional:       General: He is not in acute distress  Appearance: He is not ill-appearing or diaphoretic  HENT:      Head: Normocephalic and atraumatic        Nose: Nose " normal       Mouth/Throat:      Pharynx: Oropharynx is clear  Eyes:      Conjunctiva/sclera: Conjunctivae normal    Cardiovascular:      Rate and Rhythm: Normal rate  Pulmonary:      Effort: Pulmonary effort is normal  No respiratory distress  Breath sounds: No wheezing  Comments: Scattered rhonchi  Abdominal:      General: Bowel sounds are normal  There is no distension  Palpations: Abdomen is soft  Tenderness: There is no abdominal tenderness  Musculoskeletal:         General: No tenderness or signs of injury  Skin:     General: Skin is warm and dry  Findings: No bruising or erythema  Neurological:      Mental Status: He is alert and oriented to person, place, and time  Mental status is at baseline  Psychiatric:         Mood and Affect: Mood normal          Behavior: Behavior normal          Discussion with Family: patient updated  He will call wife who was anticipating discharge to arrange      Discharge instructions/Information to patient and family:   See after visit summary for information provided to patient and family  Provisions for Follow-Up Care:  See after visit summary for information related to follow-up care and any pertinent home health orders  Disposition:     Home        Planned Readmission: no     Discharge Statement:  I spent 40 minutes discharging the patient  This time was spent on the day of discharge  I had direct contact with the patient on the day of discharge  Greater than 50% of the total time was spent examining patient, answering all patient questions, arranging and discussing plan of care with patient as well as directly providing post-discharge instructions  Additional time then spent on discharge activities  Discharge Medications:  See after visit summary for reconciled discharge medications provided to patient and family        ** Please Note: This note has been constructed using a voice recognition system **

## 2023-06-03 NOTE — NURSING NOTE
Reviewed d/c instructions with patient and wife, patient verbalized understanding  Pt d/c'd in stable condition; accompanied off the floor by wife and PCA via wheelchair

## 2023-06-03 NOTE — CASE MANAGEMENT
Case Management Discharge Planning Note    Patient name Jhon Reyes  Location [address] [id number] MRN [id number]  : 1945 Date 6/3/2023       Current Admission Date: 2023  Current Admission Diagnosis:Syncope   Patient Active Problem List    Diagnosis Date Noted   • Pneumonia of left lower lobe due to infectious organism 2023   • Insulin pump in place 2023   • Hypoglycemia    • Left lower lobe pneumonia    • Ambulatory dysfunction 10/18/2022   • SIRS (systemic inflammatory response syndrome) ([facility] ) 10/18/2022   • Intracranial vascular stenosis 10/18/2022   • Hyponatremia 10/18/2022   • Hepatomegaly 10/18/2022   • Cholelithiasis 10/18/2022   • Aspiration pneumonitis ([facility] ) 10/18/2022   • Diabetic retinopathy ([facility] ) 2022   • Subluxation of lens, right eye 2022   • Abnormal gait 2022   • Spastic hemiplegia affecting left nondominant side ([facility] ) 2022   • 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) 2021   • Type 2 diabetes mellitus with diabetic neuropathy ([facility] ) 2021   • History of stroke 2021   • Long term current use of insulin ([facility] ) 10/01/2020   • Medicare annual wellness visit, subsequent 2018   • DMII (diabetes mellitus, type 2) ([facility] ) 2018   • Hypothyroidism 2018   • Hyperlipidemia 2018   • Stage 3 chronic kidney disease ([facility] ) 2018   • Adenocarcinoma of prostate ([facility] ) 2017   • Sensorineural hearing loss (SNHL), bilateral 2017   • Syncope 02/15/2016   • Essential hypertension 2012      LOS (days): 1  Geometric Mean LOS (GMLOS) (days):   Days to GMLOS:     OBJECTIVE:  Risk of Unplanned Readmission Score: 18 66         Current admission status: Inpatient   Preferred Pharmacy:   [facility]/pharmacy #[id number]- [place] [address] [place] [id number]  Phone: [phone] Fax: [address] [facility], [address] [facility] [address]  Phone: [phone] Fax: [phone]    [facility] [address] [address]  Phone: [phone] Fax: [phone]    Primary Care Provider: Shana De Leon DO    Primary Insurance: [address] [facility]  Secondary Insurance: [employer] [facility] [facility] REP    DISCHARGE DETAILS:  Pt is being dc'd home on this date with OP follow up and OP physical therapy at [facility] in [facility]

## 2023-06-03 NOTE — PLAN OF CARE
Problem: PAIN - ADULT  Goal: Verbalizes/displays adequate comfort level or baseline comfort level  Description: Interventions:  - Encourage patient to monitor pain and request assistance  - Assess pain using appropriate pain scale  - Administer analgesics based on type and severity of pain and evaluate response  - Implement non-pharmacological measures as appropriate and evaluate response  - Consider cultural and social influences on pain and pain management  - Notify physician/advanced practitioner if interventions unsuccessful or patient reports new pain  Outcome: Progressing     Problem: INFECTION - ADULT  Goal: Absence or prevention of progression during hospitalization  Description: INTERVENTIONS:  - Assess and monitor for signs and symptoms of infection  - Monitor lab/diagnostic results  - Monitor all insertion sites, i e  indwelling lines, tubes, and drains  - Monitor endotracheal if appropriate and nasal secretions for changes in amount and color  - [place] appropriate cooling/warming therapies per order  - Administer medications as ordered  - Instruct and encourage patient and family to use good hand hygiene technique  - Identify and instruct in appropriate isolation precautions for identified infection/condition  Outcome: Progressing     Problem: SAFETY ADULT  Goal: Patient will remain free of falls  Description: INTERVENTIONS:  - Educate patient/family on patient safety including physical limitations  - Instruct patient to call for assistance with activity   - Consult OT/PT to assist with strengthening/mobility   - Keep Call bell within reach  - Keep bed low and locked with side rails adjusted as appropriate  - Keep care items and personal belongings within reach  - Initiate and maintain comfort rounds  - Make Fall Risk Sign visible to staff  - Offer Toileting every 2 Hours, in advance of need  - Initiate/Maintain fall alarm  - Obtain necessary fall risk management equipment: non-skid footwear  - Apply yellow socks and bracelet for high fall risk patients  - Consider moving patient to room near nurses station  Outcome: Progressing  Goal: Maintain or return to baseline ADL function  Description: INTERVENTIONS:  -  Assess patient's ability to carry out ADLs; assess patient's baseline for ADL function and identify physical deficits which impact ability to perform ADLs (bathing, care of mouth/teeth, toileting, grooming, dressing, etc )  - Assess/evaluate cause of self-care deficits   - Assess range of motion  - Assess patient's mobility; develop plan if impaired  - Assess patient's need for assistive devices and provide as appropriate  - Encourage maximum independence but intervene and supervise when necessary  - Involve family in performance of ADLs  - Assess for home care needs following discharge   - Consider OT consult to assist with ADL evaluation and planning for discharge  - Provide patient education as appropriate  Outcome: Progressing  Goal: Maintains/Returns to pre admission functional level  Description: INTERVENTIONS:  - Perform BMAT or MOVE assessment daily    - Set and communicate daily mobility goal to care team and patient/family/caregiver  - Collaborate with rehabilitation services on mobility goals if consulted  - Perform Range of Motion 3 times a day  - Reposition patient every 2 hours    - Dangle patient 3 times a day  - Stand patient 3 times a day  - Ambulate patient 3 times a day  - Out of bed to chair 3 times a day   - Out of bed for meals 3 times a day  - Out of bed for toileting  - Record patient progress and toleration of activity level   Outcome: Progressing     Problem: DISCHARGE PLANNING  Goal: Discharge to home or other facility with appropriate resources  Description: INTERVENTIONS:  - Identify barriers to discharge w/patient and caregiver  - Arrange for needed discharge resources and transportation as appropriate  - Identify discharge learning needs (meds, wound care, etc )  - Arrange for interpretive services to assist at discharge as needed  - Refer to Case Management Department for coordinating discharge planning if the patient needs post-hospital services based on physician/advanced practitioner order or complex needs related to functional status, cognitive ability, or social support system  Outcome: Progressing     Problem: Knowledge Deficit  Goal: Patient/family/caregiver demonstrates understanding of disease process, treatment plan, medications, and discharge instructions  Description: Complete learning assessment and assess knowledge base  Interventions:  - Provide teaching at level of understanding  - Provide teaching via preferred learning methods  Outcome: Progressing     Problem: NEUROSENSORY - ADULT  Goal: Achieves stable or improved neurological status  Description: INTERVENTIONS  - Monitor and report changes in neurological status  - Monitor vital signs such as temperature, blood pressure, glucose, and any other labs ordered   - Initiate measures to prevent increased intracranial pressure  - Monitor for seizure activity and implement precautions if appropriate      Outcome: Progressing  Goal: Achieves maximal functionality and self care  Description: INTERVENTIONS  - Monitor swallowing and airway patency with patient fatigue and changes in neurological status  - Encourage and assist patient to increase activity and self care     - Encourage visually impaired, hearing impaired and aphasic patients to use assistive/communication devices  Outcome: Progressing     Problem: METABOLIC, FLUID AND ELECTROLYTES - ADULT  Goal: Electrolytes maintained within normal limits  Description: INTERVENTIONS:  - Monitor labs and assess patient for signs and symptoms of electrolyte imbalances  - Administer electrolyte replacement as ordered  - Monitor response to electrolyte replacements, including repeat lab results as appropriate  - Instruct patient on fluid and nutrition as appropriate  Outcome: Progressing  Goal: Fluid balance maintained  Description: INTERVENTIONS:  - Monitor labs   - Monitor I/O and WT  - Instruct patient on fluid and nutrition as appropriate  - Assess for signs & symptoms of volume excess or deficit  Outcome: Progressing  Goal: Glucose maintained within target range  Description: INTERVENTIONS:  - Monitor Blood Glucose as ordered  - Assess for signs and symptoms of hyperglycemia and hypoglycemia  - Administer ordered medications to maintain glucose within target range  - Assess nutritional intake and initiate nutrition service referral as needed  Outcome: Progressing     Problem: HEMATOLOGIC - ADULT  Goal: Maintains hematologic stability  Description: INTERVENTIONS  - Assess for signs and symptoms of bleeding or hemorrhage  - Monitor labs  - Administer supportive blood products/factors as ordered and appropriate  Outcome: Progressing     Problem: MUSCULOSKELETAL - ADULT  Goal: Maintain or return mobility to safest level of function  Description: INTERVENTIONS:  - Assess patient's ability to carry out ADLs; assess patient's baseline for ADL function and identify physical deficits which impact ability to perform ADLs (bathing, care of mouth/teeth, toileting, grooming, dressing, etc )  - Assess/evaluate cause of self-care deficits   - Assess range of motion  - Assess patient's mobility  - Assess patient's need for assistive devices and provide as appropriate  - Encourage maximum independence but intervene and supervise when necessary  - Involve family in performance of ADLs  - Assess for home care needs following discharge   - Consider OT consult to assist with ADL evaluation and planning for discharge  - Provide patient education as appropriate  Outcome: Progressing  Goal: Maintain proper alignment of affected body part  Description: INTERVENTIONS:  - Support, maintain and protect limb and body alignment  - Provide patient/ family with appropriate education  Outcome: Progressing     Problem: RESPIRATORY - ADULT  Goal: Achieves optimal ventilation and oxygenation  Description: INTERVENTIONS:  - Assess for changes in respiratory status  - Assess for changes in mentation and behavior  - Position to facilitate oxygenation and minimize respiratory effort  - Oxygen administered by appropriate delivery if ordered  - Initiate smoking cessation education as indicated  - Encourage broncho-pulmonary hygiene including cough, deep breathe, Incentive Spirometry  - Assess the need for suctioning and aspirate as needed  - Assess and instruct to report SOB or any respiratory difficulty  - Respiratory Therapy support as indicated  Outcome: Progressing     Problem: MOBILITY - ADULT  Goal: Maintain or return to baseline ADL function  Description: INTERVENTIONS:  -  Assess patient's ability to carry out ADLs; assess patient's baseline for ADL function and identify physical deficits which impact ability to perform ADLs (bathing, care of mouth/teeth, toileting, grooming, dressing, etc )  - Assess/evaluate cause of self-care deficits   - Assess range of motion  - Assess patient's mobility; develop plan if impaired  - Assess patient's need for assistive devices and provide as appropriate  - Encourage maximum independence but intervene and supervise when necessary  - Involve family in performance of ADLs  - Assess for home care needs following discharge   - Consider OT consult to assist with ADL evaluation and planning for discharge  - Provide patient education as appropriate  Outcome: Progressing  Goal: Maintains/Returns to pre admission functional level  Description: INTERVENTIONS:  - Perform BMAT or MOVE assessment daily    - Set and communicate daily mobility goal to care team and patient/family/caregiver  - Collaborate with rehabilitation services on mobility goals if consulted  - Perform Range of Motion 3 times a day  - Reposition patient every 2 hours    - Dangle patient 3 times a day  - Stand patient 3 times a day  - Ambulate patient 3 times a day  - Out of bed to chair 3 times a day   - Out of bed for meals 3 times a day  - Out of bed for toileting  - Record patient progress and toleration of activity level   Outcome: Progressing

## 2023-06-03 NOTE — ASSESSMENT & PLAN NOTE
Lab Results   Component Value Date    HGBA1C 7 4 05/16/2023       Recent Labs     06/02/23  2020 06/02/23  2354 06/03/23  0358 06/03/23  0730   POCGLU 315* 285* 242* 187*       Blood Sugar Average: Last 72 hrs:  (P) 237 [id number]     · Noted hypoglycemic events in the emergency department  · Received ampule of D50  · Patient currently has an insulin pump with following settings: 0 6 basal units, 1 unit for every 25 g of carbs  · Continue with the current settings for now  · Monitor blood glucose closely

## 2023-06-03 NOTE — PLAN OF CARE
Problem: PAIN - ADULT  Goal: Verbalizes/displays adequate comfort level or baseline comfort level  Description: Interventions:  - Encourage patient to monitor pain and request assistance  - Assess pain using appropriate pain scale  - Administer analgesics based on type and severity of pain and evaluate response  - Implement non-pharmacological measures as appropriate and evaluate response  - Consider cultural and social influences on pain and pain management  - Notify physician/advanced practitioner if interventions unsuccessful or patient reports new pain  Outcome: Progressing     Problem: INFECTION - ADULT  Goal: Absence or prevention of progression during hospitalization  Description: INTERVENTIONS:  - Assess and monitor for signs and symptoms of infection  - Monitor lab/diagnostic results  - Monitor all insertion sites, i e  indwelling lines, tubes, and drains  - Monitor endotracheal if appropriate and nasal secretions for changes in amount and color  - [place] appropriate cooling/warming therapies per order  - Administer medications as ordered  - Instruct and encourage patient and family to use good hand hygiene technique  - Identify and instruct in appropriate isolation precautions for identified infection/condition  Outcome: Progressing     Problem: SAFETY ADULT  Goal: Patient will remain free of falls  Description: INTERVENTIONS:  - Educate patient/family on patient safety including physical limitations  - Instruct patient to call for assistance with activity   - Consult OT/PT to assist with strengthening/mobility   - Keep Call bell within reach  - Keep bed low and locked with side rails adjusted as appropriate  - Keep care items and personal belongings within reach  - Initiate and maintain comfort rounds  - Make Fall Risk Sign visible to staff  - Offer Toileting every 2 Hours, in advance of need  - Initiate/Maintain fall alarm  - Obtain necessary fall risk management equipment: non-skid footwear  - Apply yellow socks and bracelet for high fall risk patients  - Consider moving patient to room near nurses station  Outcome: Progressing  Goal: Maintain or return to baseline ADL function  Description: INTERVENTIONS:  -  Assess patient's ability to carry out ADLs; assess patient's baseline for ADL function and identify physical deficits which impact ability to perform ADLs (bathing, care of mouth/teeth, toileting, grooming, dressing, etc )  - Assess/evaluate cause of self-care deficits   - Assess range of motion  - Assess patient's mobility; develop plan if impaired  - Assess patient's need for assistive devices and provide as appropriate  - Encourage maximum independence but intervene and supervise when necessary  - Involve family in performance of ADLs  - Assess for home care needs following discharge   - Consider OT consult to assist with ADL evaluation and planning for discharge  - Provide patient education as appropriate  Outcome: Progressing  Goal: Maintains/Returns to pre admission functional level  Description: INTERVENTIONS:  - Perform BMAT or MOVE assessment daily    - Set and communicate daily mobility goal to care team and patient/family/caregiver  - Collaborate with rehabilitation services on mobility goals if consulted  - Perform Range of Motion 3 times a day  - Reposition patient every 2 hours    - Dangle patient 3 times a day  - Stand patient 3 times a day  - Ambulate patient 3 times a day  - Out of bed to chair 3 times a day   - Out of bed for meals 3 times a day  - Out of bed for toileting  - Record patient progress and toleration of activity level   Outcome: Progressing     Problem: DISCHARGE PLANNING  Goal: Discharge to home or other facility with appropriate resources  Description: INTERVENTIONS:  - Identify barriers to discharge w/patient and caregiver  - Arrange for needed discharge resources and transportation as appropriate  - Identify discharge learning needs (meds, wound care, etc )  - Arrange for interpretive services to assist at discharge as needed  - Refer to Case Management Department for coordinating discharge planning if the patient needs post-hospital services based on physician/advanced practitioner order or complex needs related to functional status, cognitive ability, or social support system  Outcome: Progressing     Problem: Knowledge Deficit  Goal: Patient/family/caregiver demonstrates understanding of disease process, treatment plan, medications, and discharge instructions  Description: Complete learning assessment and assess knowledge base  Interventions:  - Provide teaching at level of understanding  - Provide teaching via preferred learning methods  Outcome: Progressing     Problem: NEUROSENSORY - ADULT  Goal: Achieves stable or improved neurological status  Description: INTERVENTIONS  - Monitor and report changes in neurological status  - Monitor vital signs such as temperature, blood pressure, glucose, and any other labs ordered   - Initiate measures to prevent increased intracranial pressure  - Monitor for seizure activity and implement precautions if appropriate      Outcome: Progressing  Goal: Achieves maximal functionality and self care  Description: INTERVENTIONS  - Monitor swallowing and airway patency with patient fatigue and changes in neurological status  - Encourage and assist patient to increase activity and self care     - Encourage visually impaired, hearing impaired and aphasic patients to use assistive/communication devices  Outcome: Progressing     Problem: METABOLIC, FLUID AND ELECTROLYTES - ADULT  Goal: Electrolytes maintained within normal limits  Description: INTERVENTIONS:  - Monitor labs and assess patient for signs and symptoms of electrolyte imbalances  - Administer electrolyte replacement as ordered  - Monitor response to electrolyte replacements, including repeat lab results as appropriate  - Instruct patient on fluid and nutrition as appropriate  Outcome: Progressing  Goal: Fluid balance maintained  Description: INTERVENTIONS:  - Monitor labs   - Monitor I/O and WT  - Instruct patient on fluid and nutrition as appropriate  - Assess for signs & symptoms of volume excess or deficit  Outcome: Progressing  Goal: Glucose maintained within target range  Description: INTERVENTIONS:  - Monitor Blood Glucose as ordered  - Assess for signs and symptoms of hyperglycemia and hypoglycemia  - Administer ordered medications to maintain glucose within target range  - Assess nutritional intake and initiate nutrition service referral as needed  Outcome: Progressing     Problem: HEMATOLOGIC - ADULT  Goal: Maintains hematologic stability  Description: INTERVENTIONS  - Assess for signs and symptoms of bleeding or hemorrhage  - Monitor labs  - Administer supportive blood products/factors as ordered and appropriate  Outcome: Progressing     Problem: MUSCULOSKELETAL - ADULT  Goal: Maintain or return mobility to safest level of function  Description: INTERVENTIONS:  - Assess patient's ability to carry out ADLs; assess patient's baseline for ADL function and identify physical deficits which impact ability to perform ADLs (bathing, care of mouth/teeth, toileting, grooming, dressing, etc )  - Assess/evaluate cause of self-care deficits   - Assess range of motion  - Assess patient's mobility  - Assess patient's need for assistive devices and provide as appropriate  - Encourage maximum independence but intervene and supervise when necessary  - Involve family in performance of ADLs  - Assess for home care needs following discharge   - Consider OT consult to assist with ADL evaluation and planning for discharge  - Provide patient education as appropriate  Outcome: Progressing  Goal: Maintain proper alignment of affected body part  Description: INTERVENTIONS:  - Support, maintain and protect limb and body alignment  - Provide patient/ family with appropriate education  Outcome: Progressing     Problem: RESPIRATORY - ADULT  Goal: Achieves optimal ventilation and oxygenation  Description: INTERVENTIONS:  - Assess for changes in respiratory status  - Assess for changes in mentation and behavior  - Position to facilitate oxygenation and minimize respiratory effort  - Oxygen administered by appropriate delivery if ordered  - Initiate smoking cessation education as indicated  - Encourage broncho-pulmonary hygiene including cough, deep breathe, Incentive Spirometry  - Assess the need for suctioning and aspirate as needed  - Assess and instruct to report SOB or any respiratory difficulty  - Respiratory Therapy support as indicated  Outcome: Progressing     Problem: MOBILITY - ADULT  Goal: Maintain or return to baseline ADL function  Description: INTERVENTIONS:  -  Assess patient's ability to carry out ADLs; assess patient's baseline for ADL function and identify physical deficits which impact ability to perform ADLs (bathing, care of mouth/teeth, toileting, grooming, dressing, etc )  - Assess/evaluate cause of self-care deficits   - Assess range of motion  - Assess patient's mobility; develop plan if impaired  - Assess patient's need for assistive devices and provide as appropriate  - Encourage maximum independence but intervene and supervise when necessary  - Involve family in performance of ADLs  - Assess for home care needs following discharge   - Consider OT consult to assist with ADL evaluation and planning for discharge  - Provide patient education as appropriate  Outcome: Progressing  Goal: Maintains/Returns to pre admission functional level  Description: INTERVENTIONS:  - Perform BMAT or MOVE assessment daily    - Set and communicate daily mobility goal to care team and patient/family/caregiver  - Collaborate with rehabilitation services on mobility goals if consulted  - Perform Range of Motion 3 times a day  - Reposition patient every 2 hours    - Dangle patient 3 times a day  - Stand patient 3 times a day  - Ambulate patient 3 times a day  - Out of bed to chair 3 times a day   - Out of bed for meals 3 times a day  - Out of bed for toileting  - Record patient progress and toleration of activity level   Outcome: Progressing

## 2023-06-03 NOTE — ASSESSMENT & PLAN NOTE
Lab Results   Component Value Date    CREATININE 1 30 06/03/2023    CREATININE 1 47 (H) 06/02/2023    CREATININE 1 43 (H) 06/01/2023    EGFR 53 06/03/2023    EGFR 46 06/02/2023    EGFR 47 06/01/2023     At baseline  Avoid nephrotoxins

## 2023-06-03 NOTE — ASSESSMENT & PLAN NOTE
· Low suspicion for pneumonia despite the radiologic finding, consider episode of aspiration during syncopal event  · Negative procalcitonin  · Patient reports chronic cough, continue supportive management  · Complete pneumonia work-up  Sputum culture pending   Blood cultures negative at 24 hours  · Discontinue rocephin, finish course of PO azithromycin at home

## 2023-06-03 NOTE — ASSESSMENT & PLAN NOTE
Suspected cause of syncope, patient with insulin pump   Appreciate endocrine recommendations-stable for discharge  Follow up with outpatient endocrinologist at [facility] upon discharge

## 2023-06-03 NOTE — ASSESSMENT & PLAN NOTE
Patient presents after a syncopal episode at home witness by his wife - patient has insulin pump in place  At that time glucose level was around 80     · Likely this occurred due to hypoglycemia  · Of note, recent appointment with patient's endocrinologist approximately 2 weeks ago, [facility] Dr Jhonatan Velazquez in [facility] -noted for reassuring range of blood glucose, patient reports his hemoglobin A1c is at goal of 7 4  · No evidence of orthostatic hypotension  · No additional episodes of syncope/lightheadedness while admitted  · Patient ready for discharge home

## 2023-06-03 NOTE — UTILIZATION REVIEW
Continued Stay Review    Date: 6/3/23                         Current Patient Class: IP  Current Level of Care: MS    HPI:77 y o  male initially admitted on 6/2/23 - DX:  Syncope / Hypoglycemia / Insulin pump in place / Pneumonia of left lower lobe due to infectious organism / Hyponatremia / Ambulatory dysfunction / Essential hypertension / Stage 3 chronic kidney disease     Assessment/Plan:   6/3/23: Blood cultures negative at 24 hours; hemoglobin A1c is at goal of 7 4; No evidence of orthostatic hypotension PT/OT evaluation - home with outpatient therapies recommended;  Na 131  Plan: cont ivf @ 75; cont iv abx; monitor labs; f/u blood cx; f/u sputum cx; Accuchecks with [facility]      Vital Signs:   06/03/23 0755 -- -- -- -- -- -- None (Room air) --   06/03/23 07:10:22 97 8 °F (36 6 °C) 73 18 161/88 112 96 % -- Lying   06/02/23 22:36:17 -- 78 18 158/86 110 96 % -- Sitting   06/02/23 22:09:17 -- 77 16 192/99 Abnormal  130 91 % -- --   06/02/23 22:07:59 -- 78 -- 183/89 Abnormal  120 95 % -- --   06/02/23 2137 97 8 °F (36 6 °C) 78 -- -- -- 96 % -- --   06/02/23 1930 -- -- -- -- -- -- None (Room air) --   06/02/23 14:36:13 98 3 °F (36 8 °C) 70 -- 159/84 109 96 % -- --   06/02/23 0822 -- -- -- -- -- -- None (Room air) --   06/02/23 06:11:23 -- -- -- 155/78 104 -- -- Standing - Orthostatic VS   06/02/23 06:10:14 -- 85 -- 163/83 110 96 % -- Sitting - Orthostatic VS   06/02/23 06:09:06 -- 85 -- 164/83 110 94 % -- Lying - Orthostatic VS         Pertinent Labs/Diagnostic Results:       Results from last 7 days   Lab Units 06/03/23  0414 06/02/23  0425 06/01/23  1804   HEMATOCRIT % 33 9* 34 3* 37 9   HEMOGLOBIN g/dL 11 6* 11 8* 13 1   NEUTROS ABS Thousands/µL 4 83 8 21* 6 60   PLATELETS Thousands/uL 243 274 301   WBC Thousand/uL 7 02 9 50 10 26*         Results from last 7 days   Lab Units 06/03/23  0414 06/02/23  0425 06/01/23  1804   ANION GAP mmol/L 6 6 10   BUN mg/dL 27* 30* 34*   CALCIUM mg/dL 9 0 8 8 9 3   CHLORIDE mmol/L 98 92* 94*   CO2 mmol/L 27 25 24   CREATININE mg/dL 1 30 1 47* 1 43*   EGFR ml/min/1 73sq m 53 46 47   POTASSIUM mmol/L 3 8 4 4 3 4*   MAGNESIUM mg/dL  --   --  2 1   SODIUM mmol/L 131* 123* 128*     Results from last 7 days   Lab Units 06/03/23  0414   ALBUMIN g/dL 3 7   ALK PHOS U/L 88   ALT U/L 12   AST U/L 13   TOTAL BILIRUBIN mg/dL 0 39   TOTAL PROTEIN g/dL 6 2*     Results from last 7 days   Lab Units 06/03/23  0730 06/03/23  0358 06/02/23  2354 06/02/23  2020 06/02/23  1550 06/02/23  1104 06/02/23  0818 06/02/23  0415 06/02/23  0010 06/01/23  2042 06/01/23 2015 06/01/23  1926   POC GLUCOSE mg/dl 187* 242* 285* 315* 197* 268* 397* 385* 297* 230* 209* 220*     Results from last 7 days   Lab Units 06/03/23  0414 06/02/23  0425 06/01/23  1804   GLUCOSE RANDOM mg/dL 258* 404* 74       Results from last 7 days   Lab Units 06/01/23 2016 06/01/23  1804   HS TNI 0HR ng/L  --  8   HS TNI 2HR ng/L 6  --    HSTNI D2 ng/L -2  --        Results from last 7 days   Lab Units 06/01/23  1804   TSH 3RD GENERATON uIU/mL 5 622*     Results from last 7 days   Lab Units 06/01/23  1804   PROCALCITONIN ng/ml 0 08     Results from last 7 days   Lab Units 06/01/23  1935   LACTIC ACID mmol/L 0 8       Results from last 7 days   Lab Units 06/02/23  1542   OSMO UR mmol/     Results from last 7 days   Lab Units 06/02/23  1542   SODIUM UR  28     Results from last 7 days   Lab Units 06/02/23  0232   LEGIONELLA URINARY ANTIGEN  Negative   STREP PNEUMONIAE ANTIGEN, URINE  Negative       Results from last 7 days   Lab Units 06/01/23  1935   BLOOD CULTURE  No Growth at 24 hrs  No Growth at 24 hrs       Medications:   Scheduled Medications:  amLODIPine, 5 mg, Oral, Daily  atorvastatin, 40 mg, Oral, Daily With Dinner  azithromycin, 250 mg, Oral, Q24H  cefTRIAXone, 2,000 mg, Intravenous, Q24H  clopidogrel, 75 mg, Oral, Daily  gabapentin, 400 mg, Oral, HS  heparin (porcine), 5,000 Units, Subcutaneous, Q8H ZAINA  levothyroxine, 100 mcg, Oral, Early Morning  lisinopril, 40 mg, Oral, Daily  multivitamin-minerals, 1 tablet, Oral, Daily  patient maintained insulin pump, 1 each, Subcutaneous, Q8H      Continuous IV Infusions:  sodium chloride, 75 mL/hr, Intravenous, Continuous      PRN Meds:  acetaminophen, 650 mg, Oral, Q6H PRN  benzonatate, 100 mg, Oral, TID PRN        Discharge Plan: D    Network Utilization Review Department  ATTENTION: Please call with any questions or concerns to [phone] and carefully listen to the prompts so that you are directed to the right person  All voicemails are confidential   Francois Denver all requests for admission clinical reviews, approved or denied determinations and any other requests to dedicated fax number below belonging to the campus where the patient is receiving treatment   List of dedicated fax numbers for the Facilities:  [address] DENIALS (Administrative/Medical Necessity) [phone]   [address] (Maternity/NICU/Pediatrics) [phone]   [address] Jeni Polk [phone]   [facility]  [phone]   [address] [place] [address] Medical [place] [place] [address] [place] [facility] [phone]   [address] [place] [facility] [address] [facility] [facility] [phone]

## 2023-06-03 NOTE — ASSESSMENT & PLAN NOTE
· Patient follows with Dr Phillips [place], [place]  · Recent appointment approximately 2 weeks ago, at that time insulin control was reassuring  · Patient presents with episodes of hypoglycemia, will request inpatient endocrinology evaluation  · Patient usually replaces his insulin pump every 3 days, patient's wife at bedside states that she is just now replaced the pump already after hospital admission  · Appreciate endocrine recommendations

## 2023-06-04 ENCOUNTER — HOSPITAL ENCOUNTER (INPATIENT)
Facility: HOSPITAL | Age: 78
LOS: 1 days | Discharge: HOME | End: 2023-06-06
Attending: EMERGENCY MEDICINE | Admitting: INTERNAL MEDICINE
Payer: COMMERCIAL

## 2023-06-04 ENCOUNTER — TELEPHONE (OUTPATIENT)
Dept: OTHER | Facility: HOSPITAL | Age: 78
End: 2023-06-04

## 2023-06-04 DIAGNOSIS — Z79.4 TYPE 2 DIABETES MELLITUS WITH HYPOGLYCEMIA WITHOUT COMA, WITH LONG-TERM CURRENT USE OF INSULIN (HCC): Chronic | ICD-10-CM

## 2023-06-04 DIAGNOSIS — E11.649 TYPE 2 DIABETES MELLITUS WITH HYPOGLYCEMIA WITHOUT COMA, WITH LONG-TERM CURRENT USE OF INSULIN (HCC): Chronic | ICD-10-CM

## 2023-06-04 DIAGNOSIS — E87.1 HYPONATREMIA: ICD-10-CM

## 2023-06-04 DIAGNOSIS — R78.81 POSITIVE BLOOD CULTURE: Primary | ICD-10-CM

## 2023-06-04 DIAGNOSIS — N18.30 STAGE 3 CHRONIC KIDNEY DISEASE (HCC): Chronic | ICD-10-CM

## 2023-06-04 PROBLEM — R79.89 ELEVATED TSH: Status: ACTIVE | Noted: 2023-06-04

## 2023-06-04 LAB
ALBUMIN SERPL BCP-MCNC: 4.1 G/DL (ref 3.5–5)
ALP SERPL-CCNC: 94 U/L (ref 34–104)
ALT SERPL W P-5'-P-CCNC: 16 U/L (ref 7–52)
ANION GAP SERPL CALCULATED.3IONS-SCNC: 7 MMOL/L (ref 4–13)
APTT PPP: 26 SECONDS (ref 23–37)
AST SERPL W P-5'-P-CCNC: 18 U/L (ref 13–39)
BASOPHILS # BLD AUTO: 0.05 THOUSANDS/ÂΜL (ref 0–0.1)
BASOPHILS NFR BLD AUTO: 1 % (ref 0–1)
BILIRUB SERPL-MCNC: 0.5 MG/DL (ref 0.2–1)
BILIRUB UR QL STRIP: NEGATIVE
BUN SERPL-MCNC: 21 MG/DL (ref 5–25)
CALCIUM SERPL-MCNC: 9.6 MG/DL (ref 8.4–10.2)
CHLORIDE SERPL-SCNC: 95 MMOL/L (ref 96–108)
CLARITY UR: CLEAR
CO2 SERPL-SCNC: 27 MMOL/L (ref 21–32)
COLOR UR: YELLOW
CREAT SERPL-MCNC: 1.25 MG/DL (ref 0.6–1.3)
EOSINOPHIL # BLD AUTO: 0.32 THOUSAND/ÂΜL (ref 0–0.61)
EOSINOPHIL NFR BLD AUTO: 6 % (ref 0–6)
ERYTHROCYTE [DISTWIDTH] IN BLOOD BY AUTOMATED COUNT: 13.1 % (ref 11.6–15.1)
GFR SERPL CREATININE-BSD FRML MDRD: 55 ML/MIN/1.73SQ M
GLUCOSE SERPL-MCNC: 143 MG/DL (ref 65–140)
GLUCOSE SERPL-MCNC: 146 MG/DL (ref 65–140)
GLUCOSE SERPL-MCNC: 171 MG/DL (ref 65–140)
GLUCOSE SERPL-MCNC: 219 MG/DL (ref 65–140)
GLUCOSE UR STRIP-MCNC: ABNORMAL MG/DL
HCT VFR BLD AUTO: 37.9 % (ref 36.5–49.3)
HGB BLD-MCNC: 12.9 G/DL (ref 12–17)
HGB UR QL STRIP.AUTO: NEGATIVE
IMM GRANULOCYTES # BLD AUTO: 0.02 THOUSAND/UL (ref 0–0.2)
IMM GRANULOCYTES NFR BLD AUTO: 0 % (ref 0–2)
INR PPP: 0.95 (ref 0.84–1.19)
KETONES UR STRIP-MCNC: NEGATIVE MG/DL
LACTATE SERPL-SCNC: 1.6 MMOL/L (ref 0.5–2)
LEUKOCYTE ESTERASE UR QL STRIP: NEGATIVE
LYMPHOCYTES # BLD AUTO: 0.79 THOUSANDS/ÂΜL (ref 0.6–4.47)
LYMPHOCYTES NFR BLD AUTO: 14 % (ref 14–44)
MCH RBC QN AUTO: 32.7 PG (ref 26.8–34.3)
MCHC RBC AUTO-ENTMCNC: 34 G/DL (ref 31.4–37.4)
MCV RBC AUTO: 96 FL (ref 82–98)
MONOCYTES # BLD AUTO: 0.61 THOUSAND/ÂΜL (ref 0.17–1.22)
MONOCYTES NFR BLD AUTO: 11 % (ref 4–12)
NEUTROPHILS # BLD AUTO: 3.8 THOUSANDS/ÂΜL (ref 1.85–7.62)
NEUTS SEG NFR BLD AUTO: 68 % (ref 43–75)
NITRITE UR QL STRIP: NEGATIVE
NRBC BLD AUTO-RTO: 0 /100 WBCS
PH UR STRIP.AUTO: 6.5 [PH]
PLATELET # BLD AUTO: 279 THOUSANDS/UL (ref 149–390)
PLATELET # BLD AUTO: 291 THOUSANDS/UL (ref 149–390)
PMV BLD AUTO: 9 FL (ref 8.9–12.7)
PMV BLD AUTO: 9.5 FL (ref 8.9–12.7)
POTASSIUM SERPL-SCNC: 3.6 MMOL/L (ref 3.5–5.3)
PROCALCITONIN SERPL-MCNC: 0.07 NG/ML
PROT SERPL-MCNC: 7 G/DL (ref 6.4–8.4)
PROT UR STRIP-MCNC: NEGATIVE MG/DL
PROTHROMBIN TIME: 12.9 SECONDS (ref 11.6–14.5)
RBC # BLD AUTO: 3.95 MILLION/UL (ref 3.88–5.62)
SODIUM 24H UR-SCNC: 135 MOL/L
SODIUM SERPL-SCNC: 129 MMOL/L (ref 135–147)
SP GR UR STRIP.AUTO: 1.01 (ref 1–1.03)
UROBILINOGEN UR QL STRIP.AUTO: 0.2 E.U./DL
WBC # BLD AUTO: 5.59 THOUSAND/UL (ref 4.31–10.16)

## 2023-06-04 PROCEDURE — 36415 COLL VENOUS BLD VENIPUNCTURE: CPT | Performed by: EMERGENCY MEDICINE

## 2023-06-04 PROCEDURE — 80053 COMPREHEN METABOLIC PANEL: CPT | Performed by: EMERGENCY MEDICINE

## 2023-06-04 PROCEDURE — 84300 ASSAY OF URINE SODIUM: CPT | Performed by: INTERNAL MEDICINE

## 2023-06-04 PROCEDURE — 87086 URINE CULTURE/COLONY COUNT: CPT | Performed by: INTERNAL MEDICINE

## 2023-06-04 PROCEDURE — 85049 AUTOMATED PLATELET COUNT: CPT | Performed by: PHYSICIAN ASSISTANT

## 2023-06-04 PROCEDURE — 84145 PROCALCITONIN (PCT): CPT | Performed by: EMERGENCY MEDICINE

## 2023-06-04 PROCEDURE — 99285 EMERGENCY DEPT VISIT HI MDM: CPT | Performed by: EMERGENCY MEDICINE

## 2023-06-04 PROCEDURE — 83935 ASSAY OF URINE OSMOLALITY: CPT | Performed by: INTERNAL MEDICINE

## 2023-06-04 PROCEDURE — 99284 EMERGENCY DEPT VISIT MOD MDM: CPT

## 2023-06-04 PROCEDURE — 83605 ASSAY OF LACTIC ACID: CPT | Performed by: EMERGENCY MEDICINE

## 2023-06-04 PROCEDURE — 99223 1ST HOSP IP/OBS HIGH 75: CPT | Performed by: INTERNAL MEDICINE

## 2023-06-04 PROCEDURE — 94760 N-INVAS EAR/PLS OXIMETRY 1: CPT

## 2023-06-04 PROCEDURE — 81003 URINALYSIS AUTO W/O SCOPE: CPT | Performed by: EMERGENCY MEDICINE

## 2023-06-04 PROCEDURE — 87040 BLOOD CULTURE FOR BACTERIA: CPT | Performed by: EMERGENCY MEDICINE

## 2023-06-04 PROCEDURE — 82948 REAGENT STRIP/BLOOD GLUCOSE: CPT

## 2023-06-04 PROCEDURE — 85730 THROMBOPLASTIN TIME PARTIAL: CPT | Performed by: EMERGENCY MEDICINE

## 2023-06-04 PROCEDURE — 85610 PROTHROMBIN TIME: CPT | Performed by: EMERGENCY MEDICINE

## 2023-06-04 PROCEDURE — 85025 COMPLETE CBC W/AUTO DIFF WBC: CPT | Performed by: EMERGENCY MEDICINE

## 2023-06-04 RX ORDER — INSULIN LISPRO 100 [IU]/ML
1-5 INJECTION, SOLUTION INTRAVENOUS; SUBCUTANEOUS
Status: DISCONTINUED | OUTPATIENT
Start: 2023-06-04 | End: 2023-06-06 | Stop reason: HOSPADM

## 2023-06-04 RX ORDER — HEPARIN SODIUM 5000 [USP'U]/ML
5000 INJECTION, SOLUTION INTRAVENOUS; SUBCUTANEOUS EVERY 8 HOURS SCHEDULED
Status: DISCONTINUED | OUTPATIENT
Start: 2023-06-04 | End: 2023-06-06 | Stop reason: HOSPADM

## 2023-06-04 RX ORDER — CLOPIDOGREL BISULFATE 75 MG/1
75 TABLET ORAL DAILY
Status: DISCONTINUED | OUTPATIENT
Start: 2023-06-04 | End: 2023-06-06 | Stop reason: HOSPADM

## 2023-06-04 RX ORDER — MAGNESIUM HYDROXIDE/ALUMINUM HYDROXICE/SIMETHICONE 120; 1200; 1200 MG/30ML; MG/30ML; MG/30ML
30 SUSPENSION ORAL EVERY 6 HOURS PRN
Status: DISCONTINUED | OUTPATIENT
Start: 2023-06-04 | End: 2023-06-06 | Stop reason: HOSPADM

## 2023-06-04 RX ORDER — BACLOFEN 10 MG/1
10 TABLET ORAL 3 TIMES DAILY
Status: DISCONTINUED | OUTPATIENT
Start: 2023-06-04 | End: 2023-06-06 | Stop reason: HOSPADM

## 2023-06-04 RX ORDER — DOCUSATE SODIUM 100 MG/1
100 CAPSULE, LIQUID FILLED ORAL DAILY PRN
Status: DISCONTINUED | OUTPATIENT
Start: 2023-06-04 | End: 2023-06-06 | Stop reason: HOSPADM

## 2023-06-04 RX ORDER — LEVOTHYROXINE SODIUM 0.1 MG/1
100 TABLET ORAL
Status: DISCONTINUED | OUTPATIENT
Start: 2023-06-05 | End: 2023-06-06 | Stop reason: HOSPADM

## 2023-06-04 RX ORDER — GABAPENTIN 400 MG/1
400 CAPSULE ORAL
Status: DISCONTINUED | OUTPATIENT
Start: 2023-06-04 | End: 2023-06-06 | Stop reason: HOSPADM

## 2023-06-04 RX ORDER — ATORVASTATIN CALCIUM 40 MG/1
40 TABLET, FILM COATED ORAL
Status: DISCONTINUED | OUTPATIENT
Start: 2023-06-04 | End: 2023-06-06 | Stop reason: HOSPADM

## 2023-06-04 RX ORDER — AMLODIPINE BESYLATE 5 MG/1
5 TABLET ORAL DAILY
Status: DISCONTINUED | OUTPATIENT
Start: 2023-06-04 | End: 2023-06-06 | Stop reason: HOSPADM

## 2023-06-04 RX ORDER — LISINOPRIL 20 MG/1
40 TABLET ORAL DAILY
Status: DISCONTINUED | OUTPATIENT
Start: 2023-06-04 | End: 2023-06-06 | Stop reason: HOSPADM

## 2023-06-04 RX ORDER — ONDANSETRON 2 MG/ML
4 INJECTION INTRAMUSCULAR; INTRAVENOUS EVERY 6 HOURS PRN
Status: DISCONTINUED | OUTPATIENT
Start: 2023-06-04 | End: 2023-06-06 | Stop reason: HOSPADM

## 2023-06-04 RX ORDER — BENZONATATE 100 MG/1
100 CAPSULE ORAL 3 TIMES DAILY PRN
Status: DISCONTINUED | OUTPATIENT
Start: 2023-06-04 | End: 2023-06-06 | Stop reason: HOSPADM

## 2023-06-04 RX ORDER — ACETAMINOPHEN 325 MG/1
650 TABLET ORAL EVERY 6 HOURS PRN
Status: DISCONTINUED | OUTPATIENT
Start: 2023-06-04 | End: 2023-06-06 | Stop reason: HOSPADM

## 2023-06-04 RX ADMIN — LISINOPRIL 40 MG: 20 TABLET ORAL at 13:32

## 2023-06-04 RX ADMIN — HEPARIN SODIUM 5000 UNITS: 5000 INJECTION INTRAVENOUS; SUBCUTANEOUS at 21:43

## 2023-06-04 RX ADMIN — BACLOFEN 10 MG: 10 TABLET ORAL at 13:32

## 2023-06-04 RX ADMIN — INSULIN LISPRO 1 UNITS: 100 INJECTION, SOLUTION INTRAVENOUS; SUBCUTANEOUS at 12:51

## 2023-06-04 RX ADMIN — SODIUM CHLORIDE 1000 ML: 0.9 INJECTION, SOLUTION INTRAVENOUS at 10:00

## 2023-06-04 RX ADMIN — GABAPENTIN 400 MG: 400 CAPSULE ORAL at 21:42

## 2023-06-04 RX ADMIN — HEPARIN SODIUM 5000 UNITS: 5000 INJECTION INTRAVENOUS; SUBCUTANEOUS at 13:44

## 2023-06-04 RX ADMIN — BACLOFEN 10 MG: 10 TABLET ORAL at 21:42

## 2023-06-04 RX ADMIN — AMLODIPINE BESYLATE 5 MG: 5 TABLET ORAL at 13:32

## 2023-06-04 RX ADMIN — DESMOPRESSIN ACETATE 40 MG: 0.2 TABLET ORAL at 16:42

## 2023-06-04 RX ADMIN — VANCOMYCIN HYDROCHLORIDE 1000 MG: 5 INJECTION, POWDER, LYOPHILIZED, FOR SOLUTION INTRAVENOUS at 12:55

## 2023-06-04 RX ADMIN — CLOPIDOGREL BISULFATE 75 MG: 75 TABLET ORAL at 13:32

## 2023-06-04 NOTE — ASSESSMENT & PLAN NOTE
Lab Results   Component Value Date    HGBA1C 7 4 05/16/2023       Recent Labs     06/02/23  2354 06/03/23  0358 06/03/23  0730 06/04/23  1236   POCGLU 285* 242* 187* 171*       Blood Sugar Average: Last 72 hrs:  (P) 171     Manages his insulin pump at home, continue  Check fingersticks AC and qHS

## 2023-06-04 NOTE — ASSESSMENT & PLAN NOTE
Patient was recently admitted for a syncopal episode thought to be secondary to hypoglycemia  While here he had a CXR which was suggestive of pneumonia  Although he denied any new respiratory symptoms and states his cough was chronic  Afebrile  Normal WBC  No signs bacteremia  He did have blood cultures  Reported as negative at 24 hours  Patient was anxious to be discharged home as he felt well  After discharge 1/2 of the blood cultures reported as gram positive cocci in cultures  Patient was called to come back for additional workup  New blood cultures taken  Started on IV vanco for now  ECHO ordered     ID consulted

## 2023-06-04 NOTE — RESPIRATORY THERAPY NOTE
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)      Home Pulmonary Medications:  Patient denies any respiratory medications, no oxygen or pap therapy       Past Medical History:   Diagnosis Date   • Arthritis    • BPH (benign prostatic hyperplasia)     last assessed 4/29/2014   • Diabetes mellitus ([facility] [address] )    • Disease of thyroid gland    • GERD (gastroesophageal reflux disease)    • Hearing aid worn    • Hyperlipidemia    • Hypertension    • Hypothyroidism    • Mumps    • Prostate cancer ([facility] [address] )    • Stroke ([facility] [address] )    • Tingling sensation     bilateral feet occassionally   • Tinnitus    • Wears glasses    • Wears partial dentures     upper     Social History     Socioeconomic History   • Marital status: /Civil Union     Spouse name: None   • Number of children: None   • Years of education: None   • Highest education level: None   Occupational History   • Occupation: printer  retired   Tobacco Use   • Smoking status: Former   • Smokeless tobacco: Never   • Tobacco comments:     quit about 50 years ago   Vaping Use   • Vaping Use: Never used   Substance and Sexual Activity   • Alcohol use: Not Currently     Alcohol/week: 2 0 standard drinks of alcohol     Types: 2 Glasses of wine per week     Comment: week, social drinker   • Drug use: No   • Sexual activity: Not Currently     Partners: Female   Other Topics Concern   • None   Social History Narrative    Always uses seat belt    Caffeine use    Lives independently with spouse    Retired    Sun protection sunscreen     Social Determinants of Health     Financial Resource Strain: Low Risk  (5/9/2023)    Overall Financial Resource Strain (CARDIA)    • Difficulty of Paying Living Expenses: Not very hard   Food Insecurity: No Food Insecurity (6/2/2023)    Hunger Vital Sign    • Worried About Running Out of Food in the Last Year: Never true    • Ran Out of Food in the Last Year: Never true   Transportation Needs: No Transportation Needs (6/2/2023)    PRAPARE - Transportation    • Lack of Transportation (Medical): No    • Lack of Transportation (Non-Medical): No   Physical Activity: Not on file   Stress: Not on file   Social Connections: Not on file   Intimate Partner Violence: Not on file   Housing Stability: Low Risk  (6/2/2023)    Housing Stability Vital Sign    • Unable to Pay for Housing in the Last Year: No    • Number of Places Lived in the Last Year: 1    • Unstable Housing in the Last Year: No       Subjective    Subjective Data: patient denies any respiratory difficulties    Objective    Physical Exam:   Assessment Type: During-treatment  General Appearance: Alert, Awake  Respiratory Pattern: Normal  Chest Assessment: Chest expansion symmetrical, Trachea midline  L Breath Sounds: Diminished  Cough: Non-productive, Dry  O2 Device: room air    Vitals:  Blood pressure (!) 183/94, pulse 72, temperature 97 8 °F (36 6 °C), temperature source Temporal, resp  rate 18, SpO2 96 %  Imaging and other studies: I have personally reviewed pertinent reports  O2 Device: room air     Plan       Airway Clearance Plan: Incentive Spirometer, Discontinue Protocol     Resp Comments: Patient received in his room, he was on room air, he denies sob, he denies any respirtory medications, no oxygen or pap therapy  I did instruct and educate patient on the use of the incentive spirometer to be used Q1H to help promote better bronchial hygiene  Patient gave a good return demonstration   At this time I will dc the respiratory protocol, we will gladly reassess the patient if needed

## 2023-06-04 NOTE — PROGRESS NOTES
Quinn Quintana is a 78 y o  male who is currently ordered Vancomycin IV with management by the Pharmacy Consult service  Relevant clinical data and objective / subjective history reviewed  Vancomycin Assessment:  Indication and Goal AUC/Trough: Bacteremia (goal -600, trough >10)  Clinical Status: stable  Micro:     Renal Function:  SCr: 1 25 mg/dL  CrCl: 46 1 mL/min  Renal replacement: Not on dialysis  Days of Therapy: 1  Current Dose: 1000mg Q24H  Vancomycin Plan:  New Dosing:    Estimated AUC: 432 mcg*hr/mL  Estimated Trough: 13 mcg/mL  Next Level: 06/06/2023 in the AM  Renal Function Monitoring: Daily BMP and [place] will continue to follow closely for s/sx of nephrotoxicity, infusion reactions and appropriateness of therapy  BMP and CBC will be ordered per protocol  We will continue to follow the patient’s culture results and clinical progress daily      William Gómez, Pharmacist

## 2023-06-04 NOTE — ED PROVIDER NOTES
History  Chief Complaint   Patient presents with   • Abnormal Lab     Received a call this morning that he has positive blood cultures and was told to come back to be evaluated  Had a dose of Zithromax this morning      Patient discharges from the hospital for admission for presumed pneumonia  Was treated antibiotics and then prescribe Zithromax as outpatient  He was called back to return the emergency department for a positive blood culture  Patient denies any fevers chills, cough or shortness of breath  He states his appetite has been good since he has been discharged  Recent hospital admission records reviewed  Prior to Admission Medications   Prescriptions Last Dose Informant Patient Reported? Taking? Dysport 500 units SOLR   Yes No   Glucagon 1 MG/0 2ML SOSY  Spouse/Significant Other, Self Yes No   Multiple Vitamin (MULTIVITAMIN) tablet  Spouse/Significant Other, Self Yes No   Sig: Take 1 tablet by mouth daily     PATIENT MAINTAINED INSULIN PUMP  Spouse/Significant Other, Self Yes No   Sig: Inject 0 4 each under the skin continuous    amLODIPine (NORVASC) 5 mg tablet  Self No No   Sig: Take 1 tablet (5 mg total) by mouth daily   atorvastatin (LIPITOR) 40 mg tablet  Spouse/Significant Other, Self No No   Sig: Take 1 tablet (40 mg total) by mouth daily with dinner   azithromycin (ZITHROMAX) 250 mg tablet   No No   Sig: Take 1 tablet (250 mg total) by mouth every 24 hours for 3 doses   baclofen 10 mg tablet  Self No No   Sig: Take 1 tablet (10 mg total) by mouth 3 (three) times a day   benzonatate (TESSALON PERLES) 100 mg capsule   No No   Sig: Take 1 capsule (100 mg total) by mouth 3 (three) times a day as needed for cough   clopidogrel (PLAVIX) 75 mg tablet  Self No No   Sig: Take 1 tablet (75 mg total) by mouth daily   docusate sodium (COLACE) 100 mg capsule  Spouse/Significant Other, Self Yes No   Sig: Take 100 mg by mouth if needed   gabapentin (NEURONTIN) 400 mg capsule  Self No No   Sig: Take 1 capsule (400 mg total) by mouth daily at bedtime   levothyroxine (Synthroid) 100 mcg tablet  Spouse/Significant Other, Self No No   Sig: Take 1 tablet (100 mcg total) by mouth daily in the early morning   lisinopril (ZESTRIL) 40 mg tablet  Self No No   Sig: Take 1 tablet (40 mg total) by mouth daily      Facility-Administered Medications: None       Past Medical History:   Diagnosis Date   • Arthritis    • BPH (benign prostatic hyperplasia)     last assessed 4/29/2014   • Diabetes mellitus ([facility] [address] )    • Disease of thyroid gland    • GERD (gastroesophageal reflux disease)    • Hearing aid worn    • Hyperlipidemia    • Hypertension    • Hypothyroidism    • Mumps    • Prostate cancer ([facility] [address] )    • Stroke ([facility] [address] )    • Tingling sensation     bilateral feet occassionally   • Tinnitus    • Wears glasses    • Wears partial dentures     upper       Past Surgical History:   Procedure Laterality Date   • CARDIAC ELECTROPHYSIOLOGY PROCEDURE Left 11/4/2021    Procedure: Reveal implant;  Surgeon: Meaghan Sampson MD;  Location:  CARDIAC CATH LAB;   Service: Cardiology   • CATARACT EXTRACTION Bilateral 2000   • EYE SURGERY     • JOINT REPLACEMENT     • KNEE ARTHROSCOPY Right 06/26/2009    knee   • NY LAPS SURG [id number] RPBIC RAD W/NRV SPARING ROBOT N/A 2/7/2018    Procedure: ROBOTIC ASSISTED LAPAROSCOPIC PROSTATECTOMY; BILATERAL PELVIC LYMPH NODE DISSECTION;  Surgeon: Jazlyn Boudreaux MD;  Location: [facility];  Service: Urology   • [place] RPR 1ST INGUN HRNA AGE 5 YRS/> REDUCIBLE Right 4/19/2019    Procedure: REPAIR HERNIA INGUINAL;  Surgeon: Blu Portillo DO;  Location: [facility];  Service: General   • PROSTATE BIOPSY  11/07/2017    needle biopsy of prostate   • TRIGGER FINGER RELEASE  05/2013    trigger thumb       Family History   Problem Relation Age of Onset   • Cancer Mother    • Diabetes Mother    • Uterine cancer Mother    • Diabetes Father    • Cancer Father    • Stroke Father         of unknown cause   • Hypertension Father • Prostate cancer Father    • Diabetes Family         Uncle, DM   • Cancer Family         Grandmother     I have reviewed and agree with the history as documented  E-Cigarette/Vaping   • E-Cigarette Use Never User      E-Cigarette/Vaping Substances   • Nicotine No    • THC No    • CBD No    • Flavoring No    • Other No    • Unknown No      Social History     Tobacco Use   • Smoking status: Former   • Smokeless tobacco: Never   • Tobacco comments:     quit about 50 years ago   Vaping Use   • Vaping Use: Never used   Substance Use Topics   • Alcohol use: Not Currently     Alcohol/week: 2 0 standard drinks of alcohol     Types: 2 Glasses of wine per week     Comment: week, social drinker   • Drug use: No       Review of Systems   Constitutional: Negative for activity change, appetite change, chills and fever  HENT: Negative for ear pain, hearing loss, rhinorrhea, sneezing, sore throat and trouble swallowing  Eyes: Negative for pain and visual disturbance  Respiratory: Negative for cough, choking, chest tightness, shortness of breath, wheezing and stridor  Cardiovascular: Negative for chest pain, palpitations and leg swelling  Gastrointestinal: Negative for abdominal pain, constipation, diarrhea, nausea and vomiting  Genitourinary: Negative for difficulty urinating, dysuria, frequency, hematuria and testicular pain  Musculoskeletal: Negative for arthralgias, back pain, gait problem and neck pain  Skin: Negative for color change and rash  Allergic/Immunologic: Negative for immunocompromised state  Neurological: Negative for dizziness, seizures, syncope, speech difficulty, weakness, light-headedness, numbness and headaches  All other systems reviewed and are negative  Physical Exam  Physical Exam  Vitals and nursing note reviewed  Constitutional:       General: He is not in acute distress  Appearance: Normal appearance  He is well-developed and normal weight   He is not ill-appearing, toxic-appearing or diaphoretic  HENT:      Head: Normocephalic and atraumatic  Nose: Nose normal       Mouth/Throat:      Mouth: Mucous membranes are moist       Pharynx: Oropharynx is clear  Eyes:      General: No scleral icterus  Conjunctiva/sclera: Conjunctivae normal    Cardiovascular:      Rate and Rhythm: Normal rate and regular rhythm  Pulses: Normal pulses  Heart sounds: Normal heart sounds  No murmur heard  Pulmonary:      Effort: Pulmonary effort is normal  No respiratory distress  Breath sounds: Normal breath sounds  No wheezing or rales  Chest:      Chest wall: No tenderness  Abdominal:      General: Bowel sounds are normal  There is no distension  Palpations: Abdomen is soft  There is no mass  Tenderness: There is no abdominal tenderness  There is no guarding or rebound  Musculoskeletal:         General: No tenderness or deformity  Normal range of motion  Cervical back: Normal range of motion and neck supple  Right lower leg: No edema  Left lower leg: No edema  Comments: Left ankle in brace from previous CVA   Lymphadenopathy:      Cervical: No cervical adenopathy  Skin:     General: Skin is warm and dry  Capillary Refill: Capillary refill takes less than 2 seconds  Findings: No bruising, erythema, lesion or rash  Neurological:      General: No focal deficit present  Mental Status: He is alert and oriented to person, place, and time  Motor: No abnormal muscle tone     Psychiatric:         Mood and Affect: Mood normal          Behavior: Behavior normal          Vital Signs  ED Triage Vitals   Temperature Pulse Respirations Blood Pressure SpO2   06/04/23 0735 06/04/23 0735 06/04/23 0735 06/04/23 0735 06/04/23 0735   98 °F (36 7 °C) 72 18 (!) 184/79 98 %      Temp Source Heart Rate Source Patient Position - Orthostatic VS BP Location FiO2 (%)   06/04/23 6340 06/04/23 0735 06/04/23 0735 06/04/23 0735 -- Temporal Monitor Lying Right arm       Pain Score       06/04/23 0957       No Pain           Vitals:    06/04/23 0735 06/04/23 0957 06/04/23 1000 06/04/23 1234   BP: (!) 184/79 (!) 180/86 (!) 180/86 (!) 183/94   Pulse: 72 75 71 72   Patient Position - Orthostatic VS: Lying Lying  Lying         Visual Acuity      ED Medications  Medications   insulin lispro (HumaLOG) 100 units/mL subcutaneous injection 1-5 Units (1 Units Subcutaneous Given 6/4/23 1251)   insulin lispro (HumaLOG) 100 units/mL subcutaneous injection 1-5 Units (has no administration in time range)   vancomycin (VANCOCIN) 1000 mg in sodium chloride 0 9% 250 mL IVPB (1,000 mg Intravenous Given 6/4/23 1255)   PATIENT MAINTAINED INSULIN PUMP 1 each (has no administration in time range)   insulin aspart (NovoLOG) FOR PUMP REFILLS 20 Units (has no administration in time range)   amLODIPine (NORVASC) tablet 5 mg (5 mg Oral Given 6/4/23 1332)   atorvastatin (LIPITOR) tablet 40 mg (has no administration in time range)   baclofen tablet 10 mg (10 mg Oral Given 6/4/23 1332)   benzonatate (TESSALON PERLES) capsule 100 mg (has no administration in time range)   clopidogrel (PLAVIX) tablet 75 mg (75 mg Oral Given 6/4/23 1332)   docusate sodium (COLACE) capsule 100 mg (has no administration in time range)   gabapentin (NEURONTIN) capsule 400 mg (has no administration in time range)   levothyroxine tablet 100 mcg (has no administration in time range)   lisinopril (ZESTRIL) tablet 40 mg (40 mg Oral Given 6/4/23 1332)   acetaminophen (TYLENOL) tablet 650 mg (has no administration in time range)   ondansetron (ZOFRAN) injection 4 mg (has no administration in time range)   aluminum-magnesium hydroxide-simethicone (MYLANTA) oral suspension 30 mL (has no administration in time range)   heparin (porcine) subcutaneous injection 5,000 Units (has no administration in time range)   sodium chloride 0 9 % bolus 1,000 mL (0 mL Intravenous Stopped 6/4/23 1218)       Diagnostic Studies  Results Reviewed     Procedure Component Value Units Date/Time    Platelet count [[id number]]     Lab Status: No result Specimen: Blood     Fingerstick Glucose (POCT) [[id number]]  (Abnormal) Collected: 06/04/23 1236    Lab Status: Final result Updated: 06/04/23 1237     POC Glucose 171 mg/dl     Procalcitonin [[id number]]  (Normal) Collected: 06/04/23 0847    Lab Status: Final result Specimen: Blood from Arm, Right Updated: 06/04/23 0921     Procalcitonin 0 07 ng/ml     Comprehensive metabolic panel [[id number]]  (Abnormal) Collected: 06/04/23 0847    Lab Status: Final result Specimen: Blood from Arm, Right Updated: 06/04/23 0914     Sodium 129 mmol/L      Potassium 3 6 mmol/L      Chloride 95 mmol/L      CO2 27 mmol/L      ANION GAP 7 mmol/L      BUN 21 mg/dL      Creatinine 1 25 mg/dL      Glucose 219 mg/dL      Calcium 9 6 mg/dL      AST 18 U/L      ALT 16 U/L      Alkaline Phosphatase 94 U/L      Total Protein 7 0 g/dL      Albumin 4 1 g/dL      Total Bilirubin 0 50 mg/dL      eGFR 55 ml/min/1 73sq m     Narrative:      [place] guidelines for Chronic Kidney Disease (CKD):   •  Stage 1 with normal or high GFR (GFR > 90 mL/min/1 73 square meters)  •  Stage 2 Mild CKD (GFR = 60-89 mL/min/1 73 square meters)  •  Stage 3A Moderate CKD (GFR = 45-59 mL/min/1 73 square meters)  •  Stage 3B Moderate CKD (GFR = 30-44 mL/min/1 73 square meters)  •  Stage 4 Severe CKD (GFR = 15-29 mL/min/1 73 square meters)  •  Stage 5 End Stage CKD (GFR <15 mL/min/1 73 square meters)  Note: GFR calculation is accurate only with a steady state creatinine    Lactic acid [[id number]]  (Normal) Collected: 06/04/23 0847    Lab Status: Final result Specimen: Blood from Arm, Right Updated: 06/04/23 0914     LACTIC ACID 1 6 mmol/L     Narrative:      Result may be elevated if tourniquet was used during collection  Blood culture #2 [[id number]] Collected: 06/04/23 0906    Lab Status:  In process Specimen: Blood from Hand, Right Updated: 06/04/23 0908    Protime-INR [[id number]]  (Normal) Collected: 06/04/23 0847    Lab Status: Final result Specimen: Blood from Arm, Right Updated: 06/04/23 0907     Protime 12 9 seconds      INR 0 95    APTT [[id number]]  (Normal) Collected: 06/04/23 0847    Lab Status: Final result Specimen: Blood from Arm, Right Updated: 06/04/23 0907     PTT 26 seconds     UA w Reflex to Microscopic w Reflex to Culture [[id number]]  (Abnormal) Collected: 06/04/23 0853    Lab Status: Final result Specimen: Urine, Clean Catch Updated: 06/04/23 0858     Color, UA Yellow     Clarity, UA Clear     Specific Gravity, UA 1 010     pH, UA 6 5     Leukocytes, UA Negative     Nitrite, UA Negative     Protein, UA Negative mg/dl      Glucose,  (1/4%) mg/dl      Ketones, UA Negative mg/dl      Urobilinogen, UA 0 2 E U /dl      Bilirubin, UA Negative     Occult Blood, UA Negative    CBC and differential [[id number]] Collected: 06/04/23 0847    Lab Status: Final result Specimen: Blood from Arm, Right Updated: 06/04/23 0858     WBC 5 59 Thousand/uL      RBC 3 95 Million/uL      Hemoglobin 12 9 g/dL      Hematocrit 37 9 %      MCV 96 fL      MCH 32 7 pg      MCHC 34 0 g/dL      RDW 13 1 %      MPV 9 5 fL      Platelets 968 Thousands/uL      nRBC 0 /100 WBCs      Neutrophils Relative 68 %      Immat GRANS % 0 %      Lymphocytes Relative 14 %      Monocytes Relative 11 %      Eosinophils Relative 6 %      Basophils Relative 1 %      Neutrophils Absolute 3 80 Thousands/µL      Immature Grans Absolute 0 02 Thousand/uL      Lymphocytes Absolute 0 79 Thousands/µL      Monocytes Absolute 0 61 Thousand/µL      Eosinophils Absolute 0 32 Thousand/µL      Basophils Absolute 0 05 Thousands/µL     Blood culture #1 [[id number]] Collected: 06/04/23 0847    Lab Status:  In process Specimen: Blood from Arm, Left Updated: 06/04/23 0854                 No orders to display              Procedures  Procedures         ED Course SBIRT 22yo+    Flowsheet Row Most Recent Value   Initial Alcohol Screen: US AUDIT-C     1  How often do you have a drink containing alcohol? 0 Filed at: 06/04/2023 0736   2  How many drinks containing alcohol do you have on a typical day you are drinking? 0 Filed at: 06/04/2023 0736   Audit-C Score 0 Filed at: 06/04/2023 1798   ABEL: How many times in the past year have you    Used an illegal drug or used a prescription medication for non-medical reasons? Never Filed at: 06/04/2023 7134                    Medical Decision Making  79-year-old male recently discharged with pneumonia call back to emergency department for evaluation of positive blood culture  Hyponatremia:     Details: Acute on chronic  Positive blood culture: acute illness or injury  Amount and/or Complexity of Data Reviewed  Independent Historian: priscilla  External Data Reviewed: labs, radiology, ECG and notes  Labs: ordered  Decision-making details documented in ED Course  Discussion of management or test interpretation with external provider(s): Discussed with hospitalist, Dr Ennis Spatz  Will admit to the hospital pending further blood culture results    Risk  OTC drugs  Prescription drug management  Decision regarding hospitalization  Risk Details: No evidence for sepsis, severe sepsis or septic shock  Patient not tachycardic tachypneic nor hypoxic  Previous records reviewed  Patient not immunocompromise  Patient currently without complaints  Previous records reviewed does indicate episodes of hyper bone natremia  Patient was treated with normal saline solution for treatment of a sodium of 129  No seizure activity          Disposition  Final diagnoses:   Hyponatremia   Positive blood culture     Time reflects when diagnosis was documented in both MDM as applicable and the Disposition within this note     Time User Action Codes Description Comment    6/4/2023 10:02 AM Lane TOBAR Add [E87 1] Hyponatremia     6/4/2023 10:02 AM Lauren TOBAR Add [R78 81] Positive blood culture     6/4/2023 12:09 PM Nico Delgado Modify [E87 1] Hyponatremia     6/4/2023 12:09 PM Michael Burnett Modify [R78 81] Positive blood culture       ED Disposition     ED Disposition   Admit    Condition   Stable    Date/Time   Sun Jun 4, 2023 10:02 AM    Comment   Case was discussed with MILTON and the patient's admission status was agreed to be Admission Status: observation status to the service of Dr Isabella Palmer  Follow-up Information    None         Patient's Medications   Discharge Prescriptions    No medications on file       No discharge procedures on file      PDMP Review       Value Time User    PDMP Reviewed  Yes 3/25/2021  8:52 AM Trev Marrero PA-C          ED Provider  Electronically Signed by           Hang Rowe DO  06/04/23 8191

## 2023-06-04 NOTE — ASSESSMENT & PLAN NOTE
• Severe level improvementAppears to have a component of chronic hyponatremia and at times has pseudohyponatremia in the setting of hyperglycemia  • Received a NSS IV fluid bolus of 1000 ml in the emergency department  • Sodium level improving

## 2023-06-04 NOTE — TELEPHONE ENCOUNTER
Blood culture 1/2 from 6/1 positive for gram positive cocci in clusters  BCIP unable to identify  Spoke with patient and wife and recommended he return to [facility] ER  Notified nursing team in the ER he would be returning

## 2023-06-04 NOTE — H&P
[address]  H&P  Name: Hansel Coburn 78 y o  male I MRN: [id number]  Unit/Bed#: LM00 I Date of Admission: 6/4/2023   Date of Service: 6/4/2023 I Hospital Day: 0      Assessment/Plan   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)  Assessment & Plan  Continue baclofen    Type 2 diabetes mellitus with diabetic neuropathy [facility])  Assessment & Plan  Lab Results   Component Value Date    HGBA1C 7 4 05/16/2023       Recent Labs     06/02/23  2354 06/03/23  0358 06/03/23  0730 06/04/23  1236   POCGLU 285* 242* 187* 171*       Blood Sugar Average: Last 72 hrs:  (P) 171     Manages his insulin pump at home, continue  Check fingersticks AC and qHS    Stage 3 chronic kidney disease ([facility] [address] )  Assessment & Plan  Lab Results   Component Value Date    CREATININE 1 25 06/04/2023    CREATININE 1 30 06/03/2023    CREATININE 1 47 (H) 06/02/2023    EGFR 55 06/04/2023    EGFR 53 06/03/2023    EGFR 46 06/02/2023     Creat 1 25 today  Avoid nephrotoxins    Hypothyroidism  Assessment & Plan  Continue levothyroxine    * Positive blood culture  Assessment & Plan  Patient was recently admitted for a syncopal episode thought to be secondary to hypoglycemia  While here he had a CXR which was suggestive of pneumonia  Although he denied any new respiratory symptoms and states his cough was chronic  Afebrile  Normal WBC  No signs bacteremia  He did have blood cultures  Reported as negative at 24 hours  Patient was anxious to be discharged home as he felt well  After discharge 1/2 of the blood cultures reported as gram positive cocci in cultures  Patient was called to come back for additional workup  New blood cultures taken  Started on IV vanco for now  ECHO ordered     ID consulted          VTE Prophylaxis: Heparin  / sequential compression device   Code Status: full code  POLST: There is no POLST form on file for this patient (pre-hospital)      Anticipated Length of Stay:  Patient will be admitted on an Observation basis with an anticipated length of stay of  Less than 2 midnights  Justification for Hospital Stay: awaiting repeat blood cultures, ID consult, continue IV antibiotics    Total Time for Visit, including Counseling / Coordination of Care: 45 minutes  Greater than 50% of this total time spent on direct patient counseling and coordination of care  Chief Complaint:   Abnormal cultures    History of Present Illness:    Juan Mccauley is a 78 y o  male who presents with positive blood cultures  Patient was recently admitted for syncope due to hypoglycemia  He had a chest xray which suggested pneumonia  Was treated with IV and PO antibiotics while in the hospital  Blood cultures were initially negative  He felt well and was discharged home  1/2 cultures came back positive after he was discharged  He was called back for further treatment    Review of Systems:    Review of Systems   Constitutional: Negative  HENT: Negative  Eyes: Negative  Respiratory: Positive for cough  Cardiovascular: Negative  Gastrointestinal: Negative  Endocrine: Negative  Genitourinary: Negative  Musculoskeletal: Positive for gait problem  Allergic/Immunologic: Negative  Neurological: Positive for weakness  Hematological: Negative  Psychiatric/Behavioral: Negative          Past Medical and Surgical History:     Past Medical History:   Diagnosis Date   • Arthritis    • BPH (benign prostatic hyperplasia)     last assessed 4/29/2014   • Diabetes mellitus ([address] )    • Disease of thyroid gland    • GERD (gastroesophageal reflux disease)    • Hearing aid worn    • Hyperlipidemia    • Hypertension    • Hypothyroidism    • Mumps    • Prostate cancer ([address] )    • Stroke ([address] )    • Tingling sensation     bilateral feet occassionally   • Tinnitus    • Wears glasses    • Wears partial dentures     upper       Past Surgical History:   Procedure Laterality Date   • CARDIAC ELECTROPHYSIOLOGY PROCEDURE Left 11/4/2021 Procedure: Reveal implant;  Surgeon: Aileen Amin MD;  Location:  CARDIAC CATH LAB; Service: Cardiology   • CATARACT EXTRACTION Bilateral 2000   • EYE SURGERY     • JOINT REPLACEMENT     • KNEE ARTHROSCOPY Right 06/26/2009    knee   • GA LAPS SURG [id number] RPBIC RAD W/NRV SPARING ROBOT N/A 2/7/2018    Procedure: ROBOTIC ASSISTED LAPAROSCOPIC PROSTATECTOMY; BILATERAL PELVIC LYMPH NODE DISSECTION;  Surgeon: Lokesh Castro MD;  Location: [facility];  Service: Urology   • GA RPR 1ST INGUN HRNA AGE 5 YRS/> REDUCIBLE Right 4/19/2019    Procedure: REPAIR HERNIA INGUINAL;  Surgeon: Wright Runner, DO;  Location: [facility] OR;  Service: General   • PROSTATE BIOPSY  11/07/2017    needle biopsy of prostate   • TRIGGER FINGER RELEASE  05/2013    trigger thumb       Meds/Allergies:    Prior to Admission medications    Medication Sig Start Date End Date Taking?  Authorizing Provider   amLODIPine (NORVASC) 5 mg tablet Take 1 tablet (5 mg total) by mouth daily 9/27/22   Yaniv Zamudio DO   atorvastatin (LIPITOR) 40 mg tablet Take 1 tablet (40 mg total) by mouth daily with dinner 3/25/21   Arcadio Schwab, PA-C   azithromycin [facility]) 250 mg tablet Take 1 tablet (250 mg total) by mouth every 24 hours for 3 doses 6/3/23 6/6/23  Fallon Chavarria PA-C   baclofen 10 mg tablet Take 1 tablet (10 mg total) by mouth 3 (three) times a day 3/30/22   Akanksha Kelsye PA-C   benzonatate (TESSALON PERLES) 100 mg capsule Take 1 capsule (100 mg total) by mouth 3 (three) times a day as needed for cough 6/3/23   Fallon Chavarria PA-C   clopidogrel (PLAVIX) 75 mg tablet Take 1 tablet (75 mg total) by mouth daily 9/30/22   Lupe Downs MD   docusate sodium (COLACE) 100 mg capsule Take 100 mg by mouth if needed    Historical Provider, MD   Dysport 500 units SOLR  5/18/23   Historical Provider, MD   gabapentin (NEURONTIN) 400 mg capsule Take 1 capsule (400 mg total) by mouth daily at bedtime 9/30/22   Lupe Downs MD   Glucagon 1 MG/0 2ML SOSY  3/30/21   Historical Provider, MD   levothyroxine (Synthroid) 100 mcg tablet Take 1 tablet (100 mcg total) by mouth daily in the early morning 10/15/21   Sherley Hernandez DO   lisinopril (ZESTRIL) 40 mg tablet Take 1 tablet (40 mg total) by mouth daily 2/13/23   Sherley Heranndez DO   Multiple Vitamin (MULTIVITAMIN) tablet Take 1 tablet by mouth daily  Historical Provider, MD   PATIENT MAINTAINED INSULIN PUMP Inject 0 4 each under the skin continuous     Historical Provider, MD     I have reviewed home medications with patient personally  Allergies: No Known Allergies    Social History:     Marital Status: /Civil Union   Occupation: retired  Patient Pre-hospital Living Situation: lives with wife    Substance Use History:   Social History     Substance and Sexual Activity   Alcohol Use Not Currently   • Alcohol/week: 2 0 standard drinks of alcohol   • Types: 2 Glasses of wine per week    Comment: week, social drinker     Social History     Tobacco Use   Smoking Status Former   Smokeless Tobacco Never   Tobacco Comments    quit about 50 years ago     Social History     Substance and Sexual Activity   Drug Use No       Family History:    non-contributory    Physical Exam:     Vitals:   Blood Pressure: (!) 183/94 (06/04/23 1234)  Pulse: 72 (06/04/23 1234)  Temperature: 97 8 °F (36 6 °C) (06/04/23 1234)  Temp Source: Temporal (06/04/23 1234)  Respirations: 18 (06/04/23 1234)  SpO2: 97 % (06/04/23 1234)    Physical Exam  Vitals reviewed  Constitutional:       General: He is not in acute distress  Appearance: He is not ill-appearing or diaphoretic  HENT:      Head: Normocephalic and atraumatic  Nose: Nose normal       Mouth/Throat:      Pharynx: Oropharynx is clear  Eyes:      Conjunctiva/sclera: Conjunctivae normal    Cardiovascular:      Rate and Rhythm: Normal rate  Pulmonary:      Effort: Pulmonary effort is normal  No respiratory distress     Abdominal:      General: Bowel sounds are normal  There is no distension  Palpations: Abdomen is soft  Tenderness: There is no abdominal tenderness  Musculoskeletal:         General: No signs of injury  Skin:     General: Skin is warm and dry  Neurological:      Mental Status: He is alert  Mental status is at baseline  Additional Data:     Lab Results: I have personally reviewed pertinent reports  Results from last 7 days   Lab Units 06/04/23  0847   EOS PCT % 6   HEMATOCRIT % 37 9   HEMOGLOBIN g/dL 12 9   LYMPHS PCT % 14   MONOS PCT % 11   NEUTROS PCT % 68   PLATELETS Thousands/uL 279   WBC Thousand/uL 5 59     Results from last 7 days   Lab Units 06/04/23  0847   ANION GAP mmol/L 7   ALBUMIN g/dL 4 1   ALK PHOS U/L 94   ALT U/L 16   AST U/L 18   BUN mg/dL 21   CALCIUM mg/dL 9 6   CHLORIDE mmol/L 95*   CO2 mmol/L 27   CREATININE mg/dL 1 25   GLUCOSE RANDOM mg/dL 219*   POTASSIUM mmol/L 3 6   SODIUM mmol/L 129*   TOTAL BILIRUBIN mg/dL 0 50     Results from last 7 days   Lab Units 06/04/23  0847   INR  0 95     Results from last 7 days   Lab Units 06/04/23  1236 06/03/23  0730 06/03/23  0358 06/02/23  2354 06/02/23  2020 06/02/23  1550 06/02/23  1104 06/02/23  0818 06/02/23  0415 06/02/23  0010 06/01/23  2042 06/01/23 2015   POC GLUCOSE mg/dl 171* 187* 242* 285* 315* 197* 268* 397* 385* 297* 230* 209*         Results from last 7 days   Lab Units 06/04/23  0847 06/01/23  1935 06/01/23  1804   LACTIC ACID mmol/L 1 6 0 8  --    PROCALCITONIN ng/ml 0 07  --  0 08       Imaging: I have personally reviewed pertinent reports  No orders to display       EKG, Pathology, and Other Studies Reviewed on Admission:   · EKG: none    Allscripts / Epic Records Reviewed: Yes     ** Please Note: This note has been constructed using a voice recognition system   **

## 2023-06-04 NOTE — ASSESSMENT & PLAN NOTE
Lab Results   Component Value Date    CREATININE 1 25 06/04/2023    CREATININE 1 30 06/03/2023    CREATININE 1 47 (H) 06/02/2023    EGFR 55 06/04/2023    EGFR 53 06/03/2023    EGFR 46 06/02/2023     Creat 1 25 today  Avoid nephrotoxins

## 2023-06-05 ENCOUNTER — APPOINTMENT (OUTPATIENT)
Dept: NON INVASIVE DIAGNOSTICS | Facility: HOSPITAL | Age: 78
End: 2023-06-05
Payer: COMMERCIAL

## 2023-06-05 ENCOUNTER — TRANSITIONAL CARE MANAGEMENT (OUTPATIENT)
Dept: FAMILY MEDICINE CLINIC | Facility: CLINIC | Age: 78
End: 2023-06-05

## 2023-06-05 ENCOUNTER — APPOINTMENT (OUTPATIENT)
Dept: PHYSICAL THERAPY | Facility: HOME HEALTHCARE | Age: 78
End: 2023-06-05
Payer: COMMERCIAL

## 2023-06-05 PROBLEM — R79.89 ELEVATED TSH: Status: RESOLVED | Noted: 2023-06-04 | Resolved: 2023-06-05

## 2023-06-05 LAB
ALBUMIN SERPL BCP-MCNC: 4 G/DL (ref 3.5–5)
ALP SERPL-CCNC: 76 U/L (ref 34–104)
ALT SERPL W P-5'-P-CCNC: 18 U/L (ref 7–52)
ANION GAP SERPL CALCULATED.3IONS-SCNC: 10 MMOL/L (ref 4–13)
AORTIC ROOT: 3.6 CM
AST SERPL W P-5'-P-CCNC: 19 U/L (ref 13–39)
AV REGURGITATION PRESSURE HALF TIME: 607 MS
BILIRUB SERPL-MCNC: 0.54 MG/DL (ref 0.2–1)
BUN SERPL-MCNC: 25 MG/DL (ref 5–25)
CALCIUM SERPL-MCNC: 9.5 MG/DL (ref 8.4–10.2)
CHLORIDE SERPL-SCNC: 98 MMOL/L (ref 96–108)
CO2 SERPL-SCNC: 24 MMOL/L (ref 21–32)
CREAT SERPL-MCNC: 1.39 MG/DL (ref 0.6–1.3)
E WAVE DECELERATION TIME: 369 MS
ERYTHROCYTE [DISTWIDTH] IN BLOOD BY AUTOMATED COUNT: 13.2 % (ref 11.6–15.1)
GFR SERPL CREATININE-BSD FRML MDRD: 49 ML/MIN/1.73SQ M
GLUCOSE P FAST SERPL-MCNC: 173 MG/DL (ref 65–99)
GLUCOSE SERPL-MCNC: 156 MG/DL (ref 65–140)
GLUCOSE SERPL-MCNC: 173 MG/DL (ref 65–140)
GLUCOSE SERPL-MCNC: 216 MG/DL (ref 65–140)
GLUCOSE SERPL-MCNC: 242 MG/DL (ref 65–140)
GLUCOSE SERPL-MCNC: 289 MG/DL (ref 65–140)
HCT VFR BLD AUTO: 38.3 % (ref 36.5–49.3)
HGB BLD-MCNC: 13.1 G/DL (ref 12–17)
INTERVENTRICULAR SEPTUM IN DIASTOLE (PARASTERNAL SHORT AXIS VIEW): 1.1 CM
INTERVENTRICULAR SEPTUM: 1.1 CM (ref 0.6–1.1)
LAAS-AP2: 20.4 CM2
LAAS-AP4: 19.3 CM2
LEFT ATRIUM SIZE: 3.7 CM
LEFT VENTRICULAR INTERNAL DIMENSION IN DIASTOLE: 4 CM (ref 3.5–6)
LEFT VENTRICULAR POSTERIOR WALL IN END DIASTOLE: 1.1 CM
MAGNESIUM SERPL-MCNC: 2 MG/DL (ref 1.9–2.7)
MCH RBC QN AUTO: 32.7 PG (ref 26.8–34.3)
MCHC RBC AUTO-ENTMCNC: 34.2 G/DL (ref 31.4–37.4)
MCV RBC AUTO: 96 FL (ref 82–98)
MV E'TISSUE VEL-LAT: 10 CM/S
MV E'TISSUE VEL-SEP: 6 CM/S
MV PEAK A VEL: 0.95 M/S
MV PEAK E VEL: 60 CM/S
MV STENOSIS PRESSURE HALF TIME: 107 MS
MV VALVE AREA P 1/2 METHOD: 2.06
OSMOLALITY UR: 418 MMOL/KG
PHOSPHATE SERPL-MCNC: 3.5 MG/DL (ref 2.3–4.1)
PLATELET # BLD AUTO: 287 THOUSANDS/UL (ref 149–390)
PMV BLD AUTO: 9.4 FL (ref 8.9–12.7)
POTASSIUM SERPL-SCNC: 3.5 MMOL/L (ref 3.5–5.3)
PROCALCITONIN SERPL-MCNC: 0.09 NG/ML
PROT SERPL-MCNC: 6.7 G/DL (ref 6.4–8.4)
RBC # BLD AUTO: 4.01 MILLION/UL (ref 3.88–5.62)
RIGHT ATRIUM AREA SYSTOLE A4C: 14.8 CM2
RIGHT VENTRICLE ID DIMENSION: 2.6 CM
SL CV AV DECELERATION TIME RETROGRADE: 2094 MS
SL CV AV PEAK GRADIENT RETROGRADE: 97 MMHG
SL CV LEFT ATRIUM LENGTH A2C: 5 CM
SL CV LV EF: 60
SL CV PED ECHO LEFT VENTRICLE DIASTOLIC VOLUME (MOD BIPLANE) 2D: 70 ML
SODIUM SERPL-SCNC: 132 MMOL/L (ref 135–147)
TRICUSPID ANNULAR PLANE SYSTOLIC EXCURSION: 2.3 CM
TRICUSPID VALVE PEAK E WAVE VELOCITY: 0.08 M/S
TRICUSPID VALVE PEAK REGURGITATION VELOCITY: 2.58 M/S
TSH SERPL DL<=0.05 MIU/L-ACNC: 4.18 UIU/ML (ref 0.45–4.5)
WBC # BLD AUTO: 7.72 THOUSAND/UL (ref 4.31–10.16)

## 2023-06-05 PROCEDURE — 85027 COMPLETE CBC AUTOMATED: CPT | Performed by: PHYSICIAN ASSISTANT

## 2023-06-05 PROCEDURE — 93306 TTE W/DOPPLER COMPLETE: CPT | Performed by: INTERNAL MEDICINE

## 2023-06-05 PROCEDURE — 83735 ASSAY OF MAGNESIUM: CPT | Performed by: INTERNAL MEDICINE

## 2023-06-05 PROCEDURE — 80053 COMPREHEN METABOLIC PANEL: CPT | Performed by: INTERNAL MEDICINE

## 2023-06-05 PROCEDURE — 97167 OT EVAL HIGH COMPLEX 60 MIN: CPT

## 2023-06-05 PROCEDURE — 97163 PT EVAL HIGH COMPLEX 45 MIN: CPT

## 2023-06-05 PROCEDURE — 82948 REAGENT STRIP/BLOOD GLUCOSE: CPT

## 2023-06-05 PROCEDURE — 93306 TTE W/DOPPLER COMPLETE: CPT

## 2023-06-05 PROCEDURE — 84443 ASSAY THYROID STIM HORMONE: CPT | Performed by: INTERNAL MEDICINE

## 2023-06-05 PROCEDURE — 84100 ASSAY OF PHOSPHORUS: CPT | Performed by: INTERNAL MEDICINE

## 2023-06-05 PROCEDURE — 84145 PROCALCITONIN (PCT): CPT | Performed by: INTERNAL MEDICINE

## 2023-06-05 PROCEDURE — 99232 SBSQ HOSP IP/OBS MODERATE 35: CPT | Performed by: INTERNAL MEDICINE

## 2023-06-05 RX ADMIN — BACLOFEN 10 MG: 10 TABLET ORAL at 16:10

## 2023-06-05 RX ADMIN — GABAPENTIN 400 MG: 400 CAPSULE ORAL at 21:13

## 2023-06-05 RX ADMIN — LISINOPRIL 40 MG: 20 TABLET ORAL at 08:32

## 2023-06-05 RX ADMIN — HEPARIN SODIUM 5000 UNITS: 5000 INJECTION INTRAVENOUS; SUBCUTANEOUS at 13:30

## 2023-06-05 RX ADMIN — CLOPIDOGREL BISULFATE 75 MG: 75 TABLET ORAL at 08:32

## 2023-06-05 RX ADMIN — AMLODIPINE BESYLATE 5 MG: 5 TABLET ORAL at 08:32

## 2023-06-05 RX ADMIN — BACLOFEN 10 MG: 10 TABLET ORAL at 08:32

## 2023-06-05 RX ADMIN — VANCOMYCIN HYDROCHLORIDE 1000 MG: 5 INJECTION, POWDER, LYOPHILIZED, FOR SOLUTION INTRAVENOUS at 12:41

## 2023-06-05 RX ADMIN — LEVOTHYROXINE SODIUM 100 MCG: 100 TABLET ORAL at 05:17

## 2023-06-05 RX ADMIN — DESMOPRESSIN ACETATE 40 MG: 0.2 TABLET ORAL at 16:10

## 2023-06-05 RX ADMIN — INSULIN LISPRO 5 UNITS: 100 INJECTION, SOLUTION INTRAVENOUS; SUBCUTANEOUS at 21:13

## 2023-06-05 RX ADMIN — HEPARIN SODIUM 5000 UNITS: 5000 INJECTION INTRAVENOUS; SUBCUTANEOUS at 21:13

## 2023-06-05 RX ADMIN — HEPARIN SODIUM 5000 UNITS: 5000 INJECTION INTRAVENOUS; SUBCUTANEOUS at 05:17

## 2023-06-05 RX ADMIN — BACLOFEN 10 MG: 10 TABLET ORAL at 21:13

## 2023-06-05 NOTE — ASSESSMENT & PLAN NOTE
Previous hospital stay chart reviewed, H&P reviewed  Patient currently asymptomatic, suspected contaminant, infectious disease consult pending

## 2023-06-05 NOTE — PLAN OF CARE
Problem: PAIN - ADULT  Goal: Verbalizes/displays adequate comfort level or baseline comfort level  Description: Interventions:  - Encourage patient to monitor pain and request assistance  - Assess pain using appropriate pain scale  - Administer analgesics based on type and severity of pain and evaluate response  - Implement non-pharmacological measures as appropriate and evaluate response  - Consider cultural and social influences on pain and pain management  - Notify physician/advanced practitioner if interventions unsuccessful or patient reports new pain  Outcome: Progressing     Problem: INFECTION - ADULT  Goal: Absence or prevention of progression during hospitalization  Description: INTERVENTIONS:  - Assess and monitor for signs and symptoms of infection  - Monitor lab/diagnostic results  - Monitor all insertion sites, i e  indwelling lines, tubes, and drains  - Monitor endotracheal if appropriate and nasal secretions for changes in amount and color  - Rex appropriate cooling/warming therapies per order  - Administer medications as ordered  - Instruct and encourage patient and family to use good hand hygiene technique  - Identify and instruct in appropriate isolation precautions for identified infection/condition  Outcome: Progressing  Goal: Absence of fever/infection during neutropenic period  Description: INTERVENTIONS:  - Monitor WBC    Outcome: Progressing

## 2023-06-05 NOTE — PLAN OF CARE
Problem: PHYSICAL THERAPY ADULT  Goal: Performs mobility at highest level of function for planned discharge setting  See evaluation for individualized goals  Description: Treatment/Interventions: Functional transfer training, LE strengthening/ROM, Elevations, Therapeutic exercise, Endurance training, Bed mobility, Gait training          See flowsheet documentation for full assessment, interventions and recommendations  Note: Prognosis: Good  Problem List: Decreased strength, Decreased endurance, Impaired balance, Decreased mobility, Impaired tone  Assessment: Patient is a 78 y o  male evaluated by Physical Therapy s/p admit to [address] [place] [address],[address] on 6/4/2023 with admitting diagnosis of: Hyponatremia, Abnormal laboratory test result, Positive blood culture, Stage 3 chronic kidney disease, and principal problem of: Positive blood culture  PT was consulted to assess patient's functional mobility and discharge needs  Ordered are PT Evaluation and treatment with activity level of: up and OOB as tolerated  Comorbidities affecting patient's physical performance at time of assessment include: DM, HTN, HLD, prostate cancer, arthritis, GERD, hypothyroidism, BPH, hx of mumps, hx of CVA  Personal factors affecting the patient at time of IE include: lives in 2 story home, ambulating with assistive device, inability/difficulty performing IADLs and inability/difficulty performing ADLs  Please locate objective findings from PT assessment regarding body systems outlined above  Upon evaluation, pt able to perform all functional mobility with SUP-Karen x 1, SPC, and increased time  Occasional verbal cuing provided for safety awareness and sequencing  Pt experiencing 3 LOB with mobility that require Karen to recover  Pt aware of balance deficits, stating this happens when he has sat too long and becomes still; is attending OP PT to address gait deviations and balance deficits   Despite LOB, pt able to ambulate 400' before taking seated rest break  SpO2 and HR remained WFL on RA throughout  The patient's AM-PAC Basic Mobility Inpatient Short Form Raw Score is 20  A Raw score of greater than 16 suggests the patient may benefit from discharge to home  Please also refer to the recommendation of the Physical Therapist for safe discharge planning  Co treatment with OT secondary to complex medical condition of pt, possible A of 2 required to achieve and maintain transitional movements, requiring the need of skilled therapeutic intervention of 2 therapists to achieve delivery of services  Pt will benefit from continued PT intervention during LOS to address current deficits, increase LOF, and facilitate safe d/c to next level of care when medically appropriate  D/c recommendation at this time is home with outpatient rehabilitation services  PT Discharge Recommendation: Home with outpatient rehabilitation    See flowsheet documentation for full assessment

## 2023-06-05 NOTE — ASSESSMENT & PLAN NOTE
Lab Results   Component Value Date    CREATININE 1 39 (H) 06/05/2023    CREATININE 1 25 06/04/2023    CREATININE 1 30 06/03/2023    EGFR 49 06/05/2023    EGFR 55 06/04/2023    EGFR 53 06/03/2023     Renal function near baseline  Avoid nephrotoxins

## 2023-06-05 NOTE — UTILIZATION REVIEW
NOTIFICATION OF ADMISSION DISCHARGE   This is a Notification of Discharge from [address]  Please be advised that this patient has been discharge from our facility  Below you will find the admission and discharge date and time including the patient’s disposition  UTILIZATION REVIEW CONTACT:  Alessandra Villalpando  Utilization   Network Utilization Review Department  Phone: [phone] x carefully listen to the prompts  All voicemails are confidential   Email: [email or web address]     ADMISSION INFORMATION  PRESENTATION DATE: 6/1/2023  5:57 PM  OBERVATION ADMISSION DATE:  INPATIENT ADMISSION DATE: 6/2/23  1:29 PM   DISCHARGE DATE: 6/3/2023 10:34 AM   DISPOSITION:Home/Self Care    IMPORTANT INFORMATION:  Send all requests for admission clinical reviews, approved or denied determinations and any other requests to dedicated fax number below belonging to the campus where the patient is receiving treatment   List of dedicated fax numbers:  [address] DENIALS (Administrative/Medical Necessity) [phone]   [address] (Maternity/NICU/Pediatrics) [phone]   [facility] [phone]   [place] [address] [phone]   [address] [phone]   220 [facility] [phone] 90 [facility] [phone]   [address] [place], [place] [address] [phone]   [facility]  [phone]   [address] [facility] [phone]   412 [facility] 850 [facility] [phone]

## 2023-06-05 NOTE — PROGRESS NOTES
[address] [facility] [address] [place]  Progress Note  Name: Valencia Gibson  MRN: [id number]  Unit/Bed#:  I Date of Admission: 2023   Date of Service: 2023 I Hospital Day: 0    Assessment/Plan   * Positive blood culture  Assessment & Plan  Previous hospital stay chart reviewed, H&P reviewed  Patient currently asymptomatic, suspected contaminant, infectious disease consult pending  Type 2 diabetes mellitus with diabetic neuropathy [facility])  Assessment & Plan  Lab Results   Component Value Date    HGBA1C 7 4 2023       Recent Labs     23  1236 23  1634 23  2129 23  0724   POCGLU 171* 146* 143* 156*       Blood Sugar Average: Last 72 hrs:  (P) 154     Continue current management    Hyponatremia  Assessment & Plan  • Severe level improvementAppears to have a component of chronic hyponatremia and at times has pseudohyponatremia in the setting of hyperglycemia  • Received a NSS IV fluid bolus of 1000 ml in the emergency department  • Sodium level improving    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)  Assessment & Plan  Continue baclofen    Stage 3 chronic kidney disease ([facility] [address] )  Assessment & Plan  Lab Results   Component Value Date    CREATININE 1 39 (H) 2023    CREATININE 1 25 2023    CREATININE 1 30 2023    EGFR 49 2023    EGFR 55 2023    EGFR 53 2023     Renal function near baseline  Avoid nephrotoxins    Hypothyroidism  Assessment & Plan  Continue levothyroxine         Patient was initially placed on observation status, now will require a greater than 2 midnight stay and will be transition to inpatient status given need for ongoing monitoring of abnormal blood cultures  Patient will be treated with IV antibiotics pending final blood cultures      Code Status: Level 1 - Full Code    Subjective:   No pain, has no acute complaints, tolerating p o  intake    Objective:     Vitals:   Temp (24hrs), Av °F (36 7 °C), Min:97 7 °F (36 5 °C), Max:98 3 °F (36 8 °C)    Temp:  [97 7 °F (36 5 °C)-98 3 °F (36 8 °C)] 97 7 °F (36 5 °C)  HR:  [70-87] 70  Resp:  [16-18] 16  BP: (140-183)/(77-94) 163/84  SpO2:  [95 %-97 %] 97 %  Body mass index is 23 38 kg/m²  Input and Output Summary (last 24 hours): Intake/Output Summary (Last 24 hours) at 6/5/2023 0944  Last data filed at 6/4/2023 1646  Gross per 24 hour   Intake --   Output 350 ml   Net -350 ml       Physical Exam:   Physical Exam  Vitals and nursing note reviewed  Constitutional:       Appearance: Normal appearance  He is not toxic-appearing or diaphoretic  HENT:      Head: Normocephalic and atraumatic  Cardiovascular:      Rate and Rhythm: Normal rate  Pulses: Normal pulses  Pulmonary:      Effort: Pulmonary effort is normal    Abdominal:      General: Abdomen is flat  Musculoskeletal:      Cervical back: Normal range of motion  Skin:     General: Skin is warm and dry  Findings: No lesion or rash  Neurological:      General: No focal deficit present  Mental Status: He is alert and oriented to person, place, and time  Mental status is at baseline  Psychiatric:         Mood and Affect: Mood normal          Behavior: Behavior normal          Thought Content: Thought content normal          Judgment: Judgment normal             Additional Data:     Labs:  Results from last 7 days   Lab Units 06/05/23  0526 06/04/23  1643 06/04/23  0847   EOS PCT %  --   --  6   HEMATOCRIT % 38 3  --  37 9   HEMOGLOBIN g/dL 13 1  --  12 9   LYMPHS PCT %  --   --  14   MONOS PCT %  --   --  11   NEUTROS PCT %  --   --  68   PLATELETS Thousands/uL 287   < > 279   WBC Thousand/uL 7 72  --  5 59    < > = values in this interval not displayed       Results from last 7 days   Lab Units 06/05/23  0526   ANION GAP mmol/L 10   ALBUMIN g/dL 4 0   ALK PHOS U/L 76   ALT U/L 18   AST U/L 19   BUN mg/dL 25   CALCIUM mg/dL 9 5   CHLORIDE mmol/L 98   CO2 mmol/L 24   CREATININE mg/dL 1 39*   GLUCOSE RANDOM mg/dL 173*   POTASSIUM mmol/L 3 5   SODIUM mmol/L 132*   TOTAL BILIRUBIN mg/dL 0 54     Results from last 7 days   Lab Units 06/04/23  0847   INR  0 95     Results from last 7 days   Lab Units 06/05/23  0724 06/04/23  2129 06/04/23  1634 06/04/23  1236 06/03/23  0730 06/03/23  0358 06/02/23  2354 06/02/23  2020 06/02/23  1550 06/02/23  1104 06/02/23  0818 06/02/23  0415   POC GLUCOSE mg/dl 156* 143* 146* 171* 187* 242* 285* 315* 197* 268* 397* 385*         Results from last 7 days   Lab Units 06/05/23  0526 06/04/23  0847 06/01/23  1935 06/01/23  1804   LACTIC ACID mmol/L  --  1 6 0 8  --    PROCALCITONIN ng/ml 0 09 0 07  --  0 08       Lines/Drains:  Invasive Devices     Peripheral Intravenous Line  Duration           Peripheral IV 06/01/23 Left Wrist 3 days    Peripheral IV 06/04/23 Dorsal (posterior); Right Forearm <1 day                      Imaging: No pertinent imaging reviewed  Recent Cultures (last 7 days):   Results from last 7 days   Lab Units 06/04/23  0906 06/04/23  0847 06/02/23  1941 06/02/23  0232 06/01/23  1935   BLOOD CULTURE  Received in Microbiology Lab  Culture in Progress  Received in Microbiology Lab  Culture in Progress  --   --  No Growth at 72 hrs     GRAM STAIN RESULT   --   --  1+ Epithelial cells per low power field*  2+ Polys*  1+ Gram positive cocci in pairs*  --  Gram positive cocci in clusters*   LEGIONELLA URINARY ANTIGEN   --   --   --  Negative  --    SPUTUM CULTURE   --   --  1+ Growth of  --   --        Last 24 Hours Medication List:   Current Facility-Administered Medications   Medication Dose Route Frequency Provider Last Rate   • acetaminophen  650 mg Oral Q6H PRN Josafat Liao PA-C     • aluminum-magnesium hydroxide-simethicone  30 mL Oral Q6H PRN Josafat Liao PA-C     • amLODIPine  5 mg Oral Daily Josafat Liao PA-C     • atorvastatin  40 mg Oral Daily With ANIVAL Hoskins     • baclofen  10 mg Oral TID Josafat ANIVAL Liao • benzonatate  100 mg Oral TID PRN José Zamudio PA-C     • clopidogrel  75 mg Oral Daily José Zamudio PA-C     • docusate sodium  100 mg Oral Daily PRN José Zamudio PA-C     • gabapentin  400 mg Oral HS José Zamudio PA-C     • heparin (porcine)  5,000 Units Subcutaneous Q8H [facility] José Zamudio PA-C     • insulin aspart  20 Units Subcutaneous Insulin Pump Daily PRN José Zamudio PA-C     • insulin lispro  1-5 Units Subcutaneous TID [facility] Clary Burnett DO     • insulin lispro  1-5 Units Subcutaneous HS Clary Burnett DO     • levothyroxine  100 mcg Oral Early Morning José Zamudio PA-C     • lisinopril  40 mg Oral Daily José Zamudio PA-C     • ondansetron  4 mg Intravenous Q6H PRN José Zamudio PA-C     • patient maintained insulin pump  1 each Subcutaneous Q8H José Zamudio PA-C     • vancomycin  15 mg/kg Intravenous Q24H Dena Harding DO          Today, Patient Was Seen By: Wilma Padilla DO    **Please Note: This note may have been constructed using a voice recognition system  **

## 2023-06-05 NOTE — PHYSICAL THERAPY NOTE
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)    Hyponatremia       Past Medical History  Past Medical History:   Diagnosis Date    Arthritis     BPH (benign prostatic hyperplasia)     last assessed 4/29/2014    Diabetes mellitus ([facility] [address] )     Disease of thyroid gland     GERD (gastroesophageal reflux disease)     Hearing aid worn     Hyperlipidemia     Hypertension     Hypothyroidism     Mumps     Prostate cancer ([facility] [facility] )     Stroke ([facility] [facility] )     Tingling sensation     bilateral feet occassionally    Tinnitus     Wears glasses     Wears partial dentures     upper        Past Surgical History  Past Surgical History:   Procedure Laterality Date    CARDIAC ELECTROPHYSIOLOGY PROCEDURE Left 11/4/2021    Procedure: Reveal implant;  Surgeon: Rina Akers MD;  [facility]: [facility];   Service: Cardiology    CATARACT EXTRACTION Bilateral 2000    EYE SURGERY      JOINT REPLACEMENT      KNEE ARTHROSCOPY Right 06/26/2009    knee    FL LAPS SURG [id number] RPBIC RAD W/NRV SPARING ROBOT N/A 2/7/2018    Procedure: ROBOTIC ASSISTED LAPAROSCOPIC PROSTATECTOMY; BILATERAL PELVIC LYMPH NODE DISSECTION;  Surgeon: Kerri Dawn MD;  Location: [facility];  Service: Urology    FL RPR 1ST [place] AGE 5 YRS/> REDUCIBLE Right 4/19/2019    Procedure: REPAIR HERNIA INGUINAL;  Surgeon: Loc Dowd DO;  Location: [facility];  Service: General    PROSTATE BIOPSY  11/07/2017    needle biopsy of prostate    TRIGGER FINGER RELEASE  05/2013    trigger thumb           06/05/23 0931   PT Last Visit   PT Visit Date 06/05/23   Note Type   Note type Evaluation   Pain Assessment   Pain Assessment "Tool 0-10   Pain Score No Pain   Restrictions/Precautions   Weight Bearing Precautions Per Order No   Braces or Orthoses Other (Comment)  (L LE AFO)   Other Precautions Fall Risk;Multiple lines; Chair Alarm   Home Living   Type of [address] Two level;1/2 bath on main level; Other (Comment)  (0 INEZ)   Bathroom Shower/Tub Tub/shower unit   Bathroom Toilet Standard   Bathroom Equipment Shower chair;Commode   Bathroom Accessibility Accessible   Home Equipment Cane   Additional Comments pt reports use of cane at baseline   Prior Function   Level of Artie Independent with functional mobility; Needs assistance with ADLs; Needs assistance with IADLS   Lives With Spouse   Receives Help From Family   IADLs Family/Friend/Other provides transportation   Falls in the last 6 months 0   Vocational Retired   General   Family/Caregiver Present No   Cognition   Overall Cognitive Status WFL   Arousal/Participation Alert   Orientation Level Oriented X4   Following Commands Follows all commands and directions without difficulty   Subjective   Subjective \"I was supposed to go to therapy today in [place]\"   RLE Assessment   RLE Assessment WFL   LLE Assessment   LLE Assessment X  (baseline foot drop d/t prior CVA)   Bed Mobility   Additional Comments pt OOB at start/end of session   Transfers   Sit to Stand 5  Supervision   Additional items Increased time required;Verbal cues   Stand to Sit 5  Supervision   Additional items Increased time required;Verbal cues   Additional Comments SPC used   Ambulation/Elevation   Gait pattern Short stride;Circumduction;Decreased L stance;Decreased foot clearance; Wide ERASMO   Gait Assistance 4  Minimal assist   Additional items Assist x 1;Verbal cues   Assistive Device [facility]   Distance 400'   Balance   Static Sitting Fair +   Dynamic Sitting Fair +   Static Standing Fair   Dynamic Standing [address] -  (with RW)   Endurance Deficit   Endurance Deficit Yes   Activity Tolerance " Activity Tolerance Patient limited by fatigue   Assessment   Prognosis Good   Problem List Decreased strength;Decreased endurance; Impaired balance;Decreased mobility; Impaired tone   Assessment Patient is a 78 y o  male evaluated by Physical Therapy s/p [facility] [place] [facility] [place] with admitting diagnosis of: Hyponatremia, Abnormal laboratory test result, Positive blood culture, Stage 3 chronic kidney disease, and principal problem of: Positive blood culture  PT was consulted to assess patient's functional mobility and discharge needs  Ordered are PT Evaluation and treatment with activity level of: up and OOB as tolerated  Comorbidities affecting patient's physical performance at time of assessment include: DM, HTN, HLD, prostate cancer, arthritis, GERD, hypothyroidism, BPH, hx of mumps, hx of CVA  Personal factors affecting the patient at time of IE include: lives in 2 story home, ambulating with assistive device, inability/difficulty performing IADLs and inability/difficulty performing ADLs  Please locate objective findings from PT assessment regarding body systems outlined above  Upon evaluation, pt able to perform all functional mobility with SUP-Karen x 1, SPC, and increased time  Occasional verbal cuing provided for safety awareness and sequencing  Pt experiencing 3 LOB with mobility that require Karen to recover  Pt aware of balance deficits, stating this happens when he has sat too long and becomes still; is attending OP PT to address gait deviations and balance deficits  Despite LOB, pt able to ambulate 400' before taking seated rest break  SpO2 and HR remained WFL on RA throughout  The patient's AM-PAC Basic Mobility Inpatient Short Form Raw Score is 20  A Raw score of greater than 16 suggests the patient may benefit from discharge to home  Please also refer to the recommendation of the Physical Therapist for safe discharge planning   Co treatment with OT secondary to complex medical condition of pt, possible A of 2 required to achieve and maintain transitional movements, requiring the need of skilled therapeutic intervention of 2 therapists to achieve delivery of services  Pt will benefit from continued PT intervention during LOS to address current deficits, increase LOF, and facilitate safe d/c to next level of care when medically appropriate  D/c recommendation at this time is home with outpatient rehabilitation services  Goals   Patient Goals to go home   LTG Expiration Date 06/19/23   Long Term Goal #1 Pt will participate in B LE strengthening exercises to facilitate improved functional activity tolerance  Pt will perform all functional transfers and bed mobility mod(I) with good safety awareness  Pt will ambulate 450' mod(I) with LRAD while maintaining good functional dynamic balance  Pt will ascend/descend a FFOS with HR and SUP to reflect the ability to safely navigate the home  Plan   Treatment/Interventions Functional transfer training;LE strengthening/ROM; Elevations; Therapeutic exercise; Endurance training;Bed mobility;Gait training   PT Frequency 3-5x/wk   Recommendation   PT Discharge Recommendation Home with outpatient rehabilitation   AM-PAC Basic Mobility Inpatient   Turning in Flat Bed Without Bedrails 4   Lying on Back to Sitting on Edge of Flat Bed Without Bedrails 4   Moving Bed to Chair 3   Standing Up From Chair Using Arms 3   Walk in Room 3   Climb 3-5 Stairs With Railing 3   Basic Mobility Inpatient Raw Score 20   Basic Mobility Standardized Score 43 99   Highest Level Of Mobility   JH-HLM Goal 6: Walk 10 steps or more   JH-HLM Achieved 8: Walk 250 feet ot more   End of Consult   Patient Position at End of Consult Bedside chair;Bed/Chair alarm activated; All needs within reach

## 2023-06-05 NOTE — UTILIZATION REVIEW
NOTIFICATION OF INPATIENT ADMISSION   AUTHORIZATION REQUEST   SERVICING FACILITY:   [address] [place] [address]  [address]  Tax ID:  [id number]  NPI: [id number] ATTENDING PROVIDER:  Attending Name and NPI#: Luigi Jurado [[id number]]  Address:  [address]  Phone: [phone]     ADMISSION INFORMATION:  Place of Service: Inpatient [address] [facility]  60  Place of Service Code: 21  Inpatient Admission Date/Time: 6/5/23  9:27 AM  Discharge Date/Time: No discharge date for patient encounter  Admitting Diagnosis Code/Description:  Hyponatremia [E87 1]  Abnormal laboratory test result [R89 9]  Positive blood culture [R78 81]  Stage 3 chronic kidney disease ([facility] [address] ) [N18 30]     UTILIZATION REVIEW CONTACT:  Jaycob Mcneill Utilization   Network Utilization Review Department  Phone: [phone]  Fax [phone]  Email: [email or web address]  Contact for approvals/pending authorizations, clinical reviews, and discharge  PHYSICIAN ADVISORY SERVICES:  Medical Necessity Denial & Oicb-xh-Yiiy Review  Phone: [phone]  Fax: [phone]  Email: [email or web address]

## 2023-06-05 NOTE — ASSESSMENT & PLAN NOTE
Lab Results   Component Value Date    HGBA1C 7 4 05/16/2023       Recent Labs     06/04/23  1236 06/04/23  1634 06/04/23  2129 06/05/23  0724   POCGLU 171* 146* 143* 156*       Blood Sugar Average: Last 72 hrs:  (P) 154     Continue current management

## 2023-06-05 NOTE — UTILIZATION REVIEW
NOTIFICATION OF INPATIENT ADMISSION   AUTHORIZATION REQUEST   SERVICING FACILITY:   [address] [place] [address]  [address], [place], [address]  Tax ID:  [id number]  NPI: [id number] ATTENDING PROVIDER:  Attending Name and NPI#: Grace Medellin [[id number]]  Address:  [address]  Phone: [phone]     ADMISSION INFORMATION:  Place of Service: Inpatient [address] [facility]  [address]  Place of Service Code: 21  Inpatient Admission Date/Time: 6/5/23  9:27 AM  Discharge Date/Time: No discharge date for patient encounter  Admitting Diagnosis Code/Description:  Hyponatremia [E87 1]  Abnormal laboratory test result [R89 9]  Positive blood culture [R78 81]  Stage 3 chronic kidney disease ([facility] [address] ) [N18 30]     UTILIZATION REVIEW CONTACT:  Ollie Miranda Utilization   Network Utilization Review Department  Phone: [phone]  Fax [phone]  Email: [email or web address]  Contact for approvals/pending authorizations, clinical reviews, and discharge  PHYSICIAN ADVISORY SERVICES:  Medical Necessity Denial & Yxmp-iq-Ehzu Review  Phone: [phone]  Fax: [phone]  Email: [email or web address]

## 2023-06-05 NOTE — UTILIZATION REVIEW
Initial Clinical Review    Admission: Date/Time/Statement:     OBSERVATION 6-4-23 CHANGED TO INPATIENT 6-5-23 TO RULE OUT BACTEREMIA  Admission Orders (From admission, onward)     Ordered        06/05/23 0927  Inpatient Admission  Once            06/04/23 0941  Place in Observation  Once                      Orders Placed This Encounter   • Inpatient Admission     Standing Status:   Standing     Number of Occurrences:   1     Order Specific Question:   Level of Care     Answer:   Med Surg [16]     Order Specific Question:   Estimated length of stay     Answer:   More than 2 Midnights     Order Specific Question:   Certification     Answer:   I certify that inpatient services are medically necessary for this patient for a duration of greater than two midnights  See H&P and MD Progress Notes for additional information about the patient's course of treatment  ED Arrival Information     Expected   -    Arrival   6/4/2023 07:25    Acuity   Urgent            Means of arrival   Walk-In    Escorted by   Spouse    Service   Hospitalist    Admission type   Emergency            Arrival complaint   Abnormal Labs           Chief Complaint   Patient presents with   • Abnormal Lab     Received a call this morning that he has positive blood cultures and was told to come back to be evaluated  Had a dose of Zithromax this morning        Initial Presentation: 78 y o  male presents to ed form home because one of two blood cultures are positive  Discharged from hospital for pneumonia on po zithromax  He had 1 dose of zithromax for pneumonia  PMHX: DM, STROKE, HTN  Clinical assessment significant for hypertension  New blood cultures drawn    Initially treated with iv  9% ns bolus, sq insulin, iv vancomycin  Admit to observation to rule out bacteremia, and hyponatremia  Date: 6-5-23 OBSERVATION TO INPATIENT   Sodium 132  Iv continue to monitor  Continue iv antibiotics  Follow up blood cultures       6-6-23 INPATIENT DAY 2  Continue iv vancomycin 1000 mg q24 hr and monitor renal function  PT/OT evaluated for functional deficits recommending outpatient therapy  Sputum culture shows gram positive cocci in pairs  Repeat blood cultures no growth 24 hours  Urine culture no growth  Creatinine 1 39 to 1 37  Sodium 132 to 129  Obtain vancomycin random        6-6-23 discharged  78 y o  male patient who originally presented to the hospital on 6/4/2023 due to abnormal/positive blood cultures from recent hospital stay      Patient had no systemic signs of illness, patient was treated with IV vancomycin pending final blood culture, blood culture results likely represent contaminant from skin silvia      ED Triage Vitals   06/04/23 0735 06/04/23 0735 06/04/23 0735 06/04/23 0735 06/04/23 0735   98 °F (36 7 °C) 72 18 (!) 184/79 98 %      Temporal Monitor         No Pain          06/05/23 66 6 kg (146 lb 13 2 oz)     Additional Vital Signs:     Date/Time Temp Pulse Resp BP MAP (mmHg) SpO2 O2 Device   06/05/23 1400 97 4 °F (36 3 °C) Abnormal  69 -- 150/76 101 96 % --   06/05/23 13:58:01 97 4 °F (36 3 °C) Abnormal  67 18 150/76 101 96 % --   06/05/23 0752 -- 87 -- 162/82 -- -- --   06/05/23 0700 97 7 °F (36 5 °C) 70 -- 163/84 114 97 % --   06/04/23 2312 98 1 °F (36 7 °C) 87 16 140/77 102 96 % None (Room air)   06/04/23 1506 -- 72 18 -- -- 96 % None (Room air)   06/04/23 1234 97 8 °F (36 6 °C) 72 18 183/94 Abnormal  130 97 % None (Room air)   06/04/23 1000 -- 71 -- 180/86 Abnormal  123 95 % --   06/04/23 0957 98 3 °F (36 8 °C) 75 18 180/86 Abnormal  123 97 % None (Room air)   06/04/23 0735 98 °F (36 7 °C) 72 18 184/79 Abnormal  -- 98 % None (Room air)         Pertinent Labs/Diagnostic Test Results:         Results from last 7 days   Lab Units 06/05/23  0526 06/04/23  1643 06/04/23  0847 06/03/23  0414 06/02/23  0425 06/01/23  1804   HEMATOCRIT % 38 3  --  37 9 33 9* 34 3* 37 9   HEMOGLOBIN g/dL 13 1  --  12 9 11 6* 11 8* 13 1   NEUTROS ABS Thousands/µL  --   --  3 80 4 83 8 21* 6 60   PLATELETS Thousands/uL 287 291 279 243 274 301   WBC Thousand/uL 7 72  --  5 59 7 02 9 50 10 26*         Results from last 7 days   Lab Units 06/05/23  0526 06/04/23  0847 06/03/23  0414 06/02/23  0425 06/01/23  1804   ANION GAP mmol/L 10 7 6 6 10   BUN mg/dL 25 21 27* 30* 34*   CALCIUM mg/dL 9 5 9 6 9 0 8 8 9 3   CHLORIDE mmol/L 98 95* 98 92* 94*   CO2 mmol/L 24 27 27 25 24   CREATININE mg/dL 1 39* 1 25 1 30 1 47* 1 43*   EGFR ml/min/1 73sq m 49 55 53 46 47   POTASSIUM mmol/L 3 5 3 6 3 8 4 4 3 4*   MAGNESIUM mg/dL 2 0  --   --   --  2 1   PHOSPHORUS mg/dL 3 5  --   --   --   --    SODIUM mmol/L 132* 129* 131* 123* 128*     Results from last 7 days   Lab Units 06/05/23  0526 06/04/23  0847 06/03/23  0414   ALBUMIN g/dL 4 0 4 1 3 7   ALK PHOS U/L 76 94 88   ALT U/L 18 16 12   AST U/L 19 18 13   TOTAL BILIRUBIN mg/dL 0 54 0 50 0 39   TOTAL PROTEIN g/dL 6 7 7 0 6 2*     Results from last 7 days   Lab Units 06/05/23  1549 06/05/23  1110 06/05/23  0724 06/04/23  2129 06/04/23  1634 06/04/23  1236 06/03/23  0730 06/03/23  0358 06/02/23  2354 06/02/23  2020 06/02/23  1550 06/02/23  1104   POC GLUCOSE mg/dl 242* 289* 156* 143* 146* 171* 187* 242* 285* 315* 197* 268*     Results from last 7 days   Lab Units 06/05/23  0526 06/04/23  0847 06/03/23  0414 06/02/23  0425 06/01/23  1804   GLUCOSE RANDOM mg/dL 173* 219* 258* 404* 74       Results from last 7 days   Lab Units 06/01/23 2016 06/01/23  1804   HS TNI 0HR ng/L  --  8   HS TNI 2HR ng/L 6  --    HSTNI D2 ng/L -2  --          Results from last 7 days   Lab Units 06/04/23  0847   INR  0 95   PROTIME seconds 12 9   PTT seconds 26     Results from last 7 days   Lab Units 06/05/23  0526 06/01/23  1804   TSH 3RD GENERATON uIU/mL 4 183 5 622*     Results from last 7 days   Lab Units 06/05/23  0526 06/04/23  0847 06/01/23  1804   PROCALCITONIN ng/ml 0 09 0 07 0 08     Results from last 7 days   Lab Units 06/04/23  0847 06/01/23 1935   LACTIC ACID mmol/L 1 6 0 8       Results from last 7 days   Lab Units 06/04/23 1913 06/02/23  1542   OSMO UR mmol/ 337     Results from last 7 days   Lab Units 06/04/23 1913 06/04/23  0853 06/02/23  1542   BILIRUBIN UA   --  Negative  --    BLOOD UA   --  Negative  --    CLARITY UA   --  Clear  --    COLOR UA   --  Yellow  --    GLUCOSE UA mg/dl  --  250 (1/4%)*  --    KETONES UA mg/dl  --  Negative  --    LEUKOCYTES UA   --  Negative  --    SODIUM UR  135  --  28   NITRITE UA   --  Negative  --    PH UA   --  6 5  --    PROTEIN UA mg/dl  --  Negative  --    SPEC GRAV UA   --  1 010  --    UROBILINOGEN UA E U /dl  --  0 2  --      Results from last 7 days   Lab Units 06/02/23  0232   LEGIONELLA URINARY ANTIGEN  Negative   STREP PNEUMONIAE ANTIGEN, URINE  Negative       Results from last 7 days   Lab Units 06/04/23  0906 06/04/23  0847 06/02/23  1941 06/01/23 1935   BLOOD CULTURE  No Growth at 24 hrs  No Growth at 24 hrs   --  No Growth at 72 hrs  GRAM STAIN RESULT   --   --  1+ Epithelial cells per low power field*  2+ Polys*  1+ Gram positive cocci in pairs* Gram positive cocci in clusters*   SPUTUM CULTURE   --   --  Culture results to follow    --        ED Treatment:   Medication Administration from 06/04/2023 0725 to 06/04/2023 2307       Date/Time Order Dose Route Action     06/04/2023 1000 EDT sodium chloride 0 9 % bolus 1,000 mL 1,000 mL Intravenous New Bag     06/04/2023 1251 EDT insulin lispro (HumaLOG) 100 units/mL subcutaneous injection 1-5 Units 1 Units Subcutaneous Given     06/04/2023 1255 EDT vancomycin (VANCOCIN) 1000 mg in sodium chloride 0 9% 250 mL IVPB 1,000 mg Intravenous Given     06/04/2023 2142 EDT PATIENT MAINTAINED INSULIN PUMP 1 each 1 each Subcutaneous Given     06/04/2023 1344 EDT PATIENT MAINTAINED INSULIN PUMP 1 each 1 each Subcutaneous Given     06/04/2023 1332 EDT amLODIPine (NORVASC) tablet 5 mg 5 mg Oral Given     06/04/2023 1642 EDT atorvastatin (LIPITOR) tablet 40 mg 40 mg Oral Given     06/04/2023 2142 EDT baclofen tablet 10 mg 10 mg Oral Given     06/04/2023 1332 EDT baclofen tablet 10 mg 10 mg Oral Given     06/04/2023 1332 EDT clopidogrel (PLAVIX) tablet 75 mg 75 mg Oral Given     06/04/2023 2142 EDT gabapentin (NEURONTIN) capsule 400 mg 400 mg Oral Given     06/04/2023 1332 EDT lisinopril (ZESTRIL) tablet 40 mg 40 mg Oral Given     06/04/2023 2143 EDT heparin (porcine) subcutaneous injection 5,000 Units 5,000 Units Subcutaneous Given     06/04/2023 1344 EDT heparin (porcine) subcutaneous injection 5,000 Units 5,000 Units Subcutaneous Given        Past Medical History:   Diagnosis   • Arthritis   • BPH (benign prostatic hyperplasia)    last assessed 4/29/2014   • Diabetes mellitus ([facility] [address] )   • Disease of thyroid gland   • GERD (gastroesophageal reflux disease)   • Hearing aid worn   • Hyperlipidemia   • Hypertension   • Hypothyroidism   • Mumps   • [facility] )   • Stroke ([facility] )   • Tingling sensation    bilateral feet occassionally   • Tinnitus   • Wears glasses   • Wears partial dentures    upper     Present on Admission:  • Positive blood culture  • Type 2 diabetes mellitus with diabetic neuropathy (HCC)  • Stage 3 chronic kidney disease (HCC)  • Hypothyroidism  • Hyponatremia      Admitting Diagnosis:     Hyponatremia [E87 1]  Abnormal laboratory test result [R89 9]  Positive blood culture [R78 81]  Stage 3 chronic kidney disease ([facility] ) [N18 30]    Age/Sex: 78 y o  male    Scheduled Medications:    amLODIPine, 5 mg, Oral, Daily  atorvastatin, 40 mg, Oral, Daily With Dinner  baclofen, 10 mg, Oral, TID  clopidogrel, 75 mg, Oral, Daily  gabapentin, 400 mg, Oral, HS  heparin (porcine), 5,000 Units, Subcutaneous, Q8H ZAINA  insulin lispro, 1-5 Units, Subcutaneous, TID AC  insulin lispro, 1-5 Units, Subcutaneous, HS  levothyroxine, 100 mcg, Oral, Early Morning  lisinopril, 40 mg, Oral, Daily  patient maintained insulin pump, 1 each, Subcutaneous, Q8H  vancomycin, 15 mg/kg, Intravenous, Q24H      Continuous IV Infusions:     PRN Meds:  acetaminophen, 650 mg, Oral, Q6H PRN  aluminum-magnesium hydroxide-simethicone, 30 mL, Oral, Q6H PRN  benzonatate, 100 mg, Oral, TID PRN  docusate sodium, 100 mg, Oral, Daily PRN  insulin aspart, 20 Units, Subcutaneous Insulin Pump, Daily PRN  ondansetron, 4 mg, Intravenous, Q6H PRN        IP CONSULT TO PHARMACY  IP CONSULT TO CASE MANAGEMENT    Network Utilization Review Department  ATTENTION: Please call with any questions or concerns to [phone] and carefully listen to the prompts so that you are directed to the right person  All voicemails are confidential   Evelina Vázquez all requests for admission clinical reviews, approved or denied determinations and any other requests to dedicated fax number below belonging to the campus where the patient is receiving treatment   List of dedicated fax numbers for the Facilities:  [address] DENIALS (Administrative/Medical Necessity) [phone]   [address] (Maternity/NICU/Pediatrics) [phone]   91 Jeni Polk [phone]   [facility] [phone]   [address] [place] [address] Kevyn Rajendra [place] [address] [phone]   1552 [facility] Iselin Simran [facility] 134 [address] [phone]

## 2023-06-05 NOTE — PROGRESS NOTES
Edison Thomas is a 78 y o  male who is currently ordered Vancomycin IV with management by the Pharmacy Consult service  Relevant clinical data and objective / subjective history reviewed  Vancomycin Assessment:  Indication and Goal AUC/Trough: Bacteremia (goal -600, trough >10), -600, trough >10  Clinical Status: stable  Micro:     Renal Function:  SCr: 1 39 mg/dL  CrCl: 41 4 mL/min  Renal replacement: Not on dialysis  Days of Therapy: 2  Current Dose: 1000mg q24h  Vancomycin Plan:  New Dosing: same  Estimated AUC: 485 mcg*hr/mL  Estimated Trough: 15 mcg/mL  Next Level: 0600 on 6/6/23  Renal Function Monitoring: Daily BMP and [place] will continue to follow closely for s/sx of nephrotoxicity, infusion reactions and appropriateness of therapy  BMP and CBC will be ordered per protocol  We will continue to follow the patient’s culture results and clinical progress daily      Elvis Schaefer, Pharmacist

## 2023-06-05 NOTE — OCCUPATIONAL THERAPY NOTE
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)    Hyponatremia    Past Medical History  Past Medical History:   Diagnosis Date    Arthritis     BPH (benign prostatic hyperplasia)     last assessed 4/29/2014    Diabetes mellitus ([facility] )     Disease of thyroid gland     GERD (gastroesophageal reflux disease)     Hearing aid worn     Hyperlipidemia     Hypertension     Hypothyroidism     Mumps     Prostate cancer ([facility] )     Stroke ([facility] )     Tingling sensation     bilateral feet occassionally    Tinnitus     Wears glasses     Wears partial dentures     upper     Past Surgical History  Past Surgical History:   Procedure Laterality Date    CARDIAC ELECTROPHYSIOLOGY PROCEDURE Left 11/4/2021    Procedure: Reveal implant;  Surgeon: Loreta Epley, MD;  Location:  CARDIAC CATH LAB;   Service: Cardiology    CATARACT EXTRACTION Bilateral 2000    EYE SURGERY      JOINT REPLACEMENT      KNEE ARTHROSCOPY Right 06/26/2009    knee    TN LAPS SURG [id number] RPBIC RAD W/NRV SPARING ROBOT N/A 2/7/2018    Procedure: ROBOTIC ASSISTED LAPAROSCOPIC PROSTATECTOMY; BILATERAL PELVIC LYMPH NODE DISSECTION;  Surgeon: Britney Dupont MD;  Location: [facility];  Service: Urology    TN RPR 1ST Verl Lover AGE 5 YRS/> REDUCIBLE Right 4/19/2019    Procedure: REPAIR HERNIA INGUINAL;  Surgeon: Carlo Cruz DO;  Location: [facility] MAIN OR;  Service: General    PROSTATE BIOPSY  11/07/2017    needle biopsy of prostate    TRIGGER FINGER RELEASE  05/2013    trigger thumb             06/05/23 0930   OT Last Visit   OT Visit Date 06/05/23   Note Type   Note type Evaluation   Pain Assessment   Pain Score No Pain   Restrictions/Precautions   Weight Bearing "Precautions Per Order No   Braces or Orthoses Other (Comment)  (MAFO for L LE)   Other Precautions Chair Alarm; Fall Risk   Home Living   Type of [address] Two level;1/2 bath on main level;Bed/bath upstairs  (0 INEZ; FOS to 2nd c HR)   Bathroom Shower/Tub Tub/shower unit   Bathroom Toilet Standard   Bathroom Equipment Shower chair;Commode   [facility]; Other (Comment)  (MAFO)   Additional Comments pt performs functional mobility with SPC at baseline during functional mobility   Prior Function   Level of [place] Needs assistance with ADLs; Needs assistance with functional mobility; Needs assistance with IADLS   Lives With Spouse   Receives Help From Personal care attendant   IADLs Family/Friend/Other provides transportation   Falls in the last 6 months 0   Comments pt's wife is present at all times to (A) with ADLs and IADLs; pt with CVA resulting in limiting use of L UE and mobility difficulties requiring L LE MAFO   Subjective   Subjective \"what can I say, I am here\"   ADL   Where Assessed Chair   LB Dressing Assistance 2  Maximal Assistance   LB Dressing Deficit   ((A) to don bilateral shoes and MAFO to L LE)   Toileting Assistance  5  Supervision/Setup   Toileting Deficit   (stands at toilet to perform urination)   Bed Mobility   Additional Comments pt seated in chair at start and end of session; Spo2 WFL on RA with no complaints of SOB   Transfers   Sit to Stand 5  Supervision   Additional items Increased time required;Verbal cues  (SPC)   Stand to Sit 5  Supervision   Additional items Increased time required;Verbal cues  [facility])   Additional Comments pt performs with (S) level and use of SPC; no significant LOB or instability with functional transfers   Functional Mobility   Functional Mobility 4  Minimal assistance   Additional Comments x1 with SPC; performs 400ft with no significant LOB, however x3 LOB during sesison; appears to have tripped on shoes " Additional items SPC   Balance   Static Sitting Fair +   Dynamic Sitting Fair +   Static Standing Fair   Dynamic Standing Fair -   Ambulatory Fair -   Activity Tolerance   Activity Tolerance Patient tolerated treatment well   RUE Assessment   RUE Assessment WFL   LUE Assessment   LUE Assessment WFL   Hand Function   Gross Motor Coordination Functional   Fine Motor Coordination Functional   Sensation   Light Touch No apparent deficits   Sharp/Dull No apparent deficits   Psychosocial   Psychosocial (WDL) WDL   Cognition   Overall Cognitive Status WFL   Arousal/Participation Alert   Attention Within functional limits   Orientation Level Oriented X4   Memory Within functional limits   Following Commands Follows all commands and directions without difficulty   Assessment   Limitation Decreased ADL status; Decreased Safe judgement during ADL;Decreased endurance;Decreased high-level ADLs; Decreased self-care trans   Assessment Pt is a 78 y o  male seen for OT evaluation s/p admit to [facility] on 6/4/2023 w/ Positive blood culture  Comorbidities affecting pt's functional performance at time of assessment include: DM, disease of thyroid gland, HTN, HLD, prostate CA, arthritis, GERD, tinnitus, hypothyroidism, mumps, CVA  Personal factors affecting pt at time of IE include:difficulty performing ADLS, difficulty performing IADLS  and health management   Prior to admission, pt was (A) with ADLs and IADLs with use of SPC during mobility  Upon evaluation: Pt requires (S)-min (A) level with use of SPC during mobility 2* the following deficits impacting occupational performance: weakness, decreased strength, decreased balance, decreased tolerance, impaired initiation and decreased safety awareness  Pt to benefit from continued skilled OT tx while in the hospital to address deficits as defined above and maximize level of functional independence w ADL's and functional mobility   Occupational Performance areas to address include: grooming, bathing/shower, toilet hygiene, dressing, functional mobility, community mobility and clothing management  The patient's raw score on the AM-PAC Daily Activity Inpatient Short Form is 16  A raw score of less than 19 suggests the patient may benefit from discharge to post-acute rehabilitation services  Please refer to the recommendation of the Occupational Therapist for safe discharge planning  Pt benefited from co-evaluation of skilled OT and PT therapists in order to most appropriately address functional deficits d/t extensive assistance required for safe functional mobility, decreased activity tolerance, and regression from functioning level prior to admission and/or onset of present illness  OT/PT objectives were addressed separately; please see PT note for specific goal areas targeted  Goals   Patient Goals to go home   Short Term Goal  pt will perform UE strengthening exercises   Long Term Goal #1 pt will demonstrate UB bathing and grooming tasks at min (A) level   Long Term Goal #2 pt will demonstrate functional transfers with [facility] at mod (I) level   Long Term Goal pt will demonstrate functional mobility with [facility] at mod (I) level   Plan   Treatment Interventions ADL retraining;Functional transfer training;UE strengthening/ROM; Endurance training;Patient/family training;Equipment evaluation/education; Activityengagement   Goal Expiration Date 06/19/23   OT Frequency 3-5x/wk   Recommendation   OT Discharge Recommendation Home with outpatient rehabilitation   Additional Comments  continue with OP PT services   AM-[facility] Daily Activity Inpatient   Lower Body Dressing 2   Bathing 2   Toileting 3   Upper Body Dressing 3   Grooming 3   Eating 3   Daily Activity Raw Score 16   Daily Activity Standardized Score (Calc for Raw Score >=11) 35 96   AM-[facility] Applied Cognition Inpatient   Following a Speech/Presentation 4   Understanding Ordinary Conversation 4   Taking Medications 4   Remembering Where Things Are Placed or Put Away 4   Remembering List of 4-5 Errands 4   Taking Care of Complicated Tasks 4   Applied Cognition Raw Score 24   Applied Cognition Standardized Score 62 21

## 2023-06-05 NOTE — CASE MANAGEMENT
Case Management Assessment    Patient name Kelly Handley  Location / MRN [id number]  : 1945 Date 2023       Current Admission Date: 2023  Current Admission Diagnosis:Positive blood culture   Patient Active Problem List    Diagnosis Date Noted   • Positive blood culture 2023   • Pneumonia of left lower lobe due to infectious organism 2023   • Insulin pump in place 2023   • Hypoglycemia    • Left lower lobe pneumonia    • Ambulatory dysfunction 10/18/2022   • SIRS (systemic inflammatory response syndrome) ([facility] ) 10/18/2022   • Intracranial vascular stenosis 10/18/2022   • Hyponatremia 10/18/2022   • Hepatomegaly 10/18/2022   • Cholelithiasis 10/18/2022   • Aspiration pneumonitis ([facility] ) 10/18/2022   • Diabetic retinopathy (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) 2021   • Type 2 diabetes mellitus with diabetic neuropathy ([facility] ) 2021   • History of stroke 2021   • Long term current use of insulin ([facility] ) 10/01/2020   • Medicare annual wellness visit, subsequent 2018   • DMII (diabetes mellitus, type 2) ([facility] ) 2018   • Hypothyroidism 2018   • Hyperlipidemia 2018   • Stage 3 chronic kidney disease ([facility] ) 2018   • Adenocarcinoma of prostate ([facility] ) 2017   • Sensorineural hearing loss (SNHL), bilateral 2017   • Syncope 02/15/2016   • Essential hypertension 2012      LOS (days): 0  Geometric Mean LOS (GMLOS) (days):   Days to GMLOS:     OBJECTIVE:  PATIENT READMITTED TO HOSPITAL            Current admission status: Inpatient       Preferred Pharmacy:   [facility]/pharmacy #[id number] [id number]  Phone: [phone] Fax: [phone] [facility] [place] - [address] [facility] [address]  [address]  [address] [address]  Phone: [phone] Fax: [phone]    [facility] [address] [place] [address]  Phone: [phone] Fax: [phone]    Primary Care Provider: Anselmo aSntana DO    Primary Insurance: [address] [facility]  Secondary Insurance: [facility] HOS[facility] REP    ASSESSMENT:  [address] [facility], [address] Representative - Spouse   Primary Phone: [phone] (Mobile)  Home Phone: [phone]               Advance Directives  Does patient have a [address]?: Yes  Does patient have Advance Directives?: Yes  Advance Directives: Power of  for health care (pt states his wife is POA)  Primary Contact: Mirlande Ortiz         Readmission Root Cause  30 Day Readmission: Yes  Who directed you to return to the hospital?: Other (comment) (call recieved from hospital that he had +blood cultures to return to hospital)  Did you understand whom to contact if you had questions or problems?: Yes  Did you get your prescriptions before you left the hospital?: No  Reason[de-identified] Preference for own pharmacy  Were you able to get your prescriptions filled when you left the hospital?: Yes  Did you take your medications as prescribed?: Yes  Were you able to get to your follow-up appointments?: No  Reason[de-identified] Readmitted prior to appointment  During previous admission, was a post-acute recommendation made?: Yes  What post-acute resources were offered?: OP Therapy (pt already set [facility])  Patient was readmitted due to: +blood cultures  Action Plan: repeat cultures, iv abx    Patient Information  Admitted from[de-identified] Home  Mental Status: Alert  During Assessment patient was accompanied by: Not accompanied during assessment  Assessment information provided by[de-identified] Patient  Primary Caregiver: Self  Support Systems: Self, Spouse/significant other  [place] of Residence: [address] do you live in?: [place]  Home entry access options  Select all that apply : No steps to enter home  Type of Current Residence: 2 story home  Upon entering residence, is there a bedroom on the main floor (no further steps)?: No  A bedroom is located on the following floor levels of residence (select all that apply):: 2nd Floor  Upon entering residence, is there a bathroom on the main floor (no further steps)?: No  Indicate which floors of current residence have a bathroom (select all the apply):: 2nd Floor  Number of steps to 2nd floor from main floor: One Flight  In the last 12 months, was there a time when you were not able to pay the mortgage or rent on time?: No  In the last 12 months, how many places have you lived?: 1  In the last 12 months, was there a time when you did not have a steady place to sleep or slept in a shelter (including now)?: No  Homeless/housing insecurity resource given?: N/A  Living Arrangements: Lives w/ Spouse/significant other  Is patient a ?: Yes  Is patient active with [facility])?: Yes ([facility])  Is patient service connected?: Yes    Activities of Daily Living Prior to Admission  Functional Status: Assistance  Completes ADLs independently?: No  Level of ADL dependence: Assistance  Ambulates independently?: Yes  Does patient use assisted devices?: Yes  Assisted Devices (DME) used:  Shower Chair, Bedside Commode, [place]  Does patient currently own DME?: Yes  What DME does the patient currently own?: Bedside Commode, George Toscano, Shower Chair  Does patient have a history of Outpatient Therapy (PT/OT)?: Yes (leeann)  Does the patient have a history of Short-Term Rehab?: No  Does patient have a history of HHC?: No  Does patient currently have [facility]?: No         Patient Information Continued  Income Source: Pension/[facility]  Does patient have prescription coverage?: Yes  Within the past 12 months, you worried that your food would run out before you got the money to buy more : Never true  Within the past 12 months, the food you bought just didn't last and you didn't have money to get more : Never true  Food insecurity resource given?: N/A  Does patient receive dialysis treatments?: No  Does patient have a history of substance abuse?: No  Does patient have a history of Mental Health Diagnosis?: No         Means of Transportation  Means of Transport to Appts[de-identified] Family transport  In the past 12 months, has lack of transportation kept you from medical appointments or from getting medications?: No  In the past 12 months, has lack of transportation kept you from meetings, work, or from getting things needed for daily living?: No  Was application for public transport provided?: N/A  Cm met with the patient to evaluate the patients prior function and living situation and any barriers to d/c and form a safe d/c plan  Cm also evaluated the patient for any services in the home or needs for services  CM also discussed with patient that their preferences will be taken into account/consideration  Pt recently discharged over weekend  Was called by hospital to come back after blood cultures +  Iv abx, repeat cultures  No current discharge needs  CM to follow

## 2023-06-05 NOTE — PLAN OF CARE
Problem: OCCUPATIONAL THERAPY ADULT  Goal: Performs self-care activities at highest level of function for planned discharge setting  See evaluation for individualized goals  Description: Treatment Interventions: ADL retraining, Functional transfer training, UE strengthening/ROM, Endurance training, Patient/family training, Equipment evaluation/education, Activityengagement          See flowsheet documentation for full assessment, interventions and recommendations  Note: Limitation: Decreased ADL status, Decreased Safe judgement during ADL, Decreased endurance, Decreased high-level ADLs, Decreased self-care trans     Assessment: Pt is a 78 y o  male seen for OT evaluation s/p admit to [facility] on 6/4/2023 w/ Positive blood culture  Comorbidities affecting pt's functional performance at time of assessment include: DM, disease of thyroid gland, HTN, HLD, prostate CA, arthritis, GERD, tinnitus, hypothyroidism, mumps, CVA  Personal factors affecting pt at time of IE include:difficulty performing ADLS, difficulty performing IADLS  and health management   Prior to admission, pt was (A) with ADLs and IADLs with use of SPC during mobility  Upon evaluation: Pt requires (S)-min (A) level with use of SPC during mobility 2* the following deficits impacting occupational performance: weakness, decreased strength, decreased balance, decreased tolerance, impaired initiation and decreased safety awareness  Pt to benefit from continued skilled OT tx while in the hospital to address deficits as defined above and maximize level of functional independence w ADL's and functional mobility  Occupational Performance areas to address include: grooming, bathing/shower, toilet hygiene, dressing, functional mobility, community mobility and clothing management  The patient's raw score on the AM-PAC Daily Activity Inpatient Short Form is 16   A raw score of less than 19 suggests the patient may benefit from discharge to post-acute rehabilitation services  Please refer to the recommendation of the Occupational Therapist for safe discharge planning  Pt benefited from co-evaluation of skilled OT and PT therapists in order to most appropriately address functional deficits d/t extensive assistance required for safe functional mobility, decreased activity tolerance, and regression from functioning level prior to admission and/or onset of present illness  OT/PT objectives were addressed separately; please see PT note for specific goal areas targeted       OT Discharge Recommendation: Home with outpatient rehabilitation

## 2023-06-05 NOTE — NURSING NOTE
Pt received from 2nd floor  Patient alert to room, wife at bedside and patient has all belongings  Pt noted to have Insulin pump intact  Will continue to monitor

## 2023-06-05 NOTE — UTILIZATION REVIEW
NOTIFICATION OF ADMISSION DISCHARGE   This is a Notification of Discharge from [facility]  Please be advised that this patient has been discharge from our facility  Below you will find the admission and discharge date and time including the patient’s disposition  UTILIZATION REVIEW CONTACT:  Crista Fried  Utilization   Network Utilization Review Department  Phone: [phone] x carefully listen to the prompts  All voicemails are confidential   Email: [email or web address]     ADMISSION INFORMATION  PRESENTATION DATE: 6/1/2023  5:57 PM  OBERVATION ADMISSION DATE:  INPATIENT ADMISSION DATE: 6/2/23  1:29 PM   DISCHARGE DATE: 6/3/2023 10:34 AM   DISPOSITION:Home/Self Care    IMPORTANT INFORMATION:  Send all requests for admission clinical reviews, approved or denied determinations and any other requests to dedicated fax number below belonging to the campus where the patient is receiving treatment   List of dedicated fax numbers:  [address] DENIALS (Administrative/Medical Necessity) [phone]   [address] (Maternity/NICU/Pediatrics) [phone]   [facility] [phone]   [facility] [phone]   [address] [phone]   [address] [facility] [phone] [facility] [phone]   [address] [place], [place] [address] [phone]   [facility]  [phone]   [facility] [phone]   [address] [place] [address] 850 [facility] [phone]

## 2023-06-06 ENCOUNTER — TRANSITIONAL CARE MANAGEMENT (OUTPATIENT)
Dept: FAMILY MEDICINE CLINIC | Facility: CLINIC | Age: 78
End: 2023-06-06

## 2023-06-06 VITALS
SYSTOLIC BLOOD PRESSURE: 120 MMHG | HEIGHT: 63 IN | HEART RATE: 67 BPM | TEMPERATURE: 97.9 F | BODY MASS INDEX: 26.02 KG/M2 | DIASTOLIC BLOOD PRESSURE: 72 MMHG | OXYGEN SATURATION: 96 % | WEIGHT: 146.83 LBS | RESPIRATION RATE: 14 BRPM

## 2023-06-06 LAB
ALL TARGETS: NOT DETECTED
ANION GAP SERPL CALCULATED.3IONS-SCNC: 9 MMOL/L (ref 4–13)
BACTERIA BLD CULT: ABNORMAL
BACTERIA BLD CULT: NORMAL
BACTERIA SPT RESP CULT: ABNORMAL
BACTERIA SPT RESP CULT: ABNORMAL
BACTERIA UR CULT: NORMAL
BASOPHILS # BLD AUTO: 0.07 THOUSANDS/ÂΜL (ref 0–0.1)
BASOPHILS NFR BLD AUTO: 1 % (ref 0–1)
BUN SERPL-MCNC: 34 MG/DL (ref 5–25)
CALCIUM SERPL-MCNC: 9.2 MG/DL (ref 8.4–10.2)
CHLORIDE SERPL-SCNC: 96 MMOL/L (ref 96–108)
CO2 SERPL-SCNC: 24 MMOL/L (ref 21–32)
CREAT SERPL-MCNC: 1.37 MG/DL (ref 0.6–1.3)
EOSINOPHIL # BLD AUTO: 0.53 THOUSAND/ÂΜL (ref 0–0.61)
EOSINOPHIL NFR BLD AUTO: 8 % (ref 0–6)
ERYTHROCYTE [DISTWIDTH] IN BLOOD BY AUTOMATED COUNT: 13.1 % (ref 11.6–15.1)
GFR SERPL CREATININE-BSD FRML MDRD: 50 ML/MIN/1.73SQ M
GLUCOSE SERPL-MCNC: 192 MG/DL (ref 65–140)
GLUCOSE SERPL-MCNC: 203 MG/DL (ref 65–140)
GLUCOSE SERPL-MCNC: 208 MG/DL (ref 65–140)
GLUCOSE SERPL-MCNC: 284 MG/DL (ref 65–140)
GRAM STN SPEC: ABNORMAL
HCT VFR BLD AUTO: 35.5 % (ref 36.5–49.3)
HGB BLD-MCNC: 12.2 G/DL (ref 12–17)
IMM GRANULOCYTES # BLD AUTO: 0.02 THOUSAND/UL (ref 0–0.2)
IMM GRANULOCYTES NFR BLD AUTO: 0 % (ref 0–2)
LYMPHOCYTES # BLD AUTO: 1.34 THOUSANDS/ÂΜL (ref 0.6–4.47)
LYMPHOCYTES NFR BLD AUTO: 20 % (ref 14–44)
MCH RBC QN AUTO: 32.4 PG (ref 26.8–34.3)
MCHC RBC AUTO-ENTMCNC: 34.4 G/DL (ref 31.4–37.4)
MCV RBC AUTO: 94 FL (ref 82–98)
MONOCYTES # BLD AUTO: 0.66 THOUSAND/ÂΜL (ref 0.17–1.22)
MONOCYTES NFR BLD AUTO: 10 % (ref 4–12)
NEUTROPHILS # BLD AUTO: 4.02 THOUSANDS/ÂΜL (ref 1.85–7.62)
NEUTS SEG NFR BLD AUTO: 61 % (ref 43–75)
NRBC BLD AUTO-RTO: 0 /100 WBCS
PLATELET # BLD AUTO: 264 THOUSANDS/UL (ref 149–390)
PMV BLD AUTO: 9.4 FL (ref 8.9–12.7)
POTASSIUM SERPL-SCNC: 3.8 MMOL/L (ref 3.5–5.3)
RBC # BLD AUTO: 3.76 MILLION/UL (ref 3.88–5.62)
SODIUM SERPL-SCNC: 129 MMOL/L (ref 135–147)
VANCOMYCIN SERPL-MCNC: 10.5 UG/ML (ref 10–20)
WBC # BLD AUTO: 6.64 THOUSAND/UL (ref 4.31–10.16)

## 2023-06-06 PROCEDURE — 85025 COMPLETE CBC W/AUTO DIFF WBC: CPT | Performed by: INTERNAL MEDICINE

## 2023-06-06 PROCEDURE — 80048 BASIC METABOLIC PNL TOTAL CA: CPT | Performed by: INTERNAL MEDICINE

## 2023-06-06 PROCEDURE — 80202 ASSAY OF VANCOMYCIN: CPT | Performed by: INTERNAL MEDICINE

## 2023-06-06 PROCEDURE — 82948 REAGENT STRIP/BLOOD GLUCOSE: CPT

## 2023-06-06 PROCEDURE — 99239 HOSP IP/OBS DSCHRG MGMT >30: CPT | Performed by: INTERNAL MEDICINE

## 2023-06-06 RX ADMIN — LEVOTHYROXINE SODIUM 100 MCG: 100 TABLET ORAL at 05:45

## 2023-06-06 RX ADMIN — CLOPIDOGREL BISULFATE 75 MG: 75 TABLET ORAL at 08:47

## 2023-06-06 RX ADMIN — VANCOMYCIN HYDROCHLORIDE 1000 MG: 5 INJECTION, POWDER, LYOPHILIZED, FOR SOLUTION INTRAVENOUS at 11:48

## 2023-06-06 RX ADMIN — LISINOPRIL 40 MG: 20 TABLET ORAL at 08:47

## 2023-06-06 RX ADMIN — INSULIN LISPRO 5 UNITS: 100 INJECTION, SOLUTION INTRAVENOUS; SUBCUTANEOUS at 08:48

## 2023-06-06 RX ADMIN — INSULIN LISPRO 4 UNITS: 100 INJECTION, SOLUTION INTRAVENOUS; SUBCUTANEOUS at 11:48

## 2023-06-06 RX ADMIN — HEPARIN SODIUM 5000 UNITS: 5000 INJECTION INTRAVENOUS; SUBCUTANEOUS at 05:45

## 2023-06-06 RX ADMIN — BACLOFEN 10 MG: 10 TABLET ORAL at 08:47

## 2023-06-06 RX ADMIN — AMLODIPINE BESYLATE 5 MG: 5 TABLET ORAL at 08:47

## 2023-06-06 NOTE — ASSESSMENT & PLAN NOTE
Blood culture likely represents contaminant from skin silvia, monitor as outpatient for any signs of recurrent infection, as was discussed in detail with the patient's wife at bedside  Repeat blood cultures with no growth to date

## 2023-06-06 NOTE — PLAN OF CARE
Problem: PAIN - ADULT  Goal: Verbalizes/displays adequate comfort level or baseline comfort level  Description: Interventions:  - Encourage patient to monitor pain and request assistance  - Assess pain using appropriate pain scale  - Administer analgesics based on type and severity of pain and evaluate response  - Implement non-pharmacological measures as appropriate and evaluate response  - Consider cultural and social influences on pain and pain management  - Notify physician/advanced practitioner if interventions unsuccessful or patient reports new pain  Outcome: Progressing     Problem: INFECTION - ADULT  Goal: Absence or prevention of progression during hospitalization  Description: INTERVENTIONS:  - Assess and monitor for signs and symptoms of infection  - Monitor lab/diagnostic results  - Monitor all insertion sites, i e  indwelling lines, tubes, and drains  - Monitor endotracheal if appropriate and nasal secretions for changes in amount and color  - [place] appropriate cooling/warming therapies per order  - Administer medications as ordered  - Instruct and encourage patient and family to use good hand hygiene technique  - Identify and instruct in appropriate isolation precautions for identified infection/condition  Outcome: Progressing  Goal: Absence of fever/infection during neutropenic period  Description: INTERVENTIONS:  - Monitor WBC    Outcome: Progressing     Problem: Knowledge Deficit  Goal: Patient/family/caregiver demonstrates understanding of disease process, treatment plan, medications, and discharge instructions  Description: Complete learning assessment and assess knowledge base    Interventions:  - Provide teaching at level of understanding  - Provide teaching via preferred learning methods  Outcome: Progressing     Problem: CARDIOVASCULAR - ADULT  Goal: Maintains optimal cardiac output and hemodynamic stability  Description: INTERVENTIONS:  - Monitor I/O, vital signs and rhythm  - Monitor for S/S and trends of decreased cardiac output  - Administer and titrate ordered vasoactive medications to optimize hemodynamic stability  - Assess quality of pulses, skin color and temperature  - Assess for signs of decreased coronary artery perfusion  - Instruct patient to report change in severity of symptoms  Outcome: Progressing

## 2023-06-06 NOTE — PLAN OF CARE
Problem: MOBILITY - ADULT  Goal: Maintain or return to baseline ADL function  Description: INTERVENTIONS:  -  Assess patient's ability to carry out ADLs; assess patient's baseline for ADL function and identify physical deficits which impact ability to perform ADLs (bathing, care of mouth/teeth, toileting, grooming, dressing, etc )  - Assess/evaluate cause of self-care deficits   - Assess range of motion  - Assess patient's mobility; develop plan if impaired  - Assess patient's need for assistive devices and provide as appropriate  - Encourage maximum independence but intervene and supervise when necessary  - Involve family in performance of ADLs  - Assess for home care needs following discharge   - Consider OT consult to assist with ADL evaluation and planning for discharge  - Provide patient education as appropriate  Outcome: Progressing  Goal: Maintains/Returns to pre admission functional level  Description: INTERVENTIONS:  - Perform BMAT or MOVE assessment daily    - Set and communicate daily mobility goal to care team and patient/family/caregiver  - Collaborate with rehabilitation services on mobility goals if consulted  - Perform Range of Motion  times a day  - Reposition patient every  hours    - Dangle patient  times a day  - Stand patient  times a day  - Ambulate patient  times a day  - Out of bed to chair  times a day   - Out of bed for meals  times a day  - Out of bed for toileting  - Record patient progress and toleration of activity level   Outcome: Progressing     Problem: PAIN - ADULT  Goal: Verbalizes/displays adequate comfort level or baseline comfort level  Description: Interventions:  - Encourage patient to monitor pain and request assistance  - Assess pain using appropriate pain scale  - Administer analgesics based on type and severity of pain and evaluate response  - Implement non-pharmacological measures as appropriate and evaluate response  - Consider cultural and social influences on pain and pain management  - Notify physician/advanced practitioner if interventions unsuccessful or patient reports new pain  Outcome: Progressing     Problem: INFECTION - ADULT  Goal: Absence or prevention of progression during hospitalization  Description: INTERVENTIONS:  - Assess and monitor for signs and symptoms of infection  - Monitor lab/diagnostic results  - Monitor all insertion sites, i e  indwelling lines, tubes, and drains  - Monitor endotracheal if appropriate and nasal secretions for changes in amount and color  - [place] appropriate cooling/warming therapies per order  - Administer medications as ordered  - Instruct and encourage patient and family to use good hand hygiene technique  - Identify and instruct in appropriate isolation precautions for identified infection/condition  Outcome: Progressing  Goal: Absence of fever/infection during neutropenic period  Description: INTERVENTIONS:  - Monitor WBC    Outcome: Progressing     Problem: SAFETY ADULT  Goal: Maintain or return to baseline ADL function  Description: INTERVENTIONS:  -  Assess patient's ability to carry out ADLs; assess patient's baseline for ADL function and identify physical deficits which impact ability to perform ADLs (bathing, care of mouth/teeth, toileting, grooming, dressing, etc )  - Assess/evaluate cause of self-care deficits   - Assess range of motion  - Assess patient's mobility; develop plan if impaired  - Assess patient's need for assistive devices and provide as appropriate  - Encourage maximum independence but intervene and supervise when necessary  - Involve family in performance of ADLs  - Assess for home care needs following discharge   - Consider OT consult to assist with ADL evaluation and planning for discharge  - Provide patient education as appropriate  Outcome: Progressing  Goal: Maintains/Returns to pre admission functional level  Description: INTERVENTIONS:  - Perform BMAT or MOVE assessment daily    - Set and communicate daily mobility goal to care team and patient/family/caregiver  - Collaborate with rehabilitation services on mobility goals if consulted  - Perform Range of Motion  times a day  - Reposition patient every  hours    - Dangle patient  times a day  - Stand patient  times a day  - Ambulate patient  times a day  - Out of bed to chair  times a day   - Out of bed for meals  times a day  - Out of bed for toileting  - Record patient progress and toleration of activity level   Outcome: Progressing  Goal: Patient will remain free of falls  Description: INTERVENTIONS:  - Educate patient/family on patient safety including physical limitations  - Instruct patient to call for assistance with activity   - Consult OT/PT to assist with strengthening/mobility   - Keep Call bell within reach  - Keep bed low and locked with side rails adjusted as appropriate  - Keep care items and personal belongings within reach  - Initiate and maintain comfort rounds  - Make Fall Risk Sign visible to staff  - Offer Toileting every  Hours, in advance of need  - Initiate/Maintain alarm  - Obtain necessary fall risk management equipment:   - Apply yellow socks and bracelet for high fall risk patients  - Consider moving patient to room near nurses station  Outcome: Progressing     Problem: DISCHARGE PLANNING  Goal: Discharge to home or other facility with appropriate resources  Description: INTERVENTIONS:  - Identify barriers to discharge w/patient and caregiver  - Arrange for needed discharge resources and transportation as appropriate  - Identify discharge learning needs (meds, wound care, etc )  - Arrange for interpretive services to assist at discharge as needed  - Refer to Case Management Department for coordinating discharge planning if the patient needs post-hospital services based on physician/advanced practitioner order or complex needs related to functional status, cognitive ability, or social support system  Outcome: Progressing Problem: Knowledge Deficit  Goal: Patient/family/caregiver demonstrates understanding of disease process, treatment plan, medications, and discharge instructions  Description: Complete learning assessment and assess knowledge base    Interventions:  - Provide teaching at level of understanding  - Provide teaching via preferred learning methods  Outcome: Progressing     Problem: NEUROSENSORY - ADULT  Goal: Achieves stable or improved neurological status  Description: INTERVENTIONS  - Monitor and report changes in neurological status  - Monitor vital signs such as temperature, blood pressure, glucose, and any other labs ordered   - Initiate measures to prevent increased intracranial pressure  - Monitor for seizure activity and implement precautions if appropriate      Outcome: Progressing     Problem: CARDIOVASCULAR - ADULT  Goal: Maintains optimal cardiac output and hemodynamic stability  Description: INTERVENTIONS:  - Monitor I/O, vital signs and rhythm  - Monitor for S/S and trends of decreased cardiac output  - Administer and titrate ordered vasoactive medications to optimize hemodynamic stability  - Assess quality of pulses, skin color and temperature  - Assess for signs of decreased coronary artery perfusion  - Instruct patient to report change in severity of symptoms  Outcome: Progressing     Problem: GENITOURINARY - ADULT  Goal: Maintains or returns to baseline urinary function  Description: INTERVENTIONS:  - Assess urinary function  - Encourage oral fluids to ensure adequate hydration if ordered  - Administer IV fluids as ordered to ensure adequate hydration  - Administer ordered medications as needed  - Offer frequent toileting  - Follow urinary retention protocol if ordered  Outcome: Progressing     Problem: SKIN/TISSUE INTEGRITY - ADULT  Goal: Skin Integrity remains intact(Skin Breakdown Prevention)  Description: Assess:  -Perform Jamal assessment every   -Clean and moisturize skin every   -Inspect skin when repositioning, toileting, and assisting with ADLS  -Assess under medical devices such as  every   -Assess extremities for adequate circulation and sensation     Bed Management:  -Have minimal linens on bed & keep smooth, unwrinkled  -Change linens as needed when moist or perspiring  -Avoid sitting or lying in one position for more than  hours while in bed  -Keep HOB at degrees     Toileting:  -Offer bedside commode  -Assess for incontinence every   -Use incontinent care products after each incontinent episode such as     Activity:  -Mobilize patient  times a day  -Encourage activity and walks on unit  -Encourage or provide ROM exercises   -Turn and reposition patient every  Hours  -Use appropriate equipment to lift or move patient in bed  -Instruct/ Assist with weight shifting every  when out of bed in chair  -Consider limitation of chair time  hour intervals    Skin Care:  -Avoid use of baby powder, tape, friction and shearing, hot water or constrictive clothing  -Relieve pressure over bony prominences using   -Do not massage red bony areas    Next Steps:  -Teach patient strategies to minimize risks such as    -Consider consults to  interdisciplinary teams such as   Outcome: Progressing

## 2023-06-06 NOTE — CASE MANAGEMENT
Case Management Discharge Planning Note    Patient name Marrie Kocher  Location [facility] [address] [id number] MRN [id number]  : 1945 Date 2023       Current Admission Date: 2023  Current Admission Diagnosis:Positive blood culture   Patient Active Problem List    Diagnosis Date Noted   • Positive blood culture 2023   • Pneumonia of left lower lobe due to infectious organism 2023   • Insulin pump in place 2023   • Hypoglycemia    • Left lower lobe pneumonia    • Ambulatory dysfunction 10/18/2022   • SIRS (systemic inflammatory response syndrome) ([facility] ) 10/18/2022   • Intracranial vascular stenosis 10/18/2022   • Hyponatremia 10/18/2022   • Hepatomegaly 10/18/2022   • Cholelithiasis 10/18/2022   • Aspiration pneumonitis ([facility] ) 10/18/2022   • Diabetic retinopathy ([place] [address] ) 2022   • Subluxation of lens, right eye 2022   • Abnormal gait 2022   • Spastic hemiplegia affecting left nondominant side 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 ) 2018   • Adenocarcinoma of prostate ([facility] ) 2017   • Sensorineural hearing loss (SNHL), bilateral 2017   • Syncope 02/15/2016   • Essential hypertension 2012      LOS (days): 1  Geometric Mean LOS (GMLOS) (days): 2 60  Days to GMLOS:1 4     OBJECTIVE:  Risk of Unplanned Readmission Score: 24 51         Current admission status: Inpatient   Preferred Pharmacy:   [id number]- [address] [address]  Phone: [phone] Fax: [phone] [facility] [address]  Phone: [phone] Fax: [phone]    [facility] [address] [address]  Phone: [phone] Fax: [phone]    Primary Care Provider: Rishabh Reilly DO    Primary Insurance: [facility]  Secondary Insurance: Jabier Burris [address] REP    [address] discharging home  Nurse to review AVS, all follow up providers listed  Pt following with leeann o/p therapy

## 2023-06-06 NOTE — DISCHARGE SUMMARY
"[address] [facility] [address]  Discharge- Edison Thomas 1945, 78 y o  male MRN: [id number]  Unit/Bed#: 516-42 Encounter: [id number]  Primary Care Provider: Rishabh Reilly DO   Date and time admitted to hospital: 6/4/2023  7:27 AM    * Positive blood culture  Assessment & Plan  Blood culture likely represents contaminant from skin silvia, monitor as outpatient for any signs of recurrent infection, as was discussed in detail with the patient's wife at bedside  Repeat blood cultures with no growth to date  Hyponatremia  Assessment & Plan  • Recommend outpatient follow-up with nephrology      Medical Problems     Resolved Problems  Date Reviewed: 6/6/2023          Resolved    Elevated TSH 6/5/2023     Resolved by  Norah Madera DO        Discharging Physician / Practitioner: Norah Madera DO  PCP: Rishabh Reilly DO  Admission Date:   Admission Orders (From admission, onward)     Ordered        06/05/23 0927  Inpatient Admission  Once            06/04/23 0941  Place in Observation  Once                      Discharge Date: 06/06/23    Reason for Admission: Abnormal/positive blood culture    Hospital Course: Edison Thomas is a 78 y o  male patient who originally presented to the hospital on 6/4/2023 due to abnormal/positive blood cultures from recent hospital stay  Patient had no systemic signs of illness, patient was treated with IV vancomycin pending final blood culture, blood culture results likely represent contaminant from skin silvia  Please see above list of diagnoses and related plan for additional information       Condition at Discharge: good    Discharge Day Visit / Exam:   Subjective: No complaints, feeling well  Vitals: Blood Pressure: 120/72 (06/06/23 0727)  Pulse: 67 (06/06/23 0727)  Temperature: 97 9 °F (36 6 °C) (06/06/23 0727)  Temp Source: Oral (06/05/23 2112)  Respirations: 14 (06/06/23 0727)  Height: 5' 3\" (160 cm) (06/05/23 1400)  Weight - Scale: 66 6 " kg (146 lb 13 2 oz) (06/05/23 1400)  SpO2: 96 % (06/06/23 0727)  Exam:   Physical Exam  Vitals and nursing note reviewed  HENT:      Head: Normocephalic and atraumatic  Right Ear: External ear normal       Left Ear: External ear normal    Cardiovascular:      Rate and Rhythm: Normal rate  Pulses: Normal pulses  Pulmonary:      Effort: Pulmonary effort is normal    Musculoskeletal:      Cervical back: Normal range of motion  Neurological:      Mental Status: He is alert  Mental status is at baseline  Psychiatric:         Mood and Affect: Mood normal          Behavior: Behavior normal          Thought Content: Thought content normal          Judgment: Judgment normal           Discussion with Family: Updated  (wife) at bedside  Discharge instructions/Information to patient and family:   See after visit summary for information provided to patient and family  Provisions for Follow-Up Care:  See after visit summary for information related to follow-up care and any pertinent home health orders  Disposition:   Home    Planned Readmission: no     Discharge Statement:  I spent 35 minutes discharging the patient  This time was spent on the day of discharge  I had direct contact with the patient on the day of discharge  Greater than 50% of the total time was spent examining patient, answering all patient questions, arranging and discussing plan of care with patient as well as directly providing post-discharge instructions  Additional time then spent on discharge activities  Discharge Medications:  See after visit summary for reconciled discharge medications provided to patient and/or family        **Please Note: This note may have been constructed using a voice recognition system**

## 2023-06-06 NOTE — PROGRESS NOTES
Juan Mccauley is a 78 y o  male who is currently ordered Vancomycin IV with management by the Pharmacy Consult service  Relevant clinical data and objective / subjective history reviewed  Vancomycin Assessment:  Indication and Goal AUC/Trough: Bacteremia (goal -600, trough >10), -600, trough >10  Clinical Status: stable  Micro:     Renal Function:  SCr: 1 37 mg/dL  CrCl: 37 5 mL/min  Renal replacement: Not on dialysis  Days of Therapy: 3  Current Dose: 1000mg q24h  Vancomycin Plan:  New Dosing: same  Estimated AUC: 461 mcg*hr/mL  Estimated Trough: 14 3 mcg/mL  Next Level: 0600 on 6/10/23  Renal Function Monitoring: Daily BMP and [place] will continue to follow closely for s/sx of nephrotoxicity, infusion reactions and appropriateness of therapy  BMP and CBC will be ordered per protocol  We will continue to follow the patient’s culture results and clinical progress daily      Harjeet Escobedo, Pharmacist

## 2023-06-07 ENCOUNTER — OFFICE VISIT (OUTPATIENT)
Dept: PHYSICAL THERAPY | Facility: HOME HEALTHCARE | Age: 78
End: 2023-06-07
Payer: COMMERCIAL

## 2023-06-07 DIAGNOSIS — R26.9 ABNORMAL GAIT: Primary | ICD-10-CM

## 2023-06-07 PROCEDURE — 97112 NEUROMUSCULAR REEDUCATION: CPT

## 2023-06-07 PROCEDURE — 97110 THERAPEUTIC EXERCISES: CPT

## 2023-06-07 PROCEDURE — 97140 MANUAL THERAPY 1/> REGIONS: CPT

## 2023-06-07 NOTE — UTILIZATION REVIEW
NOTIFICATION OF ADMISSION DISCHARGE   This is a Notification of Discharge from [facility]  Please be advised that this patient has been discharge from our facility  Below you will find the admission and discharge date and time including the patient’s disposition  UTILIZATION REVIEW CONTACT:  Anjel Norris  Utilization   Network Utilization Review Department  Phone: [phone] x carefully listen to the prompts  All voicemails are confidential   Email: [email or web address]     ADMISSION INFORMATION  PRESENTATION DATE: 6/4/2023  7:27 AM  OBERVATION ADMISSION DATE:   INPATIENT ADMISSION DATE: 6/5/23  9:27 AM   DISCHARGE DATE: 6/6/2023  2:33 PM   DISPOSITION:Home/Self Care    IMPORTANT INFORMATION:  Send all requests for admission clinical reviews, approved or denied determinations and any other requests to dedicated fax number below belonging to the campus where the patient is receiving treatment   List of dedicated fax numbers:  [address] DENIALS (Administrative/Medical Necessity) [phone]   [address] (Maternity/NICU/Pediatrics) [phone]   [facility] [phone]   [facility] 87 [phone]   [address] [phone]   [address] [facility] [phone]   90 [facility] [phone]   [address] [place], [place] [address] [phone]   [facility]  [phone] 405[facility] [phone]   412 [facility] [address] [facility] [phone]

## 2023-06-07 NOTE — PROGRESS NOTES
This encounter was created in error - please disregard  Eucrisa Counseling: Patient may experience a mild burning sensation during topical application. Eucrisa is not approved in children less than 2 years of age.

## 2023-06-07 NOTE — PROGRESS NOTES
"Daily Note     Today's date: 2023  Patient name: Jennifer Marie  : 1945  MRN: [id number]  Referring provider: Nilda Laguerre MD  Dx:   Encounter Diagnosis     ICD-10-CM    1  Abnormal gait  R26 9           Start Time: 1130  Stop Time: 1215  Total time in clinic (min): 45 minutes    Subjective: Pt reports that he was in the hospital for 3 days and wasn't really allowed to get out of bed  Objective: See treatment diary below      Assessment: Pt demonstrated good tolerance to treatment session and was able to progress with strengthening and balance exercises  The pt does continue to have difficulty with active L hip flexion and knee extension due to muscular weakness  The pt would benefit from continued PT  Plan: Continue per plan of care        Precautions: PMH of CVA        Manuals          Karl hs/glute stretch NV 8'                                          Neuro Re-Ed           Romberg balance 1x30\" EO, 1x30\" EC 1x60\" EO         Tandem balance EO: 1x10\" karl 1x60\" R/L EO         Cone taps           Biodex: LOS           Biodex: Maze           Biodex: catch game                                 Ther Ex           Nustep  L2 10' seat 7 HR 80 bpm  SpO2 98%         Standing hip flex/ext/abd  1x20 ea dir         Squats  2x10         HR/TR           Bridges 1x5 2x10         LAQ  x20 karl         HS curls           Seated marches  x20 karl         Hip add machine NV 45# 3x10         Hip abduction machine NV 50# 3x10         Leg press NV 70# 3x10         LF knee ext           Ther Activity           Step up           Step down           Gait Training           Ambulation with SPC-laps around clinic                      Modalities                                      "

## 2023-06-09 LAB
BACTERIA BLD CULT: NORMAL
BACTERIA BLD CULT: NORMAL

## 2023-06-12 ENCOUNTER — OFFICE VISIT (OUTPATIENT)
Dept: PHYSICAL THERAPY | Facility: HOME HEALTHCARE | Age: 78
End: 2023-06-12
Payer: COMMERCIAL

## 2023-06-12 DIAGNOSIS — R26.9 ABNORMAL GAIT: Primary | ICD-10-CM

## 2023-06-12 PROCEDURE — 97110 THERAPEUTIC EXERCISES: CPT

## 2023-06-12 PROCEDURE — 97140 MANUAL THERAPY 1/> REGIONS: CPT

## 2023-06-12 PROCEDURE — 97112 NEUROMUSCULAR REEDUCATION: CPT

## 2023-06-12 NOTE — PROGRESS NOTES
"Daily Note     Today's date: 2023  Patient name: Ar Munguia  : 1945  MRN: [id number]  Referring provider: Sebastián Nick MD  Dx: No diagnosis found  Start Time: 1300          Subjective: I am ok  No new c/o  Objective: See treatment diary below    Assessment: Pt hemal session well and without c/o pain  Pt declined a few ex 2* mix up in time scheduled  Pt requires strap for feet on NuStep to avoid foot from slipping  Pt requires assistance of L LE to raise foot onto ex equipment and onto mat table  Patient would benefit from continued PT    Plan: Continue per plan of care        Precautions: PMH of CVA        Manuals  6-12        Farzana hs/glute stretch NV 8' 8'                                         Neuro Re-Ed           Romberg balance 1x30\" EO, 1x30\" EC 1x60\" EO Pt declined        Tandem balance EO: 1x10\" farzana 1x60\" R/L EO Pt declined        Cone taps           Biodex: LOS           Biodex: Maze           Biodex: catch game                                 Ther Ex   -12        Nustep  L2 10' seat 7 HR 80 bpm  SpO2 98% L2 10'  Seat #7        Standing hip flex/ext/abd  1x20 ea dir 1x20 ea dir        Squats  2x10 2x10        HR/TR           Bridges 1x5 2x10 2x10        LAQ  x20 farzana x20 farzana          HS curls           Seated marches  x20 farzana x20 farzana        Hip add machine NV 45# 3x10 45#  3x10        Hip abduction machine NV 50# 3x10 50#  3x10        Leg press NV 70# 3x10 Pt declined        LF knee ext           Ther Activity           Step up           Step down           Gait Training           Ambulation with SPC-laps around clinic                      Modalities                                      "

## 2023-06-14 ENCOUNTER — OFFICE VISIT (OUTPATIENT)
Dept: PHYSICAL THERAPY | Facility: HOME HEALTHCARE | Age: 78
End: 2023-06-14
Payer: COMMERCIAL

## 2023-06-14 DIAGNOSIS — R26.9 ABNORMAL GAIT: Primary | ICD-10-CM

## 2023-06-14 PROCEDURE — 97140 MANUAL THERAPY 1/> REGIONS: CPT

## 2023-06-14 PROCEDURE — 97112 NEUROMUSCULAR REEDUCATION: CPT

## 2023-06-14 PROCEDURE — 97110 THERAPEUTIC EXERCISES: CPT

## 2023-06-14 NOTE — PROGRESS NOTES
"Daily Note     Today's date: 2023  Patient name: Cas Feliz  : 1945  MRN: [id number]  Referring provider: Shai Lazo MD  Dx:   Encounter Diagnosis     ICD-10-CM    1  Abnormal gait  R26 9           Start Time: 1215          Subjective: I am doing well  No c/o from last session  Objective: See treatment diary below    Assessment: Pt hemal session very well and without c/o pain  Pt requires assistance for placement of L foot onto equipment and at mat table  Pt remains with B HS tightness  Patient would benefit from continued PT    Plan: Continue per plan of care        Precautions: PMH of CVA        Manuals  6       Farzana hs/glute stretch NV 8' 8' 8'                                        Neuro Re-Ed          Romberg balance 1x30\" EO, 1x30\" EC 1x60\" EO Pt declined 1x30\"  EO       Tandem balance EO: 1x10\" farzana 1x60\" R/L EO Pt declined 1x30\"   R/L  EO       Cone taps           Biodex: LOS           Biodex: Maze           Biodex: catch game                                 Ther Ex          Nustep      Sp02  HR  L2 10' seat 7 HR 80 bpm  SpO2 98% L2 10'  Seat #7 L2  10'  Seat #7  98%  83 bpm       Standing hip flex/ext/abd  1x20 ea dir 1x20 ea dir 1x20  ea dir       Squats  2x10 2x10 2x10       HR/TR           Bridges 1x5 2x10 2x10 2x10       LAQ  x20 farzana x20 farzana   held       HS curls           Seated marches  x20 farzana x20 farzana held       Hip add machine NV 45# 3x10 45#  3x10 45#  3x10       Hip abduction machine NV 50# 3x10 50#  3x10 50#   3x10       Leg press NV 70# 3x10 Pt declined 50#  3x10       LF knee ext           Ther Activity           Step up           Step down           Gait Training           Ambulation with SPC-laps around clinic                      Modalities                                      "

## 2023-06-16 ENCOUNTER — OFFICE VISIT (OUTPATIENT)
Dept: FAMILY MEDICINE CLINIC | Facility: CLINIC | Age: 78
End: 2023-06-16
Payer: COMMERCIAL

## 2023-06-16 VITALS
HEIGHT: 63 IN | RESPIRATION RATE: 18 BRPM | TEMPERATURE: 98 F | DIASTOLIC BLOOD PRESSURE: 76 MMHG | SYSTOLIC BLOOD PRESSURE: 130 MMHG | HEART RATE: 72 BPM | WEIGHT: 150 LBS | BODY MASS INDEX: 26.58 KG/M2

## 2023-06-16 DIAGNOSIS — E11.40 TYPE 2 DIABETES MELLITUS WITH DIABETIC NEUROPATHY, WITHOUT LONG-TERM CURRENT USE OF INSULIN (HCC): ICD-10-CM

## 2023-06-16 DIAGNOSIS — E87.1 HYPONATREMIA: Primary | ICD-10-CM

## 2023-06-16 DIAGNOSIS — R26.9 ABNORMAL GAIT: ICD-10-CM

## 2023-06-16 LAB
BACTERIA BLD CULT: ABNORMAL
GRAM STN SPEC: ABNORMAL

## 2023-06-16 PROCEDURE — 99495 TRANSJ CARE MGMT MOD F2F 14D: CPT | Performed by: INTERNAL MEDICINE

## 2023-06-16 NOTE — PROGRESS NOTES
Name: Ching Dueñas      : 1945      MRN: [id number]  Encounter Provider: Johnnie Haines DO  Encounter Date: 2023   Encounter department: [address] [place] [address] Road     1  Hyponatremia  -     Basic metabolic panel; Future  Recheck labs within 2 weeks to fritz stable levels    2  Type 2 diabetes mellitus with diabetic neuropathy, without long-term current use of insulin (HCC)  BS stable since home and they monitor closely  Endocrine followup thru the [place]    3  Abnormal gait  Pt is going to PT and did have botox recently for foot contracture      BMI Counseling: Body mass index is 26 57 kg/m²  The BMI is above normal  Nutrition recommendations include increasing intake of lean protein  Exercise recommendations include exercising 3-5 times per week  Rationale for BMI follow-up plan is due to patient being overweight or obese  Subjective      HPI   Pt home after hospital initially for symptomatic hypoglycemia and possible aspiration pneumonia then readmit for positive blood culture which was then suspected contaminant BS stable since home He did resume PT and is at same level despite recent hospital No fever or chills NO low BS since home No n/v No cough or sob No change in bowels He has neuro followup and did have Botox for lower leg contracture sxs with temporary improvement  TCM Call     Date and time call was made  2023  2:50 PM    Hospital care reviewed  Records reviewed    Patient was hospitialized at  [address] [place] [address]    Date of Admission  23    Date of discharge  23    Diagnosis  positive blood cultures    Disposition  Home    Were the patients medications reviewed and updated  No    Current Symptoms  None    Weakness severity  Mild    Fatigue severity  Mild      TCM Call     Post hospital issues  None    Should patient be enrolled in anticoag monitoring? No    Scheduled for follow up?   Yes    Patients specialists  Nephrologist  P T    Did you obtain your prescribed medications  Yes    Do you need help managing your prescriptions or medications  No    Is transportation to your appointment needed  No    I have advised the patient to call PCP with any new or worsening symptoms  Jesus Simmons,     Living Arrangements  Spouse or Significiant other    Support System  Spouse    The type of support provided  Physical    Do you have social support  Yes, quite a bit    Are you recieving any outpatient services  No    Are you recieving home care services  No    Are you using any community resources  No    Current waiver services  No    Have you fallen in the last 12 months  No    Interperter language line needed  No    Counseling  Patient          Review of Systems   Constitutional: Negative for chills and fever  HENT: Negative  Eyes: Negative for visual disturbance  Respiratory: Negative for cough and shortness of breath  Cardiovascular: Negative for chest pain, palpitations and leg swelling  Gastrointestinal: Negative for abdominal distention and abdominal pain  Genitourinary: Negative  Musculoskeletal: Positive for gait problem  Neurological: Positive for weakness and numbness  Psychiatric/Behavioral: Negative for sleep disturbance  The patient is not nervous/anxious          Current Outpatient Medications on File Prior to Visit   Medication Sig   • amLODIPine (NORVASC) 5 mg tablet Take 1 tablet (5 mg total) by mouth daily   • atorvastatin (LIPITOR) 40 mg tablet Take 1 tablet (40 mg total) by mouth daily with dinner   • baclofen 10 mg tablet Take 1 tablet (10 mg total) by mouth 3 (three) times a day   • clopidogrel (PLAVIX) 75 mg tablet Take 1 tablet (75 mg total) by mouth daily   • docusate sodium (COLACE) 100 mg capsule Take 100 mg by mouth if needed   • Dysport 500 units SOLR    • gabapentin (NEURONTIN) 400 mg capsule Take 1 capsule (400 mg total) by mouth daily at bedtime   • Glucagon 1 MG/0 2ML SOSY    • levothyroxine "(Synthroid) 100 mcg tablet Take 1 tablet (100 mcg total) by mouth daily in the early morning   • lisinopril (ZESTRIL) 40 mg tablet Take 1 tablet (40 mg total) by mouth daily   • Multiple Vitamin (MULTIVITAMIN) tablet Take 1 tablet by mouth daily  • PATIENT MAINTAINED INSULIN PUMP Inject 0 4 each under the skin continuous        Objective     /76   Pulse 72   Temp 98 °F (36 7 °C) (Temporal)   Resp 18   Ht 5' 3\" (1 6 m)   Wt 68 kg (150 lb)   BMI 26 57 kg/m²     Physical Exam  Vitals reviewed  Constitutional:       General: He is not in acute distress  Appearance: Normal appearance  He is not ill-appearing, toxic-appearing or diaphoretic  HENT:      Head: Normocephalic and atraumatic  Right Ear: External ear normal       Left Ear: External ear normal       Nose: Nose normal       Mouth/Throat:      Mouth: Mucous membranes are moist    Eyes:      General: No scleral icterus  Extraocular Movements: Extraocular movements intact  Conjunctiva/sclera: Conjunctivae normal       Pupils: Pupils are equal, round, and reactive to light  Cardiovascular:      Rate and Rhythm: Normal rate and regular rhythm  Pulses: Normal pulses  Heart sounds: Normal heart sounds  No murmur heard  Pulmonary:      Effort: Pulmonary effort is normal  No respiratory distress  Breath sounds: Normal breath sounds  No wheezing  Abdominal:      General: Bowel sounds are normal  There is no distension  Palpations: Abdomen is soft  Tenderness: There is no abdominal tenderness  Musculoskeletal:      Cervical back: Normal range of motion and neck supple  No rigidity  Right lower leg: No edema  Left lower leg: No edema  Lymphadenopathy:      Cervical: No cervical adenopathy  Skin:     General: Skin is warm and dry  Coloration: Skin is not jaundiced or pale  Neurological:      General: No focal deficit present        Mental Status: He is alert and oriented to person, place, " and time  Mental status is at baseline  Cranial Nerves: No cranial nerve deficit  Sensory: No sensory deficit  Psychiatric:         Mood and Affect: Mood normal          Behavior: Behavior normal          Thought Content:  Thought content normal          Judgment: Judgment normal        Sparkle Borders, DO

## 2023-06-19 ENCOUNTER — OFFICE VISIT (OUTPATIENT)
Dept: PHYSICAL THERAPY | Facility: HOME HEALTHCARE | Age: 78
End: 2023-06-19
Payer: COMMERCIAL

## 2023-06-19 DIAGNOSIS — R26.9 ABNORMAL GAIT: Primary | ICD-10-CM

## 2023-06-19 PROCEDURE — 97140 MANUAL THERAPY 1/> REGIONS: CPT

## 2023-06-19 PROCEDURE — 97112 NEUROMUSCULAR REEDUCATION: CPT

## 2023-06-19 PROCEDURE — 97110 THERAPEUTIC EXERCISES: CPT

## 2023-06-19 NOTE — PROGRESS NOTES
"Daily Note     Today's date: 2023  Patient name: Sada Zurita  : 1945  MRN: [id number]  Referring provider: Jeri Hooks MD  Dx: No diagnosis found  Subjective: I feel the same  Objective: See treatment diary below    Assessment: Pt hemal session well with assistance of L LE and modifications with tx as needed  Pt denied pain t/o session and reports good challenge with current program  Pt remains with B LE weakness and B LE HS limitations noted  Pt continues to decline EC with balance activities  Pt would benefit from continued  Plan: Continue per plan of care        Precautions: PMH of CVA        Manuals       Farzana hs/glute stretch NV 8' 8' 8' 8'                                       Neuro Re-Ed         Romberg balance 1x30\" EO, 1x30\" EC 1x60\" EO Pt declined 1x30\"  EO 1x30\"  EO      Tandem balance EO: 1x10\" farzana 1x60\" R/L EO Pt declined 1x30\"   R/L  EO 1x30\"  R/L  EO      Cone taps           Biodex: LOS           Biodex: Maze           Biodex: catch game                                 Ther Ex         Nustep      Sp02  HR  L2 10' seat 7 HR 80 bpm  SpO2 98% L2 10'  Seat #7 L2  10'  Seat #7  98%  83 bpm L2    10'   Seat # 7  98%  78 bpm      Standing hip flex/ext/abd  1x20 ea dir 1x20 ea dir 1x20  ea dir 1x20   ea dir      Squats  2x10 2x10 2x10 2x10      HR/TR           Bridges 1x5 2x10 2x10 2x10 2x10      LAQ  x20 farzana x20 farzana   held       HS curls     2x10      Seated marches  x20 farzana x20 farzana held       Hip add machine NV 45# 3x10 45#  3x10 45#  3x10 30# 2x10  45# 1x10      Hip abduction machine NV 50# 3x10 50#  3x10 50#   3x10 50#  3x10      Leg press NV 70# 3x10 Pt declined 50#  3x10 50#  3x10      LF knee ext           Ther Activity           Step up           Step down           Gait Training           Ambulation with SPC-laps around clinic                      Modalities                                      "

## 2023-06-21 ENCOUNTER — OFFICE VISIT (OUTPATIENT)
Dept: PHYSICAL THERAPY | Facility: HOME HEALTHCARE | Age: 78
End: 2023-06-21
Payer: COMMERCIAL

## 2023-06-21 DIAGNOSIS — R26.9 ABNORMAL GAIT: Primary | ICD-10-CM

## 2023-06-21 PROCEDURE — 97140 MANUAL THERAPY 1/> REGIONS: CPT

## 2023-06-21 PROCEDURE — 97110 THERAPEUTIC EXERCISES: CPT

## 2023-06-21 PROCEDURE — 97112 NEUROMUSCULAR REEDUCATION: CPT

## 2023-06-21 NOTE — PROGRESS NOTES
"Daily Note     Today's date: 2023  Patient name: Marisabel Alvarez  : 1945  MRN: [id number]  Referring provider: Dirk Sanabria MD  Dx:   Encounter Diagnosis     ICD-10-CM    1  Abnormal gait  R26 9           Start Time: 1045  Stop Time: 1130  Total time in clinic (min): 45 minutes    Subjective: Pt has no complaints from the previous session  Objective: See treatment diary below      Assessment: Pt demonstrated good tolerance to treatment session and was able to progress with strengthening exercises  The pt does continue to have bilateral hamstring tightness, with the L being tighter than the R, but improved with passive stretching  The pt would benefit from continued PT  Plan: Continue per plan of care        Precautions: PMH of CVA        Manuals      Karl hs/glute stretch NV 8' 8' 8' 8' 8'                                      Neuro Re-Ed         Romberg balance 1x30\" EO, 1x30\" EC 1x60\" EO Pt declined 1x30\"  EO 1x30\"  EO 1x30\"  EO     Tandem balance EO: 1x10\" karl 1x60\" R/L EO Pt declined 1x30\"   R/L  EO 1x30\"  R/L  EO 1x30\"  R/L  EO     Cone taps           Biodex: LOS           Biodex: Maze           Biodex: catch game                                 Ther Ex         Nustep      Sp02  HR  L2 10' seat 7 HR 80 bpm  SpO2 98% L2 10'  Seat #7 L2  10'  Seat #7  98%  83 bpm L2    10'   Seat # 7  98%  78 bpm L2    10'   Seat # 7  97%  79 bpm     Standing hip flex/ext/abd  1x20 ea dir 1x20 ea dir 1x20  ea dir 1x20   ea dir 1x20 ea dir     Squats  2x10 2x10 2x10 2x10 2x10     HR/TR           Bridges 1x5 2x10 2x10 2x10 2x10 2x10     LAQ  x20 karl x20 karl   held  x20 karl     HS curls     2x10 2x10     Seated marches  x20 karl x20 karl held  x20 karl     Hip add machine NV 45# 3x10 45#  3x10 45#  3x10 30# 2x10  45# 1x10 50# 3x10     Hip abduction machine NV 50# 3x10 50#  3x10 50#   3x10 50#  3x10 50# 3x10     Leg press NV 70# 3x10 Pt declined " 50#  3x10 50#  3x10 70# 3x10     LF knee ext           Ther Activity           Step up           Step down           Gait Training           Ambulation with SPC-laps around clinic                      Modalities

## 2023-06-26 ENCOUNTER — OFFICE VISIT (OUTPATIENT)
Dept: PHYSICAL THERAPY | Facility: HOME HEALTHCARE | Age: 78
End: 2023-06-26
Payer: COMMERCIAL

## 2023-06-26 DIAGNOSIS — R26.9 ABNORMAL GAIT: Primary | ICD-10-CM

## 2023-06-26 PROCEDURE — 97112 NEUROMUSCULAR REEDUCATION: CPT

## 2023-06-26 PROCEDURE — 97110 THERAPEUTIC EXERCISES: CPT

## 2023-06-26 PROCEDURE — 97140 MANUAL THERAPY 1/> REGIONS: CPT

## 2023-06-26 NOTE — PROGRESS NOTES
"Daily Note     Today's date: 2023  Patient name: Kaia Young  : 1945  MRN: [id number]  Referring provider: Darius Arauz MD  Dx: No diagnosis found  Start Time:           Subjective: I don't notice any difference  Objective: See treatment diary below    Assessment: Pt with good hemal to session  Pt was will to advance to EO during Romberg stance  Pt continues to require straps on NuStep to avoid foot dropping from peddle and assistance for foot placement on machines  Pt also requires assistance to avoid foot from slipping from equipment during ex  Pt remains with weakness evident t/o Tx  Pt denied pain and reports feeling well at end  Patient would benefit from continued PT    Plan: Continue per plan of care        Precautions: PMH of CVA        Manuals     Farzana hs/glute stretch NV 8' 8' 8' 8' 8' 8'                                     Neuro Re-Ed       Romberg balance 1x30\" EO, 1x30\" EC 1x60\" EO Pt declined 1x30\"  EO 1x30\"  EO 1x30\"  EO 1x30\" EO  1x20\"   EC    Tandem balance EO: 1x10\" farzana 1x60\" R/L EO Pt declined 1x30\"   R/L  EO 1x30\"  R/L  EO 1x30\"  R/L  EO 1x30\"  R/L  EO    Cone taps           Biodex: LOS           Biodex: Maze           Biodex: catch game                                 Ther Ex       Nustep      Sp02  HR  L2 10' seat 7 HR 80 bpm  SpO2 98% L2 10'  Seat #7 L2  10'  Seat #7  98%  83 bpm L2    10'   Seat # 7  98%  78 bpm L2    10'   Seat # 7  97%  79 bpm L2 10'  Seat#7      Standing hip flex/ext/abd  1x20 ea dir 1x20 ea dir 1x20  ea dir 1x20   ea dir 1x20 ea dir 1x20 ea farzana    Squats  2x10 2x10 2x10 2x10 2x10 2x10    HR/TR           Bridges 1x5 2x10 2x10 2x10 2x10 2x10 2x10    LAQ  x20 farzana x20 farzana   held  x20 farzana x20 farzana    HS curls     2x10 2x10 2x10    Seated marches  x20 farzana x20 farzana held  x20 farzana x20 farzana    Hip add machine NV 45# 3x10 45#  3x10 45#  3x10 30# 2x10  45# 1x10 50# 3x10 50#  3x10  " Hip abduction machine NV 50# 3x10 50#  3x10 50#   3x10 50#  3x10 50# 3x10     Leg press NV 70# 3x10 Pt declined 50#  3x10 50#  3x10 70# 3x10 70#  3x10    LF knee ext           Ther Activity           Step up           Step down           Gait Training           Ambulation with SPC-laps around clinic                      Modalities

## 2023-06-27 ENCOUNTER — OFFICE VISIT (OUTPATIENT)
Dept: PHYSICAL THERAPY | Facility: HOME HEALTHCARE | Age: 78
End: 2023-06-27
Payer: COMMERCIAL

## 2023-06-27 DIAGNOSIS — R26.9 ABNORMAL GAIT: Primary | ICD-10-CM

## 2023-06-27 PROCEDURE — 97140 MANUAL THERAPY 1/> REGIONS: CPT

## 2023-06-27 PROCEDURE — 97112 NEUROMUSCULAR REEDUCATION: CPT

## 2023-06-27 PROCEDURE — 97110 THERAPEUTIC EXERCISES: CPT

## 2023-06-27 NOTE — PROGRESS NOTES
PT Discharge    Today's date: 2023  Patient name: Greg Herrera  : 1945  MRN: [id number]  Referring provider: Noris Mustafa MD  Dx:   Encounter Diagnosis     ICD-10-CM    1  Abnormal gait  R26 9           Start Time: 945  Stop Time:   Total time in clinic (min): 40 minutes    Assessment  Assessment details: Pt was evaluated on 2023 and attended a total of 8 PT visits, with his most recent PT session being on 2023   Pt then informed the PT that his house has sold and that he will be moving away  The pt requests to be D/C from PT at this time  Goals  STG: 3-4 weeks  Pt will be independent with HEP in order to continue to progress outside of PT treatment sessions  Pt will improve in functional mobility as demonstrated by L passive SLR of 60 deg or greater  Pt will improve in functional mobility and balance as demonstrated by TUG time of 17s or less  LT-8 weeks  Pt will improve in functional mobility as demonstrated by 1/2 point or greater improvement in LE strength  Pt will improve in functional mobility and balance as demonstrated by TUG time of 13s or less  Pt will improve in functional mobility and gait quality as demonstrated 3MWT of 375 feet or greater  Pt will improve in functional mobility and fall risk as demonstrated by no reported falls  Plan  Plan details: D/C to HEP  Treatment plan discussed with: patient        Subjective Evaluation    History of Present Illness  Mechanism of injury: Pt is returning to [facility] s/p CVA for overall gait, mobility, and strengthening  Pt reports that he did receive his first round of Botox injections and received his first one in his foot for his toe flexor reflex  Pt reports that he still has difficulty with overall mobility and when doing marches  Pt reports that he has difficulty pushing with his LLE when he is on the leg press machine due to his foot sliding off of the platform    Pt reports that he would like to continue with physical therapy in order to try to keep working on lifting his leg  Pt reports that the injection was to help with the curling of his toes because it hurts a lot when walking  Pt is currently independent for ambulation using an AFO and SPC, but reports that he does have the tendency to sometimes get up without using the cane  Pt reports that he would really like to work on strengthening his glute muscles  Treatments  Current treatment: injection treatment  Current treatment comments: Botox injections  Patient Goals  Patient goals for therapy: improved balance, increased motion, increased strength and independence with ADLs/IADLs  Patient goal: To get glute muscles stronger, get his leg back        Objective     Passive Range of Motion   Left Knee   Flexion: 115 degrees   Extension: WFL    Right Knee   Flexion: WFL  Extension: WFL    Strength/Myotome Testing     Left Hip   Planes of Motion   Flexion: 1  Abduction: 3  Adduction: 4    Right Hip   Planes of Motion   Flexion: 4+  Abduction: 4+  Adduction: 5    Left Knee   Flexion: 3+  Extension: 4    Right Knee   Flexion: 5  Extension: 5    Left Ankle/Foot   Dorsiflexion: 0  Plantar flexion: 1    Right Ankle/Foot   Dorsiflexion: 5  Plantar flexion: 5    Tests     Left Hip   90/90 SLR: Positive  SLR: Positive  Additional Tests Details  SLR: L: 50, R: 65  90/90: L: 40, R: 20    Ambulation     Ambulation: Level Surfaces   Ambulation with assistive device: independent    Additional Level Surfaces Ambulation Details  SPC    Ambulation: Stairs   Ascend stairs: contact guard assist  Pattern: non-reciprocal  Railings: one rail  Descend stairs: contact guard assist  Pattern: non-reciprocal  Railings: one rail  Curbs: independent    Observational Gait   Decreased walking speed and stride length  Additional Observational Gait Details  L hemiparetic gait    Quality of Movement During Gait     Hip    Hip (Left): Positive circumducted  Ankle    Ankle (Left): Positive foot drop       Functional Assessment        Comments  TU 3s  Romberg: EO: 30s  EC: 30s  Tandem: EO: L: 3s R: 30s EC: L: <1s, R: 9s  3MWT: approx 325ft or 3 25 laps in clinic              Precautions: PMH of CVA

## 2023-06-27 NOTE — PROGRESS NOTES
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 [facility])      Mr Madison Fuentes is a very pleasant 68yo M with medical history of R anterior medullary infarct in 2021 with subsequent L spastic hemiplegia/weakness  He is here for a midpoint visit after having dysport injection to his FDL for his toe flexor reflex  He did have some improvement in this with improvement in pain/discomfort, but it is already starting to wear off  HE is going to be moving to [place] next month and finding a provider there  - I would recommend that they increase the dose to his FDL, and consider also injecting his FDB if symptoms persist    - In the future, monitor lumbricals/elbow for tone as those seem increased today  - Last visit injected 125 units to his FDL       His gait is quite good, he does compensate for hip flexion weakness by using his adductors, but is able to clear  His knee is stiff with cocontraction resulting in difficulty flexing, but the external rotation helps shorten the limb for clearance too  His AFO is adequately correcting the tone in his L ankle  He has tone in his lumbricals, but this is not a functional concern or hygiene concern for him at this time  It is slightly increased today      Will give them my card so I can send his notes to his new provider      1  Oral antispasticity medications: Continue Baclofen 10mg TID  No need for refills today  2  Schedule 500-1000 units of dysport is recommended at next visit  3  Continue PT  4  They need a new script for his Plavix to give to the [place] to tide him over until he finds a provider in [place]  I reached out to his Neurologist Dr Lj Sheridan, who is fine with me writing out that script today     ?  *I have spent 30 minutes with Patient and family today in which greater than 50% of this time was spent in counseling/coordination of care regarding Risks and benefits of tx options, Instructions for management, Patient and family education, Importance of tx compliance, Impressions, Counseling / Coordination of care, Documenting in the medical record, Obtaining or reviewing history   and Communicating with other healthcare professionals   HPI/SUBJECTIVE:  Here with his wife today  Patient presents today in the office with chief complaint of toe curling  He is now starting to feel them starting to curl again  He felt he was able to move that leg better for a period of time  His strength in PT is stable  Now he feels like he is having trouble picking up his leg  Since I last saw him, he did have an episode of syncope, felt to be 2/2 hypoglycemia  He had an infectious work-up at the time with blood cultures growing what was felt to be contaminant  He is not having any other new localizing symptoms of infection  No new CP, SOB, fevers, chills, N/V, abdominal pain  Last seen for chemodenervation: 5/24/2023  Results of chemodenervation: He did have improvement in his toe curling  How long did effects last: Already feeling like his toes are starting to curl again  Side affect/Adverse Effects:  None    Any issues with driving, swallowing, appetite: Doesn't drive, no issues with swallowing or appetite  Stretching/Exercise Program: Active and back in PT now  Currently in therapy: Back in PT  Any falls with significant injuries: He did have a syncopal episode 2/2 hypoglycemia  No traumatic injuries  Any new weakness: Nothing new  Any changes to care since last seen: No recent changes other than the tubing to his pump  Safe at home: Yes  Any oral meds: Baclofen 10mg TID   No need for refills at this time, gets this through the [place]  On anticoagulation/blood thinners: Plavix  Current functional status:   Independent with AFO and a cane  Independent with ADLs, some "assistance for AFO/footwear  Goes down the stairs backwards  Recently sold their home and plan is to move to [place] with their daughter in July  ROS: A 10 point review of systems was negative except for what is noted in the HPI  Imaging: I have personally reviewed imaging with results as follows:  6/1 CXR:  Left lower lobe pulmonary opacity  Clinical correlation for pneumonia  6/1 CTH:  No acute intracranial abnormality  6/1 CT C-Spine:  No cervical spine fracture or traumatic malalignment  OBJECTIVE:   /75 (BP Location: Right arm, Patient Position: Sitting)   Pulse 69   Temp 98 °F (36 7 °C) (Temporal)   Ht 5' 3\" (1 6 m)   Wt 66 6 kg (146 lb 12 8 oz)   BMI 26 00 kg/m²     Gen: No acute distress, Well-nourished, well-appearing  HEENT: Moist mucus membranes, Normocephalic/Atraumatic  Cardiovascular: Regular, Distal pulses palpable  Heme/Extr: No edema  Pulmonary: Non-labored breathing  : No clifton  GI: Soft, non-tender, non-distended  MSK:  Limited elbow extension, wrist extension, tight lumbricals  Has full elbow flexion  Weakness in finger flexors  Wearing L AFO  Integumentary: Skin is warm, dry  Psych: Normal mood and affect  Neuro: AAOx3, Speech is intact  Appropriate to questioning   Toe flexor reflex  MMT:   Strength:   Right  Left  Site  Right  Left  Site    5 4 S Ab: Shoulder Abductors  5  1  HF: Hip Flexors    5 4-  EF: Elbow Flexors  5  1 KF: Knee Flexors    5  4+  EE: Elbow Extensors  5  5  KE: Knee Extensors    5  3 WE: Wrist Extensors  5  0  DR: Dorsi Flexors    5  2  FF: Finger Flexors  5  1  PF: Plantar Flexors    5  2  HI: Hand Intrinsics  5  0  EHL: Extensor Hallucis Longus     MAS:  Right  Left  Site  Right  Left  Site    0 1  Shoulder 0 2 Hip Adductors   0 2  EF: Elbow Flexors  0 1 KF: Knee Flexors    0  1+  EE: Elbow Extensors  0  1  KE: Knee Extensors    0/0 1/0  WF/WE 0  1+  DR: Dorsi Flexors    0  0  FF: Finger Flexors  0  0  PF: Plantar Flexors    0  2*  HI: Hand " Intrinsics  0/0  1/0  Toe Flex/Ex   * lumbricals    MAS  0 - no increased tone  1 - slight increase in tone at the end of the ROM  1+ increase in tone at 1/2 the ROM  2 - increase in tone through most the ROM  3 - moderate increase in muscle tone - passive movement difficult  4 - affected parts ridid in flexion or extension    SPASMS observed:   RUE: no   LUE: no  RLE: no  LLE: no    The following portions of the patient's history were reviewed and updated as appropriate: allergies, current medications, past family history, past medical history, past social history, past surgical history and problem list       Current Outpatient Medications:   •  amLODIPine (NORVASC) 5 mg tablet, Take 1 tablet (5 mg total) by mouth daily, Disp: 90 tablet, Rfl: 3  •  atorvastatin (LIPITOR) 40 mg tablet, Take 1 tablet (40 mg total) by mouth daily with dinner, Disp: 30 tablet, Rfl: 0  •  baclofen 10 mg tablet, Take 1 tablet (10 mg total) by mouth 3 (three) times a day, Disp: 270 tablet, Rfl: 1  •  clopidogrel (PLAVIX) 75 mg tablet, Take 1 tablet (75 mg total) by mouth daily, Disp: 90 tablet, Rfl: 1  •  docusate sodium (COLACE) 100 mg capsule, Take 100 mg by mouth if needed, Disp: , Rfl:   •  Dysport 500 units SOLR, , Disp: , Rfl:   •  gabapentin (NEURONTIN) 400 mg capsule, Take 1 capsule (400 mg total) by mouth daily at bedtime, Disp: 90 capsule, Rfl: 1  •  Glucagon 1 MG/0 2ML SOSY, , Disp: , Rfl:   •  levothyroxine (Synthroid) 100 mcg tablet, Take 1 tablet (100 mcg total) by mouth daily in the early morning, Disp: 90 tablet, Rfl: 3  •  lisinopril (ZESTRIL) 40 mg tablet, Take 1 tablet (40 mg total) by mouth daily, Disp: 90 tablet, Rfl: 3  •  Multiple Vitamin (MULTIVITAMIN) tablet, Take 1 tablet by mouth daily  , Disp: , Rfl:   •  PATIENT MAINTAINED INSULIN PUMP, Inject 0 4 each under the skin continuous , Disp: , Rfl:     Past Surgical History:   Procedure Laterality Date   • CARDIAC ELECTROPHYSIOLOGY PROCEDURE Left 11/4/2021 Procedure: Reveal implant;  Surgeon: Charmaine Marcos MD;  Location:  CARDIAC CATH LAB;   Service: Cardiology   • CATARACT EXTRACTION Bilateral 2000   • EYE SURGERY     • JOINT REPLACEMENT     • KNEE ARTHROSCOPY Right 06/26/2009    knee   • AL LAPS SURG [id number] RPBIC RAD W/NRV SPARING ROBOT N/A 2/7/2018    Procedure: ROBOTIC ASSISTED LAPAROSCOPIC PROSTATECTOMY; BILATERAL PELVIC LYMPH NODE DISSECTION;  Surgeon: Adrian Johnson MD;  Location: [facility];  Service: Urology   • AL RPR 1ST Oliver Gladwin AGE 7 YRS/> REDUCIBLE Right 4/19/2019    Procedure: REPAIR HERNIA INGUINAL;  Surgeon: Deangelo Bautista DO;  Location: [facility];  Service: General   • PROSTATE BIOPSY  11/07/2017    needle biopsy of prostate   • TRIGGER FINGER RELEASE  05/2013    trigger thumb       Patient Active Problem List    Diagnosis Date Noted   • Positive blood culture 06/04/2023   • Pneumonia of left lower lobe due to infectious organism 06/01/2023   • Insulin pump in place 06/01/2023   • Hypoglycemia    • Left lower lobe pneumonia    • Ambulatory dysfunction 10/18/2022   • SIRS (systemic inflammatory response syndrome) ([facility] [address] ) 10/18/2022   • Intracranial vascular stenosis 10/18/2022   • Hyponatremia 10/18/2022   • Hepatomegaly 10/18/2022   • Cholelithiasis 10/18/2022   • Aspiration pneumonitis ([address] ) 10/18/2022   • Diabetic retinopathy ([facility] [address] ) 06/01/2022   • Subluxation of lens, right eye 06/01/2022   • Abnormal gait 02/16/2022   • Spastic hemiplegia affecting left nondominant side ([address] ) 02/16/2022   • 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) 09/29/2021   • Type 2 diabetes mellitus with diabetic neuropathy ([address] ) 05/28/2021   • History of stroke 03/03/2021   • Long term current use of insulin ([address] ) 10/01/2020   • Medicare annual wellness visit, subsequent 06/28/2018   • DMII (diabetes mellitus, type 2) ([address] ) 02/07/2018   • Hypothyroidism 02/07/2018   • Hyperlipidemia 02/07/2018   • Stage 3 chronic kidney disease ([address] ) 02/07/2018   • Adenocarcinoma of prostate ([facility] [address] ) 11/07/2017   • Sensorineural hearing loss (SNHL), bilateral 07/21/2017   • Syncope 02/15/2016   • Essential hypertension 06/26/2012

## 2023-06-27 NOTE — PROGRESS NOTES
"Daily Note     Today's date: 2023  Patient name: Gabriel Tejada  : 1945  MRN: [id number]  Referring provider: Mehul Sahu MD  Dx: No diagnosis found  Start Time: 945          Subjective: Today will be my last PT session because we sold our house and we are moving to [place] with my daughter  Objective: See treatment diary below    Assessment: Tolerated treatment well and without incident  Pt requests DC to [facility]       Plan: Pt DC to [facility]     Precautions: PMH of CVA        Manuals    Karl hs/glute stretch NV 8' 8' 8' 8' 8' 8' 8'                                    Neuro Re-Ed      Romberg balance 1x30\" EO, 1x30\" EC 1x60\" EO Pt declined 1x30\"  EO 1x30\"  EO 1x30\"  EO 1x30\" EO  1x20\"   EC NP   Tandem balance EO: 1x10\" karl 1x60\" R/L EO Pt declined 1x30\"   R/L  EO 1x30\"  R/L  EO 1x30\"  R/L  EO 1x30\"  R/L  EO NP   Cone taps           Biodex: LOS           Biodex: Maze           Biodex: catch game                                 Ther Ex      Nustep      Sp02  HR  L2 10' seat 7 HR 80 bpm  SpO2 98% L2 10'  Seat #7 L2  10'  Seat #7  98%  83 bpm L2    10'   Seat # 7  98%  78 bpm L2    10'   Seat # 7  97%  79 bpm L2 10'  Seat#7   L2  Seat #7      95%  82 bpm     Standing hip flex/ext/abd  1x20 ea dir 1x20 ea dir 1x20  ea dir 1x20   ea dir 1x20 ea dir 1x20 ea karl 1x20  Ea karl   Squats  2x10 2x10 2x10 2x10 2x10 2x10 2x10   HR/TR           Bridges 1x5 2x10 2x10 2x10 2x10 2x10 2x10 2x10   LAQ  x20 karl x20 karl   held  x20 karl x20 karl x20 karl   HS curls     2x10 2x10 2x10 2x10   Seated marches  x20 karl x20 karl held  x20 karl x20 karl x20 karl   Hip add machine NV 45# 3x10 45#  3x10 45#  3x10 30# 2x10  45# 1x10 50# 3x10 50#  3x10 Declined   Hip abduction machine NV 50# 3x10 50#  3x10 50#   3x10 50#  3x10 50# 3x10     Leg press NV 70# 3x10 Pt declined 50#  3x10 50#  3x10 70# 3x10 70#  3x10 70#  3x10   LF knee ext         " Ther Activity           Step up           Step down           Gait Training           Ambulation with SPC-laps around clinic                      Modalities

## 2023-06-28 ENCOUNTER — OFFICE VISIT (OUTPATIENT)
Dept: NEUROLOGY | Facility: CLINIC | Age: 78
End: 2023-06-28
Payer: COMMERCIAL

## 2023-06-28 ENCOUNTER — APPOINTMENT (OUTPATIENT)
Dept: PHYSICAL THERAPY | Facility: HOME HEALTHCARE | Age: 78
End: 2023-06-28
Payer: COMMERCIAL

## 2023-06-28 VITALS
WEIGHT: 146.8 LBS | BODY MASS INDEX: 26.01 KG/M2 | HEART RATE: 69 BPM | SYSTOLIC BLOOD PRESSURE: 157 MMHG | HEIGHT: 63 IN | TEMPERATURE: 98 F | DIASTOLIC BLOOD PRESSURE: 75 MMHG

## 2023-06-28 DIAGNOSIS — I63.9 CEREBROVASCULAR ACCIDENT (CVA), UNSPECIFIED MECHANISM (HCC): ICD-10-CM

## 2023-06-28 DIAGNOSIS — I69.354 SPASTIC HEMIPLEGIA OF LEFT NONDOMINANT SIDE AS LATE EFFECT OF CEREBRAL INFARCTION (HCC): Primary | ICD-10-CM

## 2023-06-28 PROCEDURE — 99214 OFFICE O/P EST MOD 30 MIN: CPT | Performed by: PHYSICAL MEDICINE & REHABILITATION

## 2023-06-28 RX ORDER — CLOPIDOGREL BISULFATE 75 MG/1
75 TABLET ORAL DAILY
Qty: 90 TABLET | Refills: 1 | Status: SHIPPED | OUTPATIENT
Start: 2023-06-28

## 2023-06-28 NOTE — PROGRESS NOTES
Review of Systems   Constitutional: Negative for appetite change, chills, fatigue and fever  HENT: Negative  Negative for ear pain, hearing loss, sore throat, tinnitus, trouble swallowing and voice change  Eyes: Negative  Negative for photophobia, pain and visual disturbance  Respiratory: Negative  Negative for cough and shortness of breath  Cardiovascular: Negative  Negative for chest pain and palpitations  Gastrointestinal: Negative  Negative for abdominal pain, nausea and vomiting  Endocrine: Negative  Negative for cold intolerance  Genitourinary: Negative  Negative for dysuria, frequency, hematuria and urgency  Musculoskeletal: Negative for arthralgias, back pain, gait problem, myalgias and neck pain  Skin: Negative  Negative for color change and rash  Allergic/Immunologic: Negative  Neurological: Negative  Negative for dizziness, tremors, seizures, syncope, facial asymmetry, speech difficulty, weakness, light-headedness, numbness and headaches  Hematological: Negative  Does not bruise/bleed easily  Psychiatric/Behavioral: Negative  Negative for confusion, hallucinations and sleep disturbance  All other systems reviewed and are negative

## 2023-06-29 ENCOUNTER — TELEPHONE (OUTPATIENT)
Dept: FAMILY MEDICINE CLINIC | Facility: CLINIC | Age: 78
End: 2023-06-29

## 2023-06-29 NOTE — TELEPHONE ENCOUNTER
Pt spouse called to cx next appt as they are moving out of state to [place] end of June 2023, also he will not be going for labs that are scheduled

## 2023-07-01 NOTE — TELEPHONE ENCOUNTER
06/30/23 10:27 PM    Please cancel appt for May 2024. The office's request has been received, reviewed, and the patient chart updated. The PCP has successfully been removed with a patient attribution note. This message will now be completed.         Thank you  Thi Cade

## 2023-07-10 NOTE — ASSESSMENT & PLAN NOTE
Continue pravachol Fluconazole Counseling:  Patient counseled regarding adverse effects of fluconazole including but not limited to headache, diarrhea, nausea, upset stomach, liver function test abnormalities, taste disturbance, and stomach pain.  There is a rare possibility of liver failure that can occur when taking fluconazole.  The patient understands that monitoring of LFTs and kidney function test may be required, especially at baseline. The patient verbalized understanding of the proper use and possible adverse effects of fluconazole.  All of the patient's questions and concerns were addressed.

## 2023-08-01 PROBLEM — J18.9 PNEUMONIA OF LEFT LOWER LOBE DUE TO INFECTIOUS ORGANISM: Status: RESOLVED | Noted: 2023-06-01 | Resolved: 2023-08-01

## 2023-08-04 ENCOUNTER — REMOTE DEVICE CLINIC VISIT (OUTPATIENT)
Dept: CARDIOLOGY CLINIC | Facility: CLINIC | Age: 78
End: 2023-08-04
Payer: COMMERCIAL

## 2023-08-04 DIAGNOSIS — Z95.818 PRESENCE OF OTHER CARDIAC IMPLANTS AND GRAFTS: Primary | ICD-10-CM

## 2023-08-04 PROCEDURE — G2066 INTER DEVC REMOTE 30D: HCPCS | Performed by: INTERNAL MEDICINE

## 2023-08-04 PROCEDURE — 93298 REM INTERROG DEV EVAL SCRMS: CPT | Performed by: INTERNAL MEDICINE

## 2023-08-04 NOTE — PROGRESS NOTES
MDT LNQ22/ ACTIVE SYSTEM IS MRI CONDITIONAL   CARELINK TRANSMISSION: LOOP RECORDER. PRESENTING RHYTHM NSR @ 66 BPM. BATTERY STATUS "OK." 4 TACHY EPISODES W/ EGRAMS SHOWING SVT @ 162-176 BPM. NO PATIENT ACTIVATED EPISODES. NORMAL DEVICE FUNCTION.  DL

## 2023-08-08 DIAGNOSIS — I47.1 SVT (SUPRAVENTRICULAR TACHYCARDIA) (HCC): Primary | ICD-10-CM

## 2023-08-08 RX ORDER — METOPROLOL SUCCINATE 25 MG/1
25 TABLET, EXTENDED RELEASE ORAL DAILY
Qty: 30 TABLET | Refills: 3 | Status: SHIPPED | OUTPATIENT
Start: 2023-08-08

## 2023-08-08 NOTE — PROGRESS NOTES
Patient with new paroxysmal SVT noted on recent loop recorder interrogation. I spoke with patient's wife - she reports that he was asymptomatic with these episodes, has not reported palpitations or symptoms corresponding to these episodes. Per Dr. Sangeetha Dawn, will start Toprol XL. She reports that he has had issues with dizziness, sometimes attributed to hypotension, sometimes to hypoglycemia. Thus, we will start low dose Toprol XL 25 mg daily. I explained that they may also try 12.5 mg twice daily if he seems to tolerate that better. They recently moved to [place], and have an appointment to establish with a PCP later this month. I will send in a one month supply to their new local pharmacy. They also report not currently having a monitor (unclear how we received the latest transmission?), thus I will reach out to our device clinic to send them a replacement. I asked them to establish care locally, at which time we can transfer care to that clinic. All questions were answered, and they agree with this plan. I asked them to call us with any questions or issues.

## 2023-09-18 ENCOUNTER — TELEPHONE (OUTPATIENT)
Dept: VASCULAR SURGERY | Facility: CLINIC | Age: 78
End: 2023-09-18

## 2023-09-18 NOTE — TELEPHONE ENCOUNTER
Left mess on spouses vm to call back with new insurance info. Per Medicare patient has replacement plan called [facility]. 0 Render Note In Bullet Format When Appropriate: No Application Tool (Optional): Cry-AC Post-Care Instructions: I reviewed with the patient in detail post-care instructions. Patient is to wear sunprotection, and avoid picking at any of the treated lesions. Pt may apply Vaseline to crusted or scabbing areas. Number Of Freeze-Thaw Cycles: 2 freeze-thaw cycles Duration Of Freeze Thaw-Cycle (Seconds): 4 Detail Level: Detailed Consent: The patient's consent was obtained including but not limited to risks of crusting, scabbing, blistering, scarring, darker or lighter pigmentary change, recurrence, incomplete removal and infection. Show Aperture Variable?: Yes

## 2023-09-19 ENCOUNTER — TELEPHONE (OUTPATIENT)
Dept: VASCULAR SURGERY | Facility: CLINIC | Age: 78
End: 2023-09-19

## 2023-09-19 NOTE — TELEPHONE ENCOUNTER
I spoke with Victor Hugo's wife. ... She was in her car and said she will call me back when she gets home with [facility] info.

## 2023-11-03 ENCOUNTER — REMOTE DEVICE CLINIC VISIT (OUTPATIENT)
Dept: CARDIOLOGY CLINIC | Facility: CLINIC | Age: 78
End: 2023-11-03
Payer: MEDICARE

## 2023-11-03 DIAGNOSIS — Z95.818 PRESENCE OF OTHER CARDIAC IMPLANTS AND GRAFTS: Primary | ICD-10-CM

## 2023-11-03 PROCEDURE — G2066 INTER DEVC REMOTE 30D: HCPCS | Performed by: INTERNAL MEDICINE

## 2023-11-03 PROCEDURE — 93298 REM INTERROG DEV EVAL SCRMS: CPT | Performed by: INTERNAL MEDICINE

## 2023-11-21 LAB — COLOGUARD RESULT REPORTABLE: NORMAL

## 2025-05-29 NOTE — TEAM CONFERENCE
Acute RehabilitationTeam Conference Note  Date: 3/10/2021   Time: 10:37 AM       Patient Name:  Jaquelin Patricia       Medical Record Number: [id number]   YOB: 1945  Sex:  Male          Room/Bed:  /[facility] 216-01  Payor Info:  Payor: Tresa Mascorro  REP / Plan: Sabina Soni [facility] REP / Product Type: Medicare O /      Admitting Diagnosis: Stroke [facility]) [I63 9]   Admit Date/Time:  3/5/2021  5:34 PM  Admission Comments: No comment available     Primary Diagnosis:  CVA (cerebral vascular accident) ([place] [address] )  Principal Problem: CVA (cerebral vascular accident) [facility])    Patient Active Problem List    Diagnosis Date Noted    CVA (cerebral vascular accident) ([facility] ) 03/03/2021    Erectile dysfunction due to type 2 diabetes mellitus ([place] [address] ) 10/01/2020    Long term current use of insulin ([place] [address] ) 10/01/2020    Medicare annual wellness visit, subsequent 06/28/2018    DMII (diabetes mellitus, type 2) ([place] [address] ) 02/07/2018    Hypothyroidism 02/07/2018    GERD (gastroesophageal reflux disease) 02/07/2018    Hyperlipidemia 02/07/2018    Stage 3 chronic kidney disease 02/07/2018    Adenocarcinoma of prostate ([facility] ) 11/07/2017    Lumbar radicular pain 09/05/2017    Sensorineural hearing loss (SNHL), bilateral 07/21/2017    Vitamin D deficiency 11/04/2014    Arthritis 06/26/2012    Essential hypertension 06/26/2012       Physical Therapy:    Weight Bearing Status: Full Weight Bearing  Transfers: Incidental Touching, Minimal Assistance(CG STS with HW/wall or bed rail; MIN A SPT/sit pivot)  Bed Mobility: Minimal Assistance  Amulation Distance (ft): 24 feet  Ambulation: Minimal Assistance, Moderate Assistance  Assistive Device for Ambulation: Solo Walker  Wheelchair Mobility Distance: 157 ft  Wheelchair Mobility: Supervision  Discharge Recommendations: Home with:  [address] with[de-identified] 24 Hour Assisteance, Family Support, First Floor Setup, Home Physical Therapy    03/10/2021:   Patient participating in therapy and making positive gains  Patient MIN A bed mobility; CG STS transfer with HW vs wall/bed rail; MIN A SPT/sit pivot transfer; S wheelchair mobility up to 157' level and unlevel surfaces; MIN-occasional MOD A ambulation up to 24' level surfaces with HW and DF wrap assist   Remains limited by decreased ROM/strength, decreased balance and safety, and decreased endurance  May benefit from continued inpatient ARC PT to increase function, safety, and increased independence in prep for safe d/c  Occupational Therapy:  Eating: Supervision  Grooming: Supervision  Bathing: Minimal Assistance  Bathing: Minimal Assistance  Upper Body Dressing: Minimal Assistance  Lower Body Dressing: Maximum Assistance  Toileting: Moderate Assistance  Tub/Shower Transfer: (TBA)  Toilet Transfer: Minimal Assistance  Cognition: Within Defined Limits  Orientation: Person, Place, Time, Situation  Discharge Recommendations: Home with:  [address] with[de-identified] Family Support, Home Occupational Therapy       3/10/21: Pt's current LOF listed above  Barriers include decreased strength, ROM, and coordination on L side with LUE > LLE, impaired tone LUE, decreased balance, decreased independence in self care, and decreased activity tolerance  Pt participating in ADL training, therapeutic exercises, therapeutic activities, functional mobility/transfers, neuromuscular reeducation, and activity tolerance in order to progress towards goals  Pt would benefit from continued skilled OT services in order to address listed barriers and prepare for safe d/c     Speech Therapy:  Mode of Communication: Verbal  Cognition: Within Defined Limits     Orientation: Person, Place, Time, Situation  Discharge Recommendations: Home with:  [address] with[de-identified] Family Support(level of assist pending pt's progress)  Cognitive linguistic evaluation was completed where pt found to be presenting with overall functional cognitive linguistic skills and appears to be at baseline   Pt completed the CLQT+ on initial evaluation with scores correlating to overall cognitive linguistic skills to be WNL, as well as all cognitive linguistic domains  SLP also spoke with pt's wife who was present later in the day-stated that pt appears to be at baseline and she has not observed any deficits  SLP spoke with OT and PT teams-both deny any cognitive linguistic deficits observed  Based on current evaluation, pt is presenting with overall cognitive linguistic skills that are WNL at time of assessment and further skilled ST services are not warranted for structured cognitive linguistic therapy at this time  However, pt will benefit from short term follow up of skilled ST services to provide stroke education as during discussion, pt had appropriate questions related to stroke and secondary stroke prevention  Anticipate brief follow up-staff to inform ST team should cognitive linguistic difficulties be observed with ST team to then follow  Nursing Notes:  Appetite: Good  Diet Type: Cardiac, Diabetic, 2 gram sodium diet                      Diet Patient/Family Education Complete: Yes                            Bladder: Continent     Bladder Patient/Family Education: Yes  Bowel: Continent     Bowel Patient/Family Education: Yes     Pain Score: 0                          Pain Patient/Family Education: Yes  Medication Management/Safety  Injectable: Insulin  Safe Administration: Yes  Medication Patient/Family Education Complete: Yes    3/8/21 Pt admitted S/P Stroke  Pt alert and oriented  LUE Flaccid and LLE very weak  Pt is continent of bowel and bladder  Pt uses urinal  Assist of 2 with transfers  Case Management:     Discharge Planning  Living Arrangements: Spouse/significant other  Support Systems: Spouse/significant other, Children  Assistance Needed: n/a  Type of Current Residence: Private residence  Current [address] : No  Initial assessment & orientation to [facility] with Pt & phone contact with Pt's spouse   Pt & his spouse reside in a multi-story home, 0 steps in, 13 inside, but can stay on the FF if needed  Pt's spouse is a retired RN & will be available to provide 24 hour supervision & assistance  Pt has no DME  Pt was completely independent PTA & expressed a very high level of motivation to reach his rehab goals  Pt spoke about his desire to continue mowing lawns in his neighborhood, which he does on a volunray basis  Pt was tearful as he spoke about concerns related to the stroke, but also expresseed a strong sense of hope & optimism  Provided emotional support & encouragement  Offered Pt support sessions as needed  Discussed role of team members & reviewed [address] with Pt & spouse, who expressed understanding & agreement  SW will continue to monitor & assist as needed with [address]  Pt has [employer], see UR section of chart for insurance details  Is the patient actively participating in therapies? yes  List any modifications to the treatment plan: na    Barriers Interventions   High blood sugars Medical management   Left sided weakness Therapeutic exercise, therapeutic activity, possible bracing, sling for mobility, positioning    Decreased balance    ADL, transfer, gait training   Decreased safety Cues, ADL, transfer, gait training   Decreased cog ST strategies, ADL/transfer/gait training     Is the patient making expected progress toward goals?  yes  List any update or changes to goals: na    Medical Goals: Patient will be able to manage medical conditions and comorbid conditions with medications and follow up upon completion of rehab program    Weekly Team Goals:   Rehab Team Goals  ADL Team Goal: Patient will require assist with ADLs with least restrictive device upon completion of rehab program  Bowel/Bladder Team Goal: Patient will return to premorbid level for bladder/bowel management upon completion of rehab program  Transfer Team Goal: Patient will require supervision with transfers with least restrictive device upon completion of rehab program  Locomotion Team Goal: Patient will require assist with locomotion with least restrictive device upon completion of rehab program  Cognitive Team Goal: Patient will return to premorbid level of cognitive activity upon completion of rehab program     CG bed mobility  CG/S transfers  CG/min assist mobility  Mod I wc mobility  Min assist LB self care and toileting  CG bathing   Mod I cog    Health and wellness: to be able to return to walking outdoors/cutting grass    Discussion: Plan for return home with wife with Edyta Hearn for PT, OT, and nursing     Anticipated Discharge Date: March 23, 2021 denies pain/discomfort (Rating = 0)

## (undated) DEVICE — GLOVE SRG BIOGEL 8

## (undated) DEVICE — 3M™ TEGADERM™ TRANSPARENT FILM DRESSING FRAME STYLE, 1624W, 2-3/8 IN X 2-3/4 IN (6 CM X 7 CM), 100/CT 4CT/CASE: Brand: 3M™ TEGADERM™

## (undated) DEVICE — SYRINGE 10ML LL

## (undated) DEVICE — ENDOPATH PNEUMONEEDLE INSUFFLATION NEEDLES WITH LUER LOCK CONNECTORS 120MM: Brand: ENDOPATH

## (undated) DEVICE — GLOVE INDICATOR PI UNDERGLOVE SZ 7 BLUE

## (undated) DEVICE — LUBRICANT INST ELECTROLUBE ANTISTK WO PAD

## (undated) DEVICE — SUT VICRYL 0 CT-1 27 IN J260H

## (undated) DEVICE — JP CHANNEL DRAIN, 19FR HUBLESS: Brand: CARDINAL HEALTH

## (undated) DEVICE — SUT MONOCRYL 4-0 PS-2 27 IN Y426H

## (undated) DEVICE — PACK ROBOTIC PROSTATE PBDS DA VINCI SI/XI

## (undated) DEVICE — PAD GROUNDING ADULT

## (undated) DEVICE — CHLORAPREP HI-LITE 26ML ORANGE

## (undated) DEVICE — 3000CC GUARDIAN II: Brand: GUARDIAN

## (undated) DEVICE — 3M™ DURAPORE™ SURGICAL TAPE 2 INCHES X 10YARDS (5.0CM X 9.1M) 6ROLLS/CARTON 10CARTONS/CASE 1538-2: Brand: 3M™ DURAPORE™

## (undated) DEVICE — JACKSON-PRATT 100CC BULB RESERVOIR: Brand: CARDINAL HEALTH

## (undated) DEVICE — SUT VICRYL 2-0 REEL 54 IN J286G

## (undated) DEVICE — SUT VICRYL 0 UR-6 27 IN J603H

## (undated) DEVICE — TUBING SUCTION 5MM X 12 FT

## (undated) DEVICE — SURGICEL 4 X 8

## (undated) DEVICE — SUT VICRYL 2-0 SH 27 IN UNDYED J417H

## (undated) DEVICE — ENDOPATH XCEL UNIVERSAL TROCAR STABLILITY SLEEVES: Brand: ENDOPATH XCEL

## (undated) DEVICE — GLOVE SRG BIOGEL ECLIPSE 7

## (undated) DEVICE — PERMANENT CAUTERY HOOK: Brand: ENDOWRIST

## (undated) DEVICE — SUT ETHILON 3-0 FS-1 18 IN 663G

## (undated) DEVICE — 3M™ STERI-STRIP™ REINFORCED ADHESIVE SKIN CLOSURES, R1541, 1/4 IN X 3 IN (6 MM X 75 MM), 3 STRIPS/ENVELOPE: Brand: 3M™ STERI-STRIP™

## (undated) DEVICE — SUT VLOC 90 3-0 90 CV-23 L9 IN VLOCM1944

## (undated) DEVICE — PLUMEPEN PRO 10FT

## (undated) DEVICE — DRAIN SPONGES,6 PLY: Brand: EXCILON

## (undated) DEVICE — PENROSE DRAIN, 18 X 3 8: Brand: CARDINAL HEALTH

## (undated) DEVICE — DRAPE EQUIPMENT RF WAND

## (undated) DEVICE — PROGRASP FORCEPS: Brand: ENDOWRIST

## (undated) DEVICE — URIMETER 2500ML

## (undated) DEVICE — TIP COVER ACCESSORY

## (undated) DEVICE — NEEDLE 25G X 1 1/2

## (undated) DEVICE — SUT VLOC 90 3-0 CV-23 9 IN VLOCM0844

## (undated) DEVICE — FLOSEAL MATRIX IS INDICATED IN SURGICAL PROCEDURES (OTHER THAN IN OPHTHALMIC) AS AN ADJUNCT TO HEMOSTASIS WHEN CONTROL OF BLEEDING BY LIGATURE OR CONVENTIONALPROCEDURES IS INEFFECTIVE OR IMPRACTICAL.: Brand: FLOSEAL HEMOSTATIC MATRIX

## (undated) DEVICE — BETHLEHEM UNIVERSAL MINOR GEN: Brand: CARDINAL HEALTH

## (undated) DEVICE — HEM-O-LOK CLIP CARTRIDGE LARGE DA VINCI SI/XI

## (undated) DEVICE — GAUZE SPONGES,16 PLY: Brand: CURITY

## (undated) DEVICE — ASTOUND STANDARD SURGICAL GOWN, XL: Brand: CONVERTORS

## (undated) DEVICE — ALL PURPOSE SPONGES,NON-WOVEN, 3 PLY: Brand: CURITY

## (undated) DEVICE — POSITIONER EGGCRATE

## (undated) DEVICE — ENDOPATH XCEL BLADELESS TROCARS WITH STABILITY SLEEVES: Brand: ENDOPATH XCEL

## (undated) DEVICE — ADHESIVE SKN CLSR HISTOACRYL FLEX 0.5ML LF

## (undated) DEVICE — ENDOPOUCH RETRIEVER SPECIMEN RETRIEVAL BAGS: Brand: ENDOPOUCH RETRIEVER

## (undated) DEVICE — SUT PROLENE 1 CT-1 30 IN 8425H

## (undated) DEVICE — DRAPE FLUID WARMER (BIRD BATH)

## (undated) DEVICE — 3M™ TEGADERM™ TRANSPARENT FILM DRESSING FRAME STYLE, 1626W, 4 IN X 4-3/4 IN (10 CM X 12 CM), 50/CT 4CT/CASE: Brand: 3M™ TEGADERM™

## (undated) DEVICE — SUT PDS II 0 CT-1 27 IN Z340H

## (undated) DEVICE — CATH FOLEY 20FR 5ML 2 WAY SILICONE ELASTIMER

## (undated) DEVICE — LARGE NEEDLE DRIVER: Brand: ENDOWRIST

## (undated) DEVICE — SUT VICRYL 3-0 SH 27 IN J416H

## (undated) DEVICE — INTENDED FOR TISSUE SEPARATION, AND OTHER PROCEDURES THAT REQUIRE A SHARP SURGICAL BLADE TO PUNCTURE OR CUT.: Brand: BARD-PARKER SAFETY BLADES SIZE 15, STERILE

## (undated) DEVICE — GLOVE SRG BIOGEL 7

## (undated) DEVICE — CATH FOLEY 18FR 5ML 2 WAY SILICONE ELASTIMER

## (undated) DEVICE — ROBOT ACCESSORY KIT 4 ARM

## (undated) DEVICE — INTENDED FOR TISSUE SEPARATION, AND OTHER PROCEDURES THAT REQUIRE A SHARP SURGICAL BLADE TO PUNCTURE OR CUT.: Brand: BARD-PARKER SAFETY BLADES SIZE 11, STERILE

## (undated) DEVICE — LUBRICANT SURGILUBE TUBE 4 OZ  FLIP TOP

## (undated) DEVICE — MEDI-VAC YANK SUCT HNDL W/TPRD BULBOUS TIP: Brand: CARDINAL HEALTH

## (undated) DEVICE — 3M™ STERI-STRIP™ REINFORCED ADHESIVE SKIN CLOSURES, R1546, 1/4 IN X 4 IN (6 MM X 100 MM), 10 STRIPS/ENVELOPE: Brand: 3M™ STERI-STRIP™

## (undated) DEVICE — PK DISSECTING FORCEPS: Brand: ENDOWRIST;DAVINCI SI